# Patient Record
Sex: FEMALE | Race: WHITE | Employment: OTHER | URBAN - METROPOLITAN AREA
[De-identification: names, ages, dates, MRNs, and addresses within clinical notes are randomized per-mention and may not be internally consistent; named-entity substitution may affect disease eponyms.]

---

## 2017-01-05 ENCOUNTER — OFFICE VISIT (OUTPATIENT)
Dept: CARDIOLOGY CLINIC | Age: 62
End: 2017-01-05

## 2017-01-05 VITALS
WEIGHT: 167.4 LBS | HEART RATE: 96 BPM | DIASTOLIC BLOOD PRESSURE: 70 MMHG | SYSTOLIC BLOOD PRESSURE: 120 MMHG | BODY MASS INDEX: 27.02 KG/M2

## 2017-01-05 DIAGNOSIS — E78.1 PURE HYPERGLYCERIDEMIA: Primary | ICD-10-CM

## 2017-01-05 DIAGNOSIS — R94.39 POSITIVE CARDIAC STRESS TEST: ICD-10-CM

## 2017-01-05 PROCEDURE — 99214 OFFICE O/P EST MOD 30 MIN: CPT | Performed by: INTERNAL MEDICINE

## 2017-01-06 RX ORDER — ROSUVASTATIN CALCIUM 10 MG/1
10 TABLET, COATED ORAL NIGHTLY
Qty: 90 TABLET | Refills: 0 | Status: SHIPPED | OUTPATIENT
Start: 2017-01-06 | End: 2017-05-12 | Stop reason: SDUPTHER

## 2017-01-20 RX ORDER — TRAMADOL HYDROCHLORIDE 50 MG/1
TABLET ORAL
Qty: 30 TABLET | Refills: 0 | Status: SHIPPED | OUTPATIENT
Start: 2017-01-20 | End: 2017-03-06 | Stop reason: SDUPTHER

## 2017-01-20 RX ORDER — PREDNISONE 1 MG/1
1 TABLET ORAL DAILY
Qty: 10 TABLET | Refills: 0 | Status: SHIPPED | OUTPATIENT
Start: 2017-01-20 | End: 2017-07-11

## 2017-02-14 RX ORDER — CITALOPRAM 20 MG/1
TABLET ORAL
Qty: 90 TABLET | Refills: 0 | Status: SHIPPED | OUTPATIENT
Start: 2017-02-14 | End: 2017-04-05 | Stop reason: SDUPTHER

## 2017-03-06 RX ORDER — TRAMADOL HYDROCHLORIDE 50 MG/1
TABLET ORAL
Qty: 30 TABLET | Refills: 0 | Status: SHIPPED | OUTPATIENT
Start: 2017-03-06 | End: 2017-04-05 | Stop reason: SDUPTHER

## 2017-03-14 DIAGNOSIS — B37.0 ORAL THRUSH: Primary | ICD-10-CM

## 2017-04-05 ENCOUNTER — OFFICE VISIT (OUTPATIENT)
Dept: FAMILY MEDICINE CLINIC | Age: 62
End: 2017-04-05

## 2017-04-05 VITALS
WEIGHT: 162 LBS | SYSTOLIC BLOOD PRESSURE: 140 MMHG | OXYGEN SATURATION: 98 % | BODY MASS INDEX: 26.03 KG/M2 | DIASTOLIC BLOOD PRESSURE: 74 MMHG | HEART RATE: 93 BPM | HEIGHT: 66 IN

## 2017-04-05 DIAGNOSIS — Z86.69 HISTORY OF MIGRAINE: ICD-10-CM

## 2017-04-05 DIAGNOSIS — M79.7 FIBROMYALGIA: Primary | ICD-10-CM

## 2017-04-05 DIAGNOSIS — Z02.89 PAIN MEDICATION AGREEMENT: ICD-10-CM

## 2017-04-05 PROCEDURE — 99214 OFFICE O/P EST MOD 30 MIN: CPT | Performed by: FAMILY MEDICINE

## 2017-04-05 RX ORDER — RIZATRIPTAN BENZOATE 10 MG/1
TABLET ORAL
Qty: 27 TABLET | Refills: 3 | Status: SHIPPED | OUTPATIENT
Start: 2017-04-05 | End: 2017-12-21 | Stop reason: SDUPTHER

## 2017-04-05 RX ORDER — TRAMADOL HYDROCHLORIDE 50 MG/1
TABLET ORAL
Qty: 30 TABLET | Refills: 2 | Status: SHIPPED | OUTPATIENT
Start: 2017-04-05 | End: 2017-07-20 | Stop reason: SDUPTHER

## 2017-04-05 RX ORDER — CITALOPRAM 20 MG/1
TABLET ORAL
Qty: 90 TABLET | Refills: 1 | Status: SHIPPED | OUTPATIENT
Start: 2017-04-05 | End: 2017-12-11 | Stop reason: SDUPTHER

## 2017-04-06 ENCOUNTER — NURSE ONLY (OUTPATIENT)
Dept: FAMILY MEDICINE CLINIC | Age: 62
End: 2017-04-06

## 2017-04-06 DIAGNOSIS — E78.1 PURE HYPERGLYCERIDEMIA: ICD-10-CM

## 2017-04-06 DIAGNOSIS — E78.5 HYPERLIPIDEMIA, UNSPECIFIED HYPERLIPIDEMIA TYPE: Primary | ICD-10-CM

## 2017-04-06 DIAGNOSIS — R94.39 POSITIVE CARDIAC STRESS TEST: ICD-10-CM

## 2017-04-07 LAB
A/G RATIO: 1.8 (ref 1.1–2.2)
ALBUMIN SERPL-MCNC: 4.2 G/DL (ref 3.4–5)
ALP BLD-CCNC: 85 U/L (ref 40–129)
ALT SERPL-CCNC: 10 U/L (ref 10–40)
ANION GAP SERPL CALCULATED.3IONS-SCNC: 15 MMOL/L (ref 3–16)
AST SERPL-CCNC: 13 U/L (ref 15–37)
BILIRUB SERPL-MCNC: 0.3 MG/DL (ref 0–1)
BUN BLDV-MCNC: 10 MG/DL (ref 7–20)
CALCIUM SERPL-MCNC: 9.2 MG/DL (ref 8.3–10.6)
CHLORIDE BLD-SCNC: 102 MMOL/L (ref 99–110)
CHOLESTEROL, TOTAL: 186 MG/DL (ref 0–199)
CO2: 25 MMOL/L (ref 21–32)
CREAT SERPL-MCNC: 0.6 MG/DL (ref 0.6–1.2)
GFR AFRICAN AMERICAN: >60
GFR NON-AFRICAN AMERICAN: >60
GLOBULIN: 2.3 G/DL
GLUCOSE BLD-MCNC: 92 MG/DL (ref 70–99)
HDLC SERPL-MCNC: 59 MG/DL (ref 40–60)
LDL CHOLESTEROL CALCULATED: 100 MG/DL
POTASSIUM SERPL-SCNC: 3.9 MMOL/L (ref 3.5–5.1)
SODIUM BLD-SCNC: 142 MMOL/L (ref 136–145)
TOTAL PROTEIN: 6.5 G/DL (ref 6.4–8.2)
TRIGL SERPL-MCNC: 137 MG/DL (ref 0–150)
VLDLC SERPL CALC-MCNC: 27 MG/DL

## 2017-04-10 LAB
6-ACETYLMORPHINE: NOT DETECTED
7-AMINOCLONAZEPAM: NOT DETECTED
ALPHA-OH-ALPRAZOLAM: NOT DETECTED
ALPRAZOLAM: NOT DETECTED
AMPHETAMINE: NOT DETECTED
BARBITURATES: NOT DETECTED
BENZOYLECGONINE: NOT DETECTED
BUPRENORPHINE: NOT DETECTED
CARISOPRODOL: NOT DETECTED
CLONAZEPAM: NOT DETECTED
CODEINE: NOT DETECTED
CREATININE URINE: 195.9 MG/DL (ref 20–400)
DIAZEPAM: NOT DETECTED
DRUGS EXPECTED: NORMAL
EER PAIN MGT DRUG PANEL, HIGH RES/EMIT U: NORMAL
ETHYL GLUCURONIDE: NOT DETECTED
FENTANYL: NOT DETECTED
HYDROCODONE: NOT DETECTED
HYDROMORPHONE: NOT DETECTED
LORAZEPAM: NOT DETECTED
MARIJUANA METABOLITE: NOT DETECTED
MDA: NOT DETECTED
MDEA: NOT DETECTED
MDMA URINE: NOT DETECTED
MEPERIDINE: NOT DETECTED
METHADONE: NOT DETECTED
METHAMPHETAMINE: NOT DETECTED
METHYLPHENIDATE: NOT DETECTED
MIDAZOLAM: NOT DETECTED
MORPHINE: NOT DETECTED
NORBUPRENORPHINE, FREE: NOT DETECTED
NORDIAZEPAM: NOT DETECTED
NORFENTANYL: NOT DETECTED
NORHYDROCODONE, URINE: NOT DETECTED
NOROXYCODONE: NOT DETECTED
NOROXYMORPHONE, URINE: NOT DETECTED
OXAZEPAM: NOT DETECTED
OXYCODONE: NOT DETECTED
OXYMORPHONE: NOT DETECTED
PAIN MANAGEMENT DRUG PANEL: NORMAL
PAIN MANAGEMENT DRUG PANEL: NORMAL
PCP: NOT DETECTED
PHENTERMINE: NOT DETECTED
PROPOXYPHENE: NOT DETECTED
TAPENTADOL, URINE: NOT DETECTED
TAPENTADOL-O-SULFATE, URINE: NOT DETECTED
TEMAZEPAM: NOT DETECTED
TRAMADOL: PRESENT
ZOLPIDEM: NOT DETECTED

## 2017-05-12 RX ORDER — ROSUVASTATIN CALCIUM 10 MG/1
TABLET, COATED ORAL
Qty: 90 TABLET | Refills: 0 | Status: SHIPPED | OUTPATIENT
Start: 2017-05-12 | End: 2017-09-21 | Stop reason: SDUPTHER

## 2017-06-08 ENCOUNTER — TELEPHONE (OUTPATIENT)
Dept: FAMILY MEDICINE CLINIC | Age: 62
End: 2017-06-08

## 2017-06-08 DIAGNOSIS — R51.9 CHRONIC NONINTRACTABLE HEADACHE, UNSPECIFIED HEADACHE TYPE: Primary | ICD-10-CM

## 2017-06-08 DIAGNOSIS — G89.29 CHRONIC NONINTRACTABLE HEADACHE, UNSPECIFIED HEADACHE TYPE: Primary | ICD-10-CM

## 2017-06-15 PROBLEM — G43.119 MIGRAINE WITH AURA, INTRACTABLE, WITHOUT STATUS MIGRAINOSUS: Status: ACTIVE | Noted: 2017-06-15

## 2017-06-15 PROBLEM — R41.3 MEMORY PROBLEM: Status: ACTIVE | Noted: 2017-06-15

## 2017-06-15 PROBLEM — M79.2 NEUROPATHIC PAIN: Status: ACTIVE | Noted: 2017-06-15

## 2017-06-15 PROBLEM — G43.719 INTRACTABLE CHRONIC MIGRAINE WITHOUT AURA AND WITHOUT STATUS MIGRAINOSUS: Status: ACTIVE | Noted: 2017-06-15

## 2017-06-22 DIAGNOSIS — R30.0 DYSURIA: Primary | ICD-10-CM

## 2017-06-22 LAB
BILIRUBIN, POC: ABNORMAL
BLOOD URINE, POC: ABNORMAL
CLARITY, POC: ABNORMAL
COLOR, POC: ABNORMAL
GLUCOSE URINE, POC: ABNORMAL
KETONES, POC: ABNORMAL
LEUKOCYTE EST, POC: ABNORMAL
NITRITE, POC: ABNORMAL
PH, POC: 7.5
PROTEIN, POC: 30
SPECIFIC GRAVITY, POC: 1.02
UROBILINOGEN, POC: 0.2

## 2017-06-22 PROCEDURE — 81002 URINALYSIS NONAUTO W/O SCOPE: CPT | Performed by: FAMILY MEDICINE

## 2017-06-22 RX ORDER — NITROFURANTOIN 25; 75 MG/1; MG/1
100 CAPSULE ORAL 2 TIMES DAILY
Qty: 14 CAPSULE | Refills: 0 | Status: SHIPPED | OUTPATIENT
Start: 2017-06-22 | End: 2017-06-29

## 2017-06-23 ENCOUNTER — HOSPITAL ENCOUNTER (OUTPATIENT)
Dept: MRI IMAGING | Age: 62
Discharge: HOME | End: 2017-06-23
Attending: PSYCHIATRY & NEUROLOGY | Admitting: PSYCHIATRY & NEUROLOGY

## 2017-06-23 DIAGNOSIS — G43.119 MIGRAINE WITH AURA, INTRACTABLE, WITHOUT STATUS MIGRAINOSUS: ICD-10-CM

## 2017-06-23 DIAGNOSIS — R41.3 MEMORY PROBLEM: ICD-10-CM

## 2017-06-23 DIAGNOSIS — G43.719 INTRACTABLE CHRONIC MIGRAINE WITHOUT AURA AND WITHOUT STATUS MIGRAINOSUS: ICD-10-CM

## 2017-06-24 LAB
ORGANISM: ABNORMAL
URINE CULTURE, ROUTINE: ABNORMAL

## 2017-07-07 ENCOUNTER — PATIENT MESSAGE (OUTPATIENT)
Dept: FAMILY MEDICINE CLINIC | Age: 62
End: 2017-07-07

## 2017-07-10 ENCOUNTER — TELEPHONE (OUTPATIENT)
Dept: FAMILY MEDICINE CLINIC | Age: 62
End: 2017-07-10

## 2017-07-11 ENCOUNTER — OFFICE VISIT (OUTPATIENT)
Dept: FAMILY MEDICINE CLINIC | Age: 62
End: 2017-07-11

## 2017-07-11 ENCOUNTER — TELEPHONE (OUTPATIENT)
Dept: FAMILY MEDICINE CLINIC | Age: 62
End: 2017-07-11

## 2017-07-11 VITALS
SYSTOLIC BLOOD PRESSURE: 138 MMHG | WEIGHT: 168 LBS | HEART RATE: 74 BPM | HEIGHT: 66 IN | DIASTOLIC BLOOD PRESSURE: 82 MMHG | OXYGEN SATURATION: 98 % | BODY MASS INDEX: 27 KG/M2

## 2017-07-11 DIAGNOSIS — M79.7 FIBROMYALGIA: ICD-10-CM

## 2017-07-11 DIAGNOSIS — Z12.39 SCREENING FOR BREAST CANCER: ICD-10-CM

## 2017-07-11 PROCEDURE — 99212 OFFICE O/P EST SF 10 MIN: CPT | Performed by: NURSE PRACTITIONER

## 2017-07-11 RX ORDER — MAGNESIUM OXIDE 400 MG/1
400 TABLET ORAL DAILY
COMMUNITY
End: 2019-06-17 | Stop reason: ALTCHOICE

## 2017-07-19 ASSESSMENT — ENCOUNTER SYMPTOMS: RESPIRATORY NEGATIVE: 1

## 2017-07-20 RX ORDER — TRAMADOL HYDROCHLORIDE 50 MG/1
TABLET ORAL
Qty: 30 TABLET | Refills: 0 | Status: SHIPPED | OUTPATIENT
Start: 2017-07-20 | End: 2017-08-31 | Stop reason: SDUPTHER

## 2017-08-31 RX ORDER — TRAMADOL HYDROCHLORIDE 50 MG/1
TABLET ORAL
Qty: 30 TABLET | Refills: 0 | Status: SHIPPED | OUTPATIENT
Start: 2017-08-31 | End: 2017-10-04 | Stop reason: SDUPTHER

## 2017-09-13 DIAGNOSIS — R11.0 NAUSEA: ICD-10-CM

## 2017-09-13 RX ORDER — PROMETHAZINE HYDROCHLORIDE 25 MG/1
25 TABLET ORAL EVERY 6 HOURS PRN
Qty: 20 TABLET | Refills: 0 | Status: SHIPPED | OUTPATIENT
Start: 2017-09-13 | End: 2018-03-08 | Stop reason: SDUPTHER

## 2017-09-21 RX ORDER — ROSUVASTATIN CALCIUM 10 MG/1
10 TABLET, COATED ORAL DAILY
Qty: 90 TABLET | Refills: 0 | Status: SHIPPED | OUTPATIENT
Start: 2017-09-21 | End: 2018-05-09

## 2017-10-04 RX ORDER — TRAMADOL HYDROCHLORIDE 50 MG/1
TABLET ORAL
Qty: 30 TABLET | Refills: 0 | Status: SHIPPED | OUTPATIENT
Start: 2017-10-04 | End: 2017-11-15 | Stop reason: SDUPTHER

## 2017-10-04 NOTE — TELEPHONE ENCOUNTER
From: Riaz Rodriguez  To: Gene Gallardo DO  Sent: 10/4/2017 11:03 AM EDT  Subject: Medication Renewal Request    Original authorizing provider: DO Shelby Aguilar would like a refill of the following medications:  traMADol (ULTRAM) 50 MG tablet Gene Gallardo DO]    Preferred pharmacy: [facility] [address], [address] [phone] - F [phone]    Comment:  Hi Dr. Wilian Albright, I need to request a refill on my prescription for Tramadol. My next appointment with you is not until 11.06.17.  Thanks

## 2017-10-24 ENCOUNTER — OFFICE VISIT (OUTPATIENT)
Dept: FAMILY MEDICINE CLINIC | Age: 62
End: 2017-10-24

## 2017-10-24 DIAGNOSIS — T78.40XA ALLERGIC REACTION, INITIAL ENCOUNTER: Primary | ICD-10-CM
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
## 2017-10-24 PROCEDURE — 99213 OFFICE O/P EST LOW 20 MIN: CPT | Performed by: NURSE PRACTITIONER

## 2017-10-24 PROCEDURE — 96372 THER/PROPH/DIAG INJ SC/IM: CPT | Performed by: NURSE PRACTITIONER

## 2017-10-24 PROCEDURE — G0444 DEPRESSION SCREEN ANNUAL: HCPCS | Performed by: NURSE PRACTITIONER

## 2017-10-24 RX ORDER — HYDROXYZINE HYDROCHLORIDE 25 MG/1
25 TABLET, FILM COATED ORAL 3 TIMES DAILY PRN
Qty: 20 TABLET | Refills: 0 | Status: SHIPPED | OUTPATIENT
Start: 2017-10-24 | End: 2019-08-23

## 2017-10-24 RX ORDER — MUPIROCIN CALCIUM 20 MG/G
CREAM TOPICAL
Qty: 30 G | Refills: 0 | Status: SHIPPED | OUTPATIENT
Start: 2017-10-24 | End: 2017-11-23

## 2017-10-24 RX ORDER — CLOTRIMAZOLE AND BETAMETHASONE DIPROPIONATE 10; .64 MG/G; MG/G
CREAM TOPICAL PRN
COMMUNITY
End: 2020-10-21

## 2017-10-24 RX ORDER — METHYLPREDNISOLONE SODIUM SUCCINATE 125 MG/2ML
125 INJECTION, POWDER, LYOPHILIZED, FOR SOLUTION INTRAMUSCULAR; INTRAVENOUS ONCE
Status: COMPLETED | OUTPATIENT
Start: 2017-10-24 | End: 2017-10-24

## 2017-10-24 RX ORDER — PREDNISONE 10 MG/1
TABLET ORAL
Qty: 30 TABLET | Refills: 0 | Status: SHIPPED | OUTPATIENT
Start: 2017-10-24 | End: 2017-10-26 | Stop reason: ALTCHOICE

## 2017-10-24 RX ADMIN — METHYLPREDNISOLONE SODIUM SUCCINATE 125 MG: 125 INJECTION, POWDER, LYOPHILIZED, FOR SOLUTION INTRAMUSCULAR; INTRAVENOUS at 19:38

## 2017-10-24 ASSESSMENT — PATIENT HEALTH QUESTIONNAIRE - PHQ9
2. FEELING DOWN, DEPRESSED OR HOPELESS: 3
1. LITTLE INTEREST OR PLEASURE IN DOING THINGS: 0
6. FEELING BAD ABOUT YOURSELF - OR THAT YOU ARE A FAILURE OR HAVE LET YOURSELF OR YOUR FAMILY DOWN: 1
9. THOUGHTS THAT YOU WOULD BE BETTER OFF DEAD, OR OF HURTING YOURSELF: 0
SUM OF ALL RESPONSES TO PHQ QUESTIONS 1-9: 6
10. IF YOU CHECKED OFF ANY PROBLEMS, HOW DIFFICULT HAVE THESE PROBLEMS MADE IT FOR YOU TO DO YOUR WORK, TAKE CARE OF THINGS AT HOME, OR GET ALONG WITH OTHER PEOPLE: 1
3. TROUBLE FALLING OR STAYING ASLEEP: 0
8. MOVING OR SPEAKING SO SLOWLY THAT OTHER PEOPLE COULD HAVE NOTICED. OR THE OPPOSITE, BEING SO FIGETY OR RESTLESS THAT YOU HAVE BEEN MOVING AROUND A LOT MORE THAN USUAL: 0
SUM OF ALL RESPONSES TO PHQ9 QUESTIONS 1 & 2: 3
4. FEELING TIRED OR HAVING LITTLE ENERGY: 2
5. POOR APPETITE OR OVEREATING: 0
7. TROUBLE CONCENTRATING ON THINGS, SUCH AS READING THE NEWSPAPER OR WATCHING TELEVISION: 0

## 2017-10-24 NOTE — PATIENT INSTRUCTIONS
provider before taking prednisone? You should not use this medication if you are allergic to prednisone, or if you have a fungal infection anywhere in your body. Steroid medication can weaken your immune system, making it easier for you to get an infection or worsening an infection you already have or have recently had. Tell your doctor about any illness or infection you have had within the past several weeks. To make sure prednisone is safe for you, tell your doctor if you have:  · any illness that causes diarrhea;  · liver disease (such as cirrhosis);  · kidney disease;  · heart disease, high blood pressure, low levels of potassium in your blood;  · a thyroid disorder;  · diabetes;  · a history of malaria;  · tuberculosis;  · osteoporosis;  · glaucoma, cataracts, or herpes infection of the eyes;  · stomach ulcers, ulcerative colitis, or a history of stomach bleeding;  · a muscle disorder such as myasthenia gravis; or  · depression or mental illness. Long-term use of steroids may lead to bone loss (osteoporosis), especially if you smoke, if you do not exercise, if you do not get enough vitamin D or calcium in your diet, or if you have a family history of osteoporosis. Talk with your doctor about your risk of osteoporosis. Prednisone can cause low birth weight or birth defects if you take the medicine during your first trimester. Tell your doctor if you are pregnant or plan to become pregnant while using this medication. Use effective birth control. Prednisone can pass into breast milk and may harm a nursing baby. Tell your doctor if you are breast-feeding a baby. Steroids can affect growth in children. Talk with your doctor if you think your child is not growing at a normal rate while using this medication. How should I take prednisone? Follow all directions on your prescription label. Your doctor may occasionally change your dose to make sure you get the best results.  Do not take this medicine in larger or smaller amounts or for longer than recommended. Take with food. Your dosage needs may change if you have any unusual stress such as a serious illness, fever or infection, or if you have surgery or a medical emergency. Do not change your medication dose or schedule without your doctor's advice. Measure liquid medicine with a special dose-measuring spoon or medicine cup. If you do not have a dose-measuring device, ask your pharmacist for one. Do not crush, chew, or break a delayed-release tablet. Swallow it whole. While using prednisone, you may need frequent blood tests at your doctor's office. Your blood pressure may also need to be checked. This medication can cause unusual results with certain medical tests. Tell any doctor who treats you that you are using prednisone. You should not stop using prednisone suddenly. Follow your doctor's instructions about tapering your dose. Wear a medical alert tag or carry an ID card stating that you take prednisone. Any medical care provider who treats you should know that you are using a steroid. Store at room temperature away from moisture and heat. What happens if I miss a dose? Take the missed dose as soon as you remember. Skip the missed dose if it is almost time for your next scheduled dose. Do not take extra medicine to make up the missed dose. What happens if I overdose? Seek emergency medical attention or call the Poison Help line at 1-684.212.8020. An overdose of prednisone is not expected to produce life threatening symptoms. However, long term use of high steroid doses can lead to symptoms such as thinning skin, easy bruising, changes in the shape or location of body fat (especially in your face, neck, back, and waist), increased acne or facial hair, menstrual problems, impotence, or loss of interest in sex. What should I avoid while taking prednisone? Avoid being near people who are sick or have infections.  Call your doctor for preventive treatment if you are exposed to chicken pox or measles. These conditions can be serious or even fatal in people who are using a steroid. Do not receive a \"live\" vaccine while using prednisone. Prednisone may increase your risk of harmful effects from a live vaccine. Live vaccines include measles, mumps, rubella (MMR), rotavirus, typhoid, yellow fever, varicella (chickenpox), zoster (shingles), and nasal flu (influenza) vaccine. Avoid drinking alcohol while you are taking prednisone. What are the possible side effects of prednisone? Get emergency medical help if you have any of these signs of an allergic reaction: hives; difficult breathing; swelling of your face, lips, tongue, or throat. Call your doctor at once if you have:  · blurred vision, eye pain, or seeing halos around lights;  · swelling, rapid weight gain, feeling short of breath;  · severe depression, feelings of extreme happiness or sadness, changes in personality or behavior, seizure (convulsions);  · bloody or tarry stools, coughing up blood;  · pancreatitis (severe pain in your upper stomach spreading to your back, nausea and vomiting, fast heart rate);  · low potassium (confusion, uneven heart rate, extreme thirst, increased urination, leg discomfort, muscle weakness or limp feeling); or  · dangerously high blood pressure (severe headache, blurred vision, buzzing in your ears, anxiety, confusion, chest pain, shortness of breath, uneven heartbeats, seizure). Other common side effects may include:  · sleep problems (insomnia), mood changes;  · increased appetite, gradual weight gain;  · acne, increased sweating, dry skin, thinning skin, bruising or discoloration;  · slow wound healing;  · headache, dizziness, spinning sensation;  · nausea, stomach pain, bloating; or  · changes in the shape or location of body fat (especially in your arms, legs, face, neck, breasts, and waist). This is not a complete list of side effects and others may occur.  Call your doctor for medical advice about side effects. You may report side effects to FDA at 8-261-SDE-2012. What other drugs will affect prednisone? Many drugs can interact with prednisone. Not all possible interactions are listed here. Tell your doctor about all your medications and any you start or stop using during treatment with prednisone, especially:  · amphotericin B;  · cyclosporine;  · digoxin, digitalis;  · Ramon's wort;  · an antibiotic such as clarithromycin or telithromycin;  · antifungal medication such as itraconazole, ketoconazole, posaconazole, voriconazole;  · birth control pills and other hormones;  · a blood thinner such as warfarin, Coumadin;  · a diuretic or \"water pill\";  · the hepatitis C medications boceprevir or telaprevir;  · HIV or AIDS medicine such as atazanavir, delavirdine, efavirenz, fosamprenavir, indinavir, nelfinavir, nevirapine, ritonavir, saquinavir;  · insulin or diabetes medications you take by mouth;  · a non-steroidal anti-inflammatory drug (NSAID) such as aspirin, ibuprofen (Advil, Motrin), naproxen (Aleve), celecoxib, diclofenac, indomethacin, meloxicam, and others;  · seizure medications such as carbamazepine, fosphenytoin, oxcarbazepine, phenobarbital, phenytoin, primidone; or  · the tuberculosis medications isoniazid, rifabutin, rifapentine, or rifampin. This list is not complete and many other drugs can interact with prednisone. This includes prescription and over-the-counter medicines, vitamins, and herbal products. Give a list of all your medicines to any healthcare provider who treats you. Where can I get more information? Your pharmacist can provide more information about prednisone. Remember, keep this and all other medicines out of the reach of children, never share your medicines with others, and use this medication only for the indication prescribed.   Every effort has been made to ensure that the information provided by Maddy smith accurate, up-to-date, and complete, but no guarantee is made to that effect. Drug information contained herein may be time sensitive. [facility] information has been compiled for use by healthcare practitioners and consumers in the [place] and therefore [facility] does not warrant that uses outside of the [place] are appropriate, unless specifically indicated otherwise. [facility] information does not endorse drugs, diagnose patients or recommend therapy. [facility]'s drug information is an informational resource designed to assist licensed healthcare practitioners in caring for their patients and/or to serve consumers viewing this service as a supplement to, and not a substitute for, the expertise, skill, knowledge and judgment of healthcare practitioners. The absence of a warning for a given drug or drug combination in no way should be construed to indicate that the drug or drug combination is safe, effective or appropriate for any given patient. [facility] does not assume any responsibility for any aspect of healthcare administered with the aid of information [facility] provides. The information contained herein is not intended to cover all possible uses, directions, precautions, warnings, drug interactions, allergic reactions, or adverse effects. If you have questions about the drugs you are taking, check with your doctor, nurse or pharmacist.  Copyright 8285-7593 [address]. Version: 9.01. Revision date: 2/13/2013. Care instructions adapted under license by [facility] ([facility]). If you have questions about a medical condition or this instruction, always ask your healthcare professional. [place] [address] any warranty or liability for your use of this information. Patient Education          hydroxyzine  Pronunciation:  julia DROX irene preston  Brand:  Vistaril  What is the most important information I should know about hydroxyzine?   You should not use hydroxyzine if you are pregnant, especially your entire arm or leg is swollen). · You have signs of infection, such as:  ¨ Increased pain, swelling, redness, or warmth around the sting. ¨ Red streaks leading from the area. ¨ Pus draining from the sting. ¨ A fever. Watch closely for changes in your health, and be sure to contact your doctor if:  · You do not get better as expected. Where can you learn more? Go to https://[email or web address]. org and sign in to your [employer] account. Enter P390 in the [employer] box to learn more about \"Insect Stings and Bites: Care Instructions. \"     If you do not have an account, please click on the \"Sign Up Now\" link. Current as of: March 20, 2017  Content Version: 11.3  © 0091-7299 [employer]. Care instructions adapted under license by [facility] ([facility]). If you have questions about a medical condition or this instruction, always ask your healthcare professional. [place] [address] any warranty or liability for your use of this information. Patient Education          mupirocin topical  Pronunciation:  myoo PEER oh sin  Brand:  Francie Hernandez AT Kit  What is the most important information I should know about mupirocin topical?  Follow all directions on your medicine label and package. Tell each of your healthcare providers about all your medical conditions, allergies, and all medicines you use. What is mupirocin topical?  Mupirocin is an antibiotic that prevents bacteria from growing on your skin. Mupirocin topical (for use on the skin) is used to treat skin infections such as impetigo (JH-xn-ELR-go) or a \"Staph\" infection of the skin. Mupirocin topical may also be used for purposes not listed in this medication guide. What should I discuss with my healthcare provider before using mupirocin topical?  You should not use this medicine if you are allergic to mupirocin. Do not use mupirocin topical on a child without medical advice.  The cream should not be used on a child younger than 1 months old. The ointment may be used on a child as young as 3 months old. It is not known whether this medicine will harm an unborn baby. Tell your doctor if you are pregnant or plan to become pregnant. It is not known whether mupirocin topical passes into breast milk or if it could harm a nursing baby. Tell your doctor if you are breast-feeding a baby. How should I use mupirocin topical?  Follow all directions on your prescription label. Do not use this medicine in larger or smaller amounts or for longer than recommended. Do not take by mouth. Mupirocin topical is for use only on the skin. If this medicine gets in your eyes, nose, or mouth, rinse with water. Wash your hands before and after applying mupirocin topical.  Clean and dry the affected skin area. Use a cotton swab or gauze pad to apply a small amount of mupirocin topical as directed. Do not spread mupirocin topical over large areas of skin. Use only a sterile gauze pad to cover the treated skin. Do not cover treated areas with a bandage, plastic wrap, or other covering that does not allow air to circulate. Mupirocin topical is usually applied 3 times per day for 10 days. Call your doctor if your symptoms do not improve within 3 to 5 days, or if your skin condition gets worse. Use this medicine for the full prescribed length of time. Your symptoms may improve before the infection is completely cleared. Skipping doses may also increase your risk of further infection that is resistant to antibiotics. Store at room temperature away from moisture and heat. Do not freeze. Keep the medicine tube tightly closed when not in use. What happens if I miss a dose? Apply the missed dose as soon as you remember. Skip the missed dose if it is almost time for your next dose. Do not use extra medicine to make up the missed dose. What happens if I overdose? An overdose of mupirocin topical is not expected to be dangerous.  Seek emergency medical attention or call the Poison Help line at 1-832.445.5161 if anyone has accidentally swallowed the medication. What should I avoid while using mupirocin topical?  Antibiotic medicines can cause diarrhea, which may be a sign of a new infection. If you have diarrhea that is watery or bloody, call your doctor. Do not use anti-diarrhea medicine unless your doctor tells you to. Avoid getting this medicine in your eyes, mouth, or nose. A separate product called mupirocin nasal is made for use in the nose. Mupirocin topical is for use only on the skin. Avoid using other medications on the areas you treat with mupirocin topical unless your doctor tells you to. What are the possible side effects of mupirocin topical?  Get emergency medical help if you have signs of an allergic reaction: hives; difficult breathing; swelling of your face, lips, tongue, or throat. Call your doctor at once if you have:  · severe stomach pain, diarrhea that is watery or bloody;  · redness, itching, dryness, or other irritation of treated skin;  · unusual skin blistering or peeling; or  · any signs of a new skin infection. Common side effects may include:  · rash;  · nausea; or  · headache. This is not a complete list of side effects and others may occur. Call your doctor for medical advice about side effects. You may report side effects to FDA at 6-613-JUM-3618. What other drugs will affect mupirocin topical?  It is not likely that other drugs you take orally or inject will have an effect on topically applied mupirocin. But many drugs can interact with each other. Tell each of your health care providers about all medicines you use, including prescription and over-the-counter medicines, vitamins, and herbal products. Where can I get more information?   Your pharmacist can provide more information about mupirocin topical.    Remember, keep this and all other medicines out of the reach of children, never share your medicines with others, and use this medication only for the indication prescribed. Every effort has been made to ensure that the information provided by Maddy Donaldson Dr is accurate, up-to-date, and complete, but no guarantee is made to that effect. Drug information contained herein may be time sensitive. [facility] information has been compiled for use by healthcare practitioners and consumers in the [place] and therefore [facility] does not warrant that uses outside of the [place] are appropriate, unless specifically indicated otherwise. [facility]'s drug information does not endorse drugs, diagnose patients or recommend therapy. [facility]'s drug information is an informational resource designed to assist licensed healthcare practitioners in caring for their patients and/or to serve consumers viewing this service as a supplement to, and not a substitute for, the expertise, skill, knowledge and judgment of healthcare practitioners. The absence of a warning for a given drug or drug combination in no way should be construed to indicate that the drug or drug combination is safe, effective or appropriate for any given patient. [facility] does not assume any responsibility for any aspect of healthcare administered with the aid of information [facility] provides. The information contained herein is not intended to cover all possible uses, directions, precautions, warnings, drug interactions, allergic reactions, or adverse effects. If you have questions about the drugs you are taking, check with your doctor, nurse or pharmacist.  Copyright 2025-0274 [address]. Version: 3.01. Revision date: 2/1/2016. Care instructions adapted under license by [facility] ([facility]). If you have questions about a medical condition or this instruction, always ask your healthcare professional. [place] [address] any warranty or liability for your use of this information.

## 2017-10-26 ENCOUNTER — TELEPHONE (OUTPATIENT)
Dept: FAMILY MEDICINE CLINIC | Age: 62
End: 2017-10-26

## 2017-10-26 ENCOUNTER — OFFICE VISIT (OUTPATIENT)
Dept: FAMILY MEDICINE CLINIC | Age: 62
End: 2017-10-26

## 2017-10-26 VITALS
DIASTOLIC BLOOD PRESSURE: 94 MMHG | HEIGHT: 66 IN | SYSTOLIC BLOOD PRESSURE: 136 MMHG | RESPIRATION RATE: 20 BRPM | TEMPERATURE: 99.1 F | OXYGEN SATURATION: 98 % | BODY MASS INDEX: 26.2 KG/M2 | WEIGHT: 163 LBS | HEART RATE: 106 BPM

## 2017-10-26 DIAGNOSIS — R11.2 NAUSEA AND VOMITING, INTRACTABILITY OF VOMITING NOT SPECIFIED, UNSPECIFIED VOMITING TYPE: Primary | ICD-10-CM

## 2017-10-26 PROCEDURE — 96372 THER/PROPH/DIAG INJ SC/IM: CPT | Performed by: NURSE PRACTITIONER

## 2017-10-26 PROCEDURE — 99213 OFFICE O/P EST LOW 20 MIN: CPT | Performed by: NURSE PRACTITIONER

## 2017-10-26 RX ORDER — HYDROXYZINE HYDROCHLORIDE 25 MG/1
TABLET, FILM COATED ORAL
Qty: 20 TABLET | Refills: 0 | OUTPATIENT
Start: 2017-10-26

## 2017-10-26 RX ORDER — ONDANSETRON 4 MG/1
4 TABLET, ORALLY DISINTEGRATING ORAL EVERY 8 HOURS PRN
Qty: 15 TABLET | Refills: 0 | Status: SHIPPED | OUTPATIENT
Start: 2017-10-26 | End: 2019-01-31

## 2017-10-26 RX ORDER — PROMETHAZINE HYDROCHLORIDE 25 MG/ML
25 INJECTION, SOLUTION INTRAMUSCULAR; INTRAVENOUS ONCE
Status: COMPLETED | OUTPATIENT
Start: 2017-10-26 | End: 2017-10-26

## 2017-10-26 RX ORDER — PROMETHAZINE HYDROCHLORIDE 25 MG/1
25 SUPPOSITORY RECTAL EVERY 6 HOURS PRN
Qty: 8 SUPPOSITORY | Refills: 0 | Status: ON HOLD | OUTPATIENT
Start: 2017-10-26 | End: 2019-06-01

## 2017-10-26 RX ADMIN — PROMETHAZINE HYDROCHLORIDE 25 MG: 25 INJECTION, SOLUTION INTRAMUSCULAR; INTRAVENOUS at 11:03

## 2017-10-26 ASSESSMENT — ENCOUNTER SYMPTOMS
SPUTUM PRODUCTION: 0
HEARTBURN: 0
BLOOD IN STOOL: 0
DIARRHEA: 0
EYES NEGATIVE: 1
ABDOMINAL PAIN: 0
NAUSEA: 1
COUGH: 0
ORTHOPNEA: 0
SHORTNESS OF BREATH: 0
HEMOPTYSIS: 0
RESPIRATORY NEGATIVE: 1
CONSTIPATION: 0
VOMITING: 1

## 2017-10-26 NOTE — TELEPHONE ENCOUNTER
Called patient to check on her status. She said she feels much better, no longer having facial discomfort or hot sensation. No longer nauseated, has been able to drink and eat without emesis. She was able to rest and get sleep with phenergan.

## 2017-10-26 NOTE — LETTER
[facility]  [address]  Phone: [phone]  Fax: [phone] [place], [place]        October 26, 2017     Patient: Demetra Fonseca   YOB: 1955   Date of Visit: 10/26/2017       To Whom it May Concern:    Zakia Ribera was seen in my clinic on 10/26/2017. She may return to school on 10/26/17. If you have any questions or concerns, please don't hesitate to call.     Sincerely,         Luis Miguel Hernandez, CNP

## 2017-10-26 NOTE — PROGRESS NOTES
take ibuprofen or aleve only tylenol while taking prednisone. Atarax as needed as directed for itching- do not take while driving  Bactroban three times per day to lesions on neck  Call office if you develop fever, or red streaks to lesions   Return to office if symptoms worsen  To Emergency Room for shortness of breath, chest pain or dizziness.       Return to office for worsening symptoms, fever   To emergency room for severe symptoms

## 2017-10-26 NOTE — PROGRESS NOTES
ear normal.   Mouth/Throat: Oropharynx is clear and moist.   Right side of face a little puffy, no facial droop, eyebrows eqaul height   Eyes: Conjunctivae and EOM are normal. Pupils are equal, round, and reactive to light. Right eye exhibits no discharge. Left eye exhibits no discharge. No scleral icterus. Cardiovascular: Normal rate and regular rhythm. Heart rate elevated   Pulmonary/Chest: Effort normal and breath sounds normal. No respiratory distress. She has no wheezes. She has no rales. Neurological: She is alert and oriented to person, place, and time. Cranial Nerves:   II: Visual fields: Full to confrontation  III: Pupils: equal, round, reactive to light  III,IV,VI: Extra Ocular Movements are intact. No nystagmus  V: Facial sensation is intact to pin prick and light touch  VII: Facial strength and movements: intact and symmetric smile,cheek puffing and eyebrow elevation  IX: Palate elevation is symmetric  XI: Shoulder shrug is intact  XII: Tongue movements are normal  Coordination: no pronator drift, no dysmetria. Finger nose finger testing within normal limits. Sensation: normal to all modalities. Gait/Posture: steady      Skin: Skin is warm and dry. There is erythema. Face is flushed, lesions on neck bilaterally much less inflamed and reddened than they were at OV. Face flushed. Psychiatric: She has a normal mood and affect. Her behavior is normal. Thought content normal.       Assessment/Plan   1. Nausea and vomiting, intractability of vomiting not specified, unspecified vomiting type  promethazine (PHENERGAN) injection 25 mg    promethazine (PHENERGAN) 25 MG suppository    ondansetron (ZOFRAN ODT) 4 MG disintegrating tablet   Phenergan 25 mg IM given. Patient states she has taken this medication and dose without side effects in the past. Her  is with her to drive home. She stayed 10 minutes after injection given by MA. No side effects of injection.        Phenergan suppository 25

## 2017-10-27 ENCOUNTER — TELEPHONE (OUTPATIENT)
Dept: FAMILY MEDICINE CLINIC | Age: 62
End: 2017-10-27

## 2017-10-27 VITALS
DIASTOLIC BLOOD PRESSURE: 84 MMHG | OXYGEN SATURATION: 99 % | HEIGHT: 66 IN | BODY MASS INDEX: 26.84 KG/M2 | TEMPERATURE: 98.1 F | WEIGHT: 167 LBS | SYSTOLIC BLOOD PRESSURE: 148 MMHG | HEART RATE: 84 BPM | RESPIRATION RATE: 18 BRPM

## 2017-10-27 ASSESSMENT — ENCOUNTER SYMPTOMS: RESPIRATORY NEGATIVE: 1

## 2017-11-15 RX ORDER — TRAMADOL HYDROCHLORIDE 50 MG/1
TABLET ORAL
Qty: 30 TABLET | Refills: 0 | Status: SHIPPED | OUTPATIENT
Start: 2017-11-15 | End: 2017-12-27 | Stop reason: SDUPTHER

## 2017-11-20 ENCOUNTER — TELEPHONE (OUTPATIENT)
Dept: FAMILY MEDICINE CLINIC | Age: 62
End: 2017-11-20

## 2017-12-20 ENCOUNTER — OFFICE VISIT (OUTPATIENT)
Dept: ORTHOPEDIC SURGERY | Age: 62
End: 2017-12-20

## 2017-12-20 VITALS
HEART RATE: 88 BPM | SYSTOLIC BLOOD PRESSURE: 150 MMHG | BODY MASS INDEX: 26.18 KG/M2 | DIASTOLIC BLOOD PRESSURE: 94 MMHG | WEIGHT: 162.92 LBS | HEIGHT: 66 IN

## 2017-12-20 DIAGNOSIS — M25.512 LEFT SHOULDER PAIN, UNSPECIFIED CHRONICITY: Primary | ICD-10-CM

## 2017-12-20 DIAGNOSIS — M75.82 ROTATOR CUFF TENDINITIS, LEFT: ICD-10-CM

## 2017-12-20 PROCEDURE — 72040 X-RAY EXAM NECK SPINE 2-3 VW: CPT | Performed by: PHYSICIAN ASSISTANT

## 2017-12-20 PROCEDURE — 99213 OFFICE O/P EST LOW 20 MIN: CPT | Performed by: PHYSICIAN ASSISTANT

## 2017-12-20 NOTE — PROGRESS NOTES
CHIEF COMPLAINT:    Chief Complaint   Patient presents with    Neck Pain     LEFT BURNIG DOWN ARM, . NO GRECIA. X2 WEEKS       HISTORY OF PRESENT ILLNESS:                The patient is a 58 y.o. female who presents to clinic for evaluation of left shoulder pain. She states she began expressing pain to the lateral aspect of his shoulder and in her armpit. She notices some burning down the arm and pain in her scapula.   She denies any numbness or tingling distally    Past Medical History:   Diagnosis Date    Allergic rhinitis     Anxiety     Depression     Endometriosis     hx    Fibromyalgia     Headache(784.0)     Hyperlipidemia     Irritable bowel syndrome     Osteoarthritis     SVT (supraventricular tachycardia) (HCC)     hx     Trouble swallowing           The pain assessment was noted & is as follows:  Pain Assessment  Location of Pain: Neck  Location Modifiers: Left  Severity of Pain: 9  Quality of Pain: Aching  Duration of Pain: Persistent  Frequency of Pain: Constant]      Work Status/Functionality:     Past Medical History: Medical history form was reviewed today & can be found in the media tab  Past Medical History:   Diagnosis Date    Allergic rhinitis     Anxiety     Depression     Endometriosis     hx    Fibromyalgia     Headache(784.0)     Hyperlipidemia     Irritable bowel syndrome     Osteoarthritis     SVT (supraventricular tachycardia) (HCC)     hx     Trouble swallowing       Past Surgical History:     Past Surgical History:   Procedure Laterality Date    ABLATION OF DYSRHYTHMIC FOCUS  1999    APPENDECTOMY      BUNIONECTOMY  1973    bilateral, 2 revisions left foot    CHOLECYSTECTOMY      COLONOSCOPY  10/15/13    Hx polyps-3 yrs-? poor prep    HYSTERECTOMY      LAPAROSCOPY      6    LAPAROTOMY      3    NASAL SEPTUM SURGERY  1982    OTHER SURGICAL HISTORY      Angiogram     UPPER GASTROINTESTINAL ENDOSCOPY      UPPER GASTROINTESTINAL ENDOSCOPY  10/15/13    volodymyr omeprazole (PRILOSEC) 20 MG capsule, Take 20 mg by mouth every 12 hours. , Disp: , Rfl:     ibuprofen (ADVIL;MOTRIN) 200 MG tablet, Take 200 mg by mouth every 6 hours as needed. OTC , Disp: , Rfl:     diphenhydrAMINE (BENADRYL) 25 MG tablet, Take 25 mg by mouth every 6 hours as needed. OTC , Disp: , Rfl:     hydrOXYzine (ATARAX) 25 MG tablet, Take 1 tablet by mouth 3 times daily as needed for Itching, Disp: 20 tablet, Rfl: 0    naratriptan (AMERGE) 2.5 MG tablet, Take 1 tablet by mouth once as needed for Migraine 2.5 mg at onset of headache, may repeat in 4 hours if needed, Disp: 9 tablet, Rfl: 3  Allergies: Avelox [moxifloxacin hcl in nacl]; Cephalexin; Darvon [propoxyphene hcl]; Digoxin; Librax [chlordiazepoxide-methscopol]; Motrin [ibuprofen micronized]; Other; Pcn [penicillins]; Prochlorperazine edisylate; Sulfa antibiotics; Tetracyclines & related; and Statins [statins]  Social History:    reports that she has never smoked. She has never used smokeless tobacco. She reports that she does not drink alcohol or use drugs. Family History:   Family History   Problem Relation Age of Onset    Other Mother      sepsis    Dementia Mother     High Cholesterol Mother     Depression Mother     Anxiety Disorder Mother     High Blood Pressure Father     Other Father       from renal failure and CHF    Heart Disease Father     Migraines Father     Heart Attack Brother     Heart Disease Brother     Hypertension Other     High Cholesterol Other     Migraines Other     Heart Disease Other     Diabetes Other        REVIEW OF SYSTEMS:   For new problems, a full review of systems will be found scanned in the patient's chart.   CONSTITUTIONAL: Denies unexplained weight loss, fevers, chills   NEUROLOGICAL: Denies unsteady gait or progressive weakness  SKIN: Denies skin changes, delayed healing, rash, itching       PHYSICAL EXAM:    Vitals: Blood pressure (!) 150/94, pulse 88, height 5' 5.98\" (1.676 m), dosing and has taken this medication the past without adverse reactions. She will follow up in 2 weeks for reevaluation with Dr. Collins Martin.

## 2017-12-21 ENCOUNTER — OFFICE VISIT (OUTPATIENT)
Dept: FAMILY MEDICINE CLINIC | Age: 62
End: 2017-12-21

## 2017-12-21 VITALS
OXYGEN SATURATION: 96 % | HEART RATE: 104 BPM | BODY MASS INDEX: 26.36 KG/M2 | HEIGHT: 66 IN | DIASTOLIC BLOOD PRESSURE: 90 MMHG | SYSTOLIC BLOOD PRESSURE: 166 MMHG | WEIGHT: 164 LBS

## 2017-12-21 DIAGNOSIS — Z12.31 ENCOUNTER FOR SCREENING MAMMOGRAM FOR BREAST CANCER: ICD-10-CM

## 2017-12-21 DIAGNOSIS — G89.4 CHRONIC PAIN SYNDROME: Primary | ICD-10-CM

## 2017-12-21 DIAGNOSIS — M79.7 FIBROMYALGIA: ICD-10-CM

## 2017-12-21 DIAGNOSIS — Z86.69 HISTORY OF MIGRAINE: ICD-10-CM

## 2017-12-21 PROCEDURE — 99214 OFFICE O/P EST MOD 30 MIN: CPT | Performed by: FAMILY MEDICINE

## 2017-12-21 RX ORDER — HYDROCODONE BITARTRATE AND ACETAMINOPHEN 5; 325 MG/1; MG/1
1 TABLET ORAL DAILY PRN
Qty: 10 TABLET | Refills: 0 | Status: SHIPPED | OUTPATIENT
Start: 2017-12-21 | End: 2017-12-28

## 2017-12-21 RX ORDER — RIZATRIPTAN BENZOATE 10 MG/1
TABLET ORAL
Qty: 27 TABLET | Refills: 3 | Status: SHIPPED | OUTPATIENT
Start: 2017-12-21 | End: 2019-01-30

## 2017-12-21 ASSESSMENT — ENCOUNTER SYMPTOMS
GASTROINTESTINAL NEGATIVE: 1
RESPIRATORY NEGATIVE: 1

## 2017-12-21 NOTE — PROGRESS NOTES
bowel syndrome  No date: Osteoarthritis  No date: SVT (supraventricular tachycardia) (HCC)      Comment: hx   No date: Trouble swallowing          Review of Systems   HENT: Negative. Respiratory: Negative. Cardiovascular: Negative. Gastrointestinal: Negative. Musculoskeletal: Positive for myalgias. Neurological: Positive for headaches. Psychiatric/Behavioral: Negative. Objective:   Physical Exam   Constitutional: She is oriented to person, place, and time. HENT:   Mouth/Throat: Oropharynx is clear and moist.   Eyes: Conjunctivae are normal.   Neck: Carotid bruit is not present. No thyromegaly present. Cardiovascular: Normal rate, regular rhythm and normal heart sounds. Pulmonary/Chest: Effort normal and breath sounds normal.   Abdominal: Soft. She exhibits no distension and no mass. There is no tenderness. Musculoskeletal: She exhibits no edema. Lymphadenopathy:     She has no cervical adenopathy. Neurological: She is alert and oriented to person, place, and time. Psychiatric: She has a normal mood and affect. Her behavior is normal. Judgment and thought content normal.       Assessment:      1. Chronic pain syndrome    2. Encounter for screening mammogram for breast cancer    3. Fibromyalgia    4. History of migraine            Plan:      Enzo Whitman was seen today for chronic pain. Diagnoses and all orders for this visit:    Chronic pain syndrome  meds as needed-heat-ex's  Encounter for screening mammogram for breast cancer  -     [facility] Digital Screen Bilateral [[id number]];  Future    Fibromyalgia  As above  History of migraine  As above  Other orders  -     rizatriptan (MAXALT) 10 MG tablet; TAKE ONE TABLET BY MOUTH ONCE AS NEEDED FOR MIGRAINE FOR UP TO 1 DOSE. MAY REPEAT IN 2HR IF NEEDED

## 2017-12-21 NOTE — PATIENT INSTRUCTIONS
Kelley Somers was seen today for chronic pain. Diagnoses and all orders for this visit:    Chronic pain syndrome  meds as needed-heat-ex's  Encounter for screening mammogram for breast cancer  -     [facility] Digital Screen Bilateral [[id number]];  Future    Fibromyalgia  As above  History of migraine  As above  Other orders  -     rizatriptan (MAXALT) 10 MG tablet; TAKE ONE TABLET BY MOUTH ONCE AS NEEDED FOR MIGRAINE FOR UP TO 1 DOSE. MAY REPEAT IN 2HR IF NEEDED

## 2017-12-26 ENCOUNTER — HOSPITAL ENCOUNTER (OUTPATIENT)
Dept: MAMMOGRAPHY | Age: 62
Discharge: HOME | End: 2017-12-26
Admitting: FAMILY MEDICINE

## 2017-12-26 DIAGNOSIS — Z12.31 ENCOUNTER FOR SCREENING MAMMOGRAM FOR BREAST CANCER: ICD-10-CM

## 2017-12-27 RX ORDER — TRAMADOL HYDROCHLORIDE 50 MG/1
TABLET ORAL
Qty: 30 TABLET | Refills: 0 | Status: SHIPPED | OUTPATIENT
Start: 2017-12-27 | End: 2018-02-13 | Stop reason: SDUPTHER

## 2017-12-27 NOTE — TELEPHONE ENCOUNTER
From: Kerri Polk  Sent: 12/27/2017 1:35 PM EST  Subject: Medication Renewal Request    Shelby Godoy would like a refill of the following medications:  traMADol (ULTRAM) 50 MG tablet Marguerite Peralta DO]    Preferred pharmacy: [facility] [address], [address] [facility] [phone] - F [phone]    Comment:  Hi, I told Dr. Loya Son at my appointment that I would check and see if I had any refills on my prescription for the Tramadol but I do not. The Vicodin is only for the current condition of the rotator cuff tendenitis, which is very, very painful.

## 2017-12-27 NOTE — TELEPHONE ENCOUNTER
Last Seen: 12/21/2017    Last Writen: 11/15/17    Last UDS: 4/5/17     OARRS Run On: 10/9/17    Med Agreement Signed On: n/a    Next Appointment: 3/23/2018    Requested Prescriptions     Pending Prescriptions Disp Refills    traMADol (ULTRAM) 50 MG tablet 30 tablet 0     Sig: TAKE ONE TABLET BY MOUTH TWICE A DAY AS NEEDED FOR PAIN.

## 2018-01-01 ENCOUNTER — HOSPITAL ENCOUNTER (OUTPATIENT)
Dept: PHYSICAL THERAPY | Age: 63
Discharge: HOME | End: 2018-01-31
Attending: ORTHOPAEDIC SURGERY | Admitting: ORTHOPAEDIC SURGERY

## 2018-01-09 ENCOUNTER — HOSPITAL ENCOUNTER (OUTPATIENT)
Dept: PHYSICAL THERAPY | Age: 63
Discharge: HOME | End: 2018-01-09
Admitting: ORTHOPAEDIC SURGERY

## 2018-01-09 NOTE — PLAN OF CARE
instruction: (see scanned forms)    GOALS:  Patient stated goal: full function    Therapist goals for Patient:   Short Term Goals: To be achieved in: 2 weeks  1. Independent in HEP and progression per patient tolerance, in order to prevent re-injury. 2. Patient will have a decrease in pain to facilitate improvement in movement, function, and ADLs as indicated by Functional Deficits. Long Term Goals: To be achieved in: 8 weeks  1. Disability index score of 19% or less for the DASH to assist with reaching prior level of function. 3. Patient will demonstrate an increase in Strength to 5/5 to allow for proper functional mobility as indicated by patients Functional Deficits. 4. Patient will return to full functional activities without increased symptoms or restriction.         Electronically signed by:  Tee Bui PT

## 2018-01-09 NOTE — FLOWSHEET NOTE
Treatment Minutes: 15   Total Treatment Minutes: 35     [x] EVAL (LOW) 54924 (typically 20 minutes face-to-face)  [x] BM(96397) x      [] IONTO  [] NMR (61759) x      [] VASO  [] Manual (54697) x       [] Other:  [] TA x       [] Mech Traction (32901)  [] ES(attended) (83364)      [] ES (un) (35409):     Theresa Cuff stated goal: full function     Therapist goals for Patient:   Short Term Goals: To be achieved in: 2 weeks  1. Independent in HEP and progression per patient tolerance, in order to prevent re-injury. 2. Patient will have a decrease in pain to facilitate improvement in movement, function, and ADLs as indicated by Functional Deficits.     Long Term Goals: To be achieved in: 8 weeks  1. Disability index score of 19% or less for the DASH to assist with reaching prior level of function. 3. Patient will demonstrate an increase in Strength to 5/5 to allow for proper functional mobility as indicated by patients Functional Deficits. 4. Patient will return to full functional activities without increased symptoms or restriction. Progression Towards Functional goals:  [] Patient is progressing as expected towards functional goals listed. [] Progression is slowed due to complexities listed. [] Progression has been slowed due to co-morbidities.   [x] Plan just implemented, too soon to assess goals progression  [] Other:     ASSESSMENT:  See eval    Treatment/Activity Tolerance:  [x] Patient tolerated treatment well [] Patient limited by fatique  [] Patient limited by pain  [] Patient limited by other medical complications  [] Other:     Prognosis: [x] Good [] Fair  [] Poor    Patient Requires Follow-up: [x] Yes  [] No    PLAN: See eval  [] Continue per plan of care [] Alter current plan (see comments)  [x] Plan of care initiated [] Hold pending MD visit [] Discharge    Electronically signed by: Georgina Saavedra PT

## 2018-01-10 ENCOUNTER — OFFICE VISIT (OUTPATIENT)
Dept: ORTHOPEDIC SURGERY | Age: 63
End: 2018-01-10

## 2018-01-10 VITALS
HEIGHT: 65 IN | DIASTOLIC BLOOD PRESSURE: 68 MMHG | SYSTOLIC BLOOD PRESSURE: 124 MMHG | BODY MASS INDEX: 26.99 KG/M2 | HEART RATE: 87 BPM | WEIGHT: 162 LBS

## 2018-01-10 DIAGNOSIS — R52 PAIN: Primary | ICD-10-CM

## 2018-01-10 PROCEDURE — 99213 OFFICE O/P EST LOW 20 MIN: CPT | Performed by: ORTHOPAEDIC SURGERY

## 2018-01-10 RX ORDER — METHYLPREDNISOLONE 4 MG/1
TABLET ORAL
Qty: 1 KIT | Refills: 0 | Status: SHIPPED | OUTPATIENT
Start: 2018-01-10 | End: 2018-01-16

## 2018-01-10 NOTE — PROGRESS NOTES
Chief Complaint:  Shoulder Pain (LEFT        [facility]   12/20/2017   )      History of Present Illness:  Lorena Hanson is a 58 y.o. RHD female here regarding left shoulder pain. The pain is located diffusely throughout the shoulder. She describes the symptoms as aching and burning. Symptoms improve with rest, avoiding the painful activities. The symptoms are worse with certain motions. Patient reports the pain began in December 2017. She was seen in the after hours clinic on December 20, 2017 where she was started on a prednisone taper, and got started in formal physical therapy. She reports her pain has much improved. She was complaining of some burning sensation after her armpit down into her arm. She denies any specific injury to her shoulder. Her primary care physician is Dr. Khloe Garcia who has given her prescription for tramadol and Robaxin for her fibromyalgia and migraines which she has been taking and helps her shoulder somewhat. She also has a small prescription of Vicodin which she needed to take a few times as well. For the most part she states that ibuprofen and Tylenol which helps minimally as well. She does have a history of a adhesive capsulitis on the shoulder which she reports resolved well in just continue to be able to move it very well. Medical History:  Patient's medications, allergies, past medical, surgical, social and family histories were reviewed and updated as appropriate. Review of Systems:  Pertinent items are noted in HPI  Review of systems reviewed from Patient History Form dated on 01/10/2018 and available in the patient's chart under the Media tab.      Vital Signs:  Vitals:    01/10/18 1628   BP: 124/68   Pulse: 87       Pain Assessment:  Pain Assessment  Location of Pain: Shoulder  Location Modifiers: Left  Severity of Pain: 3  Quality of Pain: Dull, Aching  Frequency of Pain: Intermittent  Aggravating Factors: Stretching, Straightening  Relieving Factors: Rest, Ice, tear    Impression:  Encounter Diagnosis   Name Primary?  Pain Yes       Office Procedures:  Orders Placed This Encounter   Procedures    XR SHOULDER LEFT (MIN 2 VIEWS)     22107     Order Specific Question:   Reason for exam:     Answer:   Pain       Treatment Plan:  The patient wants to continue with her therapy and home exercise program for some strengthening. We'll give her a prescription for a Medrol Dosepak and she'll follow-up with us in approximately 4 weeks. Deangelo Orellana PA-C, scribing for Dr. Edna Polanco        This dictation was performed with a verbal recognition program [facility]) and it was checked for errors. It is possible that there are still dictated errors within this office note. If so, please bring any errors to my attention for an addendum. All efforts were made to ensure that this office note is accurate.

## 2018-02-01 ENCOUNTER — HOSPITAL ENCOUNTER (OUTPATIENT)
Dept: PHYSICAL THERAPY | Age: 63
Discharge: HOME | End: 2018-02-28
Attending: ORTHOPAEDIC SURGERY | Admitting: ORTHOPAEDIC SURGERY

## 2018-02-05 ENCOUNTER — OFFICE VISIT (OUTPATIENT)
Dept: ORTHOPEDIC SURGERY | Age: 63
End: 2018-02-05

## 2018-02-05 VITALS
HEART RATE: 99 BPM | SYSTOLIC BLOOD PRESSURE: 140 MMHG | HEIGHT: 65 IN | WEIGHT: 162.04 LBS | BODY MASS INDEX: 27 KG/M2 | DIASTOLIC BLOOD PRESSURE: 89 MMHG

## 2018-02-05 DIAGNOSIS — M75.82 ROTATOR CUFF TENDINITIS, LEFT: Primary | ICD-10-CM

## 2018-02-05 PROCEDURE — 99213 OFFICE O/P EST LOW 20 MIN: CPT | Performed by: ORTHOPAEDIC SURGERY

## 2018-02-05 NOTE — PROGRESS NOTES
Chief Complaint:  Shoulder Pain (ck left shoulder )      History of Present Illness:  Daniel Soto is a 58 y.o. RHD female here regarding left shoulder pain. The pain is located diffusely throughout the shoulder. She describes the symptoms as aching and burning. Symptoms improve with rest, avoiding the painful activities. The symptoms are worse with certain motions. Patient reports the pain began in December 2017. She was seen in the after hours clinic on December 20, 2017 where she was started on a prednisone taper, and got started in formal physical therapy. She reports her pain has much improved. She was complaining of some burning sensation after her armpit down into her arm. She denies any specific injury to her shoulder. Her primary care physician is Dr. Armond Baker who has given her prescription for tramadol and Robaxin for her fibromyalgia and migraines which she has been taking and helps her shoulder somewhat. She also has a small prescription of Vicodin which she needed to take a few times as well. For the most part she states that ibuprofen and Tylenol which helps minimally as well. She does have a history of a adhesive capsulitis on the shoulder which she reports resolved well in just continue to be able to move it very well. At her last visit we gave her a prescription for Medrol Dosepak and asked her to continue with her home exercise program and she feels that her pain and stiffness is now 100% improved. Medical History:  Patient's medications, allergies, past medical, surgical, social and family histories were reviewed and updated as appropriate. Review of Systems:  Pertinent items are noted in HPI  Review of systems reviewed from Patient History Form dated on 01/10/2018 and available in the patient's chart under the Media tab.      Vital Signs:  Vitals:    02/05/18 1600   BP: (!) 140/89   Pulse: 99       Pain Assessment:  Pain Assessment  Location of Pain: Shoulder  Location encounter. Treatment Plan: At this time the patient is doing very well overall. We encouraged her to continue with her home exercise program and will follow-up with us on a p.r.n. basis    Bindu Bagley PA-C, scribing for Dr. Racquel German        This dictation was performed with a verbal recognition program [facility] ) and it was checked for errors. It is possible that there are still dictated errors within this office note. If so, please bring any errors to my attention for an addendum. All efforts were made to ensure that this office note is accurate.

## 2018-02-13 DIAGNOSIS — G89.29 CHRONIC LOW BACK PAIN, UNSPECIFIED BACK PAIN LATERALITY, WITH SCIATICA PRESENCE UNSPECIFIED: Primary | ICD-10-CM

## 2018-02-13 DIAGNOSIS — M54.5 CHRONIC LOW BACK PAIN, UNSPECIFIED BACK PAIN LATERALITY, WITH SCIATICA PRESENCE UNSPECIFIED: Primary | ICD-10-CM

## 2018-02-14 RX ORDER — DICYCLOMINE HYDROCHLORIDE 10 MG/1
10 CAPSULE ORAL
Qty: 90 CAPSULE | Refills: 0 | Status: SHIPPED | OUTPATIENT
Start: 2018-02-14 | End: 2021-12-22

## 2018-02-14 RX ORDER — TRAMADOL HYDROCHLORIDE 50 MG/1
TABLET ORAL
Qty: 30 TABLET | Refills: 0 | Status: SHIPPED | OUTPATIENT
Start: 2018-02-14 | End: 2018-03-22 | Stop reason: SDUPTHER

## 2018-03-08 DIAGNOSIS — R11.0 NAUSEA: ICD-10-CM

## 2018-03-08 RX ORDER — PROMETHAZINE HYDROCHLORIDE 25 MG/1
25 TABLET ORAL EVERY 6 HOURS PRN
Qty: 20 TABLET | Refills: 0 | Status: SHIPPED | OUTPATIENT
Start: 2018-03-08 | End: 2019-09-13 | Stop reason: SDUPTHER

## 2018-03-08 RX ORDER — METHOCARBAMOL 500 MG/1
500 TABLET, FILM COATED ORAL 3 TIMES DAILY
Qty: 50 TABLET | Refills: 1 | Status: SHIPPED | OUTPATIENT
Start: 2018-03-08 | End: 2018-10-16 | Stop reason: SDUPTHER

## 2018-03-08 NOTE — TELEPHONE ENCOUNTER
From: Elaine Hsieh  Sent: 3/8/2018 1:13 PM EST  Subject: Medication Renewal Request    Shelby Park would like a refill of the following medications:     promethazine (PHENERGAN) 25 MG tablet [Bubba Gamez, ]     methocarbamol (ROBAXIN) 500 MG tablet Haim Certain, DO]    Preferred pharmacy: [facility] [address], [address] [facility] [phone] - F [phone]    Comment:  Hi, I need to request a refill on my prescriptions for Promethazine and Methocarbamol. Please let me know when the prescriptions are sent to [facility] in [place]. Nancy. I am on day 3 of a migraine headache with nausea. I think that they are caused by the weather.

## 2018-03-20 ENCOUNTER — TELEPHONE (OUTPATIENT)
Dept: FAMILY MEDICINE CLINIC | Age: 63
End: 2018-03-20

## 2018-03-20 DIAGNOSIS — Z23 NEED FOR DIPHTHERIA-TETANUS-PERTUSSIS (TDAP) VACCINE: Primary | ICD-10-CM

## 2018-03-20 PROCEDURE — 90715 TDAP VACCINE 7 YRS/> IM: CPT | Performed by: FAMILY MEDICINE

## 2018-03-20 PROCEDURE — 90471 IMMUNIZATION ADMIN: CPT | Performed by: FAMILY MEDICINE

## 2018-03-22 ENCOUNTER — OFFICE VISIT (OUTPATIENT)
Dept: FAMILY MEDICINE CLINIC | Age: 63
End: 2018-03-22

## 2018-03-22 VITALS
OXYGEN SATURATION: 98 % | HEIGHT: 65 IN | HEART RATE: 85 BPM | SYSTOLIC BLOOD PRESSURE: 166 MMHG | DIASTOLIC BLOOD PRESSURE: 98 MMHG | BODY MASS INDEX: 28.66 KG/M2 | WEIGHT: 172 LBS

## 2018-03-22 DIAGNOSIS — R60.0 LOCALIZED EDEMA: ICD-10-CM

## 2018-03-22 DIAGNOSIS — R00.2 PALPITATIONS: ICD-10-CM

## 2018-03-22 DIAGNOSIS — G89.29 CHRONIC LOW BACK PAIN, UNSPECIFIED BACK PAIN LATERALITY, WITH SCIATICA PRESENCE UNSPECIFIED: ICD-10-CM

## 2018-03-22 DIAGNOSIS — M54.5 CHRONIC LOW BACK PAIN, UNSPECIFIED BACK PAIN LATERALITY, WITH SCIATICA PRESENCE UNSPECIFIED: ICD-10-CM

## 2018-03-22 DIAGNOSIS — M79.7 FIBROMYALGIA: Primary | ICD-10-CM

## 2018-03-22 PROCEDURE — 93000 ELECTROCARDIOGRAM COMPLETE: CPT | Performed by: FAMILY MEDICINE

## 2018-03-22 PROCEDURE — 99214 OFFICE O/P EST MOD 30 MIN: CPT | Performed by: FAMILY MEDICINE

## 2018-03-22 RX ORDER — TRAMADOL HYDROCHLORIDE 50 MG/1
TABLET ORAL
Qty: 30 TABLET | Refills: 1 | Status: SHIPPED | OUTPATIENT
Start: 2018-03-22 | End: 2018-06-05 | Stop reason: SDUPTHER

## 2018-03-22 ASSESSMENT — ENCOUNTER SYMPTOMS
RESPIRATORY NEGATIVE: 1
GASTROINTESTINAL NEGATIVE: 1

## 2018-03-22 NOTE — PROGRESS NOTES
MD    Medication rosuvastatin (CRESTOR) 10 MG tablet, Sig Take 1 tablet by mouth daily, Taking? Yes, Authorizing Provider Bubba Gamez, DO    Medication magnesium oxide (MAG-OX) 400 MG tablet, Sig Take 400 mg by mouth daily, Taking? Yes, Authorizing Provider Luís Cruz MD    Medication Cholecalciferol (VITAMIN D3) 5000 UNITS TABS, Sig Take by mouth, Taking? Yes, Authorizing Provider Luís Cruz MD    Medication hydrochlorothiazide (HYDRODIURIL) 25 MG tablet, Sig 1 or 2 daily prn swelling, Taking? Yes, Authorizing Provider Bubba Gamez, DO    Medication omeprazole (PRILOSEC) 20 MG capsule, Sig Take 20 mg by mouth every 12 hours. , Taking? Yes, Authorizing Provider Luís Cruz MD    Medication ibuprofen (ADVIL;MOTRIN) 200 MG tablet, Sig Take 200 mg by mouth every 6 hours as needed. OTC , Taking? Yes, Authorizing Provider Luís Cruz MD    Medication diphenhydrAMINE (BENADRYL) 25 MG tablet, Sig Take 25 mg by mouth every 6 hours as needed. OTC , Taking? Yes, Authorizing Provider Luís Cruz MD    Medication hydrOXYzine (ATARAX) 25 MG tablet, Sig Take 1 tablet by mouth 3 times daily as needed for Itching, Taking? , Authorizing Provider Gregory Beatty CNP    Medication Topiramate ER (TROKENDI XR) 25 MG CP24, Sig 3 sample packs containing 7 pills each, Taking? , Authorizing Provider Linda Gottlieb MD    Medication naratriptan (AMERGE) 2.5 MG tablet, Sig Take 1 tablet by mouth once as needed for Migraine 2.5 mg at onset of headache, may repeat in 4 hours if needed, Taking? , Authorizing Provider Linda Gottlieb MD    Medication Diclofenac Sodium POWD, Sig Apply 1-2 g topically as needed (TID - QID) Formula 3 D Ketamine 5 %, Taking? , Authorizing Provider Linda Gottlieb MD      Past Medical History:  No date: Allergic rhinitis  No date:  Anxiety  No date: Depression  No date: Endometriosis      Comment: hx  No date: Fibromyalgia  No date:

## 2018-05-07 DIAGNOSIS — Z00.00 ENCOUNTER FOR ANNUAL HEALTH EXAMINATION: Primary | ICD-10-CM

## 2018-05-09 LAB
A/G RATIO: 2.1 (ref 1.1–2.2)
ALBUMIN SERPL-MCNC: 4.6 G/DL (ref 3.4–5)
ALP BLD-CCNC: 81 U/L (ref 40–129)
ALT SERPL-CCNC: 10 U/L (ref 10–40)
ANION GAP SERPL CALCULATED.3IONS-SCNC: 15 MMOL/L (ref 3–16)
AST SERPL-CCNC: 14 U/L (ref 15–37)
BILIRUB SERPL-MCNC: 0.3 MG/DL (ref 0–1)
BUN BLDV-MCNC: 9 MG/DL (ref 7–20)
CALCIUM SERPL-MCNC: 9.5 MG/DL (ref 8.3–10.6)
CHLORIDE BLD-SCNC: 102 MMOL/L (ref 99–110)
CHOLESTEROL, TOTAL: 207 MG/DL (ref 0–199)
CO2: 27 MMOL/L (ref 21–32)
CREAT SERPL-MCNC: 0.6 MG/DL (ref 0.6–1.2)
GFR AFRICAN AMERICAN: >60
GFR NON-AFRICAN AMERICAN: >60
GLOBULIN: 2.2 G/DL
GLUCOSE BLD-MCNC: 105 MG/DL (ref 70–99)
HDLC SERPL-MCNC: 61 MG/DL (ref 40–60)
LDL CHOLESTEROL CALCULATED: 112 MG/DL
POTASSIUM SERPL-SCNC: 4.3 MMOL/L (ref 3.5–5.1)
SODIUM BLD-SCNC: 144 MMOL/L (ref 136–145)
TOTAL PROTEIN: 6.8 G/DL (ref 6.4–8.2)
TRIGL SERPL-MCNC: 171 MG/DL (ref 0–150)
VLDLC SERPL CALC-MCNC: 34 MG/DL

## 2018-06-05 DIAGNOSIS — M54.5 CHRONIC LOW BACK PAIN, UNSPECIFIED BACK PAIN LATERALITY, WITH SCIATICA PRESENCE UNSPECIFIED: ICD-10-CM

## 2018-06-05 DIAGNOSIS — G89.29 CHRONIC LOW BACK PAIN, UNSPECIFIED BACK PAIN LATERALITY, WITH SCIATICA PRESENCE UNSPECIFIED: ICD-10-CM

## 2018-06-06 RX ORDER — TRAMADOL HYDROCHLORIDE 50 MG/1
TABLET ORAL
Qty: 30 TABLET | Refills: 0 | Status: SHIPPED | OUTPATIENT
Start: 2018-06-06 | End: 2018-07-02 | Stop reason: SDUPTHER

## 2018-06-09 ENCOUNTER — OFFICE VISIT (OUTPATIENT)
Dept: ORTHOPEDIC SURGERY | Age: 63
End: 2018-06-09

## 2018-06-09 VITALS — HEIGHT: 66 IN | BODY MASS INDEX: 27.16 KG/M2 | WEIGHT: 169 LBS

## 2018-06-09 DIAGNOSIS — M79.672 LEFT FOOT PAIN: Primary | ICD-10-CM

## 2018-06-09 DIAGNOSIS — S93.492A SPRAIN OF OTHER LIGAMENT OF LEFT ANKLE, INITIAL ENCOUNTER: ICD-10-CM

## 2018-06-09 PROCEDURE — L4361 PNEUMA/VAC WALK BOOT PRE OTS: HCPCS | Performed by: PHYSICIAN ASSISTANT

## 2018-06-09 PROCEDURE — 99213 OFFICE O/P EST LOW 20 MIN: CPT | Performed by: PHYSICIAN ASSISTANT

## 2018-06-11 ENCOUNTER — TELEPHONE (OUTPATIENT)
Dept: ORTHOPEDIC SURGERY | Age: 63
End: 2018-06-11

## 2018-06-18 ENCOUNTER — OFFICE VISIT (OUTPATIENT)
Dept: ORTHOPEDIC SURGERY | Age: 63
End: 2018-06-18

## 2018-06-18 VITALS — BODY MASS INDEX: 27.18 KG/M2 | HEIGHT: 66 IN | WEIGHT: 169.09 LBS

## 2018-06-18 DIAGNOSIS — S93.602A SPRAIN OF LEFT FOOT, INITIAL ENCOUNTER: ICD-10-CM

## 2018-06-18 DIAGNOSIS — S93.492A SPRAIN OF ANTERIOR TALOFIBULAR LIGAMENT OF LEFT ANKLE, INITIAL ENCOUNTER: Primary | ICD-10-CM

## 2018-06-18 PROCEDURE — 99213 OFFICE O/P EST LOW 20 MIN: CPT | Performed by: ORTHOPAEDIC SURGERY

## 2018-06-18 PROCEDURE — L1902 AFO ANKLE GAUNTLET PRE OTS: HCPCS | Performed by: ORTHOPAEDIC SURGERY

## 2018-07-02 ENCOUNTER — OFFICE VISIT (OUTPATIENT)
Dept: ORTHOPEDIC SURGERY | Age: 63
End: 2018-07-02

## 2018-07-02 VITALS
HEIGHT: 67 IN | WEIGHT: 168 LBS | HEART RATE: 84 BPM | BODY MASS INDEX: 26.37 KG/M2 | SYSTOLIC BLOOD PRESSURE: 136 MMHG | DIASTOLIC BLOOD PRESSURE: 72 MMHG

## 2018-07-02 DIAGNOSIS — S93.492A SPRAIN OF ANTERIOR TALOFIBULAR LIGAMENT OF LEFT ANKLE, INITIAL ENCOUNTER: Primary | ICD-10-CM

## 2018-07-02 DIAGNOSIS — S93.602A SPRAIN OF LEFT FOOT, INITIAL ENCOUNTER: ICD-10-CM

## 2018-07-02 DIAGNOSIS — G89.29 CHRONIC LOW BACK PAIN, UNSPECIFIED BACK PAIN LATERALITY, WITH SCIATICA PRESENCE UNSPECIFIED: ICD-10-CM

## 2018-07-02 DIAGNOSIS — M54.5 CHRONIC LOW BACK PAIN, UNSPECIFIED BACK PAIN LATERALITY, WITH SCIATICA PRESENCE UNSPECIFIED: ICD-10-CM

## 2018-07-02 PROCEDURE — 99213 OFFICE O/P EST LOW 20 MIN: CPT | Performed by: ORTHOPAEDIC SURGERY

## 2018-07-02 RX ORDER — TRAMADOL HYDROCHLORIDE 50 MG/1
TABLET ORAL
Qty: 30 TABLET | Refills: 0 | Status: SHIPPED | OUTPATIENT
Start: 2018-07-02 | End: 2018-08-03 | Stop reason: SDUPTHER

## 2018-07-02 NOTE — PROGRESS NOTES
CHIEF COMPLAINT:    Chief Complaint   Patient presents with    Ankle Pain     CK LEFT ANKLE/FOOT SPRAIN- DOING BETTER       HISTORY OF PRESENT ILLNESS:                The patient is a 61 y.o. female   Past Medical History:   Diagnosis Date    Allergic rhinitis     Anxiety     Depression     Endometriosis     hx    Fibromyalgia     Headache(784.0)     Hyperlipidemia     Irritable bowel syndrome     Osteoarthritis     SVT (supraventricular tachycardia) (HCC)     hx     Trouble swallowing       This lady presents in follow-up for her LEFT foot and ankle sprain from 6/9/80. Overall she reports that she is doing well. She's has been using a Aguilar brace now. She is starting to wean herself out of it.     The pain assessment was noted & is as follows:  Pain Assessment  Location of Pain: Ankle  Location Modifiers: Left  Severity of Pain: 1  Quality of Pain: Aching  Duration of Pain: Persistent  Frequency of Pain: Constant]      Work Status/Functionality:     Past Medical History: Medical history form was reviewed today & can be found in the media tab  Past Medical History:   Diagnosis Date    Allergic rhinitis     Anxiety     Depression     Endometriosis     hx    Fibromyalgia     Headache(784.0)     Hyperlipidemia     Irritable bowel syndrome     Osteoarthritis     SVT (supraventricular tachycardia) (HCC)     hx     Trouble swallowing       Past Surgical History:     Past Surgical History:   Procedure Laterality Date    ABLATION OF DYSRHYTHMIC FOCUS  1999    APPENDECTOMY      BUNIONECTOMY  1973    bilateral, 2 revisions left foot    CHOLECYSTECTOMY      COLONOSCOPY  10/15/13    Hx polyps-3 yrs-? poor prep    HYSTERECTOMY      LAPAROSCOPY      6    LAPAROTOMY      3    NASAL SEPTUM SURGERY  1982    OTHER SURGICAL HISTORY      Angiogram     UPPER GASTROINTESTINAL ENDOSCOPY      UPPER GASTROINTESTINAL ENDOSCOPY  10/15/13    volodymyr ring     Current Medications:     Current Outpatient Prescriptions:     traMADol (ULTRAM) 50 MG tablet, TAKE ONE TABLET BY MOUTH TWICE A DAY AS NEEDED FOR PAIN., Disp: 30 tablet, Rfl: 0    citalopram (CELEXA) 20 MG tablet, TAKE ONE TABLET BY MOUTH ONE TIME A DAY, Disp: 90 tablet, Rfl: 1    rosuvastatin (CRESTOR) 10 MG tablet, TAKE ONE TABLET BY MOUTH AT BEDTIME, Disp: 90 tablet, Rfl: 1    Chlorphen-Pseudoephed-APAP (TYLENOL ALLERGY SINUS PO), Take by mouth as needed, Disp: , Rfl:     promethazine (PHENERGAN) 25 MG tablet, Take 1 tablet by mouth every 6 hours as needed for Nausea, Disp: 20 tablet, Rfl: 0    methocarbamol (ROBAXIN) 500 MG tablet, Take 1 tablet by mouth 3 times daily, Disp: 50 tablet, Rfl: 1    dicyclomine (BENTYL) 10 MG capsule, Take 1 capsule by mouth 3 times daily (before meals), Disp: 90 capsule, Rfl: 0    rizatriptan (MAXALT) 10 MG tablet, TAKE ONE TABLET BY MOUTH ONCE AS NEEDED FOR MIGRAINE FOR UP TO 1 DOSE. MAY REPEAT IN 2HR IF NEEDED, Disp: 27 tablet, Rfl: 3    promethazine (PHENERGAN) 25 MG suppository, Place 1 suppository rectally every 6 hours as needed for Nausea, Disp: 8 suppository, Rfl: 0    ondansetron (ZOFRAN ODT) 4 MG disintegrating tablet, Take 1 tablet by mouth every 8 hours as needed for Nausea or Vomiting, Disp: 15 tablet, Rfl: 0    clotrimazole-betamethasone (LOTRISONE) 1-0.05 % cream, Apply topically as needed Apply topically 2 times daily. , Disp: , Rfl:     Topiramate ER (TROKENDI XR) 25 MG CP24, 3 sample packs containing 7 pills each, Disp: 21 capsule, Rfl: 0    magnesium oxide (MAG-OX) 400 MG tablet, Take 400 mg by mouth daily, Disp: , Rfl:     Diclofenac Sodium POWD, Apply 1-2 g topically as needed (TID - QID) Formula 3 D Ketamine 5 %, Disp: 240 g, Rfl: 3    Cholecalciferol (VITAMIN D3) 5000 UNITS TABS, Take by mouth, Disp: , Rfl:     hydrochlorothiazide (HYDRODIURIL) 25 MG tablet, 1 or 2 daily prn swelling, Disp: 50 tablet, Rfl: 1    omeprazole (PRILOSEC) 20 MG capsule, Take 20 mg by mouth every 12 hours. ,

## 2018-07-02 NOTE — TELEPHONE ENCOUNTER
From: Matthias Cordova  Sent: 7/2/2018 4:35 PM EDT  Subject: Medication Renewal Request    Shelby Brito would like a refill of the following medications:     traMADol (ULTRAM) 50 MG tablet [Bubba Gamez, DO]   Patient Comment: next appt is 8/03/18    Preferred pharmacy: [facility] [address], [address] [place] [address]

## 2018-08-03 ENCOUNTER — OFFICE VISIT (OUTPATIENT)
Dept: FAMILY MEDICINE CLINIC | Age: 63
End: 2018-08-03

## 2018-08-03 VITALS
BODY MASS INDEX: 25.11 KG/M2 | HEIGHT: 67 IN | SYSTOLIC BLOOD PRESSURE: 140 MMHG | OXYGEN SATURATION: 98 % | DIASTOLIC BLOOD PRESSURE: 80 MMHG | HEART RATE: 92 BPM | WEIGHT: 160 LBS

## 2018-08-03 DIAGNOSIS — M79.7 FIBROMYALGIA: ICD-10-CM

## 2018-08-03 DIAGNOSIS — Z00.00 ANNUAL PHYSICAL EXAM: Primary | ICD-10-CM

## 2018-08-03 DIAGNOSIS — E55.9 VITAMIN D DEFICIENCY: ICD-10-CM

## 2018-08-03 DIAGNOSIS — Z86.69 HISTORY OF MIGRAINE: ICD-10-CM

## 2018-08-03 DIAGNOSIS — Z13.29 THYROID DISORDER SCREEN: ICD-10-CM

## 2018-08-03 DIAGNOSIS — M54.5 CHRONIC LOW BACK PAIN, UNSPECIFIED BACK PAIN LATERALITY, WITH SCIATICA PRESENCE UNSPECIFIED: Primary | ICD-10-CM

## 2018-08-03 DIAGNOSIS — Z12.11 COLON CANCER SCREENING: ICD-10-CM

## 2018-08-03 DIAGNOSIS — G89.29 CHRONIC LOW BACK PAIN, UNSPECIFIED BACK PAIN LATERALITY, WITH SCIATICA PRESENCE UNSPECIFIED: Primary | ICD-10-CM

## 2018-08-03 LAB
BASOPHILS ABSOLUTE: 0.1 K/UL (ref 0–0.2)
BASOPHILS RELATIVE PERCENT: 1.1 %
EOSINOPHILS ABSOLUTE: 0.1 K/UL (ref 0–0.6)
EOSINOPHILS RELATIVE PERCENT: 2.4 %
HCT VFR BLD CALC: 42.5 % (ref 36–48)
HEMOGLOBIN: 14.2 G/DL (ref 12–16)
LYMPHOCYTES ABSOLUTE: 1.7 K/UL (ref 1–5.1)
LYMPHOCYTES RELATIVE PERCENT: 29.4 %
MCH RBC QN AUTO: 31 PG (ref 26–34)
MCHC RBC AUTO-ENTMCNC: 33.4 G/DL (ref 31–36)
MCV RBC AUTO: 92.7 FL (ref 80–100)
MONOCYTES ABSOLUTE: 0.3 K/UL (ref 0–1.3)
MONOCYTES RELATIVE PERCENT: 5.4 %
NEUTROPHILS ABSOLUTE: 3.6 K/UL (ref 1.7–7.7)
NEUTROPHILS RELATIVE PERCENT: 61.7 %
PDW BLD-RTO: 13.9 % (ref 12.4–15.4)
PLATELET # BLD: 315 K/UL (ref 135–450)
PMV BLD AUTO: 6.8 FL (ref 5–10.5)
RBC # BLD: 4.59 M/UL (ref 4–5.2)
T4 FREE: 1 NG/DL (ref 0.9–1.8)
TSH SERPL DL<=0.05 MIU/L-ACNC: 0.66 UIU/ML (ref 0.27–4.2)
VITAMIN D 25-HYDROXY: 22.3 NG/ML
WBC # BLD: 5.9 K/UL (ref 4–11)

## 2018-08-03 PROCEDURE — 99396 PREV VISIT EST AGE 40-64: CPT | Performed by: FAMILY MEDICINE

## 2018-08-03 RX ORDER — TRAMADOL HYDROCHLORIDE 50 MG/1
TABLET ORAL
Qty: 30 TABLET | Refills: 5 | Status: SHIPPED | OUTPATIENT
Start: 2018-08-03 | End: 2019-01-30 | Stop reason: SDUPTHER

## 2018-08-03 ASSESSMENT — ENCOUNTER SYMPTOMS
ABDOMINAL PAIN: 1
SHORTNESS OF BREATH: 0
BLOOD IN STOOL: 0
COUGH: 0

## 2018-08-03 NOTE — LETTER
MEDICATION AGREEMENT     Easton Rena  [birth year]      For certain conditions, multiple classes of medications may be used to help better manage your symptoms, and to improve your ability to function at home, work and in social settings. However, these medications do have risks, which will be discussed with you, including addiction and dependency. The following prescribed medications need frequent monitoring and will require you to partner and assist in your healthcare. Medication  Dose, instructions and quantity as indicated on current prescription bottle Diagnosis/Reason(s) for Taking Category   traMADol (ULTRAM) 50 MG tablet                               Benefits and goals of Controlled Substance Medications: There are two potential goals for your treatment: (1) decreased pain and suffering (2) improved daily life functions. There are many possible treatments for your chronic condition(s), and, in addition to controlled substance medications, we will try alternatives such as physical therapy, yoga, massage, home daily exercise, meditation, relaxation techniques, injections, chiropractic manipulations, surgery, cognitive therapy, hypnosis and many medications that are not habit-forming. Use of controlled substance medications may be helpful, but they are unlikely to resolve all of your symptoms or restore all function. Risks of Controlled Substance Medications:    Opioid pain medications: These medications can lead to problems such as addiction/dependence, sedation, lightheadedness/dizziness, memory issues, falls, constipation, nausea, or vomiting. They may also impair the ability to drive or operate machinery. Additionally, these medications may lower testosterone levels, leading to loss of bone strength, stamina and sex drive.   They may cause problems with breathing, sleep apnea and reduced coughing, which are especially dangerous for patients with lung · I agree to participate in any medical, psychological or psychiatric assessments recommended by my provider. · I will actively participate in any program designed to improve function, including social, physical, psychological and daily or work activities. 2. I will not use illegal or street drugs or another person's prescription. If I have an addiction problem with drugs or alcohol and my provider asks me to enter a program to address this issue, I agree to follow through. Such programs may include:  · 12-Step program and securing a sponsor  · Individual counseling   · Inpatient or outpatient treatment  · Other:_____________________________________________________________________________________________________________________________________________    If in treatment, I will request that a copy of the programs initial evaluation and treatment recommendations be sent to this provider and will not expect refills until that is received. I will also request written monthly updates be sent to this provider to verify my continuing treatment. 3. I will consent to drug screening upon my providers request to assure I am only taking the prescribed drugs, described in this MEDICATION AGREEMENT. I understand that a drug screen is a laboratory test in which a sample of my urine, blood or saliva is checked to see what drugs I have been taking. 4. I agree that I will treat the providers and staff at this office with respect at all times. I will keep all of my scheduled appointments, but if I need to cancel my appointment, I will do so a minimum of 24 hours before it is scheduled. 5. I understand that this provider may stop prescribing the medications listed if:  · I do not show any improvement in pain, or my activity has not improved. · I develop rapid tolerance or loss of improvement, as described in my treatment plan. · I develop significant side effects from the medication. · My behavior is inconsistent with the responsibilities outlined above, which may also result in my being prevented from receiving further care from this office. · Other:____________________________________________________________________    AGREEMENT:    I have read the above and have had all of my questions answered. For chronic disease management, I know that my symptoms can be managed with many types of treatments. A chronic medication trial may be part of my treatment, but I must be an active participant in my care. Medication therapy is only one part of my symptom management plan. In some cases, there may be limited scientific evidence to support the chronic use of certain medications to improve symptoms and daily function. Furthermore, in certain circumstances, there may be scientific information that suggests that use of chronic controlled substances may actually worsen my symptoms and increase my risk of unintentional death directly related to this medication therapy. I know that if my provider feels my risk from controlled medications is greater than my benefit, I will have my controlled substance medication(s) compassionately lowered or removed altogether. I agree to a controlled substance medication trial.      I further agree to allow this office to contact family or friends if there are concerns about my safety and use of the controlled medications. I have agreed to use the following medications above as instructed by my physician and as stated in this [id number].      Patient Signature:  ______________________  Date:8/3/2018 or _____________    Provider Signature:______________________  Date:8/3/2018 or _____________

## 2018-08-03 NOTE — TELEPHONE ENCOUNTER
Last Seen: 8/3/2018    Last Writen: 7/2/18    Last UDS: 4/5/17    OARRS Run On: 7/2/18    Med Agreement Signed On: 8/3/18    Next Appointment: 11/8/2018    Requested Prescriptions     Pending Prescriptions Disp Refills    traMADol (ULTRAM) 50 MG tablet 30 tablet 0     Sig: TAKE ONE TABLET BY MOUTH TWICE A DAY AS NEEDED FOR PAIN.

## 2018-08-03 NOTE — PROGRESS NOTES
XR) 25 MG CP24, Sig 3 sample packs containing 7 pills each, Taking? Yes, Authorizing Provider Gina Menard MD    Medication magnesium oxide (MAG-OX) 400 MG tablet, Sig Take 400 mg by mouth daily, Taking? Yes, Authorizing Provider Luís Cruz MD    Medication Diclofenac Sodium POWD, Sig Apply 1-2 g topically as needed (TID - QID) Formula 3 D Ketamine 5 %, Taking? Yes, Authorizing Provider Gina Menard MD    Medication Cholecalciferol (VITAMIN D3) 5000 UNITS TABS, Sig Take by mouth, Taking? Yes, Authorizing Provider Luís Cruz MD    Medication hydrochlorothiazide (HYDRODIURIL) 25 MG tablet, Sig 1 or 2 daily prn swelling, Taking? Yes, Authorizing Provider Bubba Gamez,     Medication omeprazole (PRILOSEC) 20 MG capsule, Sig Take 20 mg by mouth every 12 hours. , Taking? Yes, Authorizing Provider Luís Cruz MD    Medication ibuprofen (ADVIL;MOTRIN) 200 MG tablet, Sig Take 200 mg by mouth every 6 hours as needed. OTC , Taking? Yes, Authorizing Provider Luís Cruz MD    Medication diphenhydrAMINE (BENADRYL) 25 MG tablet, Sig Take 25 mg by mouth every 6 hours as needed. OTC , Taking? Yes, Authorizing Provider Luís Cruz MD    Medication hydrOXYzine (ATARAX) 25 MG tablet, Sig Take 1 tablet by mouth 3 times daily as needed for Itching, Taking? , Authorizing Provider LUCIA Julien - CNP    Medication naratriptan (AMERGE) 2.5 MG tablet, Sig Take 1 tablet by mouth once as needed for Migraine 2.5 mg at onset of headache, may repeat in 4 hours if needed, Taking? , Authorizing Provider Gina Menard MD      Past Medical History:  No date: Allergic rhinitis  No date:  Anxiety  No date: Depression  No date: Endometriosis      Comment: hx  No date: Fibromyalgia  No date: Headache(784.0)  No date: Hyperlipidemia  No date: Irritable bowel syndrome  No date: Osteoarthritis  No date: SVT (supraventricular tachycardia) ([facility])      Comment: hx   No date: Trouble swallowing          Review of Systems   Constitutional: Negative for unexpected weight change. HENT: Negative for congestion and postnasal drip. Eyes: Negative for visual disturbance. Respiratory: Negative for cough and shortness of breath. Cardiovascular: Positive for chest pain. Negative for palpitations. Has had checked   Gastrointestinal: Positive for abdominal pain. Negative for blood in stool. Hx IBS   Genitourinary: Negative for difficulty urinating and hematuria. Musculoskeletal: Positive for arthralgias and myalgias. Neurological: Positive for headaches. Psychiatric/Behavioral: Negative for sleep disturbance. The patient is not nervous/anxious. Objective:   Physical Exam   Constitutional: She is oriented to person, place, and time. She appears well-developed and well-nourished. HENT:   Head: Normocephalic. Mouth/Throat: Oropharynx is clear and moist.   Eyes: Conjunctivae are normal. No scleral icterus. Neck: Neck supple. Carotid bruit is not present. No thyromegaly present. Cardiovascular: Normal rate, regular rhythm, normal heart sounds and intact distal pulses. Pulmonary/Chest: Effort normal and breath sounds normal.   Abdominal: Soft. Bowel sounds are normal. She exhibits no distension and no mass. There is no tenderness. Musculoskeletal: Normal range of motion. She exhibits no edema or tenderness. Lymphadenopathy:     She has no cervical adenopathy. Neurological: She is alert and oriented to person, place, and time. She displays normal reflexes. No cranial nerve deficit. She exhibits normal muscle tone. Coordination normal.   Skin: Skin is warm and dry. No pallor. Psychiatric: She has a normal mood and affect. Her behavior is normal. Judgment and thought content normal.       Assessment:      1. Annual physical exam    2. Fibromyalgia    3. History of migraine    4. Thyroid disorder screen    5. Vitamin D deficiency    6.  Colon cancer

## 2018-10-11 ENCOUNTER — TELEPHONE (OUTPATIENT)
Dept: FAMILY MEDICINE CLINIC | Age: 63
End: 2018-10-11

## 2018-10-11 NOTE — TELEPHONE ENCOUNTER
Requesting an antibiotic. Has chills and sweats. Is using flonase and washing sinuses. Mucous is green, coughing up green stuff and has had diarrhea this afternoon.

## 2018-10-12 RX ORDER — AZITHROMYCIN 250 MG/1
TABLET, FILM COATED ORAL
Qty: 1 PACKET | Refills: 0 | Status: SHIPPED | OUTPATIENT
Start: 2018-10-12 | End: 2018-10-22

## 2018-10-12 NOTE — TELEPHONE ENCOUNTER
Pt informed to be seen, stated you had sent in an rx. Informed patient if that doesn't help to call the office back to schedule an appt.

## 2018-11-08 ENCOUNTER — OFFICE VISIT (OUTPATIENT)
Dept: FAMILY MEDICINE CLINIC | Age: 63
End: 2018-11-08
Payer: COMMERCIAL

## 2018-11-08 VITALS
SYSTOLIC BLOOD PRESSURE: 140 MMHG | DIASTOLIC BLOOD PRESSURE: 80 MMHG | BODY MASS INDEX: 26.03 KG/M2 | HEIGHT: 66 IN | OXYGEN SATURATION: 99 % | HEART RATE: 75 BPM | WEIGHT: 162 LBS

## 2018-11-08 DIAGNOSIS — G89.4 CHRONIC PAIN SYNDROME: Primary | ICD-10-CM

## 2018-11-08 DIAGNOSIS — Z86.69 HISTORY OF MIGRAINE: ICD-10-CM

## 2018-11-08 PROCEDURE — 99214 OFFICE O/P EST MOD 30 MIN: CPT | Performed by: FAMILY MEDICINE

## 2018-11-08 ASSESSMENT — ENCOUNTER SYMPTOMS
BLOOD IN STOOL: 0
COUGH: 0
SHORTNESS OF BREATH: 0
ABDOMINAL PAIN: 0

## 2018-11-08 ASSESSMENT — PATIENT HEALTH QUESTIONNAIRE - PHQ9
SUM OF ALL RESPONSES TO PHQ QUESTIONS 1-9: 0
SUM OF ALL RESPONSES TO PHQ9 QUESTIONS 1 & 2: 0
1. LITTLE INTEREST OR PLEASURE IN DOING THINGS: 0
2. FEELING DOWN, DEPRESSED OR HOPELESS: 0
SUM OF ALL RESPONSES TO PHQ QUESTIONS 1-9: 0

## 2018-11-08 NOTE — PROGRESS NOTES
Systems    Review of Systems   Respiratory: Negative for cough and shortness of breath. Cardiovascular: Negative for chest pain and palpitations. Gastrointestinal: Negative for abdominal pain and blood in stool. Musculoskeletal: Positive for myalgias. Neurological: Positive for headaches. Psychiatric/Behavioral: Negative for sleep disturbance. The patient is not nervous/anxious. Objective:   Physical Exam     Physical Exam   Constitutional: She is oriented to person, place, and time. Cardiovascular: Normal rate, regular rhythm and normal heart sounds. Pulmonary/Chest: Effort normal.   Musculoskeletal: She exhibits no edema. Neurological: She is alert and oriented to person, place, and time. Psychiatric: She has a normal mood and affect. Her behavior is normal. Judgment and thought content normal.       Assessment:       Diagnosis Orders   1. Chronic pain syndrome     2. History of migraine  Drug Panel-PM-HI Res-UR Interp-A         Plan:      Mary Jo Palomo was seen today for chronic pain and 3 month follow-up.     Diagnoses and all orders for this visit:    Chronic pain syndrome  Meds as needed-heat-ex's  History of migraine  -     Drug Panel-PM-HI Res-UR Interp-A  Rx Aimovig  Other orders  -     Erenumab-aooe (AIMOVIG) 70 MG/ML SOAJ; 1 inj per mo    See me 3 mos        Bubba Gamez, DO

## 2018-11-13 LAB
6-ACETYLMORPHINE: NOT DETECTED
7-AMINOCLONAZEPAM: NOT DETECTED
ALPHA-OH-ALPRAZOLAM: NOT DETECTED
ALPRAZOLAM: NOT DETECTED
AMPHETAMINE: NOT DETECTED
BARBITURATES: NOT DETECTED
BENZOYLECGONINE: NOT DETECTED
BUPRENORPHINE: NOT DETECTED
CARISOPRODOL: NOT DETECTED
CLONAZEPAM: NOT DETECTED
CODEINE: NOT DETECTED
CREATININE URINE: 70.5 MG/DL (ref 20–400)
DIAZEPAM: NOT DETECTED
DRUGS EXPECTED: NORMAL
EER PAIN MGT DRUG PANEL, HIGH RES/EMIT U: NORMAL
ETHYL GLUCURONIDE: NOT DETECTED
FENTANYL: NOT DETECTED
HYDROCODONE: NOT DETECTED
HYDROMORPHONE: NOT DETECTED
LORAZEPAM: NOT DETECTED
MARIJUANA METABOLITE: NOT DETECTED
MDA: NOT DETECTED
MDEA: NOT DETECTED
MDMA URINE: NOT DETECTED
MEPERIDINE: NOT DETECTED
METHADONE: NOT DETECTED
METHAMPHETAMINE: NOT DETECTED
METHYLPHENIDATE: NOT DETECTED
MIDAZOLAM: NOT DETECTED
MORPHINE: NOT DETECTED
NORBUPRENORPHINE, FREE: NOT DETECTED
NORDIAZEPAM: NOT DETECTED
NORFENTANYL: NOT DETECTED
NORHYDROCODONE, URINE: NOT DETECTED
NOROXYCODONE: NOT DETECTED
NOROXYMORPHONE, URINE: NOT DETECTED
OXAZEPAM: NOT DETECTED
OXYCODONE: NOT DETECTED
OXYMORPHONE: NOT DETECTED
PAIN MANAGEMENT DRUG PANEL: NORMAL
PAIN MANAGEMENT DRUG PANEL: NORMAL
PCP: NOT DETECTED
PHENTERMINE: NOT DETECTED
PROPOXYPHENE: NOT DETECTED
TAPENTADOL, URINE: NOT DETECTED
TAPENTADOL-O-SULFATE, URINE: NOT DETECTED
TEMAZEPAM: NOT DETECTED
TRAMADOL: PRESENT
ZOLPIDEM: NOT DETECTED

## 2018-12-06 ENCOUNTER — NURSE ONLY (OUTPATIENT)
Dept: FAMILY MEDICINE CLINIC | Age: 63
End: 2018-12-06
Payer: COMMERCIAL

## 2018-12-06 DIAGNOSIS — G43.119 MIGRAINE WITH AURA, INTRACTABLE, WITHOUT STATUS MIGRAINOSUS: Primary | ICD-10-CM

## 2018-12-06 DIAGNOSIS — G43.909 MIGRAINE WITHOUT STATUS MIGRAINOSUS, NOT INTRACTABLE, UNSPECIFIED MIGRAINE TYPE: Primary | ICD-10-CM

## 2018-12-06 PROCEDURE — 96372 THER/PROPH/DIAG INJ SC/IM: CPT | Performed by: FAMILY MEDICINE

## 2018-12-06 RX ORDER — KETOROLAC TROMETHAMINE 30 MG/ML
30 INJECTION, SOLUTION INTRAMUSCULAR; INTRAVENOUS ONCE
Status: COMPLETED | OUTPATIENT
Start: 2018-12-06 | End: 2018-12-06

## 2018-12-06 RX ADMIN — KETOROLAC TROMETHAMINE 30 MG: 30 INJECTION, SOLUTION INTRAMUSCULAR; INTRAVENOUS at 14:40

## 2018-12-18 ENCOUNTER — TELEPHONE (OUTPATIENT)
Dept: FAMILY MEDICINE CLINIC | Age: 63
End: 2018-12-18

## 2018-12-19 ENCOUNTER — TELEPHONE (OUTPATIENT)
Dept: FAMILY MEDICINE CLINIC | Age: 63
End: 2018-12-19

## 2018-12-28 NOTE — TELEPHONE ENCOUNTER
I reviewed the letter and the criteria needed. I re-submitted the PA with clinical documentation and failure of past drugs.

## 2019-01-08 RX ORDER — CITALOPRAM 20 MG/1
TABLET ORAL
Qty: 90 TABLET | Refills: 1 | Status: SHIPPED | OUTPATIENT
Start: 2019-01-08 | End: 2019-04-10 | Stop reason: SDUPTHER

## 2019-01-30 DIAGNOSIS — M54.5 CHRONIC LOW BACK PAIN, UNSPECIFIED BACK PAIN LATERALITY, WITH SCIATICA PRESENCE UNSPECIFIED: ICD-10-CM

## 2019-01-30 DIAGNOSIS — G43.119 MIGRAINE WITH AURA, INTRACTABLE, WITHOUT STATUS MIGRAINOSUS: ICD-10-CM

## 2019-01-30 DIAGNOSIS — G43.719 INTRACTABLE CHRONIC MIGRAINE WITHOUT AURA AND WITHOUT STATUS MIGRAINOSUS: ICD-10-CM

## 2019-01-30 DIAGNOSIS — G89.29 CHRONIC LOW BACK PAIN, UNSPECIFIED BACK PAIN LATERALITY, WITH SCIATICA PRESENCE UNSPECIFIED: ICD-10-CM

## 2019-01-30 RX ORDER — NARATRIPTAN 2.5 MG/1
2.5 TABLET ORAL
Qty: 9 TABLET | Refills: 3 | Status: SHIPPED | OUTPATIENT
Start: 2019-01-30 | End: 2019-05-03 | Stop reason: SDUPTHER

## 2019-01-30 RX ORDER — TRAMADOL HYDROCHLORIDE 50 MG/1
50 TABLET ORAL 2 TIMES DAILY PRN
Qty: 30 TABLET | Refills: 1 | Status: SHIPPED | OUTPATIENT
Start: 2019-01-30 | End: 2019-03-19 | Stop reason: SDUPTHER

## 2019-01-31 ENCOUNTER — TELEPHONE (OUTPATIENT)
Dept: FAMILY MEDICINE CLINIC | Age: 64
End: 2019-01-31

## 2019-01-31 RX ORDER — ONDANSETRON 4 MG/1
4 TABLET, ORALLY DISINTEGRATING ORAL EVERY 8 HOURS PRN
Qty: 20 TABLET | Refills: 0 | Status: SHIPPED | OUTPATIENT
Start: 2019-01-31 | End: 2020-08-07

## 2019-01-31 RX ORDER — OSELTAMIVIR PHOSPHATE 75 MG/1
75 CAPSULE ORAL 2 TIMES DAILY
Qty: 10 CAPSULE | Refills: 0 | Status: SHIPPED | OUTPATIENT
Start: 2019-01-31 | End: 2019-02-05

## 2019-02-04 ENCOUNTER — OFFICE VISIT (OUTPATIENT)
Dept: FAMILY MEDICINE CLINIC | Age: 64
End: 2019-02-04
Payer: COMMERCIAL

## 2019-02-04 VITALS
OXYGEN SATURATION: 99 % | BODY MASS INDEX: 24.86 KG/M2 | SYSTOLIC BLOOD PRESSURE: 136 MMHG | DIASTOLIC BLOOD PRESSURE: 82 MMHG | WEIGHT: 154 LBS | TEMPERATURE: 98.6 F | HEART RATE: 113 BPM

## 2019-02-04 DIAGNOSIS — R11.0 NAUSEA: Primary | ICD-10-CM

## 2019-02-04 DIAGNOSIS — R19.7 DIARRHEA, UNSPECIFIED TYPE: ICD-10-CM

## 2019-02-04 PROCEDURE — 96372 THER/PROPH/DIAG INJ SC/IM: CPT | Performed by: PHYSICIAN ASSISTANT

## 2019-02-04 PROCEDURE — 99213 OFFICE O/P EST LOW 20 MIN: CPT | Performed by: PHYSICIAN ASSISTANT

## 2019-02-04 RX ORDER — PROMETHAZINE HYDROCHLORIDE 25 MG/ML
25 INJECTION, SOLUTION INTRAMUSCULAR; INTRAVENOUS ONCE
Qty: 1 ML | Refills: 0
Start: 2019-02-04 | End: 2019-02-04 | Stop reason: CLARIF

## 2019-02-04 RX ORDER — PROMETHAZINE HYDROCHLORIDE 12.5 MG/1
12.5 TABLET ORAL EVERY 6 HOURS PRN
Qty: 12 TABLET | Refills: 0 | Status: SHIPPED | OUTPATIENT
Start: 2019-02-04 | End: 2019-02-07

## 2019-02-04 RX ORDER — PROMETHAZINE HYDROCHLORIDE 25 MG/ML
25 INJECTION, SOLUTION INTRAMUSCULAR; INTRAVENOUS ONCE
Status: COMPLETED | OUTPATIENT
Start: 2019-02-04 | End: 2019-02-04

## 2019-02-04 RX ADMIN — PROMETHAZINE HYDROCHLORIDE 25 MG: 25 INJECTION, SOLUTION INTRAMUSCULAR; INTRAVENOUS at 12:58

## 2019-02-04 ASSESSMENT — ENCOUNTER SYMPTOMS
NAUSEA: 1
ANAL BLEEDING: 0
VOICE CHANGE: 0
COLOR CHANGE: 0
CHEST TIGHTNESS: 0
ABDOMINAL DISTENTION: 0
ABDOMINAL PAIN: 0
BLOOD IN STOOL: 0
SINUS PRESSURE: 0
RHINORRHEA: 0
DIARRHEA: 1
VOMITING: 0
CONSTIPATION: 0
TROUBLE SWALLOWING: 0
RECTAL PAIN: 0
COUGH: 0
SHORTNESS OF BREATH: 0
SINUS PAIN: 0

## 2019-02-08 ENCOUNTER — OFFICE VISIT (OUTPATIENT)
Dept: FAMILY MEDICINE CLINIC | Age: 64
End: 2019-02-08
Payer: COMMERCIAL

## 2019-02-08 VITALS
SYSTOLIC BLOOD PRESSURE: 135 MMHG | HEART RATE: 108 BPM | OXYGEN SATURATION: 99 % | HEIGHT: 66 IN | BODY MASS INDEX: 24.62 KG/M2 | WEIGHT: 153.2 LBS | DIASTOLIC BLOOD PRESSURE: 80 MMHG

## 2019-02-08 DIAGNOSIS — Z86.69 HISTORY OF MIGRAINE: ICD-10-CM

## 2019-02-08 DIAGNOSIS — M79.7 FIBROMYALGIA: Primary | ICD-10-CM

## 2019-02-08 DIAGNOSIS — F41.9 ANXIETY: ICD-10-CM

## 2019-02-08 PROCEDURE — 99214 OFFICE O/P EST MOD 30 MIN: CPT | Performed by: FAMILY MEDICINE

## 2019-02-08 ASSESSMENT — ENCOUNTER SYMPTOMS
COUGH: 0
ABDOMINAL PAIN: 0
SHORTNESS OF BREATH: 0

## 2019-03-14 ENCOUNTER — TELEPHONE (OUTPATIENT)
Dept: FAMILY MEDICINE CLINIC | Age: 64
End: 2019-03-14

## 2019-03-16 ENCOUNTER — E-VISIT (OUTPATIENT)
Dept: FAMILY MEDICINE CLINIC | Age: 64
End: 2019-03-16
Payer: COMMERCIAL

## 2019-03-16 PROCEDURE — 99444 PR PHYSICIAN ONLINE EVALUATION & MANAGEMENT SERVICE: CPT | Performed by: FAMILY MEDICINE

## 2019-03-16 RX ORDER — AZITHROMYCIN 250 MG/1
250 TABLET, FILM COATED ORAL SEE ADMIN INSTRUCTIONS
Qty: 6 TABLET | Refills: 0 | Status: SHIPPED | OUTPATIENT
Start: 2019-03-16 | End: 2019-03-21

## 2019-03-16 ASSESSMENT — LIFESTYLE VARIABLES: SMOKING_STATUS: NO, I'VE NEVER SMOKED

## 2019-03-19 DIAGNOSIS — G89.29 CHRONIC LOW BACK PAIN, UNSPECIFIED BACK PAIN LATERALITY, WITH SCIATICA PRESENCE UNSPECIFIED: ICD-10-CM

## 2019-03-19 DIAGNOSIS — M54.5 CHRONIC LOW BACK PAIN, UNSPECIFIED BACK PAIN LATERALITY, WITH SCIATICA PRESENCE UNSPECIFIED: ICD-10-CM

## 2019-03-20 RX ORDER — TRAMADOL HYDROCHLORIDE 50 MG/1
TABLET ORAL
Qty: 28 TABLET | Refills: 0 | Status: SHIPPED | OUTPATIENT
Start: 2019-03-20 | End: 2019-04-15 | Stop reason: SDUPTHER

## 2019-03-25 DIAGNOSIS — R05.9 COUGH: Primary | ICD-10-CM

## 2019-03-27 DIAGNOSIS — R00.0 TACHYCARDIA: Primary | ICD-10-CM

## 2019-03-28 ENCOUNTER — TELEPHONE (OUTPATIENT)
Dept: FAMILY MEDICINE CLINIC | Age: 64
End: 2019-03-28

## 2019-03-28 ENCOUNTER — HOSPITAL ENCOUNTER (OUTPATIENT)
Dept: GENERAL RADIOLOGY | Age: 64
Discharge: HOME | End: 2019-03-28
Payer: COMMERCIAL

## 2019-03-28 DIAGNOSIS — R05.9 COUGH: ICD-10-CM

## 2019-03-28 PROCEDURE — 71046 X-RAY EXAM CHEST 2 VIEWS: CPT

## 2019-03-29 DIAGNOSIS — R93.89 ABNORMAL CXR: Primary | ICD-10-CM

## 2019-03-29 RX ORDER — AZITHROMYCIN 250 MG/1
250 TABLET, FILM COATED ORAL SEE ADMIN INSTRUCTIONS
Qty: 6 TABLET | Refills: 0 | Status: SHIPPED | OUTPATIENT
Start: 2019-03-29 | End: 2019-04-03

## 2019-04-01 ENCOUNTER — HOSPITAL ENCOUNTER (OUTPATIENT)
Dept: CT IMAGING | Age: 64
Discharge: HOME | End: 2019-04-01
Payer: COMMERCIAL

## 2019-04-01 DIAGNOSIS — R93.89 ABNORMAL CXR: ICD-10-CM

## 2019-04-01 PROCEDURE — 71250 CT THORAX DX C-: CPT

## 2019-04-05 ENCOUNTER — TELEPHONE (OUTPATIENT)
Dept: FAMILY MEDICINE CLINIC | Age: 64
End: 2019-04-05

## 2019-04-05 RX ORDER — METHYLPREDNISOLONE 4 MG/1
TABLET ORAL
Qty: 21 TABLET | Refills: 0 | Status: SHIPPED | OUTPATIENT
Start: 2019-04-05 | End: 2019-04-11

## 2019-04-09 ENCOUNTER — EMPLOYEE WELLNESS (OUTPATIENT)
Dept: OTHER | Age: 64
End: 2019-04-09

## 2019-04-09 LAB
CHOLESTEROL, TOTAL: 181 MG/DL (ref 0–199)
GLUCOSE BLD-MCNC: 93 MG/DL (ref 70–99)
HDLC SERPL-MCNC: 85 MG/DL (ref 40–60)
LDL CHOLESTEROL CALCULATED: 73 MG/DL
TRIGL SERPL-MCNC: 115 MG/DL (ref 0–150)

## 2019-04-10 ENCOUNTER — PATIENT MESSAGE (OUTPATIENT)
Dept: FAMILY MEDICINE CLINIC | Age: 64
End: 2019-04-10

## 2019-04-10 RX ORDER — CITALOPRAM 20 MG/1
TABLET ORAL
Qty: 10 TABLET | Refills: 0 | Status: SHIPPED | OUTPATIENT
Start: 2019-04-10 | End: 2019-04-10

## 2019-04-10 RX ORDER — CITALOPRAM 40 MG/1
40 TABLET ORAL DAILY
Qty: 90 TABLET | Refills: 1 | Status: SHIPPED | OUTPATIENT
Start: 2019-04-10 | End: 2019-11-27 | Stop reason: SDUPTHER

## 2019-04-10 NOTE — TELEPHONE ENCOUNTER
From: Ale Gann  To: Alis Arambula DO  Sent: 4/10/2019 8:29 AM EDT  Subject: Prescription Question    Good Morning,    I need a prescription sent in for the increased dose of the Citalopram. The last time I was in you increased it. Thanks.

## 2019-04-11 ENCOUNTER — TELEPHONE (OUTPATIENT)
Dept: FAMILY MEDICINE CLINIC | Age: 64
End: 2019-04-11

## 2019-04-15 DIAGNOSIS — M54.5 CHRONIC LOW BACK PAIN, UNSPECIFIED BACK PAIN LATERALITY, WITH SCIATICA PRESENCE UNSPECIFIED: ICD-10-CM

## 2019-04-15 DIAGNOSIS — G89.29 CHRONIC LOW BACK PAIN, UNSPECIFIED BACK PAIN LATERALITY, WITH SCIATICA PRESENCE UNSPECIFIED: ICD-10-CM

## 2019-04-15 RX ORDER — TRAMADOL HYDROCHLORIDE 50 MG/1
50 TABLET ORAL 2 TIMES DAILY PRN
Qty: 28 TABLET | Refills: 0 | Status: SHIPPED | OUTPATIENT
Start: 2019-04-15 | End: 2019-05-10 | Stop reason: SDUPTHER

## 2019-04-19 NOTE — TELEPHONE ENCOUNTER
Received DENIAL for Aimovig 70MG/ML SC SOAJ. Denial letter attached. Please advise patient. Thank you.

## 2019-04-22 NOTE — TELEPHONE ENCOUNTER
Pt states she has used topamax didn't work years ago, Imitrex doesn't work, tried years ago, Maxalt causes rebound headaches. But states she has been getting Aimovig for free. That it doesn't need a PA.

## 2019-04-23 VITALS — WEIGHT: 151 LBS | BODY MASS INDEX: 24.37 KG/M2

## 2019-04-26 ENCOUNTER — OFFICE VISIT (OUTPATIENT)
Dept: FAMILY MEDICINE CLINIC | Age: 64
End: 2019-04-26
Payer: COMMERCIAL

## 2019-04-26 VITALS
HEIGHT: 66 IN | OXYGEN SATURATION: 98 % | DIASTOLIC BLOOD PRESSURE: 85 MMHG | WEIGHT: 155.8 LBS | BODY MASS INDEX: 25.04 KG/M2 | HEART RATE: 77 BPM | SYSTOLIC BLOOD PRESSURE: 140 MMHG

## 2019-04-26 DIAGNOSIS — Z86.69 HISTORY OF MIGRAINE: ICD-10-CM

## 2019-04-26 DIAGNOSIS — M79.7 FIBROMYALGIA: Primary | ICD-10-CM

## 2019-04-26 DIAGNOSIS — R35.0 URINARY FREQUENCY: ICD-10-CM

## 2019-04-26 LAB
BILIRUBIN, POC: NEGATIVE
BLOOD URINE, POC: NEGATIVE
CLARITY, POC: CLEAR
COLOR, POC: NORMAL
GLUCOSE URINE, POC: NEGATIVE
KETONES, POC: NEGATIVE
LEUKOCYTE EST, POC: NORMAL
NITRITE, POC: NEGATIVE
PH, POC: 6
PROTEIN, POC: NORMAL
SPECIFIC GRAVITY, POC: 1.02
UROBILINOGEN, POC: 1

## 2019-04-26 PROCEDURE — 99214 OFFICE O/P EST MOD 30 MIN: CPT | Performed by: FAMILY MEDICINE

## 2019-04-26 PROCEDURE — 81002 URINALYSIS NONAUTO W/O SCOPE: CPT | Performed by: FAMILY MEDICINE

## 2019-04-26 ASSESSMENT — ENCOUNTER SYMPTOMS
SHORTNESS OF BREATH: 0
COUGH: 0

## 2019-04-26 NOTE — PATIENT INSTRUCTIONS
Fibromyalgia  Continue meds-keep active-heat-ex's  History of migraine  Meds as needed  Urinary frequency  UA pending  Other orders  -     POCT Urinalysis no Micro

## 2019-04-29 ENCOUNTER — OFFICE VISIT (OUTPATIENT)
Dept: CARDIOLOGY CLINIC | Age: 64
End: 2019-04-29
Payer: COMMERCIAL

## 2019-04-29 VITALS
DIASTOLIC BLOOD PRESSURE: 70 MMHG | HEART RATE: 78 BPM | SYSTOLIC BLOOD PRESSURE: 133 MMHG | WEIGHT: 155.2 LBS | BODY MASS INDEX: 25.05 KG/M2

## 2019-04-29 DIAGNOSIS — R06.02 SHORTNESS OF BREATH: ICD-10-CM

## 2019-04-29 DIAGNOSIS — R94.39 POSITIVE CARDIAC STRESS TEST: Primary | ICD-10-CM

## 2019-04-29 PROCEDURE — 0296T PR EXT ECG > 48HR TO 21 DAY RCRD W/CONECT INTL RCRD: CPT | Performed by: INTERNAL MEDICINE

## 2019-04-29 PROCEDURE — 93000 ELECTROCARDIOGRAM COMPLETE: CPT | Performed by: INTERNAL MEDICINE

## 2019-04-29 PROCEDURE — 99243 OFF/OP CNSLTJ NEW/EST LOW 30: CPT | Performed by: INTERNAL MEDICINE

## 2019-04-29 NOTE — PROGRESS NOTES
[facility] [place] [address]     Outpatient Cardiology Consult  Consulting Cardiologist Lamine Argueta M.D., [facility] [place]  Referring Provider:  Dheeraj Florentino DO    4/29/2019,2:40 PM    Chief Complaint   Patient presents with    Follow-up           Asked by No admitting provider for patient encounter./Bubba Gamez DO  to evaluate and assess this patient's palpitations and atypical weakness    History of Present Illness: Piero Coleman is a 61 y.o. female is here for cardiac evaluation because she's had sensation of pauses and may be heart stoppage but no syncope. She thinks that she may had a presyncopal episode. She states that she is more tired than usual and has fibromyalgia flareups and she thinks she is having PVCs. This time she does not have any pain. She is otherwise active and moving about fairly well. History of SVT ablation by Dr. Obinna Alfaro cath 2016 with minor coronary lesions    Past Medical History:   has a past medical history of Allergic rhinitis, Anxiety, Depression, Endometriosis, Fibromyalgia, Headache(784.0), Hyperlipidemia, Irritable bowel syndrome, Osteoarthritis, SVT (supraventricular tachycardia) ([address] and Trouble swallowing. Surgical History:   has a past surgical history that includes Nasal septum surgery (1982); Appendectomy; Cholecystectomy; Hysterectomy; Bunionectomy (1973); laparoscopy; laparotomy; ablation of dysrhythmic focus (1999); Upper gastrointestinal endoscopy; Colonoscopy (10/15/13); Upper gastrointestinal endoscopy (10/15/13); and other surgical history. Social History:   reports that she has never smoked. She has never used smokeless tobacco. She reports that she does not drink alcohol or use drugs.      Family History:  family history includes Anxiety Disorder in her mother; Dementia in her mother; Depression in her mother; Diabetes in an other family member; Heart Attack in her brother; Heart Disease in her brother, father, and another family member; High Blood Pressure in her father; High Cholesterol in her mother and another family member; Hypertension in an other family member; Migraines in her father and another family member; Other in her father and mother. Home Medications:  Prior to Admission medications    Medication Sig Start Date End Date Taking? Authorizing Provider   traMADol (ULTRAM) 50 MG tablet Take 1 tablet by mouth 2 times daily as needed for Pain for up to 14 days. 4/15/19 4/29/19 Yes Bubba Gamez DO   citalopram (CELEXA) 40 MG tablet Take 1 tablet by mouth daily 4/10/19  Yes Bubba Gamez DO   gabapentin (NEURONTIN) 100 MG capsule Take 1 capsule by mouth 2 times daily for 30 days.  Intended supply: 30 days 4/3/19 5/3/19 Yes Bubba Gamez DO   ondansetron (ZOFRAN-ODT) 4 MG disintegrating tablet Take 1 tablet by mouth every 8 hours as needed for Nausea or Vomiting 1/31/19  Yes Bubba Gamez DO   naratriptan (AMERGE) 2.5 MG tablet Take 1 tablet by mouth once as needed for Migraine 2.5 mg at onset of headache, may repeat in 4 hours if needed 1/30/19 4/29/19 Yes Aleksandar Gamez DO   Erenumab-aooe (AIMOVIG) 70 MG/ML SOAJ 1 inj per mo 11/8/18  Yes Bubba Gamez DO   methocarbamol (ROBAXIN) 500 MG tablet TAKE ONE TABLET BY MOUTH THREE TIMES A DAY 10/16/18  Yes Bubba Gamez DO   rosuvastatin (CRESTOR) 10 MG tablet TAKE ONE TABLET BY MOUTH AT BEDTIME 5/9/18  Yes Bubba Gamez DO   Chlorphen-Pseudoephed-APAP (TYLENOL ALLERGY SINUS PO) Take by mouth as needed   Yes Historical Provider, MD   promethazine (PHENERGAN) 25 MG tablet Take 1 tablet by mouth every 6 hours as needed for Nausea 3/8/18  Yes Bubba Gamez DO   dicyclomine (BENTYL) 10 MG capsule Take 1 capsule by mouth 3 times daily (before meals) 2/14/18  Yes Bubba Gamez DO   promethazine (PHENERGAN) 25 MG suppository Place 1 suppository rectally every 6 hours as needed for Nausea 10/26/17  Yes Blanca Solo, APRN - CNP   clotrimazole-betamethasone (LOTRISONE) 1-0.05 % cream Apply topically as needed Apply topically 2 times daily. Yes Historical Provider, MD   hydrOXYzine (ATARAX) 25 MG tablet Take 1 tablet by mouth 3 times daily as needed for Itching 10/24/17 4/29/19 Yes LUCIA Watson - CNP   magnesium oxide (MAG-OX) 400 MG tablet Take 400 mg by mouth daily   Yes Historical Provider, MD   Diclofenac Sodium POWD Apply 1-2 g topically as needed (TID - QID) Formula 3 D Ketamine 5 % 6/15/17  Yes Jessica Rg Contractor, MD   Cholecalciferol (VITAMIN D3) 5000 UNITS TABS Take by mouth   Yes Historical Provider, MD   hydrochlorothiazide (HYDRODIURIL) 25 MG tablet 1 or 2 daily prn swelling 6/13/14  Yes Bubba Gamez,    omeprazole (PRILOSEC) 20 MG capsule Take 20 mg by mouth every 12 hours. Yes Historical Provider, MD   ibuprofen (ADVIL;MOTRIN) 200 MG tablet Take 200 mg by mouth every 6 hours as needed. OTC    Yes Historical Provider, MD   diphenhydrAMINE (BENADRYL) 25 MG tablet Take 25 mg by mouth every 6 hours as needed. OTC    Yes Historical Provider, MD           Allergies: Avelox [moxifloxacin hcl in nacl]; Cephalexin; Darvon [propoxyphene hcl]; Digoxin; Librax [chlordiazepoxide-methscopol]; Motrin [ibuprofen micronized]; Other; Pcn [penicillins]; Prochlorperazine edisylate; Sulfa antibiotics; Tetracyclines & related; and Statins [statins]     Review of Systems:   · Constitutional: there has been no unanticipated weight loss. · Eyes: No visual changes or diplopia. No scleral icterus. · ENT: No Headaches, hearing loss or vertigo. No mouth sores or sore throat. · Cardiovascular: Reviewed in HPI  ·  Pulmonary:  no cough or sputum production. · Gastrointestinal: No abdominal pain, appetite loss, blood in stools. No change in bowel or bladder habits. · Genitourinary: No dysuria, trouble voiding, or hematuria. · Musculoskeletal:  No gait disturbance, weakness or joint complaints. · Integumentary: No rash or pruritis.           Endocrine: No malaise, fatigue or temperature intolerance. Hematologic/Lymphatic: No abnormal bruising or bleeding, blood clots or swollen lymph nodes. · Allergic/Immunologic: No nasal congestion or hives. · All other ROS are reviewed and are unremarkable. Physical Examination:      Wt Readings from Last 3 Encounters:   04/29/19 155 lb 3.2 oz (70.4 kg)   04/26/19 155 lb 12.8 oz (70.7 kg)   04/09/19 151 lb (68.5 kg)       BP Readings from Last 3 Encounters:   04/29/19 133/70   04/26/19 (!) 140/85   02/08/19 135/80       Pulse Readings from Last 3 Encounters:   04/29/19 78   04/26/19 77   02/08/19 108       Constitutional and General Appearance:  Healthy. And alert . HEENT: eyes and ears intact. No nasal masses  THYROID: not enlarged  LUNGS:  · Clear to auscultation and percussion  HEART and VASCULAR:  · The apical impulses not displaced  · Heart tones are crisp and normal  · Cervical veins are not engorged  · The carotid upstroke is normal in amplitude and contour without delay or bruit  · Peripheral pulses are symmetrical and full  · There is no clubbing, cyanosis of the extremities. · No peripheral edema  · Femoral Arteries: 2+ and equal  · Pedal Pulses: 2+ and equal   ABDOMEN[de-identified]  · No masses or tenderness  · Liver/Spleen: No Abnormalities Noted  NEUROLOGICAL:  . Moves all extremities to command. Cranial nerves 2-12 are in tact.   PSYCHIATRIC: alert and lucid  and oriented and appropriate  SKIN: No lesions or rashes  LYMPH NODES: none enlarged    ·   ·   ·     CBC with Differential:    Lab Results   Component Value Date    WBC 5.9 08/03/2018    RBC 4.59 08/03/2018    HGB 14.2 08/03/2018    HCT 42.5 08/03/2018     08/03/2018    MCV 92.7 08/03/2018    MCH 31.0 08/03/2018    MCHC 33.4 08/03/2018    RDW 13.9 08/03/2018    SEGSPCT 73.7 06/27/2012    LYMPHOPCT 29.4 08/03/2018    MONOPCT 5.4 08/03/2018    BASOPCT 1.1 08/03/2018    MONOSABS 0.3 08/03/2018    LYMPHSABS 1.7 08/03/2018    EOSABS 0.1 08/03/2018    BASOSABS 0.1 using voice recognition technology and may contain unintended errors  Joelyn Gottron, M.D., [facility] [place]  9/49/14278:[id number] PM

## 2019-04-30 ENCOUNTER — TELEPHONE (OUTPATIENT)
Dept: FAMILY MEDICINE CLINIC | Age: 64
End: 2019-04-30

## 2019-05-02 ENCOUNTER — TELEPHONE (OUTPATIENT)
Dept: FAMILY MEDICINE CLINIC | Age: 64
End: 2019-05-02

## 2019-05-10 ENCOUNTER — OFFICE VISIT (OUTPATIENT)
Dept: FAMILY MEDICINE CLINIC | Age: 64
End: 2019-05-10
Payer: COMMERCIAL

## 2019-05-10 VITALS
BODY MASS INDEX: 25.04 KG/M2 | HEIGHT: 66 IN | HEART RATE: 97 BPM | DIASTOLIC BLOOD PRESSURE: 80 MMHG | OXYGEN SATURATION: 98 % | SYSTOLIC BLOOD PRESSURE: 122 MMHG | WEIGHT: 155.8 LBS

## 2019-05-10 DIAGNOSIS — M54.5 CHRONIC LOW BACK PAIN, UNSPECIFIED BACK PAIN LATERALITY, WITH SCIATICA PRESENCE UNSPECIFIED: ICD-10-CM

## 2019-05-10 DIAGNOSIS — G89.29 CHRONIC LOW BACK PAIN, UNSPECIFIED BACK PAIN LATERALITY, WITH SCIATICA PRESENCE UNSPECIFIED: ICD-10-CM

## 2019-05-10 DIAGNOSIS — G43.709 CHRONIC MIGRAINE WITHOUT AURA WITHOUT STATUS MIGRAINOSUS, NOT INTRACTABLE: Primary | ICD-10-CM

## 2019-05-10 PROCEDURE — 99213 OFFICE O/P EST LOW 20 MIN: CPT | Performed by: FAMILY MEDICINE

## 2019-05-10 RX ORDER — TRAMADOL HYDROCHLORIDE 50 MG/1
50 TABLET ORAL 2 TIMES DAILY PRN
Qty: 28 TABLET | Refills: 2 | Status: SHIPPED | OUTPATIENT
Start: 2019-05-10 | End: 2019-07-22 | Stop reason: SDUPTHER

## 2019-05-10 ASSESSMENT — ENCOUNTER SYMPTOMS
SHORTNESS OF BREATH: 0
COUGH: 0

## 2019-05-10 NOTE — PROGRESS NOTES
Subjective:      Patient ID: Cecille Sood is a 59 y.o. female. HPI  In fir check on Chr Migraines(worse with weather changes). Prior to Visit Medications :  Medication naratriptan (AMERGE) 2.5 MG tablet, Sig TAKE 1 TABLET BY MOUTH ONCE AS NEEDED FOR MIGRAINE AT ONSET OF HEADACHE MAY REPEAT IN 4 HOURS IF NEEDED, Taking? Yes, Authorizing Provider Bubba Gamez, DO    Medication methocarbamol (ROBAXIN) 500 MG tablet, Sig TAKE ONE TABLET BY MOUTH THREE TIMES A DAY, Taking? Yes, Authorizing Provider Bubba Gamez, DO    Medication citalopram (CELEXA) 40 MG tablet, Sig Take 1 tablet by mouth daily, Taking? Yes, Authorizing Provider Bubba Gamez, DO    Medication ondansetron (ZOFRAN-ODT) 4 MG disintegrating tablet, Sig Take 1 tablet by mouth every 8 hours as needed for Nausea or Vomiting, Taking? Yes, Authorizing Provider Bubba Gamez, DO    Medication Erenumab-aooe (AIMOVIG) 70 MG/ML SOAJ, Sig 1 inj per mo, Taking? Yes, Authorizing Provider Bubba Gamez, DO    Medication rosuvastatin (CRESTOR) 10 MG tablet, Sig TAKE ONE TABLET BY MOUTH AT BEDTIME, Taking? Yes, Authorizing Provider Bubba Gamez, DO    Medication Chlorphen-Pseudoephed-APAP (TYLENOL ALLERGY SINUS PO), Sig Take by mouth as needed, Taking? Yes, Authorizing Provider Historical Provider, MD    Medication promethazine (PHENERGAN) 25 MG tablet, Sig Take 1 tablet by mouth every 6 hours as needed for Nausea, Taking? Yes, Authorizing Provider Bubba Gamez, DO    Medication dicyclomine (BENTYL) 10 MG capsule, Sig Take 1 capsule by mouth 3 times daily (before meals), Taking? Yes, Authorizing Provider Bubba Gamez, DO    Medication promethazine (PHENERGAN) 25 MG suppository, Sig Place 1 suppository rectally every 6 hours as needed for Nausea, Taking? Yes, Authorizing Provider Olamide Smith APRN - CNP    Medication clotrimazole-betamethasone (LOTRISONE) 1-0.05 % cream, Sig Apply topically as needed Apply topically 2 times daily. , Taking?  Yes, Authorizing Provider Historical MD Nancy    Medication magnesium oxide (MAG-OX) 400 MG tablet, Sig Take 400 mg by mouth daily, Taking? Yes, Authorizing Provider Historical Provider, MD    Medication Cholecalciferol (VITAMIN D3) 5000 UNITS TABS, Sig Take by mouth, Taking? Yes, Authorizing Provider Luís Cruz MD    Medication hydrochlorothiazide (HYDRODIURIL) 25 MG tablet, Sig 1 or 2 daily prn swelling, Taking? Yes, Authorizing Provider Bubba Gamez, DO    Medication omeprazole (PRILOSEC) 20 MG capsule, Sig Take 20 mg by mouth every 12 hours. , Taking? Yes, Authorizing Provider Historical Provider, MD    Medication ibuprofen (ADVIL;MOTRIN) 200 MG tablet, Sig Take 200 mg by mouth every 6 hours as needed. OTC , Taking? Yes, Authorizing Provider Historical MD Nancy    Medication diphenhydrAMINE (BENADRYL) 25 MG tablet, Sig Take 25 mg by mouth every 6 hours as needed. OTC , Taking? Yes, Authorizing Provider Historical Provider, MD    Medication gabapentin (NEURONTIN) 100 MG capsule, Sig Take 1 capsule by mouth 2 times daily for 30 days. Intended supply: 30 days, Taking? , Authorizing Provider Bubba Gamez, DO    Medication hydrOXYzine (ATARAX) 25 MG tablet, Sig Take 1 tablet by mouth 3 times daily as needed for Itching, Taking? , Authorizing Provider Claudia Joseph APRN - CNP    Medication Diclofenac Sodium POWD, Sig Apply 1-2 g topically as needed (TID - QID) Formula 3 D Ketamine 5 %, Taking? , Authorizing Provider Danyn Toscano MD      Past Medical History:  No date: Allergic rhinitis  No date:  Anxiety  No date: Depression  No date: Endometriosis      Comment:  hx  No date: Fibromyalgia  No date: Headache(784.0)  No date: Hyperlipidemia  No date: Irritable bowel syndrome  No date: Osteoarthritis  No date: SVT (supraventricular tachycardia) ([facility])      Comment:  hx   No date: Trouble swallowing        Review of Systems    Review of Systems   Respiratory: Negative for cough and shortness of breath. Cardiovascular: Negative for chest pain and palpitations. Neurological: Positive for headaches. Negative for tremors. Objective:   Physical Exam      Physical Exam   Cardiovascular: Normal rate, regular rhythm and normal heart sounds. Pulmonary/Chest: Effort normal and breath sounds normal.   Musculoskeletal: She exhibits no edema. Tender occip ms   Psychiatric: She has a normal mood and affect. Her behavior is normal. Judgment and thought content normal.       Assessment:       Diagnosis Orders   1. Chronic migraine without aura without status migrainosus, not intractable           Plan:      Ale Otto was seen today for 3 month follow-up.     Diagnoses and all orders for this visit:    Chronic migraine without aura without status migrainosus, not intractable    Increase to 140 mg per mo  See me 3 mos        Bubba Gamez, DO

## 2019-05-30 ENCOUNTER — TELEPHONE (OUTPATIENT)
Dept: CARDIOLOGY CLINIC | Age: 64
End: 2019-05-30

## 2019-05-30 ENCOUNTER — HOSPITAL ENCOUNTER (OUTPATIENT)
Dept: CARDIOLOGY | Age: 64
Discharge: HOME | End: 2019-05-30
Payer: COMMERCIAL

## 2019-05-30 ENCOUNTER — TELEPHONE (OUTPATIENT)
Dept: CARDIOLOGY | Age: 64
End: 2019-05-30

## 2019-05-30 DIAGNOSIS — R94.39 POSITIVE CARDIAC STRESS TEST: ICD-10-CM

## 2019-05-30 DIAGNOSIS — R06.02 SHORTNESS OF BREATH: ICD-10-CM

## 2019-05-30 DIAGNOSIS — R94.31 ABNORMAL ELECTROCARDIOGRAM: Primary | ICD-10-CM

## 2019-05-30 LAB
LV EF: 33 %
LVEF MODALITY: NORMAL

## 2019-05-30 PROCEDURE — 93320 DOPPLER ECHO COMPLETE: CPT

## 2019-05-30 PROCEDURE — 93351 STRESS TTE COMPLETE: CPT

## 2019-06-01 ENCOUNTER — APPOINTMENT (OUTPATIENT)
Dept: GENERAL RADIOLOGY | Age: 64
DRG: 247 | End: 2019-06-01
Payer: COMMERCIAL

## 2019-06-01 ENCOUNTER — HOSPITAL ENCOUNTER (INPATIENT)
Age: 64
LOS: 5 days | Discharge: HOME | DRG: 247 | End: 2019-06-06
Attending: EMERGENCY MEDICINE | Admitting: INTERNAL MEDICINE
Payer: COMMERCIAL

## 2019-06-01 DIAGNOSIS — I20.0 UNSTABLE ANGINA (HCC): Primary | ICD-10-CM

## 2019-06-01 PROBLEM — R06.09 DYSPNEA ON EXERTION: Status: ACTIVE | Noted: 2019-06-01

## 2019-06-01 PROBLEM — R07.9 CHEST PAIN: Status: ACTIVE | Noted: 2019-06-01

## 2019-06-01 LAB
ANION GAP SERPL CALCULATED.3IONS-SCNC: 12 MMOL/L (ref 3–16)
APTT: 34.3 SEC (ref 26–36)
BUN BLDV-MCNC: 6 MG/DL (ref 7–20)
CALCIUM SERPL-MCNC: 9.2 MG/DL (ref 8.3–10.6)
CHLORIDE BLD-SCNC: 103 MMOL/L (ref 99–110)
CO2: 27 MMOL/L (ref 21–32)
CREAT SERPL-MCNC: 0.6 MG/DL (ref 0.6–1.2)
GFR AFRICAN AMERICAN: >60
GFR NON-AFRICAN AMERICAN: >60
GLUCOSE BLD-MCNC: 104 MG/DL (ref 70–99)
HCT VFR BLD CALC: 39.2 % (ref 36–48)
HEMOGLOBIN: 13.3 G/DL (ref 12–16)
INR BLD: 1.02 (ref 0.86–1.14)
MCH RBC QN AUTO: 31.4 PG (ref 26–34)
MCHC RBC AUTO-ENTMCNC: 34.1 G/DL (ref 31–36)
MCV RBC AUTO: 92.1 FL (ref 80–100)
PDW BLD-RTO: 12.7 % (ref 12.4–15.4)
PLATELET # BLD: 304 K/UL (ref 135–450)
PMV BLD AUTO: 6.7 FL (ref 5–10.5)
POTASSIUM SERPL-SCNC: 3.5 MMOL/L (ref 3.5–5.1)
PRO-BNP: 68 PG/ML (ref 0–124)
PROTHROMBIN TIME: 11.6 SEC (ref 9.8–13)
RBC # BLD: 4.25 M/UL (ref 4–5.2)
SODIUM BLD-SCNC: 142 MMOL/L (ref 136–145)
TROPONIN: <0.01 NG/ML
WBC # BLD: 6.4 K/UL (ref 4–11)

## 2019-06-01 PROCEDURE — 6360000002 HC RX W HCPCS: Performed by: INTERNAL MEDICINE

## 2019-06-01 PROCEDURE — 6370000000 HC RX 637 (ALT 250 FOR IP): Performed by: INTERNAL MEDICINE

## 2019-06-01 PROCEDURE — 83880 ASSAY OF NATRIURETIC PEPTIDE: CPT

## 2019-06-01 PROCEDURE — 84484 ASSAY OF TROPONIN QUANT: CPT

## 2019-06-01 PROCEDURE — 71046 X-RAY EXAM CHEST 2 VIEWS: CPT

## 2019-06-01 PROCEDURE — 6370000000 HC RX 637 (ALT 250 FOR IP): Performed by: EMERGENCY MEDICINE

## 2019-06-01 PROCEDURE — 80048 BASIC METABOLIC PNL TOTAL CA: CPT

## 2019-06-01 PROCEDURE — 99285 EMERGENCY DEPT VISIT HI MDM: CPT

## 2019-06-01 PROCEDURE — 85027 COMPLETE CBC AUTOMATED: CPT

## 2019-06-01 PROCEDURE — 93005 ELECTROCARDIOGRAM TRACING: CPT | Performed by: EMERGENCY MEDICINE

## 2019-06-01 PROCEDURE — 85730 THROMBOPLASTIN TIME PARTIAL: CPT

## 2019-06-01 PROCEDURE — 2580000003 HC RX 258: Performed by: INTERNAL MEDICINE

## 2019-06-01 PROCEDURE — 36415 COLL VENOUS BLD VENIPUNCTURE: CPT

## 2019-06-01 PROCEDURE — 85610 PROTHROMBIN TIME: CPT

## 2019-06-01 PROCEDURE — 2060000000 HC ICU INTERMEDIATE R&B

## 2019-06-01 RX ORDER — HEPARIN SODIUM 10000 [USP'U]/100ML
12 INJECTION, SOLUTION INTRAVENOUS CONTINUOUS
Status: DISCONTINUED | OUTPATIENT
Start: 2019-06-01 | End: 2019-06-01

## 2019-06-01 RX ORDER — HYDRALAZINE HYDROCHLORIDE 20 MG/ML
10 INJECTION INTRAMUSCULAR; INTRAVENOUS EVERY 6 HOURS PRN
Status: DISCONTINUED | OUTPATIENT
Start: 2019-06-01 | End: 2019-06-06 | Stop reason: HOSPADM

## 2019-06-01 RX ORDER — HEPARIN SODIUM 5000 [USP'U]/ML
4000 INJECTION, SOLUTION INTRAVENOUS; SUBCUTANEOUS ONCE
Status: DISCONTINUED | OUTPATIENT
Start: 2019-06-01 | End: 2019-06-01

## 2019-06-01 RX ORDER — ONDANSETRON 2 MG/ML
4 INJECTION INTRAMUSCULAR; INTRAVENOUS EVERY 6 HOURS PRN
Status: DISCONTINUED | OUTPATIENT
Start: 2019-06-01 | End: 2019-06-06 | Stop reason: HOSPADM

## 2019-06-01 RX ORDER — PROMETHAZINE HYDROCHLORIDE 25 MG/ML
12.5 INJECTION, SOLUTION INTRAMUSCULAR; INTRAVENOUS
Status: DISPENSED | OUTPATIENT
Start: 2019-06-01 | End: 2019-06-01

## 2019-06-01 RX ORDER — NITROGLYCERIN 0.4 MG/1
0.4 TABLET SUBLINGUAL EVERY 5 MIN PRN
Status: DISCONTINUED | OUTPATIENT
Start: 2019-06-01 | End: 2019-06-06 | Stop reason: HOSPADM

## 2019-06-01 RX ORDER — DICYCLOMINE HYDROCHLORIDE 10 MG/1
10 CAPSULE ORAL
Status: DISCONTINUED | OUTPATIENT
Start: 2019-06-01 | End: 2019-06-06 | Stop reason: HOSPADM

## 2019-06-01 RX ORDER — CITALOPRAM 40 MG/1
40 TABLET ORAL DAILY
Status: DISCONTINUED | OUTPATIENT
Start: 2019-06-02 | End: 2019-06-06 | Stop reason: HOSPADM

## 2019-06-01 RX ORDER — PANTOPRAZOLE SODIUM 40 MG/1
40 TABLET, DELAYED RELEASE ORAL
Status: DISCONTINUED | OUTPATIENT
Start: 2019-06-02 | End: 2019-06-06 | Stop reason: HOSPADM

## 2019-06-01 RX ORDER — HYDROCHLOROTHIAZIDE 25 MG/1
25 TABLET ORAL DAILY
Status: DISCONTINUED | OUTPATIENT
Start: 2019-06-01 | End: 2019-06-05

## 2019-06-01 RX ORDER — SODIUM CHLORIDE 0.9 % (FLUSH) 0.9 %
10 SYRINGE (ML) INJECTION EVERY 12 HOURS SCHEDULED
Status: DISCONTINUED | OUTPATIENT
Start: 2019-06-01 | End: 2019-06-06 | Stop reason: HOSPADM

## 2019-06-01 RX ORDER — ASPIRIN 81 MG/1
81 TABLET, CHEWABLE ORAL DAILY
Status: DISCONTINUED | OUTPATIENT
Start: 2019-06-02 | End: 2019-06-06 | Stop reason: HOSPADM

## 2019-06-01 RX ORDER — ROSUVASTATIN CALCIUM 20 MG/1
20 TABLET, COATED ORAL NIGHTLY
Status: DISCONTINUED | OUTPATIENT
Start: 2019-06-01 | End: 2019-06-06 | Stop reason: HOSPADM

## 2019-06-01 RX ORDER — SODIUM CHLORIDE 0.9 % (FLUSH) 0.9 %
10 SYRINGE (ML) INJECTION PRN
Status: DISCONTINUED | OUTPATIENT
Start: 2019-06-01 | End: 2019-06-06 | Stop reason: HOSPADM

## 2019-06-01 RX ORDER — TRAMADOL HYDROCHLORIDE 50 MG/1
50 TABLET ORAL EVERY 6 HOURS PRN
Status: DISCONTINUED | OUTPATIENT
Start: 2019-06-01 | End: 2019-06-06 | Stop reason: HOSPADM

## 2019-06-01 RX ORDER — OXYCODONE HYDROCHLORIDE 5 MG/1
5 TABLET ORAL ONCE
Status: COMPLETED | OUTPATIENT
Start: 2019-06-01 | End: 2019-06-01

## 2019-06-01 RX ORDER — HEPARIN SODIUM 5000 [USP'U]/ML
4000 INJECTION, SOLUTION INTRAVENOUS; SUBCUTANEOUS PRN
Status: DISCONTINUED | OUTPATIENT
Start: 2019-06-01 | End: 2019-06-05 | Stop reason: ALTCHOICE

## 2019-06-01 RX ORDER — ACETAMINOPHEN 500 MG
1000 TABLET ORAL ONCE
Status: COMPLETED | OUTPATIENT
Start: 2019-06-01 | End: 2019-06-01

## 2019-06-01 RX ORDER — HEPARIN SODIUM 5000 [USP'U]/ML
2000 INJECTION, SOLUTION INTRAVENOUS; SUBCUTANEOUS PRN
Status: DISCONTINUED | OUTPATIENT
Start: 2019-06-01 | End: 2019-06-05 | Stop reason: ALTCHOICE

## 2019-06-01 RX ORDER — NITROGLYCERIN 0.4 MG/1
0.4 TABLET SUBLINGUAL EVERY 5 MIN PRN
Status: DISCONTINUED | OUTPATIENT
Start: 2019-06-01 | End: 2019-06-01 | Stop reason: SDUPTHER

## 2019-06-01 RX ORDER — METHOCARBAMOL 500 MG/1
500 TABLET, FILM COATED ORAL 3 TIMES DAILY
Status: DISCONTINUED | OUTPATIENT
Start: 2019-06-01 | End: 2019-06-06 | Stop reason: HOSPADM

## 2019-06-01 RX ADMIN — METHOCARBAMOL TABLETS 500 MG: 500 TABLET, COATED ORAL at 17:06

## 2019-06-01 RX ADMIN — NITROGLYCERIN 0.4 MG: 0.4 TABLET, ORALLY DISINTEGRATING SUBLINGUAL at 15:41

## 2019-06-01 RX ADMIN — OXYCODONE HYDROCHLORIDE 5 MG: 5 TABLET ORAL at 15:16

## 2019-06-01 RX ADMIN — ACETAMINOPHEN 1000 MG: 500 TABLET ORAL at 14:07

## 2019-06-01 RX ADMIN — ENOXAPARIN SODIUM 70 MG: 80 INJECTION SUBCUTANEOUS at 18:22

## 2019-06-01 RX ADMIN — TRAMADOL HYDROCHLORIDE 50 MG: 50 TABLET, FILM COATED ORAL at 19:17

## 2019-06-01 RX ADMIN — Medication 10 ML: at 20:19

## 2019-06-01 RX ADMIN — ROSUVASTATIN CALCIUM 20 MG: 20 TABLET, COATED ORAL at 20:18

## 2019-06-01 ASSESSMENT — PAIN SCALES - GENERAL
PAINLEVEL_OUTOF10: 4
PAINLEVEL_OUTOF10: 2
PAINLEVEL_OUTOF10: 6
PAINLEVEL_OUTOF10: 8
PAINLEVEL_OUTOF10: 8

## 2019-06-01 ASSESSMENT — ENCOUNTER SYMPTOMS
NAUSEA: 1
COUGH: 0
SHORTNESS OF BREATH: 1
DIARRHEA: 0
RHINORRHEA: 0
BACK PAIN: 1
VOMITING: 0
ABDOMINAL PAIN: 0

## 2019-06-01 ASSESSMENT — PAIN DESCRIPTION - DESCRIPTORS
DESCRIPTORS: SHARP
DESCRIPTORS: ACHING
DESCRIPTORS: HEADACHE
DESCRIPTORS: HEADACHE

## 2019-06-01 ASSESSMENT — PAIN DESCRIPTION - LOCATION
LOCATION: HEAD
LOCATION: BACK
LOCATION: HEAD
LOCATION: NECK;SHOULDER;BACK

## 2019-06-01 ASSESSMENT — PAIN DESCRIPTION - ONSET
ONSET: GRADUAL

## 2019-06-01 ASSESSMENT — PAIN DESCRIPTION - PAIN TYPE
TYPE: ACUTE PAIN

## 2019-06-01 ASSESSMENT — PAIN DESCRIPTION - PROGRESSION
CLINICAL_PROGRESSION: GRADUALLY IMPROVING
CLINICAL_PROGRESSION: GRADUALLY IMPROVING

## 2019-06-01 ASSESSMENT — PAIN DESCRIPTION - FREQUENCY
FREQUENCY: INTERMITTENT

## 2019-06-01 ASSESSMENT — PAIN DESCRIPTION - ORIENTATION
ORIENTATION: ANTERIOR
ORIENTATION: RIGHT;LEFT
ORIENTATION: RIGHT;UPPER
ORIENTATION: MID

## 2019-06-01 ASSESSMENT — PAIN - FUNCTIONAL ASSESSMENT
PAIN_FUNCTIONAL_ASSESSMENT: ACTIVITIES ARE NOT PREVENTED
PAIN_FUNCTIONAL_ASSESSMENT: ACTIVITIES ARE NOT PREVENTED

## 2019-06-01 NOTE — H&P
Hospital Medicine History & Physical      PCP: Hiren Daniels DO    Date of Admission: 6/1/2019    Date of Service: Pt seen/examined on 06/01/19 and Admitted to Inpatient with expected LOS greater than two midnights due to medical therapy. Chief Complaint:  Right sided scapular pain      History Of Present Illness:   59 y.o. female Migue Santos past medical history of SVT ablation by Dr. Jerson Walters, CAD status post cardiac 2016 with minor coronary lesions, fibromyalgia, depression, migraines, hyperlipidemia, recent stress test which is high risk for ischemia presented to the emergency room with complaints of right-sided scapular pain. Pain started yesterday, going around the right scapula blade, intermittently radiates to the anterior chest wall, worse with exertion, associated with dyspnea, nausea, left cheek and neck numbness. Recently seen in the cardiology office for concerns of sensation of pauses, EKG and done in the office which showed nonspecific ST changes, and stress echocardiogram was ordered. As per patient she was also on Holter monitor for 2 weeks but did not show any pauses or abnormal rhythm. Stress echocardiogram was done which was high risk for ischemia. Patient's case was discussed with cardiology by emergency physician, with recent HIDA stress test recommended admission for monitoring and possibly cardiac cath on Monday. She was given a dose of nitroglycerin which did somewhat improve her pain that she started to have significant headaches with nausea after administration.      Past Medical History:          Diagnosis Date    Allergic rhinitis     Anxiety     Depression     Endometriosis     hx    Fibromyalgia     Headache(784.0)     Hyperlipidemia     Irritable bowel syndrome     Osteoarthritis     SVT (supraventricular tachycardia) (HCC)     hx     Trouble swallowing        Past Surgical History:          Procedure Laterality Date    ABLATION OF DYSRHYTHMIC FOCUS  1999    APPENDECTOMY      BUNIONECTOMY  1973    bilateral, 2 revisions left foot    CHOLECYSTECTOMY      COLONOSCOPY  10/15/13    Hx polyps-3 yrs-? poor prep    HYSTERECTOMY      LAPAROSCOPY      6    LAPAROTOMY      3    NASAL SEPTUM SURGERY  1982    OTHER SURGICAL HISTORY      Angiogram     UPPER GASTROINTESTINAL ENDOSCOPY      UPPER GASTROINTESTINAL ENDOSCOPY  10/15/13    volodymyr ring       Medications Prior to Admission:      Prior to Admission medications    Medication Sig Start Date End Date Taking? Authorizing Provider   Erenumab-aooe (AIMOVIG) 140 MG/ML SOAJ 1 inj per mo 5/10/19   Bubba Gamez DO   naratriptan (AMERGE) 2.5 MG tablet TAKE 1 TABLET BY MOUTH ONCE AS NEEDED FOR MIGRAINE AT ONSET OF HEADACHE MAY REPEAT IN 4 HOURS IF NEEDED 5/3/19   Bubba Gamez DO   methocarbamol (ROBAXIN) 500 MG tablet TAKE ONE TABLET BY MOUTH THREE TIMES A DAY 4/29/19   Bubba Gamez DO   citalopram (CELEXA) 40 MG tablet Take 1 tablet by mouth daily 4/10/19   Bubba Gamez DO   gabapentin (NEURONTIN) 100 MG capsule Take 1 capsule by mouth 2 times daily for 30 days.  Intended supply: 30 days 4/3/19 5/3/19  Bubba Gamez DO   ondansetron (ZOFRAN-ODT) 4 MG disintegrating tablet Take 1 tablet by mouth every 8 hours as needed for Nausea or Vomiting 1/31/19   Bubba Gamez DO   rosuvastatin (CRESTOR) 10 MG tablet TAKE ONE TABLET BY MOUTH AT BEDTIME 5/9/18   Bubba Gamez DO   Chlorphen-Pseudoephed-APAP (TYLENOL ALLERGY SINUS PO) Take by mouth as needed    Historical Provider, MD   promethazine (PHENERGAN) 25 MG tablet Take 1 tablet by mouth every 6 hours as needed for Nausea 3/8/18   Bubba Gamez DO   dicyclomine (BENTYL) 10 MG capsule Take 1 capsule by mouth 3 times daily (before meals) 2/14/18   Bubba Gamez DO   promethazine (PHENERGAN) 25 MG suppository Place 1 suppository rectally every 6 hours as needed for Nausea 10/26/17   Nura Dias, APRN - CNP   clotrimazole-betamethasone (Malachy Camel) 1-0.05 % cream Apply topically as needed Apply topically 2 times daily. Historical Provider, MD   hydrOXYzine (ATARAX) 25 MG tablet Take 1 tablet by mouth 3 times daily as needed for Itching 10/24/17 4/29/19  Creed Jimena Trinidad APRN - CNP   magnesium oxide (MAG-OX) 400 MG tablet Take 400 mg by mouth daily    Historical Provider, MD   Diclofenac Sodium POWD Apply 1-2 g topically as needed (TID - QID) Formula 3 D Ketamine 5 % 6/15/17   Yun REYNOSO Contractor, MD   Cholecalciferol (VITAMIN D3) 5000 UNITS TABS Take by mouth    Historical Provider, MD   hydrochlorothiazide (HYDRODIURIL) 25 MG tablet 1 or 2 daily prn swelling 14   Bubba Gamez DO   omeprazole (PRILOSEC) 20 MG capsule Take 20 mg by mouth every 12 hours. Historical Provider, MD   ibuprofen (ADVIL;MOTRIN) 200 MG tablet Take 200 mg by mouth every 6 hours as needed. OTC     Historical Provider, MD   diphenhydrAMINE (BENADRYL) 25 MG tablet Take 25 mg by mouth every 6 hours as needed. OTC     Historical Provider, MD       Allergies: Avelox [moxifloxacin hcl in nacl]; Cephalexin; Darvon [propoxyphene hcl]; Digoxin; Librax [chlordiazepoxide-methscopol]; Motrin [ibuprofen micronized]; Other; Pcn [penicillins]; Prochlorperazine edisylate; Sulfa antibiotics; Tetracyclines & related; and Statins [statins]    Social History:      The patient currently lives at home    TOBACCO:   reports that she has never smoked. She has never used smokeless tobacco.  ETOH:   reports that she does not drink alcohol. Family History:      Reviewed in detail.   Positive as follows:        Problem Relation Age of Onset    Other Mother         sepsis    Dementia Mother     High Cholesterol Mother     Depression Mother     Anxiety Disorder Mother     High Blood Pressure Father     Other Father          from renal failure and CHF    Heart Disease Father     Migraines Father     Heart Attack Brother     Heart Disease Brother     Hypertension Other     High Cholesterol Other     Migraines Other     Heart Disease Other     Diabetes Other        REVIEW OF SYSTEMS:   Pertinent positives as noted in the HPI. All other systems reviewed and negative. PHYSICAL EXAM PERFORMED:    BP (!) 204/88   Pulse 81   Temp 99.2 °F (37.3 °C) (Oral)   Resp 12   Wt 155 lb (70.3 kg)   SpO2 99%   BMI 25.02 kg/m²     General appearance:  No apparent distress, appears stated age and cooperative. HEENT:  Normal cephalic, atraumatic without obvious deformity. Pupils equal, round, and reactive to light. Extra ocular muscles intact. Conjunctivae/corneas clear. Neck: Supple, with full range of motion. No jugular venous distention. Trachea midline. Respiratory:  Normal respiratory effort. Clear to auscultation, bilaterally without Rales/Wheezes/Rhonchi. Cardiovascular:  Regular rate and rhythm with normal S1/S2 without murmurs, rubs or gallops. No chest wall tenderness  No radiological delay  Noted her today  Blood pressure all 4 extremity with checked lower summary pressures greater than upper extremity, Bilateral upper extremity pressures similar  Abdomen: Soft, non-tender, non-distended with normal bowel sounds. Musculoskeletal:  No clubbing, cyanosis or edema bilaterally. Full range of motion without deformity. Skin: Skin color, texture, turgor normal.  No rashes or lesions. Neurologic:  Neurovascularly intact without any focal sensory/motor deficits. Cranial nerves: II-XII intact, grossly non-focal.  Psychiatric:  Alert and oriented, thought content appropriate, normal insight  Capillary Refill: Brisk,< 3 seconds   Peripheral Pulses: +2 palpable, equal bilaterally       Labs:     Recent Labs     06/01/19  1327   WBC 6.4   HGB 13.3   HCT 39.2        Recent Labs     06/01/19  1327      K 3.5      CO2 27   BUN 6*   CREATININE 0.6   CALCIUM 9.2     No results for input(s): AST, ALT, BILIDIR, BILITOT, ALKPHOS in the last 72 hours.   No results for input(s): INR in the last 72 hours. Recent Labs     06/01/19  1327   TROPONINI <0.01       Urinalysis:      Lab Results   Component Value Date    BLOODU NEGATIVE 04/26/2019    SPECGRAV 1.025 04/26/2019    GLUCOSEU NEGATIVE 04/26/2019       Radiology:     CXR: I have reviewed the CXR with the following interpretation: No acute cardio pulmonary abnormality  EKG:  I have reviewed the EKG with the following interpretation:   EKG showed normal sinus rhythm with ventricular rate of 79, left bundle branch block, nonspecific ST changes    XR CHEST STANDARD (2 VW)   Final Result      No acute chest process. 5/30 stress TTE:  HIgh risk abnormal stress echo with baseline, resting LV dysfunction and   poor augmentation with exercise.   These findings are consistent with ischemia      ASSESSMENT/PLAN[de-identified]    Active Hospital Problems    Diagnosis Date Noted    Dyspnea on exertion [R06.09] 06/01/2019    Chest pain [R07.9] 06/01/2019     1. Right scapular pain with radiation to the anterior chest wall, nausea, recent high risk stress test, considering this pain as angina equal, and with unstable into the pain started on therapeutic Lovenox dose. Continue aspirin, high intensity statin (she is already on rosuvastatin 20 mg once daily). Continue telemetry monitoring. Nitroglycerin in sublingual when necessary for chest pain. Tylenol when necessary for headaches post nitroglycerin use. 2. Dyspnea on exertion: Recent high risk stress test, cardiology consulted, will await recommendations  3.  Uncontrolled hypertension: Continue hydrochlorothiazide 25 mammograms once daily, added hydralazine IV when necessary for SBP greater than 160 and DBP greater than 110  Chronic medical problems  Fibromyalgia: Continue home medications including Robaxin 500 mg 3 times a day, Bentyl 10 mg 3 times a day, gabapentin 100 mg twice a day  Migraine headaches  Anxiety/depression      DVT Prophylaxis: Lovenox therapeutic dose  Diet: No diet orders on

## 2019-06-01 NOTE — PROGRESS NOTES
4 Eyes Admission Assessment     I agree as the admission nurse that 2 RN's have performed a thorough Head to Toe Skin Assessment on the patient. ALL assessment sites listed below have been assessed on admission. Areas assessed by both nurses:   [x]   Head, Face, and Ears   [x]   Shoulders, Back, and Chest  [x]   Arms, Elbows, and Hands   [x]   Coccyx, Sacrum, and Ischum  [x]   Legs, Feet, and Heels        Does the Patient have Skin Breakdown?   No         Jaquan Prevention initiated:  No   Wound Care Orders initiated:  No      [facility] nurse consulted for Pressure Injury (Stage 3,4, Unstageable, DTI, NWPT, and Complex wounds):  No      Nurse 1 eSignature: Electronically signed by Tyrese Navas RN on 6/1/19 at 4:42 PM    **SHARE this note so that the co-signing nurse is able to place an eSignature**    Nurse 2 eSignature: Electronically signed by Dara King RN on 6/2/19 at 3:45 AM

## 2019-06-01 NOTE — ED TRIAGE NOTES
Pt arrived to ED with right upper back pain and nausea that started last night and has progressively gotten worse. Recently had positive stress ECHO on 5/30/19.

## 2019-06-01 NOTE — PLAN OF CARE
Problem: HEMODYNAMIC STATUS  Goal: Patient has stable vital signs and fluid balance  Note:   Pt remains hemodynamically stable at this time. Vss. Denies chest pain, sob, lightheadedness, dizziness, or palpitations. Strict I/Os maintained with daily weights. NSR on tele. SpO2 remains >92% on room air . Will continue to monitor.

## 2019-06-01 NOTE — ED PROVIDER NOTES
Bunionectomy (1973); laparoscopy; laparotomy; ablation of dysrhythmic focus (1999); Upper gastrointestinal endoscopy; Colonoscopy (10/15/13); Upper gastrointestinal endoscopy (10/15/13); and other surgical history. Her family history includes Anxiety Disorder in her mother; Dementia in her mother; Depression in her mother; Diabetes in an other family member; Heart Attack in her brother; Heart Disease in her brother, father, and another family member; High Blood Pressure in her father; High Cholesterol in her mother and another family member; Hypertension in an other family member; Migraines in her father and another family member; Other in her father and mother. She reports that she has never smoked. She has never used smokeless tobacco. She reports that she does not drink alcohol or use drugs.     Medications     Current Discharge Medication List      CONTINUE these medications which have NOT CHANGED    Details   Erenumab-aooe (AIMOVIG) 140 MG/ML SOAJ 1 inj per mo  Qty: 1 pen, Refills: 5      naratriptan (AMERGE) 2.5 MG tablet TAKE 1 TABLET BY MOUTH ONCE AS NEEDED FOR MIGRAINE AT ONSET OF HEADACHE MAY REPEAT IN 4 HOURS IF NEEDED  Qty: 9 tablet, Refills: 5    Associated Diagnoses: Intractable chronic migraine without aura and without status migrainosus; Migraine with aura, intractable, without status migrainosus      methocarbamol (ROBAXIN) 500 MG tablet TAKE ONE TABLET BY MOUTH THREE TIMES A DAY  Qty: 90 tablet, Refills: 2      citalopram (CELEXA) 40 MG tablet Take 1 tablet by mouth daily  Qty: 90 tablet, Refills: 1      ondansetron (ZOFRAN-ODT) 4 MG disintegrating tablet Take 1 tablet by mouth every 8 hours as needed for Nausea or Vomiting  Qty: 20 tablet, Refills: 0      rosuvastatin (CRESTOR) 10 MG tablet TAKE ONE TABLET BY MOUTH AT BEDTIME  Qty: 90 tablet, Refills: 1      promethazine (PHENERGAN) 25 MG tablet Take 1 tablet by mouth every 6 hours as needed for Nausea  Qty: 20 tablet, Refills: 0    Associated Diagnoses: Nausea      dicyclomine (BENTYL) 10 MG capsule Take 1 capsule by mouth 3 times daily (before meals)  Qty: 90 capsule, Refills: 0    Associated Diagnoses: Chronic low back pain, unspecified back pain laterality, with sciatica presence unspecified      Diclofenac Sodium POWD Apply 1-2 g topically as needed (TID - QID) Formula 3 D Ketamine 5 %  Qty: 240 g, Refills: 3    Comments: Pt contact #'s [phone] (N)[phone] (C)[phone] (M)  Associated Diagnoses: Intractable chronic migraine without aura and without status migrainosus; Migraine with aura, intractable, without status migrainosus; Neuropathic pain; Fibromyalgia      Cholecalciferol (VITAMIN D3) 5000 UNITS TABS Take by mouth      hydrochlorothiazide (HYDRODIURIL) 25 MG tablet 1 or 2 daily prn swelling  Qty: 50 tablet, Refills: 1      omeprazole (PRILOSEC) 20 MG capsule Take 20 mg by mouth every 12 hours. ibuprofen (ADVIL;MOTRIN) 200 MG tablet Take 200 mg by mouth every 6 hours as needed. OTC       diphenhydrAMINE (BENADRYL) 25 MG tablet Take 25 mg by mouth every 6 hours as needed. OTC       gabapentin (NEURONTIN) 100 MG capsule Take 1 capsule by mouth 2 times daily for 30 days. Intended supply: 30 days  Qty: 60 capsule, Refills: 2      Chlorphen-Pseudoephed-APAP (TYLENOL ALLERGY SINUS PO) Take by mouth as needed      clotrimazole-betamethasone (LOTRISONE) 1-0.05 % cream Apply topically as needed Apply topically 2 times daily. hydrOXYzine (ATARAX) 25 MG tablet Take 1 tablet by mouth 3 times daily as needed for Itching  Qty: 20 tablet, Refills: 0    Associated Diagnoses:  Allergic reaction, initial encounter; Bug bite, initial encounter      magnesium oxide (MAG-OX) 400 MG tablet Take 400 mg by mouth daily             Allergies     Sheis allergic to avelox [moxifloxacin hcl in nacl]; cephalexin; darvon [propoxyphene hcl]; digoxin; librax [chlordiazepoxide-methscopol]; motrin [ibuprofen micronized]; other; pcn [penicillins]; prochlorperazine edisylate; sulfa antibiotics; tetracyclines & related; and statins [statins]. Physical Exam     INITIAL VITALS: BP: (!) 152/84,Temp: 99.2 °F (37.3 °C), Pulse: 74, Resp: 19, SpO2: 100 %   Physical Exam   Constitutional: She is oriented to person, place, and time. She appears well-developed and well-nourished. No distress. HENT:   Head: Normocephalic and atraumatic. Eyes: EOM are normal. No scleral icterus. Neck: Normal range of motion. No tracheal deviation present. Cardiovascular: Normal rate, regular rhythm, normal heart sounds and intact distal pulses. Exam reveals no gallop and no friction rub. No murmur heard. Pulmonary/Chest: Effort normal and breath sounds normal. No respiratory distress. She has no wheezes. She has no rales. Abdominal: Soft. She exhibits no distension. There is no tenderness. There is no rebound and no guarding. Musculoskeletal: Normal range of motion. She exhibits no edema. Neurological: She is alert and oriented to person, place, and time. No cranial nerve deficit. Skin: Skin is warm. No rash noted. She is not diaphoretic. Psychiatric: She has a normal mood and affect. Her behavior is normal.   Nursing note and vitals reviewed. Diagnostic Results     EKG   As below    RADIOLOGY:  XR CHEST STANDARD (2 VW)   Final Result      No acute chest process.           LABS:   Results for orders placed or performed during the hospital encounter of 06/01/19   CBC   Result Value Ref Range    WBC 6.4 4.0 - 11.0 K/uL    RBC 4.25 4.00 - 5.20 M/uL    Hemoglobin 13.3 12.0 - 16.0 g/dL    Hematocrit 39.2 36.0 - 48.0 %    MCV 92.1 80.0 - 100.0 fL    MCH 31.4 26.0 - 34.0 pg    MCHC 34.1 31.0 - 36.0 g/dL    RDW 12.7 12.4 - 15.4 %    Platelets 541 072 - 474 K/uL    MPV 6.7 5.0 - 10.5 fL   Basic Metabolic Panel   Result Value Ref Range    Sodium 142 136 - 145 mmol/L    Potassium 3.5 3.5 - 5.1 mmol/L    Chloride 103 99 - 110 mmol/L    CO2 27 21 - 32 mmol/L    Anion Gap 12 3 - 16    Glucose 104 (H) 70 - 99 mg/dL    BUN 6 (L) 7 - 20 mg/dL    CREATININE 0.6 0.6 - 1.2 mg/dL    GFR Non-African American >60 >60    GFR African American >60 >60    Calcium 9.2 8.3 - 10.6 mg/dL   Troponin   Result Value Ref Range    Troponin <0.01 <0.01 ng/mL   Brain natriuretic peptide   Result Value Ref Range    Pro-BNP 68 0 - 124 pg/mL   APTT   Result Value Ref Range    aPTT 34.3 26.0 - 36.0 sec   Protime-INR   Result Value Ref Range    Protime 11.6 9.8 - 13.0 sec    INR 1.02 0.86 - 1.14       ED BEDSIDE ULTRASOUND:  N/A    RECENT VITALS:  BP: (!) 156/91, Temp: 98.1 °F (36.7 °C), Pulse: 81, Resp: 16, SpO2: 98 %     Procedures     N/A    MEDICAL DECISION MAKING / ASSESSMENT / Beatrice Jennifer is a 59 y.o. female with a history of depression, migraines, HL who presents with R back pain. Symptoms and exam most consistent with MSK pain. Other considerations include PNA/PTX. Doubt PE (Wells's score). With recent positive stress, however, and exertional characteristic, some concern for ACS. Plan: ECG, labs as below, CXR, pain control, cardiology consult    ED Course     Nursing Notes, Past Medical Hx, Past Surgical Hx, Social Hx, Allergies, and Family Hx were reviewed.     The patient was given the followingmedications:  Orders Placed This Encounter   Medications    acetaminophen (TYLENOL) tablet 1,000 mg    citalopram (CELEXA) tablet 40 mg    dicyclomine (BENTYL) capsule 10 mg    methocarbamol (ROBAXIN) tablet 500 mg    pantoprazole (PROTONIX) tablet 40 mg    rosuvastatin (CRESTOR) tablet 20 mg    sodium chloride flush 0.9 % injection 10 mL    sodium chloride flush 0.9 % injection 10 mL    magnesium hydroxide (MILK OF MAGNESIA) 400 MG/5ML suspension 30 mL    ondansetron (ZOFRAN) injection 4 mg    aspirin chewable tablet 81 mg    DISCONTD: enoxaparin (LOVENOX) injection 40 mg    DISCONTD: nitroGLYCERIN (NITROSTAT) SL tablet 0.4 mg    oxyCODONE (ROXICODONE) immediate release tablet 5 mg  nitroGLYCERIN (NITROSTAT) SL tablet 0.4 mg    promethazine (PHENERGAN) injection 12.5 mg    DISCONTD: heparin (porcine) injection 4,000 Units    heparin (porcine) injection 4,000 Units    heparin (porcine) injection 2,000 Units    DISCONTD: heparin 25,000 units in dextrose 5% 250 mL infusion    enoxaparin (LOVENOX) injection 70 mg       CONSULTS:  IP CONSULT TO CARDIOLOGY  IP CONSULT TO HOSPITALIST    ED Course as of Jun 01 1759   Sat Jun 01, 2019   1320 NSR at 79bpm, LBBB, no inappropriate ST discordance/concordance   EKG 12 Lead [AZ]   1343 Neg acute   XR CHEST STANDARD (2 VW) [AZ]   1352 CBC/BMP unremarkable    [AZ]   1404 Neg   Troponin: <0.01 [AZ]   1434 Talked to on-call cardiology. Reasonable to admit for cath. [AZ]      ED Course User Index  [AZ] Ana Bright MD       Clinical Impression     1.  Unstable angina [facility])        Disposition     PATIENT REFERRED TO:  Modesto Daily DO  [address] [place] [id number]  [phone]            DISCHARGEMEDICATIONS:  Current Discharge Medication List          DISPOSITION Admitted 06/01/2019 03:26:54 PM     Ana Bright MD  06/01/19 1800

## 2019-06-02 LAB
EKG ATRIAL RATE: 65 BPM
EKG ATRIAL RATE: 79 BPM
EKG DIAGNOSIS: NORMAL
EKG DIAGNOSIS: NORMAL
EKG P AXIS: 35 DEGREES
EKG P AXIS: 44 DEGREES
EKG P-R INTERVAL: 116 MS
EKG P-R INTERVAL: 128 MS
EKG Q-T INTERVAL: 430 MS
EKG Q-T INTERVAL: 470 MS
EKG QRS DURATION: 126 MS
EKG QRS DURATION: 130 MS
EKG QTC CALCULATION (BAZETT): 488 MS
EKG QTC CALCULATION (BAZETT): 493 MS
EKG R AXIS: -26 DEGREES
EKG R AXIS: -37 DEGREES
EKG T AXIS: -3 DEGREES
EKG T AXIS: 75 DEGREES
EKG VENTRICULAR RATE: 65 BPM
EKG VENTRICULAR RATE: 79 BPM
HCT VFR BLD CALC: 38.4 % (ref 36–48)
HEMOGLOBIN: 12.9 G/DL (ref 12–16)
MCH RBC QN AUTO: 31.5 PG (ref 26–34)
MCHC RBC AUTO-ENTMCNC: 33.8 G/DL (ref 31–36)
MCV RBC AUTO: 93.2 FL (ref 80–100)
PDW BLD-RTO: 13.1 % (ref 12.4–15.4)
PLATELET # BLD: 295 K/UL (ref 135–450)
PMV BLD AUTO: 6.7 FL (ref 5–10.5)
RBC # BLD: 4.11 M/UL (ref 4–5.2)
WBC # BLD: 5.2 K/UL (ref 4–11)

## 2019-06-02 PROCEDURE — 93010 ELECTROCARDIOGRAM REPORT: CPT | Performed by: INTERNAL MEDICINE

## 2019-06-02 PROCEDURE — 99222 1ST HOSP IP/OBS MODERATE 55: CPT | Performed by: INTERNAL MEDICINE

## 2019-06-02 PROCEDURE — 2580000003 HC RX 258: Performed by: INTERNAL MEDICINE

## 2019-06-02 PROCEDURE — 2060000000 HC ICU INTERMEDIATE R&B

## 2019-06-02 PROCEDURE — 6360000002 HC RX W HCPCS: Performed by: INTERNAL MEDICINE

## 2019-06-02 PROCEDURE — 36415 COLL VENOUS BLD VENIPUNCTURE: CPT

## 2019-06-02 PROCEDURE — 85027 COMPLETE CBC AUTOMATED: CPT

## 2019-06-02 PROCEDURE — 6370000000 HC RX 637 (ALT 250 FOR IP): Performed by: INTERNAL MEDICINE

## 2019-06-02 PROCEDURE — 93005 ELECTROCARDIOGRAM TRACING: CPT | Performed by: INTERNAL MEDICINE

## 2019-06-02 RX ADMIN — Medication 10 ML: at 09:23

## 2019-06-02 RX ADMIN — METHOCARBAMOL TABLETS 500 MG: 500 TABLET, COATED ORAL at 16:06

## 2019-06-02 RX ADMIN — ROSUVASTATIN CALCIUM 20 MG: 20 TABLET, COATED ORAL at 20:13

## 2019-06-02 RX ADMIN — ENOXAPARIN SODIUM 70 MG: 80 INJECTION SUBCUTANEOUS at 21:52

## 2019-06-02 RX ADMIN — TRAMADOL HYDROCHLORIDE 50 MG: 50 TABLET, FILM COATED ORAL at 20:13

## 2019-06-02 RX ADMIN — CITALOPRAM HYDROBROMIDE 40 MG: 40 TABLET ORAL at 09:22

## 2019-06-02 RX ADMIN — TRAMADOL HYDROCHLORIDE 50 MG: 50 TABLET, FILM COATED ORAL at 04:43

## 2019-06-02 RX ADMIN — METHOCARBAMOL TABLETS 500 MG: 500 TABLET, COATED ORAL at 20:13

## 2019-06-02 RX ADMIN — ENOXAPARIN SODIUM 70 MG: 80 INJECTION SUBCUTANEOUS at 09:23

## 2019-06-02 RX ADMIN — TRAMADOL HYDROCHLORIDE 50 MG: 50 TABLET, FILM COATED ORAL at 12:31

## 2019-06-02 RX ADMIN — ASPIRIN 81 MG 81 MG: 81 TABLET ORAL at 09:22

## 2019-06-02 RX ADMIN — METHOCARBAMOL TABLETS 500 MG: 500 TABLET, COATED ORAL at 09:22

## 2019-06-02 RX ADMIN — HYDROCHLOROTHIAZIDE 25 MG: 25 TABLET ORAL at 09:22

## 2019-06-02 RX ADMIN — PANTOPRAZOLE SODIUM 40 MG: 40 TABLET, DELAYED RELEASE ORAL at 09:23

## 2019-06-02 ASSESSMENT — PAIN DESCRIPTION - DESCRIPTORS
DESCRIPTORS: ACHING;DISCOMFORT
DESCRIPTORS: HEADACHE
DESCRIPTORS: ACHING

## 2019-06-02 ASSESSMENT — PAIN SCALES - GENERAL
PAINLEVEL_OUTOF10: 0
PAINLEVEL_OUTOF10: 0
PAINLEVEL_OUTOF10: 8
PAINLEVEL_OUTOF10: 0
PAINLEVEL_OUTOF10: 2
PAINLEVEL_OUTOF10: 4
PAINLEVEL_OUTOF10: 6
PAINLEVEL_OUTOF10: 3
PAINLEVEL_OUTOF10: 3
PAINLEVEL_OUTOF10: 0

## 2019-06-02 ASSESSMENT — PAIN DESCRIPTION - PAIN TYPE
TYPE: ACUTE PAIN

## 2019-06-02 ASSESSMENT — PAIN DESCRIPTION - FREQUENCY
FREQUENCY: INTERMITTENT

## 2019-06-02 ASSESSMENT — PAIN DESCRIPTION - PROGRESSION
CLINICAL_PROGRESSION: NOT CHANGED
CLINICAL_PROGRESSION: NOT CHANGED
CLINICAL_PROGRESSION: GRADUALLY WORSENING

## 2019-06-02 ASSESSMENT — PAIN DESCRIPTION - ORIENTATION
ORIENTATION: UPPER;RIGHT;LEFT
ORIENTATION: POSTERIOR
ORIENTATION: ANTERIOR

## 2019-06-02 ASSESSMENT — PAIN DESCRIPTION - LOCATION
LOCATION: HEAD
LOCATION: BACK

## 2019-06-02 ASSESSMENT — PAIN DESCRIPTION - ONSET
ONSET: GRADUAL

## 2019-06-02 ASSESSMENT — PAIN - FUNCTIONAL ASSESSMENT
PAIN_FUNCTIONAL_ASSESSMENT: ACTIVITIES ARE NOT PREVENTED

## 2019-06-02 NOTE — CONSULTS
[facility]  Cardiology Inpatient Consult Service                                                                                          Pt Name: James Joyce  Age: 59 y.o. Sex: female  : 1955  Location: [address] [place] [address]    Referring Physician: Óscar Yates MD      Reason for Consult:       Reason for Consultation/Chief Complaint: back pain, CP, abnormal stress      HPI:      James Joyce is a 59 y.o. female with a past medical history of non obst CAD, SVT ablation,HLD  who presented to the hospital with worsening back pain, rad to right side, some nausea associated with the pain. Had recent abnormal stress echo with plan of possible cath vs CTA to be performed as outpatient. Pt called during the weekend due to worsening pain. Known LBBB. Neg trop x3      Histories     Past Medical History:   has a past medical history of Allergic rhinitis, Anxiety, Depression, Endometriosis, Fibromyalgia, GERD (gastroesophageal reflux disease), Headache(784.0), Hyperlipidemia, Irritable bowel syndrome, Left bundle branch block (LBBB), Osteoarthritis, SVT (supraventricular tachycardia) ([address] and Trouble swallowing. Surgical History:   has a past surgical history that includes Nasal septum surgery (); Appendectomy; Cholecystectomy; Hysterectomy; Bunionectomy (); laparoscopy; laparotomy; ablation of dysrhythmic focus (); Upper gastrointestinal endoscopy; Colonoscopy (10/15/13); Upper gastrointestinal endoscopy (10/15/13); and other surgical history. Social History:   reports that she has never smoked. She has never used smokeless tobacco. She reports that she does not drink alcohol or use drugs. Family History:  No evidence for sudden cardiac death or premature CAD      Medications:       Home Medications  Were reviewed and are listed in nursing record. and/or listed below  Prior to Admission medications    Medication Sig Start Date End Date Taking? Authorizing Provider   Erenumab-aooe (AIMOVIG) 140 MG/ML SOAJ 1 inj per mo 5/10/19  Yes Bubba Gamez DO   naratriptan (AMERGE) 2.5 MG tablet TAKE 1 TABLET BY MOUTH ONCE AS NEEDED FOR MIGRAINE AT ONSET OF HEADACHE MAY REPEAT IN 4 HOURS IF NEEDED 5/3/19  Yes Bubba Gamez DO   methocarbamol (ROBAXIN) 500 MG tablet TAKE ONE TABLET BY MOUTH THREE TIMES A DAY  Patient taking differently: TAKE two TABLET BY MOUTH THREE TIMES A DAY 4/29/19  Yes Bubba Gamez DO   citalopram (CELEXA) 40 MG tablet Take 1 tablet by mouth daily 4/10/19  Yes Bubba Gamez DO   ondansetron (ZOFRAN-ODT) 4 MG disintegrating tablet Take 1 tablet by mouth every 8 hours as needed for Nausea or Vomiting 1/31/19  Yes Bubba Gamez DO   rosuvastatin (CRESTOR) 10 MG tablet TAKE ONE TABLET BY MOUTH AT BEDTIME  Patient taking differently: TAKE ONE TABLET BY MOUTH AT BEDTIME (40 mg) 5/9/18  Yes Bubba Gamez DO   promethazine (PHENERGAN) 25 MG tablet Take 1 tablet by mouth every 6 hours as needed for Nausea 3/8/18  Yes Bubba Gamez DO   dicyclomine (BENTYL) 10 MG capsule Take 1 capsule by mouth 3 times daily (before meals)  Patient taking differently: Take 10 mg by mouth 3 times daily as needed  2/14/18  Yes Bubba Gamez DO   Diclofenac Sodium POWD Apply 1-2 g topically as needed (TID - QID) Formula 3 D Ketamine 5 % 6/15/17  Yes Dc Salvador Contractor, MD   Cholecalciferol (VITAMIN D3) 5000 UNITS TABS Take by mouth   Yes Historical Provider, MD   hydrochlorothiazide (HYDRODIURIL) 25 MG tablet 1 or 2 daily prn swelling 6/13/14  Yes Bubba Gamez DO   omeprazole (PRILOSEC) 20 MG capsule Take 20 mg by mouth every 12 hours. Yes Historical Provider, MD   ibuprofen (ADVIL;MOTRIN) 200 MG tablet Take 200 mg by mouth every 6 hours as needed. OTC    Yes Historical Provider, MD   diphenhydrAMINE (BENADRYL) 25 MG tablet Take 25 mg by mouth every 6 hours as needed.  OTC    Yes Historical Provider, MD   gabapentin (NEURONTIN) 100 MG capsule Take 1 capsule by mouth 2 times daily for 30 days. Intended supply: 30 days 4/3/19 5/3/19  Bubba Gamez,    Chlorphen-Pseudoephed-APAP (TYLENOL ALLERGY SINUS PO) Take by mouth as needed    Historical Provider, MD   clotrimazole-betamethasone (LOTRISONE) 1-0.05 % cream Apply topically as needed Apply topically 2 times daily. Historical Provider, MD   hydrOXYzine (ATARAX) 25 MG tablet Take 1 tablet by mouth 3 times daily as needed for Itching 10/24/17 4/29/19  Sylvia Johnson, APRN - CNP   magnesium oxide (MAG-OX) 400 MG tablet Take 400 mg by mouth daily    Historical Provider, MD          Inpatient Medications:   citalopram  40 mg Oral Daily    dicyclomine  10 mg Oral TID AC    methocarbamol  500 mg Oral TID    pantoprazole  40 mg Oral QAM AC    rosuvastatin  20 mg Oral Nightly    sodium chloride flush  10 mL Intravenous 2 times per day    aspirin  81 mg Oral Daily    enoxaparin  1 mg/kg Subcutaneous BID    hydrochlorothiazide  25 mg Oral Daily       IV drips:      PRN:  sodium chloride flush, magnesium hydroxide, ondansetron, nitroGLYCERIN, heparin (porcine), heparin (porcine), traMADol, hydrALAZINE    Allergy:     Avelox [moxifloxacin hcl in nacl]; Cephalexin; Darvon [propoxyphene hcl]; Digoxin; Librax [chlordiazepoxide-methscopol]; Motrin [ibuprofen micronized]; Other; Pcn [penicillins]; Prochlorperazine edisylate; Sulfa antibiotics; Tetracyclines & related; and Statins [statins]       Review of Systems:     All 12 point review of symptoms completed. Pertinent positives identified in the HPI, all other review of symptoms negative as below. CONSTITUTIONAL: Nounanticipated weight loss. No change in energy level, sleep pattern, or activity level. SKIN: No rash or pruritis. EYES: No visual changes or diplopia. No scleral icterus. ENT: No Headaches, hearing loss or vertigo. No mouth sores or sore throat. CARDIOVASCULAR: CP/Back pain  RESPIRATORY: No cough or wheezing, no sputum production. No hematemesis. atraumatic, no cyanosis or edema   Pulses: 2+ and symmetric   Skin: Skin color, texture, turgor normal, no rashes or lesions   Pysch: Normal mood and affect   Neurologic: Normal gross motor and sensory exam.  Cranial nerves intact        Labs:     Recent Labs     06/01/19  1327      K 3.5   BUN 6*   CREATININE 0.6      CO2 27   GLUCOSE 104*   CALCIUM 9.2     Recent Labs     06/01/19  1327 06/02/19  0502   WBC 6.4 5.2   HGB 13.3 12.9   HCT 39.2 38.4    295   MCV 92.1 93.2     No results for input(s): CHOLTOT, TRIG, HDL in the last 72 hours. Invalid input(s): LIPIDCOMM, CHOLHDL, VLDCHOL, LDL  Recent Labs     06/01/19  1327   INR 1.02     Recent Labs     06/01/19  1327 06/01/19  1952 06/01/19  2307   TROPONINI <0.01 <0.01 <0.01     No results for input(s): BNP in the last 72 hours. No results for input(s): TSH in the last 72 hours. No results for input(s): CHOL, HDL, LDLCALC, TRIG in the last 72 hours.]    Lab Results   Component Value Date    TROPONINI <0.01 06/01/2019         Imaging:     I personally reviewed imaging studies including CXR, Stress test, TTE/TANVIR. Last ECG (if available) - EKG:  I have reviewed EKG with the following interpretation  LBBB, NSR    Telemetry:  NSR, LBBB    Last Stress (if available):  HIgh risk abnormal stress echo with baseline, resting LV dysfunction and   poor augmentation with exercise.  Michael Patel findings are consistent with ischemia    Last TTE/TANVIR(if available):    Last Cath (if available):    Last CMR  (if available):      Assessment / Plan:     Back pain/CP/abnormal stress/non obstructive CAD prior cath  - NPO after midnight for possible cath vs CTA, will discuss with interventional.  - continue ASA/Statin  - heparin for angina  - VS stable, Neg trop, ECG LBBB. Tobacco use was discussed with the patient and educated on the negative effects. I have asked the patient to not utilize these agents.     Thank you for allowing to us to participate in the care or Cruz Sanchez. Further evaluation will be based upon the patient's clinical course and testing results. All questions and concerns were addressed to the patient/family. Alternatives to my treatment were discussed. The note was completed using EMR. Every effort was made to ensure accuracy; however, inadvertent computerized transcription errors may be present. Chris Salcdeo MD, [facility]

## 2019-06-02 NOTE — PLAN OF CARE
Problem: HEMODYNAMIC STATUS  Goal: Patient has stable vital signs and fluid balance  6/2/2019 0650 by Iftikahr Richardson RN  Outcome: Ongoing  VSS throughout shift thus far. Denies CP, lightheadedness, or SOB. Skin warm and dry. NSR on telemetry. Will continue to monitor.

## 2019-06-02 NOTE — PROGRESS NOTES
bilaterally without Rales/Wheezes/Rhonchi. Cardiovascular: Regular rate and rhythm with normal S1/S2 without murmurs, rubs or gallops. Abdomen: Soft, non-tender, non-distended with normal bowel sounds. Musculoskeletal: No clubbing, cyanosis or edema bilaterally. Full range of motion without deformity. Skin: Skin color, texture, turgor normal.  No rashes or lesions. Neurologic:  Neurovascularly intact without any focal sensory/motor deficits. Cranial nerves: II-XII intact, grossly non-focal.  Psychiatric: Alert and oriented, thought content appropriate, normal insight  Capillary Refill: Brisk,< 3 seconds   Peripheral Pulses: +2 palpable, equal bilaterally       Labs:   Recent Labs     06/01/19  1327 06/02/19  0502   WBC 6.4 5.2   HGB 13.3 12.9   HCT 39.2 38.4    295     Recent Labs     06/01/19  1327      K 3.5      CO2 27   BUN 6*   CREATININE 0.6   CALCIUM 9.2     No results for input(s): AST, ALT, BILIDIR, BILITOT, ALKPHOS in the last 72 hours. Recent Labs     06/01/19  1327   INR 1.02     Recent Labs     06/01/19  1327 06/01/19  1952 06/01/19  2307   TROPONINI <0.01 <0.01 <0.01       Urinalysis:      Lab Results   Component Value Date    BLOODU NEGATIVE 04/26/2019    SPECGRAV 1.025 04/26/2019    GLUCOSEU NEGATIVE 04/26/2019       Radiology:  XR CHEST STANDARD (2 VW)   Final Result      No acute chest process. Assessment/Plan:    Active Hospital Problems    Diagnosis Date Noted    Dyspnea on exertion [R06.09] 06/01/2019    Chest pain [R07.9] 06/01/2019     59 y.o. female Lisa Ohs past medical history of SVT ablation by Dr. Mary Arellano, CAD status post cardiac 2016 with minor coronary lesions, fibromyalgia, depression, migraines, hyperlipidemia, recent stress test which is high risk for ischemia presented to the emergency room with complaints of right-sided scapular pain.   Pain started yesterday, going around the right scapula blade, intermittently radiates to the

## 2019-06-03 ENCOUNTER — APPOINTMENT (OUTPATIENT)
Dept: CT IMAGING | Age: 64
DRG: 247 | End: 2019-06-03
Payer: COMMERCIAL

## 2019-06-03 PROBLEM — R94.39 ABNORMAL STRESS ECHO: Status: ACTIVE | Noted: 2019-06-03

## 2019-06-03 LAB
D DIMER: <200 NG/ML DDU (ref 0–229)
MAGNESIUM: 2 MG/DL (ref 1.8–2.4)
T4 TOTAL: 7 UG/DL (ref 4.5–10.9)
TSH REFLEX: 0.94 UIU/ML (ref 0.27–4.2)

## 2019-06-03 PROCEDURE — 75574 CT ANGIO HRT W/3D IMAGE: CPT

## 2019-06-03 PROCEDURE — 84443 ASSAY THYROID STIM HORMONE: CPT

## 2019-06-03 PROCEDURE — 6370000000 HC RX 637 (ALT 250 FOR IP): Performed by: INTERNAL MEDICINE

## 2019-06-03 PROCEDURE — 36415 COLL VENOUS BLD VENIPUNCTURE: CPT

## 2019-06-03 PROCEDURE — 84436 ASSAY OF TOTAL THYROXINE: CPT

## 2019-06-03 PROCEDURE — 6360000002 HC RX W HCPCS: Performed by: INTERNAL MEDICINE

## 2019-06-03 PROCEDURE — 85379 FIBRIN DEGRADATION QUANT: CPT

## 2019-06-03 PROCEDURE — 99233 SBSQ HOSP IP/OBS HIGH 50: CPT | Performed by: NURSE PRACTITIONER

## 2019-06-03 PROCEDURE — 6360000004 HC RX CONTRAST MEDICATION: Performed by: NURSE PRACTITIONER

## 2019-06-03 PROCEDURE — 2500000003 HC RX 250 WO HCPCS

## 2019-06-03 PROCEDURE — 2060000000 HC ICU INTERMEDIATE R&B

## 2019-06-03 PROCEDURE — 2580000003 HC RX 258: Performed by: INTERNAL MEDICINE

## 2019-06-03 PROCEDURE — 6370000000 HC RX 637 (ALT 250 FOR IP): Performed by: STUDENT IN AN ORGANIZED HEALTH CARE EDUCATION/TRAINING PROGRAM

## 2019-06-03 PROCEDURE — 83735 ASSAY OF MAGNESIUM: CPT

## 2019-06-03 RX ORDER — SODIUM CHLORIDE 9 MG/ML
INJECTION, SOLUTION INTRAVENOUS CONTINUOUS
Status: CANCELLED | OUTPATIENT
Start: 2019-06-03

## 2019-06-03 RX ORDER — SODIUM CHLORIDE 0.9 % (FLUSH) 0.9 %
10 SYRINGE (ML) INJECTION EVERY 12 HOURS SCHEDULED
Status: CANCELLED | OUTPATIENT
Start: 2019-06-03

## 2019-06-03 RX ORDER — OXYCODONE HYDROCHLORIDE AND ACETAMINOPHEN 5; 325 MG/1; MG/1
1 TABLET ORAL ONCE
Status: COMPLETED | OUTPATIENT
Start: 2019-06-03 | End: 2019-06-03

## 2019-06-03 RX ORDER — METOPROLOL TARTRATE 5 MG/5ML
INJECTION INTRAVENOUS
Status: COMPLETED
Start: 2019-06-03 | End: 2019-06-03

## 2019-06-03 RX ORDER — SODIUM CHLORIDE 0.9 % (FLUSH) 0.9 %
10 SYRINGE (ML) INJECTION PRN
Status: CANCELLED | OUTPATIENT
Start: 2019-06-03

## 2019-06-03 RX ADMIN — METHOCARBAMOL TABLETS 500 MG: 500 TABLET, COATED ORAL at 14:06

## 2019-06-03 RX ADMIN — IOPAMIDOL 80 ML: 755 INJECTION, SOLUTION INTRAVENOUS at 14:01

## 2019-06-03 RX ADMIN — HYDROCHLOROTHIAZIDE 25 MG: 25 TABLET ORAL at 09:02

## 2019-06-03 RX ADMIN — ENOXAPARIN SODIUM 70 MG: 80 INJECTION SUBCUTANEOUS at 09:03

## 2019-06-03 RX ADMIN — ONDANSETRON 4 MG: 2 INJECTION INTRAMUSCULAR; INTRAVENOUS at 15:18

## 2019-06-03 RX ADMIN — TRAMADOL HYDROCHLORIDE 50 MG: 50 TABLET, FILM COATED ORAL at 09:01

## 2019-06-03 RX ADMIN — NITROGLYCERIN 0.4 MG: 0.4 TABLET, ORALLY DISINTEGRATING SUBLINGUAL at 13:03

## 2019-06-03 RX ADMIN — Medication 10 ML: at 09:04

## 2019-06-03 RX ADMIN — METOPROLOL TARTRATE 5 MG: 5 INJECTION, SOLUTION INTRAVENOUS at 13:03

## 2019-06-03 RX ADMIN — METOPROLOL TARTRATE 5 MG: 5 INJECTION, SOLUTION INTRAVENOUS at 13:15

## 2019-06-03 RX ADMIN — OXYCODONE HYDROCHLORIDE AND ACETAMINOPHEN 1 TABLET: 5; 325 TABLET ORAL at 17:33

## 2019-06-03 RX ADMIN — METHOCARBAMOL TABLETS 500 MG: 500 TABLET, COATED ORAL at 21:28

## 2019-06-03 RX ADMIN — CITALOPRAM HYDROBROMIDE 40 MG: 40 TABLET ORAL at 16:38

## 2019-06-03 RX ADMIN — PANTOPRAZOLE SODIUM 40 MG: 40 TABLET, DELAYED RELEASE ORAL at 16:38

## 2019-06-03 RX ADMIN — ROSUVASTATIN CALCIUM 20 MG: 20 TABLET, COATED ORAL at 21:28

## 2019-06-03 RX ADMIN — ASPIRIN 81 MG 81 MG: 81 TABLET ORAL at 09:01

## 2019-06-03 RX ADMIN — ENOXAPARIN SODIUM 70 MG: 80 INJECTION SUBCUTANEOUS at 21:28

## 2019-06-03 RX ADMIN — TRAMADOL HYDROCHLORIDE 50 MG: 50 TABLET, FILM COATED ORAL at 16:15

## 2019-06-03 RX ADMIN — METOPROLOL TARTRATE 5 MG: 5 INJECTION, SOLUTION INTRAVENOUS at 13:28

## 2019-06-03 ASSESSMENT — PAIN DESCRIPTION - ORIENTATION
ORIENTATION: POSTERIOR
ORIENTATION: MID

## 2019-06-03 ASSESSMENT — PAIN SCALES - GENERAL
PAINLEVEL_OUTOF10: 0
PAINLEVEL_OUTOF10: 0
PAINLEVEL_OUTOF10: 9
PAINLEVEL_OUTOF10: 6
PAINLEVEL_OUTOF10: 10
PAINLEVEL_OUTOF10: 1
PAINLEVEL_OUTOF10: 3
PAINLEVEL_OUTOF10: 5
PAINLEVEL_OUTOF10: 0

## 2019-06-03 ASSESSMENT — PAIN DESCRIPTION - PAIN TYPE
TYPE: ACUTE PAIN
TYPE: ACUTE PAIN

## 2019-06-03 ASSESSMENT — PAIN DESCRIPTION - DESCRIPTORS
DESCRIPTORS: ACHING;DISCOMFORT
DESCRIPTORS: HEADACHE

## 2019-06-03 ASSESSMENT — PAIN DESCRIPTION - PROGRESSION
CLINICAL_PROGRESSION: GRADUALLY WORSENING
CLINICAL_PROGRESSION: GRADUALLY WORSENING

## 2019-06-03 ASSESSMENT — PAIN DESCRIPTION - ONSET
ONSET: GRADUAL
ONSET: GRADUAL

## 2019-06-03 ASSESSMENT — PAIN DESCRIPTION - FREQUENCY
FREQUENCY: INTERMITTENT
FREQUENCY: INTERMITTENT

## 2019-06-03 ASSESSMENT — PAIN DESCRIPTION - LOCATION
LOCATION: BACK
LOCATION: HEAD

## 2019-06-03 NOTE — PLAN OF CARE
Problem: Pain:  Goal: Pain level will decrease  Description  Pain level will decrease  Outcome: Ongoing  Goal: Control of acute pain  Description  Control of acute pain  Outcome: Ongoing  Goal: Control of chronic pain  Description  Control of chronic pain  Outcome: Ongoing     Problem: OXYGENATION/RESPIRATORY FUNCTION  Goal: Patient will maintain patent airway  Outcome: Ongoing  Goal: Patient will achieve/maintain normal respiratory rate/effort  Description  Respiratory rate and effort will be within normal limits for the patient  Outcome: Ongoing     Problem: HEMODYNAMIC STATUS  Goal: Patient has stable vital signs and fluid balance  6/3/2019 0055 by Rashawn Albert RN  Outcome: Ongoing  6/2/2019 1926 by Kristyn Moser RN  Outcome: Ongoing  Note:   Pt remains hemodynamically stable at this time. Vss. Denies chest pain, sob, lightheadedness, dizziness, or palpitations. Strict I/Os maintained with daily weights. NSR on tele. SpO2 remains >92% on room air . Will continue to monitor.

## 2019-06-03 NOTE — PROGRESS NOTES
[facility]       In Patient  Progress note        Sotero Wilkerson MD,  [address] [facility]   Daily Progress Note      Admit Date:  6/1/2019      CC: back pain, CP, abnormal stress      Subjective: resting in bed, quiet night   Pt with no acute overnight events. Denies chest pain, palpitations, and dyspnea. Reviewed vitals    Discussed CTA coronary verses LHC will do CTA first per pt and Dr. Yamel Lr request   If needed LHC will follows     HPI:per Dr. Shanon Salcedo on 6/1/19 Cruz Sanchez is a 59 y.o. female with a past medical history of non obst CAD, SVT ablation,HLD  who presented to the hospital with worsening back pain, rad to right side, some nausea associated with the pain. Had recent abnormal stress echo with plan of possible cath vs CTA to be performed as outpatient. Pt called during the weekend due to worsening pain. Known LBBB. Neg trop x3    History of SVT ablation by Dr. Montalvo Ill cath 2016 with minor coronary lesions    Reviewed most recent test with pt       Review of Systems:   · Constitutional: no unanticipated weight loss. There's been no change in energy level, sleep pattern, or activity level. No fevers, chills. · Eyes: No visual changes or diplopia. No scleral icterus. · ENT: No Headaches, hearing loss or vertigo. No mouth sores or sore throat. · Cardiovascular: as reviewed in HPI  · Respiratory: No cough or wheezing, no sputum production. No hematemesis. · Gastrointestinal: No abdominal pain, appetite loss, blood in stools. No change in bowel or bladder habits. · Genitourinary: No dysuria, trouble voiding, or hematuria. · Musculoskeletal:  No gait disturbance, no joint complaints. · Integumentary: No rash or pruritis. · Neurological: No headache, diplopia, change in muscle strength, numbness or tingling. · Psychiatric: No anxiety or depression.   · Endocrine: No temperature intolerance. No excessive thirst, fluid intake, or urination. No tremor. · Hematologic/Lymphatic: No abnormal bruising or bleeding, blood clots or swollen lymph nodes. · Allergic/Immunologic: No nasal congestion or hives. Objective:   /67   Pulse 80   Temp 97.9 °F (36.6 °C) (Oral)   Resp 16   Ht 5' 6.14\" (1.68 m)   Wt 151 lb 14.4 oz (68.9 kg)   SpO2 95%   BMI 24.41 kg/m²       Intake/Output Summary (Last 24 hours) at 6/3/2019 0836  Last data filed at 6/3/2019 8624  Gross per 24 hour   Intake 480 ml   Output 2150 ml   Net -1670 ml     Wt Readings from Last 3 Encounters:   06/03/19 151 lb 14.4 oz (68.9 kg)   05/10/19 155 lb 12.8 oz (70.7 kg)   04/29/19 155 lb 3.2 oz (70.4 kg)       Physical Exam:  /67   Pulse 80   Temp 97.9 °F (36.6 °C) (Oral)   Resp 16   Ht 5' 6.14\" (1.68 m)   Wt 151 lb 14.4 oz (68.9 kg)   SpO2 95%   BMI 24.41 kg/m²   BP Readings from Last 3 Encounters:   06/03/19 107/67   05/10/19 122/80   04/29/19 133/70     Pulse Readings from Last 3 Encounters:   06/03/19 80   05/10/19 97   04/29/19 78       Intake/Output Summary (Last 24 hours) at 6/3/2019 0836  Last data filed at 6/3/2019 5148  Gross per 24 hour   Intake 480 ml   Output 2150 ml   Net -1670 ml     Wt Readings from Last 2 Encounters:   06/03/19 151 lb 14.4 oz (68.9 kg)   05/10/19 155 lb 12.8 oz (70.7 kg)     Constitutional: She is oriented to person, place, and time. She appears well-developed and well-nourished. In no acute distress. Head: Normocephalic and atraumatic. Eyes: PEERL   Neck: Neck supple. No JVD present. Carotid bruit is not present. No mass and no thyromegaly present. No lymphadenopathy present. Cardiovascular: Normal rate, regular rhythm, normal heart sounds and intact distal pulses. Exam reveals no gallop and no friction rub. No murmur heard. Pulmonary/Chest: Effort normal and breath sounds normal. No respiratory distress. She has no wheezes, rhonchi or rales. Abdominal: Soft, non-tender. Bowel sounds and aorta are normal. She exhibits no organomegaly, mass or bruit. Extremities: No edema, cyanosis, or clubbing. Pulses are 2+ radial/carotid/dorsalis pedis and posterior tibial bilaterally. Neurological: She is alert and oriented to person, place, and time. She has normal reflexes. No cranial nerve deficit. Coordination normal.   Skin: Skin is warm and dry. There is no rash or diaphoresis. Psychiatric: She has a normal mood and affect. Her speech is normal and behavior is normal.     Medications:    citalopram  40 mg Oral Daily    dicyclomine  10 mg Oral TID AC    methocarbamol  500 mg Oral TID    pantoprazole  40 mg Oral QAM AC    rosuvastatin  20 mg Oral Nightly    sodium chloride flush  10 mL Intravenous 2 times per day    aspirin  81 mg Oral Daily    enoxaparin  1 mg/kg Subcutaneous BID    hydrochlorothiazide  25 mg Oral Daily       sodium chloride flush, magnesium hydroxide, ondansetron, nitroGLYCERIN, heparin (porcine), heparin (porcine), traMADol, hydrALAZINE    CBC:   Recent Labs     06/01/19  1327 06/02/19  0502   WBC 6.4 5.2   HGB 13.3 12.9    295     BMP:    Recent Labs     06/01/19  1327      K 3.5   CO2 27   BUN 6*   CREATININE 0.6     LIVR: No results for input(s): AST, ALT in the last 72 hours. INR:    Recent Labs     06/01/19  1327   INR 1.02     APTT:   Recent Labs     06/01/19  1327   APTT 34.3     BNP:  No results for input(s): BNP in the last 72 hours. Telemetry: NSR     Reviewed  available lab work,  EKGs, images, [facility]       Assessment     cp   Discussed CTA coronary verses [facility] will do CTA first per pt and Dr. Celina Manzo request   If needed LHC will follows   history of non obst CAD, SVT ablation,HLD  who presented to the hospital with worsening back pain, Pt called during the weekend due to worsening pain. Known LBBB.  Neg trop x3    Cardiac cath 2016 with minor coronary lesions    History of SVT ablation by Dr. Kvng Montoya  in NSR     HD  On statin optimal level     Migraines /  fibromyalgia  / GERD / depression anxiety   Stable  / on Celexa     Plan   Mag level TSH T4  CTA coronary today     Ary Jean APRN, CVNP

## 2019-06-03 NOTE — PROGRESS NOTES
Resident Progress Note    Admit Date: 2019    PCP: Flor Navarro DO                  : 1955  MRN: [id number]  CC: Back and chest pain    Subjective: Interval History:   Patient seen this morning with resolution of back pain and nausea. Diet: Diet NPO, After Midnight      Data:   Scheduled Meds:   citalopram  40 mg Oral Daily    dicyclomine  10 mg Oral TID AC    methocarbamol  500 mg Oral TID    pantoprazole  40 mg Oral QAM AC    rosuvastatin  20 mg Oral Nightly    sodium chloride flush  10 mL Intravenous 2 times per day    aspirin  81 mg Oral Daily    enoxaparin  1 mg/kg Subcutaneous BID    hydrochlorothiazide  25 mg Oral Daily     Continuous Infusions:  PRN Meds:sodium chloride flush, magnesium hydroxide, ondansetron, nitroGLYCERIN, heparin (porcine), heparin (porcine), traMADol, hydrALAZINE  I/O last 3 completed shifts: In: 480 [P.O.:480]  Out: 2150 [Urine:2150]  No intake/output data recorded. Intake/Output Summary (Last 24 hours) at 6/3/2019 0742  Last data filed at 6/3/2019 5266  Gross per 24 hour   Intake 480 ml   Output 2150 ml   Net -1670 ml           Objective:     Vitals: /67   Pulse 80   Temp 97.9 °F (36.6 °C) (Oral)   Resp 16   Ht 5' 6.14\" (1.68 m)   Wt 151 lb 14.4 oz (68.9 kg)   SpO2 95%   BMI 24.41 kg/m²     Physical Exam   Constitutional: She is oriented to person, place, and time. She appears well-developed and well-nourished. HENT:   Head: Normocephalic and atraumatic. Eyes: Pupils are equal, round, and reactive to light. EOM are normal.   Neck: Normal range of motion. Neck supple. No JVD present. Cardiovascular: Normal rate, regular rhythm and normal heart sounds. Exam reveals no friction rub. No murmur heard. Pulmonary/Chest: Effort normal and breath sounds normal. No stridor. No respiratory distress. She has no wheezes. Abdominal: Soft. Bowel sounds are normal. She exhibits no distension and no mass. There is no tenderness.  There is no guarding. Musculoskeletal: Normal range of motion. She exhibits no edema or deformity. Neurological: She is alert and oriented to person, place, and time. No cranial nerve deficit. Skin: Skin is warm and dry. Capillary refill takes less than 2 seconds. Psychiatric: She has a normal mood and affect. LABS:    CBC:   Recent Labs     06/01/19  1327 06/02/19  0502   WBC 6.4 5.2   HGB 13.3 12.9   HCT 39.2 38.4   MCV 92.1 93.2    295                                                                BMP:    Recent Labs     06/01/19  1327      K 3.5      CO2 27   BUN 6*   CREATININE 0.6   GLUCOSE 104*       LFT's: No results for input(s): AST, ALT, ALB, BILITOT, ALKPHOS in the last 72 hours. Troponin:   Recent Labs     06/01/19  1327 06/01/19 1952 06/01/19  2307   TROPONINI <0.01 <0.01 <0.01       BNP: No results for input(s): BNP in the last 72 hours. Lipids: No results for input(s): CHOL, HDL in the last 72 hours. Invalid input(s): LDLCALCU    ABGs: No results found for: PHART, XOV9UCG, PO2ART    INR:   Recent Labs     06/01/19  1327   INR 1.02       U/A:No results for input(s): NITRITE, COLORU, PHUR, LABCAST, WBCUA, RBCUA, MUCUS, TRICHOMONAS, YEAST, BACTERIA, CLARITYU, SPECGRAV, LEUKOCYTESUR, UROBILINOGEN, BILIRUBINUR, BLOODU, GLUCOSEU, AMORPHOUS in the last 72 hours. Invalid input(s): Marlena Marquez   -----------------------------------------------------------------  RAD:   XR CHEST STANDARD (2 VW)   Final Result      No acute chest process. Assessment/Plan:   Patient is a 58 y/o female presenting with back pain and abnormal stress echo.      Back pain with radiation to anterior chest 2/2 ACS vs Aortic Dissection  - Patient hemodynamically stable and no elevation of troponin  - Patient had an abnormal stress echo on 5/30/19  - Patient started on therapeutic Lovenox  - ASA 81  - Crestor 20 mg nightly  - Cardiology consulted awaiting CTA Coronaries results before cardiac cath  - Will add D-Dimer in light of symptoms concerning for dissection   - Continue to monitor on Tele    Anxiety and depression  - Stable symptoms  - continue on Celexa    Irritable bowel   - contineu on Bentyl    HTN  Started on HCTZ 25mg daily      Code Status: Full  FEN: NPO  PPx: Lovenox  Dispo: GMF    Plan discussed with Dr. Alicia Salguero MD  6/3/2019  7:42 AM

## 2019-06-03 NOTE — PROGRESS NOTES
Procedure CTA Coronary Heart:  Over ride pull for NTG 0.4 SL and 3 doses Metoprolol 5mg/5ml to be given IVP q5min x3 doses according toIV protocol for CTA Coronary Procedure. Patient name and  verified and vital signs taken prior to each dose. First dose 1305 NTG 0.4 mg SL and Metoprolol 5mg/5ml IVP per rt antecubital IV--good blood return and flushes easily.

## 2019-06-04 ENCOUNTER — APPOINTMENT (OUTPATIENT)
Dept: CARDIAC CATH/INVASIVE PROCEDURES | Age: 64
DRG: 247 | End: 2019-06-04
Payer: COMMERCIAL

## 2019-06-04 LAB
ALBUMIN SERPL-MCNC: 4.4 G/DL (ref 3.4–5)
ANION GAP SERPL CALCULATED.3IONS-SCNC: 11 MMOL/L (ref 3–16)
BASOPHILS ABSOLUTE: 0.1 K/UL (ref 0–0.2)
BASOPHILS RELATIVE PERCENT: 1.2 %
BUN BLDV-MCNC: 9 MG/DL (ref 7–20)
CALCIUM SERPL-MCNC: 9.5 MG/DL (ref 8.3–10.6)
CHLORIDE BLD-SCNC: 99 MMOL/L (ref 99–110)
CHOLESTEROL, TOTAL: 169 MG/DL (ref 0–199)
CO2: 29 MMOL/L (ref 21–32)
CREAT SERPL-MCNC: 0.7 MG/DL (ref 0.6–1.2)
EKG ATRIAL RATE: 76 BPM
EKG DIAGNOSIS: NORMAL
EKG P AXIS: 48 DEGREES
EKG P-R INTERVAL: 124 MS
EKG Q-T INTERVAL: 450 MS
EKG QRS DURATION: 128 MS
EKG QTC CALCULATION (BAZETT): 506 MS
EKG R AXIS: -58 DEGREES
EKG T AXIS: 89 DEGREES
EKG VENTRICULAR RATE: 76 BPM
EOSINOPHILS ABSOLUTE: 0.3 K/UL (ref 0–0.6)
EOSINOPHILS RELATIVE PERCENT: 4.5 %
GFR AFRICAN AMERICAN: >60
GFR NON-AFRICAN AMERICAN: >60
GLUCOSE BLD-MCNC: 109 MG/DL (ref 70–99)
HCT VFR BLD CALC: 39.5 % (ref 36–48)
HDLC SERPL-MCNC: 51 MG/DL (ref 40–60)
HEMOGLOBIN: 13.4 G/DL (ref 12–16)
LDL CHOLESTEROL CALCULATED: 65 MG/DL
LEFT VENTRICULAR EJECTION FRACTION MODE: NORMAL
LV EF: 60 %
LYMPHOCYTES ABSOLUTE: 2.8 K/UL (ref 1–5.1)
LYMPHOCYTES RELATIVE PERCENT: 41.6 %
MCH RBC QN AUTO: 31.6 PG (ref 26–34)
MCHC RBC AUTO-ENTMCNC: 33.9 G/DL (ref 31–36)
MCV RBC AUTO: 93.2 FL (ref 80–100)
MONOCYTES ABSOLUTE: 0.4 K/UL (ref 0–1.3)
MONOCYTES RELATIVE PERCENT: 6 %
NEUTROPHILS ABSOLUTE: 3.1 K/UL (ref 1.7–7.7)
NEUTROPHILS RELATIVE PERCENT: 46.7 %
PDW BLD-RTO: 13.1 % (ref 12.4–15.4)
PHOSPHORUS: 3.7 MG/DL (ref 2.5–4.9)
PLATELET # BLD: 305 K/UL (ref 135–450)
PMV BLD AUTO: 6.7 FL (ref 5–10.5)
POC ACT LR: 363 SEC
POTASSIUM SERPL-SCNC: 3.9 MMOL/L (ref 3.5–5.1)
RBC # BLD: 4.24 M/UL (ref 4–5.2)
SODIUM BLD-SCNC: 139 MMOL/L (ref 136–145)
TRIGL SERPL-MCNC: 267 MG/DL (ref 0–150)
VLDLC SERPL CALC-MCNC: 53 MG/DL
WBC # BLD: 6.6 K/UL (ref 4–11)

## 2019-06-04 PROCEDURE — C1874 STENT, COATED/COV W/DEL SYS: HCPCS

## 2019-06-04 PROCEDURE — 2060000000 HC ICU INTERMEDIATE R&B

## 2019-06-04 PROCEDURE — 2580000003 HC RX 258: Performed by: INTERNAL MEDICINE

## 2019-06-04 PROCEDURE — 2709999900 HC NON-CHARGEABLE SUPPLY

## 2019-06-04 PROCEDURE — 99233 SBSQ HOSP IP/OBS HIGH 50: CPT | Performed by: NURSE PRACTITIONER

## 2019-06-04 PROCEDURE — 93010 ELECTROCARDIOGRAM REPORT: CPT | Performed by: INTERNAL MEDICINE

## 2019-06-04 PROCEDURE — C1769 GUIDE WIRE: HCPCS

## 2019-06-04 PROCEDURE — 80061 LIPID PANEL: CPT

## 2019-06-04 PROCEDURE — 6370000000 HC RX 637 (ALT 250 FOR IP)

## 2019-06-04 PROCEDURE — 6360000002 HC RX W HCPCS

## 2019-06-04 PROCEDURE — C1887 CATHETER, GUIDING: HCPCS

## 2019-06-04 PROCEDURE — 6360000002 HC RX W HCPCS: Performed by: INTERNAL MEDICINE

## 2019-06-04 PROCEDURE — 93005 ELECTROCARDIOGRAM TRACING: CPT | Performed by: NURSE PRACTITIONER

## 2019-06-04 PROCEDURE — 027034Z DILATION OF CORONARY ARTERY, ONE ARTERY WITH DRUG-ELUTING INTRALUMINAL DEVICE, PERCUTANEOUS APPROACH: ICD-10-PCS | Performed by: FAMILY MEDICINE

## 2019-06-04 PROCEDURE — 85025 COMPLETE CBC W/AUTO DIFF WBC: CPT

## 2019-06-04 PROCEDURE — 92928 PRQ TCAT PLMT NTRAC ST 1 LES: CPT

## 2019-06-04 PROCEDURE — B2151ZZ FLUOROSCOPY OF LEFT HEART USING LOW OSMOLAR CONTRAST: ICD-10-PCS | Performed by: FAMILY MEDICINE

## 2019-06-04 PROCEDURE — 99153 MOD SED SAME PHYS/QHP EA: CPT

## 2019-06-04 PROCEDURE — 6370000000 HC RX 637 (ALT 250 FOR IP): Performed by: INTERNAL MEDICINE

## 2019-06-04 PROCEDURE — B2111ZZ FLUOROSCOPY OF MULTIPLE CORONARY ARTERIES USING LOW OSMOLAR CONTRAST: ICD-10-PCS | Performed by: FAMILY MEDICINE

## 2019-06-04 PROCEDURE — 36415 COLL VENOUS BLD VENIPUNCTURE: CPT

## 2019-06-04 PROCEDURE — 85347 COAGULATION TIME ACTIVATED: CPT

## 2019-06-04 PROCEDURE — C1894 INTRO/SHEATH, NON-LASER: HCPCS

## 2019-06-04 PROCEDURE — 80069 RENAL FUNCTION PANEL: CPT

## 2019-06-04 PROCEDURE — 2500000003 HC RX 250 WO HCPCS

## 2019-06-04 PROCEDURE — 6360000004 HC RX CONTRAST MEDICATION: Performed by: INTERNAL MEDICINE

## 2019-06-04 PROCEDURE — 4A023N7 MEASUREMENT OF CARDIAC SAMPLING AND PRESSURE, LEFT HEART, PERCUTANEOUS APPROACH: ICD-10-PCS | Performed by: FAMILY MEDICINE

## 2019-06-04 PROCEDURE — 92921 HC PRQ CARDIAC ANGIO ADDL ART: CPT

## 2019-06-04 PROCEDURE — 99152 MOD SED SAME PHYS/QHP 5/>YRS: CPT

## 2019-06-04 PROCEDURE — 93458 L HRT ARTERY/VENTRICLE ANGIO: CPT

## 2019-06-04 PROCEDURE — C1725 CATH, TRANSLUMIN NON-LASER: HCPCS

## 2019-06-04 RX ORDER — MORPHINE SULFATE 2 MG/ML
2 INJECTION, SOLUTION INTRAMUSCULAR; INTRAVENOUS
Status: ACTIVE | OUTPATIENT
Start: 2019-06-04 | End: 2019-06-04

## 2019-06-04 RX ORDER — ASPIRIN 81 MG/1
81 TABLET, CHEWABLE ORAL DAILY
Status: DISCONTINUED | OUTPATIENT
Start: 2019-06-05 | End: 2019-06-04

## 2019-06-04 RX ORDER — ATORVASTATIN CALCIUM 40 MG/1
40 TABLET, FILM COATED ORAL NIGHTLY
Status: DISCONTINUED | OUTPATIENT
Start: 2019-06-04 | End: 2019-06-04

## 2019-06-04 RX ORDER — ATROPINE SULFATE 0.4 MG/ML
0.5 AMPUL (ML) INJECTION
Status: DISCONTINUED | OUTPATIENT
Start: 2019-06-04 | End: 2019-06-04

## 2019-06-04 RX ORDER — ACETAMINOPHEN 325 MG/1
650 TABLET ORAL EVERY 4 HOURS PRN
Status: DISCONTINUED | OUTPATIENT
Start: 2019-06-04 | End: 2019-06-06 | Stop reason: HOSPADM

## 2019-06-04 RX ORDER — SODIUM CHLORIDE 0.9 % (FLUSH) 0.9 %
10 SYRINGE (ML) INJECTION EVERY 12 HOURS SCHEDULED
Status: DISCONTINUED | OUTPATIENT
Start: 2019-06-04 | End: 2019-06-04

## 2019-06-04 RX ORDER — SODIUM CHLORIDE 9 MG/ML
INJECTION, SOLUTION INTRAVENOUS CONTINUOUS
Status: ACTIVE | OUTPATIENT
Start: 2019-06-04 | End: 2019-06-04

## 2019-06-04 RX ORDER — ATROPINE SULFATE 0.1 MG/ML
0.5 INJECTION INTRAVENOUS
Status: ACTIVE | OUTPATIENT
Start: 2019-06-04 | End: 2019-06-04

## 2019-06-04 RX ORDER — SODIUM CHLORIDE 0.9 % (FLUSH) 0.9 %
10 SYRINGE (ML) INJECTION PRN
Status: DISCONTINUED | OUTPATIENT
Start: 2019-06-04 | End: 2019-06-04

## 2019-06-04 RX ORDER — LISINOPRIL 2.5 MG/1
2.5 TABLET ORAL DAILY
Status: DISCONTINUED | OUTPATIENT
Start: 2019-06-04 | End: 2019-06-05

## 2019-06-04 RX ORDER — ONDANSETRON 2 MG/ML
4 INJECTION INTRAMUSCULAR; INTRAVENOUS EVERY 6 HOURS PRN
Status: DISCONTINUED | OUTPATIENT
Start: 2019-06-04 | End: 2019-06-04

## 2019-06-04 RX ADMIN — METHOCARBAMOL TABLETS 500 MG: 500 TABLET, COATED ORAL at 07:41

## 2019-06-04 RX ADMIN — ONDANSETRON 4 MG: 2 INJECTION INTRAMUSCULAR; INTRAVENOUS at 20:25

## 2019-06-04 RX ADMIN — Medication 10 ML: at 20:25

## 2019-06-04 RX ADMIN — CITALOPRAM HYDROBROMIDE 40 MG: 40 TABLET ORAL at 12:50

## 2019-06-04 RX ADMIN — ASPIRIN 81 MG 81 MG: 81 TABLET ORAL at 07:48

## 2019-06-04 RX ADMIN — METHOCARBAMOL TABLETS 500 MG: 500 TABLET, COATED ORAL at 20:25

## 2019-06-04 RX ADMIN — SODIUM CHLORIDE: 9 INJECTION, SOLUTION INTRAVENOUS at 11:24

## 2019-06-04 RX ADMIN — ACETAMINOPHEN 650 MG: 325 TABLET ORAL at 12:43

## 2019-06-04 RX ADMIN — TRAMADOL HYDROCHLORIDE 50 MG: 50 TABLET, FILM COATED ORAL at 20:25

## 2019-06-04 RX ADMIN — TIROFIBAN 0.15 MCG/KG/MIN: 5 INJECTION, SOLUTION INTRAVENOUS at 11:29

## 2019-06-04 RX ADMIN — METOPROLOL TARTRATE 25 MG: 25 TABLET ORAL at 12:45

## 2019-06-04 RX ADMIN — ONDANSETRON 4 MG: 2 INJECTION INTRAMUSCULAR; INTRAVENOUS at 12:50

## 2019-06-04 RX ADMIN — Medication 10 ML: at 07:51

## 2019-06-04 RX ADMIN — PANTOPRAZOLE SODIUM 40 MG: 40 TABLET, DELAYED RELEASE ORAL at 07:41

## 2019-06-04 RX ADMIN — METOPROLOL TARTRATE 25 MG: 25 TABLET ORAL at 20:25

## 2019-06-04 RX ADMIN — METHOCARBAMOL TABLETS 500 MG: 500 TABLET, COATED ORAL at 18:52

## 2019-06-04 RX ADMIN — HYDROCHLOROTHIAZIDE 25 MG: 25 TABLET ORAL at 07:40

## 2019-06-04 RX ADMIN — ROSUVASTATIN CALCIUM 20 MG: 20 TABLET, COATED ORAL at 20:25

## 2019-06-04 RX ADMIN — IOHEXOL 200 ML: 350 INJECTION, SOLUTION INTRAVENOUS at 10:25

## 2019-06-04 RX ADMIN — ENOXAPARIN SODIUM 70 MG: 80 INJECTION SUBCUTANEOUS at 20:24

## 2019-06-04 RX ADMIN — LISINOPRIL 2.5 MG: 2.5 TABLET ORAL at 15:09

## 2019-06-04 RX ADMIN — SODIUM CHLORIDE: 9 INJECTION, SOLUTION INTRAVENOUS at 14:05

## 2019-06-04 ASSESSMENT — PAIN SCALES - GENERAL
PAINLEVEL_OUTOF10: 0
PAINLEVEL_OUTOF10: 9
PAINLEVEL_OUTOF10: 7

## 2019-06-04 ASSESSMENT — PAIN DESCRIPTION - LOCATION: LOCATION: HEAD

## 2019-06-04 ASSESSMENT — PAIN DESCRIPTION - PAIN TYPE: TYPE: CHRONIC PAIN

## 2019-06-04 ASSESSMENT — PAIN DESCRIPTION - FREQUENCY: FREQUENCY: INTERMITTENT

## 2019-06-04 ASSESSMENT — PAIN DESCRIPTION - PROGRESSION: CLINICAL_PROGRESSION: GRADUALLY WORSENING

## 2019-06-04 ASSESSMENT — PAIN DESCRIPTION - DESCRIPTORS: DESCRIPTORS: HEADACHE

## 2019-06-04 ASSESSMENT — PAIN DESCRIPTION - ONSET: ONSET: ON-GOING

## 2019-06-04 NOTE — PROCEDURES
The [address] [facility] CATH    James Joyce   59 y.o., female  1955 6/4/2019      Procedure  Selective Coronary Angiography  Cardiac Catheterization for Coronary Anatomy  Left Heart Catheterization  Left Ventriculogram  PTCA with stent to the LAD 3.5 x 15 drug-eluting Chet Hollowayort stent. Arterial Access Right Radial Artery after a negative Magan test  TR Band    Indication:Cardiac cath to rule out ischemic CAD, Possible angioplasty, The procedure and risks described to patient including risk of CVA, MI, bleeding, emergency surgery, death, this patient did have a CT angiogram that suggested moderate to severe stenosis of the LAD., Consent signed or positive stress test  Unspecified Angina  Anesthesia: Moderate sedation with Versed and Fentanyl IV  Estimated blood loss :minimal  Abnormal Stress Test      Procedure:  After written informed consent was obtained, the patient was   brought to the cardiac catheterization suite, where patient was prepped and   draped in the usual sterile fashion. Local anesthesia was achieved in the   right wrist with 2% lidocaine. A 5-Australian hemostasis sheath was placed into   the right radial artery. The pre cocktail of heparin, verapamil and nitroglycerin was injected into the sheath. The Brian catheter could not access the left coronary artery. It seemed to short. We did ultimately use a JL4 diagnostic and was able to inject the left coronary artery and very degrees of obliquity. We did however use the Brian catheter   introduced  to engage the right coronary   artery. Radiographic  images were obtained.  The catheter was removed and exchanged over an exchange length 0.35 soft guide wire. The Brian catheter was then positioned in the left ventricle. Left ventriculogram was performed by hand injection with good imaging. After these images were obtained. , the   catheter was flushed, pulled back across the aortic valve revealing no gradient. The catheter was removed. At this time we proceeded with intervention. We use an [employer] left 3.5. Through this we placed a BMW down the LAD. We then directly stented with a 3.5 x 15 mm Araceli drug-eluting stent. This stent did retirement the moderately large diagonal branch. We then used a guidewire to access that diagonal branch through the stent struts and we used a 2.5 x 8 mm balloon through the stress and we inflated the balloon. There was grade flow. After procedure was done we then terminated the procedure and removed the guidewires. Made our final images. The hemostasis sheath was removed and hemostasis was achieved using a TR Band     There were no complications. Patient tolerated the procedure well. The   patient was transferred to the holding area in stable condition. Total contrast was 160 cc  Radiation fluoroscopy time was 17.5 minutes  Radiation exposure was 1035.19 mGy    Results:  Left ventricular pressure 12 mmHg  Aortic pressure 142 mmHg    Coronary anatomy:   The left main coronary artery is normal.     Left anterior descending artery had a proximal stenosis of 80% at the takeoff of a large diagonal branch. This was treated with balloon angioplasty stenting as noted. Circumflex artery is dominant and normal.    The right coronary artery is a non-dominant vessel with moderate plaque. .     Left ventriculogram shows ejection fraction of 55%. Normal wall motion. Impression:  Significant disease of the LAD that was treated with balloon angioplasty and stenting. Plan:  Continue optimal medical management.   She will be started on brilenta in addition to baby aspirin and she still takes Crestor I believe at 20 mg daily. We anticipate keeping her this afternoon and possibly going home this evening.   This note was likely completed using voice recognition technology and may contain unintended errors  Stephanie Murrieta M.D., [address]     Cc; Larissa Lee,

## 2019-06-04 NOTE — PROGRESS NOTES
Patient is scheduled for LHC with  today at 0900. Please call [phone] with questions.   Cam Scripture  7:18 AM

## 2019-06-04 NOTE — CONSULTS
Pharmacy Progress Note    Admit date: 6/1/2019     Subjective/Objective:  Pt s/p LHC. Consult entered by Dr. Sheilah Lefort stating that patient will need Brilinta via the Meds To Beds program at discharge. Anticipated discharge date: unknown at this time      Assessment/Plan:  1)  Meds To Beds General Rules:   Can only be done Mon-Fri between 8am-5pm   Prescription(s) must be in pharmacy by 3pm to be filled the same day   Copy of patient's insurance / prescription drug card and patient Face Sheet must be sent along with the prescription   Cost of Rx cannot be added to the hospital bill. If financial assistance is required, Social Work should be consulted.  Patients can then  the prescription on their way out of the hospital at discharge, or pharmacy can deliver to the bedside if staff is available (payment due at time of pick-up or delivery--cash, check, or card accepted)    Please notify outpatient pharmacy at [phone] when discharge prescriptions are printed and being sent to pharmacy. Please call with questions. Concha Plascencia., [facility]  Wireless phone:  [phone]  6/4/2019 11:16 AM

## 2019-06-04 NOTE — CARE COORDINATION
CTC attempted to meet with patient this am for initial face to face interview.   Patient being assessed by SN, CTC will attempt to meet with patient again later today, or in am.      Thank You,    Kevin Rivera RN  Care Transition Coordinator  Contact [facility]:[phone]

## 2019-06-04 NOTE — PROGRESS NOTES
Pt reports intervals, 10-15 seconds each, of shortness of breath and chest pressure. Vitals are WNL and oxygen is 99 percent on room air. Will monitor.

## 2019-06-04 NOTE — PLAN OF CARE
Problem: Pain:  Goal: Pain level will decrease  Description  Pain level will decrease  Outcome: Ongoing  Goal: Control of acute pain  Description  Control of acute pain  Outcome: Ongoing     Problem: OXYGENATION/RESPIRATORY FUNCTION  Goal: Patient will maintain patent airway  Outcome: Ongoing  Goal: Patient will achieve/maintain normal respiratory rate/effort  Description  Respiratory rate and effort will be within normal limits for the patient  Outcome: Ongoing     Problem: HEMODYNAMIC STATUS  Goal: Patient has stable vital signs and fluid balance  6/4/2019 0402 by Elo Joel RN  Outcome: Ongoing  6/3/2019 1948 by Luiz Martinez RN  Note:   Pt remains hemodynamically stable at this time. Vss. Denies chest pain, sob, lightheadedness, dizziness, or palpitations. Strict I/Os maintained with daily weights. NSR on tele. SpO2 remains >92% on room air . Will continue to monitor.         Problem: FLUID AND ELECTROLYTE IMBALANCE  Goal: Fluid and electrolyte balance are achieved/maintained  Outcome: Ongoing     Problem: ACTIVITY INTOLERANCE/IMPAIRED MOBILITY  Goal: Mobility/activity is maintained at optimum level for patient  Outcome: Ongoing

## 2019-06-04 NOTE — PROGRESS NOTES
The [address]       In Patient  Progress note        Sotero Wilkerson MD,  [address]   Daily Progress Note      Admit Date:  6/1/2019      CC:  back pain, CP, abnormal stress        Subjective: resting in bed, quiet night   Pt with no acute overnight events. Denies chest pain, palpitations, and dyspnea. Reviewed vitals  / stable   To have [facility] this am       HPI: admitted with cp with abnormal stress test & abnormal CTA coronary  History of SVT ablation by Dr. Montalvo Ill cath 2016 with minor coronary lesions      Reviewed most recent test with pt       Review of Systems:   · Constitutional: no unanticipated weight loss. There's been no change in energy level, sleep pattern, or activity level. No fevers, chills. · Eyes: No visual changes or diplopia. No scleral icterus. · ENT: No Headaches, hearing loss or vertigo. No mouth sores or sore throat. · Cardiovascular: as reviewed in HPI  · Respiratory: No cough or wheezing, no sputum production. No hematemesis. · Gastrointestinal: No abdominal pain, appetite loss, blood in stools. No change in bowel or bladder habits. · Genitourinary: No dysuria, trouble voiding, or hematuria. · Musculoskeletal:  No gait disturbance, no joint complaints. · Integumentary: No rash or pruritis. · Neurological: No headache, diplopia, change in muscle strength, numbness or tingling. · Psychiatric: No anxiety or depression. · Endocrine: No temperature intolerance. No excessive thirst, fluid intake, or urination. No tremor. · Hematologic/Lymphatic: No abnormal bruising or bleeding, blood clots or swollen lymph nodes. · Allergic/Immunologic: No nasal congestion or hives.     Objective:   /69   Pulse 71   Temp 98 °F (36.7 °C) (Oral)   Resp 16   Ht 5' 6.14\" (1.68 m)   Wt 149 lb 14.6 oz (68 kg)   SpO2 94%   BMI 24.09 kg/m²       Intake/Output Summary (Last 24 hours) at 6/4/2019 0851  Last data filed at 6/4/2019 0502  Gross per 24 hour   Intake 240 ml   Output 2000 ml   Net -1760 ml     Wt Readings from Last 3 Encounters:   06/04/19 149 lb 14.6 oz (68 kg)   05/10/19 155 lb 12.8 oz (70.7 kg)   04/29/19 155 lb 3.2 oz (70.4 kg)       Physical Exam:  /69   Pulse 71   Temp 98 °F (36.7 °C) (Oral)   Resp 16   Ht 5' 6.14\" (1.68 m)   Wt 149 lb 14.6 oz (68 kg)   SpO2 94%   BMI 24.09 kg/m²   BP Readings from Last 3 Encounters:   06/04/19 106/69   05/10/19 122/80   04/29/19 133/70     Pulse Readings from Last 3 Encounters:   06/04/19 71   05/10/19 97   04/29/19 78       Intake/Output Summary (Last 24 hours) at 6/4/2019 0851  Last data filed at 6/4/2019 0502  Gross per 24 hour   Intake 240 ml   Output 2000 ml   Net -1760 ml     Wt Readings from Last 2 Encounters:   06/04/19 149 lb 14.6 oz (68 kg)   05/10/19 155 lb 12.8 oz (70.7 kg)     Constitutional: She is oriented to person, place, and time. She appears well-developed and well-nourished. In no acute distress. Head: Normocephalic and atraumatic. Eyes: PEERL   Neck: Neck supple. No JVD present. Carotid bruit is not present. No mass and no thyromegaly present. No lymphadenopathy present. Cardiovascular: Normal rate, regular rhythm, normal heart sounds and intact distal pulses. Exam reveals no gallop and no friction rub. No murmur heard. Pulmonary/Chest: Effort normal and breath sounds normal. No respiratory distress. She has no wheezes, rhonchi or rales. Abdominal: Soft, non-tender. Bowel sounds and aorta are normal. She exhibits no organomegaly, mass or bruit. Extremities: No edema, cyanosis, or clubbing. Pulses are 2+ radial/carotid/dorsalis pedis and posterior tibial bilaterally. Neurological: She is alert and oriented to person, place, and time. She has normal reflexes. No cranial nerve deficit. Coordination normal.   Skin: Skin is warm and dry. There is no rash or diaphoresis.    Psychiatric: She has a

## 2019-06-04 NOTE — PROGRESS NOTES
Resident Progress Note    Admit Date: 2019    PCP: Chelsea Herndon DO                  : 1955  MRN: [id number]  CC: chest pain and abnormal stress test    Subjective: Interval History:   Patient was seen this afternoon no events overnight. Patient denies any chest pain or SOB    Diet: DIET CARDIAC; No Caffeine      Data:   Scheduled Meds:   metoprolol tartrate  25 mg Oral BID    lisinopril  2.5 mg Oral Daily    [START ON 2019] ticagrelor  90 mg Oral BID    citalopram  40 mg Oral Daily    dicyclomine  10 mg Oral TID AC    methocarbamol  500 mg Oral TID    pantoprazole  40 mg Oral QAM AC    rosuvastatin  20 mg Oral Nightly    sodium chloride flush  10 mL Intravenous 2 times per day    aspirin  81 mg Oral Daily    enoxaparin  1 mg/kg Subcutaneous BID    hydrochlorothiazide  25 mg Oral Daily     Continuous Infusions:   sodium chloride 75 mL/hr at 19 1124    tirofiban 0.15 mcg/kg/min (19 1129)     PRN Meds:acetaminophen, morphine, atropine, sodium chloride flush, magnesium hydroxide, ondansetron, nitroGLYCERIN, heparin (porcine), heparin (porcine), traMADol, hydrALAZINE  I/O last 3 completed shifts: In: 240 [P.O.:240]  Out: 2000 [Urine:2000]  No intake/output data recorded. Intake/Output Summary (Last 24 hours) at 2019 1311  Last data filed at 2019 0502  Gross per 24 hour   Intake 240 ml   Output 2000 ml   Net -1760 ml           Objective:     Vitals: /84   Pulse 78   Temp 98 °F (36.7 °C) (Oral)   Resp 16   Ht 5' 6.14\" (1.68 m)   Wt 149 lb 14.6 oz (68 kg)   SpO2 99%   BMI 24.09 kg/m²     Physical Exam   Constitutional: She is oriented to person, place, and time. She appears well-developed and well-nourished. HENT:   Head: Normocephalic and atraumatic. Eyes: Pupils are equal, round, and reactive to light. EOM are normal.   Neck: Normal range of motion. Neck supple. No JVD present. Cardiovascular: Normal rate, regular rhythm and normal heart sounds. Exam reveals no gallop and no friction rub. No murmur heard. Pulmonary/Chest: Effort normal. No stridor. No respiratory distress. She has no wheezes. Abdominal: Soft. Bowel sounds are normal. She exhibits no distension and no mass. There is no tenderness. There is no guarding. Musculoskeletal: Normal range of motion. She exhibits no edema or deformity. Good Cap refill on right hand   Neurological: She is alert and oriented to person, place, and time. Skin: Skin is warm and dry. Capillary refill takes less than 2 seconds. Psychiatric: She has a normal mood and affect. LABS:    CBC:   Recent Labs     06/01/19  1327 06/02/19  0502 06/04/19  0549   WBC 6.4 5.2 6.6   HGB 13.3 12.9 13.4   HCT 39.2 38.4 39.5   MCV 92.1 93.2 93.2    295 305                                                                BMP:    Recent Labs     06/01/19  1327 06/04/19  0549    139   K 3.5 3.9    99   CO2 27 29   BUN 6* 9   CREATININE 0.6 0.7   GLUCOSE 104* 109*       LFT's: No results for input(s): AST, ALT, ALB, BILITOT, ALKPHOS in the last 72 hours. Troponin:   Recent Labs     06/01/19  1327 06/01/19  1952 06/01/19  2307   TROPONINI <0.01 <0.01 <0.01       BNP: No results for input(s): BNP in the last 72 hours. Lipids: No results for input(s): CHOL, HDL in the last 72 hours. Invalid input(s): LDLCALCU    ABGs: No results found for: PHART, LBF1BDQ, PO2ART    INR:   Recent Labs     06/01/19  1327   INR 1.02       U/A:No results for input(s): NITRITE, COLORU, PHUR, LABCAST, WBCUA, RBCUA, MUCUS, TRICHOMONAS, YEAST, BACTERIA, CLARITYU, SPECGRAV, LEUKOCYTESUR, UROBILINOGEN, BILIRUBINUR, BLOODU, GLUCOSEU, AMORPHOUS in the last 72 hours.     Invalid input(s): Dre Acevedo   -----------------------------------------------------------------  RAD:   CTA CARDIAC W C STRC MORP W CONTRAST   Final Result      The total calcium score of 52 is in between the 50 percentile and 75 percentile for females  between the age of 61 and 59. This means that 50% of people this age and gender had less calcium than was detected in this study. A calcium score of 52 has an implication of definite at least mild atherosclerotic plaque and places the patient in a category of Risk of Coronary Artery Disease of minimal or mild coronary artery narrowings likely. CT CORONARY ANGIOGRAPHY WITH IV CONTRAST & MAXIMUM INTENSITY PROJECTION MULTIPLANAR RECONSTRUCTION & POST-PROCESSING      HISTORY: 79-year-old woman with abnormal stress test, screening for coronary artery disease      TECHNIQUE:      CT coronary angiography performed using standard protocol with prospective ECG cardiac gating and timing bolus acquisition with 80 mL Isovue-370 IV. Multiplanar reconstruction of the image dataset as MIP images with 3-D volume rendering and    post-processing on the independent workstation. Review of the images on the [employer] Independent Workstation by the interpreting radiologist.      Radiation dose reduction individualized and optimized for the CT scan to reduce the radiation exposure to as low as reasonably achievable (ALARA), while still obtaining diagnotic-quality images. The dose reduction techniques include automated exposure    control, adjustment of mA and/or kV according to patient size and use of iterative reconstruction technique. FINDINGS:      No coronary artery congenital anomalies. Left coronary dominant system. Left Main Coronary Artery: Normal patent. Left Anterior Descending Coronary Artery: Origin of vessel soft plaque causes irregular moderate stenosis (60%). Proximal vessel soft plaque causes moderate stenosis (60%). Mid and distal vessel mildly irregular. Distal vessel mild stenosis (less than    50% stenoses. Left Circumflex Coronary Artery: Mid and distal vessel small focus of soft plaque causes mild stenoses (25%). Remainder of vessel patent.       Right Coronary Artery: Small vessel. Origin soft plaque causes mild-moderate stenosis (50%). Mid vessel mild vessel soft plaque causes mild-moderate stenosis (50%)       Normal tricuspid aortic valve. Normal bicuspid mitral valve. Normal paired pulmonary veins bilateral.      IMPRESSION:      Left anterior descending coronary artery origin irregular moderate stenosis (60%) and proximal vessel moderate stenosis (60%). Right coronary artery origin mild-moderate stenosis (50%) and mid vessel mild-moderate stenosis (50%). XR CHEST STANDARD (2 VW)   Final Result      No acute chest process.             Assessment/Plan:   Patient is a 60 y/o female presenting with back pain and abnormal stress echo.      Back pain with radiation to anterior chest 2/2 CAD s/p Cardiac cath 6/4/19  - Patient underwent cardiac cath showing LAD disease s/p OLAMIDE to LAD  - ASA 81 and Brilinta 90 mg BID  - Crestor 20 mg nightly  - Lopressor and Lisinopril  - Will add D-Dimer negative  - Continue to monitor on Tele     Anxiety and depression  - Stable symptoms  - continue on Celexa     Irritable bowel   - contineu on Bentyl     HTN  Started on HCTZ 25mg daily    Code Status: Full  FEN: Cardiac diet  PPx: Lovenox  Dispo: GMF    Plan discussed with Dr. Angelique James MD  6/4/2019  1:11 PM

## 2019-06-04 NOTE — ANESTHESIA PRE-OP
Brief Pre-Op Note/Sedation Assessment      Nils Maxwell  1955  [id number]  [id number]  10:58 AM    Planned Procedure: Cardiac Catheterization Procedure    Post Procedure Plan: Return to same level of care    Consent: I have discussed with the patient and/or the patient representative the indication, alternatives, and the possible risks and/or complications of the planned procedure and the anesthesia methods. The patient and/or patient representative appear to understand and agree to proceed. Chief Complaint: Chest Pain/Pressure  Anginal Equivalent      Indications for the Procedure:   CAD Presentation:  Worsening Angina  Anginal Classification within 2 weeks:  CCS I - Angina only during strenuous or prolonged physical activity  NYHA Heart Failure Class within 2 weeks: No symptoms      Anti- Anginal Meds within 2 weeks:   ANTI-ANGINAL MEDS: Yes: Beta Blockers and Ca Channel Blockers      Stress or Imaging Studies Performed:  Stress Echo Result: Positive:  anteroseptal and anteroapical Risk/Extent of Ischemia:  Intermediate    Vital Signs:  BP 97/65   Pulse 68   Temp 98 °F (36.7 °C) (Oral)   Resp 16   Ht 5' 6.14\" (1.68 m)   Wt 149 lb 14.6 oz (68 kg)   SpO2 94%   BMI 24.09 kg/m²     Allergies:   Allergies   Allergen Reactions    Avelox [Moxifloxacin Hcl In Nacl] Hives    Cephalexin Hives and Rash    Darvon [Propoxyphene Hcl] Hives    Digoxin Hives    Librax [Chlordiazepoxide-Methscopol] Hives    Motrin [Ibuprofen Micronized] Rash    Other Hives     Most mycin's    Pcn [Penicillins] Rash    Prochlorperazine Edisylate Nausea And Vomiting    Sulfa Antibiotics Rash    Tetracyclines & Related Hives    Statins [Statins]        Past Medical History:  Past Medical History:   Diagnosis Date    Allergic rhinitis     Anxiety     Depression     Endometriosis     hx    Fibromyalgia     GERD (gastroesophageal reflux disease)     Headache(784.0)     Hiatal hernia     Hyperlipidemia     Irritable bowel syndrome     Left bundle branch block (LBBB)     Osteoarthritis     SVT (supraventricular tachycardia) (HCC)     hx     Trouble swallowing          Surgical History:  Past Surgical History:   Procedure Laterality Date    ABLATION OF DYSRHYTHMIC FOCUS  1999    APPENDECTOMY      BUNIONECTOMY  1973    bilateral, 2 revisions left foot    CHOLECYSTECTOMY      COLONOSCOPY  10/15/13    Hx polyps-3 yrs-? poor prep    HYSTERECTOMY      LAPAROSCOPY      6    LAPAROTOMY      3    NASAL SEPTUM SURGERY  1982    OTHER SURGICAL HISTORY      Angiogram     UPPER GASTROINTESTINAL ENDOSCOPY      UPPER GASTROINTESTINAL ENDOSCOPY  10/15/13    schatzki ring         Medications:  Current Facility-Administered Medications   Medication Dose Route Frequency Provider Last Rate Last Dose    citalopram (CELEXA) tablet 40 mg  40 mg Oral Daily Anders Brasher MD   Stopped at 06/04/19 0740    dicyclomine (BENTYL) capsule 10 mg  10 mg Oral TID AC Anders Brasher MD   Stopped at 06/04/19 0740    methocarbamol (ROBAXIN) tablet 500 mg  500 mg Oral TID Anders Brasher MD   Stopped at 06/04/19 0741    pantoprazole (PROTONIX) tablet 40 mg  40 mg Oral QAM AC Anders Brasher MD   Stopped at 06/04/19 0741    rosuvastatin (CRESTOR) tablet 20 mg  20 mg Oral Nightly Anders Brasher MD   20 mg at 06/03/19 2128    sodium chloride flush 0.9 % injection 10 mL  10 mL Intravenous 2 times per day Anders Brasher MD   10 mL at 06/04/19 0751    sodium chloride flush 0.9 % injection 10 mL  10 mL Intravenous PRN Anders Brasher MD        magnesium hydroxide (MILK OF MAGNESIA) 400 MG/5ML suspension 30 mL  30 mL Oral Daily PRN Anders Brasher MD        ondansetron [facility] injection 4 mg  4 mg Intravenous Q6H PRN Anders Brasher MD   4 mg at 06/03/19 1518    aspirin chewable tablet 81 mg  81 mg Oral Daily Anders Brasher MD   81 mg at 06/04/19 0748    nitroGLYCERIN (NITROSTAT) SL tablet 0.4 mg  0.4 mg Sublingual Q5 Min

## 2019-06-04 NOTE — PROGRESS NOTES
Right radial cath site is bleeding. Original start time to start removing air was 11:30. Added two mls air at 11:25 due to bleeding. Will monitor. Radial site resumed bleeding after first 2 ml air removal. Put 2 ml of air back in, will keep on for 30 more minutes per conversation with cath lab.

## 2019-06-04 NOTE — PLAN OF CARE
Problem: Pain:  Goal: Pain level will decrease  Description  Pain level will decrease  6/4/2019 1635 by Rod Smith RN  Outcome: Ongoing  Goal: Control of acute pain  Description  Control of acute pain  6/4/2019 1635 by Rod Smith RN  Outcome: Ongoing  Goal: Control of chronic pain  Description  Control of chronic pain  Outcome: Ongoing

## 2019-06-05 PROBLEM — Z92.89 H/O ANGIOGRAPHY: Status: ACTIVE | Noted: 2019-06-05

## 2019-06-05 PROBLEM — I25.10 CAD (CORONARY ARTERY DISEASE): Status: ACTIVE | Noted: 2019-06-05

## 2019-06-05 LAB
ALBUMIN SERPL-MCNC: 4.1 G/DL (ref 3.4–5)
ANION GAP SERPL CALCULATED.3IONS-SCNC: 9 MMOL/L (ref 3–16)
BASOPHILS ABSOLUTE: 0.1 K/UL (ref 0–0.2)
BASOPHILS RELATIVE PERCENT: 0.7 %
BUN BLDV-MCNC: 10 MG/DL (ref 7–20)
CALCIUM SERPL-MCNC: 9.5 MG/DL (ref 8.3–10.6)
CHLORIDE BLD-SCNC: 102 MMOL/L (ref 99–110)
CO2: 28 MMOL/L (ref 21–32)
CREAT SERPL-MCNC: 0.8 MG/DL (ref 0.6–1.2)
EKG ATRIAL RATE: 77 BPM
EKG DIAGNOSIS: NORMAL
EKG P AXIS: 51 DEGREES
EKG P-R INTERVAL: 124 MS
EKG Q-T INTERVAL: 466 MS
EKG QRS DURATION: 122 MS
EKG QTC CALCULATION (BAZETT): 527 MS
EKG R AXIS: -27 DEGREES
EKG T AXIS: 135 DEGREES
EKG VENTRICULAR RATE: 77 BPM
EOSINOPHILS ABSOLUTE: 0.3 K/UL (ref 0–0.6)
EOSINOPHILS RELATIVE PERCENT: 4.3 %
GFR AFRICAN AMERICAN: >60
GFR NON-AFRICAN AMERICAN: >60
GLUCOSE BLD-MCNC: 104 MG/DL (ref 70–99)
HCT VFR BLD CALC: 40 % (ref 36–48)
HEMOGLOBIN: 13.5 G/DL (ref 12–16)
LYMPHOCYTES ABSOLUTE: 2.2 K/UL (ref 1–5.1)
LYMPHOCYTES RELATIVE PERCENT: 28.8 %
MCH RBC QN AUTO: 31.6 PG (ref 26–34)
MCHC RBC AUTO-ENTMCNC: 33.7 G/DL (ref 31–36)
MCV RBC AUTO: 93.9 FL (ref 80–100)
MONOCYTES ABSOLUTE: 0.5 K/UL (ref 0–1.3)
MONOCYTES RELATIVE PERCENT: 6.3 %
NEUTROPHILS ABSOLUTE: 4.6 K/UL (ref 1.7–7.7)
NEUTROPHILS RELATIVE PERCENT: 59.9 %
PDW BLD-RTO: 13.1 % (ref 12.4–15.4)
PHOSPHORUS: 3.4 MG/DL (ref 2.5–4.9)
PLATELET # BLD: 297 K/UL (ref 135–450)
PMV BLD AUTO: 6.6 FL (ref 5–10.5)
POTASSIUM REFLEX MAGNESIUM: 3.9 MMOL/L (ref 3.5–5.1)
POTASSIUM SERPL-SCNC: 3.9 MMOL/L (ref 3.5–5.1)
RBC # BLD: 4.26 M/UL (ref 4–5.2)
SODIUM BLD-SCNC: 139 MMOL/L (ref 136–145)
WBC # BLD: 7.6 K/UL (ref 4–11)

## 2019-06-05 PROCEDURE — 6360000002 HC RX W HCPCS: Performed by: INTERNAL MEDICINE

## 2019-06-05 PROCEDURE — 2580000003 HC RX 258: Performed by: INTERNAL MEDICINE

## 2019-06-05 PROCEDURE — 6370000000 HC RX 637 (ALT 250 FOR IP): Performed by: INTERNAL MEDICINE

## 2019-06-05 PROCEDURE — 85025 COMPLETE CBC W/AUTO DIFF WBC: CPT

## 2019-06-05 PROCEDURE — 36415 COLL VENOUS BLD VENIPUNCTURE: CPT

## 2019-06-05 PROCEDURE — 93005 ELECTROCARDIOGRAM TRACING: CPT | Performed by: INTERNAL MEDICINE

## 2019-06-05 PROCEDURE — 93010 ELECTROCARDIOGRAM REPORT: CPT | Performed by: INTERNAL MEDICINE

## 2019-06-05 PROCEDURE — 6360000002 HC RX W HCPCS: Performed by: STUDENT IN AN ORGANIZED HEALTH CARE EDUCATION/TRAINING PROGRAM

## 2019-06-05 PROCEDURE — 99233 SBSQ HOSP IP/OBS HIGH 50: CPT | Performed by: INTERNAL MEDICINE

## 2019-06-05 PROCEDURE — APPSS30 APP SPLIT SHARED TIME 16-30 MINUTES: Performed by: NURSE PRACTITIONER

## 2019-06-05 PROCEDURE — 2060000000 HC ICU INTERMEDIATE R&B

## 2019-06-05 PROCEDURE — 80069 RENAL FUNCTION PANEL: CPT

## 2019-06-05 RX ORDER — DIPHENHYDRAMINE HCL 25 MG
25 TABLET ORAL ONCE
Status: COMPLETED | OUTPATIENT
Start: 2019-06-05 | End: 2019-06-05

## 2019-06-05 RX ORDER — PROMETHAZINE HYDROCHLORIDE 25 MG/ML
25 INJECTION, SOLUTION INTRAMUSCULAR; INTRAVENOUS ONCE
Status: COMPLETED | OUTPATIENT
Start: 2019-06-05 | End: 2019-06-05

## 2019-06-05 RX ORDER — LISINOPRIL 2.5 MG/1
2.5 TABLET ORAL DAILY
Qty: 30 TABLET | Refills: 3 | Status: SHIPPED | OUTPATIENT
Start: 2019-06-05 | End: 2019-06-06 | Stop reason: HOSPADM

## 2019-06-05 RX ORDER — NITROGLYCERIN 0.4 MG/1
TABLET SUBLINGUAL
Qty: 25 TABLET | Refills: 3 | Status: SHIPPED | OUTPATIENT
Start: 2019-06-05 | End: 2020-11-30 | Stop reason: SDUPTHER

## 2019-06-05 RX ORDER — PREDNISONE 20 MG/1
40 TABLET ORAL DAILY
Status: DISCONTINUED | OUTPATIENT
Start: 2019-06-05 | End: 2019-06-06 | Stop reason: HOSPADM

## 2019-06-05 RX ORDER — 0.9 % SODIUM CHLORIDE 0.9 %
500 INTRAVENOUS SOLUTION INTRAVENOUS ONCE
Status: COMPLETED | OUTPATIENT
Start: 2019-06-05 | End: 2019-06-05

## 2019-06-05 RX ORDER — PROMETHAZINE HYDROCHLORIDE 25 MG/ML
12.5 INJECTION, SOLUTION INTRAMUSCULAR; INTRAVENOUS ONCE
Status: DISCONTINUED | OUTPATIENT
Start: 2019-06-05 | End: 2019-06-05

## 2019-06-05 RX ORDER — METOCLOPRAMIDE HYDROCHLORIDE 5 MG/ML
10 INJECTION INTRAMUSCULAR; INTRAVENOUS ONCE
Status: COMPLETED | OUTPATIENT
Start: 2019-06-05 | End: 2019-06-05

## 2019-06-05 RX ORDER — ASPIRIN 81 MG/1
81 TABLET, CHEWABLE ORAL DAILY
Qty: 30 TABLET | Refills: 3 | Status: SHIPPED | OUTPATIENT
Start: 2019-06-05

## 2019-06-05 RX ORDER — ROSUVASTATIN CALCIUM 20 MG/1
20 TABLET, COATED ORAL NIGHTLY
Qty: 30 TABLET | Refills: 3 | Status: SHIPPED | OUTPATIENT
Start: 2019-06-05 | End: 2019-10-15 | Stop reason: SDUPTHER

## 2019-06-05 RX ADMIN — LISINOPRIL 2.5 MG: 2.5 TABLET ORAL at 08:32

## 2019-06-05 RX ADMIN — METHOCARBAMOL TABLETS 500 MG: 500 TABLET, COATED ORAL at 08:32

## 2019-06-05 RX ADMIN — METOCLOPRAMIDE 10 MG: 5 INJECTION, SOLUTION INTRAMUSCULAR; INTRAVENOUS at 14:37

## 2019-06-05 RX ADMIN — TICAGRELOR 90 MG: 90 TABLET ORAL at 08:32

## 2019-06-05 RX ADMIN — ASPIRIN 81 MG 81 MG: 81 TABLET ORAL at 08:32

## 2019-06-05 RX ADMIN — ONDANSETRON 4 MG: 2 INJECTION INTRAMUSCULAR; INTRAVENOUS at 09:58

## 2019-06-05 RX ADMIN — CITALOPRAM HYDROBROMIDE 40 MG: 40 TABLET ORAL at 08:31

## 2019-06-05 RX ADMIN — PREDNISONE 40 MG: 20 TABLET ORAL at 11:41

## 2019-06-05 RX ADMIN — Medication 10 ML: at 21:10

## 2019-06-05 RX ADMIN — SODIUM CHLORIDE 500 ML: 9 INJECTION, SOLUTION INTRAVENOUS at 11:08

## 2019-06-05 RX ADMIN — DICYCLOMINE HYDROCHLORIDE 10 MG: 10 CAPSULE ORAL at 16:31

## 2019-06-05 RX ADMIN — ENOXAPARIN SODIUM 40 MG: 40 INJECTION SUBCUTANEOUS at 21:16

## 2019-06-05 RX ADMIN — PROMETHAZINE HYDROCHLORIDE 25 MG: 25 INJECTION INTRAMUSCULAR; INTRAVENOUS at 11:02

## 2019-06-05 RX ADMIN — PANTOPRAZOLE SODIUM 40 MG: 40 TABLET, DELAYED RELEASE ORAL at 05:16

## 2019-06-05 RX ADMIN — METOPROLOL TARTRATE 25 MG: 25 TABLET ORAL at 08:32

## 2019-06-05 RX ADMIN — HYDROCHLOROTHIAZIDE 25 MG: 25 TABLET ORAL at 08:32

## 2019-06-05 RX ADMIN — METHOCARBAMOL TABLETS 500 MG: 500 TABLET, COATED ORAL at 21:09

## 2019-06-05 RX ADMIN — TICAGRELOR 90 MG: 90 TABLET ORAL at 21:09

## 2019-06-05 RX ADMIN — DIPHENHYDRAMINE HCL 25 MG: 25 TABLET ORAL at 11:41

## 2019-06-05 RX ADMIN — Medication 10 ML: at 08:32

## 2019-06-05 RX ADMIN — ROSUVASTATIN CALCIUM 20 MG: 20 TABLET, COATED ORAL at 21:09

## 2019-06-05 ASSESSMENT — PAIN SCALES - GENERAL
PAINLEVEL_OUTOF10: 0

## 2019-06-05 NOTE — PROGRESS NOTES
Concern for allergic to tina per family. Per PCU nursing staff patient has stent with tina material.   Discussed with Dr Marisol Gonzalez it has titanium in it   Doubt this is allergic reaction. Likely hypotensive from meds. Will do holding parameter on bp meds. Will do IVF for hypotension, Benadryl x 1. Cancel dc given hypotension.

## 2019-06-05 NOTE — PROGRESS NOTES
Resident Progress Note    Admit Date: 2019    PCP: Doug Baron DO                  : 1955  MRN: [id number]  CC: back pain and abnormal stress test    Subjective: Interval History:   Patient was seen this morning without any complaints however patient became hypotensive and nauseous in the afternoon. Discharge was held. Patient was given a 500ml bolus with good responce    Diet: DIET CARDIAC; No Caffeine      Data:   Scheduled Meds:   predniSONE  40 mg Oral Daily    enoxaparin  40 mg Subcutaneous Daily    metoclopramide  10 mg Intravenous Once    metoprolol tartrate  25 mg Oral BID    ticagrelor  90 mg Oral BID    citalopram  40 mg Oral Daily    dicyclomine  10 mg Oral TID AC    methocarbamol  500 mg Oral TID    pantoprazole  40 mg Oral QAM AC    rosuvastatin  20 mg Oral Nightly    sodium chloride flush  10 mL Intravenous 2 times per day    aspirin  81 mg Oral Daily     Continuous Infusions:  PRN Meds:acetaminophen, sodium chloride flush, magnesium hydroxide, ondansetron, nitroGLYCERIN, traMADol, hydrALAZINE  I/O last 3 completed shifts: In: 1230 [P.O.:480; I.V.:750]  Out: 700 [Urine:700]  I/O this shift: In: 56 [P.O.:740]  Out: 650 [Urine:500; Emesis/NG output:150]    Intake/Output Summary (Last 24 hours) at 2019 1414  Last data filed at 2019 1250  Gross per 24 hour   Intake 1730 ml   Output 1350 ml   Net 380 ml           Objective:     Vitals: BP (!) 95/56   Pulse 77   Temp 97.5 °F (36.4 °C) (Oral)   Resp 18   Ht 5' 6.14\" (1.68 m)   Wt 149 lb 14.6 oz (68 kg)   SpO2 99%   BMI 24.09 kg/m²     Physical Exam   Constitutional: She is oriented to person, place, and time. She appears well-developed and well-nourished. HENT:   Head: Normocephalic and atraumatic. Eyes: Pupils are equal, round, and reactive to light. EOM are normal.   Neck: Normal range of motion. Neck supple. No JVD present. Cardiovascular: Normal rate, regular rhythm and normal heart sounds.  Exam reveals no gallop and no friction rub. No murmur heard. Pulmonary/Chest: Effort normal and breath sounds normal. No stridor. No respiratory distress. She has no wheezes. Abdominal: Soft. Bowel sounds are normal. She exhibits no distension and no mass. There is no tenderness. There is no guarding. Musculoskeletal: Normal range of motion. She exhibits no edema or deformity. Neurological: She is alert and oriented to person, place, and time. Skin: Skin is warm and dry. Capillary refill takes less than 2 seconds. No erythema. Psychiatric: She has a normal mood and affect. LABS:    CBC:   Recent Labs     06/04/19 0549 06/05/19  0520   WBC 6.6 7.6   HGB 13.4 13.5   HCT 39.5 40.0   MCV 93.2 93.9    297                                                                BMP:    Recent Labs     06/04/19  0549 06/05/19  0520    139   K 3.9 3.9  3.9   CL 99 102   CO2 29 28   BUN 9 10   CREATININE 0.7 0.8   GLUCOSE 109* 104*       LFT's: No results for input(s): AST, ALT, ALB, BILITOT, ALKPHOS in the last 72 hours. Troponin: No results for input(s): TROPONINI in the last 72 hours. BNP: No results for input(s): BNP in the last 72 hours. Lipids:   Recent Labs     06/04/19  0549   CHOL 169   HDL 51       ABGs: No results found for: PHART, OFQ5EGQ, PO2ART    INR: No results for input(s): INR in the last 72 hours. U/A:No results for input(s): NITRITE, COLORU, PHUR, LABCAST, WBCUA, RBCUA, MUCUS, TRICHOMONAS, YEAST, BACTERIA, CLARITYU, SPECGRAV, LEUKOCYTESUR, UROBILINOGEN, BILIRUBINUR, BLOODU, GLUCOSEU, AMORPHOUS in the last 72 hours. Invalid input(s): [employer]   -----------------------------------------------------------------  RAD:   CTA CARDIAC W C STRC MORP W CONTRAST   Final Result      The total calcium score of 52 is in between the 50 percentile and 75 percentile for females  between the age of 61 and 59.  This means that 50% of people this age and gender had less calcium than was detected in this study. A calcium score of 52 has an implication of definite at least mild atherosclerotic plaque and places the patient in a category of Risk of Coronary Artery Disease of minimal or mild coronary artery narrowings likely. CT CORONARY ANGIOGRAPHY WITH IV CONTRAST & MAXIMUM INTENSITY PROJECTION MULTIPLANAR RECONSTRUCTION & POST-PROCESSING      HISTORY: 60-year-old woman with abnormal stress test, screening for coronary artery disease      TECHNIQUE:      CT coronary angiography performed using standard protocol with prospective ECG cardiac gating and timing bolus acquisition with 80 mL Isovue-370 IV. Multiplanar reconstruction of the image dataset as MIP images with 3-D volume rendering and    post-processing on the independent workstation. Review of the images on the [employer] Independent Workstation by the interpreting radiologist.      Radiation dose reduction individualized and optimized for the CT scan to reduce the radiation exposure to as low as reasonably achievable (ALARA), while still obtaining diagnotic-quality images. The dose reduction techniques include automated exposure    control, adjustment of mA and/or kV according to patient size and use of iterative reconstruction technique. FINDINGS:      No coronary artery congenital anomalies. Left coronary dominant system. Left Main Coronary Artery: Normal patent. Left Anterior Descending Coronary Artery: Origin of vessel soft plaque causes irregular moderate stenosis (60%). Proximal vessel soft plaque causes moderate stenosis (60%). Mid and distal vessel mildly irregular. Distal vessel mild stenosis (less than    50% stenoses. Left Circumflex Coronary Artery: Mid and distal vessel small focus of soft plaque causes mild stenoses (25%). Remainder of vessel patent. Right Coronary Artery: Small vessel. Origin soft plaque causes mild-moderate stenosis (50%).  Mid vessel mild vessel soft plaque causes

## 2019-06-05 NOTE — CARE COORDINATION
CTC attempted to meet with patient again today, patient lying in bed sleeping with covers over her head. CTC did not want to disturb patient.   Will attempt to meet with patient again in am.        Thank You,    Nan Pickering RN  Care Transition Coordinator  Contact JOSE:[phone]

## 2019-06-05 NOTE — PROGRESS NOTES
updated regarding patient's possible allergic reaction to Jonathan. New orders obtained. Kelly li RN updated on new plan of care.  Nilda Haddad R.N.

## 2019-06-05 NOTE — DISCHARGE SUMMARY
INTERNAL MEDICINE DEPARTMENT AT [address] [place] [address]  DISCHARGE SUMMARY    Patient ID: Zi Betts  Discharge Date: 6/6/19  Patient's PCP: Sophie Burgos DO  Discharge Physician: Sarah Colon MD  Admit Date: 6/1/2019  Admitting Physician: Danika Caballero MD      DISCHARGE DIAGNOSES:  1- Coronary artery disease  2- Unstable angina  2- hypotension    Hospital Course:    Patient is a 60 y/o female with PMHx of SVT s/p ablation presenting with back pain and abnormal stress test and was admitted for further evaluation. Patient underwent cardiac CT that was concerning CAD. Patient underwent cardiac cath which a OLAMIDE was placed in the LAD. Patient was started on DAPT BB and Lisinopril, however patient became Hypotensive from her new blood pressure medications. Patient's BP was monitored overnight which improved off blood pressure medications. Conferred with cardiology about her cardiac meds and recommended to stop lisinopril and reduce Lopressor to 12.5mg BID. Patient will be discharged home on this regimen. Patient was discharged home in stable condition and will follow up with cardiology in 1-2 weeks    Physical Exam:  /66   Pulse 91   Temp 98.7 °F (37.1 °C) (Oral)   Resp 18   Ht 5' 6.14\" (1.68 m)   Wt 148 lb 5.9 oz (67.3 kg)   SpO2 97%   BMI 23.84 kg/m²   General appearance: alert, appears stated age and cooperative  Physical Exam   Constitutional: She is oriented to person, place, and time. She appears well-developed and well-nourished. HENT:   Head: Normocephalic and atraumatic. Eyes: Pupils are equal, round, and reactive to light. EOM are normal.   Neck: Normal range of motion. Neck supple. No JVD present. Cardiovascular: Normal rate, regular rhythm and normal heart sounds. Exam reveals no gallop and no friction rub. No murmur heard. Pulmonary/Chest: Effort normal and breath sounds normal. No stridor. No respiratory distress. She has no wheezes. Abdominal: Soft.  Bowel sounds are normal. She exhibits no distension. There is no tenderness. There is no guarding. Musculoskeletal: Normal range of motion. She exhibits no edema or deformity. Neurological: She is alert and oriented to person, place, and time. No cranial nerve deficit. Skin: Skin is warm and dry. Capillary refill takes less than 2 seconds. Psychiatric: She has a normal mood and affect. Consults: cardiology  Significant Diagnostic Studies:  Cardiac cath  Treatments: cardiac meds: metoprolol and ASA and Brilinta  Disposition: home  Discharged Condition: Stable  Follow Up: Primary Care Physician in one week    DISCHARGE MEDICATION:     Medication List      START taking these medications    aspirin 81 MG chewable tablet  Take 1 tablet by mouth daily  Notes to patient:  Aspirin  USE--Prevents heart attack and stroke (decreases platelet adhesion)  SIDE EFFECTS-- Stomach upset, bleeding/bruising more easily     metoprolol tartrate 25 MG tablet  Commonly known as:  LOPRESSOR  Take 0.5 tablets by mouth 2 times daily  Notes to patient:  Do NOT take if SBP <110      Metoprolol  (Lopressor, Toprol XL)  USE--  Lower blood pressure, prevent heart attack, treat heart failure  SIDE EFFECTS--  Dizziness, feeling tired, low blood pressure     nitroGLYCERIN 0.4 MG SL tablet  Commonly known as:  NITROSTAT  up to max of 3 total doses. If no relief after 1 dose, call 911.   Notes to patient:  Use: Treat chest pain  Side effects: headache, flushing, dizziness     ticagrelor 90 MG Tabs tablet  Commonly known as:  BRILINTA  Take 1 tablet by mouth 2 times daily  Notes to patient:  Ticagrelor (Brilinta)  USE--  Prevents strokes and heart attacks, protects heart stents  SIDE EFFECTS--bleeding or bruising more easily; upset stomach  Refer to drug information handout        CHANGE how you take these medications    dicyclomine 10 MG capsule  Commonly known as:  BENTYL  Take 1 capsule by mouth 3 times daily (before meals)  What changed:    · when to take tablet  · metoprolol tartrate 25 MG tablet  · nitroGLYCERIN 0.4 MG SL tablet  · rosuvastatin 20 MG tablet     You can get these medications from any pharmacy    Bring a paper prescription for each of these medications  · ticagrelor 90 MG Tabs tablet       Activity: activity as tolerated  Diet: cardiac diet  Wound Care: none needed    Time Spent on discharge is more than 45 minutes    Signed:  Tre Villavicencio MD   6/6/2019

## 2019-06-05 NOTE — PROGRESS NOTES
Pt states she is feeling much better. 1x dose reglan helped per pt, no emesis.  Will cont to monitor  Electronically signed by Antonietta Lamb RN on 6/5/2019 at 4:42 PM

## 2019-06-05 NOTE — CARE COORDINATION
Case Management Discharge Assessment    2019  [facility])  Clinical Case Management Department    Patient: Yary Diaz  MRN: [id number] / : 1955  ACCT: [de-identified]          Admission Documentation  Attending Provider: Uvaldo Weathers MD  Admit date/time: 2019 12:52 PM  Status: Inpatient [101]  Diagnosis: Chest pain     Readmission within last 30 days:  no     Living Situation  Discharge Planning  Living Arrangements: Spouse/Significant Other  Support Systems: Spouse/Significant Other, Family Members  Potential Assistance Needed: N/A  Type of Home Care Services: None  Patient expects to be discharged to[de-identified] home  Expected Discharge Date: 19    Service Assessment:       Values / Beliefs  Do you have any ethnic, cultural, sacramental, or spiritual Presybeterian needs you would like us to be aware of while you are in the hospital?: No    Advance Directives (For Healthcare)  Pre-existing DNR Comfort Care/DNR Arrest/DNI Order: No  Healthcare Directive: No, patient does not have an advance directive for healthcare treatment  Information on Healthcare Directives Requested: No  Patient Requests Assistance: Yes, referral made to   Advance Directives: Documents explained                        Pharmacy: [facility]   Potential Assistance Purchasing Medications:  No  Does patient want to participate in local refill/meds to beds program?: No    Notification completed in [facility]/PAS? No     IMM  No       Discharge disposition:   Home with spouse. Patient refused home care and  states he takes cvare of her. Transport home with . Shelby and her family were provided with choice of provider; she and her family are in agreement with the discharge plan.       Care Transitions patient: No    Foster Aguilar RN  [facility]  Case Management Department  Ph: [phone]

## 2019-06-05 NOTE — FLOWSHEET NOTE
06/05/19 1057   Vitals   BP (!) 76/50  (paged MD)       Pt c/o nausea, emesis x2. Order for IM phenergan obtained per MD. On most recent BP check, as above. Checked manually, 70/50. MD aware, spoke with over phone. Order for IVF bolus, see MAR. Pt endorses dizziness and nausea. Bed alarm on, call light in reach,  at bedside. Will cont to monitor. Electronically signed by Pal Ramirez RN on 6/5/2019 at 11:14 AM      Paged Dr Xochilt Waever of the above as well via cardio RN  Electronically signed by Pal Ramirez RN on 6/5/2019 at 11:35 AM    Nickel added to pt's list of allergies. Was not on list, pt mentioned today to this RN she is allergic. Will cont to monitor.   Electronically signed by Pal Ramirez RN on 6/5/2019 at 11:51 AM

## 2019-06-05 NOTE — PROGRESS NOTES
[address]       Gurinder Das MD,  [place] [address]       Cardiology                   Milo Corona  1955 June 5, 2019      CC: back pain chest pain abnormal stress test     Reason for Cardiology Consult:cp  Primary Cardiologist Juan     Subjective: pt with no acute overnight events  / no c/o cp SOB edema   No c/o this am right wrist [facility] site no complications noted     HPI:  The patient is 59 y.o. female with admitted with cp with abnormal stress test & abnormal CTA coronary  History of SVT ablation by Dr. Obinna Alfaro cath 2016 with minor coronary lesions    [facility] 5/4/19 LCA with stent to the LAD 3.5 x 15 drug-eluting [address] stent. Reviewed most recent test with pt     Review of Systems:  Constitutional: No fatigue, weakness, night sweats or fever. HEENT: No new vision difficulties or ringing in the ears. Respiratory: No new SOB, PND, orthopnea or cough. Cardiovascular: See HPI   GI: No n/v, diarrhea, constipation, abdominal pain or changes in bowel habits. No melena, no hematochezia  : No urinary frequency, urgency, incontinence, hematuria or dysuria. Skin: No cyanosis or skin lesions. Musculoskeletal: No new muscle or joint pain. Neurological: No syncope or TIA-like symptoms.   Psychiatric: No anxiety, insomnia or depression     Past Medical History:   Diagnosis Date    Allergic rhinitis     Anxiety     CAD (coronary artery disease) 6/5/2019    Depression     Endometriosis     hx    Fibromyalgia     GERD (gastroesophageal reflux disease)     Headache(784.0)     Hiatal hernia     Hyperlipidemia     Irritable bowel syndrome     Left bundle branch block (LBBB)     Osteoarthritis     SVT (supraventricular tachycardia) (HCC)     hx     Trouble swallowing      Past Surgical History:   Procedure Laterality Date    ABLATION OF DYSRHYTHMIC FOCUS  1999    APPENDECTOMY  BUNIONECTOMY  1973    bilateral, 2 revisions left foot    CHOLECYSTECTOMY      COLONOSCOPY  10/15/13    Hx polyps-3 yrs-? poor prep    HYSTERECTOMY      LAPAROSCOPY      6    LAPAROTOMY      3    NASAL SEPTUM SURGERY      OTHER SURGICAL HISTORY      Angiogram     UPPER GASTROINTESTINAL ENDOSCOPY      UPPER GASTROINTESTINAL ENDOSCOPY  10/15/13    volodymyr balderrama     Family History   Problem Relation Age of Onset    Other Mother         sepsis    Dementia Mother     High Cholesterol Mother     Depression Mother     Anxiety Disorder Mother     High Blood Pressure Father     Other Father          from renal failure and CHF    Heart Disease Father     Migraines Father     Heart Attack Brother     Heart Disease Brother     Hypertension Other     High Cholesterol Other     Migraines Other     Heart Disease Other     Diabetes Other      Social History     Tobacco Use    Smoking status: Never Smoker    Smokeless tobacco: Never Used   Substance Use Topics    Alcohol use: No    Drug use: No       Allergies   Allergen Reactions    Avelox [Moxifloxacin Hcl In Nacl] Hives    Cephalexin Hives and Rash    Darvon [Propoxyphene Hcl] Hives    Digoxin Hives    Librax [Chlordiazepoxide-Methscopol] Hives    Motrin [Ibuprofen Micronized] Rash    Other Hives     Most mycin's    Pcn [Penicillins] Rash    Prochlorperazine Edisylate Nausea And Vomiting    Sulfa Antibiotics Rash    Tetracyclines & Related Hives    Statins [Statins]     Nickel Nausea And Vomiting     Hypotension       Current Facility-Administered Medications   Medication Dose Route Frequency Provider Last Rate Last Dose    predniSONE (DELTASONE) tablet 40 mg  40 mg Oral Daily Karen Eldridge MD   40 mg at 19 1141    enoxaparin (LOVENOX) injection 40 mg  40 mg Subcutaneous Daily Karen Eldridge MD        acetaminophen (TYLENOL) tablet 650 mg  650 mg Oral Q4H PRN Shelli Mehta MD   650 mg at 19 1243    metoprolol tartrate (LOPRESSOR) tablet 25 mg  25 mg Oral BID Sukumar Hill MD   25 mg at 06/05/19 9586    ticagrelor (BRILINTA) tablet 90 mg  90 mg Oral BID Mera Ewing MD   90 mg at 06/05/19 1875    citalopram (CELEXA) tablet 40 mg  40 mg Oral Daily Helyn Pain, MD   40 mg at 06/05/19 0831    dicyclomine (BENTYL) capsule 10 mg  10 mg Oral TID [facility] Helyn Pain, MD   Stopped at 06/05/19 1040    methocarbamol (ROBAXIN) tablet 500 mg  500 mg Oral TID Helyn Pain, MD   Stopped at 06/05/19 1323    pantoprazole (PROTONIX) tablet 40 mg  40 mg Oral QAM AC Helyn Pain, MD   40 mg at 06/05/19 0516    rosuvastatin (CRESTOR) tablet 20 mg  20 mg Oral Nightly Helyn Pain, MD   20 mg at 06/04/19 2025    sodium chloride flush 0.9 % injection 10 mL  10 mL Intravenous 2 times per day Helyn Pain, MD   10 mL at 06/05/19 8952    sodium chloride flush 0.9 % injection 10 mL  10 mL Intravenous PRN Helyn Pain, MD        magnesium hydroxide (MILK OF MAGNESIA) 400 MG/5ML suspension 30 mL  30 mL Oral Daily PRN Helyn Pain, MD        ondansetron [facility]) injection 4 mg  4 mg Intravenous Q6H PRN Helyn Pain, MD   4 mg at 06/05/19 2434    aspirin chewable tablet 81 mg  81 mg Oral Daily Helyn Pain, MD   81 mg at 06/05/19 9149    nitroGLYCERIN (NITROSTAT) SL tablet 0.4 mg  0.4 mg Sublingual Q5 Min PRN Helyn Pain, MD   0.4 mg at 06/03/19 1303    traMADol (ULTRAM) tablet 50 mg  50 mg Oral Q6H PRN Helyn Pain, MD   50 mg at 06/04/19 2025    hydrALAZINE (APRESOLINE) injection 10 mg  10 mg Intravenous Q6H PRN Helyn Pain, MD           Physical Exam:  BP 99/60   Pulse 78   Temp 98.8 °F (37.1 °C) (Oral)   Resp 16   Ht 5' 6.14\" (1.68 m)   Wt 149 lb 14.6 oz (68 kg)   SpO2 98%   BMI 24.09 kg/m²     Intake/Output Summary (Last 24 hours) at 6/5/2019 1457  Last data filed at 6/5/2019 1250  Gross per 24 hour   Intake 1730 ml   Output 1350 ml   Net 380 ml     Wt Readings from Last 2 Encounters:   06/04/19 149 lb 14.6 oz (68 kg)   05/10/19 155 lb 12.8 oz (70.7 kg)     Constitutional: She is oriented to person, place, and time. She appears well-developed and well-nourished. In no acute distress. Head: Normocephalic and atraumatic. Eyes: RODGER   Neck: Neck supple. No JVD present. Carotid bruit is not present. No mass and no thyromegaly present. No lymphadenopathy present. Cardiovascular: Normal rate, regular rhythm, normal heart sounds and intact distal pulses. Exam reveals no gallop and no friction rub. No murmur heard. Pulmonary/Chest: Effort normal and breath sounds normal. No respiratory distress. She has no wheezes, rhonchi or rales. Abdominal: Soft, non-tender. Bowel sounds and aorta are normal. She exhibits no organomegaly, mass or bruit. Extremities: No edema, cyanosis, or clubbing. Pulses are 2+ radial/carotid/dorsalis pedis and posterior tibial bilaterally. Neurological: She is alert and oriented to person, place, and time. She has normal reflexes. No cranial nerve deficit. Coordination normal.   Skin: Skin is warm and dry. There is no rash or diaphoresis. Psychiatric: She has a normal mood and affect. Her speech is normal and behavior is normal.     EKG Interpretation:     Lab Review:   Lab Results   Component Value Date    TRIG 267 06/04/2019    HDL 51 06/04/2019    LDLCALC 65 06/04/2019    LABVLDL 53 06/04/2019     Lab Results   Component Value Date     06/05/2019    K 3.9 06/05/2019    K 3.9 06/05/2019    BUN 10 06/05/2019    CREATININE 0.8 06/05/2019     Recent Labs     06/04/19  0549 06/05/19  0520   WBC 6.6 7.6   HGB 13.4 13.5   HCT 39.5 40.0    297       Any Lab work EKGs stress test, angiograms, & images reviewed       Assessment:    cp   Cardiac cath 2016 with minor coronary lesions  [facility] 5/4/19 LCA with stent to the LAD 3.5 x 15 drug-eluting [address] stent.     History of SVT ablation by Dr. Anna Tijerina  in [place]      HLD  On statin   optimal level    Migraines / Starlet Gentle  / GERD / depression anxiety   Stable  / on Celexa      Plan:   Plan home today on GDMT / including Alice Muñoz in approx 1-2 weeks     Addendum   b/p soft with n/v  / given bolus fluid, prednisone benadryl Zofran     She was concerned she was allergic to OLAMIDE  / states has nickel allergy    This afternoon she is feeling better   Plan home  tomorrow if continues to feel better       [address] [facility] cardiology    This patient did have stent placed yesterday. This morning she developed some nausea and think she has some diarrhea and someone raised the issue about whether she may have a nickel allergy. We did the research and we found that there is some very trace amount of nickel and lower and the stent materials. She does not have rash and at this point she is completely stable and her vitals are stable. I don't think this is a stent related issue. Don't have a good idea about exactly what is going on at this time but she is clinically better. Toward that end she is being observed and monitored. We anticipate discharging her home tomorrow on current therapy. Romi Maddox M.D.  [facility]

## 2019-06-06 VITALS
TEMPERATURE: 98.7 F | RESPIRATION RATE: 18 BRPM | SYSTOLIC BLOOD PRESSURE: 100 MMHG | DIASTOLIC BLOOD PRESSURE: 66 MMHG | HEART RATE: 91 BPM | HEIGHT: 66 IN | OXYGEN SATURATION: 97 % | WEIGHT: 148.37 LBS | BODY MASS INDEX: 23.84 KG/M2

## 2019-06-06 LAB
ALBUMIN SERPL-MCNC: 4.1 G/DL (ref 3.4–5)
ANION GAP SERPL CALCULATED.3IONS-SCNC: 14 MMOL/L (ref 3–16)
BASOPHILS ABSOLUTE: 0 K/UL (ref 0–0.2)
BASOPHILS RELATIVE PERCENT: 0.3 %
BUN BLDV-MCNC: 15 MG/DL (ref 7–20)
CALCIUM SERPL-MCNC: 9.3 MG/DL (ref 8.3–10.6)
CHLORIDE BLD-SCNC: 99 MMOL/L (ref 99–110)
CO2: 26 MMOL/L (ref 21–32)
CREAT SERPL-MCNC: 0.9 MG/DL (ref 0.6–1.2)
EOSINOPHILS ABSOLUTE: 0 K/UL (ref 0–0.6)
EOSINOPHILS RELATIVE PERCENT: 0.5 %
GFR AFRICAN AMERICAN: >60
GFR NON-AFRICAN AMERICAN: >60
GLUCOSE BLD-MCNC: 99 MG/DL (ref 70–99)
HCT VFR BLD CALC: 41.8 % (ref 36–48)
HEMOGLOBIN: 14.1 G/DL (ref 12–16)
LYMPHOCYTES ABSOLUTE: 2.2 K/UL (ref 1–5.1)
LYMPHOCYTES RELATIVE PERCENT: 22.3 %
MCH RBC QN AUTO: 31.4 PG (ref 26–34)
MCHC RBC AUTO-ENTMCNC: 33.7 G/DL (ref 31–36)
MCV RBC AUTO: 93.2 FL (ref 80–100)
MONOCYTES ABSOLUTE: 0.7 K/UL (ref 0–1.3)
MONOCYTES RELATIVE PERCENT: 7 %
NEUTROPHILS ABSOLUTE: 6.8 K/UL (ref 1.7–7.7)
NEUTROPHILS RELATIVE PERCENT: 69.9 %
PDW BLD-RTO: 13.6 % (ref 12.4–15.4)
PHOSPHORUS: 3.3 MG/DL (ref 2.5–4.9)
PLATELET # BLD: 333 K/UL (ref 135–450)
PMV BLD AUTO: 6.8 FL (ref 5–10.5)
POTASSIUM SERPL-SCNC: 3.4 MMOL/L (ref 3.5–5.1)
RBC # BLD: 4.48 M/UL (ref 4–5.2)
SODIUM BLD-SCNC: 139 MMOL/L (ref 136–145)
WBC # BLD: 9.7 K/UL (ref 4–11)

## 2019-06-06 PROCEDURE — 6370000000 HC RX 637 (ALT 250 FOR IP): Performed by: INTERNAL MEDICINE

## 2019-06-06 PROCEDURE — 80069 RENAL FUNCTION PANEL: CPT

## 2019-06-06 PROCEDURE — 6370000000 HC RX 637 (ALT 250 FOR IP): Performed by: STUDENT IN AN ORGANIZED HEALTH CARE EDUCATION/TRAINING PROGRAM

## 2019-06-06 PROCEDURE — 36415 COLL VENOUS BLD VENIPUNCTURE: CPT

## 2019-06-06 PROCEDURE — 2580000003 HC RX 258: Performed by: INTERNAL MEDICINE

## 2019-06-06 PROCEDURE — 99233 SBSQ HOSP IP/OBS HIGH 50: CPT | Performed by: NURSE PRACTITIONER

## 2019-06-06 PROCEDURE — 85025 COMPLETE CBC W/AUTO DIFF WBC: CPT

## 2019-06-06 RX ORDER — POTASSIUM CHLORIDE 20 MEQ/1
40 TABLET, EXTENDED RELEASE ORAL ONCE
Status: COMPLETED | OUTPATIENT
Start: 2019-06-06 | End: 2019-06-06

## 2019-06-06 RX ADMIN — ASPIRIN 81 MG 81 MG: 81 TABLET ORAL at 08:19

## 2019-06-06 RX ADMIN — PREDNISONE 40 MG: 20 TABLET ORAL at 08:20

## 2019-06-06 RX ADMIN — METHOCARBAMOL TABLETS 500 MG: 500 TABLET, COATED ORAL at 08:20

## 2019-06-06 RX ADMIN — PANTOPRAZOLE SODIUM 40 MG: 40 TABLET, DELAYED RELEASE ORAL at 06:44

## 2019-06-06 RX ADMIN — POTASSIUM CHLORIDE 40 MEQ: 20 TABLET, EXTENDED RELEASE ORAL at 08:20

## 2019-06-06 RX ADMIN — Medication 10 ML: at 08:21

## 2019-06-06 RX ADMIN — TICAGRELOR 90 MG: 90 TABLET ORAL at 08:20

## 2019-06-06 RX ADMIN — DICYCLOMINE HYDROCHLORIDE 10 MG: 10 CAPSULE ORAL at 06:44

## 2019-06-06 RX ADMIN — CITALOPRAM HYDROBROMIDE 40 MG: 40 TABLET ORAL at 08:20

## 2019-06-06 ASSESSMENT — PAIN SCALES - GENERAL
PAINLEVEL_OUTOF10: 0
PAINLEVEL_OUTOF10: 0

## 2019-06-06 NOTE — CARE COORDINATION
CTC attempted to meet with patient again this am, around 11:52 am.  Patient had already discharged from hospital.  CTC will follow up with patient in home CTC call tomorrow.     Thank Rula Nascimento RN  Care Transition Coordinator  Contact LUCIANAYTANAANNABELLE:[phone]

## 2019-06-06 NOTE — PROGRESS NOTES
[address]       In Patient  Progress note        Darren Leiva MD,  [address]   Daily Progress Note      Admit Date:  6/1/2019      CC: back pain chest pain abnormal stress test     Subjective: resting in bed, quiet night /eating breakfast   Pt with no acute overnight events. Denies chest pain, palpitations, and dyspnea. Reviewed vitals      HPI:  The patient is 59 y.o. female with admitted with cp with abnormal stress test & abnormal CTA coronary  History of SVT ablation by Dr. Moe Marker cath 2016 with minor coronary lesions     [facility] 5/4/19 LCA with stent to the LAD 3.5 x 15 drug-eluting Desiree Lofty stent. Reviewed most recent test with pt     Review of Systems:   · Constitutional: no unanticipated weight loss. There's been no change in energy level, sleep pattern, or activity level. No fevers, chills. · Eyes: No visual changes or diplopia. No scleral icterus. · ENT: No Headaches, hearing loss or vertigo. No mouth sores or sore throat. · Cardiovascular: as reviewed in HPI  · Respiratory: No cough or wheezing, no sputum production. No hematemesis. · Gastrointestinal: No abdominal pain, appetite loss, blood in stools. No change in bowel or bladder habits. · Genitourinary: No dysuria, trouble voiding, or hematuria. · Musculoskeletal:  No gait disturbance, no joint complaints. · Integumentary: No rash or pruritis. · Neurological: No headache, diplopia, change in muscle strength, numbness or tingling. · Psychiatric: No anxiety or depression. · Endocrine: No temperature intolerance. No excessive thirst, fluid intake, or urination. No tremor. · Hematologic/Lymphatic: No abnormal bruising or bleeding, blood clots or swollen lymph nodes. · Allergic/Immunologic: No nasal congestion or hives.     Objective:   /66   Pulse 91   Temp 98.7 °F (37.1 °C) (Oral)   Resp 18   Ht 5' 6.14\" (1.68 m)   Wt 148 lb 5.9 oz (67.3 kg)   SpO2 97%   BMI 23.84 kg/m²       Intake/Output Summary (Last 24 hours) at 6/6/2019 1310  Last data filed at 6/6/2019 1002  Gross per 24 hour   Intake 660 ml   Output --   Net 660 ml     Wt Readings from Last 3 Encounters:   06/06/19 148 lb 5.9 oz (67.3 kg)   05/10/19 155 lb 12.8 oz (70.7 kg)   04/29/19 155 lb 3.2 oz (70.4 kg)       Physical Exam:  /66   Pulse 91   Temp 98.7 °F (37.1 °C) (Oral)   Resp 18   Ht 5' 6.14\" (1.68 m)   Wt 148 lb 5.9 oz (67.3 kg)   SpO2 97%   BMI 23.84 kg/m²   BP Readings from Last 3 Encounters:   06/06/19 100/66   05/10/19 122/80   04/29/19 133/70     Pulse Readings from Last 3 Encounters:   06/06/19 91   05/10/19 97   04/29/19 78       Intake/Output Summary (Last 24 hours) at 6/6/2019 1310  Last data filed at 6/6/2019 1002  Gross per 24 hour   Intake 660 ml   Output --   Net 660 ml     Wt Readings from Last 2 Encounters:   06/06/19 148 lb 5.9 oz (67.3 kg)   05/10/19 155 lb 12.8 oz (70.7 kg)     Constitutional: She is oriented to person, place, and time. She appears well-developed and well-nourished. In no acute distress. Head: Normocephalic and atraumatic. Eyes: PEERL   Neck: Neck supple. No JVD present. Carotid bruit is not present. No mass and no thyromegaly present. No lymphadenopathy present. Cardiovascular: Normal rate, regular rhythm, normal heart sounds and intact distal pulses. Exam reveals no gallop and no friction rub. No murmur heard. Pulmonary/Chest: Effort normal and breath sounds normal. No respiratory distress. She has no wheezes, rhonchi or rales. Abdominal: Soft, non-tender. Bowel sounds and aorta are normal. She exhibits no organomegaly, mass or bruit. Extremities: No edema, cyanosis, or clubbing. Pulses are 2+ radial/carotid/dorsalis pedis and posterior tibial bilaterally. Neurological: She is alert and oriented to person, place, and time. She has normal reflexes. No cranial nerve deficit.  Coordination normal.   Skin: Skin is warm and dry. There is no rash or diaphoresis. Psychiatric: She has a normal mood and affect. Her speech is normal and behavior is normal.     Medications:    metoprolol tartrate  12.5 mg Oral BID    predniSONE  40 mg Oral Daily    enoxaparin  40 mg Subcutaneous Daily    ticagrelor  90 mg Oral BID    citalopram  40 mg Oral Daily    dicyclomine  10 mg Oral TID AC    methocarbamol  500 mg Oral TID    pantoprazole  40 mg Oral QAM AC    rosuvastatin  20 mg Oral Nightly    sodium chloride flush  10 mL Intravenous 2 times per day    aspirin  81 mg Oral Daily       acetaminophen, sodium chloride flush, magnesium hydroxide, ondansetron, nitroGLYCERIN, traMADol, hydrALAZINE    Lab Data:  CBC:   Recent Labs     06/04/19  0549 06/05/19  0520 06/06/19  0508   WBC 6.6 7.6 9.7   HGB 13.4 13.5 14.1    297 333     BMP:    Recent Labs     06/04/19  0549 06/05/19  0520 06/06/19  0508    139 139   K 3.9 3.9  3.9 3.4*   CO2 29 28 26   BUN 9 10 15   CREATININE 0.7 0.8 0.9     LIVR: No results for input(s): AST, ALT in the last 72 hours. INR:  No results for input(s): INR in the last 72 hours. APTT: No results for input(s): APTT in the last 72 hours. BNP:  No results for input(s): BNP in the last 72 hours.     Telemetry: [facility]     Reviewed  available lab work,  EKGs, images, [facility]       Assessment/Plan:       Cardiac cath 2016 with minor coronary lesions  [facility] 5/4/19 LCA with stent to the LAD 3.5 x 15 drug-eluting [place] stent.     History of SVT ablation by Dr. Sigrid Conway  in [facility] [address]  On statin   optimal level      Migraines / Kal Alvarado  / GERD / depression anxiety   Stable  / on Celexa      Plan:   Plan home today on GDMT / including Brilinta   Fu in approx 1-2 weeks      Mike MYERS, CVNP

## 2019-06-06 NOTE — PLAN OF CARE
Problem: Nausea/Vomiting:  Goal: Absence of nausea/vomiting  Description  Absence of nausea/vomiting  Outcome: Met This Shift  Note:   Patient had no episode of nausea and vomiting and has tolerated fluid intake so far. Able to verbalize needs and calls appropriately.

## 2019-06-06 NOTE — PROGRESS NOTES
Pt provided with discharge instructions, and follow up information. No further questions at this time. Scripts filled thru meds to beds. IV and tele d/c'd. Taken down to 's vehicle via wheelchair without incident with all belongings.    Electronically signed by Mariel Machado RN on 6/6/2019 at 11:26 AM

## 2019-06-07 ENCOUNTER — TELEPHONE (OUTPATIENT)
Dept: CARDIOLOGY CLINIC | Age: 64
End: 2019-06-07

## 2019-06-07 ENCOUNTER — CARE COORDINATION (OUTPATIENT)
Dept: CASE MANAGEMENT | Age: 64
End: 2019-06-07

## 2019-06-07 DIAGNOSIS — R07.9 CHEST PAIN, UNSPECIFIED TYPE: Primary | ICD-10-CM

## 2019-06-07 NOTE — CARE COORDINATION
[facility] Trans[facility] Initial Follow Up Call    Call within 2 business days of discharge: Yes    Patient: Cruz Sanchez Patient : 1955   MRN: [id number]   Reason for Admission: Chest Pain, S/P Cardiac Cath   Discharge Date: 19 RARS: Readmission Risk Score: 13      Last Discharge [facility]       Complaint Diagnosis Description Type Department Provider    19 Back Pain; Nausea Unstable angina [facility]) ED to Hosp-Admission (Discharged) (ADMITTED) [facility] 4 PCU Dg Bingham MD; Leeann Santos ... Spoke with: Joselito Gupta 148: [facility] [employer]     Non-face-to-face services provided:  Obtained and reviewed discharge summary and/or continuity of care documents  Education of patient/family/caregiver/guardian to support self-management-. Assessment and support for treatment adherence and medication management-.    Care Transitions 24 Hour Call    Schedule Follow Up Appointment with PCP:  Declined  Do you have any ongoing symptoms?:  Yes  Patient-reported symptoms:  Shortness of Breath  Do you have a copy of your discharge instructions?:  No  Do you have all of your prescriptions and are they filled?:  Yes  Have you been contacted by a [facility] Pharmacist?:  No  Have you scheduled your follow up appointment?:  Yes  How are you going to get to your appointment?:  Car - family or friend to transport  Were you discharged with any Home Care or Post Acute Services:  No  Do you feel like you have everything you need to keep you well at home?:  Yes  Care Transitions Interventions  No Identified Needs       Summary  CTC spoke with patient this am for initial 24 hour discharge follow up CTC call. Patient states she is doing well, having some SOB, but this improves with rest.  Patient states she is not having any complaints of chest pain, heart palpitations, dizziness or lightheadedness. Appetite wnl, no difficulty with urination or BM's.   No nausea, vomiting, fevers, chills or

## 2019-06-11 ENCOUNTER — CARE COORDINATION (OUTPATIENT)
Dept: CASE MANAGEMENT | Age: 64
End: 2019-06-11

## 2019-06-12 ENCOUNTER — CARE COORDINATION (OUTPATIENT)
Dept: CASE MANAGEMENT | Age: 64
End: 2019-06-12

## 2019-06-12 ENCOUNTER — OFFICE VISIT (OUTPATIENT)
Dept: FAMILY MEDICINE CLINIC | Age: 64
End: 2019-06-12
Payer: COMMERCIAL

## 2019-06-12 VITALS
SYSTOLIC BLOOD PRESSURE: 114 MMHG | TEMPERATURE: 98.4 F | OXYGEN SATURATION: 98 % | HEIGHT: 66 IN | HEART RATE: 80 BPM | BODY MASS INDEX: 24.27 KG/M2 | WEIGHT: 151 LBS | DIASTOLIC BLOOD PRESSURE: 80 MMHG

## 2019-06-12 DIAGNOSIS — I25.110 CORONARY ARTERY DISEASE INVOLVING NATIVE CORONARY ARTERY OF NATIVE HEART WITH UNSTABLE ANGINA PECTORIS (HCC): Primary | ICD-10-CM

## 2019-06-12 PROCEDURE — 99214 OFFICE O/P EST MOD 30 MIN: CPT | Performed by: FAMILY MEDICINE

## 2019-06-12 PROCEDURE — 1111F DSCHRG MED/CURRENT MED MERGE: CPT | Performed by: FAMILY MEDICINE

## 2019-06-17 ENCOUNTER — OFFICE VISIT (OUTPATIENT)
Dept: CARDIOLOGY CLINIC | Age: 64
End: 2019-06-17
Payer: COMMERCIAL

## 2019-06-17 VITALS
SYSTOLIC BLOOD PRESSURE: 110 MMHG | HEART RATE: 80 BPM | DIASTOLIC BLOOD PRESSURE: 85 MMHG | WEIGHT: 149.2 LBS | BODY MASS INDEX: 24.08 KG/M2

## 2019-06-17 DIAGNOSIS — Z92.89 H/O ANGIOGRAPHY: ICD-10-CM

## 2019-06-17 PROCEDURE — 99214 OFFICE O/P EST MOD 30 MIN: CPT | Performed by: NURSE PRACTITIONER

## 2019-06-17 NOTE — PATIENT INSTRUCTIONS
On GDMT for CAD  Cardiac rehab Phase II   recommended / consult completed   Fu in 6 months with blood work

## 2019-06-17 NOTE — PROGRESS NOTES
[facility], [place]  Office Visit           Everette Muñoz MD,  [address]  APRN [address] [facility]       Cardiology           Onofre Toro  1955 June 17, 2019    CC: fu after feeling she had a TIA / recent [facility]     HPI:  The patient is 59 y.o. female with c/o having TIA at work, states felt numbness right side of face, no dropping of mouth eyes arms speech wnl  Today she feels well   Today no c/o cp SOB edema   On GDMT for CAD / reviewed meds   Palpitations / wore Holter monitor    Abnormal CTA coronary    6/4/2019 [facility] PTCA with stent to the LAD 3.5 x 15 drug-eluting [id number] stent. This stent did halfway the moderately large diagonal branch. We then used a guidewire to access that diagonal branch through the stent struts and we used a 2.5 x 8 mm balloon through the stress and we inflated the balloon / EF55%     History of SVT ablation by Dr. Sowmya Bhatt cath 2016 with minor coronary lesions     Reviewed most recent test with pt. Review of Systems:  Constitutional: Denies  fatigue, weakness, night sweats or fever. HEENT: Denies new visual changes, ringing in ears, nosebleeds,nasal congestion  Respiratory: Denies new or change in SOB, PND, orthopnea or cough. Cardiovascular: see HPI  GI: Denies N/V, diarrhea, constipation, abdominal pain, change in bowel habits, melena or hematochezia  : Denies urinary frequency, urgency, incontinence, hematuria or dysuria. Skin: Denies rash, hives, or cyanosis  Musculoskeletal: Denies joint or muscle aches/pain  Neurological: Denies syncope or TIA-like symptoms.   Psychiatric: Denies anxiety, insomnia or depression     Past Medical History:   Diagnosis Date    Allergic rhinitis     Anxiety     CAD (coronary artery disease) 6/5/2019    Depression     Endometriosis     hx    Fibromyalgia     GERD (gastroesophageal reflux disease)     Headache(784.0)     Hiatal hernia     Hyperlipidemia     Irritable bowel syndrome  Left bundle branch block (LBBB)     Osteoarthritis     SVT (supraventricular tachycardia) (HCC)     hx     Trouble swallowing      Past Surgical History:   Procedure Laterality Date    ABLATION OF DYSRHYTHMIC FOCUS      APPENDECTOMY      BUNIONECTOMY  1973    bilateral, 2 revisions left foot    CHOLECYSTECTOMY      COLONOSCOPY  10/15/13    Hx polyps-3 yrs-? poor prep    HYSTERECTOMY      LAPAROSCOPY      6    LAPAROTOMY      3    NASAL SEPTUM SURGERY      OTHER SURGICAL HISTORY      Angiogram     UPPER GASTROINTESTINAL ENDOSCOPY      UPPER GASTROINTESTINAL ENDOSCOPY  10/15/13    volodymyr balderrama     Family History   Problem Relation Age of Onset    Other Mother         sepsis    Dementia Mother     High Cholesterol Mother     Depression Mother     Anxiety Disorder Mother     High Blood Pressure Father     Other Father          from renal failure and CHF    Heart Disease Father     Migraines Father     Heart Attack Brother     Heart Disease Brother     Hypertension Other     High Cholesterol Other     Migraines Other     Heart Disease Other     Diabetes Other      Social History     Tobacco Use    Smoking status: Never Smoker    Smokeless tobacco: Never Used   Substance Use Topics    Alcohol use: No    Drug use: No       Allergies   Allergen Reactions    Avelox [Moxifloxacin Hcl In Nacl] Hives    Cephalexin Hives and Rash    Darvon [Propoxyphene Hcl] Hives    Digoxin Hives    Librax [Chlordiazepoxide-Methscopol] Hives    Motrin [Ibuprofen Micronized] Rash    Other Hives     Most mycin's    Pcn [Penicillins] Rash    Prochlorperazine Edisylate Nausea And Vomiting    Sulfa Antibiotics Rash    Tetracyclines & Related Hives    Statins [Statins]     Nickel Nausea And Vomiting     Hypotension       Current Outpatient Medications   Medication Sig Dispense Refill    metoprolol tartrate (LOPRESSOR) 25 MG tablet Take 0.5 tablets by mouth 2 times daily 60 tablet 3  aspirin 81 MG chewable tablet Take 1 tablet by mouth daily 30 tablet 3    nitroGLYCERIN (NITROSTAT) 0.4 MG SL tablet up to max of 3 total doses. If no relief after 1 dose, call 911. 25 tablet 3    rosuvastatin (CRESTOR) 20 MG tablet Take 1 tablet by mouth nightly 30 tablet 3    ticagrelor (BRILINTA) 90 MG TABS tablet Take 1 tablet by mouth 2 times daily 60 tablet 3    Erenumab-aooe (AIMOVIG) 140 MG/ML SOAJ 1 inj per mo 1 pen 5    methocarbamol (ROBAXIN) 500 MG tablet TAKE ONE TABLET BY MOUTH THREE TIMES A DAY (Patient taking differently: TAKE two TABLET BY MOUTH THREE TIMES A DAY) 90 tablet 2    citalopram (CELEXA) 40 MG tablet Take 1 tablet by mouth daily 90 tablet 1    gabapentin (NEURONTIN) 100 MG capsule Take 1 capsule by mouth 2 times daily for 30 days. Intended supply: 30 days 60 capsule 2    ondansetron (ZOFRAN-ODT) 4 MG disintegrating tablet Take 1 tablet by mouth every 8 hours as needed for Nausea or Vomiting 20 tablet 0    promethazine (PHENERGAN) 25 MG tablet Take 1 tablet by mouth every 6 hours as needed for Nausea 20 tablet 0    dicyclomine (BENTYL) 10 MG capsule Take 1 capsule by mouth 3 times daily (before meals) (Patient taking differently: Take 10 mg by mouth 3 times daily as needed ) 90 capsule 0    clotrimazole-betamethasone (LOTRISONE) 1-0.05 % cream Apply topically as needed Apply topically 2 times daily.  hydrOXYzine (ATARAX) 25 MG tablet Take 1 tablet by mouth 3 times daily as needed for Itching 20 tablet 0    magnesium oxide (MAG-OX) 400 MG tablet Take 400 mg by mouth daily      Cholecalciferol (VITAMIN D3) 5000 UNITS TABS Take by mouth      hydrochlorothiazide (HYDRODIURIL) 25 MG tablet 1 or 2 daily prn swelling 50 tablet 1    omeprazole (PRILOSEC) 20 MG capsule Take 20 mg by mouth every 12 hours.  diphenhydrAMINE (BENADRYL) 25 MG tablet Take 25 mg by mouth every 6 hours as needed. OTC        No current facility-administered medications for this visit. Physical Exam:   /85   Pulse 80   Wt 149 lb 3.2 oz (67.7 kg)   BMI 24.08 kg/m²   BP Readings from Last 3 Encounters:   06/17/19 110/85   06/12/19 114/80   06/06/19 100/66     Pulse Readings from Last 3 Encounters:   06/17/19 80   06/12/19 80   06/06/19 91     Wt Readings from Last 3 Encounters:   06/17/19 149 lb 3.2 oz (67.7 kg)   06/12/19 151 lb (68.5 kg)   06/06/19 148 lb 5.9 oz (67.3 kg)     Constitutional: She is oriented to person, place, and time. She appears well-developed and well-nourished. In no acute distress. HEENT: Normocephalic and atraumatic. Sclerae anicteric. No xanthelasmas. Conjunctiva white, no subconjunctival hemorrhage   External inspection of ears nose teeth & gums   Eyes:PERRLA EOM's intact. Neck: Neck supple. No JVD present. Carotids without bruits. No mass and no thyromegaly present. No lymphadenopathy present. Cardiovascular: RRR, normal S1 and S2; no murmur/gallop or rub, PMI nondisplaced  Pulmonary/Chest: Effort normal.  Lungs clear to auscultation. Chest wall nontender  Abdominal: soft, nontender, nondistended. + bowel sounds; no organomegaly or bruits. Aorta normal  Extremities: No edema, cyanosis, or clubbing. Pulses are 2+ radial/carotid/dorsalis pedis bilaterally. Cap refill brisk. Neurological: No cranial nerve deficit. Psychiatric: She has a normal mood and affect.  Her speech is normal and behavior is normal.     Lab Review:   Lab Results   Component Value Date    TRIG 267 06/04/2019    HDL 51 06/04/2019    LDLCALC 65 06/04/2019    LABVLDL 53 06/04/2019     Lab Results   Component Value Date     06/06/2019    K 3.4 06/06/2019    K 3.9 06/05/2019    CL 99 06/06/2019    CO2 26 06/06/2019    BUN 15 06/06/2019    CREATININE 0.9 06/06/2019    GLUCOSE 99 06/06/2019    CALCIUM 9.3 06/06/2019      Lab Results   Component Value Date    WBC 9.7 06/06/2019    HGB 14.1 06/06/2019    HCT 41.8 06/06/2019    MCV 93.2 06/06/2019     06/06/2019         I have reviewed any available labs, images, any stress test, [facility] on this pt       Please cc this note to PCP    Assessment:    hx cp / no c/o cp today   Abnormal CTA coronary then [facility]   6/4/2019 [facility] PTCA with stent to the LAD 3.5 x 15 drug-eluting Reecezechariah Zayas stent. This stent did assisted the moderately large diagonal branch. We then used a guidewire to access that diagonal branch through the stent struts and we used a 2.5 x 8 mm balloon through the stress and we inflated the balloon / EF55%     c/o having TIA at work, x one and evaluated by PCP    states felt numbness right side of face, no dropping of mouth eyes arms speech wnl  Today she feels well     Palpitations    wore Holter monitor in May     History of SVT   ablation by Dr. Martinez Confer:  On GDMT for CAD  Cardiac rehab Phase II   recommended / consult completed   Fu in 6 months with blood work     Thanks for allowing me to participate in the care of this patient.   Please cc this note to PCP      JESSICA Gonzalez

## 2019-06-20 ENCOUNTER — TELEPHONE (OUTPATIENT)
Dept: CARDIOLOGY CLINIC | Age: 64
End: 2019-06-20

## 2019-06-20 ENCOUNTER — CARE COORDINATION (OUTPATIENT)
Dept: CASE MANAGEMENT | Age: 64
End: 2019-06-20

## 2019-06-28 DIAGNOSIS — R00.0 TACHYCARDIA: ICD-10-CM

## 2019-06-28 DIAGNOSIS — R07.9 CHEST PAIN, UNSPECIFIED TYPE: ICD-10-CM

## 2019-06-28 PROCEDURE — 0298T PR EXT ECG > 48HR TO 21 DAY REVIEW AND INTERPRETATN: CPT | Performed by: INTERNAL MEDICINE

## 2019-07-02 ENCOUNTER — HOSPITAL ENCOUNTER (OUTPATIENT)
Dept: CARDIAC REHAB | Age: 64
Setting detail: THERAPIES SERIES
Discharge: HOME | End: 2019-07-02
Payer: COMMERCIAL

## 2019-07-08 ENCOUNTER — APPOINTMENT (OUTPATIENT)
Dept: CARDIAC REHAB | Age: 64
End: 2019-07-08
Payer: COMMERCIAL

## 2019-07-08 ENCOUNTER — TELEPHONE (OUTPATIENT)
Dept: FAMILY MEDICINE CLINIC | Age: 64
End: 2019-07-08

## 2019-07-09 ENCOUNTER — TELEPHONE (OUTPATIENT)
Dept: CARDIOLOGY CLINIC | Age: 64
End: 2019-07-09

## 2019-07-10 ENCOUNTER — HOSPITAL ENCOUNTER (OUTPATIENT)
Dept: CARDIAC REHAB | Age: 64
Setting detail: THERAPIES SERIES
Discharge: HOME | End: 2019-07-10
Payer: COMMERCIAL

## 2019-07-10 PROCEDURE — 93798 PHYS/QHP OP CAR RHAB W/ECG: CPT

## 2019-07-12 ENCOUNTER — HOSPITAL ENCOUNTER (OUTPATIENT)
Dept: CARDIAC REHAB | Age: 64
Setting detail: THERAPIES SERIES
Discharge: HOME | End: 2019-07-12
Payer: COMMERCIAL

## 2019-07-12 PROCEDURE — 93798 PHYS/QHP OP CAR RHAB W/ECG: CPT

## 2019-07-15 ENCOUNTER — TELEPHONE (OUTPATIENT)
Dept: FAMILY MEDICINE CLINIC | Age: 64
End: 2019-07-15

## 2019-07-15 ENCOUNTER — HOSPITAL ENCOUNTER (OUTPATIENT)
Dept: CARDIAC REHAB | Age: 64
Setting detail: THERAPIES SERIES
Discharge: HOME | End: 2019-07-15
Payer: COMMERCIAL

## 2019-07-15 PROCEDURE — 93798 PHYS/QHP OP CAR RHAB W/ECG: CPT

## 2019-07-16 ENCOUNTER — TELEPHONE (OUTPATIENT)
Dept: FAMILY MEDICINE CLINIC | Age: 64
End: 2019-07-16

## 2019-07-16 DIAGNOSIS — Z78.0 POST-MENOPAUSAL: Primary | ICD-10-CM

## 2019-07-17 ENCOUNTER — HOSPITAL ENCOUNTER (OUTPATIENT)
Dept: WOMENS IMAGING | Age: 64
Discharge: HOME | End: 2019-07-17
Payer: COMMERCIAL

## 2019-07-17 ENCOUNTER — HOSPITAL ENCOUNTER (OUTPATIENT)
Dept: CARDIAC REHAB | Age: 64
Setting detail: THERAPIES SERIES
Discharge: HOME | End: 2019-07-17
Payer: COMMERCIAL

## 2019-07-17 DIAGNOSIS — Z78.0 POST-MENOPAUSAL: ICD-10-CM

## 2019-07-17 PROCEDURE — 77080 DXA BONE DENSITY AXIAL: CPT

## 2019-07-17 PROCEDURE — 93798 PHYS/QHP OP CAR RHAB W/ECG: CPT

## 2019-07-17 RX ORDER — METOPROLOL SUCCINATE 25 MG/1
25 TABLET, EXTENDED RELEASE ORAL DAILY
Qty: 90 TABLET | Refills: 3 | Status: SHIPPED | OUTPATIENT
Start: 2019-07-17 | End: 2020-11-16

## 2019-07-22 ENCOUNTER — HOSPITAL ENCOUNTER (OUTPATIENT)
Dept: CARDIAC REHAB | Age: 64
Setting detail: THERAPIES SERIES
Discharge: HOME | End: 2019-07-22
Payer: COMMERCIAL

## 2019-07-22 PROCEDURE — 93798 PHYS/QHP OP CAR RHAB W/ECG: CPT

## 2019-07-26 ENCOUNTER — HOSPITAL ENCOUNTER (OUTPATIENT)
Dept: CARDIAC REHAB | Age: 64
Setting detail: THERAPIES SERIES
Discharge: HOME | End: 2019-07-26
Payer: COMMERCIAL

## 2019-07-26 PROCEDURE — 93798 PHYS/QHP OP CAR RHAB W/ECG: CPT

## 2019-07-29 ENCOUNTER — HOSPITAL ENCOUNTER (OUTPATIENT)
Dept: CARDIAC REHAB | Age: 64
Setting detail: THERAPIES SERIES
Discharge: HOME | End: 2019-07-29
Payer: COMMERCIAL

## 2019-07-29 PROCEDURE — 93798 PHYS/QHP OP CAR RHAB W/ECG: CPT

## 2019-07-31 ENCOUNTER — HOSPITAL ENCOUNTER (OUTPATIENT)
Dept: CARDIAC REHAB | Age: 64
Setting detail: THERAPIES SERIES
Discharge: HOME | End: 2019-07-31
Payer: COMMERCIAL

## 2019-07-31 PROCEDURE — 93798 PHYS/QHP OP CAR RHAB W/ECG: CPT

## 2019-08-02 ENCOUNTER — HOSPITAL ENCOUNTER (OUTPATIENT)
Dept: CARDIAC REHAB | Age: 64
Setting detail: THERAPIES SERIES
Discharge: HOME | End: 2019-08-02
Payer: COMMERCIAL

## 2019-08-02 PROCEDURE — 93798 PHYS/QHP OP CAR RHAB W/ECG: CPT

## 2019-08-05 ENCOUNTER — HOSPITAL ENCOUNTER (OUTPATIENT)
Dept: CARDIAC REHAB | Age: 64
Setting detail: THERAPIES SERIES
Discharge: HOME | End: 2019-08-05
Payer: COMMERCIAL

## 2019-08-05 PROCEDURE — 93798 PHYS/QHP OP CAR RHAB W/ECG: CPT

## 2019-08-09 ENCOUNTER — HOSPITAL ENCOUNTER (OUTPATIENT)
Dept: CARDIAC REHAB | Age: 64
Setting detail: THERAPIES SERIES
Discharge: HOME | End: 2019-08-09
Payer: COMMERCIAL

## 2019-08-09 PROCEDURE — 93798 PHYS/QHP OP CAR RHAB W/ECG: CPT

## 2019-08-12 ENCOUNTER — HOSPITAL ENCOUNTER (OUTPATIENT)
Dept: CARDIAC REHAB | Age: 64
Setting detail: THERAPIES SERIES
Discharge: HOME | End: 2019-08-12
Payer: COMMERCIAL

## 2019-08-12 PROCEDURE — 93798 PHYS/QHP OP CAR RHAB W/ECG: CPT

## 2019-08-13 DIAGNOSIS — M54.5 CHRONIC LOW BACK PAIN, UNSPECIFIED BACK PAIN LATERALITY, WITH SCIATICA PRESENCE UNSPECIFIED: ICD-10-CM

## 2019-08-13 DIAGNOSIS — G89.29 CHRONIC LOW BACK PAIN, UNSPECIFIED BACK PAIN LATERALITY, WITH SCIATICA PRESENCE UNSPECIFIED: ICD-10-CM

## 2019-08-14 ENCOUNTER — HOSPITAL ENCOUNTER (OUTPATIENT)
Dept: CARDIAC REHAB | Age: 64
Setting detail: THERAPIES SERIES
Discharge: HOME | End: 2019-08-14
Payer: COMMERCIAL

## 2019-08-14 PROCEDURE — 93798 PHYS/QHP OP CAR RHAB W/ECG: CPT

## 2019-08-14 RX ORDER — TRAMADOL HYDROCHLORIDE 50 MG/1
50 TABLET ORAL EVERY 6 HOURS PRN
Qty: 28 TABLET | Refills: 0 | Status: SHIPPED | OUTPATIENT
Start: 2019-08-14 | End: 2019-09-06 | Stop reason: SDUPTHER

## 2019-08-16 ENCOUNTER — HOSPITAL ENCOUNTER (OUTPATIENT)
Dept: CARDIAC REHAB | Age: 64
Setting detail: THERAPIES SERIES
Discharge: HOME | End: 2019-08-16
Payer: COMMERCIAL

## 2019-08-16 ENCOUNTER — TELEPHONE (OUTPATIENT)
Dept: CARDIOLOGY CLINIC | Age: 64
End: 2019-08-16

## 2019-08-16 PROCEDURE — 93798 PHYS/QHP OP CAR RHAB W/ECG: CPT

## 2019-08-19 ENCOUNTER — HOSPITAL ENCOUNTER (OUTPATIENT)
Dept: CARDIAC REHAB | Age: 64
Setting detail: THERAPIES SERIES
Discharge: HOME | End: 2019-08-19
Payer: COMMERCIAL

## 2019-08-19 PROCEDURE — 93798 PHYS/QHP OP CAR RHAB W/ECG: CPT

## 2019-08-21 ENCOUNTER — HOSPITAL ENCOUNTER (OUTPATIENT)
Dept: CARDIAC REHAB | Age: 64
Setting detail: THERAPIES SERIES
Discharge: HOME | End: 2019-08-21
Payer: COMMERCIAL

## 2019-08-21 PROCEDURE — 93798 PHYS/QHP OP CAR RHAB W/ECG: CPT

## 2019-08-23 ENCOUNTER — HOSPITAL ENCOUNTER (OUTPATIENT)
Dept: CARDIAC REHAB | Age: 64
Setting detail: THERAPIES SERIES
Discharge: HOME | End: 2019-08-23
Payer: COMMERCIAL

## 2019-08-23 ENCOUNTER — OFFICE VISIT (OUTPATIENT)
Dept: FAMILY MEDICINE CLINIC | Age: 64
End: 2019-08-23
Payer: COMMERCIAL

## 2019-08-23 VITALS
HEART RATE: 82 BPM | BODY MASS INDEX: 23.3 KG/M2 | OXYGEN SATURATION: 97 % | HEIGHT: 66 IN | DIASTOLIC BLOOD PRESSURE: 78 MMHG | SYSTOLIC BLOOD PRESSURE: 136 MMHG | WEIGHT: 145 LBS

## 2019-08-23 DIAGNOSIS — M79.7 FIBROMYALGIA: Primary | ICD-10-CM

## 2019-08-23 DIAGNOSIS — F32.A DEPRESSION, UNSPECIFIED DEPRESSION TYPE: ICD-10-CM

## 2019-08-23 DIAGNOSIS — F41.9 ANXIETY: ICD-10-CM

## 2019-08-23 DIAGNOSIS — G43.709 CHRONIC MIGRAINE WITHOUT AURA WITHOUT STATUS MIGRAINOSUS, NOT INTRACTABLE: ICD-10-CM

## 2019-08-23 PROCEDURE — 99214 OFFICE O/P EST MOD 30 MIN: CPT | Performed by: FAMILY MEDICINE

## 2019-08-23 PROCEDURE — 93798 PHYS/QHP OP CAR RHAB W/ECG: CPT

## 2019-08-23 ASSESSMENT — ENCOUNTER SYMPTOMS
SHORTNESS OF BREATH: 0
COUGH: 0
ABDOMINAL PAIN: 0

## 2019-08-23 NOTE — PROGRESS NOTES
Authorizing Provider Emily Gamez, DO    Medication ondansetron (ZOFRAN-ODT) 4 MG disintegrating tablet, Sig Take 1 tablet by mouth every 8 hours as needed for Nausea or Vomiting, Taking? Yes, Authorizing Provider Bubba Gamez, DO    Medication promethazine (PHENERGAN) 25 MG tablet, Sig Take 1 tablet by mouth every 6 hours as needed for Nausea, Taking? Yes, Authorizing Provider Bubba Gamez, DO    Medication dicyclomine (BENTYL) 10 MG capsule, Sig Take 1 capsule by mouth 3 times daily (before meals)  Patient taking differently: Take 10 mg by mouth 3 times daily as needed , Taking? Yes, Authorizing Provider Emily Gamez, DO    Medication clotrimazole-betamethasone (LOTRISONE) 1-0.05 % cream, Sig Apply topically as needed Apply topically 2 times daily. , Taking? Yes, Authorizing Provider Historical Provider, MD    Medication Cholecalciferol (VITAMIN D3) 5000 UNITS TABS, Sig Take by mouth, Taking? Yes, Authorizing Provider Historical Provider, MD    Medication omeprazole (PRILOSEC) 20 MG capsule, Sig Take 20 mg by mouth every 12 hours. , Taking? Yes, Authorizing Provider Historical Provider, MD    Medication diphenhydrAMINE (BENADRYL) 25 MG tablet, Sig Take 25 mg by mouth every 6 hours as needed. OTC , Taking? Yes, Authorizing Provider Historical Provider, MD    Medication gabapentin (NEURONTIN) 100 MG capsule, Sig Take 1 capsule by mouth 2 times daily for 30 days. Intended supply: 30 days, Taking? , Authorizing Provider Lakisha Griffith DO      Past Medical History:  No date: Allergic rhinitis  No date:  Anxiety  6/5/2019: CAD (coronary artery disease)  No date: Depression  No date: Endometriosis      Comment:  hx  No date: Fibromyalgia  No date: GERD (gastroesophageal reflux disease)  No date: Headache(784.0)  No date: Hiatal hernia  No date: Hyperlipidemia  No date: Irritable bowel syndrome  No date: Left bundle branch block (LBBB)  No date: Osteoarthritis  No date: SVT (supraventricular tachycardia) ([facility] [address]

## 2019-08-26 ENCOUNTER — HOSPITAL ENCOUNTER (OUTPATIENT)
Dept: CARDIAC REHAB | Age: 64
Setting detail: THERAPIES SERIES
Discharge: HOME | End: 2019-08-26
Payer: COMMERCIAL

## 2019-08-26 PROCEDURE — 93798 PHYS/QHP OP CAR RHAB W/ECG: CPT

## 2019-08-28 ENCOUNTER — HOSPITAL ENCOUNTER (OUTPATIENT)
Dept: CARDIAC REHAB | Age: 64
Setting detail: THERAPIES SERIES
Discharge: HOME | End: 2019-08-28
Payer: COMMERCIAL

## 2019-08-28 PROCEDURE — 93798 PHYS/QHP OP CAR RHAB W/ECG: CPT

## 2019-08-30 ENCOUNTER — HOSPITAL ENCOUNTER (OUTPATIENT)
Dept: CARDIAC REHAB | Age: 64
Setting detail: THERAPIES SERIES
Discharge: HOME | End: 2019-08-30
Payer: COMMERCIAL

## 2019-08-30 PROCEDURE — 93798 PHYS/QHP OP CAR RHAB W/ECG: CPT

## 2019-09-04 ENCOUNTER — HOSPITAL ENCOUNTER (OUTPATIENT)
Dept: CARDIAC REHAB | Age: 64
Setting detail: THERAPIES SERIES
Discharge: HOME | End: 2019-09-04
Payer: COMMERCIAL

## 2019-09-04 PROCEDURE — 93798 PHYS/QHP OP CAR RHAB W/ECG: CPT

## 2019-09-06 ENCOUNTER — HOSPITAL ENCOUNTER (OUTPATIENT)
Dept: CARDIAC REHAB | Age: 64
Setting detail: THERAPIES SERIES
Discharge: HOME | End: 2019-09-06
Payer: COMMERCIAL

## 2019-09-06 DIAGNOSIS — G89.29 CHRONIC LOW BACK PAIN, UNSPECIFIED BACK PAIN LATERALITY, WITH SCIATICA PRESENCE UNSPECIFIED: ICD-10-CM

## 2019-09-06 DIAGNOSIS — M54.5 CHRONIC LOW BACK PAIN, UNSPECIFIED BACK PAIN LATERALITY, WITH SCIATICA PRESENCE UNSPECIFIED: ICD-10-CM

## 2019-09-06 PROCEDURE — 93798 PHYS/QHP OP CAR RHAB W/ECG: CPT

## 2019-09-06 RX ORDER — TRAMADOL HYDROCHLORIDE 50 MG/1
TABLET ORAL
Qty: 28 TABLET | Refills: 0 | Status: SHIPPED | OUTPATIENT
Start: 2019-09-06 | End: 2019-10-01 | Stop reason: SDUPTHER

## 2019-09-09 ENCOUNTER — HOSPITAL ENCOUNTER (OUTPATIENT)
Dept: CARDIAC REHAB | Age: 64
Setting detail: THERAPIES SERIES
Discharge: HOME | End: 2019-09-09
Payer: COMMERCIAL

## 2019-09-09 PROCEDURE — 93798 PHYS/QHP OP CAR RHAB W/ECG: CPT

## 2019-09-11 ENCOUNTER — HOSPITAL ENCOUNTER (OUTPATIENT)
Dept: CARDIAC REHAB | Age: 64
Setting detail: THERAPIES SERIES
Discharge: HOME | End: 2019-09-11
Payer: COMMERCIAL

## 2019-09-11 PROCEDURE — 93798 PHYS/QHP OP CAR RHAB W/ECG: CPT

## 2019-09-13 ENCOUNTER — TELEPHONE (OUTPATIENT)
Dept: FAMILY MEDICINE CLINIC | Age: 64
End: 2019-09-13

## 2019-09-13 DIAGNOSIS — R11.0 NAUSEA: ICD-10-CM

## 2019-09-13 RX ORDER — PROMETHAZINE HYDROCHLORIDE 25 MG/1
25 TABLET ORAL EVERY 6 HOURS PRN
Qty: 20 TABLET | Refills: 0 | Status: CANCELLED | OUTPATIENT
Start: 2019-09-13

## 2019-09-13 RX ORDER — PROMETHAZINE HYDROCHLORIDE 25 MG/1
25 TABLET ORAL EVERY 6 HOURS PRN
Qty: 30 TABLET | Refills: 1 | Status: SHIPPED | OUTPATIENT
Start: 2019-09-13

## 2019-09-16 ENCOUNTER — HOSPITAL ENCOUNTER (OUTPATIENT)
Dept: CARDIAC REHAB | Age: 64
Setting detail: THERAPIES SERIES
Discharge: HOME | End: 2019-09-16
Payer: COMMERCIAL

## 2019-09-16 PROCEDURE — 93798 PHYS/QHP OP CAR RHAB W/ECG: CPT

## 2019-09-18 ENCOUNTER — HOSPITAL ENCOUNTER (OUTPATIENT)
Dept: CARDIAC REHAB | Age: 64
Setting detail: THERAPIES SERIES
Discharge: HOME | End: 2019-09-18
Payer: COMMERCIAL

## 2019-09-18 PROCEDURE — 93798 PHYS/QHP OP CAR RHAB W/ECG: CPT

## 2019-09-20 ENCOUNTER — HOSPITAL ENCOUNTER (OUTPATIENT)
Dept: CARDIAC REHAB | Age: 64
Setting detail: THERAPIES SERIES
Discharge: HOME | End: 2019-09-20
Payer: COMMERCIAL

## 2019-09-20 PROCEDURE — 93798 PHYS/QHP OP CAR RHAB W/ECG: CPT

## 2019-09-23 RX ORDER — METHYLPREDNISOLONE 4 MG/1
TABLET ORAL
Qty: 21 TABLET | Refills: 0 | Status: SHIPPED | OUTPATIENT
Start: 2019-09-23 | End: 2019-09-29

## 2019-09-25 ENCOUNTER — HOSPITAL ENCOUNTER (OUTPATIENT)
Dept: CARDIAC REHAB | Age: 64
Setting detail: THERAPIES SERIES
Discharge: HOME | End: 2019-09-25
Payer: COMMERCIAL

## 2019-09-25 PROCEDURE — 93798 PHYS/QHP OP CAR RHAB W/ECG: CPT

## 2019-10-01 DIAGNOSIS — G89.29 CHRONIC LOW BACK PAIN: ICD-10-CM

## 2019-10-01 DIAGNOSIS — M54.50 CHRONIC LOW BACK PAIN: ICD-10-CM

## 2019-10-02 ENCOUNTER — HOSPITAL ENCOUNTER (OUTPATIENT)
Dept: CARDIAC REHAB | Age: 64
Setting detail: THERAPIES SERIES
Discharge: HOME | End: 2019-10-02
Payer: COMMERCIAL

## 2019-10-02 PROCEDURE — 93798 PHYS/QHP OP CAR RHAB W/ECG: CPT

## 2019-10-03 RX ORDER — TRAMADOL HYDROCHLORIDE 50 MG/1
TABLET ORAL
Qty: 28 TABLET | Refills: 0 | Status: SHIPPED | OUTPATIENT
Start: 2019-10-03 | End: 2019-10-29 | Stop reason: SDUPTHER

## 2019-10-04 ENCOUNTER — OFFICE VISIT (OUTPATIENT)
Dept: CARDIOLOGY CLINIC | Age: 64
End: 2019-10-04
Payer: COMMERCIAL

## 2019-10-04 VITALS
BODY MASS INDEX: 23.21 KG/M2 | DIASTOLIC BLOOD PRESSURE: 80 MMHG | WEIGHT: 143.8 LBS | HEART RATE: 75 BPM | SYSTOLIC BLOOD PRESSURE: 135 MMHG

## 2019-10-04 DIAGNOSIS — E78.2 MIXED HYPERLIPIDEMIA: ICD-10-CM

## 2019-10-04 DIAGNOSIS — R07.9 CHEST PAIN, UNSPECIFIED TYPE: Primary | ICD-10-CM

## 2019-10-04 DIAGNOSIS — I20.0 UNSTABLE ANGINA (HCC): Primary | ICD-10-CM

## 2019-10-04 DIAGNOSIS — R07.9 CHEST PAIN, UNSPECIFIED TYPE: ICD-10-CM

## 2019-10-04 DIAGNOSIS — I20.0 UNSTABLE ANGINA (HCC): ICD-10-CM

## 2019-10-04 PROCEDURE — 93000 ELECTROCARDIOGRAM COMPLETE: CPT | Performed by: INTERNAL MEDICINE

## 2019-10-04 PROCEDURE — 99214 OFFICE O/P EST MOD 30 MIN: CPT | Performed by: INTERNAL MEDICINE

## 2019-10-08 ENCOUNTER — HOSPITAL ENCOUNTER (OUTPATIENT)
Dept: CARDIAC CATH/INVASIVE PROCEDURES | Age: 64
Discharge: HOME | End: 2019-10-08
Attending: INTERNAL MEDICINE | Admitting: INTERNAL MEDICINE
Payer: COMMERCIAL

## 2019-10-08 VITALS — WEIGHT: 143 LBS | TEMPERATURE: 98 F | HEIGHT: 66 IN | BODY MASS INDEX: 22.98 KG/M2

## 2019-10-08 PROBLEM — I20.9 ANGINA PECTORIS (HCC): Status: ACTIVE | Noted: 2019-06-01

## 2019-10-08 LAB
ANION GAP SERPL CALCULATED.3IONS-SCNC: 13 MMOL/L (ref 3–16)
BUN BLDV-MCNC: 12 MG/DL (ref 7–20)
CALCIUM SERPL-MCNC: 9.3 MG/DL (ref 8.3–10.6)
CHLORIDE BLD-SCNC: 100 MMOL/L (ref 99–110)
CO2: 28 MMOL/L (ref 21–32)
CREAT SERPL-MCNC: 0.7 MG/DL (ref 0.6–1.2)
EKG ATRIAL RATE: 72 BPM
EKG DIAGNOSIS: NORMAL
EKG P AXIS: 47 DEGREES
EKG P-R INTERVAL: 122 MS
EKG Q-T INTERVAL: 444 MS
EKG QRS DURATION: 126 MS
EKG QTC CALCULATION (BAZETT): 486 MS
EKG R AXIS: 92 DEGREES
EKG T AXIS: 2 DEGREES
EKG VENTRICULAR RATE: 72 BPM
GFR AFRICAN AMERICAN: >60
GFR NON-AFRICAN AMERICAN: >60
GLUCOSE BLD-MCNC: 105 MG/DL (ref 70–99)
HCT VFR BLD CALC: 40.1 % (ref 36–48)
HEMOGLOBIN: 13.6 G/DL (ref 12–16)
INR BLD: 0.99 (ref 0.86–1.14)
LEFT VENTRICULAR EJECTION FRACTION MODE: NORMAL
LV EF: 50 %
MCH RBC QN AUTO: 32.1 PG (ref 26–34)
MCHC RBC AUTO-ENTMCNC: 33.9 G/DL (ref 31–36)
MCV RBC AUTO: 94.8 FL (ref 80–100)
PDW BLD-RTO: 12.8 % (ref 12.4–15.4)
PLATELET # BLD: 263 K/UL (ref 135–450)
PMV BLD AUTO: 6.5 FL (ref 5–10.5)
POTASSIUM SERPL-SCNC: 3.8 MMOL/L (ref 3.5–5.1)
PROTHROMBIN TIME: 11.3 SEC (ref 9.8–13)
RBC # BLD: 4.23 M/UL (ref 4–5.2)
SODIUM BLD-SCNC: 141 MMOL/L (ref 136–145)
WBC # BLD: 6.8 K/UL (ref 4–11)

## 2019-10-08 PROCEDURE — 2709999900 HC NON-CHARGEABLE SUPPLY

## 2019-10-08 PROCEDURE — 2500000003 HC RX 250 WO HCPCS

## 2019-10-08 PROCEDURE — 99152 MOD SED SAME PHYS/QHP 5/>YRS: CPT

## 2019-10-08 PROCEDURE — 6360000002 HC RX W HCPCS

## 2019-10-08 PROCEDURE — C1769 GUIDE WIRE: HCPCS

## 2019-10-08 PROCEDURE — C1725 CATH, TRANSLUMIN NON-LASER: HCPCS

## 2019-10-08 PROCEDURE — 93010 ELECTROCARDIOGRAM REPORT: CPT | Performed by: INTERNAL MEDICINE

## 2019-10-08 PROCEDURE — 85027 COMPLETE CBC AUTOMATED: CPT

## 2019-10-08 PROCEDURE — 99153 MOD SED SAME PHYS/QHP EA: CPT

## 2019-10-08 PROCEDURE — 85610 PROTHROMBIN TIME: CPT

## 2019-10-08 PROCEDURE — 6370000000 HC RX 637 (ALT 250 FOR IP)

## 2019-10-08 PROCEDURE — 93005 ELECTROCARDIOGRAM TRACING: CPT | Performed by: INTERNAL MEDICINE

## 2019-10-08 PROCEDURE — C1894 INTRO/SHEATH, NON-LASER: HCPCS

## 2019-10-08 PROCEDURE — 6370000000 HC RX 637 (ALT 250 FOR IP): Performed by: INTERNAL MEDICINE

## 2019-10-08 PROCEDURE — 93458 L HRT ARTERY/VENTRICLE ANGIO: CPT | Performed by: INTERNAL MEDICINE

## 2019-10-08 PROCEDURE — 93458 L HRT ARTERY/VENTRICLE ANGIO: CPT

## 2019-10-08 PROCEDURE — 93567 NJX CAR CTH SPRVLV AORTGRPHY: CPT

## 2019-10-08 PROCEDURE — 99024 POSTOP FOLLOW-UP VISIT: CPT | Performed by: INTERNAL MEDICINE

## 2019-10-08 PROCEDURE — 6360000004 HC RX CONTRAST MEDICATION: Performed by: INTERNAL MEDICINE

## 2019-10-08 PROCEDURE — 99217 PR OBSERVATION CARE DISCHARGE MANAGEMENT: CPT | Performed by: NURSE PRACTITIONER

## 2019-10-08 PROCEDURE — 80048 BASIC METABOLIC PNL TOTAL CA: CPT

## 2019-10-08 RX ORDER — ACETAMINOPHEN 325 MG/1
650 TABLET ORAL EVERY 4 HOURS PRN
Status: DISCONTINUED | OUTPATIENT
Start: 2019-10-08 | End: 2019-10-08 | Stop reason: HOSPADM

## 2019-10-08 RX ORDER — SODIUM CHLORIDE 9 MG/ML
INJECTION, SOLUTION INTRAVENOUS CONTINUOUS
Status: DISCONTINUED | OUTPATIENT
Start: 2019-10-08 | End: 2019-10-08 | Stop reason: HOSPADM

## 2019-10-08 RX ADMIN — ACETAMINOPHEN 650 MG: 325 TABLET ORAL at 13:29

## 2019-10-08 RX ADMIN — IOPAMIDOL 160 ML: 755 INJECTION, SOLUTION INTRAVENOUS at 10:30

## 2019-10-08 ASSESSMENT — PAIN SCALES - GENERAL: PAINLEVEL_OUTOF10: 5

## 2019-10-15 ENCOUNTER — OFFICE VISIT (OUTPATIENT)
Dept: CARDIOLOGY CLINIC | Age: 64
End: 2019-10-15
Payer: COMMERCIAL

## 2019-10-15 VITALS
BODY MASS INDEX: 23.73 KG/M2 | DIASTOLIC BLOOD PRESSURE: 70 MMHG | SYSTOLIC BLOOD PRESSURE: 122 MMHG | WEIGHT: 147 LBS | HEART RATE: 68 BPM

## 2019-10-15 DIAGNOSIS — R93.1 ABNORMAL FINDINGS ON CARDIAC CATHETERIZATION: ICD-10-CM

## 2019-10-15 PROCEDURE — 99214 OFFICE O/P EST MOD 30 MIN: CPT | Performed by: NURSE PRACTITIONER

## 2019-10-15 RX ORDER — ROSUVASTATIN CALCIUM 20 MG/1
20 TABLET, COATED ORAL NIGHTLY
Qty: 30 TABLET | Refills: 3 | Status: SHIPPED | OUTPATIENT
Start: 2019-10-15 | End: 2019-10-15 | Stop reason: SDUPTHER

## 2019-10-15 RX ORDER — ROSUVASTATIN CALCIUM 20 MG/1
20 TABLET, COATED ORAL NIGHTLY
Qty: 90 TABLET | Refills: 3 | Status: SHIPPED | OUTPATIENT
Start: 2019-10-15 | End: 2020-11-16

## 2019-10-16 ENCOUNTER — HOSPITAL ENCOUNTER (OUTPATIENT)
Dept: CARDIAC REHAB | Age: 64
Setting detail: THERAPIES SERIES
Discharge: HOME | End: 2019-10-16
Payer: COMMERCIAL

## 2019-10-16 ENCOUNTER — TELEPHONE (OUTPATIENT)
Dept: CARDIOLOGY CLINIC | Age: 64
End: 2019-10-16

## 2019-10-16 PROCEDURE — 93798 PHYS/QHP OP CAR RHAB W/ECG: CPT

## 2019-10-18 ENCOUNTER — HOSPITAL ENCOUNTER (OUTPATIENT)
Dept: CARDIAC REHAB | Age: 64
Setting detail: THERAPIES SERIES
Discharge: HOME | End: 2019-10-18
Payer: COMMERCIAL

## 2019-10-18 PROCEDURE — 93798 PHYS/QHP OP CAR RHAB W/ECG: CPT

## 2019-10-21 ENCOUNTER — HOSPITAL ENCOUNTER (OUTPATIENT)
Dept: CARDIAC REHAB | Age: 64
Setting detail: THERAPIES SERIES
Discharge: HOME | End: 2019-10-21
Payer: COMMERCIAL

## 2019-10-21 PROCEDURE — 93798 PHYS/QHP OP CAR RHAB W/ECG: CPT

## 2019-10-23 ENCOUNTER — HOSPITAL ENCOUNTER (OUTPATIENT)
Dept: WOMENS IMAGING | Age: 64
Discharge: HOME | End: 2019-10-23
Payer: COMMERCIAL

## 2019-10-23 DIAGNOSIS — Z12.31 ENCOUNTER FOR SCREENING MAMMOGRAM FOR MALIGNANT NEOPLASM OF BREAST: ICD-10-CM

## 2019-10-23 PROCEDURE — 77063 BREAST TOMOSYNTHESIS BI: CPT

## 2019-10-24 RX ORDER — GABAPENTIN 100 MG/1
100 CAPSULE ORAL 2 TIMES DAILY
Qty: 60 CAPSULE | Refills: 2 | Status: SHIPPED | OUTPATIENT
Start: 2019-10-24 | End: 2019-11-06 | Stop reason: SDUPTHER

## 2019-10-28 ENCOUNTER — HOSPITAL ENCOUNTER (OUTPATIENT)
Dept: CARDIAC REHAB | Age: 64
Setting detail: THERAPIES SERIES
Discharge: HOME | End: 2019-10-28
Payer: COMMERCIAL

## 2019-10-28 PROCEDURE — 93798 PHYS/QHP OP CAR RHAB W/ECG: CPT

## 2019-10-29 DIAGNOSIS — G89.29 CHRONIC LOW BACK PAIN: ICD-10-CM

## 2019-10-29 DIAGNOSIS — M54.50 CHRONIC LOW BACK PAIN: ICD-10-CM

## 2019-10-30 ENCOUNTER — HOSPITAL ENCOUNTER (OUTPATIENT)
Dept: CARDIAC REHAB | Age: 64
Setting detail: THERAPIES SERIES
Discharge: HOME | End: 2019-10-30
Payer: COMMERCIAL

## 2019-10-30 PROCEDURE — 93798 PHYS/QHP OP CAR RHAB W/ECG: CPT

## 2019-10-30 RX ORDER — TRAMADOL HYDROCHLORIDE 50 MG/1
TABLET ORAL
Qty: 28 TABLET | Refills: 0 | Status: SHIPPED | OUTPATIENT
Start: 2019-10-30 | End: 2019-11-22 | Stop reason: SDUPTHER

## 2019-11-04 ENCOUNTER — HOSPITAL ENCOUNTER (OUTPATIENT)
Dept: CARDIAC REHAB | Age: 64
Setting detail: THERAPIES SERIES
Discharge: HOME | End: 2019-11-04
Payer: COMMERCIAL

## 2019-11-04 PROCEDURE — 93798 PHYS/QHP OP CAR RHAB W/ECG: CPT

## 2019-11-06 ENCOUNTER — HOSPITAL ENCOUNTER (OUTPATIENT)
Dept: CARDIAC REHAB | Age: 64
Setting detail: THERAPIES SERIES
Discharge: HOME | End: 2019-11-06
Payer: COMMERCIAL

## 2019-11-06 PROCEDURE — 93798 PHYS/QHP OP CAR RHAB W/ECG: CPT

## 2019-11-06 RX ORDER — GABAPENTIN 100 MG/1
100 CAPSULE ORAL 2 TIMES DAILY
Qty: 180 CAPSULE | Refills: 1 | Status: SHIPPED | OUTPATIENT
Start: 2019-11-06 | End: 2020-08-07

## 2019-11-07 ENCOUNTER — TELEPHONE (OUTPATIENT)
Dept: FAMILY MEDICINE CLINIC | Age: 64
End: 2019-11-07

## 2019-11-08 ENCOUNTER — HOSPITAL ENCOUNTER (OUTPATIENT)
Dept: CARDIAC REHAB | Age: 64
Setting detail: THERAPIES SERIES
Discharge: HOME | End: 2019-11-08
Payer: COMMERCIAL

## 2019-11-08 PROCEDURE — 93798 PHYS/QHP OP CAR RHAB W/ECG: CPT

## 2019-11-11 ENCOUNTER — APPOINTMENT (OUTPATIENT)
Dept: CARDIAC REHAB | Age: 64
End: 2019-11-11
Payer: COMMERCIAL

## 2019-11-22 DIAGNOSIS — M54.50 CHRONIC LOW BACK PAIN: ICD-10-CM

## 2019-11-22 DIAGNOSIS — G89.29 CHRONIC LOW BACK PAIN: ICD-10-CM

## 2019-11-22 RX ORDER — TRAMADOL HYDROCHLORIDE 50 MG/1
50 TABLET ORAL EVERY 6 HOURS PRN
Qty: 28 TABLET | Refills: 0 | Status: SHIPPED | OUTPATIENT
Start: 2019-11-22 | End: 2019-12-16 | Stop reason: SDUPTHER

## 2019-11-25 ENCOUNTER — OFFICE VISIT (OUTPATIENT)
Dept: FAMILY MEDICINE CLINIC | Age: 64
End: 2019-11-25
Payer: COMMERCIAL

## 2019-11-25 VITALS
SYSTOLIC BLOOD PRESSURE: 122 MMHG | HEIGHT: 67 IN | OXYGEN SATURATION: 98 % | HEART RATE: 76 BPM | DIASTOLIC BLOOD PRESSURE: 78 MMHG | BODY MASS INDEX: 22.79 KG/M2 | WEIGHT: 145.2 LBS

## 2019-11-25 DIAGNOSIS — M54.50 CHRONIC BILATERAL LOW BACK PAIN WITHOUT SCIATICA: ICD-10-CM

## 2019-11-25 DIAGNOSIS — M79.7 FIBROMYALGIA: Primary | ICD-10-CM

## 2019-11-25 DIAGNOSIS — G89.29 CHRONIC BILATERAL LOW BACK PAIN WITHOUT SCIATICA: ICD-10-CM

## 2019-11-25 DIAGNOSIS — G43.709 CHRONIC MIGRAINE WITHOUT AURA WITHOUT STATUS MIGRAINOSUS, NOT INTRACTABLE: ICD-10-CM

## 2019-11-25 PROCEDURE — 99214 OFFICE O/P EST MOD 30 MIN: CPT | Performed by: FAMILY MEDICINE

## 2019-11-25 ASSESSMENT — ENCOUNTER SYMPTOMS
COUGH: 0
CHEST TIGHTNESS: 0
BACK PAIN: 1
BLOOD IN STOOL: 0
ABDOMINAL PAIN: 0
SHORTNESS OF BREATH: 0

## 2019-11-27 RX ORDER — CITALOPRAM 40 MG/1
TABLET ORAL
Qty: 90 TABLET | Refills: 1 | Status: SHIPPED | OUTPATIENT
Start: 2019-11-27 | End: 2020-06-25

## 2019-12-16 ENCOUNTER — OFFICE VISIT (OUTPATIENT)
Dept: CARDIOLOGY CLINIC | Age: 64
End: 2019-12-16
Payer: COMMERCIAL

## 2019-12-16 VITALS
WEIGHT: 147.2 LBS | DIASTOLIC BLOOD PRESSURE: 70 MMHG | HEART RATE: 65 BPM | BODY MASS INDEX: 23.4 KG/M2 | SYSTOLIC BLOOD PRESSURE: 110 MMHG

## 2019-12-16 DIAGNOSIS — G89.29 CHRONIC LOW BACK PAIN: ICD-10-CM

## 2019-12-16 DIAGNOSIS — I25.10 CORONARY ARTERY DISEASE INVOLVING NATIVE CORONARY ARTERY OF NATIVE HEART WITHOUT ANGINA PECTORIS: Primary | ICD-10-CM

## 2019-12-16 DIAGNOSIS — M54.50 CHRONIC LOW BACK PAIN: ICD-10-CM

## 2019-12-16 PROCEDURE — 99214 OFFICE O/P EST MOD 30 MIN: CPT | Performed by: INTERNAL MEDICINE

## 2019-12-16 RX ORDER — TRAMADOL HYDROCHLORIDE 50 MG/1
TABLET ORAL
Qty: 28 TABLET | Refills: 0 | Status: SHIPPED | OUTPATIENT
Start: 2019-12-16 | End: 2020-01-06 | Stop reason: SDUPTHER

## 2020-01-08 ENCOUNTER — TELEPHONE (OUTPATIENT)
Dept: FAMILY MEDICINE CLINIC | Age: 65
End: 2020-01-08

## 2020-01-08 NOTE — TELEPHONE ENCOUNTER
I called [facility] and spoke to  Shelby Guerrero. She states this medication needs to go through [address] ([address]) formally [facility] that she has exceeded her fills for this medication at a the retail pharmacy. Please notify patient. Thank you.

## 2020-01-09 NOTE — TELEPHONE ENCOUNTER
The only pharmacy is [facility] outpatient, same address as well.  Pended pharmacy and medication

## 2020-01-15 ENCOUNTER — TELEPHONE (OUTPATIENT)
Dept: FAMILY MEDICINE CLINIC | Age: 65
End: 2020-01-15

## 2020-01-17 NOTE — TELEPHONE ENCOUNTER
Amovig free at [facility] instead of [facility] pharmacy. Please send rx.    Future ov 3/26/20

## 2020-01-22 ENCOUNTER — TELEPHONE (OUTPATIENT)
Dept: FAMILY MEDICINE CLINIC | Age: 65
End: 2020-01-22

## 2020-01-22 NOTE — TELEPHONE ENCOUNTER
[facility] Needs re-sent to [facility] pharmacy. Insurance will not cover at outside [facility]. I have pended the medication and [facility] pharmacy as this location was used last time.

## 2020-01-22 NOTE — TELEPHONE ENCOUNTER
PRIOR AUTH NEEDED FOR Aimovig 140mg/mL auto-injectors     KEY: [id number]   : 55  LAST NAME: Vicki Aragon

## 2020-01-29 ENCOUNTER — TELEPHONE (OUTPATIENT)
Dept: CARDIOLOGY CLINIC | Age: 65
End: 2020-01-29

## 2020-02-03 ENCOUNTER — TELEPHONE (OUTPATIENT)
Dept: CARDIOLOGY CLINIC | Age: 65
End: 2020-02-03

## 2020-02-10 ENCOUNTER — TELEPHONE (OUTPATIENT)
Dept: FAMILY MEDICINE CLINIC | Age: 65
End: 2020-02-10

## 2020-02-10 NOTE — TELEPHONE ENCOUNTER
Suzi Lucero. Nancy called to clarify. Said the tramadol was called in twice on 1/7. Wants to know if there was a change or if it was accidentally sent in twice.

## 2020-02-12 RX ORDER — TRAMADOL HYDROCHLORIDE 50 MG/1
TABLET ORAL
Qty: 45 TABLET | Refills: 0 | Status: SHIPPED | OUTPATIENT
Start: 2020-02-12 | End: 2020-03-16

## 2020-02-17 ENCOUNTER — TELEPHONE (OUTPATIENT)
Dept: CARDIOLOGY CLINIC | Age: 65
End: 2020-02-17

## 2020-02-17 NOTE — TELEPHONE ENCOUNTER
[facility] Medication Refills:    ticagrelor (BRILINTA) 90 MG TABS tablet  Take 1 tablet by mouth 2 times daily, Disp-60 tablet, R-3    The patient states she only has one tablet left   Please send to this pharmacy so the patient can pick the prescription up  [place], [address]    Last Office Visit: 12/16/20    Next Office Visit: 06/26/20    Last Refill: 10/08/19    Last Labs: 10/08/19

## 2020-03-04 ENCOUNTER — TELEPHONE (OUTPATIENT)
Dept: FAMILY MEDICINE CLINIC | Age: 65
End: 2020-03-04

## 2020-03-06 ENCOUNTER — TELEPHONE (OUTPATIENT)
Dept: FAMILY MEDICINE CLINIC | Age: 65
End: 2020-03-06

## 2020-03-09 RX ORDER — FREMANEZUMAB-VFRM 225 MG/1.5ML
INJECTION SUBCUTANEOUS
Qty: 1 SYRINGE | Refills: 2 | Status: SHIPPED | OUTPATIENT
Start: 2020-03-09 | End: 2020-04-22 | Stop reason: ALTCHOICE

## 2020-04-15 ENCOUNTER — TELEPHONE (OUTPATIENT)
Dept: FAMILY MEDICINE CLINIC | Age: 65
End: 2020-04-15

## 2020-04-20 ENCOUNTER — TELEPHONE (OUTPATIENT)
Dept: FAMILY MEDICINE CLINIC | Age: 65
End: 2020-04-20

## 2020-04-20 RX ORDER — TRAMADOL HYDROCHLORIDE 50 MG/1
50 TABLET ORAL 2 TIMES DAILY PRN
Qty: 45 TABLET | Refills: 0 | Status: SHIPPED | OUTPATIENT
Start: 2020-04-20 | End: 2020-06-23

## 2020-04-20 NOTE — TELEPHONE ENCOUNTER
.  Last Seen: 11/25/2019    Last Written: 3/17/20 45 tablet 0 refills    Last UDS: 11/8/18     OARRS [place] On: 7/2/18     Med Agreement Signed On: 8/14/18     Next Appointment: 4/28/2020    Requested Prescriptions     Pending Prescriptions Disp Refills    traMADol (ULTRAM) 50 MG tablet 45 tablet 0

## 2020-04-22 ENCOUNTER — OFFICE VISIT (OUTPATIENT)
Dept: FAMILY MEDICINE CLINIC | Age: 65
End: 2020-04-22
Payer: COMMERCIAL

## 2020-04-22 VITALS
HEART RATE: 66 BPM | DIASTOLIC BLOOD PRESSURE: 70 MMHG | OXYGEN SATURATION: 97 % | SYSTOLIC BLOOD PRESSURE: 112 MMHG | BODY MASS INDEX: 24.45 KG/M2 | WEIGHT: 153.8 LBS

## 2020-04-22 DIAGNOSIS — R07.9 CHEST PAIN, UNSPECIFIED TYPE: ICD-10-CM

## 2020-04-22 DIAGNOSIS — Z92.89 H/O ANGIOGRAPHY: ICD-10-CM

## 2020-04-22 DIAGNOSIS — E78.2 MIXED HYPERLIPIDEMIA: ICD-10-CM

## 2020-04-22 DIAGNOSIS — I20.0 UNSTABLE ANGINA (HCC): ICD-10-CM

## 2020-04-22 PROCEDURE — 99213 OFFICE O/P EST LOW 20 MIN: CPT | Performed by: FAMILY MEDICINE

## 2020-04-22 RX ORDER — HYDROCODONE BITARTRATE AND ACETAMINOPHEN 5; 325 MG/1; MG/1
1 TABLET ORAL DAILY PRN
Qty: 10 TABLET | Refills: 0 | Status: SHIPPED | OUTPATIENT
Start: 2020-04-22 | End: 2020-05-18 | Stop reason: SDUPTHER

## 2020-04-22 ASSESSMENT — ENCOUNTER SYMPTOMS
COUGH: 0
SHORTNESS OF BREATH: 0
BACK PAIN: 1

## 2020-04-22 NOTE — PROGRESS NOTES
Historical Provider, MD      Past Medical History:  No date: Allergic rhinitis  No date: Anxiety  6/5/2019: CAD (coronary artery disease)  No date: Depression  No date: Endometriosis      Comment:  hx  No date: Fibromyalgia  No date: GERD (gastroesophageal reflux disease)  No date: Headache(784.0)  No date: Hiatal hernia  No date: Hyperlipidemia  No date: Irritable bowel syndrome  No date: Left bundle branch block (LBBB)  No date: Osteoarthritis  No date: SVT (supraventricular tachycardia) (HCC)      Comment:  hx   No date: Trouble swallowing        Review of Systems    Review of Systems   Constitutional: Negative for fever and unexpected weight change. Respiratory: Negative for cough and shortness of breath. Cardiovascular: Negative for chest pain and palpitations. Musculoskeletal: Positive for arthralgias, back pain and myalgias. Neurological: Positive for headaches. Objective:   Physical Exam      Physical Exam  Constitutional:       Appearance: Normal appearance. She is normal weight. HENT:      Mouth/Throat:      Mouth: Mucous membranes are moist.      Pharynx: Oropharynx is clear. Neck:      Vascular: No carotid bruit. Cardiovascular:      Rate and Rhythm: Normal rate and regular rhythm. Heart sounds: Normal heart sounds. Pulmonary:      Effort: Pulmonary effort is normal.      Breath sounds: Normal breath sounds. Abdominal:      Palpations: Abdomen is soft. Tenderness: There is no abdominal tenderness. Musculoskeletal:      Comments: Mild tenderness to palpate musculature of both arms and forearms both thighs and both calves. Neurological:      Mental Status: She is alert and oriented to person, place, and time. Psychiatric:         Mood and Affect: Mood normal.         Behavior: Behavior normal.         Thought Content: Thought content normal.         Judgment: Judgment normal.         Assessment:       Diagnosis Orders   1.  Fibromyalgia  HYDROcodone-acetaminophen (NORCO) 5-325 MG per tablet   2. Chronic migraine without aura without status migrainosus, not intractable  HYDROcodone-acetaminophen (NORCO) 5-325 MG per tablet         Plan:      Michelle Eid was seen today for follow-up. Diagnoses and all orders for this visit:    Fibromyalgia  -     HYDROcodone-acetaminophen (NORCO) 5-325 MG per tablet; Take 1 tablet by mouth daily as needed for Pain for up to 10 days. . Take lowest dose possible to manage pain  Continue medications as directed and I will send over 10 hydrocodone to be used 1/2 tablet on a flareup of the fibromyalgia. Chronic migraine without aura without status migrainosus, not intractable  -     HYDROcodone-acetaminophen (NORCO) 5-325 MG per tablet; Take 1 tablet by mouth daily as needed for Pain for up to 10 days. . Take lowest dose possible to manage pain  Continue medications. See me 3 months.      Bubba Gamez, DO

## 2020-04-23 ENCOUNTER — TELEPHONE (OUTPATIENT)
Dept: CARDIOLOGY CLINIC | Age: 65
End: 2020-04-23

## 2020-04-23 LAB
A/G RATIO: 1.8 (ref 1.1–2.2)
ALBUMIN SERPL-MCNC: 4.8 G/DL (ref 3.4–5)
ALP BLD-CCNC: 85 U/L (ref 40–129)
ALT SERPL-CCNC: 11 U/L (ref 10–40)
ANION GAP SERPL CALCULATED.3IONS-SCNC: 14 MMOL/L (ref 3–16)
AST SERPL-CCNC: 18 U/L (ref 15–37)
BASOPHILS ABSOLUTE: 0.1 K/UL (ref 0–0.2)
BASOPHILS RELATIVE PERCENT: 1.1 %
BILIRUB SERPL-MCNC: 0.5 MG/DL (ref 0–1)
BUN BLDV-MCNC: 10 MG/DL (ref 7–20)
CALCIUM SERPL-MCNC: 9.9 MG/DL (ref 8.3–10.6)
CHLORIDE BLD-SCNC: 97 MMOL/L (ref 99–110)
CO2: 26 MMOL/L (ref 21–32)
CREAT SERPL-MCNC: 0.7 MG/DL (ref 0.6–1.2)
EOSINOPHILS ABSOLUTE: 0.3 K/UL (ref 0–0.6)
EOSINOPHILS RELATIVE PERCENT: 5.1 %
GFR AFRICAN AMERICAN: >60
GFR NON-AFRICAN AMERICAN: >60
GLOBULIN: 2.7 G/DL
GLUCOSE BLD-MCNC: 94 MG/DL (ref 70–99)
HCT VFR BLD CALC: 40.7 % (ref 36–48)
HEMOGLOBIN: 13.7 G/DL (ref 12–16)
LYMPHOCYTES ABSOLUTE: 2.1 K/UL (ref 1–5.1)
LYMPHOCYTES RELATIVE PERCENT: 35.9 %
MAGNESIUM: 2.3 MG/DL (ref 1.8–2.4)
MCH RBC QN AUTO: 31.8 PG (ref 26–34)
MCHC RBC AUTO-ENTMCNC: 33.5 G/DL (ref 31–36)
MCV RBC AUTO: 94.7 FL (ref 80–100)
MONOCYTES ABSOLUTE: 0.4 K/UL (ref 0–1.3)
MONOCYTES RELATIVE PERCENT: 7.2 %
NEUTROPHILS ABSOLUTE: 3 K/UL (ref 1.7–7.7)
NEUTROPHILS RELATIVE PERCENT: 50.7 %
PDW BLD-RTO: 13.7 % (ref 12.4–15.4)
PLATELET # BLD: 293 K/UL (ref 135–450)
PMV BLD AUTO: 7.1 FL (ref 5–10.5)
POTASSIUM SERPL-SCNC: 4.2 MMOL/L (ref 3.5–5.1)
RBC # BLD: 4.3 M/UL (ref 4–5.2)
SODIUM BLD-SCNC: 137 MMOL/L (ref 136–145)
TOTAL PROTEIN: 7.5 G/DL (ref 6.4–8.2)
TSH REFLEX FT4: 1.49 UIU/ML (ref 0.27–4.2)
VITAMIN D 25-HYDROXY: 16.4 NG/ML
WBC # BLD: 6 K/UL (ref 4–11)

## 2020-05-04 ENCOUNTER — TELEPHONE (OUTPATIENT)
Dept: FAMILY MEDICINE CLINIC | Age: 65
End: 2020-05-04

## 2020-05-18 RX ORDER — HYDROCODONE BITARTRATE AND ACETAMINOPHEN 5; 325 MG/1; MG/1
1 TABLET ORAL DAILY PRN
Qty: 10 TABLET | Refills: 0 | Status: SHIPPED | OUTPATIENT
Start: 2020-05-18 | End: 2020-06-22 | Stop reason: SDUPTHER

## 2020-06-11 ENCOUNTER — TELEPHONE (OUTPATIENT)
Dept: FAMILY MEDICINE CLINIC | Age: 65
End: 2020-06-11

## 2020-06-12 ENCOUNTER — PATIENT MESSAGE (OUTPATIENT)
Dept: FAMILY MEDICINE CLINIC | Age: 65
End: 2020-06-12

## 2020-06-15 NOTE — TELEPHONE ENCOUNTER
.  Last office visit 4/22/2020     Last written 4/20/20 #45 no refills     Next office visit scheduled 7/22/2020    Requested Prescriptions     Pending Prescriptions Disp Refills    traMADol (ULTRAM) 50 MG tablet [Pharmacy Med Name: traMADol HCL 50MG TABLET] 45 tablet 0     Sig: TAKE ONE TABLET BY MOUTH TWICE A DAY AS NEEDED FOR PAIN     uds- 11/8/18  Med contract- 8/14/18

## 2020-06-15 NOTE — TELEPHONE ENCOUNTER
From: Jermaine Shen  To: Eugenia Bowman DO  Sent: 6/12/2020 6:20 PM EDT  Subject: Prescription Question    Hi,   I put in a request for a refill of Norco and I also need a refill of my Tramadol. I have an appointment on 07.22.20.

## 2020-06-16 ENCOUNTER — TELEPHONE (OUTPATIENT)
Dept: FAMILY MEDICINE CLINIC | Age: 65
End: 2020-06-16

## 2020-06-17 RX ORDER — CIPROFLOXACIN 250 MG/1
250 TABLET, FILM COATED ORAL 2 TIMES DAILY
Qty: 14 TABLET | Refills: 0 | Status: SHIPPED | OUTPATIENT
Start: 2020-06-17 | End: 2020-08-07

## 2020-06-17 RX ORDER — METRONIDAZOLE 500 MG/1
500 TABLET ORAL 3 TIMES DAILY
Qty: 30 TABLET | Refills: 0 | Status: SHIPPED | OUTPATIENT
Start: 2020-06-17 | End: 2020-06-27

## 2020-06-19 ENCOUNTER — TELEPHONE (OUTPATIENT)
Dept: FAMILY MEDICINE CLINIC | Age: 65
End: 2020-06-19

## 2020-06-19 DIAGNOSIS — M79.7 FIBROMYALGIA: ICD-10-CM

## 2020-06-19 DIAGNOSIS — G43.709 CHRONIC MIGRAINE WITHOUT AURA WITHOUT STATUS MIGRAINOSUS, NOT INTRACTABLE: ICD-10-CM

## 2020-06-19 RX ORDER — HYDROCODONE BITARTRATE AND ACETAMINOPHEN 5; 325 MG/1; MG/1
1 TABLET ORAL DAILY PRN
Qty: 10 TABLET | Refills: 0 | Status: CANCELLED | OUTPATIENT
Start: 2020-06-19 | End: 2020-06-29

## 2020-06-22 RX ORDER — HYDROCODONE BITARTRATE AND ACETAMINOPHEN 5; 325 MG/1; MG/1
1 TABLET ORAL DAILY PRN
Qty: 10 TABLET | Refills: 0 | Status: SHIPPED | OUTPATIENT
Start: 2020-06-22 | End: 2020-08-07 | Stop reason: SDUPTHER

## 2020-06-23 RX ORDER — TRAMADOL HYDROCHLORIDE 50 MG/1
TABLET ORAL
Qty: 45 TABLET | Refills: 0 | Status: SHIPPED | OUTPATIENT
Start: 2020-06-23 | End: 2020-08-04 | Stop reason: SDUPTHER

## 2020-06-25 RX ORDER — CITALOPRAM 40 MG/1
TABLET ORAL
Qty: 90 TABLET | Refills: 1 | Status: SHIPPED | OUTPATIENT
Start: 2020-06-25 | End: 2021-01-28

## 2020-06-25 NOTE — TELEPHONE ENCOUNTER
.  Last office visit 4/22/2020     Last written 11-27-19 90 with 1      Next office visit scheduled 7/22/2020    Requested Prescriptions     Pending Prescriptions Disp Refills    citalopram (CELEXA) 40 MG tablet [Pharmacy Med Name: CITALOPRAM HYDROBROMIDE 40MG TABS] 90 tablet 1     Sig: TAKE 1 TABLET BY MOUTH ONE TIME A DAY

## 2020-06-30 ENCOUNTER — TELEPHONE (OUTPATIENT)
Dept: FAMILY MEDICINE CLINIC | Age: 65
End: 2020-06-30

## 2020-06-30 NOTE — TELEPHONE ENCOUNTER
Pt states on the back page of her FMLA form where it has the graph where it says first date of leave should be 6/1/20 and the last date ending was sometime in November per pt.  Pt asking for this to be changed and refaxed

## 2020-07-21 RX ORDER — TICAGRELOR 90 MG/1
TABLET ORAL
Qty: 60 TABLET | Refills: 5 | Status: SHIPPED | OUTPATIENT
Start: 2020-07-21 | End: 2020-08-21

## 2020-08-04 ENCOUNTER — PATIENT MESSAGE (OUTPATIENT)
Dept: FAMILY MEDICINE CLINIC | Age: 65
End: 2020-08-04

## 2020-08-04 NOTE — TELEPHONE ENCOUNTER
From: Hilton Pacheco  To: Hannah Hills DO  Sent: 8/4/2020 9:41 AM EDT  Subject: Prescription Question    Hi, could i get a refill of my Tramadol sent to [facility].

## 2020-08-04 NOTE — TELEPHONE ENCOUNTER
Last office visit 4/22/2020     Last written 6-     Next office visit scheduled 8/7/2020    Requested Prescriptions     Pending Prescriptions Disp Refills    traMADol (ULTRAM) 50 MG tablet 45 tablet 0     Sig: Take 1 tablet by mouth 2 times daily for 30 days.

## 2020-08-05 RX ORDER — TRAMADOL HYDROCHLORIDE 50 MG/1
50 TABLET ORAL 2 TIMES DAILY
Qty: 45 TABLET | Refills: 0 | Status: SHIPPED | OUTPATIENT
Start: 2020-08-05 | End: 2020-09-17 | Stop reason: SDUPTHER

## 2020-08-07 ENCOUNTER — OFFICE VISIT (OUTPATIENT)
Dept: FAMILY MEDICINE CLINIC | Age: 65
End: 2020-08-07
Payer: COMMERCIAL

## 2020-08-07 VITALS
BODY MASS INDEX: 24.83 KG/M2 | HEART RATE: 73 BPM | SYSTOLIC BLOOD PRESSURE: 130 MMHG | WEIGHT: 156.2 LBS | TEMPERATURE: 97.4 F | DIASTOLIC BLOOD PRESSURE: 62 MMHG | OXYGEN SATURATION: 95 % | RESPIRATION RATE: 16 BRPM

## 2020-08-07 PROBLEM — R06.09 DYSPNEA ON EXERTION: Status: RESOLVED | Noted: 2019-06-01 | Resolved: 2020-08-07

## 2020-08-07 PROBLEM — M79.2 NEUROPATHIC PAIN: Status: RESOLVED | Noted: 2017-06-15 | Resolved: 2020-08-07

## 2020-08-07 PROBLEM — G43.119 MIGRAINE WITH AURA, INTRACTABLE, WITHOUT STATUS MIGRAINOSUS: Status: RESOLVED | Noted: 2017-06-15 | Resolved: 2020-08-07

## 2020-08-07 PROBLEM — S93.492A SPRAIN OF OTHER LIGAMENT OF LEFT ANKLE: Status: RESOLVED | Noted: 2018-06-09 | Resolved: 2020-08-07

## 2020-08-07 PROBLEM — R41.3 MEMORY PROBLEM: Status: RESOLVED | Noted: 2017-06-15 | Resolved: 2020-08-07

## 2020-08-07 PROBLEM — R94.39 ABNORMAL STRESS ECHO: Status: RESOLVED | Noted: 2019-06-03 | Resolved: 2020-08-07

## 2020-08-07 PROBLEM — G43.719 INTRACTABLE CHRONIC MIGRAINE WITHOUT AURA AND WITHOUT STATUS MIGRAINOSUS: Status: RESOLVED | Noted: 2017-06-15 | Resolved: 2020-08-07

## 2020-08-07 PROCEDURE — 90471 IMMUNIZATION ADMIN: CPT | Performed by: NURSE PRACTITIONER

## 2020-08-07 PROCEDURE — 90732 PPSV23 VACC 2 YRS+ SUBQ/IM: CPT | Performed by: NURSE PRACTITIONER

## 2020-08-07 PROCEDURE — 99213 OFFICE O/P EST LOW 20 MIN: CPT | Performed by: NURSE PRACTITIONER

## 2020-08-07 RX ORDER — HYDROCODONE BITARTRATE AND ACETAMINOPHEN 5; 325 MG/1; MG/1
1 TABLET ORAL DAILY PRN
Qty: 10 TABLET | Refills: 0 | Status: SHIPPED | OUTPATIENT
Start: 2020-08-07 | End: 2020-09-04 | Stop reason: SDUPTHER

## 2020-08-07 NOTE — PROGRESS NOTES
Subjective:      Patient ID: Asim Engle is a 72 y.o. female. HPI     To establish care with this provider at her request.     Hx of migraines and fibromyalgia, increased in the summer. Taking tramadoll routinely. During exacerbation taking norco.    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 Stays busy. Cardiology by Dr. Willie Arrieta, has appt next month. Review of Systems   Constitutional: Negative for chills, fever and unexpected weight change. Respiratory: Negative for chest tightness and shortness of breath. Cardiovascular: Negative for chest pain and palpitations. Gastrointestinal: Negative for constipation and diarrhea. Genitourinary: Negative for difficulty urinating. Musculoskeletal: Positive for arthralgias and myalgias. Negative for back pain and gait problem. Neurological: Positive for headaches. Negative for dizziness. Psychiatric/Behavioral: Negative. Objective:   Physical Exam  Constitutional:       Appearance: Normal appearance. She is well-developed. She is obese. HENT:      Head: Normocephalic and atraumatic. Neck:      Musculoskeletal: Normal range of motion and neck supple. Thyroid: No thyromegaly. Vascular: No carotid bruit. Cardiovascular:      Rate and Rhythm: Normal rate and regular rhythm. Pulses: Normal pulses. Heart sounds: Normal heart sounds. Pulmonary:      Effort: Pulmonary effort is normal.      Breath sounds: Normal breath sounds. Abdominal:      General: Bowel sounds are normal.      Palpations: Abdomen is soft. Musculoskeletal:         General: No swelling. Comments: Limited ROM generalized. Gait steady. Raises independent from seated. Skin:     General: Skin is warm. Neurological:      Mental Status: She is alert and oriented to person, place, and time. Psychiatric:         Mood and Affect: Mood normal.         Behavior: Behavior normal.         Assessment:      1. Migraine headaches  2. Fibromyalgia  3.

## 2020-08-07 NOTE — LETTER
MEDICATION AGREEMENT     Shelby HERNANDEZ Mary Elisa  [birth year]      For certain conditions, multiple classes of medications may be used to help better manage your symptoms, and to improve your ability to function at home, work and in social settings. However, these medications do have risks, which will be discussed with you, including addiction and dependency. The following prescribed medications need frequent monitoring and will require you to partner and assist in your healthcare. Medication  Dose, instructions and quantity as indicated on current prescription bottle Diagnosis/Reason(s) for Taking Category   Tramadol 50 mg every 8 hours as needed Migraine headache, fibromyalgia      Norco 1 tablet daily as needed Migraine headache, fibromyalgia                     Benefits and goals of Controlled Substance Medications: There are two potential goals for your treatment: (1) decreased pain and suffering (2) improved daily life functions. There are many possible treatments for your chronic condition(s), and, in addition to controlled substance medications, we will try alternatives such as physical therapy, yoga, massage, home daily exercise, meditation, relaxation techniques, injections, chiropractic manipulations, surgery, cognitive therapy, hypnosis and many medications that are not habit-forming. Use of controlled substance medications may be helpful, but they are unlikely to resolve all of your symptoms or restore all function. Risks of Controlled Substance Medications:    Opioid pain medications: These medications can lead to problems such as addiction/dependence, sedation, lightheadedness/dizziness, memory issues, falls, constipation, nausea, or vomiting. They may also impair the ability to drive or operate machinery. Additionally, these medications may lower testosterone levels, leading to loss of bone strength, stamina and sex drive.   They may cause problems with breathing, sleep apnea and reduced coughing, which are especially dangerous for patients with lung disease. Overdose or dangerous interactions with alcohol and other medications may occur, leading to death. Hyperalgesia may develop, in which patients receiving opioids for the treatment of pain may actually become more sensitive to certain painful stimuli, and in some cases, experience pain from ordinarily non-painful stimuli. Women between the ages of 14-53 who could become pregnant should carefully weigh the risks and benefits of opioids with their physicians, as these medications increase the risk of pregnancy complications, including miscarriage,  delivery and stillbirth. It is also possible for babies to be born addicted to opioids. Opioid dependence withdrawal symptoms may include; feelings of uneasiness, increased pain, irritability, belly pain, diarrhea, sweats and goose-flesh. Benzodiazepines and non-benzodiazepine sleep medications: These medications can lead to problems such as addiction/dependence, sedation, fatigue, lightheadedness, dizziness, incoordination, falls, depression, hallucinations, and impaired judgment, memory and concentration. The ability to drive and operate machinery may also be affected. Abnormal sleep-related behaviors have been reported, including sleep walking, driving, making telephone calls, eating, or having sex while not fully awake. These medications can suppress breathing and worsen sleep apnea, particularly when combined with alcohol or other sedating medications, potentially leading to death. Dependence withdrawal symptoms may include tremors, anxiety, hallucinations and seizures. Stimulants:  Common adverse effects include addiction/dependence, increased blood pressure and heart rate, decreased appetite, nausea, involuntary weight loss, insomnia, irritability, and headaches.   These risks may increase when these medications are combined with other stimulants, such as caffeine pills or energy drinks, certain weight loss supplements and oral decongestants. Dependence withdrawal symptoms may include depressed mood, loss of interest, suicidal thoughts, anxiety, fatigue, appetite changes and agitation. Testosterone replacement therapy:  Potential side effects include increased risk of stroke and heart attack, blood clots, increased blood pressure, increased cholesterol, enlarged prostate, sleep apnea, irritability/aggression and other mood disorders, and decreased fertility. Other:     1. I understand that I have the following responsibilities:  · I will take medications at the dose and frequency prescribed. · I will not increase or change how I take my medications without the approval of the health care provider who signs this Medication Agreement. · I will arrange for refills at the prescribed interval ONLY during regular office hours. I will not ask for refills earlier than agreed, after-hours, on holidays or on weekends. · I will obtain all refills for these medications at  ·  [facility]___________________________________  pharmacy (phone number  ·  ________________________), with full consent for my provider and pharmacist to exchange information in writing or verbally. · I will not request any pain medications or controlled substances from other providers and will inform this provider of all other medications I am taking. · I will inform my other health care providers that I am taking these medications and of the existence of this [address]46. In the event of an emergency, I will provide the same information to the emergency department providers. · I will protect my prescriptions and medications. I understand that lost or misplaced prescriptions will not be replaced. · I will keep medications only for my own use and will not share them with others. I will keep all medications away from children. · I agree to participate in any medical, psychological or psychiatric assessments recommended by my provider. · I will actively participate in any program designed to improve function, including social, physical, psychological and daily or work activities. 2. I will not use illegal or street drugs or another person's prescription. If I have an addiction problem with drugs or alcohol and my provider asks me to enter a program to address this issue, I agree to follow through. Such programs may include:  · 12-Step program and securing a sponsor  · Individual counseling   · Inpatient or outpatient treatment  · Other:_____________________________________________________________________________________________________________________________________________    If in treatment, I will request that a copy of the programs initial evaluation and treatment recommendations be sent to this provider and will not expect refills until that is received. I will also request written monthly updates be sent to this provider to verify my continuing treatment. 3. I will consent to drug screening upon my providers request to assure I am only taking the prescribed drugs, described in this MEDICATION AGREEMENT. I understand that a drug screen is a laboratory test in which a sample of my urine, blood or saliva is checked to see what drugs I have been taking. 4. I agree that I will treat the providers and staff at this office with respect at all times. I will keep all of my scheduled appointments, but if I need to cancel my appointment, I will do so a minimum of 24 hours before it is scheduled. 5. I understand that this provider may stop prescribing the medications listed if:  · I do not show any improvement in pain, or my activity has not improved. · I develop rapid tolerance or loss of improvement, as described in my treatment plan. · I develop significant side effects from the medication. · My behavior is inconsistent with the responsibilities outlined above, which may also result in my being prevented from receiving further care from this office. · Other:____________________________________________________________________    AGREEMENT:    I have read the above and have had all of my questions answered. For chronic disease management, I know that my symptoms can be managed with many types of treatments. A chronic medication trial may be part of my treatment, but I must be an active participant in my care. Medication therapy is only one part of my symptom management plan. In some cases, there may be limited scientific evidence to support the chronic use of certain medications to improve symptoms and daily function. Furthermore, in certain circumstances, there may be scientific information that suggests that use of chronic controlled substances may actually worsen my symptoms and increase my risk of unintentional death directly related to this medication therapy. I know that if my provider feels my risk from controlled medications is greater than my benefit, I will have my controlled substance medication(s) compassionately lowered or removed altogether. I agree to a controlled substance medication trial.      I further agree to allow this office to contact my HIPAA contact on file if there are concerns about my safety and use of controlled medications. I have agreed to use the following medications above as instructed by my physician and as stated in this [address].      Patient Signature:  ______________________  MFYC:2/8/5210 or _____________    Provider Signature:______________________  Date:8/7/2020 or _____________

## 2020-08-08 PROBLEM — G43.109 MIGRAINE WITH AURA AND WITHOUT STATUS MIGRAINOSUS, NOT INTRACTABLE: Status: ACTIVE | Noted: 2017-06-15

## 2020-08-08 PROBLEM — R93.1 ABNORMAL FINDINGS ON CARDIAC CATHETERIZATION: Status: RESOLVED | Noted: 2019-10-15 | Resolved: 2020-08-08

## 2020-08-08 ASSESSMENT — ENCOUNTER SYMPTOMS
CONSTIPATION: 0
BACK PAIN: 0
SHORTNESS OF BREATH: 0
DIARRHEA: 0
CHEST TIGHTNESS: 0

## 2020-08-17 ENCOUNTER — NURSE ONLY (OUTPATIENT)
Dept: FAMILY MEDICINE CLINIC | Age: 65
End: 2020-08-17

## 2020-08-17 ENCOUNTER — TELEPHONE (OUTPATIENT)
Dept: FAMILY MEDICINE CLINIC | Age: 65
End: 2020-08-17

## 2020-08-17 LAB
CHOLESTEROL, TOTAL: 129 MG/DL (ref 0–199)
HDLC SERPL-MCNC: 58 MG/DL (ref 40–60)
LDL CHOLESTEROL CALCULATED: 50 MG/DL
TRIGL SERPL-MCNC: 107 MG/DL (ref 0–150)
VITAMIN D 25-HYDROXY: 24.8 NG/ML
VLDLC SERPL CALC-MCNC: 21 MG/DL

## 2020-08-17 NOTE — PROGRESS NOTES
Patient came into the office per physician's request for the following blood test(s): b12, vit d, and lipid.       Blood drawn in office by john    # of tubes sent:2 sst

## 2020-08-19 LAB — VITAMIN B-12: 485 PG/ML (ref 211–911)

## 2020-08-21 ENCOUNTER — TELEPHONE (OUTPATIENT)
Dept: CARDIOLOGY CLINIC | Age: 65
End: 2020-08-21

## 2020-08-21 ENCOUNTER — OFFICE VISIT (OUTPATIENT)
Dept: CARDIOLOGY CLINIC | Age: 65
End: 2020-08-21
Payer: COMMERCIAL

## 2020-08-21 VITALS
WEIGHT: 156 LBS | TEMPERATURE: 97.2 F | HEART RATE: 67 BPM | SYSTOLIC BLOOD PRESSURE: 110 MMHG | HEIGHT: 66 IN | BODY MASS INDEX: 25.07 KG/M2 | DIASTOLIC BLOOD PRESSURE: 72 MMHG

## 2020-08-21 PROCEDURE — 99214 OFFICE O/P EST MOD 30 MIN: CPT | Performed by: INTERNAL MEDICINE

## 2020-08-21 PROCEDURE — 93000 ELECTROCARDIOGRAM COMPLETE: CPT | Performed by: INTERNAL MEDICINE

## 2020-08-21 NOTE — PROGRESS NOTES
[facility]  Office Visit           Leslie Christy MD,  [facility]                      Cardiology             Nikki Avina Se  1955 August 21, 2020    Primary Cardiologist:  Catie Campbell       CC: HPI:  The patient is 72 y.o. female with recent LHC /annually stable and active and at work. She is over a year since her stent was placed. Her vitals are generally stable her lungs are clear the heart is regular and the EKG looks stable today. I think she has coronary disease and is stable and nothing we need to do change different. Her LDL was 50 on current therapy. She is now on Crestor at 20 mg/day. Cardiac cath October 2019  CAD, previous LAD stent  6/2019  / on GDMT /stable   HLD LDL 65 optimal  / crestor   hx SVT ablation / in NSR /stable   hx TIA / no residual     complaint with meds   Discussed nutrition / sodium intake / fluid intake   Recommend activity as tolerated     Reviewed most recent test with pt. reviewed LHC results     Review of Systems:  Constitutional: Denies  fatigue, weakness, night sweats or fever. HEENT: Denies new visual changes, ringing in ears, nosebleeds,nasal congestion  Respiratory: Denies new or change in SOB, PND, orthopnea or cough. Cardiovascular: see HPI  GI: Denies N/V, diarrhea, constipation, abdominal pain, change in bowel habits, melena or hematochezia  : Denies urinary frequency, urgency, incontinence, hematuria or dysuria. Skin: Denies rash, hives, or cyanosis  Musculoskeletal: Denies joint or muscle aches/pain  Neurological: Denies syncope or TIA-like symptoms.   Psychiatric: Denies anxiety, insomnia or depression     Past Medical History:   Diagnosis Date    Allergic rhinitis     Anxiety     CAD (coronary artery disease) 6/5/2019    Depression     Endometriosis     hx    Fibromyalgia     GERD (gastroesophageal reflux disease)     Headache(784.0)     Hiatal hernia     Hyperlipidemia     Irritable bowel syndrome     Left bundle branch block (LBBB)     Osteoarthritis     SVT (supraventricular tachycardia) (HCC)     hx     Trouble swallowing      Past Surgical History:   Procedure Laterality Date    ABLATION OF DYSRHYTHMIC FOCUS      APPENDECTOMY      BUNIONECTOMY  1973    bilateral, 2 revisions left foot    CHOLECYSTECTOMY      COLONOSCOPY  10/15/13    Hx polyps-3 yrs-? poor prep    HYSTERECTOMY      LAPAROSCOPY      6    LAPAROTOMY      3    NASAL SEPTUM SURGERY      OTHER SURGICAL HISTORY      Angiogram     UPPER GASTROINTESTINAL ENDOSCOPY      UPPER GASTROINTESTINAL ENDOSCOPY  10/15/13    volodymyr balderrama     Family History   Problem Relation Age of Onset    Other Mother         sepsis    Dementia Mother     High Cholesterol Mother     Depression Mother     Anxiety Disorder Mother     High Blood Pressure Father     Other Father          from renal failure and CHF    Heart Disease Father     Migraines Father     Heart Attack Brother     Heart Disease Brother     Hypertension Other     High Cholesterol Other     Migraines Other     Heart Disease Other     Diabetes Other      Social History     Tobacco Use    Smoking status: Never Smoker    Smokeless tobacco: Never Used   Substance Use Topics    Alcohol use: No    Drug use: No       Allergies   Allergen Reactions    Avelox [Moxifloxacin Hcl In Nacl] Hives    Cephalexin Hives and Rash    Darvon [Propoxyphene Hcl] Hives    Digoxin Hives    Librax [Chlordiazepoxide-Methscopol] Hives    Motrin [Ibuprofen Micronized] Rash    Other Hives     Most mycin's    Pcn [Penicillins] Rash    Prochlorperazine Edisylate Nausea And Vomiting    Sulfa Antibiotics Rash    Tetracyclines & Related Hives    Nickel Nausea And Vomiting     Hypotension      Statins Other (See Comments)     myalgia     Current Outpatient Medications   Medication Sig Dispense Refill    traMADol (ULTRAM) 50 MG tablet Take 1 tablet by mouth 2 times daily for 30 days.  39 tablet 0    BRILINTA 90 MG TABS tablet TAKE 1 TABLET BY MOUTH 2 TIMES A DAY 60 tablet 5    citalopram (CELEXA) 40 MG tablet TAKE 1 TABLET BY MOUTH ONE TIME A DAY 90 tablet 1    Fremanezumab-vfrm (AJOVY) 225 MG/1.5ML SOSY INJECT CONTENTS OF SYRINGE UNDER THE SKIN ONCE A MONTH 1 Syringe 5    methocarbamol (ROBAXIN) 500 MG tablet TAKE ONE TABLET BY MOUTH THREE TIMES A DAY 90 tablet 2    rosuvastatin (CRESTOR) 20 MG tablet Take 1 tablet by mouth nightly 90 tablet 3    promethazine (PHENERGAN) 25 MG tablet Take 1 tablet by mouth every 6 hours as needed for Nausea 30 tablet 1    metoprolol succinate (TOPROL XL) 25 MG extended release tablet Take 1 tablet by mouth daily (Patient taking differently: Take 25 mg by mouth daily Indications: taking 1/2 tab at night ) 90 tablet 3    aspirin 81 MG chewable tablet Take 1 tablet by mouth daily 30 tablet 3    nitroGLYCERIN (NITROSTAT) 0.4 MG SL tablet up to max of 3 total doses. If no relief after 1 dose, call 911. 25 tablet 3    dicyclomine (BENTYL) 10 MG capsule Take 1 capsule by mouth 3 times daily (before meals) (Patient taking differently: Take 10 mg by mouth 3 times daily as needed ) 90 capsule 0    clotrimazole-betamethasone (LOTRISONE) 1-0.05 % cream Apply topically as needed Apply topically 2 times daily.  Cholecalciferol (VITAMIN D3) 5000 UNITS TABS Take by mouth      omeprazole (PRILOSEC) 20 MG capsule Take 20 mg by mouth every 12 hours.  diphenhydrAMINE (BENADRYL) 25 MG tablet Take 25 mg by mouth every 6 hours as needed. OTC        No current facility-administered medications for this visit.         Physical Exam:   /72   Pulse 67   Temp 97.2 °F (36.2 °C)   Ht 5' 6\" (1.676 m)   Wt 156 lb (70.8 kg)   BMI 25.18 kg/m²   BP Readings from Last 3 Encounters:   08/21/20 110/72   08/07/20 130/62   04/22/20 112/70     Pulse Readings from Last 3 Encounters:   08/21/20 67   08/07/20 73   04/22/20 66     Wt Readings from Last 3 Encounters:   08/21/20

## 2020-08-25 ENCOUNTER — TELEPHONE (OUTPATIENT)
Dept: FAMILY MEDICINE CLINIC | Age: 65
End: 2020-08-25

## 2020-09-01 DIAGNOSIS — G43.709 CHRONIC MIGRAINE WITHOUT AURA WITHOUT STATUS MIGRAINOSUS, NOT INTRACTABLE: ICD-10-CM

## 2020-09-01 DIAGNOSIS — M79.7 FIBROMYALGIA: ICD-10-CM

## 2020-09-04 RX ORDER — HYDROCODONE BITARTRATE AND ACETAMINOPHEN 5; 325 MG/1; MG/1
1 TABLET ORAL DAILY PRN
Qty: 10 TABLET | Refills: 0 | Status: SHIPPED | OUTPATIENT
Start: 2020-09-04 | End: 2020-10-09 | Stop reason: SDUPTHER

## 2020-09-17 RX ORDER — TRAMADOL HYDROCHLORIDE 50 MG/1
50 TABLET ORAL 2 TIMES DAILY
Qty: 45 TABLET | Refills: 0 | Status: SHIPPED | OUTPATIENT
Start: 2020-09-17 | End: 2020-10-23 | Stop reason: SDUPTHER

## 2020-09-22 ENCOUNTER — TELEPHONE (OUTPATIENT)
Dept: FAMILY MEDICINE CLINIC | Age: 65
End: 2020-09-22

## 2020-09-23 NOTE — TELEPHONE ENCOUNTER
Patient needs to request this medication  through [facility].      No PA needed. It needs to be sent to the [facility]. She is out of her allowed [facility] fills which is why its saying she needs a PA.      Please notify patient. Thank you.

## 2020-09-25 RX ORDER — FREMANEZUMAB-VFRM 225 MG/1.5ML
INJECTION SUBCUTANEOUS
Qty: 3 SYRINGE | Refills: 2 | Status: SHIPPED | OUTPATIENT
Start: 2020-09-25 | End: 2020-10-21

## 2020-10-02 ENCOUNTER — TELEPHONE (OUTPATIENT)
Dept: FAMILY MEDICINE CLINIC | Age: 65
End: 2020-10-02

## 2020-10-09 DIAGNOSIS — M79.7 FIBROMYALGIA: ICD-10-CM

## 2020-10-09 DIAGNOSIS — G43.709 CHRONIC MIGRAINE WITHOUT AURA WITHOUT STATUS MIGRAINOSUS, NOT INTRACTABLE: ICD-10-CM

## 2020-10-09 RX ORDER — HYDROCODONE BITARTRATE AND ACETAMINOPHEN 5; 325 MG/1; MG/1
1 TABLET ORAL DAILY PRN
Qty: 10 TABLET | Refills: 0 | Status: SHIPPED | OUTPATIENT
Start: 2020-10-09 | End: 2020-11-11 | Stop reason: SDUPTHER

## 2020-10-09 NOTE — TELEPHONE ENCOUNTER
Refill Request - Controlled Substance    Last Seen: 8/7/2020       Last Written: 9/4/2020    Last UDS: N/A    Med Agreement Signed On: 8/7/2020    Next Appointment: Visit date not found        Requested Prescriptions     Pending Prescriptions Disp Refills    HYDROcodone-acetaminophen (NORCO) 5-325 MG per tablet 10 tablet 0     Sig: Take 1 tablet by mouth daily as needed for Pain for up to 10 days.  May take instead of tramadol when pain is increased

## 2020-10-12 ENCOUNTER — TELEPHONE (OUTPATIENT)
Dept: FAMILY MEDICINE CLINIC | Age: 65
End: 2020-10-12

## 2020-10-14 ENCOUNTER — TELEPHONE (OUTPATIENT)
Dept: FAMILY MEDICINE CLINIC | Age: 65
End: 2020-10-14

## 2020-10-14 RX ORDER — METHYLPREDNISOLONE 4 MG/1
TABLET ORAL
Qty: 1 KIT | Refills: 0 | Status: SHIPPED | OUTPATIENT
Start: 2020-10-14 | End: 2020-10-20

## 2020-10-14 NOTE — TELEPHONE ENCOUNTER
Pt calling requesting medrol dose pack for her fibromyalgia pain. Scheduled next Wednesday with Mariella Mathis but wants this now.

## 2020-10-15 RX ORDER — GALCANEZUMAB 120 MG/ML
120 INJECTION, SOLUTION SUBCUTANEOUS
Qty: 3 PEN | Refills: 3 | Status: SHIPPED | OUTPATIENT
Start: 2020-10-15 | End: 2021-12-22

## 2020-10-16 ENCOUNTER — TELEPHONE (OUTPATIENT)
Dept: FAMILY MEDICINE CLINIC | Age: 65
End: 2020-10-16

## 2020-10-16 NOTE — TELEPHONE ENCOUNTER
Called [facility] for a verbal PA on Emgality 120MG/ML auto-injectors. Spoke to Chaitanya. Pending Auth# [id number]. Received APPROVAL for Emgality 120 mg/ml auto-injectors through 11/21/2020. Approval letter attached. Please notify patient. Thank you.

## 2020-10-21 ENCOUNTER — OFFICE VISIT (OUTPATIENT)
Dept: FAMILY MEDICINE CLINIC | Age: 65
End: 2020-10-21
Payer: COMMERCIAL

## 2020-10-21 VITALS
HEIGHT: 66 IN | HEART RATE: 86 BPM | RESPIRATION RATE: 16 BRPM | SYSTOLIC BLOOD PRESSURE: 118 MMHG | TEMPERATURE: 96.6 F | DIASTOLIC BLOOD PRESSURE: 78 MMHG | WEIGHT: 157.6 LBS | OXYGEN SATURATION: 98 % | BODY MASS INDEX: 25.33 KG/M2

## 2020-10-21 PROCEDURE — 90694 VACC AIIV4 NO PRSRV 0.5ML IM: CPT | Performed by: NURSE PRACTITIONER

## 2020-10-21 PROCEDURE — 99213 OFFICE O/P EST LOW 20 MIN: CPT | Performed by: NURSE PRACTITIONER

## 2020-10-21 PROCEDURE — 90471 IMMUNIZATION ADMIN: CPT | Performed by: NURSE PRACTITIONER

## 2020-10-21 PROCEDURE — 96372 THER/PROPH/DIAG INJ SC/IM: CPT | Performed by: NURSE PRACTITIONER

## 2020-10-21 RX ORDER — CYANOCOBALAMIN 1000 UG/ML
1000 INJECTION INTRAMUSCULAR; SUBCUTANEOUS ONCE
Status: COMPLETED | OUTPATIENT
Start: 2020-10-21 | End: 2020-10-21

## 2020-10-21 RX ORDER — PREDNISONE 10 MG/1
TABLET ORAL
Qty: 35 TABLET | Refills: 0 | Status: SHIPPED | OUTPATIENT
Start: 2020-10-21 | End: 2020-11-06

## 2020-10-21 RX ORDER — ERGOCALCIFEROL 1.25 MG/1
50000 CAPSULE ORAL WEEKLY
Qty: 12 CAPSULE | Refills: 1 | Status: SHIPPED | OUTPATIENT
Start: 2020-10-21 | End: 2021-09-07 | Stop reason: SDUPTHER

## 2020-10-21 RX ADMIN — CYANOCOBALAMIN 1000 MCG: 1000 INJECTION INTRAMUSCULAR; SUBCUTANEOUS at 17:07

## 2020-10-21 SDOH — ECONOMIC STABILITY: INCOME INSECURITY: HOW HARD IS IT FOR YOU TO PAY FOR THE VERY BASICS LIKE FOOD, HOUSING, MEDICAL CARE, AND HEATING?: NOT HARD AT ALL

## 2020-10-21 SDOH — HEALTH STABILITY: PHYSICAL HEALTH: ON AVERAGE, HOW MANY MINUTES DO YOU ENGAGE IN EXERCISE AT THIS LEVEL?: 30 MIN

## 2020-10-21 SDOH — ECONOMIC STABILITY: TRANSPORTATION INSECURITY
IN THE PAST 12 MONTHS, HAS LACK OF TRANSPORTATION KEPT YOU FROM MEETINGS, WORK, OR FROM GETTING THINGS NEEDED FOR DAILY LIVING?: NO

## 2020-10-21 SDOH — HEALTH STABILITY: MENTAL HEALTH
STRESS IS WHEN SOMEONE FEELS TENSE, NERVOUS, ANXIOUS, OR CAN'T SLEEP AT NIGHT BECAUSE THEIR MIND IS TROUBLED. HOW STRESSED ARE YOU?: ONLY A LITTLE

## 2020-10-21 SDOH — ECONOMIC STABILITY: FOOD INSECURITY: WITHIN THE PAST 12 MONTHS, YOU WORRIED THAT YOUR FOOD WOULD RUN OUT BEFORE YOU GOT MONEY TO BUY MORE.: NEVER TRUE

## 2020-10-21 SDOH — HEALTH STABILITY: PHYSICAL HEALTH: ON AVERAGE, HOW MANY DAYS PER WEEK DO YOU ENGAGE IN MODERATE TO STRENUOUS EXERCISE (LIKE A BRISK WALK)?: 3 DAYS

## 2020-10-21 SDOH — ECONOMIC STABILITY: TRANSPORTATION INSECURITY
IN THE PAST 12 MONTHS, HAS THE LACK OF TRANSPORTATION KEPT YOU FROM MEDICAL APPOINTMENTS OR FROM GETTING MEDICATIONS?: NO

## 2020-10-21 SDOH — ECONOMIC STABILITY: FOOD INSECURITY: WITHIN THE PAST 12 MONTHS, THE FOOD YOU BOUGHT JUST DIDN'T LAST AND YOU DIDN'T HAVE MONEY TO GET MORE.: NEVER TRUE

## 2020-10-21 ASSESSMENT — ENCOUNTER SYMPTOMS
BACK PAIN: 0
DIARRHEA: 0
CONSTIPATION: 0
CHEST TIGHTNESS: 0

## 2020-10-21 NOTE — PROGRESS NOTES
Inject 120 mg into the skin every 30 days 3 pen 3    ticagrelor (BRILINTA) 60 MG TABS tablet Take 1 tablet by mouth 2 times daily 180 tablet 3    citalopram (CELEXA) 40 MG tablet TAKE 1 TABLET BY MOUTH ONE TIME A DAY 90 tablet 1    methocarbamol (ROBAXIN) 500 MG tablet TAKE ONE TABLET BY MOUTH THREE TIMES A DAY 90 tablet 2    rosuvastatin (CRESTOR) 20 MG tablet Take 1 tablet by mouth nightly 90 tablet 3    promethazine (PHENERGAN) 25 MG tablet Take 1 tablet by mouth every 6 hours as needed for Nausea 30 tablet 1    metoprolol succinate (TOPROL XL) 25 MG extended release tablet Take 1 tablet by mouth daily 90 tablet 3    aspirin 81 MG chewable tablet Take 1 tablet by mouth daily 30 tablet 3    nitroGLYCERIN (NITROSTAT) 0.4 MG SL tablet up to max of 3 total doses. If no relief after 1 dose, call 911. 25 tablet 3    dicyclomine (BENTYL) 10 MG capsule Take 1 capsule by mouth 3 times daily (before meals) (Patient taking differently: Take 10 mg by mouth 3 times daily as needed ) 90 capsule 0    Cholecalciferol (VITAMIN D3) 5000 UNITS TABS Take by mouth      omeprazole (PRILOSEC) 20 MG capsule Take 20 mg by mouth every 12 hours.  diphenhydrAMINE (BENADRYL) 25 MG tablet Take 25 mg by mouth every 6 hours as needed. OTC        No current facility-administered medications for this visit. Assessment:      1. Fibromyalgia-exacerbation  2. DJD  3. Migraine headache-stable      Plan:      1. .  Controlled Substance Monitoring:    Acute and Chronic Pain Monitoring:   RX Monitoring 10/21/2020   Attestation -   Periodic Controlled Substance Monitoring No signs of potential drug abuse or diversion identified. Chronic Pain > 120 MEDD Obtained or confirmed \"Medication Contract\" on file. 2. Shelby was seen today for chronic pain.     Diagnoses and all orders for this visit:    Fibromyalgia  -     cyanocobalamin injection 1,000 mcg  -     predniSONE (DELTASONE) 10 MG tablet; 2 tablets 2 times daily for 5 days, 1 tablet 2 times daily for 5 days, 1 tablet daily    Primary osteoarthritis involving multiple joints    Flu vaccine need  -     INFLUENZA, QUADV, ADJUVANTED, 65 YRS =, IM, PF, PREFILL SYR, 0.5ML (FLUAD)  -     INFLUENZA, QUADV, ADJUVANTED, 65 YRS =, IM, PF, PREFILL SYR, 0.5ML (FLUAD)    Vitamin D deficiency  -     vitamin D (ERGOCALCIFEROL) 1.25 MG (52001 UT) CAPS capsule;  Take 1 capsule by mouth once a week              SOCORRO ADAMS, APRN - CNP

## 2020-10-29 ENCOUNTER — TELEPHONE (OUTPATIENT)
Dept: FAMILY MEDICINE CLINIC | Age: 65
End: 2020-10-29

## 2020-10-29 NOTE — TELEPHONE ENCOUNTER
Prior Auth need on     Ajovy 225MG/1.5ML Syringes  Key: [id number]    Emgality 120MG/ML Auto-Injectors  Key: [id number]

## 2020-11-06 RX ORDER — PREDNISONE 10 MG/1
TABLET ORAL
Qty: 60 TABLET | Refills: 0 | Status: ON HOLD
Start: 2020-11-06 | End: 2021-01-06 | Stop reason: HOSPADM

## 2020-11-16 RX ORDER — METOPROLOL SUCCINATE 25 MG/1
TABLET, EXTENDED RELEASE ORAL
Qty: 90 TABLET | Refills: 1 | Status: SHIPPED | OUTPATIENT
Start: 2020-11-16 | End: 2021-07-22

## 2020-11-16 RX ORDER — ROSUVASTATIN CALCIUM 20 MG/1
TABLET, COATED ORAL
Qty: 90 TABLET | Refills: 1 | Status: SHIPPED | OUTPATIENT
Start: 2020-11-16 | End: 2021-06-22

## 2020-11-25 ENCOUNTER — TELEPHONE (OUTPATIENT)
Dept: FAMILY MEDICINE CLINIC | Age: 65
End: 2020-11-25

## 2020-11-25 RX ORDER — HYDROCODONE BITARTRATE AND ACETAMINOPHEN 5; 325 MG/1; MG/1
1 TABLET ORAL DAILY PRN
Qty: 10 TABLET | Refills: 0 | Status: SHIPPED | OUTPATIENT
Start: 2020-11-25 | End: 2020-12-24 | Stop reason: SDUPTHER

## 2020-11-30 RX ORDER — NITROGLYCERIN 0.4 MG/1
TABLET SUBLINGUAL
Qty: 25 TABLET | Refills: 3 | Status: SHIPPED | OUTPATIENT
Start: 2020-11-30 | End: 2022-01-25 | Stop reason: SDUPTHER

## 2020-12-11 ENCOUNTER — TELEPHONE (OUTPATIENT)
Dept: FAMILY MEDICINE CLINIC | Age: 65
End: 2020-12-11

## 2020-12-11 NOTE — TELEPHONE ENCOUNTER
Pt calling stated that [place] paperwork that was just faxed to our office pt asked that we disregard that fax we can shred the paperwork it wasn't suppose to come to PCP.   I let call room know to look for and to disregard

## 2020-12-19 ENCOUNTER — TELEPHONE (OUTPATIENT)
Dept: FAMILY MEDICINE CLINIC | Age: 65
End: 2020-12-19

## 2020-12-21 RX ORDER — FREMANEZUMAB-VFRM 225 MG/1.5ML
1.5 INJECTION SUBCUTANEOUS
Qty: 3 ML | Refills: 5 | Status: SHIPPED | OUTPATIENT
Start: 2020-12-21 | End: 2021-02-09 | Stop reason: ALTCHOICE

## 2020-12-21 RX ORDER — FREMANEZUMAB-VFRM 225 MG/1.5ML
INJECTION SUBCUTANEOUS
Qty: 3 SYRINGE | Refills: 5 | Status: SHIPPED | OUTPATIENT
Start: 2020-12-21 | End: 2020-12-21 | Stop reason: SDUPTHER

## 2020-12-22 ENCOUNTER — TELEPHONE (OUTPATIENT)
Dept: FAMILY MEDICINE CLINIC | Age: 65
End: 2020-12-22

## 2020-12-22 NOTE — TELEPHONE ENCOUNTER
PA needed for Ajovy syringe 225 mg/1.5 syringe  Insurance: [address] [place] [address]  ID #[id number]  Phone number: [phone]    [employer]/priorauthportal  [id number]

## 2020-12-28 ENCOUNTER — OFFICE VISIT (OUTPATIENT)
Dept: PRIMARY CARE CLINIC | Age: 65
End: 2020-12-28
Payer: COMMERCIAL

## 2020-12-28 PROCEDURE — 99211 OFF/OP EST MAY X REQ PHY/QHP: CPT | Performed by: NURSE PRACTITIONER

## 2020-12-28 NOTE — PATIENT INSTRUCTIONS
Advance Care Planning  People with COVID-19 may have no symptoms, mild symptoms, such as fever, cough, and shortness of breath or they may have more severe illness, developing severe and fatal pneumonia. As a result, Advance Care Planning with attention to naming a health care decision maker (someone you trust to make healthcare decisions for you if you could not speak for yourself) and sharing other health care preferences is important BEFORE a possible health crisis. Please contact your Primary Care Provider to discuss Advance Care Planning. Preventing the Spread of Coronavirus Disease 2019 in Homes and Residential Communities  For the most recent information go to [employer]    Prevention steps for People with confirmed or suspected COVID-19 (including persons under investigation) who do not need to be hospitalized  and   People with confirmed COVID-19 who were hospitalized and determined to be medically stable to go home    Your healthcare provider and public health staff will evaluate whether you can be cared for at home. If it is determined that you do not need to be hospitalized and can be isolated at home, you will be monitored by staff from your local or state health department. You should follow the prevention steps below until a healthcare provider or local or state health department says you can return to your normal activities. Stay home except to get medical care  People who are mildly ill with COVID-19 are able to isolate at home during their illness. You should restrict activities outside your home, except for getting medical care. Do not go to work, school, or public areas. Avoid using public transportation, ride-sharing, or taxis.   Separate yourself from other people and animals in your home People: As much as possible, you should stay in a specific room and away from other people in your home. Also, you should use a separate bathroom, if available. Animals: You should restrict contact with pets and other animals while you are sick with COVID-19, just like you would around other people. Although there have not been reports of pets or other animals becoming sick with COVID-19, it is still recommended that people sick with COVID-19 limit contact with animals until more information is known about the virus. When possible, have another member of your household care for your animals while you are sick. If you are sick with COVID-19, avoid contact with your pet, including petting, snuggling, being kissed or licked, and sharing food. If you must care for your pet or be around animals while you are sick, wash your hands before and after you interact with pets and wear a facemask. Call ahead before visiting your doctor  If you have a medical appointment, call the healthcare provider and tell them that you have or may have COVID-19. This will help the healthcare providers office take steps to keep other people from getting infected or exposed. Wear a facemask  You should wear a facemask when you are around other people (e.g., sharing a room or vehicle) or pets and before you enter a healthcare providers office. If you are not able to wear a facemask (for example, because it causes trouble breathing), then people who live with you should not stay in the same room with you, or they should wear a facemask if they enter your room. Cover your coughs and sneezes  Cover your mouth and nose with a tissue when you cough or sneeze. Throw used tissues in a lined trash can. Immediately wash your hands with soap and water for at least 20 seconds or, if soap and water are not available, clean your hands with an alcohol-based hand  that contains at least 60% alcohol.   Clean your hands often Wash your hands often with soap and water for at least 20 seconds, especially after blowing your nose, coughing, or sneezing; going to the bathroom; and before eating or preparing food. If soap and water are not readily available, use an alcohol-based hand  with at least 60% alcohol, covering all surfaces of your hands and rubbing them together until they feel dry. Soap and water are the best option if hands are visibly dirty. Avoid touching your eyes, nose, and mouth with unwashed hands. Avoid sharing personal household items  You should not share dishes, drinking glasses, cups, eating utensils, towels, or bedding with other people or pets in your home. After using these items, they should be washed thoroughly with soap and water. Clean all high-touch surfaces everyday  High touch surfaces include counters, tabletops, doorknobs, bathroom fixtures, toilets, phones, keyboards, tablets, and bedside tables. Also, clean any surfaces that may have blood, stool, or body fluids on them. Use a household cleaning spray or wipe, according to the label instructions. Labels contain instructions for safe and effective use of the cleaning product including precautions you should take when applying the product, such as wearing gloves and making sure you have good ventilation during use of the product.   Monitor your symptoms

## 2020-12-28 NOTE — PROGRESS NOTES
Alexey Mack received a viral test for COVID-19. They were educated on isolation and quarantine as appropriate. For any symptoms, they were directed to seek care from their PCP, given contact information to establish with a doctor, directed to an urgent care or the emergency room.

## 2020-12-29 LAB — SARS-COV-2, NAA: DETECTED

## 2020-12-29 NOTE — RESULT ENCOUNTER NOTE
Patient notified. Your test came back detected/positive for Covid-19. What happens if I have a positive test?  If you have symptoms:  Isolate until all three of these things are true: 1) your symptoms are better, 2) it has been 10 days since you first felt sick, and 3) you have had no fever for at least 24 hours without using medicine that lowers fever. Drink plenty of fluids and eat when you can. You may take medicine for pain or fever if you need to. Rest as much as you can. If you do not have symptoms:  Stay home for 10 days after the date you were tested. If you develop symptoms during those 10 days, stay home until all three of these things are true: 1) your symptoms are better, 2) it has been 10 days since you first felt sick, and 3) you have had no fever for at least 24 hours without using medicine that lowers fever. Follow care instructions from your doctor or other healthcare provider. Seek emergency medical care immediately if you have trouble breathing, persistent pain or pressure in the chest, new confusion, inability to wake or stay awake, or bluish lips or face. Someone from the health department (case investigator or contact tracer) may reach out to you to check on your health and ask about other people you have been around or where you've spent time while you may have been able to spread COVID-19 to others. This person's role is strictly to map the virus to help identify people who may have been exposed to the virus and prevent its spread. The local health department will also provide guidance on how to stay safely at home to avoid spreading illness. Detected results can happen for months and you are not considered contagious. No retest is necessary. '

## 2021-01-01 ENCOUNTER — APPOINTMENT (OUTPATIENT)
Dept: GENERAL RADIOLOGY | Age: 66
DRG: 871 | End: 2021-01-01
Payer: COMMERCIAL

## 2021-01-01 ENCOUNTER — HOSPITAL ENCOUNTER (INPATIENT)
Age: 66
LOS: 5 days | Discharge: HOME HEALTH | DRG: 871 | End: 2021-01-06
Attending: STUDENT IN AN ORGANIZED HEALTH CARE EDUCATION/TRAINING PROGRAM | Admitting: INTERNAL MEDICINE
Payer: COMMERCIAL

## 2021-01-01 ENCOUNTER — APPOINTMENT (OUTPATIENT)
Dept: CT IMAGING | Age: 66
DRG: 871 | End: 2021-01-01
Payer: COMMERCIAL

## 2021-01-01 DIAGNOSIS — R07.9 RIGHT-SIDED CHEST PAIN: ICD-10-CM

## 2021-01-01 DIAGNOSIS — K92.2 UPPER GI BLEED: ICD-10-CM

## 2021-01-01 DIAGNOSIS — U07.1 PNEUMONIA DUE TO COVID-19 VIRUS: ICD-10-CM

## 2021-01-01 DIAGNOSIS — J12.82 PNEUMONIA DUE TO COVID-19 VIRUS: ICD-10-CM

## 2021-01-01 DIAGNOSIS — R11.2 NON-INTRACTABLE VOMITING WITH NAUSEA, UNSPECIFIED VOMITING TYPE: Primary | ICD-10-CM

## 2021-01-01 DIAGNOSIS — R94.31 ABNORMAL EKG: ICD-10-CM

## 2021-01-01 DIAGNOSIS — G43.709 CHRONIC MIGRAINE WITHOUT AURA WITHOUT STATUS MIGRAINOSUS, NOT INTRACTABLE: ICD-10-CM

## 2021-01-01 DIAGNOSIS — U07.1 COVID-19: ICD-10-CM

## 2021-01-01 DIAGNOSIS — M79.7 FIBROMYALGIA: ICD-10-CM

## 2021-01-01 PROBLEM — R06.02 SOB (SHORTNESS OF BREATH): Status: ACTIVE | Noted: 2021-01-01

## 2021-01-01 LAB
A/G RATIO: 1.4 (ref 1.1–2.2)
ALBUMIN SERPL-MCNC: 4.1 G/DL (ref 3.4–5)
ALP BLD-CCNC: 65 U/L (ref 40–129)
ALT SERPL-CCNC: 9 U/L (ref 10–40)
ANION GAP SERPL CALCULATED.3IONS-SCNC: 14 MMOL/L (ref 3–16)
AST SERPL-CCNC: 18 U/L (ref 15–37)
BACTERIA: ABNORMAL /HPF
BASE EXCESS VENOUS: 2.4 MMOL/L (ref -3–3)
BASOPHILS ABSOLUTE: 0 K/UL (ref 0–0.2)
BASOPHILS RELATIVE PERCENT: 0.5 %
BILIRUB SERPL-MCNC: 0.6 MG/DL (ref 0–1)
BILIRUBIN URINE: NEGATIVE
BLOOD, URINE: NEGATIVE
BUN BLDV-MCNC: 14 MG/DL (ref 7–20)
CALCIUM SERPL-MCNC: 9.2 MG/DL (ref 8.3–10.6)
CARBOXYHEMOGLOBIN: 1.9 % (ref 0–1.5)
CHLORIDE BLD-SCNC: 97 MMOL/L (ref 99–110)
CLARITY: ABNORMAL
CO2: 27 MMOL/L (ref 21–32)
COLOR: YELLOW
CREAT SERPL-MCNC: 0.7 MG/DL (ref 0.6–1.2)
D DIMER: 385 NG/ML DDU (ref 0–229)
EOSINOPHILS ABSOLUTE: 0 K/UL (ref 0–0.6)
EOSINOPHILS RELATIVE PERCENT: 0.3 %
EPITHELIAL CELLS, UA: ABNORMAL /HPF (ref 0–5)
GFR AFRICAN AMERICAN: >60
GFR NON-AFRICAN AMERICAN: >60
GLOBULIN: 2.9 G/DL
GLUCOSE BLD-MCNC: 130 MG/DL (ref 70–99)
GLUCOSE URINE: NEGATIVE MG/DL
HCO3 VENOUS: 25.7 MMOL/L (ref 23–29)
HCT VFR BLD CALC: 37.4 % (ref 36–48)
HCT VFR BLD CALC: 40.3 % (ref 36–48)
HEMOGLOBIN: 12.7 G/DL (ref 12–16)
HEMOGLOBIN: 13.7 G/DL (ref 12–16)
KETONES, URINE: >=80 MG/DL
LACTIC ACID: 2.1 MMOL/L (ref 0.4–2)
LEUKOCYTE ESTERASE, URINE: NEGATIVE
LYMPHOCYTES ABSOLUTE: 0.8 K/UL (ref 1–5.1)
LYMPHOCYTES RELATIVE PERCENT: 15.8 %
MAGNESIUM: 1.8 MG/DL (ref 1.8–2.4)
MCH RBC QN AUTO: 31.4 PG (ref 26–34)
MCHC RBC AUTO-ENTMCNC: 33.8 G/DL (ref 31–36)
MCV RBC AUTO: 92.9 FL (ref 80–100)
METHEMOGLOBIN VENOUS: 0.5 %
MICROSCOPIC EXAMINATION: YES
MONOCYTES ABSOLUTE: 0.3 K/UL (ref 0–1.3)
MONOCYTES RELATIVE PERCENT: 7.2 %
NEUTROPHILS ABSOLUTE: 3.7 K/UL (ref 1.7–7.7)
NEUTROPHILS RELATIVE PERCENT: 76.2 %
NITRITE, URINE: NEGATIVE
O2 CONTENT, VEN: 13 VOL %
O2 SAT, VEN: 65 %
O2 THERAPY: ABNORMAL
PCO2, VEN: 36 MMHG (ref 40–50)
PDW BLD-RTO: 14 % (ref 12.4–15.4)
PH UA: 7 (ref 5–8)
PH VENOUS: 7.47 (ref 7.35–7.45)
PLATELET # BLD: 271 K/UL (ref 135–450)
PMV BLD AUTO: 6.9 FL (ref 5–10.5)
PO2, VEN: 30 MMHG (ref 25–40)
POTASSIUM SERPL-SCNC: 2.8 MMOL/L (ref 3.5–5.1)
PRO-BNP: 18 PG/ML (ref 0–124)
PROCALCITONIN: 0.06 NG/ML (ref 0–0.15)
PROTEIN UA: ABNORMAL MG/DL
RBC # BLD: 4.34 M/UL (ref 4–5.2)
RBC UA: ABNORMAL /HPF (ref 0–4)
SODIUM BLD-SCNC: 138 MMOL/L (ref 136–145)
SPECIFIC GRAVITY UA: <=1.005 (ref 1–1.03)
TCO2 CALC VENOUS: 27 MMOL/L
TOTAL PROTEIN: 7 G/DL (ref 6.4–8.2)
TROPONIN: <0.01 NG/ML
TROPONIN: <0.01 NG/ML
URINE TYPE: ABNORMAL
UROBILINOGEN, URINE: >=8 E.U./DL
WBC # BLD: 4.8 K/UL (ref 4–11)
WBC UA: ABNORMAL /HPF (ref 0–5)

## 2021-01-01 PROCEDURE — 85014 HEMATOCRIT: CPT

## 2021-01-01 PROCEDURE — 85025 COMPLETE CBC W/AUTO DIFF WBC: CPT

## 2021-01-01 PROCEDURE — 2060000000 HC ICU INTERMEDIATE R&B

## 2021-01-01 PROCEDURE — 96365 THER/PROPH/DIAG IV INF INIT: CPT

## 2021-01-01 PROCEDURE — 83880 ASSAY OF NATRIURETIC PEPTIDE: CPT

## 2021-01-01 PROCEDURE — 71045 X-RAY EXAM CHEST 1 VIEW: CPT

## 2021-01-01 PROCEDURE — 93005 ELECTROCARDIOGRAM TRACING: CPT | Performed by: STUDENT IN AN ORGANIZED HEALTH CARE EDUCATION/TRAINING PROGRAM

## 2021-01-01 PROCEDURE — 6360000002 HC RX W HCPCS: Performed by: PHYSICIAN ASSISTANT

## 2021-01-01 PROCEDURE — C9113 INJ PANTOPRAZOLE SODIUM, VIA: HCPCS | Performed by: PHYSICIAN ASSISTANT

## 2021-01-01 PROCEDURE — 81001 URINALYSIS AUTO W/SCOPE: CPT

## 2021-01-01 PROCEDURE — 83735 ASSAY OF MAGNESIUM: CPT

## 2021-01-01 PROCEDURE — 6370000000 HC RX 637 (ALT 250 FOR IP): Performed by: INTERNAL MEDICINE

## 2021-01-01 PROCEDURE — 96366 THER/PROPH/DIAG IV INF ADDON: CPT

## 2021-01-01 PROCEDURE — 6370000000 HC RX 637 (ALT 250 FOR IP): Performed by: PHYSICIAN ASSISTANT

## 2021-01-01 PROCEDURE — 96375 TX/PRO/DX INJ NEW DRUG ADDON: CPT

## 2021-01-01 PROCEDURE — 96376 TX/PRO/DX INJ SAME DRUG ADON: CPT

## 2021-01-01 PROCEDURE — 85018 HEMOGLOBIN: CPT

## 2021-01-01 PROCEDURE — 85379 FIBRIN DEGRADATION QUANT: CPT

## 2021-01-01 PROCEDURE — 71260 CT THORAX DX C+: CPT

## 2021-01-01 PROCEDURE — 6360000004 HC RX CONTRAST MEDICATION: Performed by: STUDENT IN AN ORGANIZED HEALTH CARE EDUCATION/TRAINING PROGRAM

## 2021-01-01 PROCEDURE — 83605 ASSAY OF LACTIC ACID: CPT

## 2021-01-01 PROCEDURE — 84145 PROCALCITONIN (PCT): CPT

## 2021-01-01 PROCEDURE — 82803 BLOOD GASES ANY COMBINATION: CPT

## 2021-01-01 PROCEDURE — 96361 HYDRATE IV INFUSION ADD-ON: CPT

## 2021-01-01 PROCEDURE — 84484 ASSAY OF TROPONIN QUANT: CPT

## 2021-01-01 PROCEDURE — 80053 COMPREHEN METABOLIC PANEL: CPT

## 2021-01-01 PROCEDURE — 99285 EMERGENCY DEPT VISIT HI MDM: CPT

## 2021-01-01 PROCEDURE — 2580000003 HC RX 258: Performed by: PHYSICIAN ASSISTANT

## 2021-01-01 RX ORDER — ACETAMINOPHEN 325 MG/1
650 TABLET ORAL EVERY 6 HOURS PRN
Status: DISCONTINUED | OUTPATIENT
Start: 2021-01-01 | End: 2021-01-06 | Stop reason: HOSPADM

## 2021-01-01 RX ORDER — SUCRALFATE 1 G/1
1 TABLET ORAL EVERY 6 HOURS SCHEDULED
Status: DISCONTINUED | OUTPATIENT
Start: 2021-01-01 | End: 2021-01-01

## 2021-01-01 RX ORDER — PROMETHAZINE HYDROCHLORIDE 25 MG/1
12.5 TABLET ORAL EVERY 6 HOURS PRN
Status: DISCONTINUED | OUTPATIENT
Start: 2021-01-01 | End: 2021-01-02

## 2021-01-01 RX ORDER — POTASSIUM CHLORIDE 7.45 MG/ML
10 INJECTION INTRAVENOUS ONCE
Status: COMPLETED | OUTPATIENT
Start: 2021-01-01 | End: 2021-01-01

## 2021-01-01 RX ORDER — SODIUM CHLORIDE 0.9 % (FLUSH) 0.9 %
10 SYRINGE (ML) INJECTION PRN
Status: DISCONTINUED | OUTPATIENT
Start: 2021-01-01 | End: 2021-01-06 | Stop reason: HOSPADM

## 2021-01-01 RX ORDER — PANTOPRAZOLE SODIUM 40 MG/10ML
40 INJECTION, POWDER, LYOPHILIZED, FOR SOLUTION INTRAVENOUS DAILY
Status: DISCONTINUED | OUTPATIENT
Start: 2021-01-04 | End: 2021-01-02

## 2021-01-01 RX ORDER — ACETAMINOPHEN 500 MG
1000 TABLET ORAL ONCE
Status: COMPLETED | OUTPATIENT
Start: 2021-01-01 | End: 2021-01-01

## 2021-01-01 RX ORDER — SODIUM CHLORIDE 9 MG/ML
10 INJECTION INTRAVENOUS DAILY
Status: DISCONTINUED | OUTPATIENT
Start: 2021-01-04 | End: 2021-01-02

## 2021-01-01 RX ORDER — ONDANSETRON 2 MG/ML
4 INJECTION INTRAMUSCULAR; INTRAVENOUS ONCE
Status: COMPLETED | OUTPATIENT
Start: 2021-01-01 | End: 2021-01-01

## 2021-01-01 RX ORDER — ACETAMINOPHEN 650 MG/1
650 SUPPOSITORY RECTAL EVERY 6 HOURS PRN
Status: DISCONTINUED | OUTPATIENT
Start: 2021-01-01 | End: 2021-01-06 | Stop reason: HOSPADM

## 2021-01-01 RX ORDER — ONDANSETRON 2 MG/ML
4 INJECTION INTRAMUSCULAR; INTRAVENOUS EVERY 6 HOURS PRN
Status: DISCONTINUED | OUTPATIENT
Start: 2021-01-01 | End: 2021-01-01

## 2021-01-01 RX ORDER — POTASSIUM CHLORIDE 20 MEQ/1
40 TABLET, EXTENDED RELEASE ORAL ONCE
Status: COMPLETED | OUTPATIENT
Start: 2021-01-01 | End: 2021-01-01

## 2021-01-01 RX ORDER — MORPHINE SULFATE 2 MG/ML
2 INJECTION, SOLUTION INTRAMUSCULAR; INTRAVENOUS ONCE
Status: COMPLETED | OUTPATIENT
Start: 2021-01-01 | End: 2021-01-01

## 2021-01-01 RX ORDER — 0.9 % SODIUM CHLORIDE 0.9 %
1000 INTRAVENOUS SOLUTION INTRAVENOUS ONCE
Status: COMPLETED | OUTPATIENT
Start: 2021-01-01 | End: 2021-01-01

## 2021-01-01 RX ORDER — SODIUM CHLORIDE 0.9 % (FLUSH) 0.9 %
10 SYRINGE (ML) INJECTION EVERY 12 HOURS SCHEDULED
Status: DISCONTINUED | OUTPATIENT
Start: 2021-01-01 | End: 2021-01-06 | Stop reason: HOSPADM

## 2021-01-01 RX ADMIN — ONDANSETRON 4 MG: 2 INJECTION INTRAMUSCULAR; INTRAVENOUS at 17:29

## 2021-01-01 RX ADMIN — POTASSIUM CHLORIDE 10 MEQ: 10 INJECTION, SOLUTION INTRAVENOUS at 18:32

## 2021-01-01 RX ADMIN — SODIUM CHLORIDE 1000 ML: 9 INJECTION, SOLUTION INTRAVENOUS at 17:31

## 2021-01-01 RX ADMIN — MORPHINE SULFATE 2 MG: 2 INJECTION, SOLUTION INTRAMUSCULAR; INTRAVENOUS at 17:29

## 2021-01-01 RX ADMIN — PROMETHAZINE HYDROCHLORIDE 12.5 MG: 25 TABLET ORAL at 23:59

## 2021-01-01 RX ADMIN — IOPAMIDOL 75 ML: 755 INJECTION, SOLUTION INTRAVENOUS at 19:01

## 2021-01-01 RX ADMIN — SODIUM CHLORIDE 8 MG/HR: 9 INJECTION, SOLUTION INTRAVENOUS at 21:52

## 2021-01-01 RX ADMIN — POTASSIUM CHLORIDE 40 MEQ: 1500 TABLET, EXTENDED RELEASE ORAL at 18:32

## 2021-01-01 RX ADMIN — ONDANSETRON 4 MG: 2 INJECTION INTRAMUSCULAR; INTRAVENOUS at 21:09

## 2021-01-01 RX ADMIN — ACETAMINOPHEN 1000 MG: 500 TABLET ORAL at 17:29

## 2021-01-01 RX ADMIN — MORPHINE SULFATE 2 MG: 2 INJECTION, SOLUTION INTRAMUSCULAR; INTRAVENOUS at 21:09

## 2021-01-01 RX ADMIN — SODIUM CHLORIDE 80 MG: 9 INJECTION, SOLUTION INTRAVENOUS at 21:11

## 2021-01-01 RX ADMIN — ASPIRIN 325 MG: 325 TABLET, COATED ORAL at 17:29

## 2021-01-01 RX ADMIN — ACETAMINOPHEN 650 MG: 325 TABLET ORAL at 23:59

## 2021-01-01 ASSESSMENT — PAIN SCALES - GENERAL
PAINLEVEL_OUTOF10: 7
PAINLEVEL_OUTOF10: 7
PAINLEVEL_OUTOF10: 10
PAINLEVEL_OUTOF10: 8

## 2021-01-01 ASSESSMENT — PAIN DESCRIPTION - PAIN TYPE
TYPE: ACUTE PAIN
TYPE: ACUTE PAIN

## 2021-01-01 ASSESSMENT — PAIN DESCRIPTION - LOCATION
LOCATION: ABDOMEN
LOCATION: BACK

## 2021-01-01 NOTE — ED NOTES

## 2021-01-02 PROBLEM — E87.6 HYPOKALEMIA: Status: ACTIVE | Noted: 2021-01-02

## 2021-01-02 LAB
ANION GAP SERPL CALCULATED.3IONS-SCNC: 9 MMOL/L (ref 3–16)
BASOPHILS ABSOLUTE: 0 K/UL (ref 0–0.2)
BASOPHILS RELATIVE PERCENT: 0.7 %
BUN BLDV-MCNC: 14 MG/DL (ref 7–20)
CALCIUM SERPL-MCNC: 8.2 MG/DL (ref 8.3–10.6)
CHLORIDE BLD-SCNC: 105 MMOL/L (ref 99–110)
CO2: 27 MMOL/L (ref 21–32)
CREAT SERPL-MCNC: 0.7 MG/DL (ref 0.6–1.2)
EOSINOPHILS ABSOLUTE: 0 K/UL (ref 0–0.6)
EOSINOPHILS RELATIVE PERCENT: 0.4 %
GFR AFRICAN AMERICAN: >60
GFR NON-AFRICAN AMERICAN: >60
GLUCOSE BLD-MCNC: 102 MG/DL (ref 70–99)
HCT VFR BLD CALC: 34 % (ref 36–48)
HCT VFR BLD CALC: 38 % (ref 36–48)
HEMOGLOBIN: 11.6 G/DL (ref 12–16)
HEMOGLOBIN: 12.7 G/DL (ref 12–16)
IRON SATURATION: 10 % (ref 15–50)
IRON: 26 UG/DL (ref 37–145)
LYMPHOCYTES ABSOLUTE: 1.1 K/UL (ref 1–5.1)
LYMPHOCYTES RELATIVE PERCENT: 31.6 %
MAGNESIUM: 1.8 MG/DL (ref 1.8–2.4)
MCH RBC QN AUTO: 31.9 PG (ref 26–34)
MCHC RBC AUTO-ENTMCNC: 34 G/DL (ref 31–36)
MCV RBC AUTO: 94 FL (ref 80–100)
MONOCYTES ABSOLUTE: 0.4 K/UL (ref 0–1.3)
MONOCYTES RELATIVE PERCENT: 10.2 %
NEUTROPHILS ABSOLUTE: 2.1 K/UL (ref 1.7–7.7)
NEUTROPHILS RELATIVE PERCENT: 57.1 %
PDW BLD-RTO: 13.6 % (ref 12.4–15.4)
PLATELET # BLD: 212 K/UL (ref 135–450)
PMV BLD AUTO: 6.6 FL (ref 5–10.5)
POTASSIUM REFLEX MAGNESIUM: 3.3 MMOL/L (ref 3.5–5.1)
RBC # BLD: 3.62 M/UL (ref 4–5.2)
SODIUM BLD-SCNC: 141 MMOL/L (ref 136–145)
TOTAL IRON BINDING CAPACITY: 252 UG/DL (ref 260–445)
WBC # BLD: 3.6 K/UL (ref 4–11)

## 2021-01-02 PROCEDURE — 6360000002 HC RX W HCPCS: Performed by: HOSPITALIST

## 2021-01-02 PROCEDURE — 85018 HEMOGLOBIN: CPT

## 2021-01-02 PROCEDURE — 2580000003 HC RX 258: Performed by: INTERNAL MEDICINE

## 2021-01-02 PROCEDURE — 83550 IRON BINDING TEST: CPT

## 2021-01-02 PROCEDURE — 36415 COLL VENOUS BLD VENIPUNCTURE: CPT

## 2021-01-02 PROCEDURE — 83735 ASSAY OF MAGNESIUM: CPT

## 2021-01-02 PROCEDURE — C9113 INJ PANTOPRAZOLE SODIUM, VIA: HCPCS | Performed by: HOSPITALIST

## 2021-01-02 PROCEDURE — 80048 BASIC METABOLIC PNL TOTAL CA: CPT

## 2021-01-02 PROCEDURE — 6370000000 HC RX 637 (ALT 250 FOR IP): Performed by: INTERNAL MEDICINE

## 2021-01-02 PROCEDURE — 2060000000 HC ICU INTERMEDIATE R&B

## 2021-01-02 PROCEDURE — 2500000003 HC RX 250 WO HCPCS: Performed by: NURSE PRACTITIONER

## 2021-01-02 PROCEDURE — 85014 HEMATOCRIT: CPT

## 2021-01-02 PROCEDURE — 83540 ASSAY OF IRON: CPT

## 2021-01-02 PROCEDURE — 85025 COMPLETE CBC W/AUTO DIFF WBC: CPT

## 2021-01-02 PROCEDURE — 6360000002 HC RX W HCPCS: Performed by: NURSE PRACTITIONER

## 2021-01-02 RX ORDER — METHOCARBAMOL 500 MG/1
500 TABLET, FILM COATED ORAL 3 TIMES DAILY
Status: DISCONTINUED | OUTPATIENT
Start: 2021-01-02 | End: 2021-01-06 | Stop reason: HOSPADM

## 2021-01-02 RX ORDER — PANTOPRAZOLE SODIUM 40 MG/10ML
40 INJECTION, POWDER, LYOPHILIZED, FOR SOLUTION INTRAVENOUS 2 TIMES DAILY
Status: DISCONTINUED | OUTPATIENT
Start: 2021-01-02 | End: 2021-01-06 | Stop reason: HOSPADM

## 2021-01-02 RX ORDER — MORPHINE SULFATE 2 MG/ML
2 INJECTION, SOLUTION INTRAMUSCULAR; INTRAVENOUS ONCE
Status: COMPLETED | OUTPATIENT
Start: 2021-01-02 | End: 2021-01-02

## 2021-01-02 RX ORDER — PROMETHAZINE HYDROCHLORIDE 25 MG/ML
12.5 INJECTION, SOLUTION INTRAMUSCULAR; INTRAVENOUS EVERY 6 HOURS PRN
Status: DISCONTINUED | OUTPATIENT
Start: 2021-01-02 | End: 2021-01-06 | Stop reason: HOSPADM

## 2021-01-02 RX ORDER — METOPROLOL TARTRATE 5 MG/5ML
5 INJECTION INTRAVENOUS ONCE
Status: COMPLETED | OUTPATIENT
Start: 2021-01-02 | End: 2021-01-02

## 2021-01-02 RX ORDER — PROCHLORPERAZINE EDISYLATE 5 MG/ML
10 INJECTION INTRAMUSCULAR; INTRAVENOUS EVERY 6 HOURS PRN
Status: DISCONTINUED | OUTPATIENT
Start: 2021-01-02 | End: 2021-01-02

## 2021-01-02 RX ORDER — ROSUVASTATIN CALCIUM 10 MG/1
20 TABLET, COATED ORAL NIGHTLY
Status: DISCONTINUED | OUTPATIENT
Start: 2021-01-02 | End: 2021-01-06 | Stop reason: HOSPADM

## 2021-01-02 RX ORDER — ONDANSETRON 2 MG/ML
4 INJECTION INTRAMUSCULAR; INTRAVENOUS EVERY 6 HOURS PRN
Status: DISCONTINUED | OUTPATIENT
Start: 2021-01-02 | End: 2021-01-06 | Stop reason: HOSPADM

## 2021-01-02 RX ORDER — METOPROLOL SUCCINATE 25 MG/1
25 TABLET, EXTENDED RELEASE ORAL DAILY
Status: DISCONTINUED | OUTPATIENT
Start: 2021-01-02 | End: 2021-01-06 | Stop reason: HOSPADM

## 2021-01-02 RX ORDER — HYDROCODONE BITARTRATE AND ACETAMINOPHEN 5; 325 MG/1; MG/1
1 TABLET ORAL DAILY PRN
Status: DISCONTINUED | OUTPATIENT
Start: 2021-01-02 | End: 2021-01-06 | Stop reason: HOSPADM

## 2021-01-02 RX ORDER — CITALOPRAM 20 MG/1
40 TABLET ORAL DAILY
Status: DISCONTINUED | OUTPATIENT
Start: 2021-01-02 | End: 2021-01-06 | Stop reason: HOSPADM

## 2021-01-02 RX ADMIN — ACETAMINOPHEN 650 MG: 325 TABLET ORAL at 06:43

## 2021-01-02 RX ADMIN — ONDANSETRON 4 MG: 2 INJECTION INTRAMUSCULAR; INTRAVENOUS at 23:40

## 2021-01-02 RX ADMIN — METOPROLOL TARTRATE 5 MG: 1 INJECTION, SOLUTION INTRAVENOUS at 23:40

## 2021-01-02 RX ADMIN — PROMETHAZINE HYDROCHLORIDE 12.5 MG: 25 TABLET ORAL at 11:21

## 2021-01-02 RX ADMIN — MORPHINE SULFATE 2 MG: 2 INJECTION, SOLUTION INTRAMUSCULAR; INTRAVENOUS at 23:40

## 2021-01-02 RX ADMIN — ACETAMINOPHEN 650 MG: 650 SUPPOSITORY RECTAL at 17:43

## 2021-01-02 RX ADMIN — PANTOPRAZOLE SODIUM 40 MG: 40 INJECTION, POWDER, FOR SOLUTION INTRAVENOUS at 20:32

## 2021-01-02 RX ADMIN — ONDANSETRON 4 MG: 2 INJECTION INTRAMUSCULAR; INTRAVENOUS at 17:34

## 2021-01-02 RX ADMIN — SODIUM CHLORIDE, PRESERVATIVE FREE 10 ML: 5 INJECTION INTRAVENOUS at 20:32

## 2021-01-02 RX ADMIN — PROMETHAZINE HYDROCHLORIDE 12.5 MG: 25 INJECTION INTRAMUSCULAR; INTRAVENOUS at 20:31

## 2021-01-02 ASSESSMENT — PAIN SCALES - GENERAL
PAINLEVEL_OUTOF10: 0

## 2021-01-02 NOTE — PROGRESS NOTES
Hospitalist Progress Note      PCP: LUCIA Mobley - CNP    Date of Admission: 1/1/2021        Subjective:   Feels better this morning. Nausea and vomiting improving. No diarrhea      Medications:  Reviewed    Infusion Medications    pantoprozole (PROTONIX) infusion 8 mg/hr (01/01/21 2152)     Scheduled Medications    [START ON 1/4/2021] pantoprazole  40 mg Intravenous Daily    And    [START ON 1/4/2021] sodium chloride (PF)  10 mL Intravenous Daily    sodium chloride flush  10 mL Intravenous 2 times per day     PRN Meds: sodium chloride flush, promethazine **OR** [DISCONTINUED] ondansetron, acetaminophen **OR** acetaminophen      Intake/Output Summary (Last 24 hours) at 1/2/2021 0900  Last data filed at 1/2/2021 7158  Gross per 24 hour   Intake 1250.26 ml   Output 0 ml   Net 1250.26 ml       Exam:    /64   Pulse 77   Temp 98.6 °F (37 °C)   Resp 16   Ht 5' 4\" (1.626 m)   Wt 161 lb 12.8 oz (73.4 kg)   LMP  (LMP Unknown)   SpO2 96%   BMI 27.77 kg/m²     General appearance: No apparent distress, appears stated age and cooperative. HEENT: Pupils equal, round, and reactive to light. Conjunctivae/corneas clear. Neck: Supple, with full range of motion. No jugular venous distention. Trachea midline. Respiratory:  Normal respiratory effort. Clear to auscultation, bilaterally without Rales/Wheezes/Rhonchi. Cardiovascular: Regular rate and rhythm with normal S1/S2 without murmurs, rubs or gallops. Abdomen: Soft, non-tender, non-distended with normal bowel sounds. Musculoskeletal: No clubbing, cyanosis or edema bilaterally. Full range of motion without deformity. Skin: Skin color, texture, turgor normal.  No rashes or lesions. Neurologic:  Neurovascularly intact without any focal sensory/motor deficits.  Cranial nerves: II-XII intact, grossly non-focal.  Psychiatric: Alert and oriented, thought content appropriate, normal insight  Capillary Refill: Brisk,< 3 seconds Peripheral Pulses: +2 palpable, equal bilaterally       Labs:   Recent Labs     01/01/21  1632 01/01/21  2041 01/02/21  0533   WBC 4.8  --  3.6*   HGB 13.7 12.7 11.6*   HCT 40.3 37.4 34.0*     --  212     Recent Labs     01/01/21  1632 01/02/21  0533    141   K 2.8* 3.3*   CL 97* 105   CO2 27 27   BUN 14 14   CREATININE 0.7 0.7   CALCIUM 9.2 8.2*     Recent Labs     01/01/21  1632   AST 18   ALT 9*   BILITOT 0.6   ALKPHOS 65     No results for input(s): INR in the last 72 hours. Recent Labs     01/01/21  1632 01/01/21 2042   TROPONINI <0.01 <0.01       Assessment/Plan:      -COVID-19 infection.   No pneumonia on chest x-ray and patient does not require oxygen  -Nausea and vomiting with concerns for GI bleed--most likely due to -COVID-19-CT showed hiatal hernia with gastric contents that could be blood although this most likely due to contrast-no melena, hematochezia or hematemesis  -Severe hypokalemia--- improving  -CAD s/p stent  -Hiatal hernia-moderate-sized  -Leukopenia COVID-19    Plan  Start clears,   continue IV fluids  Replete potassium  Continue PPI   Follow-up with GI  Continue supportive care for COVID-19      DVT Prophylaxis: Lovenox  Diet: Diet NPO Effective Now Exceptions are: Ice Chips  Code Status: Full Code        Wade Red MD

## 2021-01-02 NOTE — PROGRESS NOTES
Page sent to hospital physician: The pt had an epidode of emesis. She has already been given Phenergan, Zofran is discontinued. Can we get anything else? Call put out for GI to notify of the episode of emesis. Waiting for response. 1830: Pt emesis was clear with no coffee ground emesis present. Pt to stay NPO and changes to be made to nausea medications. No bloody or dark stools on this shift either. Pt reported 4 episodes of brown loose stool.

## 2021-01-02 NOTE — ED NOTES
Attempted to ambulate pt with pulse ox but pt was unable to ambulate due to being SOB.   [address] aware      Sravanthi Garcia  01/01/21 2025

## 2021-01-02 NOTE — H&P
Hospital Medicine History & Physical      PCP: LUCIA Portillo - CNP    Date of Admission: 1/1/2021    Date of Service: Pt seen/examined on 1/1/2021 and Admitted to Inpatient with expected LOS greater than two midnights due to medical therapy. Chief Complaint: Back pain, emesis since Abdulkadir      History Of Present Illness:   72 y.o. female who presented to [facility] with above complaints  Patient with PMH of CAD s/p LAD stent, SVT ablation, TIA, fibromyalgia, HTN, HLD, hiatal hernia presented via private vehicle from her home for the above complaints. Patient reports she was tested positive for Covid on December 24. Since then she has had worsening shortness of breath, right-sided chest pain, mild low-grade fevers, mild intermittent cough. She also reports having nausea and vomiting since then intermittently. She has been taking Phenergan for the nausea without much improvement. She has been taking Tylenol for the fevers. She denies any hematemesis, hematochezia or melena. Denies any abdominal pain or diarrhea. Patient reports today she felt lightheaded like she was about to pass out. So she decided to come to the ED. patient denies any left-sided chest pain, all her pain is on the right side of her chest and right back.        Past Medical History:          Diagnosis Date    Allergic rhinitis     Anxiety     CAD (coronary artery disease) 6/5/2019    Depression     Endometriosis     hx    Fibromyalgia     GERD (gastroesophageal reflux disease)     Headache(784.0)     Hiatal hernia     Hyperlipidemia     Irritable bowel syndrome     Left bundle branch block (LBBB)     Osteoarthritis     SVT (supraventricular tachycardia) (HCC)     hx     Trouble swallowing        Past Surgical History:          Procedure Laterality Date    ABLATION OF DYSRHYTHMIC FOCUS  1999    APPENDECTOMY      BUNIONECTOMY  1973    bilateral, 2 revisions left foot    CHOLECYSTECTOMY ticagrelor (BRILINTA) 60 MG TABS tablet Take 1 tablet by mouth 2 times daily 8/21/20   Leona Castillo MD   citalopram (CELEXA) 40 MG tablet TAKE 1 TABLET BY MOUTH ONE TIME A DAY 6/25/20   Bubba Gamez DO   methocarbamol (ROBAXIN) 500 MG tablet TAKE ONE TABLET BY MOUTH THREE TIMES A DAY 3/17/20   Bubba Gamez DO   promethazine (PHENERGAN) 25 MG tablet Take 1 tablet by mouth every 6 hours as needed for Nausea 9/13/19   Bubba Gamez DO   aspirin 81 MG chewable tablet Take 1 tablet by mouth daily 6/5/19   Tim Ruggiero MD   dicyclomine (BENTYL) 10 MG capsule Take 1 capsule by mouth 3 times daily (before meals)  Patient taking differently: Take 10 mg by mouth 3 times daily as needed  2/14/18   Bubba Gamez DO   Cholecalciferol (VITAMIN D3) 5000 UNITS TABS Take by mouth    Historical Provider, MD   omeprazole (PRILOSEC) 20 MG capsule Take 20 mg by mouth every 12 hours. Historical Provider, MD   diphenhydrAMINE (BENADRYL) 25 MG tablet Take 25 mg by mouth every 6 hours as needed. OTC     Historical Provider, MD       Allergies: Avelox [moxifloxacin hcl in nacl], Cephalexin, Darvon [propoxyphene hcl], Digoxin, Librax [chlordiazepoxide-methscopol], Motrin [ibuprofen micronized], Other, Pcn [penicillins], Prochlorperazine edisylate, Sulfa antibiotics, Tetracyclines & related, Nickel, and Statins    Social History:      The patient currently lives at home    TOBACCO:   reports that she has never smoked. She has never used smokeless tobacco.  ETOH:   reports no history of alcohol use.   E-Cigarettes/Vaping Use     Questions Responses    E-Cigarette/Vaping Use Never User    Start Date     Passive Exposure     Quit Date     Counseling Given     Comments             Family History:   Positive as follows:        Problem Relation Age of Onset    Other Mother         sepsis    Dementia Mother     High Cholesterol Mother     Depression Mother     Anxiety Disorder Mother     High Blood Pressure Father  Other Father          from renal failure and CHF    Heart Disease Father     Migraines Father     Heart Attack Brother     Heart Disease Brother     Hypertension Other     High Cholesterol Other     Migraines Other     Heart Disease Other     Diabetes Other        REVIEW OF SYSTEMS:   Pertinent positives as noted in the HPI. All other systems reviewed and negative. PHYSICAL EXAM PERFORMED:    /76   Pulse 92   Temp 99.9 °F (37.7 °C) (Oral)   Resp 16   Ht 5' 4\" (1.626 m)   Wt 161 lb 12.8 oz (73.4 kg)   LMP  (LMP Unknown)   SpO2 98%   BMI 27.77 kg/m²     General appearance:  No apparent distress, appears stated age and cooperative. HEENT:  Normal cephalic, atraumatic without obvious deformity. Pupils equal, round, and reactive to light. Extra ocular muscles intact. Conjunctivae/corneas clear. Neck: Supple, with full range of motion. No jugular venous distention. Trachea midline. Respiratory:  Normal respiratory effort. Clear to auscultation, bilaterally without Rales/Wheezes/Rhonchi. Cardiovascular:  Regular rate and rhythm with normal S1/S2 without murmurs, rubs or gallops. Abdomen: Soft, non-tender, non-distended with normal bowel sounds. Musculoskeletal:  No clubbing, cyanosis or edema bilaterally. Full range of motion without deformity. Skin: Skin color, texture, turgor normal.  No rashes or lesions. Neurologic:  Neurovascularly intact without any focal sensory/motor deficits.  Cranial nerves: II-XII intact, grossly non-focal.  Psychiatric:  Alert and oriented, thought content appropriate, normal insight  Capillary Refill: Brisk,< 3 seconds   Peripheral Pulses: +2 palpable, equal bilaterally       Labs:     Recent Labs     21  1632 21  2041   WBC 4.8  --    HGB 13.7 12.7   HCT 40.3 37.4     --      Recent Labs     21  1632      K 2.8*   CL 97*   CO2 27   BUN 14   CREATININE 0.7   CALCIUM 9.2     Recent Labs     21  1632   AST 18 ALT 9*   BILITOT 0.6   ALKPHOS 65     No results for input(s): INR in the last 72 hours. Recent Labs     01/01/21  1632 01/01/21 2042   TROPONINI <0.01 <0.01       Urinalysis:      Lab Results   Component Value Date    NITRU Negative 01/01/2021    WBCUA 10-20 01/01/2021    BACTERIA Rare 01/01/2021    RBCUA 3-4 01/01/2021    BLOODU Negative 01/01/2021    SPECGRAV <=1.005 01/01/2021    GLUCOSEU Negative 01/01/2021       Radiology:     I personally reviewed the CXR and CT chest pulmonary, CT chest significant for groundglass opacity bilateral, moderate-sized hiatal hernia containing high density material which could be blood    EKG:  I have reviewed the EKG with the following interpretation: Sinus tachycardia, intraventricular conduction delay    CT CHEST PULMONARY EMBOLISM W CONTRAST   Preliminary Result   1. No acute pulmonary embolus. 2. Mild areas of ground-glass opacity compatible with COVID-19 pneumonia. 3. Moderate-sized hiatal hernia containing high-density material.  This could   be due to an active GI bleed or recently ingested contrast.   Findings were discussed with emergency department at 7:52 pm on 1/1/2021. RECOMMENDATIONS:   1.      XR CHEST PORTABLE   Final Result   No acute airspace disease identified. ASSESSMENT:PLAN:    Non-intractable nausea and vomiting, with concerns for GI bleed  -IV antiemetics  -CT showed hiatal hernia with gastric contents that could be blood. General surgery and GI consulted. Possible EGD in the a.m., will keep n.p.o.. -Patient refused NG tube insertion to determine if gastric contents of blood  -IV PPI bolus and drip started  -Hold antiplatelet agents, anticoagulants until GI bleed ruled out  -Follow serial H&H      Hypokalemia  Due to severe emesis. Replacement initiated in the ED. recheck labs in a.m.     COVID-19, mild  Value: DETECTEDAbnormal     Comment: The SARS-CoV-2 assay Patient reports she was diagnosed positive Covid on December 24, has shortness of breath  Patient not hypoxic  No evidence of PE on CTA chest, does have GGO  Supportive care, no steroids indicated at this time    CAD status post LAD stent  Troponin negative x2, EKG nonischemic. Right-sided chest pain nonspecific, less likely cardiac  Follow troponin trend  Aspirin, Brilinta on hold  Continue statin and beta-blocker    Hyperlipidemia-resume statin    Depression/fibromyalgia-resume home medication regimen    DVT Prophylaxis: SCD  Diet: Diet NPO Effective Now Exceptions are: Ice Chips  Code Status: Full Code    PT/OT Eval Status: Ambulatory    Dispo -IP stay       Linette Nuñez MD    Thank you LUCIA MITCHELL - JARVIS for the opportunity to be involved in this patient's care. If you have any questions or concerns please feel free to contact me at [phone].

## 2021-01-02 NOTE — ED NOTES
[facility] @5625  Per Donte APARICIO  RE ? upper GI bleed?   Dillon Holt @0880       Climmie Reamer  01/01/21 2036

## 2021-01-02 NOTE — CONSULTS
Gastroenterology Consult Note    Patient:   Aileen Ross   YOB: 1955   Facility:   [facility]   Referring/PCP: LUCIA Park CNP  Date:     1/2/2021  Consultant:   Nando Trotter MD    Subjective: This 72 y.o. female was admitted 1/1/2021 with a diagnosis of \"SOB (shortness of breath) [R06.02]\" and is seen in consultation regarding \"N/V\". Information was obtained from interview of  the patient, examination of the patient, and review of records. I did  update the past medical, surgical, social and / or family history. N/V moderate for 2 days in GI tract assoc w SOB/Covid    Current status  Present  Diet Order: DIET CLEAR LIQUID; and she is tolerating diet. Recently, she has experienced no abdominal  Pain and she has not required Intravenous narcotic analgesics. The patient has also experienced no constipation, diarrhea, hematochezia and melena      Prior to Admission medications    Medication Sig Start Date End Date Taking? Authorizing Provider   HYDROcodone-acetaminophen (NORCO) 5-325 MG per tablet Take 1 tablet by mouth daily as needed for Pain for up to 10 days. May take instead of tramadol when pain is increased 12/24/20 1/3/21  LUCIA Chaidez CNP   Fremanezumab-vfrm (AJOVY) 225 MG/1.5ML SOSY Inject 1.5 mLs into the skin every 30 days 12/21/20   LUCIA Cai CNP   nitroGLYCERIN (NITROSTAT) 0.4 MG SL tablet up to max of 3 total doses.  If no relief after 1 dose, call 911. 11/30/20   LUCIA Cui CNP   rosuvastatin (CRESTOR) 20 MG tablet TAKE ONE TABLET BY MOUTH NIGHTLY 11/16/20   LUCIA Cui CNP   metoprolol succinate (TOPROL XL) 25 MG extended release tablet TAKE 1 TABLET BY MOUTH ONE TIME A DAY 11/16/20   LUCIA Cui CNP predniSONE (DELTASONE) 10 MG tablet 2 tablets 2 times daily for 5 days, 1 tablet 2 times daily for 5 days, 1 tablet daily 11/6/20   LUCIA Mc CNP   Magnesium 200 MG CHEW Take 1 tablet by mouth daily    Historical Provider, MD   Calcium Carbonate-Vit D-Min (CALCIUM 600+D3 PLUS MINERALS) 600-800 MG-UNIT CHEW Take 1 tablet by mouth 2 times daily    Historical Provider, MD   vitamin D (ERGOCALCIFEROL) 1.25 MG (41038 UT) CAPS capsule Take 1 capsule by mouth once a week 10/21/20   LUCIA Mc - JARVIS   Galcanezumab-gnlm (EMGALITY) 120 MG/ML SOAJ Inject 120 mg into the skin every 30 days 10/15/20   LUCIA Mc CNP   ticagrelor (BRILINTA) 60 MG TABS tablet Take 1 tablet by mouth 2 times daily 8/21/20   Matthieu Clifton MD   citalopram (CELEXA) 40 MG tablet TAKE 1 TABLET BY MOUTH ONE TIME A DAY 6/25/20   Bubba Gamez DO   methocarbamol (ROBAXIN) 500 MG tablet TAKE ONE TABLET BY MOUTH THREE TIMES A DAY 3/17/20   Bubba Gamez DO   promethazine (PHENERGAN) 25 MG tablet Take 1 tablet by mouth every 6 hours as needed for Nausea 9/13/19   Bubba Gamez DO   aspirin 81 MG chewable tablet Take 1 tablet by mouth daily 6/5/19   Justa Stahl MD   dicyclomine (BENTYL) 10 MG capsule Take 1 capsule by mouth 3 times daily (before meals)  Patient taking differently: Take 10 mg by mouth 3 times daily as needed  2/14/18   Bubba Gamez DO   Cholecalciferol (VITAMIN D3) 5000 UNITS TABS Take by mouth    Historical Provider, MD   omeprazole (PRILOSEC) 20 MG capsule Take 20 mg by mouth every 12 hours. Historical Provider, MD   diphenhydrAMINE (BENADRYL) 25 MG tablet Take 25 mg by mouth every 6 hours as needed.  OTC     Historical Provider, MD      Scheduled Medications:    [START ON 1/4/2021] pantoprazole  40 mg Intravenous Daily    And    [START ON 1/4/2021] sodium chloride (PF)  10 mL Intravenous Daily    sodium chloride flush  10 mL Intravenous 2 times per day     Infusions:  pantoprozole (PROTONIX) infusion 8 mg/hr (01/01/21 7109)     PRN Medications: sodium chloride flush, promethazine **OR** [DISCONTINUED] ondansetron, acetaminophen **OR** acetaminophen  Allergies:    Allergies   Allergen Reactions    Avelox [Moxifloxacin Hcl In Nacl] Hives    Cephalexin Hives and Rash    Darvon [Propoxyphene Hcl] Hives    Digoxin Hives    Librax [Chlordiazepoxide-Methscopol] Hives    Motrin [Ibuprofen Micronized] Rash    Other Hives     Most mycin's    Pcn [Penicillins] Rash    Prochlorperazine Edisylate Nausea And Vomiting    Sulfa Antibiotics Rash    Tetracyclines & Related Hives    Nickel Nausea And Vomiting     Hypotension      Statins Other (See Comments)     myalgia       Past Medical History:   Diagnosis Date    Allergic rhinitis     Anxiety     CAD (coronary artery disease) 6/5/2019    Depression     Endometriosis     hx    Fibromyalgia     GERD (gastroesophageal reflux disease)     Headache(784.0)     Hiatal hernia     Hyperlipidemia     Irritable bowel syndrome     Left bundle branch block (LBBB)     Osteoarthritis     SVT (supraventricular tachycardia) (HCC)     hx     Trouble swallowing      Past Surgical History:   Procedure Laterality Date    ABLATION OF DYSRHYTHMIC FOCUS  1999    APPENDECTOMY      BUNIONECTOMY  1973    bilateral, 2 revisions left foot    CHOLECYSTECTOMY      COLONOSCOPY  10/15/13    Hx polyps-3 yrs-? poor prep    HYSTERECTOMY      LAPAROSCOPY      6    LAPAROTOMY      3    NASAL SEPTUM SURGERY  1982    OTHER SURGICAL HISTORY      Angiogram     UPPER GASTROINTESTINAL ENDOSCOPY      UPPER GASTROINTESTINAL ENDOS[facility]  10[facility]15/13    [facility]       Social:   Social History     Tobacco Use    Smoking status: Never Smoker    Smokeless tobacco: Never Used   Substance Use Topics    Alcohol use: No     Family:   Family History   Problem Relation Age of Onset    Other Mother         sepsis    Dementia Mother  High Cholesterol Mother     Depression Mother     Anxiety Disorder Mother     High Blood Pressure Father     Other Father          from renal failure and CHF    Heart Disease Father     Migraines Father     Heart Attack Brother     Heart Disease Brother     Hypertension Other     High Cholesterol Other     Migraines Other     Heart Disease Other     Diabetes Other        ROS: Pertinent items are noted in HPI.     Objective:   Vital Signs:  Temp (24hrs), Av.4 °F (37.4 °C), Min:98 °F (36.7 °C), Max:101.9 °F (52.2 °C)     Systolic (33HNV), MRB:112 , Min:106 , WMK:552      Diastolic (70ZUB), ILD:45, Min:57, Max:77     Pulse  Av.5  Min: 77  Max: 121  /71   Pulse 84   Temp 98 °F (36.7 °C) (Oral)   Resp 15   Ht 5' 4\" (1.626 m)   Wt 161 lb 12.8 oz (73.4 kg)   LMP  (LMP Unknown)   SpO2 96%   BMI 27.77 kg/m²      Physical Exam:   /71   Pulse 84   Temp 98 °F (36.7 °C) (Oral)   Resp 15   Ht 5' 4\" (1.626 m)   Wt 161 lb 12.8 oz (73.4 kg)   LMP  (LMP Unknown)   SpO2 96%   BMI 27.77 kg/m²   General appearance: alert, appears stated age and cooperative  Neurologic: Grossly normal    Lab and Imaging Review   Recent Labs     21  1632 211 21  0533   WBC 4.8  --  3.6*   HGB 13.7 12.7 11.6*   MCV 92.9  --  94.0     --  212     --  141   K 2.8*  --  3.3*   CL 97*  --  105   CO2 27  --  27   BUN 14  --  14   CREATININE 0.7  --  0.7   GLUCOSE 130*  --  102*   CALCIUM 9.2  --  8.2*   PROT 7.0  --   --    LABALBU 4.1  --   --    AST 18  --   --    ALT 9*  --   --    ALKPHOS 65  --   --    BILITOT 0.6  --   --    MG 1.80  --  1.80       Assessment:     Patient Active Problem List    Diagnosis Date Noted    Hypokalemia 2021    GI bleed 2021    COVID-19 2021    SOB (shortness of breath) 2021    CAD S/P percutaneous coronary angioplasty     CAD (coronary artery disease) 2019    H/O angiography 2019

## 2021-01-02 NOTE — ED NOTES
[email or web address] [email or web address]  David APARICIO  RE abnormal Chest CT, gastric bleeding?    @2012       Sandi Gutierrez  01/01/21 2014

## 2021-01-02 NOTE — ED PROVIDER NOTES
I independently examined and evaluated Raghav Angel. In brief, 70y F with hx of fibromyalgia, HLD, CAD, IBS, migraine and SBO. Diagnosed with COVID on 12/24. Reports SOB and R sided chest pain that has been worsening since that time. Also has nausea/NBNB emesis, low grade fever. No abdominal pain or diarrhea or constipation. Patient is worried about pneumonia. Mild cough. Intermittent lightheadedness today. Focused exam revealed   PHYSICAL EXAM  /69   Pulse 94   Temp 101.9 °F (38.8 °C) (Oral) Comment: ibuprofen taken this morning per pt unsure of the time  Resp 18   Ht 5' 4\" (1.626 m)   Wt 160 lb (72.6 kg)   LMP  (LMP Unknown)   SpO2 97%   BMI 27.46 kg/m²    GENERAL APPEARANCE: Awake and alert. Cooperative. ill appearing. HENT: Normocephalic. Atraumatic. Mucous membranes are dry  NECK: Supple. Full range of motion of the neck without stiffness or pain. EYES: PERRL. EOM's grossly intact. HEART/CHEST: tachycardia, RR. No murmurs. Chest wall is not tender to palpation. LUNGS: Respirations unlabored. CTAB. Good air exchange. Speaking comfortably in full sentences. ABDOMEN: No tenderness. Soft. Non-distended. No masses. No organomegaly. No guarding or rebound. MUSCULOSKELETAL: No extremity edema. Compartments soft. No deformity. No tenderness in the extremities. All extremities neurovascularly intact. SKIN: Warm and dry. No acute rashes. NEUROLOGICAL: Alert and oriented. No gross facial drooping. Strength 5/5, sensation intact. PSYCHIATRIC: Normal mood and affect. ED course:     Overall ill appearing patient, presenting for SOB and chest pain in setting of COVID infection. Physical exam remarkable for ill appearance, tachycardia. Differential diagnosis includes but is not limited to: COVID, PE, ACS, pneumonia, CHF      CT CHEST PULMONARY EMBOLISM W CONTRAST   Final Result   1. No acute pulmonary embolus. 2. Mild areas of ground-glass opacity compatible with COVID-19 pneumonia. 3. Moderate-sized hiatal hernia containing high-density material.  This could   be due to an active GI bleed or recently ingested contrast.   Findings were discussed with emergency department at 7:52 pm on 1/1/2021. RECOMMENDATIONS:   1.      XR CHEST PORTABLE   Final Result   No acute airspace disease identified. ED Course as of Jan 06 0949   Herlinda Main Jan 01, 2021   1906 Troponin within normal limits. EKG did show acute changes, spoke to interventional cardiology who did not feel that patient was having a STEMI.    [ER]   1906 Hypokalemia, magnesium within normal limits. Mild hyperchloremia. No significant other electrolyte abnormalities or evidence of kidney dysfunction. Patient is receiving potassium repletion.    [ER]   1906 Liver function testing unremarkable. [ER]   1907 BNP within normal limits, no evidence of fluid overload on exam, low suspicion for CHF. [ER]   1907 D-dimer is elevated, CT PE obtained. [ER]   1946 Blood gas with alkylemia, no hypercarbia        [ER]      ED Course User Index  [ER] Matty Hager MD     -SOB: Patient is not hypoxic, and in no acuute respiratory distress. Likely related to COVID infection. Imaging showed findings consistent with COVID. Blood gas without hypercarbia or acidemia, do not suspect COPD. CT without evidence of PE or pneumothorax. ECG abnormal with acute changes. Troponin WNL. Did discuss with Cardiology, they stated that ECG abnormal but had low suspicion for STEMI at this time. Patient did receive ASA. Patient with Heart score of 4, recommend further cardiac evaluation while inpatient. Workup not consistent with CHF.     -Hypokalemia- likely in setting of emesis, repletion given   -Elevated lactate- also maybe due to emesis- IVF given -CT scan showed incidental moderate hiatal hernia containing some high density material differentials include active GI bleed verses recently ingested contrast.  She did not have any ingested contrast or GI cocktail. SLAVA discussed with general surgery on-call, Dr. Wesly Braden who reviewed the images and he agreed it could be the start of a GI bleed. GI also consulted and recommended NG and PPI. Attempts at NG tube placement unsuccessful, patient could not tolerate. Will likely plan for EGD tomorrow. There was a drop in hemoglobin but patient had had blood drawn and fluids, patient does not require transfusion at this time but should be trended. AT this time patient requires admission for COVID, hypokalemia management, cardiac evaluation and further evaluation of possible upper GI bleed. Patient admitted in stable condition. CLINICAL IMPRESSION  1. Non-intractable vomiting with nausea, unspecified vomiting type    2. Upper GI bleed    3. Abnormal EKG    4. COVID-19    5. Pneumonia due to COVID-19 virus    6. Right-sided chest pain          DISPOSITION  Shelby Ledezma was admitted in stable condition. All diagnostic, treatment, and disposition decisions were made by myself in conjunction with the advanced practice provider. For all further details of the patient's emergency department visit, please see the advanced practice provider's documentation. Comment: Please note this report has been produced using speech recognition software and may contain errors related to that system including errors in grammar, punctuation, and spelling, as well as words and phrases that may be inappropriate. If there are any questions or concerns please feel free to contact the dictating provider for clarification.         Renetta Sanchez MD  01/06/21 Robert Umaña MD  01/06/21 [id number]

## 2021-01-02 NOTE — ED PROVIDER NOTES
[facility]  ED  EMERGENCY DEPARTMENT ENCOUNTER        Pt Name: Reynaldo Westbrook  MRN: [id number]  [place] 1955  Date of evaluation: 1/1/20213  Provider: ALESSANDRA Jang  PCP: LUCIA Ochoa - CNP    This patient was seen and evaluated by the attending physician Chad Guzman MD.        [facility]       Chief Complaint   Patient presents with    Back Pain     covid on anjelica debbie and now she has pain in the right upper back and is worried about pneumonia.  Emesis     began around anjelica debbie - all symptoms getting worse       HISTORY OF PRESENT ILLNESS   (Location/Symptom, Timing/Onset, Context/Setting, Quality, Duration, Modifying Factors, Severity)  Note limiting factors. Reynaldo Westbrook is a 72 y.o. female with past medical history of hyperlipidemia coronary artery disease status post PCI, IBS, hiatal hernia, GERD who presents via private vehicle from her home for evaluation of emesis, Covid. Patient tested positive for Covid [place]. She started having worsening shortness of breath, right-sided chest pain and vomiting and nausea same day that she tested positive. She has had mild low-grade fevers. She denies hematemesis. She denies melena or hematochezia, no diarrhea. She denies any abdominal pain. She notes that she thinks she may have pneumonia because the right side of her chest hurts constantly. She notes that today she started feeling lightheaded like she was may be in a pass out. She endorses mild intermittent cough and mild headaches. She has been taking Phenergan for the nausea with mild improvement. She has been taking Tylenol for fevers. Nursing Notes were all reviewed and agreed with or any disagreements were addressed  in the HPI. Pt was seen during the [place] 19 pandemic. Appropriate PPE worn by ME during patient encounters. Pt seen during a time with constrained hospital bed capacity and other potential inpatient and outpatient resources were constrained due to the viral pandemic. REVIEW OF SYSTEMS    (2-9 systems for level 4, 10 or more for level 5)     Review of Systems    Positives and Pertinent negatives as per HPI. Except as noted abovein the ROS, all other systems were reviewed and negative.        PAST MEDICAL HISTORY     Past Medical History:   Diagnosis Date    Allergic rhinitis     Anxiety     CAD (coronary artery disease) 6/5/2019    Depression     Endometriosis     hx    Fibromyalgia     GERD (gastroesophageal reflux disease)     Headache(784.0)     Hiatal hernia     Hyperlipidemia     Irritable bowel syndrome     Left bundle branch block (LBBB)     Osteoarthritis     SVT (supraventricular tachycardia) (HCC)     hx     Trouble swallowing          SURGICAL HISTORY     Past Surgical History:   Procedure Laterality Date    ABLATION OF DYSRHYTHMIC FOCUS  1999    APPENDECTOMY      BUNIONECTOMY  1973    bilateral, 2 revisions left foot    CHOLECYSTECTOMY      COLONOSCOPY  10/15/13    Hx polyps-3 yrs-? poor prep    HYSTERECTOMY      LAPAROSCOPY      6    LAPAROTOMY      3    NASAL SEPTUM SURGERY  1982    OTHER SURGICAL HISTORY      Angiogram     UPPER GASTROINTESTINAL ENDOSCOPY      UPPER GASTROINTESTINAL ENDOSCOPY  10/15/13    michaelki ring         CURRENTMEDICATIONS       Previous Medications    ASPIRIN 81 MG CHEWABLE TABLET    Take 1 tablet by mouth daily    CALCIUM CARBONATE-VIT D-MIN (CALCIUM 600+D3 PLUS MINERALS) 600-800 MG-UNIT CHEW    Take 1 tablet by mouth 2 times daily    CHOLECALCIFEROL (VITAMIN D3) 5000 UNITS TABS    Take by mouth    CITALOPRAM (CELEXA) 40 MG TABLET    TAKE 1 TABLET BY MOUTH ONE TIME A DAY DICYCLOMINE (BENTYL) 10 MG CAPSULE    Take 1 capsule by mouth 3 times daily (before meals)    DIPHENHYDRAMINE (BENADRYL) 25 MG TABLET    Take 25 mg by mouth every 6 hours as needed. OTC     FREMANEZUMAB-VFRM (AJOVY) 225 MG/1.5ML SOSY    Inject 1.5 mLs into the skin every 30 days    GALCANEZUMAB-GNLM (EMGALITY) 120 MG/ML SOAJ    Inject 120 mg into the skin every 30 days    HYDROCODONE-ACETAMINOPHEN (NORCO) 5-325 MG PER TABLET    Take 1 tablet by mouth daily as needed for Pain for up to 10 days. May take instead of tramadol when pain is increased    MAGNESIUM 200 MG CHEW    Take 1 tablet by mouth daily    METHOCARBAMOL (ROBAXIN) 500 MG TABLET    TAKE ONE TABLET BY MOUTH THREE TIMES A DAY    METOPROLOL SUCCINATE (TOPROL XL) 25 MG EXTENDED RELEASE TABLET    TAKE 1 TABLET BY MOUTH ONE TIME A DAY    NITROGLYCERIN (NITROSTAT) 0.4 MG SL TABLET    up to max of 3 total doses. If no relief after 1 dose, call 911. OMEPRAZOLE (PRILOSEC) 20 MG CAPSULE    Take 20 mg by mouth every 12 hours.     PREDNISONE (DELTASONE) 10 MG TABLET    2 tablets 2 times daily for 5 days, 1 tablet 2 times daily for 5 days, 1 tablet daily    PROMETHAZINE (PHENERGAN) 25 MG TABLET    Take 1 tablet by mouth every 6 hours as needed for Nausea    ROSUVASTATIN (CRESTOR) 20 MG TABLET    TAKE ONE TABLET BY MOUTH NIGHTLY    TICAGRELOR (BRILINTA) 60 MG TABS TABLET    Take 1 tablet by mouth 2 times daily    VITAMIN D (ERGOCALCIFEROL) 1.25 MG (83530 UT) CAPS CAPSULE    Take 1 capsule by mouth once a week         ALLERGIES     Avelox [moxifloxacin hcl in nacl], Cephalexin, Darvon [propoxyphene hcl], Digoxin, Librax [chlordiazepoxide-methscopol], Motrin [ibuprofen micronized], Other, Pcn [penicillins], Prochlorperazine edisylate, Sulfa antibiotics, Tetracyclines & related, Nickel, and Statins    FAMILYHISTORY       Family History   Problem Relation Age of Onset    Other Mother         sepsis    Dementia Mother     High Cholesterol Mother  Depression Mother     Anxiety Disorder Mother     High Blood Pressure Father     Other Father          from renal failure and CHF    Heart Disease Father     Migraines Father     Heart Attack Brother     Heart Disease Brother     Hypertension Other     High Cholesterol Other     Migraines Other     Heart Disease Other     Diabetes Other           SOCIAL HISTORY       Social History     Socioeconomic History    Marital status:      Spouse name: None    Number of children: 0    Years of education: None    Highest education level: None   Occupational History    None   Social Needs    Financial resource strain: Not hard at all   [place] insecurity     Worry: Never true     Inability: Never true    Transportation needs     Medical: No     Non-medical: No   Tobacco Use    Smoking status: Never Smoker    Smokeless tobacco: Never Used   Substance and Sexual Activity    Alcohol use: No    Drug use: No    Sexual activity: Not Currently     Partners: Male   Lifestyle    Physical activity     Days per week: 3 days     Minutes per session: 30 min    Stress:  Only a little   Relationships    Social connections     Talks on phone: None     Gets together: None     Attends [employer] service: None     Active member of club or organization: None     Attends meetings of clubs or organizations: None     Relationship status: None    Intimate partner violence     Fear of current or ex partner: None     Emotionally abused: None     Physically abused: None     Forced sexual activity: None   Other Topics Concern    None   Social History Narrative    None       SCREENINGS    [place] Coma Scale  Eye Opening: Spontaneous  Best Verbal Response: Oriented  Best Motor Response: Obeys commands  [place] Coma Scale Score: 15        PHYSICAL EXAM    (up to 7 for level 4, 8 or more for level 5)     ED Triage Vitals [21 1620]   BP Temp Temp Source Pulse Resp SpO2 Height Weight 106/69 101.9 °F (38.8 °C) Oral 121 20 96 % 5' 4\" (1.626 m) 160 lb (72.6 kg)       Physical Exam  Vitals signs and nursing note reviewed. Constitutional:       General: She is awake. She is not in acute distress. Appearance: Normal appearance. She is well-developed and normal weight. She is ill-appearing. She is not toxic-appearing or diaphoretic. HENT:      Head: Normocephalic and atraumatic. Right Ear: External ear normal.      Left Ear: External ear normal.      Nose: Nose normal.      Mouth/Throat:      Mouth: Mucous membranes are dry. Pharynx: Oropharynx is clear. Eyes:      General:         Right eye: No discharge. Left eye: No discharge. Extraocular Movements: Extraocular movements intact. Conjunctiva/sclera: Conjunctivae normal.      Pupils: Pupils are equal, round, and reactive to light. Neck:      Musculoskeletal: Normal range of motion and neck supple. No neck rigidity. Comments: No meningismus  Cardiovascular:      Rate and Rhythm: Regular rhythm. Tachycardia present. Pulses: Normal pulses. Heart sounds: Normal heart sounds. No murmur. No friction rub. No gallop. Pulmonary:      Effort: Pulmonary effort is normal. No respiratory distress. Breath sounds: Normal breath sounds. No wheezing or rales. Comments: No cough appreciated  Abdominal:      General: Abdomen is flat. Bowel sounds are increased. There is no distension. Palpations: Abdomen is soft. Tenderness: There is no abdominal tenderness. Musculoskeletal:      Right lower leg: No edema. Left lower leg: No edema. Lymphadenopathy:      Cervical: No cervical adenopathy. Skin:     General: Skin is warm and dry. Capillary Refill: Capillary refill takes less than 2 seconds. Findings: No rash. Neurological:      General: No focal deficit present. Mental Status: She is alert, oriented to person, place, and time and easily aroused. Sensory: No sensory deficit. Motor: Weakness (Generalized) present. Psychiatric:         Behavior: Behavior normal. Behavior is cooperative.            DIAGNOSTIC RESULTS   LABS:    Labs Reviewed   CBC WITH AUTO DIFFERENTIAL - Abnormal; Notable for the following components:       Result Value    Lymphocytes Absolute 0.8 (*)     All other components within normal limits    Narrative:     Performed at:  [address],  [address], [address]   Phone [phone]   COMPREHENSIVE METABOLIC PANEL - Abnormal; Notable for the following components:    Potassium 2.8 (*)     Chloride 97 (*)     Glucose 130 (*)     ALT 9 (*)     All other components within normal limits    Narrative:     Patricia Back tel. [phone],  Chemistry results called to and read back by Eduardo Segundo RN, 01/01/2021  17:29, by Cecily Both  Performed at:  [address],  [address], [address]   Phone [phone]   LACTIC ACID, PLASMA - Abnormal; Notable for the following components:    Lactic Acid 2.1 (*)     All other components within normal limits    Narrative:     Performed at:  [address],  [address], [address]   Phone [phone]   BLOOD GAS, VENOUS - Abnormal; Notable for the following components:    pH, Eder 7.472 (*)     pCO2, Eder 36.0 (*)     Carboxyhemoglobin 1.9 (*)     All other components within normal limits    Narrative:     Performed at:  [address],  [address] [place] [address] Drive, [address]   Phone [phone]   D-DIMER, QUANTITATIVE - Abnormal; Notable for the following components:    D-Dimer, Quant 385 (*)     All other components within normal limits    Narrative:     Performed at:  [address],  [address] [place] [address] Drive, [address]   Phone [phone]    Narrative: CALL  [facility] [phone],  Chemistry results called to and read back by Archana Spnece RN, 01/01/2021  17:29, by Berenice Mace  Performed at:  [address], [facility] [place]   Phone [phone]   TROPONIN    Narrative:     Adryan Gauthier tel. [phone],  Chemistry results called to and read back by Archana Spence RN, 01/01/2021  17:29, by Berenice Mace  Performed at:  [address], [facility] [place]   Phone [phone]   MAGNESIUM    Narrative:     [address] [phone],  Chemistry results called to and read back by Archana Spence RN, 01/01/2021  17:29, by Berenice Mace  Performed at:  [address], [facility] [place]   Phone [phone]   URINALYSIS   TROPONIN       All other labs were within normal range or not returned as of this dictation. EKG: All EKG's are interpreted by the Emergency Department Physician who either signs orCo-signs this chart in the absence of a cardiologist.  Please see their note for interpretation of EKG. RADIOLOGY:   Non-plain film images such as CT, Ultrasound and MRI are read by the radiologist. Plain radiographic images are visualized andpreliminarily interpreted by the  ED Provider with the below findings:        Interpretation perthe Radiologist below, if available at the time of this note:    CT CHEST PULMONARY EMBOLISM W CONTRAST   Preliminary Result   1. No acute pulmonary embolus. 2. Mild areas of ground-glass opacity compatible with COVID-19 pneumonia. 3. Moderate-sized hiatal hernia containing high-density material.  This could   be due to an active GI bleed or recently ingested contrast.   Findings were discussed with emergency department at 7:52 pm on 1/1/2021. RECOMMENDATIONS:   1.      XR CHEST PORTABLE   Final Result   No acute airspace disease identified.            Xr Chest Portable Result Date: 1/1/2021  EXAMINATION: ONE XRAY VIEW OF THE CHEST 1/1/2021 4:35 pm COMPARISON: 03/28/2019 HISTORY: ORDERING SYSTEM PROVIDED HISTORY: short of breath TECHNOLOGIST PROVIDED HISTORY: Reason for exam:->short of breath Reason for Exam: sob,covid+ Acuity: Acute Type of Exam: Initial FINDINGS: The cardiomediastinal silhouette is within normal limits. Hiatal hernia again demonstrated. There is no consolidation, pneumothorax or evidence for edema. No evidence for effusion. No acute osseous abnormality is identified. No acute airspace disease identified. PROCEDURES   Unless otherwise noted below, none     Procedures    CRITICAL CARE TIME   The total critical care time spent while evaluating and treating this patient was 50 minutes. This excludes time spent doing separately billable procedures. This includes time at the bedside, data interpretation, medication management, obtaining critical history from collateral sources if the patient is unable to provide it directly, and physician consultation. Specifics of interventions taken and potentially life-threatening diagnostic considerations are listed above in the medical decision making.           CONSULTS:  IP CONSULT TO GENERAL SURGERY  IP CONSULT TO GI      EMERGENCY DEPARTMENT COURSE and DIFFERENTIALDIAGNOSIS/MDM:   Vitals:    Vitals:    01/01/21 1814 01/01/21 1834 01/01/21 1949 01/01/21 2009   BP: 119/65 114/62 (!) 114/58 (!) 109/59   Pulse: 94 95 95 89   Resp: 20 13 17 16   Temp:       TempSrc:       SpO2: 99% 98% 98% 98%   Weight:       Height:           Patient was given thefollowing medications:  Medications   pantoprazole (PROTONIX) 80 mg in sodium chloride 0.9 % 50 mL bolus (has no administration in time range)   pantoprazole (PROTONIX) 80 mg in sodium chloride 0.9 % 100 mL infusion (has no administration in time range)   sucralfate (CARAFATE) tablet 1 g (has no administration in time range) pantoprazole (PROTONIX) injection 40 mg (has no administration in time range)     And   sodium chloride (PF) 0.9 % injection 10 mL (has no administration in time range)   acetaminophen (TYLENOL) tablet 1,000 mg (1,000 mg Oral Given 1/1/21 1729)   0.9 % sodium chloride bolus (0 mLs Intravenous Stopped 1/1/21 1834)   ondansetron (ZOFRAN) injection 4 mg (4 mg Intravenous Given 1/1/21 1729)   morphine (PF) injection 2 mg (2 mg Intravenous Given 1/1/21 1729)   aspirin EC tablet 325 mg (325 mg Oral Given 1/1/21 1729)   potassium chloride (KLOR-CON M) extended release tablet 40 mEq (40 mEq Oral Given 1/1/21 1832)   potassium chloride 10 mEq/100 mL IVPB (Peripheral Line) (0 mEq Intravenous Stopped 1/1/21 2014)   iopamidol (ISOVUE-370) 76 % injection 75 mL (75 mLs Intravenous Given 1/1/21 1901)       PDMP Monitoring:    Last PDMP [facility] SYSTEM as Reviewed [facility]):  Review User Review Instant Review Result   Sly Platts 12/24/2020 12:44 PM Reviewed PDMP [1]     Last Controlled Substance Monitoring Documentation      Patient Message from 11/24/2020 in [facility]   Periodic Controlled Substance Monitoring  No signs of potential drug abuse or diversion identified.  filed at 11/25/2020 1038        Urine Drug Screenings (1 yr)     Drug Panel-PM-HI Res-UR Interp-A  Collected: 11/8/2018  8:21 AM (Final result)    Narrative: Referred out by:  [facility]  [address] [place], [place] [address]   Phone [phone]    Complete Results          Drug Panel-PM-HI Res-UR Interp-A  Collected: 4/5/2017  1:58 PM (Final result)    Complete Results              Medication Contract and Consent for Opioid Use Documents Filed     Patient Documents       Type of Document Status Date Received Received By Description     Medication Contract Received 8/14/2018 12:04 PM [place] [facility] Med contract Medication Contract Received 8/7/2020 12:06 PM LIZZIE ESCUDERO Medication Agreement Tramadol, [place] 8/7/2020                MDM:   Patient seen and evaluated. Old records reviewed. Diagnostic testing reviewed and results discussed. Patient is a 70-year-old female who presents for evaluation of right upper chest wall pain, vomiting, and context of known COVID-19 infection. On exam she is alert and oriented she is febrile with compensatory tachycardia. She is not hypoxic, she does not show sign of acute respiratory distress. She is actively nauseated, IV Zofran administered. Labs and imaging were obtained and reviewed in the department. She has no acute go cytosis, no evidence of anemia. Mild lymphopenia consistent with COVID-19. She has significant hypokalemia consistent with history of emesis. Replenishment administered in the emergency department, magnesium is within normal limits. She does have a mildly elevated lactic acidosis. No evidence of heart failure or heart strain. Troponins negative. Her EKG is not normal.  My attending discussed this EKG with cardiology, please see her note for this. D-dimer is elevated, CTPA was obtained to rule out acute PE which was a concern of mine given her right-sided chest pain and absence of a significant finding on chest x-ray. She has no evidence of acute PE, she has groundglass opacities compatible with COVID-19 pneumonia. She has a moderate size hiatal hernia containing some high density material differentials include active GI bleed a recently ingested contrast.  She did not have any ingested contrast or GI cocktail. I discussed the CT result with general surgery on-call, Dr. Irena Montano who reviewed the images and he agreed it could be the start of a GI bleed. NG tube not recommended at this time. Consult placed for GI. Patient started on Protonix drip and Carafate. Will start her on IV antibiotics for covid PNA. Will hold dexamethasone at this time as she is not hypoxic. Patient was given IV fluids, Tylenol for fever, full dose aspirin given the abnormal EKG, morphine for body aches and Zofran for nausea. I spoke with Dr. Edwige Gallardo, GI. He agrees with PPI drip, will cancel Carafate. He does recommend placing of an NG tube as it will determine if there is blood. Will likely plan for EGD tomorrow. All information including ED workup, results, treatment, diagnosis has been reviewed and discussed with ED attending physician and directly discussed with Hospitalist who is the admitting physician. Pt will be admitted in stable condition. Pt advised of admission and is in full agreement. FINAL IMPRESSION      1. Non-intractable vomiting with nausea, unspecified vomiting type    2. Upper GI bleed    3. Abnormal EKG    4. COVID-19    5. Pneumonia due to COVID-19 virus    6. Right-sided chest pain          DISPOSITION/PLAN   DISPOSITION        PATIENT REFERREDTO:  No follow-up provider specified.     DISCHARGE MEDICATIONS:  New Prescriptions    No medications on file        DISCONTINUED MEDICATIONS:  Discontinued Medications    No medications on file (Please note that portions ofthis note were completed with a voice recognition program.  Efforts were made to edit the dictations but occasionally words are mis-transcribed.)    Magali Nation (electronically signed)       ALESSANDRA Nation  01/01/21 2129       Magali Nation  01/01/21 2141

## 2021-01-02 NOTE — PROGRESS NOTES
I just found out by accident that I am supposed to take care of this patient. Interestingly, the nurse just sent me a message to ask about diet. So I looked up the patient and found out I am supposed to see her. It would have been optimal if the hospital/ER staff had asked the patient last night which GI group she is established with before automatically calling [facility], and me not finding out about this consult until the next day, and by coincidence, now that I have been in the hospital all morning and just left.     Antonio Young MD       [phone]) [phone]

## 2021-01-02 NOTE — RESEARCH
The patient is admitted with COVID pneumonia. I was informed about the gastroenterology consultation for this patient last night. This was mainly because of nausea, vomiting and the possibility of GI bleeding based on the CT scan because of the high density area in the stomach. I have reviewed the chart. The patient has been seen by Jayy Osei in the pastDr. Casey Lares. It would be best to contact [facility] for GI evaluation. No patient-physician relationship between this patient and me exists. This note will be routed to the attending physician.

## 2021-01-02 NOTE — CONSULTS
Switch protonix gtt to IV protonoix 40 mg bid after current              bag completed   Switched to 40 mg BID iv

## 2021-01-02 NOTE — ED NOTES
Four attempts to place an NG tube, patient unable to tolerate insertion of the tube. Last attempt was successful, however patient pulled the tube back out. MD aware.      Jesi Castelan RN  01/01/21 9474

## 2021-01-03 LAB
ANION GAP SERPL CALCULATED.3IONS-SCNC: 20 MMOL/L (ref 3–16)
BASOPHILS ABSOLUTE: 0 K/UL (ref 0–0.2)
BASOPHILS RELATIVE PERCENT: 0.5 %
BUN BLDV-MCNC: 10 MG/DL (ref 7–20)
CALCIUM SERPL-MCNC: 8.7 MG/DL (ref 8.3–10.6)
CHLORIDE BLD-SCNC: 101 MMOL/L (ref 99–110)
CO2: 20 MMOL/L (ref 21–32)
CREAT SERPL-MCNC: 0.7 MG/DL (ref 0.6–1.2)
EOSINOPHILS ABSOLUTE: 0 K/UL (ref 0–0.6)
EOSINOPHILS RELATIVE PERCENT: 0 %
GFR AFRICAN AMERICAN: >60
GFR NON-AFRICAN AMERICAN: >60
GLUCOSE BLD-MCNC: 93 MG/DL (ref 70–99)
HCT VFR BLD CALC: 37 % (ref 36–48)
HEMOGLOBIN: 12.3 G/DL (ref 12–16)
LYMPHOCYTES ABSOLUTE: 0.9 K/UL (ref 1–5.1)
LYMPHOCYTES RELATIVE PERCENT: 17.5 %
MCH RBC QN AUTO: 31.5 PG (ref 26–34)
MCHC RBC AUTO-ENTMCNC: 33.2 G/DL (ref 31–36)
MCV RBC AUTO: 94.9 FL (ref 80–100)
MONOCYTES ABSOLUTE: 0.2 K/UL (ref 0–1.3)
MONOCYTES RELATIVE PERCENT: 4.6 %
NEUTROPHILS ABSOLUTE: 4 K/UL (ref 1.7–7.7)
NEUTROPHILS RELATIVE PERCENT: 77.4 %
PDW BLD-RTO: 13.8 % (ref 12.4–15.4)
PLATELET # BLD: 258 K/UL (ref 135–450)
PMV BLD AUTO: 6.9 FL (ref 5–10.5)
POTASSIUM SERPL-SCNC: 3.2 MMOL/L (ref 3.5–5.1)
RBC # BLD: 3.9 M/UL (ref 4–5.2)
SODIUM BLD-SCNC: 141 MMOL/L (ref 136–145)
WBC # BLD: 5.2 K/UL (ref 4–11)

## 2021-01-03 PROCEDURE — 6370000000 HC RX 637 (ALT 250 FOR IP): Performed by: INTERNAL MEDICINE

## 2021-01-03 PROCEDURE — C9113 INJ PANTOPRAZOLE SODIUM, VIA: HCPCS | Performed by: HOSPITALIST

## 2021-01-03 PROCEDURE — 6370000000 HC RX 637 (ALT 250 FOR IP): Performed by: HOSPITALIST

## 2021-01-03 PROCEDURE — 36415 COLL VENOUS BLD VENIPUNCTURE: CPT

## 2021-01-03 PROCEDURE — 85025 COMPLETE CBC W/AUTO DIFF WBC: CPT

## 2021-01-03 PROCEDURE — 2580000003 HC RX 258: Performed by: INTERNAL MEDICINE

## 2021-01-03 PROCEDURE — 6360000002 HC RX W HCPCS: Performed by: HOSPITALIST

## 2021-01-03 PROCEDURE — 80048 BASIC METABOLIC PNL TOTAL CA: CPT

## 2021-01-03 PROCEDURE — 2060000000 HC ICU INTERMEDIATE R&B

## 2021-01-03 RX ORDER — POTASSIUM CHLORIDE 7.45 MG/ML
10 INJECTION INTRAVENOUS
Status: COMPLETED | OUTPATIENT
Start: 2021-01-03 | End: 2021-01-03

## 2021-01-03 RX ORDER — PREDNISONE 10 MG/1
10 TABLET ORAL DAILY
Status: DISCONTINUED | OUTPATIENT
Start: 2021-01-03 | End: 2021-01-03

## 2021-01-03 RX ORDER — ASPIRIN 81 MG/1
81 TABLET, CHEWABLE ORAL DAILY
Status: DISCONTINUED | OUTPATIENT
Start: 2021-01-03 | End: 2021-01-06 | Stop reason: HOSPADM

## 2021-01-03 RX ADMIN — POTASSIUM CHLORIDE 10 MEQ: 10 INJECTION, SOLUTION INTRAVENOUS at 09:53

## 2021-01-03 RX ADMIN — CITALOPRAM HYDROBROMIDE 40 MG: 20 TABLET ORAL at 18:43

## 2021-01-03 RX ADMIN — ROSUVASTATIN CALCIUM 20 MG: 10 TABLET, FILM COATED ORAL at 20:43

## 2021-01-03 RX ADMIN — PANTOPRAZOLE SODIUM 40 MG: 40 INJECTION, POWDER, FOR SOLUTION INTRAVENOUS at 20:43

## 2021-01-03 RX ADMIN — POTASSIUM CHLORIDE 10 MEQ: 10 INJECTION, SOLUTION INTRAVENOUS at 13:04

## 2021-01-03 RX ADMIN — POTASSIUM CHLORIDE 10 MEQ: 10 INJECTION, SOLUTION INTRAVENOUS at 18:40

## 2021-01-03 RX ADMIN — ASPIRIN 81 MG: 81 TABLET, CHEWABLE ORAL at 18:43

## 2021-01-03 RX ADMIN — ACETAMINOPHEN 650 MG: 325 TABLET ORAL at 13:07

## 2021-01-03 RX ADMIN — ACETAMINOPHEN 650 MG: 325 TABLET ORAL at 20:43

## 2021-01-03 RX ADMIN — PANTOPRAZOLE SODIUM 40 MG: 40 INJECTION, POWDER, FOR SOLUTION INTRAVENOUS at 09:53

## 2021-01-03 RX ADMIN — PROMETHAZINE HYDROCHLORIDE 12.5 MG: 25 INJECTION INTRAMUSCULAR; INTRAVENOUS at 09:53

## 2021-01-03 RX ADMIN — METHOCARBAMOL 500 MG: 500 TABLET ORAL at 20:43

## 2021-01-03 RX ADMIN — ONDANSETRON 4 MG: 2 INJECTION INTRAMUSCULAR; INTRAVENOUS at 20:44

## 2021-01-03 RX ADMIN — Medication 1 LOZENGE: at 06:12

## 2021-01-03 RX ADMIN — POTASSIUM CHLORIDE 10 MEQ: 10 INJECTION, SOLUTION INTRAVENOUS at 15:59

## 2021-01-03 RX ADMIN — SODIUM CHLORIDE, PRESERVATIVE FREE 10 ML: 5 INJECTION INTRAVENOUS at 09:53

## 2021-01-03 RX ADMIN — SODIUM CHLORIDE, PRESERVATIVE FREE 10 ML: 5 INJECTION INTRAVENOUS at 20:43

## 2021-01-03 RX ADMIN — METOPROLOL SUCCINATE 25 MG: 25 TABLET, EXTENDED RELEASE ORAL at 18:43

## 2021-01-03 ASSESSMENT — PAIN SCALES - GENERAL
PAINLEVEL_OUTOF10: 0
PAINLEVEL_OUTOF10: 5
PAINLEVEL_OUTOF10: 5

## 2021-01-03 NOTE — PROGRESS NOTES
Pt is a 79yr old admitted with possible GI bleed and COVID +, Pt is NPO d/t N/V and did not receive routine dose of metoprolol and HR is hanging out around 120s sometimes jumping up to 140s. Can we get an IV dose to help with HR? Thanks. (separate message) Also pt is stating they are in pain in the R shoulder, would we be able to get something IV for pain?  Thanks  LUNA Modi has been [employer]

## 2021-01-03 NOTE — FLOWSHEET NOTE
This note also relates to the following rows which could not be included:  Pulse - Cannot attach notes to unvalidated device data       01/02/21 2027   Assessment   Charting Type Shift assessment   Neurological   Neuro (WDL) WDL   Level of Consciousness Alert (0)   Orientation Level Oriented X4   Cognition Appropriate judgement; Appropriate safety awareness; Appropriate attention/concentration; Appropriate for developmental age; Follows commands   Language Clear   [place] Coma Scale   Eye Opening 4   Best Verbal Response 5   Best Motor Response 6   [place] Coma Scale Score 15   HEENT   HEENT (WDL) X  (hearing aids at home)   Right Ear Impaired hearing   Left Ear Impaired hearing   Respiratory   Respiratory (WDL) X   Respiratory Quality/Effort Labored;Dyspnea with exertion   Chest Assessment Chest expansion symmetrical;Trachea midline   Breath Sounds   Right Upper Lobe Clear   Right Middle Lobe Clear   Right Lower Lobe Diminished   Left Upper Lobe Clear   Left Lower Lobe Diminished   Cardiac   Cardiac (WDL) WDL   Cardiac Regularity Regular   Cardiac Rhythm NSR;ST   Cardiac Monitor   Telemetry Monitor On Yes   Telemetry Audible Yes   Telemetry Alarms Set Yes   Gastrointestinal   Abdominal (WDL) X   Tenderness Soft;Nontender   Abdomen Inspection Soft   RUQ Bowel Sounds Active   LUQ Bowel Sounds Active   RLQ Bowel Sounds Active   LLQ Bowel Sounds Active   Peripheral Vascular   Peripheral Vascular (WDL) X   Edema Left lower extremity;Right lower extremity   RLE Edema +1   LLE Edema +1   Skin Color/Condition   Skin Color/Condition (WDL) X   Skin Color Appropriate for ethnicity   Skin Condition/Temp Dry; Warm   Skin Integrity   Skin Integrity (WDL) WDL   Musculoskeletal   Musculoskeletal (WDL) WDL   Genitourinary   Genitourinary (WDL) WDL   Flank Tenderness No   Suprapubic Tenderness No   Dysuria No   Anus/Rectum   Anus/Rectum (WDL) WDL   Psychosocial   Psychosocial (WDL) WDL

## 2021-01-03 NOTE — PROGRESS NOTES
ALT 9*   BILITOT 0.6   ALKPHOS 65       Imaging Last 24 Hours:  Xr Chest Portable    Result Date: 1/1/2021  EXAMINATION: ONE XRAY VIEW OF THE CHEST 1/1/2021 4:35 pm COMPARISON: 03/28/2019 HISTORY: ORDERING SYSTEM PROVIDED HISTORY: short of breath TECHNOLOGIST PROVIDED HISTORY: Reason for exam:->short of breath Reason for Exam: sob,covid+ Acuity: Acute Type of Exam: Initial FINDINGS: The cardiomediastinal silhouette is within normal limits. Hiatal hernia again demonstrated. There is no consolidation, pneumothorax or evidence for edema. No evidence for effusion. No acute osseous abnormality is identified. No acute airspace disease identified.      Ct Chest Pulmonary Embolism W Contrast    Result Date: 1/1/2021 EXAMINATION: CTA OF THE CHEST 1/1/2021 6:39 pm TECHNIQUE: CTA of the chest was performed after the administration of intravenous contrast.  Multiplanar reformatted images are provided for review. MIP images are provided for review. Dose modulation, iterative reconstruction, and/or weight based adjustment of the mA/kV was utilized to reduce the radiation dose to as low as reasonably achievable. COMPARISON: None. HISTORY: ORDERING SYSTEM PROVIDED HISTORY: SOB, + Covid TECHNOLOGIST PROVIDED HISTORY: Reason for exam:->SOB, + Covid Reason for Exam: right upper back pain x 1 week Acuity: Acute Type of Exam: Initial Additional signs and symptoms: shortness of breath Relevant Medical/Surgical History: cardiac stent,Covid positive FINDINGS: Pulmonary Arteries:  No central, lobar, or segmental pulmonary embolus. Mediastinum: No evidence of mediastinal lymphadenopathy. The heart and pericardium demonstrate no acute abnormality. There is no acute abnormality of the thoracic aorta. Thoracic aorta is normal caliber. Aortic arch branch vessels are patent. Heart size is normal.  No pericardial effusion. No thoracic adenopathy. Moderate-sized hiatal hernia is present. There is high-density material in the herniated stomach and extending along the lesser curvature of the stomach in the gastric body. Findings could represent active gastrointestinal bleeding or recently ingested contrast. Lungs and Pleura: Mild ground-glass patchy areas of consolidation are present in the lung bases compatible with COVID-19 pneumonia. No pneumothorax. No pleural effusion. Central airways are clear. Upper Abdomen: Limited images of the upper abdomen otherwise demonstrate no acute abnormality. Bones: No acute osseous abnormality. 1. No acute pulmonary embolus. 2. Mild areas of ground-glass opacity compatible with COVID-19 pneumonia. 3. Moderate-sized hiatal hernia containing high-density material.  This could be due to an active GI bleed or recently ingested contrast. Findings were discussed with emergency department at 7:52 pm on 1/1/2021. RECOMMENDATIONS: 1. Assessment//Plan           Hospital Problems           Last Modified POA    Fibromyalgia 1/2/2021 Yes    Hyperlipidemia 1/2/2021 Yes    Depression 1/2/2021 Yes    CAD (coronary artery disease) 1/2/2021 Yes    CAD S/P percutaneous coronary angioplasty 1/2/2021 Yes    GI bleed 1/1/2021 Yes    COVID-19 1/1/2021 Yes    SOB (shortness of breath) 1/1/2021 Yes    Hypokalemia 1/2/2021 Yes        Assessment & Plan  72 y. o. female, established w Dr Haynes Outhouse history of hyperlipidemia coronary artery disease status post PCI, IBS, hiatal hernia, GERD who presented w Covid-19 pneumonia and N/V. H/H 12/34, CT revealed a medium-size [place] w material in it, most likely ingested contrast although blood could not be ruled out. However, there is no reported abd pain, hematemesis or melena/hematochezia, and H/H is stable.     Plan:   1. Agree w IV Protonix and antiemetics  2. Consider adding short-term Reglan if not contraindicated  3. Supportive care w Abx's, IVF and Covid-19 Rx protocol  4. No indication for emergency EGD at this time  5. Daily H/H  6.  Will follow     Freeman Gates MD       (O) [phone]    Electronically signed by Freeman Gates MD on 1/3/21 at 8:53 AM EST

## 2021-01-03 NOTE — PROGRESS NOTES
Pt is a 72 yr old admitted with a possible GI bleed and COVID +, pt is NPO d/t N/V that they are thinking is more COVID related and no GI. Pt has been complaining of a severe dry throat. Would we be able to get some lozenges to help? Thanks.  MD Ayala Thurman has been messaged

## 2021-01-03 NOTE — PROGRESS NOTES
Pt stated she no longer take her brillinta due to nosebleeds, that she also no longer takes prednisone that was for her fibromyalgia.

## 2021-01-03 NOTE — PROGRESS NOTES
Page sent to physician: Her K this morning 3.2, she has no PRN replacement ordered. She did not tolerate liquids yesterday. If we order replacement can it be IV please? Thanks.

## 2021-01-04 LAB
A/G RATIO: 1.4 (ref 1.1–2.2)
ALBUMIN SERPL-MCNC: 3.7 G/DL (ref 3.4–5)
ALP BLD-CCNC: 54 U/L (ref 40–129)
ALT SERPL-CCNC: 7 U/L (ref 10–40)
ANION GAP SERPL CALCULATED.3IONS-SCNC: 13 MMOL/L (ref 3–16)
AST SERPL-CCNC: 20 U/L (ref 15–37)
BASOPHILS ABSOLUTE: 0 K/UL (ref 0–0.2)
BASOPHILS RELATIVE PERCENT: 0.5 %
BILIRUB SERPL-MCNC: 0.5 MG/DL (ref 0–1)
BUN BLDV-MCNC: 14 MG/DL (ref 7–20)
C-REACTIVE PROTEIN: 44 MG/L (ref 0–5.1)
CALCIUM SERPL-MCNC: 8.5 MG/DL (ref 8.3–10.6)
CHLORIDE BLD-SCNC: 102 MMOL/L (ref 99–110)
CO2: 22 MMOL/L (ref 21–32)
CREAT SERPL-MCNC: 0.6 MG/DL (ref 0.6–1.2)
D DIMER: 524 NG/ML DDU (ref 0–229)
EKG ATRIAL RATE: 117 BPM
EKG DIAGNOSIS: NORMAL
EKG P AXIS: 23 DEGREES
EKG P-R INTERVAL: 116 MS
EKG Q-T INTERVAL: 360 MS
EKG QRS DURATION: 122 MS
EKG QTC CALCULATION (BAZETT): 502 MS
EKG R AXIS: -55 DEGREES
EKG T AXIS: 103 DEGREES
EKG VENTRICULAR RATE: 117 BPM
EOSINOPHILS ABSOLUTE: 0 K/UL (ref 0–0.6)
EOSINOPHILS RELATIVE PERCENT: 0.3 %
GFR AFRICAN AMERICAN: >60
GFR NON-AFRICAN AMERICAN: >60
GLOBULIN: 2.6 G/DL
GLUCOSE BLD-MCNC: 88 MG/DL (ref 70–99)
HCT VFR BLD CALC: 36.5 % (ref 36–48)
HEMOGLOBIN: 12.4 G/DL (ref 12–16)
LACTATE DEHYDROGENASE: 341 U/L (ref 100–190)
LYMPHOCYTES ABSOLUTE: 0.8 K/UL (ref 1–5.1)
LYMPHOCYTES RELATIVE PERCENT: 13.6 %
MAGNESIUM: 1.9 MG/DL (ref 1.8–2.4)
MCH RBC QN AUTO: 31.6 PG (ref 26–34)
MCHC RBC AUTO-ENTMCNC: 33.9 G/DL (ref 31–36)
MCV RBC AUTO: 93.3 FL (ref 80–100)
MONOCYTES ABSOLUTE: 0.3 K/UL (ref 0–1.3)
MONOCYTES RELATIVE PERCENT: 5.4 %
NEUTROPHILS ABSOLUTE: 4.9 K/UL (ref 1.7–7.7)
NEUTROPHILS RELATIVE PERCENT: 80.2 %
PDW BLD-RTO: 13.5 % (ref 12.4–15.4)
PLATELET # BLD: 274 K/UL (ref 135–450)
PMV BLD AUTO: 6.9 FL (ref 5–10.5)
POTASSIUM SERPL-SCNC: 2.6 MMOL/L (ref 3.5–5.1)
POTASSIUM SERPL-SCNC: 3.1 MMOL/L (ref 3.5–5.1)
RBC # BLD: 3.91 M/UL (ref 4–5.2)
SODIUM BLD-SCNC: 137 MMOL/L (ref 136–145)
TOTAL PROTEIN: 6.3 G/DL (ref 6.4–8.2)
WBC # BLD: 6.1 K/UL (ref 4–11)

## 2021-01-04 PROCEDURE — 85379 FIBRIN DEGRADATION QUANT: CPT

## 2021-01-04 PROCEDURE — 6370000000 HC RX 637 (ALT 250 FOR IP): Performed by: INTERNAL MEDICINE

## 2021-01-04 PROCEDURE — 93010 ELECTROCARDIOGRAM REPORT: CPT | Performed by: INTERNAL MEDICINE

## 2021-01-04 PROCEDURE — 83735 ASSAY OF MAGNESIUM: CPT

## 2021-01-04 PROCEDURE — 36415 COLL VENOUS BLD VENIPUNCTURE: CPT

## 2021-01-04 PROCEDURE — 85025 COMPLETE CBC W/AUTO DIFF WBC: CPT

## 2021-01-04 PROCEDURE — C9113 INJ PANTOPRAZOLE SODIUM, VIA: HCPCS | Performed by: HOSPITALIST

## 2021-01-04 PROCEDURE — 80053 COMPREHEN METABOLIC PANEL: CPT

## 2021-01-04 PROCEDURE — 86140 C-REACTIVE PROTEIN: CPT

## 2021-01-04 PROCEDURE — 6360000002 HC RX W HCPCS: Performed by: HOSPITALIST

## 2021-01-04 PROCEDURE — 1200000000 HC SEMI PRIVATE

## 2021-01-04 PROCEDURE — 2580000003 HC RX 258: Performed by: INTERNAL MEDICINE

## 2021-01-04 PROCEDURE — 6370000000 HC RX 637 (ALT 250 FOR IP): Performed by: NURSE PRACTITIONER

## 2021-01-04 PROCEDURE — 84132 ASSAY OF SERUM POTASSIUM: CPT

## 2021-01-04 PROCEDURE — 83615 LACTATE (LD) (LDH) ENZYME: CPT

## 2021-01-04 PROCEDURE — 6360000002 HC RX W HCPCS: Performed by: NURSE PRACTITIONER

## 2021-01-04 PROCEDURE — 6370000000 HC RX 637 (ALT 250 FOR IP): Performed by: HOSPITALIST

## 2021-01-04 RX ORDER — POTASSIUM CHLORIDE 20 MEQ/1
20 TABLET, EXTENDED RELEASE ORAL ONCE
Status: COMPLETED | OUTPATIENT
Start: 2021-01-04 | End: 2021-01-04

## 2021-01-04 RX ORDER — POTASSIUM CHLORIDE 7.45 MG/ML
10 INJECTION INTRAVENOUS
Status: COMPLETED | OUTPATIENT
Start: 2021-01-04 | End: 2021-01-05

## 2021-01-04 RX ADMIN — ENOXAPARIN SODIUM 40 MG: 40 INJECTION SUBCUTANEOUS at 09:18

## 2021-01-04 RX ADMIN — PROMETHAZINE HYDROCHLORIDE 12.5 MG: 25 INJECTION INTRAMUSCULAR; INTRAVENOUS at 14:21

## 2021-01-04 RX ADMIN — SODIUM CHLORIDE, PRESERVATIVE FREE 10 ML: 5 INJECTION INTRAVENOUS at 20:52

## 2021-01-04 RX ADMIN — ONDANSETRON 4 MG: 2 INJECTION INTRAMUSCULAR; INTRAVENOUS at 20:51

## 2021-01-04 RX ADMIN — PROMETHAZINE HYDROCHLORIDE 12.5 MG: 25 INJECTION INTRAMUSCULAR; INTRAVENOUS at 23:02

## 2021-01-04 RX ADMIN — ACETAMINOPHEN 650 MG: 325 TABLET ORAL at 23:14

## 2021-01-04 RX ADMIN — PROMETHAZINE HYDROCHLORIDE 12.5 MG: 25 INJECTION INTRAMUSCULAR; INTRAVENOUS at 06:54

## 2021-01-04 RX ADMIN — ASPIRIN 81 MG: 81 TABLET, CHEWABLE ORAL at 09:17

## 2021-01-04 RX ADMIN — METOPROLOL SUCCINATE 25 MG: 25 TABLET, EXTENDED RELEASE ORAL at 09:17

## 2021-01-04 RX ADMIN — ACETAMINOPHEN 650 MG: 325 TABLET ORAL at 17:03

## 2021-01-04 RX ADMIN — METHOCARBAMOL 500 MG: 500 TABLET ORAL at 20:51

## 2021-01-04 RX ADMIN — POTASSIUM CHLORIDE 20 MEQ: 20 TABLET, EXTENDED RELEASE ORAL at 21:48

## 2021-01-04 RX ADMIN — POTASSIUM CHLORIDE 10 MEQ: 7.46 INJECTION, SOLUTION INTRAVENOUS at 21:48

## 2021-01-04 RX ADMIN — ROSUVASTATIN CALCIUM 20 MG: 10 TABLET, FILM COATED ORAL at 20:51

## 2021-01-04 RX ADMIN — SODIUM CHLORIDE, PRESERVATIVE FREE 10 ML: 5 INJECTION INTRAVENOUS at 09:18

## 2021-01-04 RX ADMIN — PANTOPRAZOLE SODIUM 40 MG: 40 INJECTION, POWDER, FOR SOLUTION INTRAVENOUS at 20:52

## 2021-01-04 RX ADMIN — PANTOPRAZOLE SODIUM 40 MG: 40 INJECTION, POWDER, FOR SOLUTION INTRAVENOUS at 09:18

## 2021-01-04 RX ADMIN — CITALOPRAM HYDROBROMIDE 40 MG: 20 TABLET ORAL at 09:17

## 2021-01-04 RX ADMIN — ONDANSETRON 4 MG: 2 INJECTION INTRAMUSCULAR; INTRAVENOUS at 05:20

## 2021-01-04 RX ADMIN — POTASSIUM CHLORIDE 10 MEQ: 7.46 INJECTION, SOLUTION INTRAVENOUS at 23:13

## 2021-01-04 RX ADMIN — ONDANSETRON 4 MG: 2 INJECTION INTRAMUSCULAR; INTRAVENOUS at 11:52

## 2021-01-04 ASSESSMENT — PAIN SCALES - GENERAL
PAINLEVEL_OUTOF10: 0
PAINLEVEL_OUTOF10: 7
PAINLEVEL_OUTOF10: 8
PAINLEVEL_OUTOF10: 0
PAINLEVEL_OUTOF10: 8

## 2021-01-04 NOTE — PLAN OF CARE
Problem: Falls - Risk of:  Goal: Will remain free from falls  Description: Will remain free from falls  Outcome: Ongoing  Goal: Absence of physical injury  Description: Absence of physical injury  Outcome: Ongoing     Problem: Airway Clearance - Ineffective  Goal: Achieve or maintain patent airway  Outcome: Ongoing     Problem: Gas Exchange - Impaired  Goal: Absence of hypoxia  Outcome: Ongoing  Goal: Promote optimal lung function  Outcome: Ongoing     Problem: Breathing Pattern - Ineffective  Goal: Ability to achieve and maintain a regular respiratory rate  Outcome: Ongoing     Problem:  Body Temperature -  Risk of, Imbalanced  Goal: Ability to maintain a body temperature within defined limits  Outcome: Ongoing  Goal: Will regain or maintain usual level of consciousness  Outcome: Ongoing  Goal: Complications related to the disease process, condition or treatment will be avoided or minimized  Outcome: Ongoing     Problem: Isolation Precautions - Risk of Spread of Infection  Goal: Prevent transmission of infection  Outcome: Ongoing     Problem: Nutrition Deficits  Goal: Optimize nutrtional status  Outcome: Ongoing     Problem: Risk for Fluid Volume Deficit  Goal: Maintain normal heart rhythm  Outcome: Ongoing  Goal: Maintain absence of muscle cramping  Outcome: Ongoing  Goal: Maintain normal serum potassium, sodium, calcium, phosphorus, and pH  Outcome: Ongoing   Pt understands to call out for help, call light within reach, bed locked and in lowest position

## 2021-01-04 NOTE — PROGRESS NOTES
Hospitalist Progress Note      PCP: [address], LUCIA - CNP    Date of Admission: 1/1/2021    Chief Complaint: Back pain, emesis since [address] Course:      Subjective: denies any overt bleeding  today      Medications:  Reviewed    Infusion Medications   Scheduled Medications    ticagrelor  60 mg Oral BID    aspirin  81 mg Oral Daily    enoxaparin  40 mg Subcutaneous Daily    citalopram  40 mg Oral Daily    methocarbamol  500 mg Oral TID    metoprolol succinate  25 mg Oral Daily    rosuvastatin  20 mg Oral Nightly    pantoprazole  40 mg Intravenous BID    sodium chloride flush  10 mL Intravenous 2 times per day     PRN Meds: benzocaine-menthol, HYDROcodone-acetaminophen, ondansetron, promethazine, sodium chloride flush, acetaminophen **OR** acetaminophen      Intake/Output Summary (Last 24 hours) at 1/4/2021 1217  Last data filed at 1/4/2021 0745  Gross per 24 hour   Intake 360 ml   Output 850 ml   Net -490 ml       Physical Exam Performed:    /74   Pulse 90   Temp 99.7 °F (37.6 °C) (Oral)   Resp 20   Ht 5' 4\" (1.626 m)   Wt 165 lb 2 oz (74.9 kg)   LMP  (LMP Unknown)   SpO2 95%   BMI 28.34 kg/m²     General appearance: No apparent distress, appears stated age and cooperative. HEENT: Pupils equal, round, and reactive to light. Conjunctivae/corneas clear. Neck: Supple, with full range of motion. No jugular venous distention. Trachea midline. Respiratory:  Normal respiratory effort. Clear to auscultation, bilaterally without Rales/Wheezes/Rhonchi. Cardiovascular: Regular rate and rhythm with normal S1/S2 without murmurs, rubs or gallops. Abdomen: Soft, non-tender, non-distended with normal bowel sounds. Musculoskeletal: No clubbing, cyanosis or edema bilaterally. Full range of motion without deformity. Skin: Skin color, texture, turgor normal.  No rashes or lesions. Neurologic:  Neurovascularly intact without any focal sensory/motor deficits. Cranial nerves: II-XII intact, grossly non-focal.  Psychiatric: Alert and oriented, thought content appropriate, normal insight  Capillary Refill: Brisk,< 3 seconds   Peripheral Pulses: +2 palpable, equal bilaterally       Labs:   Recent Labs     01/02/21  0533 01/02/21  1742 01/03/21  0531 01/04/21  0518   WBC 3.6*  --  5.2 6.1   HGB 11.6* 12.7 12.3 12.4   HCT 34.0* 38.0 37.0 36.5     --  258 274     Recent Labs     01/02/21  0533 01/03/21  0531 01/04/21  0518    141 137   K 3.3* 3.2* 3.1*    101 102   CO2 27 20* 22   BUN 14 10 14   CREATININE 0.7 0.7 0.6   CALCIUM 8.2* 8.7 8.5     Recent Labs     01/01/21  1632 01/04/21  0518   AST 18 20   ALT 9* 7*   BILITOT 0.6 0.5   ALKPHOS 65 54     No results for input(s): INR in the last 72 hours. Recent Labs     01/01/21  1632 01/01/21 2042   TROPONINI <0.01 <0.01       Urinalysis:      Lab Results   Component Value Date    NITRU Negative 01/01/2021    WBCUA 10-20 01/01/2021    BACTERIA Rare 01/01/2021    RBCUA 3-4 01/01/2021    BLOODU Negative 01/01/2021    SPECGRAV <=1.005 01/01/2021    GLUCOSEU Negative 01/01/2021       Radiology:  CT CHEST PULMONARY EMBOLISM W CONTRAST   Final Result   1. No acute pulmonary embolus. 2. Mild areas of ground-glass opacity compatible with COVID-19 pneumonia. 3. Moderate-sized hiatal hernia containing high-density material.  This could   be due to an active GI bleed or recently ingested contrast.   Findings were discussed with emergency department at 7:52 pm on 1/1/2021. RECOMMENDATIONS:   1.      XR CHEST PORTABLE   Final Result   No acute airspace disease identified.                  Assessment/Plan:    Active Hospital Problems    Diagnosis    Hypokalemia [E87.6]    GI bleed [K92.2]    COVID-19 [U07.1]    SOB (shortness of breath) [R06.02]    CAD S/P percutaneous coronary angioplasty [I25.10, Z98.61]  CAD (coronary artery disease) [I25.10]    Hyperlipidemia [E78.5]    Depression [F32.9]    Fibromyalgia [M79.7]     COVID-19 infection. No overt pneumonia on chest x-ray . patient does not require oxygen  -supportive tx    -Nausea and vomiting /fevers dt COVID-19 -improved. concerns for GI bleed on CTwhich showed hiatal hernia with gastric contents that could be blood although this most likely due to contrast-no melena, hematochezia or hematemesis. Josiane Abad  H&H is stable  -Started clear liquid diet and advance as tolerated  -on ivfs    -Sepsis due to COVID-19--poa--fever one 101.9, sinus tach 121    -Severe hypokalemia-k 2.8-- improving  -repleted, monitored    -CAD s/p LAD stent-on aspirin, Brilinta, metoprolol and statin    -SVT s/p ablation--having sinus tach    -HTN-on metoprolol    -Hyperlipidemia-statin    -GERD with hiatal hernia-moderate-sized on CT-on PPI    -Leukopenia COVID-19    -chronic migraine headaches-stable- on Emgality       DVT Prophylaxis: lovenox  Diet: DIET CLEAR LIQUID;  Code Status: Full Code    PT/OT Eval Status: not ordered    Dispo - pending GI recs    Robin Navarrete MD

## 2021-01-04 NOTE — ACP (ADVANCE CARE PLANNING)
Advance Care Planning     Advance Care Planning Activator (Inpatient)  Conversation Note      Date of ACP Conversation: 1/1/2021    Conversation Conducted with: patient's     ACP Activator: [id number] Decision Maker:     Current Designated Health Care Decision Maker:   Primary Decision Maker: Miko Anibalks Spouse - [phone]      Care Preferences    Ventilation: \"If you were in your present state of health and suddenly became very ill and were unable to breathe on your own, what would your preference be about the use of a ventilator (breathing machine) if it were available to you? \"      Would the patient desire the use of ventilator (breathing machine)?: yes    \"If your health worsens and it becomes clear that your chance of recovery is unlikely, what would your preference be about the use of a ventilator (breathing machine) if it were available to you? \"     Would the patient desire the use of ventilator (breathing machine)?: Yes      Resuscitation  \"CPR works best to restart the heart when there is a sudden event, like a heart attack, in someone who is otherwise healthy. Unfortunately, CPR does not typically restart the heart for people who have serious health conditions or who are very sick. \"    \"In the event your heart stopped as a result of an underlying serious health condition, would you want attempts to be made to restart your heart (answer \"yes\" for attempt to resuscitate) or would you prefer a natural death (answer \"no\" for do not attempt to resuscitate)? \" yes         [x] Yes   [] No   Educated Patient / Dietra  regarding differences between Advance Directives and portable DNR orders.     Length of ACP Conversation in minutes:   5    Conversation Outcomes:  [x] ACP discussion completed  [] Existing advance directive reviewed with patient; no changes to patient's previously recorded wishes  [] New Advance Directive completed [] Portable Do Not Rescitate prepared for Provider review and signature  [] POLST/POST/MOLST/MOST prepared for Provider review and signature      Follow-up plan:    [] Schedule follow-up conversation to continue planning  [x] Referred individual to Provider for additional questions/concerns   [] Advised patient/agent/surrogate to review completed ACP document and update if needed with changes in condition, patient preferences or care setting    [] This note routed to one or more involved healthcare providers

## 2021-01-05 LAB
A/G RATIO: 1.3 (ref 1.1–2.2)
ALBUMIN SERPL-MCNC: 3.6 G/DL (ref 3.4–5)
ALP BLD-CCNC: 55 U/L (ref 40–129)
ALT SERPL-CCNC: 7 U/L (ref 10–40)
ANION GAP SERPL CALCULATED.3IONS-SCNC: 12 MMOL/L (ref 3–16)
AST SERPL-CCNC: 20 U/L (ref 15–37)
BILIRUB SERPL-MCNC: 0.4 MG/DL (ref 0–1)
BUN BLDV-MCNC: 8 MG/DL (ref 7–20)
CALCIUM SERPL-MCNC: 8.8 MG/DL (ref 8.3–10.6)
CHLORIDE BLD-SCNC: 103 MMOL/L (ref 99–110)
CO2: 24 MMOL/L (ref 21–32)
CREAT SERPL-MCNC: 0.6 MG/DL (ref 0.6–1.2)
GFR AFRICAN AMERICAN: >60
GFR NON-AFRICAN AMERICAN: >60
GLOBULIN: 2.8 G/DL
GLUCOSE BLD-MCNC: 112 MG/DL (ref 70–99)
POTASSIUM SERPL-SCNC: 3.4 MMOL/L (ref 3.5–5.1)
SODIUM BLD-SCNC: 139 MMOL/L (ref 136–145)
TOTAL PROTEIN: 6.4 G/DL (ref 6.4–8.2)

## 2021-01-05 PROCEDURE — 6360000002 HC RX W HCPCS: Performed by: NURSE PRACTITIONER

## 2021-01-05 PROCEDURE — 6360000002 HC RX W HCPCS: Performed by: HOSPITALIST

## 2021-01-05 PROCEDURE — C9113 INJ PANTOPRAZOLE SODIUM, VIA: HCPCS | Performed by: HOSPITALIST

## 2021-01-05 PROCEDURE — 36415 COLL VENOUS BLD VENIPUNCTURE: CPT

## 2021-01-05 PROCEDURE — 6370000000 HC RX 637 (ALT 250 FOR IP): Performed by: INTERNAL MEDICINE

## 2021-01-05 PROCEDURE — 80053 COMPREHEN METABOLIC PANEL: CPT

## 2021-01-05 PROCEDURE — 1200000000 HC SEMI PRIVATE

## 2021-01-05 PROCEDURE — 6370000000 HC RX 637 (ALT 250 FOR IP): Performed by: HOSPITALIST

## 2021-01-05 PROCEDURE — 2580000003 HC RX 258: Performed by: INTERNAL MEDICINE

## 2021-01-05 RX ORDER — PROCHLORPERAZINE EDISYLATE 5 MG/ML
5 INJECTION INTRAMUSCULAR; INTRAVENOUS EVERY 6 HOURS PRN
Status: DISCONTINUED | OUTPATIENT
Start: 2021-01-05 | End: 2021-01-06 | Stop reason: HOSPADM

## 2021-01-05 RX ADMIN — PROMETHAZINE HYDROCHLORIDE 12.5 MG: 25 INJECTION INTRAMUSCULAR; INTRAVENOUS at 15:21

## 2021-01-05 RX ADMIN — PANTOPRAZOLE SODIUM 40 MG: 40 INJECTION, POWDER, FOR SOLUTION INTRAVENOUS at 08:25

## 2021-01-05 RX ADMIN — ACETAMINOPHEN 650 MG: 325 TABLET ORAL at 18:02

## 2021-01-05 RX ADMIN — ASPIRIN 81 MG: 81 TABLET, CHEWABLE ORAL at 08:24

## 2021-01-05 RX ADMIN — METHOCARBAMOL 500 MG: 500 TABLET ORAL at 08:24

## 2021-01-05 RX ADMIN — SODIUM CHLORIDE, PRESERVATIVE FREE 10 ML: 5 INJECTION INTRAVENOUS at 08:23

## 2021-01-05 RX ADMIN — ONDANSETRON 4 MG: 2 INJECTION INTRAMUSCULAR; INTRAVENOUS at 04:10

## 2021-01-05 RX ADMIN — ROSUVASTATIN CALCIUM 20 MG: 10 TABLET, FILM COATED ORAL at 21:17

## 2021-01-05 RX ADMIN — SODIUM CHLORIDE, PRESERVATIVE FREE 10 ML: 5 INJECTION INTRAVENOUS at 21:17

## 2021-01-05 RX ADMIN — PANTOPRAZOLE SODIUM 40 MG: 40 INJECTION, POWDER, FOR SOLUTION INTRAVENOUS at 21:17

## 2021-01-05 RX ADMIN — CITALOPRAM HYDROBROMIDE 40 MG: 20 TABLET ORAL at 08:24

## 2021-01-05 RX ADMIN — METHOCARBAMOL 500 MG: 500 TABLET ORAL at 21:17

## 2021-01-05 RX ADMIN — ENOXAPARIN SODIUM 40 MG: 40 INJECTION SUBCUTANEOUS at 08:25

## 2021-01-05 RX ADMIN — POTASSIUM CHLORIDE 10 MEQ: 7.46 INJECTION, SOLUTION INTRAVENOUS at 00:33

## 2021-01-05 RX ADMIN — METOPROLOL SUCCINATE 25 MG: 25 TABLET, EXTENDED RELEASE ORAL at 08:24

## 2021-01-05 RX ADMIN — METHOCARBAMOL 500 MG: 500 TABLET ORAL at 15:21

## 2021-01-05 ASSESSMENT — PAIN SCALES - GENERAL
PAINLEVEL_OUTOF10: 0
PAINLEVEL_OUTOF10: 0

## 2021-01-05 NOTE — PROGRESS NOTES
Fibromyalgia 1/2/2021 Yes    Hyperlipidemia 1/2/2021 Yes    Depression 1/2/2021 Yes    CAD (coronary artery disease) 1/2/2021 Yes    CAD S/P percutaneous coronary angioplasty 1/2/2021 Yes    GI bleed 1/1/2021 Yes    COVID-19 1/1/2021 Yes    SOB (shortness of breath) 1/1/2021 Yes    Hypokalemia 1/2/2021 Yes        Assessment & Plan  72 y. o. female, established w Dr Harlen Bamberger history of hyperlipidemia coronary artery disease status post PCI, IBS, hiatal hernia, GERD who presented w Covid-19 pneumonia and N/V. H/H 12/34, CT revealed a medium-size [facility] w material in it, most likely ingested contrast although blood could not be ruled out. However, there is no reported abd pain, hematemesis or melena/hematochezia, and H/H is stable. She c/o mild diarrhea.     Plan:   1. Agree w IV Protonix and antiemetics  2. Consider adding short-term Reglan if not contraindicated  3. Supportive care w Abx's, IVF and Covid-19 Rx protocol  4. No indication for emergency EGD at this time  5. Daily H/H  6. Will order stool tests  7.  Will follow  Pankaj Hartley MD       [facility]) [phone]    Electronically signed by Saloni Landaverde MD on 1/5/21 at 5:44 AM EST

## 2021-01-05 NOTE — PROGRESS NOTES
Hospitalist Progress Note      PCP: LUCIA Griffin - CNP    Date of Admission: 1/1/2021    Chief Complaint: Back pain, emesis since [facility] [place] [facility] Course:       Subjective: denies any overt bleeding  today, no n/v, tolerating diet so far, had some nose bleeding overnight            Medications:  Reviewed    Infusion Medications   Scheduled Medications    ticagrelor  60 mg Oral BID    aspirin  81 mg Oral Daily    enoxaparin  40 mg Subcutaneous Daily    citalopram  40 mg Oral Daily    methocarbamol  500 mg Oral TID    metoprolol succinate  25 mg Oral Daily    rosuvastatin  20 mg Oral Nightly    pantoprazole  40 mg Intravenous BID    sodium chloride flush  10 mL Intravenous 2 times per day     PRN Meds: prochlorperazine, benzocaine-menthol, HYDROcodone-acetaminophen, ondansetron, promethazine, sodium chloride flush, acetaminophen **OR** acetaminophen      Intake/Output Summary (Last 24 hours) at 1/5/2021 1157  Last data filed at 1/5/2021 0121  Gross per 24 hour   Intake 420 ml   Output 500 ml   Net -80 ml       Physical Exam Performed:    BP (!) 145/65   Pulse 98   Temp 99.4 °F (37.4 °C) (Oral)   Resp 20   Ht 5' 4\" (1.626 m)   Wt 162 lb 14.7 oz (73.9 kg)   LMP  (LMP Unknown)   SpO2 93%   BMI 27.97 kg/m²     General appearance: No apparent distress, appears stated age and cooperative. HEENT: Pupils equal, round, and reactive to light. Conjunctivae/corneas clear. Neck: Supple, with full range of motion. No jugular venous distention. Trachea midline. Respiratory:  Normal respiratory effort. Clear to auscultation, bilaterally without Rales/Wheezes/Rhonchi. Cardiovascular: Regular rate and rhythm with normal S1/S2 without murmurs, rubs or gallops. Abdomen: Soft, non-tender, non-distended with normal bowel sounds. Musculoskeletal: No clubbing, cyanosis or edema bilaterally. Full range of motion without deformity. Skin: Skin color, texture, turgor normal.  No rashes or lesions. Neurologic:  Neurovascularly intact without any focal sensory/motor deficits. Cranial nerves: II-XII intact, grossly non-focal.  Psychiatric: Alert and oriented, thought content appropriate, normal insight  Capillary Refill: Brisk,< 3 seconds   Peripheral Pulses: +2 palpable, equal bilaterally       Labs:   Recent Labs     01/02/21  1742 01/03/21  0531 01/04/21  0518   WBC  --  5.2 6.1   HGB 12.7 12.3 12.4   HCT 38.0 37.0 36.5   PLT  --  258 274     Recent Labs     01/03/21  0531 01/04/21  0518 01/04/21  1950 01/05/21  0608    137  --  139   K 3.2* 3.1* 2.6* 3.4*    102  --  103   CO2 20* 22  --  24   BUN 10 14  --  8   CREATININE 0.7 0.6  --  0.6   CALCIUM 8.7 8.5  --  8.8     Recent Labs     01/04/21  0518 01/05/21  0608   AST 20 20   ALT 7* 7*   BILITOT 0.5 0.4   ALKPHOS 54 55     No results for input(s): INR in the last 72 hours. No results for input(s): Patrick Spoon in the last 72 hours. Urinalysis:      Lab Results   Component Value Date    NITRU Negative 01/01/2021    WBCUA 10-20 01/01/2021    BACTERIA Rare 01/01/2021    RBCUA 3-4 01/01/2021    BLOODU Negative 01/01/2021    SPECGRAV <=1.005 01/01/2021    GLUCOSEU Negative 01/01/2021       Radiology:  CT CHEST PULMONARY EMBOLISM W CONTRAST   Final Result   1. No acute pulmonary embolus. 2. Mild areas of ground-glass opacity compatible with COVID-19 pneumonia. 3. Moderate-sized hiatal hernia containing high-density material.  This could   be due to an active GI bleed or recently ingested contrast.   Findings were discussed with emergency department at 7:52 pm on 1/1/2021. RECOMMENDATIONS:   1.      XR CHEST PORTABLE   Final Result   No acute airspace disease identified.                  Assessment/Plan:    Active Hospital Problems    Diagnosis    Hypokalemia [E87.6]    GI bleed [K92.2]    COVID-19 [U07.1]    SOB (shortness of breath) [R06.02]  CAD S/P percutaneous coronary angioplasty [I25.10, Z98.61]    CAD (coronary artery disease) [I25.10]    Hyperlipidemia [E78.5]    Depression [F32.9]    Fibromyalgia [M79.7]        COVID-19 infection.  No overt pneumonia on chest x-ray .patient does not require oxygen  -supportive tx     -Nausea and vomiting /fevers dt COVID-19 -improved. concerns for GI bleed on CTwhich showed hiatal hernia with gastric contents that could be blood although this most likely due to contrast-no melena, hematochezia or hematemesis. . H&H is stable  -Started clear liquid diet and advanced as tolerated  -on ivfs   -GI consulted, continue iv ppi and antiemetics, no need for egd    -Sepsis due to COVID-19--poa--fever one 101.9, sinus tach 121     -Severe hypokalemia-k 2.8-- improving  -repleted, monitored     -CAD s/p LAD stent-on aspirin, Brilinta, metoprolol and statin     -SVT s/p ablation--was having sinus tach     -HTN-on metoprolol     -Hyperlipidemia-statin     -GERD with hiatal hernia-moderate-sized on CT-on PPI     -Leukopenia COVID-19     -chronic migraine headaches-stable- on Emgality    DVT Prophylaxis: lovenox  Diet: DIET LOW FAT;  Code Status: Full Code    PT/OT Eval Status: not ordered     Dispo - pending GI recs, tolerating low fat diet, ?tomorrow    Nima Solo MD

## 2021-01-05 NOTE — PROGRESS NOTES
Pt is a 72 yr old admitted with COVID + and possible GI bleed. Pts potassium from the AM was 3.1. There are no PRN potassium replacement ordered. Would you lie me to replace this or wait to recheck in the AM? Pt only has a peripheral IV. Thanks. LUNA Sewell has been messaged    1933- LUNA Sewell is aware, will recheck potassium and replace if need be.

## 2021-01-05 NOTE — PROGRESS NOTES
Pt transferred to 56 with all belongings. Room set up and pt set up to eat with call light in reach.

## 2021-01-05 NOTE — PROGRESS NOTES
Pt called out stating their nose was bleeding. Pt states they have a history of nose bleeds but hasn't had one since they stopped taking their brilinta which was before they were admitted. Pt was admitted for a possible GI bleed and COVID. They are thinking pt doesn't have a GI bleed but wanted to inform you. Thanks.  LUNA Jaffe has been messaged     0121- NP Sara Marrero is aware

## 2021-01-05 NOTE — PROGRESS NOTES
Progress Note  Date:2021       Room:[facility]0438-01  Patient Agustín Tabares     YOB: 1955     Age:65 y.o. Subjective    Subjective:  Symptoms:  Stable. Pain:  She reports no pain. Review of Systems  Objective         Vitals Last 24 Hours:  TEMPERATURE:  Temp  Av.3 °F (37.4 °C)  Min: 99 °F (37.2 °C)  Max: 99.7 °F (37.6 °C)  RESPIRATIONS RANGE: Resp  Av  Min: 20  Max: 20  PULSE OXIMETRY RANGE: SpO2  Av.5 %  Min: 91 %  Max: 95 %  PULSE RANGE: Pulse  Av.5  Min: 89  Max: 98  BLOOD PRESSURE RANGE: Systolic (64VQO), BROOKS:837 , Min:106 , YMH:654   ; Diastolic (45UXJ), TDI:18, Min:65, Max:76    I/O (24Hr): Intake/Output Summary (Last 24 hours) at 2021 1051  Last data filed at 2021 0121  Gross per 24 hour   Intake 540 ml   Output 500 ml   Net 40 ml     Objective:  General Appearance:  Not in pain. Vital signs: (most recent): Blood pressure (!) 145/65, pulse 98, temperature 99.4 °F (37.4 °C), temperature source Oral, resp. rate 20, height 5' 4\" (1.626 m), weight 162 lb 14.7 oz (73.9 kg), SpO2 93 %, not currently breastfeeding. Labs/Imaging/Diagnostics    Labs:  CBC:  Recent Labs     21  1742 21  0531 2118   WBC  --  5.2 6.1   RBC  --  3.90* 3.91*   HGB 12.7 12.3 12.4   HCT 38.0 37.0 36.5   MCV  --  94.9 93.3   RDW  --  13.8 13.5   PLT  --  258 274     CHEMISTRIES:  Recent Labs     21  0531 21  0518 21  1950 21  0608    137  --  139   K 3.2* 3.1* 2.6* 3.4*    102  --  103   CO2 20* 22  --  24   BUN 10 14  --  8   CREATININE 0.7 0.6  --  0.6   GLUCOSE 93 88  --  112*   MG  --   --  1.90  --      PT/INR:No results for input(s): PROTIME, INR in the last 72 hours. APTT:No results for input(s): APTT in the last 72 hours. LIVER PROFILE:  Recent Labs     21  0518 21  0608   AST 20 20   ALT 7* 7*   BILITOT 0.5 0.4   ALKPHOS 54 55       Imaging Last 24 Hours:  No results found. Assessment//Plan           Hospital Problems           Last Modified POA    Fibromyalgia 1/2/2021 Yes    Hyperlipidemia 1/2/2021 Yes    Depression 1/2/2021 Yes    CAD (coronary artery disease) 1/2/2021 Yes    CAD S/P percutaneous coronary angioplasty 1/2/2021 Yes    GI bleed 1/1/2021 Yes    COVID-19 1/1/2021 Yes    SOB (shortness of breath) 1/1/2021 Yes    Hypokalemia 1/2/2021 Yes        Assessment & Plan  72 y. o. female, established w Dr Uzair Almazan history of hyperlipidemia coronary artery disease status post PCI, IBS, hiatal hernia, GERD who presented w Covid-19 pneumonia and N/V. H/H 12/34, CT revealed a medium-size [facility] w material in it, most likely ingested contrast although blood could not be ruled out. However, there is no reported abd pain, hematemesis or melena/hematochezia, and H/H is stable. She c/o mild diarrhea and nausea and nose bleed.     Plan:   1. Agree w IV Protonix and antiemetics  2. Consider adding short-term Reglan if not contraindicated (will leave this determination up to the treating team)  3. Supportive care w Abx's, IVF and Covid-19 Rx protocol  4. No indication for emergency EGD at this time  5. Will advance to low fat diet  6. Daily H/H  7. Will order stool tests  8.  Will follow  MD Ary Hollins [phone]    Electronically signed by Antonio Young MD on 1/5/21 at 10:51 AM EST

## 2021-01-05 NOTE — PROGRESS NOTES
Assessment completed, see doc flow sheet, in bed resting, call light within reach, without distress, vital signs stable, denies pain or needs at present time, will continue to monitor and treat. Sarah Salazar

## 2021-01-05 NOTE — PROGRESS NOTES
FYI Pt magnesium resulted in 1.90. Thanks.  NP [facility] has been [employer]

## 2021-01-05 NOTE — FLOWSHEET NOTE
This note also relates to the following rows which could not be included:  Pulse - Cannot attach notes to unvalidated device data       01/04/21 2047   Assessment   Charting Type Shift assessment   Neurological   Neuro (WDL) WDL   Level of Consciousness Alert (0)   Orientation Level Oriented X4   Cognition Appropriate judgement; Appropriate safety awareness; Appropriate attention/concentration; Appropriate for developmental age; Follows commands; No short term memory loss   Language Clear   HEENT   HEENT (WDL) X   Right Ear Impaired hearing   Left Ear Impaired hearing   Respiratory   Respiratory (WDL) X   Respiratory Quality/Effort Dyspnea with exertion;Unlabored   Chest Assessment Chest expansion symmetrical;Trachea midline   Breath Sounds   Right Upper Lobe Clear   Right Middle Lobe Clear   Right Lower Lobe Diminished   Left Upper Lobe Clear   Left Lower Lobe Diminished   Cardiac   Cardiac (WDL) X   Cardiac Regularity Regular   Cardiac Rhythm NSR;ST   Gastrointestinal   Abdominal (WDL) X   Abdomen Inspection Soft   RUQ Bowel Sounds Active   LUQ Bowel Sounds Active   RLQ Bowel Sounds Active   LLQ Bowel Sounds Active   Peripheral Vascular   Peripheral Vascular (WDL) X   Edema Left lower extremity;Right lower extremity   RLE Edema +1   LLE Edema +1   Skin Color/Condition   Skin Color/Condition (WDL) X   Skin Integrity   Skin Integrity (WDL) WDL   Musculoskeletal   Musculoskeletal (WDL) WDL   Genitourinary   Genitourinary (WDL) WDL   Flank Tenderness No   Suprapubic Tenderness No   Dysuria No   Anus/Rectum   Anus/Rectum (WDL) WDL   Psychosocial   Psychosocial (WDL) WDL

## 2021-01-05 NOTE — PROGRESS NOTES
Pt has a critical lab value. Potassium came back at 2.6. Thanks.  LUNA [employer] been [employer]

## 2021-01-06 VITALS
TEMPERATURE: 98.3 F | HEART RATE: 92 BPM | WEIGHT: 162.92 LBS | HEIGHT: 64 IN | SYSTOLIC BLOOD PRESSURE: 111 MMHG | RESPIRATION RATE: 18 BRPM | BODY MASS INDEX: 27.81 KG/M2 | OXYGEN SATURATION: 94 % | DIASTOLIC BLOOD PRESSURE: 73 MMHG

## 2021-01-06 LAB
A/G RATIO: 1.2 (ref 1.1–2.2)
ALBUMIN SERPL-MCNC: 3.7 G/DL (ref 3.4–5)
ALP BLD-CCNC: 56 U/L (ref 40–129)
ALT SERPL-CCNC: 7 U/L (ref 10–40)
ANION GAP SERPL CALCULATED.3IONS-SCNC: 11 MMOL/L (ref 3–16)
AST SERPL-CCNC: 22 U/L (ref 15–37)
BASOPHILS ABSOLUTE: 0 K/UL (ref 0–0.2)
BASOPHILS RELATIVE PERCENT: 0.6 %
BILIRUB SERPL-MCNC: 0.5 MG/DL (ref 0–1)
BUN BLDV-MCNC: 8 MG/DL (ref 7–20)
CALCIUM SERPL-MCNC: 9.1 MG/DL (ref 8.3–10.6)
CHLORIDE BLD-SCNC: 100 MMOL/L (ref 99–110)
CO2: 28 MMOL/L (ref 21–32)
CREAT SERPL-MCNC: 0.7 MG/DL (ref 0.6–1.2)
EOSINOPHILS ABSOLUTE: 0.1 K/UL (ref 0–0.6)
EOSINOPHILS RELATIVE PERCENT: 2.6 %
GFR AFRICAN AMERICAN: >60
GFR NON-AFRICAN AMERICAN: >60
GLOBULIN: 3 G/DL
GLUCOSE BLD-MCNC: 93 MG/DL (ref 70–99)
HCT VFR BLD CALC: 37.4 % (ref 36–48)
HEMOGLOBIN: 12.3 G/DL (ref 12–16)
LYMPHOCYTES ABSOLUTE: 1.2 K/UL (ref 1–5.1)
LYMPHOCYTES RELATIVE PERCENT: 27.6 %
MCH RBC QN AUTO: 30.9 PG (ref 26–34)
MCHC RBC AUTO-ENTMCNC: 33 G/DL (ref 31–36)
MCV RBC AUTO: 93.8 FL (ref 80–100)
MONOCYTES ABSOLUTE: 0.5 K/UL (ref 0–1.3)
MONOCYTES RELATIVE PERCENT: 12.3 %
NEUTROPHILS ABSOLUTE: 2.5 K/UL (ref 1.7–7.7)
NEUTROPHILS RELATIVE PERCENT: 56.9 %
PDW BLD-RTO: 13.7 % (ref 12.4–15.4)
PLATELET # BLD: 326 K/UL (ref 135–450)
PMV BLD AUTO: 7.1 FL (ref 5–10.5)
POTASSIUM SERPL-SCNC: 3.3 MMOL/L (ref 3.5–5.1)
RBC # BLD: 3.98 M/UL (ref 4–5.2)
SODIUM BLD-SCNC: 139 MMOL/L (ref 136–145)
TOTAL PROTEIN: 6.7 G/DL (ref 6.4–8.2)
WBC # BLD: 4.4 K/UL (ref 4–11)

## 2021-01-06 PROCEDURE — 36415 COLL VENOUS BLD VENIPUNCTURE: CPT

## 2021-01-06 PROCEDURE — 80053 COMPREHEN METABOLIC PANEL: CPT

## 2021-01-06 PROCEDURE — C9113 INJ PANTOPRAZOLE SODIUM, VIA: HCPCS | Performed by: HOSPITALIST

## 2021-01-06 PROCEDURE — 6370000000 HC RX 637 (ALT 250 FOR IP): Performed by: INTERNAL MEDICINE

## 2021-01-06 PROCEDURE — 87505 NFCT AGENT DETECTION GI: CPT

## 2021-01-06 PROCEDURE — 6370000000 HC RX 637 (ALT 250 FOR IP): Performed by: HOSPITALIST

## 2021-01-06 PROCEDURE — 6360000002 HC RX W HCPCS: Performed by: HOSPITALIST

## 2021-01-06 PROCEDURE — 85025 COMPLETE CBC W/AUTO DIFF WBC: CPT

## 2021-01-06 PROCEDURE — 2580000003 HC RX 258: Performed by: INTERNAL MEDICINE

## 2021-01-06 RX ORDER — HYDROCODONE BITARTRATE AND ACETAMINOPHEN 5; 325 MG/1; MG/1
1 TABLET ORAL DAILY PRN
Qty: 10 TABLET | Refills: 0 | Status: CANCELLED | OUTPATIENT
Start: 2021-01-06 | End: 2021-01-16

## 2021-01-06 RX ADMIN — SODIUM CHLORIDE, PRESERVATIVE FREE 10 ML: 5 INJECTION INTRAVENOUS at 08:26

## 2021-01-06 RX ADMIN — ACETAMINOPHEN 650 MG: 325 TABLET ORAL at 02:20

## 2021-01-06 RX ADMIN — ASPIRIN 81 MG: 81 TABLET, CHEWABLE ORAL at 08:25

## 2021-01-06 RX ADMIN — METOPROLOL SUCCINATE 25 MG: 25 TABLET, EXTENDED RELEASE ORAL at 08:25

## 2021-01-06 RX ADMIN — METHOCARBAMOL 500 MG: 500 TABLET ORAL at 08:25

## 2021-01-06 RX ADMIN — ENOXAPARIN SODIUM 40 MG: 40 INJECTION SUBCUTANEOUS at 08:25

## 2021-01-06 RX ADMIN — CITALOPRAM HYDROBROMIDE 40 MG: 20 TABLET ORAL at 08:25

## 2021-01-06 RX ADMIN — PROMETHAZINE HYDROCHLORIDE 12.5 MG: 25 INJECTION INTRAMUSCULAR; INTRAVENOUS at 12:30

## 2021-01-06 RX ADMIN — PANTOPRAZOLE SODIUM 40 MG: 40 INJECTION, POWDER, FOR SOLUTION INTRAVENOUS at 08:25

## 2021-01-06 ASSESSMENT — PAIN SCALES - GENERAL
PAINLEVEL_OUTOF10: 8
PAINLEVEL_OUTOF10: 8

## 2021-01-06 NOTE — PROGRESS NOTES
Progress Note  Date:2021       Room:42/0542-01  Patient Iris Verduzco     YOB: 1955     Age:65 y.o. Subjective    Subjective:  Symptoms:  Stable. Pain:  She reports no pain. Review of Systems  Objective         Vitals Last 24 Hours:  TEMPERATURE:  Temp  Av.2 °F (36.8 °C)  Min: 97.5 °F (36.4 °C)  Max: 98.9 °F (37.2 °C)  RESPIRATIONS RANGE: Resp  Av  Min: 16  Max: 20  PULSE OXIMETRY RANGE: SpO2  Av %  Min: 90 %  Max: 94 %  PULSE RANGE: Pulse  Av  Min: 85  Max: 95  BLOOD PRESSURE RANGE: Systolic (83UUL), BDB:271 , Min:116 , HDX:948   ; Diastolic (42TWE), NYP:21, Min:72, Max:81    I/O (24Hr): Intake/Output Summary (Last 24 hours) at 2021 1456  Last data filed at 2021 2117  Gross per 24 hour   Intake 140 ml   Output    Net 140 ml     Objective:  General Appearance:  Not in pain. Vital signs: (most recent): Blood pressure 121/81, pulse 95, temperature 97.8 °F (36.6 °C), temperature source Oral, resp. rate 18, height 5' 4\" (1.626 m), weight 162 lb 14.7 oz (73.9 kg), SpO2 94 %, not currently breastfeeding. Labs/Imaging/Diagnostics    Labs:  CBC:  Recent Labs     21  0518 21  0720   WBC 6.1 4.4   RBC 3.91* 3.98*   HGB 12.4 12.3   HCT 36.5 37.4   MCV 93.3 93.8   RDW 13.5 13.7    326     CHEMISTRIES:  Recent Labs     21  0518 21  1950 21  0608 21  0720     --  139 139   K 3.1* 2.6* 3.4* 3.3*     --  103 100   CO2 22  --  24 28   BUN 14  --  8 8   CREATININE 0.6  --  0.6 0.7   GLUCOSE 88  --  112* 93   MG  --  1.90  --   --      PT/INR:No results for input(s): PROTIME, INR in the last 72 hours. APTT:No results for input(s): APTT in the last 72 hours. LIVER PROFILE:  Recent Labs     21  0518 21  0608 21  0720   AST 20 20 22   ALT 7* 7* 7*   BILITOT 0.5 0.4 0.5   ALKPHOS 54 55 56       Imaging Last 24 Hours:  No results found.   Assessment//Plan Hospital Problems           Last Modified POA    Fibromyalgia 1/2/2021 Yes    Hyperlipidemia 1/2/2021 Yes    Depression 1/2/2021 Yes    CAD (coronary artery disease) 1/2/2021 Yes    CAD S/P percutaneous coronary angioplasty 1/2/2021 Yes    GI bleed 1/1/2021 Yes    COVID-19 1/1/2021 Yes    SOB (shortness of breath) 1/1/2021 Yes    Hypokalemia 1/2/2021 Yes        Assessment & Plan  72 y. o. female, established w Dr Rafiq Lundy history of hyperlipidemia coronary artery disease status post PCI, IBS, hiatal hernia, GERD who presented w Covid-19 pneumonia and N/V. H/H 12/34, CT revealed a medium-size [facility] w material in it, most likely ingested contrast although blood could not be ruled out. However, there is no reported abd pain, hematemesis or melena/hematochezia, and H/H is stable. She c/o mild diarrhea and nausea and nose bleed.     Plan:   1. Agree w Protonix and antiemetics  2. Awaiting stool tests  3. Consider adding short-term Reglan if not contraindicated (will leave this determination up to the treating team)  4. Supportive care w Abx's, IVF and Covid-19 Rx protocol  5. No indication for emergency EGD at this time  6. Will advance to low fat diet  7. Daily H/H  8. OK to D/C from GI standpoint  9.  Will follow  Sana Cheney MD       [facility] [place]) [phone]    Electronically signed by Yves Leary MD on 1/6/21 at 2:56 PM EST

## 2021-01-06 NOTE — DISCHARGE SUMMARY
Hospital Medicine Discharge Summary    Patient ID: Katie Bradford      Patient's PCP: Earnest Gill, LUCIA - CNP    Admit Date: 1/1/2021     Discharge Date: 1/6/2021      Admitting Physician: Aron Tyson MD     Discharge Physician: Nadira Alvarez MD     Discharge Diagnoses: Active Hospital Problems    Diagnosis    Hypokalemia [E87.6]    GI bleed [K92.2]    COVID-19 [U07.1]    SOB (shortness of breath) [R06.02]    CAD S/P percutaneous coronary angioplasty [I25.10, Z98.61]    CAD (coronary artery disease) [I25.10]    Hyperlipidemia [E78.5]    Depression [F32.9]    Fibromyalgia [M79.7]       The patient was seen and examined on day of discharge and this discharge summary is in conjunction with any daily progress note from day of discharge. Hospital Course:   History Of Present Illness:   72 y.o. female who presented to [facility] with above complaints  Patient with PMH of CAD s/p LAD stent, SVT ablation, TIA, fibromyalgia, HTN, HLD, hiatal hernia presented via private vehicle from her home for the above complaints. Patient reports she was tested positive for Covid on December 24. Since then she has had worsening shortness of breath, right-sided chest pain, mild low-grade fevers, mild intermittent cough. She also reports having nausea and vomiting since then intermittently. She has been taking Phenergan for the nausea without much improvement. She has been taking Tylenol for the fevers. She denies any hematemesis, hematochezia or melena. Denies any abdominal pain or diarrhea. Patient reports today she felt lightheaded like she was about to pass out. So she decided to come to the ED. patient denies any left-sided chest pain, all her pain is on the right side of her chest and right back.        COVID-19 infection.  No overt pneumonia on chest x-ray .patient does not require oxygen  -supportive tx    -Nausea and vomiting /fevers dt COVID-19 -improved. concerns for GI bleed on CTwhich showed hiatal hernia with gastric contents that could be blood although this most likely due to contrast-no melena, hematochezia or hematemesis. . H&H was stable  -Started clear liquid diet and advanced as tolerated  -on ivfs   -GI consulted, continue iv ppi and antiemetics, no need for egd     -SIRS due to COVID-19--poa--fever one 101.9, sinus tach 121     -Severe hypokalemia-k 2.8-- improved  -repleted, monitored     -CAD s/p LAD stent-on aspirin, Brilinta(pt states does not take), metoprolol and statin     -SVT s/p ablation--was having sinus tach     -HTN-on metoprolol     -Hyperlipidemia-statin     -GERD with hiatal hernia-moderate-sized on CT-on PPI     -Leukopenia COVID-19     -chronic migraine headaches-stable- on Emgality       Physical Exam Performed:     /73   Pulse 92   Temp 98.3 °F (36.8 °C) (Oral)   Resp 18   Ht 5' 4\" (1.626 m)   Wt 162 lb 14.7 oz (73.9 kg)   LMP  (LMP Unknown)   SpO2 94%   BMI 27.97 kg/m²       General appearance:  No apparent distress, appears stated age and cooperative. HEENT:  Normal cephalic, atraumatic without obvious deformity. Pupils equal, round, and reactive to light. Extra ocular muscles intact. Conjunctivae/corneas clear. Neck: Supple, with full range of motion. No jugular venous distention. Trachea midline. Respiratory:  Normal respiratory effort. Clear to auscultation, bilaterally without Rales/Wheezes/Rhonchi. Cardiovascular:  Regular rate and rhythm with normal S1/S2 without murmurs, rubs or gallops. Abdomen: Soft, non-tender, non-distended with normal bowel sounds. Musculoskeletal:  No clubbing, cyanosis or edema bilaterally. Full range of motion without deformity. Skin: Skin color, texture, turgor normal.  No rashes or lesions. Neurologic:  Neurovascularly intact without any focal sensory/motor deficits.  Cranial nerves: II-XII intact, grossly non-focal. Psychiatric:  Alert and oriented, thought content appropriate, normal insight  Capillary Refill: Brisk,< 3 seconds   Peripheral Pulses: +2 palpable, equal bilaterally       Labs: For convenience and continuity at follow-up the following most recent labs are provided:      CBC:    Lab Results   Component Value Date    WBC 4.4 01/06/2021    HGB 12.3 01/06/2021    HCT 37.4 01/06/2021     01/06/2021       Renal:    Lab Results   Component Value Date     01/06/2021    K 3.3 01/06/2021    K 3.3 01/02/2021     01/06/2021    CO2 28 01/06/2021    BUN 8 01/06/2021    CREATININE 0.7 01/06/2021    CALCIUM 9.1 01/06/2021    PHOS 3.3 06/06/2019         Significant Diagnostic Studies    Radiology:   CT CHEST PULMONARY EMBOLISM W CONTRAST   Final Result   1. No acute pulmonary embolus. 2. Mild areas of ground-glass opacity compatible with COVID-19 pneumonia. 3. Moderate-sized hiatal hernia containing high-density material.  This could   be due to an active GI bleed or recently ingested contrast.   Findings were discussed with emergency department at 7:52 pm on 1/1/2021. RECOMMENDATIONS:   1.      XR CHEST PORTABLE   Final Result   No acute airspace disease identified. Consults:     IP CONSULT TO GENERAL SURGERY  IP CONSULT TO GI  IP CONSULT TO PHARMACY    Disposition:  home     Condition at Discharge: Stable    Discharge Instructions/Follow-up:  GI as outpt    Code Status:  Full Code     Activity: activity as tolerated    Diet: low fat, low cholesterol diet      Discharge Medications:     Discharge Medication List as of 1/6/2021  4:55 PM           Details   Fremanezumab-vfrm (AJOVY) 225 MG/1.5ML SOSY Inject 1.5 mLs into the skin every 30 days, Disp-3 mL, R-5Normal      nitroGLYCERIN (NITROSTAT) 0.4 MG SL tablet up to max of 3 total doses.  If no relief after 1 dose, call 911., Disp-25 tablet, R-3Normal rosuvastatin (CRESTOR) 20 MG tablet TAKE ONE TABLET BY MOUTH NIGHTLY, Disp-90 tablet, R-1Normal      metoprolol succinate (TOPROL XL) 25 MG extended release tablet TAKE 1 TABLET BY MOUTH ONE TIME A DAY, Disp-90 tablet, R-1Normal      Magnesium 200 MG CHEW Take 1 tablet by mouth dailyHistorical Med      Calcium Carbonate-Vit D-Min (CALCIUM 600+D3 PLUS MINERALS) 600-800 MG-UNIT CHEW Take 1 tablet by mouth 2 times dailyHistorical Med      vitamin D (ERGOCALCIFEROL) 1.25 MG (62436 UT) CAPS capsule Take 1 capsule by mouth once a week, Disp-12 capsule,R-1Normal      Galcanezumab-gnlm (EMGALITY) 120 MG/ML SOAJ Inject 120 mg into the skin every 30 days, Disp-3 pen,R-3Normal      citalopram (CELEXA) 40 MG tablet TAKE 1 TABLET BY MOUTH ONE TIME A DAY, Disp-90 tablet,R-1Normal      methocarbamol (ROBAXIN) 500 MG tablet TAKE ONE TABLET BY MOUTH THREE TIMES A DAY, Disp-90 tablet, R-2Normal      promethazine (PHENERGAN) 25 MG tablet Take 1 tablet by mouth every 6 hours as needed for Nausea, Disp-30 tablet, R-1Normal      aspirin 81 MG chewable tablet Take 1 tablet by mouth daily, Disp-30 tablet, R-3Normal      dicyclomine (BENTYL) 10 MG capsule Take 1 capsule by mouth 3 times daily (before meals), Disp-90 capsule, R-0Normal      Cholecalciferol (VITAMIN D3) 5000 UNITS TABS Take by mouthHistorical Med      omeprazole (PRILOSEC) 20 MG capsule Take 20 mg by mouth every 12 hours. diphenhydrAMINE (BENADRYL) 25 MG tablet Take 25 mg by mouth every 6 hours as needed. OTC              Time Spent on discharge is more than 30 minutes in the examination, evaluation, counseling and review of medications and discharge plan. Signed:    Robin Navarrete MD   1/6/2021      Thank you LUCIA MITCHELL - JARVIS for the opportunity to be involved in this patient's care. If you have any questions or concerns please feel free to contact me at [phone].

## 2021-01-06 NOTE — PROGRESS NOTES
Physician Progress Note      Rupali Zaman  CSN #:                  [id number]  :                       1955  ADMIT DATE:       2021 4:01 PM  [address] DATE:        2021 5:10 PM  RESPONDING  PROVIDER #:        Kirsten Hernandez MD          QUERY TEXT:    Pt admitted with Covid 19. Noted documentation of \" Covid 19 pneumonia\" per   GI consult notes. If possible, please document in progress notes and   discharge summary:    The medical record reflects the following:  Risk Factors: 72 ye old, nausea/vomiting and emesis prior admission, Covid 19   positive test    Clinical Indicators: \"presented w Covid-19 pneumonia\" per GI note , and  \"2. Mild areas of ground-glass opacity compatible with COVID-19 pneumonia. \"   per Chest CT scan on     Treatment: IV fluids, imaging, labs, steroids    Thank you,  Gila Arevalo RN, CDS  [phone]  Options provided:  -- Covid 19 Pneumonia confirmed  -- Covid 19 Pneumonia is ruled out  -- Other - I will add my own diagnosis  -- Disagree - Not applicable / Not valid  -- Disagree - Clinically unable to determine / Unknown  -- Refer to Clinical Documentation Reviewer    PROVIDER RESPONSE TEXT:    The diagnosis of Covid 19 Pneumonia is ruled out.     Query created by: John Lr on 2021 10:10 AM      Electronically signed by:  Kirsten Hernandez MD 2021 5:58 PM

## 2021-01-07 ENCOUNTER — CARE COORDINATION (OUTPATIENT)
Dept: CASE MANAGEMENT | Age: 66
End: 2021-01-07

## 2021-01-07 ENCOUNTER — CARE COORDINATION (OUTPATIENT)
Dept: OTHER | Facility: CLINIC | Age: 66
End: 2021-01-07

## 2021-01-07 ENCOUNTER — TELEPHONE (OUTPATIENT)
Dept: FAMILY MEDICINE CLINIC | Age: 66
End: 2021-01-07

## 2021-01-07 NOTE — TELEPHONE ENCOUNTER
Naty Noguera 45 Transitions Initial Follow Up Call    Outreach made within 2 business days of discharge: Yes    Patient: Madison Aranad Patient : 1955   MRN: <[id number]>  Reason for Admission: There are no discharge diagnoses documented for the most recent discharge.   Discharge Date: 21       Spoke with: LEO for patient to call back, she can schedule a VV follow up if needed     [facility]charge department[facility]facility[facility]      Scheduled appointment with PCP within 7-14 days    Follow Up  Future Appointments   Date Time Provider Ronny Orozco   2/3/2021  2:30 PM LUCIA Rios - CNP [facility]   2021 10:30 AM Fran Kaplan MD [facility]

## 2021-01-07 NOTE — CARE COORDINATION
Chuckie 45 Transitions Initial Follow Up Call    Call within 2 business days of discharge: Yes    Patient: Ish Farris Patient : 1955   MRN: [id number]  Reason for Admission: GIbleed/covid  Discharge Date: 21 RARS: Readmission Risk Score: 14      Last Discharge [facility]       Complaint Diagnosis Description Type Department Provider    21 Back Pain; Emesis Non-intractable vomiting with nausea, unspecified vomiting type . .. ED to Hosp-Admission (Discharged) (ADMITTED) RHEAAZ KARLA Mayo MD; Echo Snowball Ros. .. Spoke with: [address] Allie Silvermanoc: [facility]      Challenges to be reviewed by the provider   Additional needs identified to be addressed with provider No  none    Discussed COVID-19 related testing which was available at this time. Test results were positive. Patient informed of results, if available? Yes, patient aware         Method of communication with provider : phone    Advance Care Planning:   Does patient have an Advance Directive:  not on file. Was this a readmission? No  Patients top risk factors for readmission: medical condition    Care Transition Nurse (CTN) contacted the patient by telephone to perform post hospital discharge assessment. Verified name and  with patient as identifiers. Provided introduction to self, and explanation of the CTN role. CTN reviewed discharge instructions, medical action plan and red flags with patient who verbalized understanding. Patient given an opportunity to ask questions and does not have any further questions or concerns at this time. Were discharge instructions available to patient? Yes. Reviewed appropriate site of care based on symptoms and resources available to patient including: PCP and Specialist. The patient agrees to contact the PCP office for questions related to their healthcare.      Medication reconciliation was performed with patient, who verbalizes understanding of administration of home medications. Advised obtaining a 90-day supply of all daily and as-needed medications. Covid Risk Education    Patient has following risk factors of: CAD. Education provided regarding infection prevention, and signs and symptoms of COVID-19 and when to seek medical attention with patient who verbalized understanding. Discussed exposure protocols and quarantine From CDC: Are you at higher risk for severe illness?   and given an opportunity for questions and concerns. The patient agrees to contact the COVID-19 hotline [phone] or PCP office for questions related to COVID-19. For more information on steps you can take to protect yourself, see CDC's How to Protect Yourself     Patient/family/caregiver given information for GetWell Loop and agrees to enroll no  Patient's preferred e-mail: declines  Patient's preferred phone number: declines    Discussed follow-up appointments. If no appointment was previously scheduled, appointment scheduling offered: Yes. Is follow up appointment scheduled within 7 days of discharge? No    Plan for follow-up call in 5-7 days based on severity of symptoms and risk factors. Patient confirmed  and agreeable to follow up transition call. Patient continues to have episodes of nausea and noted to have cough during call then. Shortly into call, patient had episode of vomiting and CTN offered to call back and patient agreeable. CTN placed return call and patient reconfirmed . Patient feeling better after episode of emesis. All medication list reviewed and taking as directed. 1111f completed. Patient in quarantine until 21 and will schedule follow up with PCP after that date. CTN reviewed quarantine timeframe, CDC recommendations and covid precautions. Denies any acute needs at present time. Agreeable to f/u calls. Educated on the use of urgent care or physicians 24 hr access line if assistance is needed after hours.  Patient transferred to [facility] team  CTN provided contact information for future needs.       Care Transitions 24 Hour Call    Do you have any ongoing symptoms?: No  Do you have a copy of your discharge instructions?: Yes  Do you have all of your prescriptions and are they filled?: Yes  Care Transitions Interventions         Follow Up  Future Appointments   Date Time Provider Ronny Orozco   2/3/2021  2:30 PM LUCIA Mathew CNP  [facility]   2/17/2021 10:30 AM Zuly Knight MD [facility]       Jaycob Proctor RN

## 2021-01-08 LAB — GI BACTERIAL PATHOGENS BY PCR: NORMAL

## 2021-01-08 NOTE — TELEPHONE ENCOUNTER
Chuckie 45 Transitions Initial Follow Up Call    Outreach made within 2 business days of discharge: Yes    Patient: Raghav Angel Patient : 1955   MRN: <[id number]>  Reason for Admission: There are no discharge diagnoses documented for the most recent discharge. Discharge Date: 21       Spoke with: Patient, she is positive for COVID, states she is feeling a little better. Scheduled VV     Discharge department/facility: [facility]    TCM Interactive Patient Contact:  Was patient able to fill all prescriptions: Yes  Was patient instructed to bring all medications to the follow-up visit: Yes  Is patient taking all medications as directed in the discharge summary?  Yes  Does patient understand their discharge instructions: Yes  Does patient have questions or concerns that need addressed prior to 7-14 day follow up office visit: no    Scheduled appointment with PCP within 7-14 days    Follow Up  Future Appointments   Date Time Provider Ronny Orozco   2/3/2021  2:30 PM LUCIA Villa - CNP [facility]   2021 10:30 AM Vazquez Simmons MD [facility]       Graham Matthews

## 2021-01-12 ENCOUNTER — TELEPHONE (OUTPATIENT)
Dept: FAMILY MEDICINE CLINIC | Age: 66
End: 2021-01-12

## 2021-01-13 ENCOUNTER — VIRTUAL VISIT (OUTPATIENT)
Dept: FAMILY MEDICINE CLINIC | Age: 66
End: 2021-01-13
Payer: COMMERCIAL

## 2021-01-13 DIAGNOSIS — G43.709 CHRONIC MIGRAINE WITHOUT AURA WITHOUT STATUS MIGRAINOSUS, NOT INTRACTABLE: ICD-10-CM

## 2021-01-13 DIAGNOSIS — U07.1 COVID-19: Primary | ICD-10-CM

## 2021-01-13 DIAGNOSIS — R11.2 NAUSEA AND VOMITING, INTRACTABILITY OF VOMITING NOT SPECIFIED, UNSPECIFIED VOMITING TYPE: ICD-10-CM

## 2021-01-13 DIAGNOSIS — R53.83 FATIGUE, UNSPECIFIED TYPE: ICD-10-CM

## 2021-01-13 DIAGNOSIS — M79.7 FIBROMYALGIA: ICD-10-CM

## 2021-01-13 DIAGNOSIS — R06.02 SOB (SHORTNESS OF BREATH): ICD-10-CM

## 2021-01-13 PROBLEM — I25.10 CAD (CORONARY ARTERY DISEASE): Status: RESOLVED | Noted: 2019-06-05 | Resolved: 2021-01-13

## 2021-01-13 PROBLEM — K92.2 GI BLEED: Status: RESOLVED | Noted: 2021-01-01 | Resolved: 2021-01-13

## 2021-01-13 PROCEDURE — 1111F DSCHRG MED/CURRENT MED MERGE: CPT | Performed by: NURSE PRACTITIONER

## 2021-01-13 PROCEDURE — 99495 TRANSJ CARE MGMT MOD F2F 14D: CPT | Performed by: NURSE PRACTITIONER

## 2021-01-13 RX ORDER — METHOCARBAMOL 500 MG/1
TABLET, FILM COATED ORAL
Qty: 90 TABLET | Refills: 2 | Status: SHIPPED | OUTPATIENT
Start: 2021-01-13 | End: 2021-09-07 | Stop reason: SDUPTHER

## 2021-01-13 RX ORDER — HYDROCODONE BITARTRATE AND ACETAMINOPHEN 5; 325 MG/1; MG/1
1 TABLET ORAL DAILY PRN
Qty: 10 TABLET | Refills: 0 | Status: SHIPPED | OUTPATIENT
Start: 2021-01-13 | End: 2021-02-26 | Stop reason: SDUPTHER

## 2021-01-13 NOTE — PROGRESS NOTES
Post-Discharge Transitional Care Management Services or Hospital Follow Up      Carmen Nunes   YOB: 1955    Date of Office Visit:  1/13/2021  Date of Hospital Admission: 1/1/21  Date of Hospital Discharge: 1/6/21  Readmission Risk Score(high >=14%. Medium >=10%):Readmission Risk Score: 14    Was at a party and started with headache. Few days later brother was positive. The she was tested and was positive for covid. Nausea and emesis were the significant symptoms. Concern for bleeding in hiatial hernia but GI managed and felt to be benign. Since home eating and drinking, no further emesis. Continues to have aching. Moving around more. However continues with fatigue and aching. Temp this morning 98.4.      Care management risk score Rising risk (score 2-5) and Complex Care (Scores >=6): 4     Non face to face  following discharge, date last encounter closed (first attempt may have been earlier): 1/8/2021 10:29 AM 1/8/2021 10:29 AM    Call initiated 2 business days of discharge: Yes     Patient Active Problem List   Diagnosis    Anxiety    Fibromyalgia    IBS (irritable bowel syndrome)    OA (osteoarthritis)    History of prior ablation treatment    Lipoma    History of colonic diverticulitis    Hyperlipidemia    Depression    Small bowel obstruction ([facility] [address]    Migraine with aura and without status migrainosus, not intractable    Angina pectoris (HCC)    CAD (coronary artery disease)    H/O angiography    CAD S/P percutaneous coronary angioplasty    GI bleed    COVID-19    SOB (shortness of breath)    Hypokalemia       Allergies   Allergen Reactions    Avelox [Moxifloxacin Hcl In Nacl] Hives    Cephalexin Hives and Rash    Darvon [Propoxyphene Hcl] Hives    Digoxin Hives    Librax [Chlordiazepoxide-Methscopol] Hives    Motrin [Ibuprofen Micronized] Rash    Other Hives     Most mycin's    Pcn [Penicillins] Rash    Prochlorperazine Edisylate Nausea And Vomiting  Sulfa Antibiotics Rash    Tetracyclines & Related Hives    Nickel Nausea And Vomiting     Hypotension      Statins Other (See Comments)     myalgia       Medications listed as ordered at the time of discharge from hospital   Yesica Martin   Home Medication Instructions KV[facility]:    Printed on:01/13/21 1579   Medication Information                      aspirin 81 MG chewable tablet  Take 1 tablet by mouth daily             Calcium Carbonate-Vit D-Min (CALCIUM 600+D3 PLUS MINERALS) 600-800 MG-UNIT CHEW  Take 1 tablet by mouth 2 times daily             Cholecalciferol (VITAMIN D3) 5000 UNITS TABS  Take by mouth             citalopram (CELEXA) 40 MG tablet  TAKE 1 TABLET BY MOUTH ONE TIME A DAY             dicyclomine (BENTYL) 10 MG capsule  Take 1 capsule by mouth 3 times daily (before meals)             diphenhydrAMINE (BENADRYL) 25 MG tablet  Take 25 mg by mouth every 6 hours as needed. OTC              Fremanezumab-vfrm (AJOVY) 225 MG/1.5ML SOSY  Inject 1.5 mLs into the skin every 30 days             Galcanezumab-gnlm (EMGALITY) 120 MG/ML SOAJ  Inject 120 mg into the skin every 30 days             Magnesium 200 MG CHEW  Take 1 tablet by mouth daily             methocarbamol (ROBAXIN) 500 MG tablet  TAKE ONE TABLET BY MOUTH THREE TIMES A DAY             metoprolol succinate (TOPROL XL) 25 MG extended release tablet  TAKE 1 TABLET BY MOUTH ONE TIME A DAY             nitroGLYCERIN (NITROSTAT) 0.4 MG SL tablet  up to max of 3 total doses. If no relief after 1 dose, call 911. omeprazole (PRILOSEC) 20 MG capsule  Take 20 mg by mouth every 12 hours.              promethazine (PHENERGAN) 25 MG tablet  Take 1 tablet by mouth every 6 hours as needed for Nausea             rosuvastatin (CRESTOR) 20 MG tablet  TAKE ONE TABLET BY MOUTH NIGHTLY             vitamin D (ERGOCALCIFEROL) 1.25 MG (62474 UT) CAPS capsule  Take 1 capsule by mouth once a week                   Medications marked \"taking\" at this time Outpatient Medications Marked as Taking for the 1/13/21 encounter (Virtual Visit) with LUCIA Jones - CNP   Medication Sig Dispense Refill    Fremanezumab-vfrm (AJOVY) 225 MG/1.5ML SOSY Inject 1.5 mLs into the skin every 30 days 3 mL 5    nitroGLYCERIN (NITROSTAT) 0.4 MG SL tablet up to max of 3 total doses. If no relief after 1 dose, call 911. 25 tablet 3    rosuvastatin (CRESTOR) 20 MG tablet TAKE ONE TABLET BY MOUTH NIGHTLY 90 tablet 1    metoprolol succinate (TOPROL XL) 25 MG extended release tablet TAKE 1 TABLET BY MOUTH ONE TIME A DAY 90 tablet 1    Magnesium 200 MG CHEW Take 1 tablet by mouth daily      Calcium Carbonate-Vit D-Min (CALCIUM 600+D3 PLUS MINERALS) 600-800 MG-UNIT CHEW Take 1 tablet by mouth 2 times daily      vitamin D (ERGOCALCIFEROL) 1.25 MG (90889 UT) CAPS capsule Take 1 capsule by mouth once a week 12 capsule 1    Galcanezumab-gnlm (EMGALITY) 120 MG/ML SOAJ Inject 120 mg into the skin every 30 days 3 pen 3    citalopram (CELEXA) 40 MG tablet TAKE 1 TABLET BY MOUTH ONE TIME A DAY 90 tablet 1    methocarbamol (ROBAXIN) 500 MG tablet TAKE ONE TABLET BY MOUTH THREE TIMES A DAY 90 tablet 2    promethazine (PHENERGAN) 25 MG tablet Take 1 tablet by mouth every 6 hours as needed for Nausea 30 tablet 1    aspirin 81 MG chewable tablet Take 1 tablet by mouth daily 30 tablet 3    dicyclomine (BENTYL) 10 MG capsule Take 1 capsule by mouth 3 times daily (before meals) (Patient taking differently: Take 10 mg by mouth 3 times daily as needed ) 90 capsule 0    omeprazole (PRILOSEC) 20 MG capsule Take 20 mg by mouth every 12 hours.  diphenhydrAMINE (BENADRYL) 25 MG tablet Take 25 mg by mouth every 6 hours as needed.  OTC           Medications patient taking as of now reconciled against medications ordered at time of hospital discharge: Yes    Chief Complaint   Patient presents with    Follow-Up from Hospital       HPI Inpatient course: Discharge summary reviewed- see chart. Interval history/Current status: stable    Review of Systems   All other systems reviewed and are negative. There were no vitals filed for this visit. There is no height or weight on file to calculate BMI. Wt Readings from Last 3 Encounters:   01/05/21 162 lb 14.7 oz (73.9 kg)   10/21/20 157 lb 9.6 oz (71.5 kg)   08/21/20 156 lb (70.8 kg)     BP Readings from Last 3 Encounters:   01/06/21 111/73   10/21/20 118/78   08/21/20 110/72       Physical Exam    Alert oriented sitting in chair. Much conversation. Well groomed  Resp E&E  ROM neck intact. Face symmetrical      Assessment/Plan:        Controlled Substance Monitoring:    Acute and Chronic Pain Monitoring:   RX Monitoring 1/13/2021   Attestation -   Periodic Controlled Substance Monitoring No signs of potential drug abuse or diversion identified. Chronic Pain > 120 MEDD Obtained or confirmed \"Medication Contract\" on file. Esdras Gama was seen today for follow-up from hospital and positive for covid-19. Diagnoses and all orders for this visit:    COVID-19  -     [place] DISCHARGE MEDS RECONCILED W/ CURRENT OUTPATIENT MED LIST    SOB (shortness of breath)  -     [place] DISCHARGE MEDS RECONCILED W/ CURRENT OUTPATIENT MED LIST    Fibromyalgia  -     HYDROcodone-acetaminophen (NORCO) 5-325 MG per tablet; Take 1 tablet by mouth daily as needed for Pain for up to 10 days. May take instead of tramadol when pain is increased  -     methocarbamol (ROBAXIN) 500 MG tablet; TAKE ONE TABLET BY MOUTH THREE TIMES A DAY    Chronic migraine without aura without status migrainosus, not intractable  -     HYDROcodone-acetaminophen (NORCO) 5-325 MG per tablet; Take 1 tablet by mouth daily as needed for Pain for up to 10 days.  May take instead of tramadol when pain is increased Agree with plan to return to work at slow pace. Hoping to return next week. Discussed need to take time to build stamina up after serious illness. Following with employee health. Will plan follow up in 3 months and as needed. Call if symptoms decline.  Agreeable    Medical Decision Making: moderate complexity

## 2021-01-14 ENCOUNTER — CARE COORDINATION (OUTPATIENT)
Dept: OTHER | Facility: CLINIC | Age: 66
End: 2021-01-14

## 2021-01-14 RX ORDER — TRAMADOL HYDROCHLORIDE 50 MG/1
50 TABLET ORAL EVERY 6 HOURS PRN
COMMUNITY
End: 2021-01-21 | Stop reason: SDUPTHER

## 2021-01-15 ENCOUNTER — TELEPHONE (OUTPATIENT)
Dept: FAMILY MEDICINE CLINIC | Age: 66
End: 2021-01-15

## 2021-01-18 NOTE — TELEPHONE ENCOUNTER
Called [employer] and did a verbal PA over the phone with Otilia Domingo. 24-72 hour turn around time unless additional information requested then could take longer. Status PENDING.

## 2021-01-21 ENCOUNTER — CARE COORDINATION (OUTPATIENT)
Dept: OTHER | Facility: CLINIC | Age: 66
End: 2021-01-21

## 2021-01-21 ENCOUNTER — TELEPHONE (OUTPATIENT)
Dept: FAMILY MEDICINE CLINIC | Age: 66
End: 2021-01-21

## 2021-01-21 NOTE — TELEPHONE ENCOUNTER
Cece Koch states that she will be sending paperwork for the short-term disability for her bout with covid. She states that the absence manager, Arnold Chery (fax [phone]) will be contacting the office for information/records about Shelby's hospital stay and a copy of the letter that Delgado Perdomo generated for Cece Koch to return to work. Adv that we will need an auth to release PHI. She is also stating that Julieth Juan is still waiting for FMLA paperwork for fibromyalgia and migraines.   She says it was sent to the office in the first or second week of Dec.

## 2021-01-21 NOTE — CARE COORDINATION
[facility] Transitions Follow Up Call    2021    Patient: Ulises Cuba  Patient : 1955   MRN: [id number]  Reason for Admission: COVID  Discharge Date: 21 RARS: Readmission Risk Score: 14         ACM attempted to reach patient for follow up call. HIPAA compliant message left requesting a return phone call at patients convenience. Will continue to follow.          Follow Up  Future Appointments   Date Time Provider Ronny Orozco   2021 10:30 AM Zachary Gomez MD [facility]   2021  1:00 PM [facility]       Veronica Bonilla RN

## 2021-01-22 NOTE — TELEPHONE ENCOUNTER
Pt states that corrected forms (Joyce Muñoz entered the dates incorrectly) were faxed over by alma rosa Muñoz.

## 2021-01-22 NOTE — TELEPHONE ENCOUNTER
This paper work was completed in December. Please contact this person to discuss, determine if she has that paperwork. Does it need to be refaxed.

## 2021-01-22 NOTE — TELEPHONE ENCOUNTER
This paperwork was faxed over on 12/24/2020 and went through successfully. I re-faxed it again today 1/22/2021. I also called Melchor Hernández and left her a vm to call me back to let me know if she did receive the fax from today and also advised her that it was faxed over back in December.

## 2021-01-25 ENCOUNTER — CARE COORDINATION (OUTPATIENT)
Dept: OTHER | Facility: CLINIC | Age: 66
End: 2021-01-25

## 2021-01-25 ENCOUNTER — TELEPHONE (OUTPATIENT)
Dept: FAMILY MEDICINE CLINIC | Age: 66
End: 2021-01-25

## 2021-01-25 NOTE — CARE COORDINATION
[facility] Transitions Follow Up Call    2021    Patient: Soraida Childs  Patient : 1955   MRN: [id number]  Reason for Admission: [place]  Discharge Date: 21 RARS: Readmission Risk Score: 14       ACM attempted to reach patient for follow up call. HIPAA compliant message left requesting a return phone call at patients convenience. Final UTR letter sent via [address] No further outreach scheduled with this ACM, ACM will sign off care team at this time. Episode of Care resolved. Patient has this ACM's contact information if future needs arise.       Follow Up  Future Appointments   Date Time Provider Ronny Orozco   2021 10:30 AM Reynaldo Sullivan MD [facility]   2021  1:00 PM LUCIA Swanson - CNP [facility]       Vidal Grewal RN

## 2021-01-25 NOTE — TELEPHONE ENCOUNTER
I called [employer] and spoke to [address] [place] [address]. PA# [id number] to get the status of this PA and they state they need more information. They are requesting very specific chart notes showing tried and fails of migraine medications, etc. That had been sent originally but they state they never received it. I re-faxed chart notes to [address] [place] [address]. Still PENDING.

## 2021-01-27 ENCOUNTER — TELEPHONE (OUTPATIENT)
Dept: FAMILY MEDICINE CLINIC | Age: 66
End: 2021-01-27

## 2021-01-27 NOTE — TELEPHONE ENCOUNTER
I am trying to complete Shelby's FMLA forms for her illness related to covid. Please check with her to determine  1) what hours is she working  2) how is this going  3) what is her expectation of remaining on part time basis.   If is still having symptoms (aching, weakness, etc) she should probably continue on part time basis

## 2021-01-28 ENCOUNTER — TELEPHONE (OUTPATIENT)
Dept: CARDIOLOGY CLINIC | Age: 66
End: 2021-01-28

## 2021-01-28 DIAGNOSIS — M79.10 MYALGIA: Primary | ICD-10-CM

## 2021-01-28 RX ORDER — PREDNISONE 10 MG/1
TABLET ORAL
Qty: 35 TABLET | Refills: 0 | Status: SHIPPED | OUTPATIENT
Start: 2021-01-28 | End: 2021-04-14

## 2021-01-28 NOTE — TELEPHONE ENCOUNTER
1. Pt states that her hours are:   2. 1/18/21 through 1/21/21 she worked 9:30-2:30, (1/18-1/22 and 1/25-1/29)  Pt states that she is still having a lot of pain. She believes the pain is primarily from her fibromyalgia flaring up. She would like another round of prednisone. She woke up today without pain, but by the end of her workday, she was in a lot of pain (10/10 for pain for the last 2 hours of work). 3.  She is planning on trying to go full time next week unless [facility] advises her not to. Read the part of the note that said to not go back to full time if she is experiencing sx. She said she might wait another week. She wanted to be sure that Du Nelson got the paperwork she dropped off at the  today.

## 2021-01-28 NOTE — TELEPHONE ENCOUNTER
I thought I sent a message yesterday but do not see it. Please check with Shelby to see what hours she is currently working, is the shorter schedule working for her and how long is she considering the shorter schedule. I have the paperwork and started it yesterday but I need to state specific dates and times.

## 2021-02-09 ENCOUNTER — VIRTUAL VISIT (OUTPATIENT)
Dept: FAMILY MEDICINE CLINIC | Age: 66
End: 2021-02-09
Payer: COMMERCIAL

## 2021-02-09 DIAGNOSIS — R53.82 CHRONIC FATIGUE: Primary | ICD-10-CM

## 2021-02-09 DIAGNOSIS — M79.7 FIBROMYALGIA: ICD-10-CM

## 2021-02-09 DIAGNOSIS — R41.3 POOR MEMORY: ICD-10-CM

## 2021-02-09 PROCEDURE — 99213 OFFICE O/P EST LOW 20 MIN: CPT | Performed by: NURSE PRACTITIONER

## 2021-02-09 NOTE — PROGRESS NOTES
Soraida Childs (:  1955) is a 72 y.o. female,Established patient, here for evaluation of the following chief complaint(s): Chronic Pain (Discuss FMLA)      ASSESSMENT/PLAN:      Has paperwork with her and will fax to office for completion    Prednisone taper was ordered previously with mild improvement. Discussed need to participate in stretching exercise, balance activities while off work. Aware    Discussed need to challenge thought process while off work. Suggested reading article of interest in the morning and recalling in the evening, sudoko, work search, etc.     No change in medication as this time. Will evaluate improvement with rest.     No follow-ups on file. SUBJECTIVE/OBJECTIVE:  HPI      Tried to return to work. Increased fibromyalgia pain with ambulation, down arms, legs. Having chest tightness, sweating. Has appt with cardiology next week. Feels would be better to take time off on FMLA. Fatigue increased after covid. Considering being off 6 weeks. Feels memory is limited. Currently working part time this week.        Review of Systems    Patient-Reported Vitals 2021   Patient-Reported Weight 160 lb   Patient-Reported Height -   Patient-Reported Pulse -   Patient-Reported Temperature -   Patient-Reported SpO2 -        Physical Exam    [INSTRUCTIONS:  \"[x]\" Indicates a positive item  \"[]\" Indicates a negative item  -- DELETE ALL ITEMS NOT EXAMINED]    Constitutional: [x] Appears well-developed and well-nourished [x] No apparent distress      [] Abnormal -     Mental status: [x] Alert and awake  [x] Oriented to person/place/time [x] Able to follow commands    [] Abnormal -     Eyes:   EOM    [x]  Normal    [] Abnormal -   Sclera  [x]  Normal    [] Abnormal -          Discharge [x]  None visible   [] Abnormal -     HENT: [x] Normocephalic, atraumatic  [] Abnormal -   [x] Mouth/Throat: Mucous membranes are moist    External Ears [x] Normal  [] Abnormal -

## 2021-02-12 ENCOUNTER — TELEPHONE (OUTPATIENT)
Dept: FAMILY MEDICINE CLINIC | Age: 66
End: 2021-02-12

## 2021-02-15 ENCOUNTER — TELEPHONE (OUTPATIENT)
Dept: FAMILY MEDICINE CLINIC | Age: 66
End: 2021-02-15

## 2021-02-15 NOTE — TELEPHONE ENCOUNTER
Prior Auth on [employer] 225mg/1.5 syringe    Visits [employer]. [email or web address]  TRX Code;[id number]

## 2021-02-23 ENCOUNTER — PATIENT MESSAGE (OUTPATIENT)
Dept: FAMILY MEDICINE CLINIC | Age: 66
End: 2021-02-23

## 2021-02-23 DIAGNOSIS — M79.7 FIBROMYALGIA: ICD-10-CM

## 2021-02-23 DIAGNOSIS — G43.709 CHRONIC MIGRAINE WITHOUT AURA WITHOUT STATUS MIGRAINOSUS, NOT INTRACTABLE: ICD-10-CM

## 2021-02-23 DIAGNOSIS — M15.9 PRIMARY OSTEOARTHRITIS INVOLVING MULTIPLE JOINTS: ICD-10-CM

## 2021-02-24 NOTE — TELEPHONE ENCOUNTER
From: Aguilar Arroyo  To: LUCIA Mobley - CNP  Sent: 2/23/2021 10:19 PM EST  Subject: Prescription Question    I need to request a refill on my Norco and my Tramadol.      Please send to [facility] in [facility]

## 2021-02-25 NOTE — TELEPHONE ENCOUNTER
Last office visit 2/9/2021     Last written Tramadol 1-                        Norco 1-    Next office visit scheduled 2/24/2021    Requested Prescriptions     Pending Prescriptions Disp Refills    HYDROcodone-acetaminophen (NORCO) 5-325 MG per tablet 10 tablet 0     Sig: Take 1 tablet by mouth daily as needed for Pain for up to 10 days. May take instead of tramadol when pain is increased    traMADol (ULTRAM) 50 MG tablet 45 tablet 0     Sig: Take 1 tablet by mouth every 6 hours as needed for Pain (fibromyalgia) for up to 30 days.

## 2021-02-26 ENCOUNTER — OFFICE VISIT (OUTPATIENT)
Dept: CARDIOLOGY CLINIC | Age: 66
End: 2021-02-26
Payer: COMMERCIAL

## 2021-02-26 VITALS
WEIGHT: 168 LBS | SYSTOLIC BLOOD PRESSURE: 122 MMHG | TEMPERATURE: 96.8 F | HEIGHT: 66 IN | OXYGEN SATURATION: 98 % | DIASTOLIC BLOOD PRESSURE: 82 MMHG | HEART RATE: 80 BPM | BODY MASS INDEX: 27 KG/M2

## 2021-02-26 DIAGNOSIS — E78.5 HYPERLIPIDEMIA, UNSPECIFIED HYPERLIPIDEMIA TYPE: ICD-10-CM

## 2021-02-26 DIAGNOSIS — Z92.89 H/O ANGIOGRAPHY: ICD-10-CM

## 2021-02-26 DIAGNOSIS — R07.9 CHEST PAIN, UNSPECIFIED TYPE: Primary | ICD-10-CM

## 2021-02-26 DIAGNOSIS — R07.9 CHEST PAIN, UNSPECIFIED TYPE: ICD-10-CM

## 2021-02-26 DIAGNOSIS — R06.02 SOB (SHORTNESS OF BREATH): ICD-10-CM

## 2021-02-26 DIAGNOSIS — I20.9 ANGINA PECTORIS (HCC): ICD-10-CM

## 2021-02-26 LAB
A/G RATIO: 1.7 (ref 1.1–2.2)
ALBUMIN SERPL-MCNC: 4.1 G/DL (ref 3.4–5)
ALP BLD-CCNC: 64 U/L (ref 40–129)
ALT SERPL-CCNC: 8 U/L (ref 10–40)
ANION GAP SERPL CALCULATED.3IONS-SCNC: 9 MMOL/L (ref 3–16)
AST SERPL-CCNC: 11 U/L (ref 15–37)
BILIRUB SERPL-MCNC: 0.3 MG/DL (ref 0–1)
BUN BLDV-MCNC: 12 MG/DL (ref 7–20)
CALCIUM SERPL-MCNC: 9.4 MG/DL (ref 8.3–10.6)
CHLORIDE BLD-SCNC: 103 MMOL/L (ref 99–110)
CHOLESTEROL, TOTAL: 185 MG/DL (ref 0–199)
CO2: 28 MMOL/L (ref 21–32)
CREAT SERPL-MCNC: 0.6 MG/DL (ref 0.6–1.2)
GFR AFRICAN AMERICAN: >60
GFR NON-AFRICAN AMERICAN: >60
GLOBULIN: 2.4 G/DL
GLUCOSE BLD-MCNC: 101 MG/DL (ref 70–99)
HDLC SERPL-MCNC: 77 MG/DL (ref 40–60)
LDL CHOLESTEROL CALCULATED: 82 MG/DL
POTASSIUM SERPL-SCNC: 4.4 MMOL/L (ref 3.5–5.1)
SODIUM BLD-SCNC: 140 MMOL/L (ref 136–145)
TOTAL PROTEIN: 6.5 G/DL (ref 6.4–8.2)
TRIGL SERPL-MCNC: 131 MG/DL (ref 0–150)
VLDLC SERPL CALC-MCNC: 26 MG/DL

## 2021-02-26 PROCEDURE — 93000 ELECTROCARDIOGRAM COMPLETE: CPT | Performed by: INTERNAL MEDICINE

## 2021-02-26 PROCEDURE — 99214 OFFICE O/P EST MOD 30 MIN: CPT | Performed by: INTERNAL MEDICINE

## 2021-02-26 RX ORDER — HYDROCODONE BITARTRATE AND ACETAMINOPHEN 5; 325 MG/1; MG/1
1 TABLET ORAL DAILY PRN
Qty: 10 TABLET | Refills: 0 | Status: SHIPPED | OUTPATIENT
Start: 2021-02-26 | End: 2021-03-31 | Stop reason: SDUPTHER

## 2021-02-26 RX ORDER — TRAMADOL HYDROCHLORIDE 50 MG/1
50 TABLET ORAL EVERY 6 HOURS PRN
Qty: 45 TABLET | Refills: 0 | Status: SHIPPED | OUTPATIENT
Start: 2021-02-26 | End: 2021-03-31 | Stop reason: SDUPTHER

## 2021-02-26 NOTE — PROGRESS NOTES
[facility]  Office Visit           Noah Umana MD,  [facility] [place]                      Cardiology             Jake Morales  1955 February 26, 2021    Primary Cardiologist:  Sae Vela       CC: HPI:  The patient is 72 y.o. female with she is here for follow-up and has generally done fairly well however in late December she developed Covid positive. She was hospitalized for 6 days and has had perpetual and continuous shortness of breath and dyspnea. She has a fibromyalgia flareup and is generally better her O2 sat today is 97% but easily short of breath with any activity. Her examination today is otherwise unremarkable. The lungs are clear I do not see any peripheral edema and her blood pressure is stable. Overall cardiac status is stable and EKG looks stable. She is now on Crestor at 20 mg/day. Cardiac cath October 2019  CAD, previous LAD stent  6/2019  / on GDMT /stable   HLD LDL 65 optimal  / crestor   hx SVT ablation / in NSR /stable   hx TIA / no residual     complaint with meds   Discussed nutrition / sodium intake / fluid intake   Recommend activity as tolerated     Reviewed most recent test with pt. reviewed [facility] results     Review of Systems:  Constitutional: Denies  fatigue, weakness, night sweats or fever. HEENT: Denies new visual changes, ringing in ears, nosebleeds,nasal congestion  Respiratory: Denies new or change in SOB, PND, orthopnea or cough. Cardiovascular: see HPI  GI: Denies N/V, diarrhea, constipation, abdominal pain, change in bowel habits, melena or hematochezia  : Denies urinary frequency, urgency, incontinence, hematuria or dysuria. Skin: Denies rash, hives, or cyanosis  Musculoskeletal: Denies joint or muscle aches/pain  Neurological: Denies syncope or TIA-like symptoms.   Psychiatric: Denies anxiety, insomnia or depression     Past Medical History:   Diagnosis Date    Allergic rhinitis     Anxiety     CAD (coronary artery disease) 06/05/2019  COVID-19 2020    Depression     Endometriosis     hx    Fibromyalgia     GERD (gastroesophageal reflux disease)     Headache(784.0)     Hiatal hernia     Hyperlipidemia     Irritable bowel syndrome     Left bundle branch block (LBBB)     Osteoarthritis     SVT (supraventricular tachycardia) (HCC)     hx     Trouble swallowing      Past Surgical History:   Procedure Laterality Date    ABLATION OF DYSRHYTHMIC FOCUS      APPENDECTOMY      BUNIONECTOMY  1973    bilateral, 2 revisions left foot    CHOLECYSTECTOMY      COLONOSCOPY  10/15/13    Hx polyps-3 yrs-? poor prep    HYSTERECTOMY      LAPAROSCOPY      6    LAPAROTOMY      3    NASAL SEPTUM SURGERY      OTHER SURGICAL HISTORY      Angiogram     UPPER GASTROINTESTINAL ENDOSCOPY      UPPER GASTROINTESTINAL ENDOSCOPY  10/15/13    volodymyr balderrama     Family History   Problem Relation Age of Onset    Other Mother         sepsis    Dementia Mother     High Cholesterol Mother     Depression Mother     Anxiety Disorder Mother     High Blood Pressure Father     Other Father          from renal failure and CHF    Heart Disease Father     Migraines Father     Heart Attack Brother     Heart Disease Brother     Hypertension Other     High Cholesterol Other     Migraines Other     Heart Disease Other     Diabetes Other      Social History     Tobacco Use    Smoking status: Never Smoker    Smokeless tobacco: Never Used   Substance Use Topics    Alcohol use: No    Drug use: No       Allergies   Allergen Reactions    Avelox [Moxifloxacin Hcl In Nacl] Hives    Cephalexin Hives and Rash    Darvon [Propoxyphene Hcl] Hives    Digoxin Hives    Librax [Chlordiazepoxide-Methscopol] Hives    Motrin [Ibuprofen Micronized] Rash    Other Hives     Most mycin's    Pcn [Penicillins] Rash    Prochlorperazine Edisylate Nausea And Vomiting    Sulfa Antibiotics Rash    Tetracyclines & Related Hives    Nickel Nausea And Vomiting     Hypotension      Statins Other (See Comments)     myalgia     Current Outpatient Medications   Medication Sig Dispense Refill    HYDROcodone-acetaminophen (NORCO) 5-325 MG per tablet Take 1 tablet by mouth daily as needed for Pain for up to 10 days. May take instead of tramadol when pain is increased 10 tablet 0    traMADol (ULTRAM) 50 MG tablet Take 1 tablet by mouth every 6 hours as needed for Pain (fibromyalgia) for up to 30 days. 45 tablet 0    citalopram (CELEXA) 40 MG tablet TAKE 1 TABLET BY MOUTH ONE TIME A DAY 90 tablet 1    predniSONE (DELTASONE) 10 MG tablet 2 tablets 2 times daily for 5 days, 1 tablet 2 times daily for 5 days, 1 tablet daily 35 tablet 0    methocarbamol (ROBAXIN) 500 MG tablet TAKE ONE TABLET BY MOUTH THREE TIMES A DAY 90 tablet 2    nitroGLYCERIN (NITROSTAT) 0.4 MG SL tablet up to max of 3 total doses.  If no relief after 1 dose, call 911. 25 tablet 3    rosuvastatin (CRESTOR) 20 MG tablet TAKE ONE TABLET BY MOUTH NIGHTLY 90 tablet 1    metoprolol succinate (TOPROL XL) 25 MG extended release tablet TAKE 1 TABLET BY MOUTH ONE TIME A DAY 90 tablet 1    Magnesium 200 MG CHEW Take 1 tablet by mouth daily      Calcium Carbonate-Vit D-Min (CALCIUM 600+D3 PLUS MINERALS) 600-800 MG-UNIT CHEW Take 1 tablet by mouth 2 times daily      vitamin D (ERGOCALCIFEROL) 1.25 MG (79314 UT) CAPS capsule Take 1 capsule by mouth once a week 12 capsule 1    Galcanezumab-gnlm (EMGALITY) 120 MG/ML SOAJ Inject 120 mg into the skin every 30 days 3 pen 3    promethazine (PHENERGAN) 25 MG tablet Take 1 tablet by mouth every 6 hours as needed for Nausea 30 tablet 1    aspirin 81 MG chewable tablet Take 1 tablet by mouth daily 30 tablet 3    dicyclomine (BENTYL) 10 MG capsule Take 1 capsule by mouth 3 times daily (before meals) (Patient taking differently: Take 10 mg by mouth 3 times daily as needed ) 90 capsule 0    Cholecalciferol (VITAMIN D3) 5000 UNITS TABS Take by mouth      omeprazole (PRILOSEC) 20 MG capsule Take 20 mg by mouth every 12 hours as needed       diphenhydrAMINE (BENADRYL) 25 MG tablet Take 25 mg by mouth every 6 hours as needed. OTC        No current facility-administered medications for this visit. Physical Exam:   /82   Pulse 80   Temp 96.8 °F (36 °C)   Ht 5' 6\" (1.676 m)   Wt 168 lb (76.2 kg)   LMP  (LMP Unknown)   BMI 27.12 kg/m²   BP Readings from Last 3 Encounters:   02/26/21 122/82   01/06/21 111/73   10/21/20 118/78     Pulse Readings from Last 3 Encounters:   02/26/21 80   01/06/21 92   10/21/20 86     Wt Readings from Last 3 Encounters:   02/26/21 168 lb (76.2 kg)   01/05/21 162 lb 14.7 oz (73.9 kg)   10/21/20 157 lb 9.6 oz (71.5 kg)     Constitutional: She is oriented to person, place, and time. She appears well-developed and well-nourished. In no acute distress. HEENT: Normocephalic and atraumatic. Sclerae anicteric. No xanthelasmas. Conjunctiva white, no subconjunctival hemorrhage   External inspection of ears nose teeth & gums   Eyes:PERRLA EOM's intact. Neck: Neck supple. No JVD present. Carotids without bruits. No mass and no thyromegaly present. No lymphadenopathy present. Cardiovascular: RRR, normal S1 and S2; no murmur/gallop or rub, PMI nondisplaced  Pulmonary/Chest: Effort normal.  Lungs clear to auscultation. Chest wall nontender  Abdominal: soft, nontender, nondistended. + bowel sounds; no organomegaly or bruits. Aorta normal  Extremities: No edema, cyanosis, or clubbing. Pulses are 2+ radial/carotid/dorsalis pedis bilaterally. Cap refill brisk. Neurological: No cranial nerve deficit. Psychiatric: She has a normal mood and affect.  Her speech is normal and behavior is normal.     Lab Review:   Lab Results   Component Value Date    TRIG 107 08/17/2020    HDL 58 08/17/2020    LDLCALC 50 08/17/2020    LABVLDL 21 08/17/2020     Lab Results   Component Value Date     01/06/2021    K 3.3 01/06/2021    K 3.3 01/02/2021  01/06/2021    CO2 28 01/06/2021    BUN 8 01/06/2021    CREATININE 0.7 01/06/2021    GLUCOSE 93 01/06/2021    CALCIUM 9.1 01/06/2021      Lab Results   Component Value Date    WBC 4.4 01/06/2021    HGB 12.3 01/06/2021    HCT 37.4 01/06/2021    MCV 93.8 01/06/2021     01/06/2021     Results: Cardiac cath October 8, 2019  Left ventricular pressure 10 mmHg  Aortic pressure 126 mmHg     Coronary anatomy:   The left main coronary artery is normal.      Left anterior descending artery has previously placed proximal stent that is widely patent. Just distal to the stent is a mild narrowing of 20%.    Circumflex artery mild mid vessel plaque. It is a dominant vessel.     The right coronary artery is a nondominant but is normal.     Left ventriculogram shows ejection fraction of 50 %. Normal wall motion. EKG on 8/21/2020 sinus rhythm 67/min.  IVCD. There is the appearance of preexcitation although I do not see alka WPW. EKG on 2/26/2021 sinus rhythm 80/min. Short IA interval and wide complexes consistent with preexcitation EKG. 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 Impression:  No acute coronary lesions. The previously placed left coronary stent is widely patent.         Assessment:    recent LHC / stable left arm LHC no complications   October 6271  CAD, previous LAD stent  6/2019  / on GDMT /stable     Plan:  Continue current medicines. I think she is doing fairly well. We will obtain a CMP and lipid profile today. Return in 3 months. Echocardiograph to be done. I think she is post Covid pneumonia and is slowly progressing and slowly improving. Time will hopefully continue to improve her. Return to see me in 3 months. Brittany Knowles MD, [facility] [place]

## 2021-03-04 ENCOUNTER — TELEPHONE (OUTPATIENT)
Dept: FAMILY MEDICINE CLINIC | Age: 66
End: 2021-03-04

## 2021-03-04 NOTE — TELEPHONE ENCOUNTER
Pt needing f/u visit for medical leave. Wants to know if she needs to be seen next week or the following week and if she should be in office or video visit. Also, wants to let you know Snehal Ahumada, from [facility] will be faxing over forms and request for last office note.

## 2021-03-08 ENCOUNTER — TELEPHONE (OUTPATIENT)
Dept: FAMILY MEDICINE CLINIC | Age: 66
End: 2021-03-08

## 2021-03-10 ENCOUNTER — HOSPITAL ENCOUNTER (OUTPATIENT)
Dept: NON INVASIVE DIAGNOSTICS | Age: 66
Discharge: HOME | End: 2021-03-10
Payer: COMMERCIAL

## 2021-03-10 DIAGNOSIS — R06.02 SOB (SHORTNESS OF BREATH): ICD-10-CM

## 2021-03-10 DIAGNOSIS — R07.9 CHEST PAIN, UNSPECIFIED TYPE: ICD-10-CM

## 2021-03-10 LAB
LV EF: 40 %
LVEF MODALITY: NORMAL

## 2021-03-10 PROCEDURE — 6360000004 HC RX CONTRAST MEDICATION: Performed by: INTERNAL MEDICINE

## 2021-03-10 PROCEDURE — C8929 TTE W OR WO FOL WCON,DOPPLER: HCPCS

## 2021-03-10 RX ADMIN — PERFLUTREN 1.3 ML: 6.52 INJECTION, SUSPENSION INTRAVENOUS at 14:17

## 2021-03-10 NOTE — PROGRESS NOTES
PIV inserted at [address]. Definity 2ml given diluted with 0.9%NS. PIV removed post procedure. Catheter intact and pressure drsg applied.

## 2021-03-12 ENCOUNTER — TELEPHONE (OUTPATIENT)
Dept: CARDIOLOGY CLINIC | Age: 66
End: 2021-03-12

## 2021-03-12 NOTE — TELEPHONE ENCOUNTER
Patient would like to speak with Mohamud William  Regarding echo results pt has my chart reviewed her results pt is concerned about her results and has questions please advise

## 2021-03-20 ENCOUNTER — TELEPHONE (OUTPATIENT)
Dept: FAMILY MEDICINE CLINIC | Age: 66
End: 2021-03-20

## 2021-03-20 RX ORDER — METRONIDAZOLE 500 MG/1
500 TABLET ORAL 3 TIMES DAILY
Qty: 21 TABLET | Refills: 0 | Status: SHIPPED | OUTPATIENT
Start: 2021-03-20 | End: 2021-10-15 | Stop reason: SDUPTHER

## 2021-03-20 RX ORDER — CIPROFLOXACIN 500 MG/1
500 TABLET, FILM COATED ORAL 2 TIMES DAILY
Qty: 14 TABLET | Refills: 0 | Status: SHIPPED | OUTPATIENT
Start: 2021-03-20 | End: 2021-10-15 | Stop reason: SDUPTHER

## 2021-03-20 NOTE — TELEPHONE ENCOUNTER
On Call Communication: Pt reports three days of diarrhea and abdominal cramping that she feels is an episode of diverticulitis. She denies having a fever or blood in her stool. I have sent in flagyl and cipro to her pharmacy which the pt states that she tolerates well. She has a video visit scheduled with her provider for Monday. To the ER over the weekend for blood in the stool or a significant increase in pain.

## 2021-03-22 ENCOUNTER — VIRTUAL VISIT (OUTPATIENT)
Dept: FAMILY MEDICINE CLINIC | Age: 66
End: 2021-03-22
Payer: COMMERCIAL

## 2021-03-22 DIAGNOSIS — R20.2 NUMBNESS AND TINGLING: Primary | ICD-10-CM

## 2021-03-22 DIAGNOSIS — R20.0 NUMBNESS AND TINGLING: Primary | ICD-10-CM

## 2021-03-22 PROCEDURE — 99213 OFFICE O/P EST LOW 20 MIN: CPT | Performed by: NURSE PRACTITIONER

## 2021-03-22 RX ORDER — OMEPRAZOLE 20 MG/1
20 CAPSULE, DELAYED RELEASE ORAL EVERY 12 HOURS
Qty: 180 CAPSULE | Refills: 1 | Status: SHIPPED | OUTPATIENT
Start: 2021-03-22 | End: 2022-04-15

## 2021-03-22 NOTE — PROGRESS NOTES
Laverne Bowman (:  1955) is a 72 y.o. female,Established patient, here for evaluation of the following chief complaint(s): Other ([facility] paperwork update. )      ASSESSMENT/PLAN:  1. Numbness and tingling  -     EEG; Future  -     Vitamin B12; Carmen Ryan will contact HR regarding additional [facility] paperwork. Will need paperwork related to [employer] as well. No follow-ups on file. SUBJECTIVE/OBJECTIVE:  HPI     Having problems with pain in arms. When bending arms hands are numb. Causes her to have change positions, awakens. Occurs throughout the day. Worsened over past 4 weeks. Relatively new symptom. Called on call over weekend and felt she was having diverticulitis. Taking cipro and flagyl, soft diet. Feels is slowly improving. Constipation improved. Mild fever last night. Denies blood in stool. Feels is not ready to return to work at this time. Feels fatigued quickly, numbness, possible diverticulitis. Would like to be off 2-3 weeks longer. Review of Systems   All other systems reviewed and are negative.       Patient-Reported Vitals 2021   Patient-Reported Weight 160 lb   Patient-Reported Height -   Patient-Reported Pulse -   Patient-Reported Temperature -   Patient-Reported SpO2 -        Physical Exam    [INSTRUCTIONS:  \"[x]\" Indicates a positive item  \"[]\" Indicates a negative item  -- DELETE ALL ITEMS NOT EXAMINED]    Constitutional: [x] Appears well-developed and well-nourished [x] No apparent distress      [] Abnormal -     Mental status: [x] Alert and awake  [x] Oriented to person/place/time [x] Able to follow commands    [] Abnormal -     Eyes:   EOM    [x]  Normal    [] Abnormal -   Sclera  [x]  Normal    [] Abnormal -          Discharge [x]  None visible   [] Abnormal -     HENT: [x] Normocephalic, atraumatic  [] Abnormal -   [x] Mouth/Throat: Mucous membranes are moist    External Ears [x] Normal  [] Abnormal -    Neck: [x] No visualized mass [] Abnormal -

## 2021-03-22 NOTE — TELEPHONE ENCOUNTER
Last office visit 3/22/2021     Last written 10-    Next office visit scheduled 4/14/2021    Requested Prescriptions     Pending Prescriptions Disp Refills    omeprazole (PRILOSEC) 20 MG delayed release capsule 180 capsule 1     Sig: Take 1 capsule by mouth every 12 hours       Pt. States she didn't ask for this during her VV      Also, HR will be sending you FMLA papers and have extended her time off until April 18th.

## 2021-03-24 ENCOUNTER — TELEPHONE (OUTPATIENT)
Dept: FAMILY MEDICINE CLINIC | Age: 66
End: 2021-03-24

## 2021-03-24 DIAGNOSIS — R20.0 NUMBNESS AND TINGLING IN BOTH HANDS: Primary | ICD-10-CM

## 2021-03-24 DIAGNOSIS — R20.2 NUMBNESS AND TINGLING IN BOTH HANDS: Primary | ICD-10-CM

## 2021-03-24 NOTE — TELEPHONE ENCOUNTER
EMG is what was intended to be ordered. I am not able to discontinue the EEG because it is already scheduled. Please call to ensure the EEG is not going to be done.

## 2021-03-24 NOTE — TELEPHONE ENCOUNTER
Pt. Patient [employer] Disability Form does not need to be filled out. All they are requesting is the office notes from 3- faxed to them.   Pt. States the fax number is on the form she submitted

## 2021-03-24 NOTE — TELEPHONE ENCOUNTER
You have placed an order for a EEG but the diagnosis says tingling and numbness. Scheduling ? If you meant to order a EMG. Pt. Is scheduled for tomorrow.

## 2021-03-25 NOTE — TELEPHONE ENCOUNTER
Pt informed. Pt states that she is not going to get the EEG. Pt states that she will call and cancel the appt for the EEG.

## 2021-03-30 DIAGNOSIS — G43.709 CHRONIC MIGRAINE WITHOUT AURA WITHOUT STATUS MIGRAINOSUS, NOT INTRACTABLE: ICD-10-CM

## 2021-03-30 DIAGNOSIS — R20.2 NUMBNESS AND TINGLING: ICD-10-CM

## 2021-03-30 DIAGNOSIS — R20.0 NUMBNESS AND TINGLING: ICD-10-CM

## 2021-03-30 DIAGNOSIS — M79.7 FIBROMYALGIA: ICD-10-CM

## 2021-03-30 DIAGNOSIS — M15.9 PRIMARY OSTEOARTHRITIS INVOLVING MULTIPLE JOINTS: ICD-10-CM

## 2021-03-31 LAB — VITAMIN B-12: 564 PG/ML (ref 211–911)

## 2021-03-31 RX ORDER — HYDROCODONE BITARTRATE AND ACETAMINOPHEN 5; 325 MG/1; MG/1
1 TABLET ORAL DAILY PRN
Qty: 10 TABLET | Refills: 0 | Status: SHIPPED | OUTPATIENT
Start: 2021-03-31 | End: 2021-05-03 | Stop reason: SDUPTHER

## 2021-03-31 RX ORDER — TRAMADOL HYDROCHLORIDE 50 MG/1
50 TABLET ORAL EVERY 6 HOURS PRN
Qty: 45 TABLET | Refills: 0 | Status: SHIPPED | OUTPATIENT
Start: 2021-03-31 | End: 2021-05-20 | Stop reason: SDUPTHER

## 2021-03-31 NOTE — TELEPHONE ENCOUNTER
.  Last office visit 3/22/2021     Last written 2-26-21 with 0 refills      Next office visit scheduled 4/14/2021    Requested Prescriptions     Pending Prescriptions Disp Refills    HYDROcodone-acetaminophen (NORCO) 5-325 MG per tablet 10 tablet 0     Sig: Take 1 tablet by mouth daily as needed for Pain for up to 10 days. May take instead of tramadol when pain is increased    traMADol (ULTRAM) 50 MG tablet 45 tablet 0     Sig: Take 1 tablet by mouth every 6 hours as needed for Pain (fibromyalgia) for up to 30 days.

## 2021-04-07 ENCOUNTER — HOSPITAL ENCOUNTER (OUTPATIENT)
Dept: NEUROLOGY | Age: 66
Discharge: HOME | End: 2021-04-07
Payer: COMMERCIAL

## 2021-04-07 DIAGNOSIS — R20.0 NUMBNESS AND TINGLING IN BOTH HANDS: ICD-10-CM

## 2021-04-07 DIAGNOSIS — R20.2 NUMBNESS AND TINGLING IN BOTH HANDS: ICD-10-CM

## 2021-04-07 PROCEDURE — 95910 NRV CNDJ TEST 7-8 STUDIES: CPT | Performed by: PHYSICAL MEDICINE & REHABILITATION

## 2021-04-07 PROCEDURE — 95885 MUSC TST DONE W/NERV TST LIM: CPT | Performed by: PHYSICAL MEDICINE & REHABILITATION

## 2021-04-07 NOTE — PROCEDURES
[address]      Electrodiagnostic Report  Test Date:  2021    Patient: David White : 1955 Physician: Jesse Valle D.O.   Sex: Female Height:   Ref Phys: Julia Aleman. LUCIA Kauffman     Patient Complaints:  Patient is a 72year-old female who presents with numbness tingling pain in the hands worse over past 5-6 weeks    Patient History / Exam:  PMH: + fibromyalgia, + heart disease, +GERD + history of COVID, + carpal tunnel syndrome, no hand surgery no neck or arm surgery PE: reflexes trace. + thumb opposition weakness, + left hand intrinsic weakness    Impression: Study is consistent with bilateral carpal tunnel syndrome, moderate severity. There is underlying left ulnar neuropathy at elbow also moderate severity. No evidence of an acute radiculopathy at this time. [facility] NCV & EMG Findings:  Evaluation of the left median motor and the right median motor nerves showed prolonged distal onset latency (L4.7, R5.5 ms) and reduced amplitude (L3.7, R1.7 mV). The left ulnar motor nerve showed decreased conduction velocity (A Elbow-B Elbow, 44 m/s). The left median sensory, the right median sensory, and the left ulnar sensory nerves showed prolonged distal peak latency (L4.4, R5.4, L3.8 ms). The right ulnar sensory nerve showed reduced amplitude (13.1 µV). All remaining nerves (as indicated in the following tables) were within normal limits. Left vs. Right side comparison data for the median motor nerve indicates abnormal L-R latency difference (0.8 ms). The ulnar motor nerve indicates abnormal L-R amplitude difference (36.8 %). The median sensory nerve indicates abnormal L-R latency difference (1.0 ms). The ulnar sensory nerve indicates abnormal L-R amplitude difference (66.2 %). All examined muscles (as indicated in the following table) showed no evidence of electrical instability.                 Jesse Valle D.O.        Nerve Conduction Studies  Anti Parasp Up Rami C1-3 Nml Nml Nml         Right Cervical Parasp Mid Rami C4-6 Nml Nml Nml         Right Cervical Parasp Low Rami C7-8 Nml Nml Nml         Left Deltoid Axillary C5-6 Nml Nml Nml Nml Nml 0 Nml Nml    Left Biceps Musculocut C5-6 Nml Nml Nml Nml Nml 0 Nml Nml    Left Triceps Radial C6-7-8 Nml Nml Nml Nml Nml 0 Nml Nml    Left BrachioRad Radial C5-6 Nml Nml Nml Nml Nml 0 Nml Nml    Left PronatorTeres Median C6-7 Nml Nml Nml Nml Nml 0 Nml Nml    Left Ext Indicis Radial (Post Int) C7-8 Nml Nml Nml Nml Nml 0 Nml Nml    Left 1stDorInt Ulnar C8-T1 Nml Nml Nml Nml Nml 0 Nml Nml    Left Abd Poll Brev Median C8-T1 Nml Nml Nml Nml Nml 0 Nml Nml    Left Cervical Parasp Up Rami C1-3 Nml Nml Nml         Left Cervical Parasp Mid Rami C4-6 Nml Nml Nml         Left Cervical Parasp Low Rami C7-8 Nml Nml Nml           Electronically signed by Alice Hobbs DO on 4/7/2021 at 1:24 PM

## 2021-04-12 ENCOUNTER — TELEPHONE (OUTPATIENT)
Dept: FAMILY MEDICINE CLINIC | Age: 66
End: 2021-04-12

## 2021-04-12 NOTE — TELEPHONE ENCOUNTER
Patient came in today, 4/12/21 at 1:00, but her appointment is 4/14/21. She is still having post COVID difficulties of chest hurting, tiredness, arm pain etc. She is asking if you can give her a steroid? She also says there is no way she can return to work like this. She will also need extinction on time off, but will ask you about this on Wednesday.

## 2021-04-13 DIAGNOSIS — G56.03 BILATERAL CARPAL TUNNEL SYNDROME: Primary | ICD-10-CM

## 2021-04-13 DIAGNOSIS — G56.22 ULNAR NERVE COMPRESSION, LEFT: ICD-10-CM

## 2021-04-14 ENCOUNTER — OFFICE VISIT (OUTPATIENT)
Dept: FAMILY MEDICINE CLINIC | Age: 66
End: 2021-04-14
Payer: COMMERCIAL

## 2021-04-14 ENCOUNTER — TELEPHONE (OUTPATIENT)
Dept: FAMILY MEDICINE CLINIC | Age: 66
End: 2021-04-14

## 2021-04-14 VITALS
OXYGEN SATURATION: 97 % | HEART RATE: 94 BPM | DIASTOLIC BLOOD PRESSURE: 82 MMHG | RESPIRATION RATE: 18 BRPM | SYSTOLIC BLOOD PRESSURE: 130 MMHG | BODY MASS INDEX: 26.99 KG/M2 | WEIGHT: 167.2 LBS

## 2021-04-14 DIAGNOSIS — G56.22 ULNAR NERVE COMPRESSION, LEFT: Primary | ICD-10-CM

## 2021-04-14 DIAGNOSIS — G56.03 BILATERAL CARPAL TUNNEL SYNDROME: ICD-10-CM

## 2021-04-14 DIAGNOSIS — R53.82 CHRONIC FATIGUE: ICD-10-CM

## 2021-04-14 DIAGNOSIS — M79.7 FIBROMYALGIA: ICD-10-CM

## 2021-04-14 PROCEDURE — 99213 OFFICE O/P EST LOW 20 MIN: CPT | Performed by: NURSE PRACTITIONER

## 2021-04-14 RX ORDER — PREDNISONE 10 MG/1
TABLET ORAL
Qty: 60 TABLET | Refills: 0 | Status: SHIPPED | OUTPATIENT
Start: 2021-04-14 | End: 2021-06-28

## 2021-04-14 NOTE — PROGRESS NOTES
Vincent Falcon (:  1955) is a 72 y.o. female,Established patient, here for evaluation of the following chief complaint(s):  Peripheral Neuropathy (from leave of absents and fibromyalgia and chronic pain) and Fatigue      ASSESSMENT/PLAN:  1. Ulnar nerve compression, left  2. Fibromyalgia  -     predniSONE (DELTASONE) 10 MG tablet; 2 tablets 2 times daily for 5 days, 1 tablet 2 times daily for 5 days, 1 tablet daily, Disp-60 tablet, R-0Normal  3. Chronic fatigue  4. Bilateral carpal tunnel syndrome      Planning to restart gabapentin 100mg daily. Has at home    Will check with HR regarding paperwork, options      No follow-ups on file. SUBJECTIVE/OBJECTIVE:  HPI     For follow up of health issues. Continues with fatigue that is overwhelming. Numbness bilateral arms. EMG positive for CTS and Left with ulnar nerve neuropathy. Has appt with Dr. Paras Garcia in 2 weeks. Now wearing braces at night. Feels will need to work part and at the same time does not feel she is ready to return at this time. Review of Systems   All other systems reviewed and are negative. Physical Exam  Constitutional:       Appearance: Normal appearance. Cardiovascular:      Rate and Rhythm: Normal rate and regular rhythm. Pulses: Normal pulses. Heart sounds: Normal heart sounds. Pulmonary:      Breath sounds: Normal breath sounds. Abdominal:      Palpations: Abdomen is soft. Musculoskeletal:      Comments: Very limited ROM generalized. Neurological:      Mental Status: She is alert and oriented to person, place, and time.                  An electronic signature was used to authenticate this note.    --LUCIA MITCHELL - CNP

## 2021-04-14 NOTE — TELEPHONE ENCOUNTER
Pt. Is continuing her medical leave and retire July 1st.  She spoke with her manager and HR. You will need to complete new paper work. Will put it in you box when it comes in.

## 2021-04-15 ENCOUNTER — TELEPHONE (OUTPATIENT)
Dept: FAMILY MEDICINE CLINIC | Age: 66
End: 2021-04-15

## 2021-04-15 NOTE — TELEPHONE ENCOUNTER
Linda Guzman says that pt is stating that [employer] is supporting her claim that she cannot work. He is requesting office notes from any visits since the one on 2/9/2021.   Fax to [phone] notes from 3/22/21 and 4/14/21

## 2021-04-15 NOTE — TELEPHONE ENCOUNTER
Pt. States she was suppose to contact you to let you know if she had Gabapentin 100mg at home. She does not have any at home.

## 2021-04-16 DIAGNOSIS — M79.7 FIBROMYALGIA: Primary | ICD-10-CM

## 2021-04-16 RX ORDER — GABAPENTIN 100 MG/1
100 CAPSULE ORAL NIGHTLY
Qty: 30 CAPSULE | Refills: 1 | Status: SHIPPED | OUTPATIENT
Start: 2021-04-16 | End: 2021-12-22

## 2021-04-22 ENCOUNTER — TELEPHONE (OUTPATIENT)
Dept: FAMILY MEDICINE CLINIC | Age: 66
End: 2021-04-22

## 2021-04-22 NOTE — TELEPHONE ENCOUNTER
Pt. Continues to have constant tingling on face and head. She wants to figure out what is going on. Also, she is still disability leave when do you want to see her again?

## 2021-04-26 NOTE — CONSULTS
Consulted by ER to corroborate Radiologist reading of possible gastric bleed on CT. Given lack of oral contrast and high density gastric material on CT I agreed this was a possibility. Recommended GI consultation at the time. Please call if any other needs.     Gilberto Rojas MD Pacemaker interrogated  Presenting rhythm:  AS/, AP 8.8%,  100%  Battey voltage 11.2 years  Lead status:  Lead impedance within range and stable  Sensing:  P waves 2.6 mV,  R waves -- Paced  Thresholds:  Atrial 0.625V @ 0.4ms, ventricular 0.500 V @ 0.4ms  Observations:  None  Reprogramming for sensitivity and threshold testing  Next [facility] appointment:  07/27/21

## 2021-04-28 ENCOUNTER — OFFICE VISIT (OUTPATIENT)
Dept: ORTHOPEDIC SURGERY | Age: 66
End: 2021-04-28
Payer: COMMERCIAL

## 2021-04-28 VITALS — BODY MASS INDEX: 26.84 KG/M2 | WEIGHT: 167 LBS | RESPIRATION RATE: 16 BRPM | HEIGHT: 66 IN

## 2021-04-28 DIAGNOSIS — G56.23 CUBITAL TUNNEL SYNDROME, BILATERAL: ICD-10-CM

## 2021-04-28 DIAGNOSIS — G56.03 BILATERAL CARPAL TUNNEL SYNDROME: Primary | ICD-10-CM

## 2021-04-28 PROCEDURE — 99244 OFF/OP CNSLTJ NEW/EST MOD 40: CPT | Performed by: ORTHOPAEDIC SURGERY

## 2021-04-28 NOTE — LETTER
CONSENT TO OPERATION  AND/OR OTHER PROCEDURE(S)          PATIENT : Tiana Granados   YOB: 1955      DATE : 4/28/21          1. I request and consent that Dr. Mila Gonzalez. Promise Adkins,  and/or his associates or assistants perform an operation and/or procedures on the above patient at  [place] [address], to treat the condition(s) which appear indicated by the diagnostic studies already performed. I have been told that in general terms the nature, purpose and reasonable expectations of the operation and/or procedure(s) are:     Release Bilateral Carpal Tunnel and Bilateral Cubital Tunnel,   Left Side First      2. It has been explained to me by the informing physician that during the course of the operation and/or procedure(s) unforeseen conditions may be revealed that necessitate an extension of the original operation and/or procedure(s) or different operation and/or procedures than those set forth in Paragraph 1. I therefore authorize and request that my physician and/or his associates or assistants perform such operations and/or procedures as are necessary and desirable in the exercise of professional judgment. The authority granted under this Paragraph 2 shall extend to all conditions that require treatment and are known to my physician at the time the operation is commenced. 3. I have been made aware by the informing physician of certain risks and consequences that are associated with the operation and/or procedure(s) described in Paragraph 1, the reasonable alternative methods or treatment, the possible consequences, the possibility that the operation and/or procedure(s) may be unsuccessful and the possibility of complications.   I understand the reasonably known risks to be:      - Bleeding  - Infection  - Poor Healing  - Possible Damage to Nerve, Vessel, Tendon/Muscle or Bone  - Need for further Treatment/Surgery  - Stiffness  - Pain  - Residual or Recurrent Symptoms  - Anesthetic and/or Medical Risks  - We have discussed the specific limitations and risks of hospital and/or office based treatment at this time due to the COVID-19 pandemic                I have been counseled about the risks of bette Covid-19 in the harrison-operative and post-operative periods related to this procedure. I have been made aware that bette Covid-19 around the time of a surgical procedure may worsen my prognosis for recovering from the virus and lend to a higher morbidity and or mortality risk. With this knowledge, I have requested to proceed with the procedure as scheduled. 4. I have also been informed by the informing physician that there are other risks from both known and unknown causes that are attendant to the performance of any surgical procedure. I am aware that the practice of medicine and surgery is not an exact science, and that no guarantees have been made to me concerning the results of the operation and/or procedure(s). 5. I   CONSENT / REFUSE CONSENT  (strike the phrase that does not apply) to the taking of photographs before, during and/or after the operation or procedure for scientific/educational purposes. 6. I consent to the administration of anesthesia and to the use of such anesthetics as may be deemed advisable by the anesthesiologist who has been engaged by me or my physician.     7. I certify that I have read and understand the above consent to operation and/or other procedure(s); that the explanations therein referred to were made to me by the informing physician in advance of my signing this consent; that all blanks or statements requiring insertion or completion were filled in and inapplicable paragraphs, if any, were stricken before I signed; and that all questions asked by me about the operation and/or procedure(s) which I have consented to have been fully answered in a satisfactory manner.                                 _______________________ doctors note, or official forms (BWC, FMLA, etc.) will be filled out, upon request, to indicate your date of surgery and your restrictions as stated above. Dr. Koki Thompson' Narcotic Policy  Patients will only be prescribed narcotics after surgical procedures or significant injury. Not all procedures cause pain great enough to require Narcotics and thus, not all patients will receive prescriptions after surgical procedures or injuries. Narcotics are never prescribed for chronic conditions. Narcotics are never prescribed for use longer than one week at a time. Refills are only granted in unusual circumstances and only at Dr. [place] discretion. Patients who are receiving narcotic medication from another physician or who are under pain management contracts will not be given a prescription for narcotics for any reason. I have read the above policies and understand that by agreeing to proceed with treatment by Dr. Perfecto Friedman and his team, that I am agreeing to abide by these policies.   Patient Name:  Alhaji Loyola    Patient Signature:  _____________________________    [facility] Date:   4/28/21

## 2021-04-28 NOTE — LETTER
[facility]  Surgery Scheduling Form:      DEMOGRAPHICS:                                                                                                                Patient Name:  Adarsh Munguia  Patient :  1955   Patient SS#:  [id number]    Patient Phone:  [phone] (home) [phone] (work)   Patient Address:  [address] [place] [address] [address] [place] [address]    PCP:  LUCIA Miller CNP  Insurance:   Payor/Plan Subscr  Sex Relation Sub. Ins. ID Effective Group Num   1. [address] [facility] * 1955 Female Self [id number]                                   [address]       DIAGNOSIS & PROCEDURE:                                                                                              Diagnosis:   left Cubital Tunnel Syndrome / Ulnar Nerve Entrapment at Elbow (354.2) &  left Carpal Tunnel Syndrome    G56.02  Operation:  left  Ulnar Nerve Decompression  (at Elbow) and  left Carpal Tunnel Release   [id number]  [id number]  Location:  [facility]  Surgeon:  Braxton Valladares    SCHEDULING INFORMATION:                                                                                         .  Surgeon's Scheduling Instruction:  elective    Requested Date:    OR Time:  1:30 Patient Arrival Time:  11:30    OR Time Required:  20  Minutes  Anesthesia:  General  Equipment:  None                                 COVID: 5-3  Mini C-Arm:  No   Standard C-Arm:  No  Status:  outpatient  PAT Required:  Yes  Comments:                      Tl Diana. Maribel Albarado MD  21     BILLING INFORMATION:                                                                                                     Procedure:       CPT Code Modifier                                  Cardiac Clearance Request Form                        21              Our mutual patient  Adarsh Munguia                     DR. Velez   1955      Is scheduled for an elective procedure:    left  Ulnar Nerve Decompression  (at Elbow) and  left Carpal Tunnel Release       Date:     5-4-21                               Dr. Fleming Asper ___XX_    They will be receiving    GENERAL    XX                 MAC/TIVA                  SPINAL  for sedation.       Office :  [facility]  -                  Phone Number:  [phone]      We are requesting:      Cardiac Clearance with Anticoagulation Recommendations discontinue 7 days prior to procedure      Please fax this sheet to [phone]                   ATTN: [facility]

## 2021-04-28 NOTE — PROGRESS NOTES
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 Symptoms have been present for 30 years. The patient also frequently notices pain in the hand, wrist and arm, as well as weakness of , morning stiffness and swelling as well as difficulty performing functions which require fine touch. Symptoms are sometimes worse at night and can disturb sleep. The problem appears to recently have become worse. Conservative treatment has been prescribed(brace, NSAID, Gabapentin) There is no history of wrist fracture. There is no history and/or family history of diabetes. There is no history of thyroid disease. There is family history of carpal tunnel syndrome(mother). The pain assessment has been reviewed and is correct as stated. The patient's social history, past medical history, family history, medications, allergies and review of systems, entered 4/28/21, have been reviewed, and dated and are recorded in the chart. On examination the patient is Height: 5' 6\" (167.6 cm) tall and weighs Weight: 167 lb (75.8 kg). Respirations are 18 per minute. The patient is well nourished, is oriented to time and place, demonstrates appropriate mood and affect as well as normal gait and station. Cervical range of motion is normal for the patient's stated age and does not produce pain or paresthesias in either upper extremity. There is soft tissue swelling involving the volar aspect of the bilateral wrists. There is moderate bilateral thenar muscle atrophy and milder intrinsic atrophy. The Tinel sign is positive over the median nerve at the  carpal tunnel bilaterally and positive over the ulnar nerve at the elbow bilaterally. The Phalen test is positive bilaterally at 10 seconds. There is hypalgesia, with associated dysesthesia, on sensory testing over the median nerve distribution.   Two point discrimination is normal at the tip of the middle finger. Range of motion of the fingers, thumbs and wrists is normal.  Skin is intact without lesions. Distal circulation is normal.  All joints are stable. Fine motor coordination and gross muscle strength are limited bilaterally, including ulnar intrinsics. Deep tendon reflexes are brisk and present bilaterally. There are no subcutaneous masses or enlarged epitrochlear lymph nodes. I have personally examined and reviewed all previous imaging studies, laboratory tests and medical encounters pertinent to this patient's visit today. An EMG study, dated 4/7/21 , is abnormal. There is moderately severe bilateral carpal tunnel syndrome and cubital tunnel syndrome. The test results are shared with the patient. Impression:     Carpal tunnel syndrome, bilateral, with clinical evidence of nerve damage. Cubital tunnel syndrome, bilateral, with clinical evidence of nerve damage. The nature of their medical problem is fully discussed with the patient, including all treatment options. Surgery is also discussed, including the possible risks, complications, prognosis and postoperative care. All questions are answered. The surgery consent form is explained and signed. Surgery will be scheduled and the patient is asked to call me if there are any additional questions. The patient understands that the surgery will be done by Dr. Burt Souza. The patient is instructed to stop taking aspirin 7 days before each surgery. They will check with their medical doctor(s), if needed, to ask permission to do so.

## 2021-04-30 ENCOUNTER — TELEPHONE (OUTPATIENT)
Dept: ORTHOPEDIC SURGERY | Age: 66
End: 2021-04-30

## 2021-04-30 NOTE — PROGRESS NOTES
Obstructive Sleep Apnea (MIR) Screening     Patient:  Billie Fernandez    YOB: 1955      Medical Record #:  [id number]                     Date:  4/30/2021     1. Are you a loud and/or regular snorer? []  Yes       [x] No    2. Have you been observed to gasp or stop breathing during sleep? []  Yes       [x] No    3. Do you feel tired or groggy upon awakening or do you awaken with a headache?           []  Yes       [] No    4. Are you often tired or fatigued during the wake time hours? []  Yes       [] No    5. Do you fall asleep sitting, reading, watching TV or driving? []  Yes       [] No    6. Do you often have problems with memory or concentration? []  Yes       [] No    **If patient's score is ? 3 they are considered high risk for MIR. An Anesthesia provider will evaluate the patient and develop a plan of care the day of surgery. Note:  If the patient's BMI is more than 35 kg m¯² , has neck circumference > 40 cm, and/or high blood pressure the risk is greater (© [employer], 2006).

## 2021-04-30 NOTE — PROGRESS NOTES
Preoperative Screening for Elective Surgery/Invasive Procedures While COVID-19 present in the community     Have you had any of the following symptoms? No  o Fever, chills  o Cough  o Shortness of breath  o Muscle aches/pain  o Diarrhea  o Abdominal pain, nausea, vomiting  o Loss or decrease in taste and / or smell   Risk of Exposure  o Have you recently been hospitalized for COVID-19 or flu-like illness, if so when? Jan 1-Jan 6  o Recently diagnosed with COVID-19, if so when? Dec 2020  o Recently tested for COVID-19, if so when? Dec 2020  o Have you been in close contact with a person or family member who currently has or recently had [phone]? If yes, when and in what context? No  o Do you live with anybody who in the last 14 days has had fever, chills, shortness of breath, muscle aches, flu-like illness? No  o Do you have any close contacts or family members who are currently in the hospital for COVID-19 or flu-like illness? No  If yes, assess recent close contact with this person. Indicate if the patient has a positive screen by answering yes to one or more of the above questions. Patients who test positive or screen positive prior to surgery or on the day of surgery should be evaluated in conjunction with the surgeon/proceduralist/anesthesiologist to determine the urgency of the procedure.

## 2021-05-03 ENCOUNTER — OFFICE VISIT (OUTPATIENT)
Dept: FAMILY MEDICINE CLINIC | Age: 66
End: 2021-05-03
Payer: COMMERCIAL

## 2021-05-03 ENCOUNTER — OFFICE VISIT (OUTPATIENT)
Dept: PRIMARY CARE CLINIC | Age: 66
End: 2021-05-03

## 2021-05-03 ENCOUNTER — ANESTHESIA EVENT (OUTPATIENT)
Dept: OPERATING ROOM | Age: 66
End: 2021-05-03
Payer: COMMERCIAL

## 2021-05-03 VITALS
HEART RATE: 78 BPM | TEMPERATURE: 98.4 F | OXYGEN SATURATION: 97 % | BODY MASS INDEX: 26.78 KG/M2 | HEIGHT: 66 IN | RESPIRATION RATE: 16 BRPM | WEIGHT: 166.6 LBS | SYSTOLIC BLOOD PRESSURE: 118 MMHG | DIASTOLIC BLOOD PRESSURE: 70 MMHG

## 2021-05-03 DIAGNOSIS — R20.0 FACIAL NUMBNESS: ICD-10-CM

## 2021-05-03 DIAGNOSIS — Z01.818 PREOP TESTING: Primary | ICD-10-CM

## 2021-05-03 DIAGNOSIS — G43.709 CHRONIC MIGRAINE WITHOUT AURA WITHOUT STATUS MIGRAINOSUS, NOT INTRACTABLE: ICD-10-CM

## 2021-05-03 DIAGNOSIS — Z01.818 PRE-OP EXAM: ICD-10-CM

## 2021-05-03 DIAGNOSIS — G56.22 ULNAR NERVE COMPRESSION, LEFT: ICD-10-CM

## 2021-05-03 DIAGNOSIS — K63.5 POLYP OF COLON, UNSPECIFIED PART OF COLON, UNSPECIFIED TYPE: ICD-10-CM

## 2021-05-03 DIAGNOSIS — G56.03 BILATERAL CARPAL TUNNEL SYNDROME: Primary | ICD-10-CM

## 2021-05-03 DIAGNOSIS — M79.7 FIBROMYALGIA: ICD-10-CM

## 2021-05-03 DIAGNOSIS — M15.9 PRIMARY OSTEOARTHRITIS INVOLVING MULTIPLE JOINTS: ICD-10-CM

## 2021-05-03 PROBLEM — E87.6 HYPOKALEMIA: Status: RESOLVED | Noted: 2021-01-02 | Resolved: 2021-05-03

## 2021-05-03 PROBLEM — R06.02 SOB (SHORTNESS OF BREATH): Status: RESOLVED | Noted: 2021-01-01 | Resolved: 2021-05-03

## 2021-05-03 PROCEDURE — 93000 ELECTROCARDIOGRAM COMPLETE: CPT | Performed by: NURSE PRACTITIONER

## 2021-05-03 PROCEDURE — 99214 OFFICE O/P EST MOD 30 MIN: CPT | Performed by: NURSE PRACTITIONER

## 2021-05-03 RX ORDER — HYDROCODONE BITARTRATE AND ACETAMINOPHEN 5; 325 MG/1; MG/1
1 TABLET ORAL DAILY PRN
Qty: 10 TABLET | Refills: 0 | Status: SHIPPED | OUTPATIENT
Start: 2021-05-03 | End: 2021-08-24 | Stop reason: SDUPTHER

## 2021-05-03 ASSESSMENT — ENCOUNTER SYMPTOMS
CONSTIPATION: 0
SHORTNESS OF BREATH: 0
CHEST TIGHTNESS: 0
TROUBLE SWALLOWING: 0
BACK PAIN: 1
DIARRHEA: 0
NAUSEA: 0

## 2021-05-03 NOTE — PATIENT INSTRUCTIONS

## 2021-05-03 NOTE — PROGRESS NOTES
5/3/2021    Grupo Gunter (:  1955) is a 77 y.o. female, here for For pre operative history and physical in preparation for  Left ulnar nerve decompression at elbow, left carpal tunnel release per Dr. Bunnie Aase 2021 at [address] [facility] [place]. Fax:       Patient Active Problem List   Diagnosis    Anxiety    Fibromyalgia    IBS (irritable bowel syndrome)    OA (osteoarthritis)    History of prior ablation treatment    Lipoma    History of colonic diverticulitis    Hyperlipidemia    Depression    Small bowel obstruction (HCC)    Migraine with aura and without status migrainosus, not intractable    Angina pectoris ([facility] [address]    H/O angiography    CAD S/P percutaneous coronary angioplasty    COVID-19    Bilateral carpal tunnel syndrome    Ulnar nerve compression, left    Cubital tunnel syndrome, bilateral       Review of Systems   Constitutional: Positive for fatigue. Negative for chills, fever and unexpected weight change. HENT: Negative for trouble swallowing. Respiratory: Negative for chest tightness and shortness of breath. Cardiovascular: Negative for chest pain and palpitations. Gastrointestinal: Negative for constipation, diarrhea and nausea. Genitourinary: Negative for difficulty urinating. Musculoskeletal: Positive for arthralgias, back pain, gait problem and myalgias. Neurological: Positive for numbness. Negative for dizziness and headaches. Psychiatric/Behavioral: Negative. Prior to Visit Medications    Medication Sig Taking? Authorizing Provider   HYDROcodone-acetaminophen (NORCO) 5-325 MG per tablet Take 1 tablet by mouth daily as needed for Pain for up to 10 days. May take instead of tramadol when pain is increased Yes Aletta Cancer, APRN - CNP   gabapentin (NEURONTIN) 100 MG capsule Take 1 capsule by mouth nightly for 30 days.  Intended supply: 30 days Yes Aletta Cancer, APRN - CNP   predniSONE (DELTASONE) 10 MG tablet 2 tablets 2 times daily for 5 days, 1 tablet 2 times daily for 5 days, 1 tablet daily Yes LUCIA Gil CNP   omeprazole (PRILOSEC) 20 MG delayed release capsule Take 1 capsule by mouth every 12 hours Yes LUCIA Gil CNP   citalopram (CELEXA) 40 MG tablet TAKE 1 TABLET BY MOUTH ONE TIME A DAY Yes LUCIA Chaidez CNP   methocarbamol (ROBAXIN) 500 MG tablet TAKE ONE TABLET BY MOUTH THREE TIMES A DAY Yes LUCIA Chaidez CNP   nitroGLYCERIN (NITROSTAT) 0.4 MG SL tablet up to max of 3 total doses. If no relief after 1 dose, call 911. Yes Jo Homans, APRN - CNP   rosuvastatin (CRESTOR) 20 MG tablet TAKE ONE TABLET BY MOUTH NIGHTLY Yes Jo Homans, APRN - CNP   metoprolol succinate (TOPROL XL) 25 MG extended release tablet TAKE 1 TABLET BY MOUTH ONE TIME A DAY  Patient taking differently: nightly  Yes Jo Homans, APRN - CNP   Magnesium 200 MG CHEW Take 1 tablet by mouth daily Yes Historical Provider, MD   Calcium Carbonate-Vit D-Min (CALCIUM 600+D3 PLUS MINERALS) 600-800 MG-UNIT CHEW Take 1 tablet by mouth 2 times daily Yes Historical Provider, MD   vitamin D (ERGOCALCIFEROL) 1.25 MG (15983 UT) CAPS capsule Take 1 capsule by mouth once a week Yes LUCIA Chaidez CNP   Galcanezumab-gnlm (EMGALITY) 120 MG/ML SOAJ Inject 120 mg into the skin every 30 days Yes LUCIA Gil CNP   promethazine (PHENERGAN) 25 MG tablet Take 1 tablet by mouth every 6 hours as needed for Nausea Yes Bubba Gamez DO   aspirin 81 MG chewable tablet Take 1 tablet by mouth daily Yes Mary Aden MD   dicyclomine (BENTYL) 10 MG capsule Take 1 capsule by mouth 3 times daily (before meals)  Patient taking differently: Take 10 mg by mouth 3 times daily as needed  Yes Bubba Gamez DO   Cholecalciferol (VITAMIN D3) 5000 UNITS TABS Take by mouth daily  Yes Historical Provider, MD   diphenhydrAMINE (BENADRYL) 25 MG tablet Take 25 mg by mouth every 6 hours as needed. OTC  Yes Historical Provider, MD        Allergies   Allergen Reactions    Avelox [Moxifloxacin Hcl In Nacl] Hives    Cephalexin Hives and Rash    Darvon [Propoxyphene Hcl] Hives    Digoxin Hives    Librax [Chlordiazepoxide-Methscopol] Hives    Lyrica [Pregabalin] Swelling     Feet hands    Motrin [Ibuprofen Micronized] Rash    Other Hives     Most mycin's    Pcn [Penicillins] Rash    Prochlorperazine Edisylate Nausea And Vomiting    Sulfa Antibiotics Rash    Tetracyclines & Related Hives    Cymbalta [Duloxetine Hcl] Palpitations    Nickel Nausea And Vomiting     Hypotension      Savella [Milnacipran] Anxiety    Statins Other (See Comments)     myalgia       Past Medical History:   Diagnosis Date    Allergic rhinitis     Anxiety     Bilateral carpal tunnel syndrome 4/13/2021    CAD (coronary artery disease) 06/05/2019    COVID-19 12/28/2020    Depression     Endometriosis     hx    Fibromyalgia     GERD (gastroesophageal reflux disease)     Headache(784.0)     Hiatal hernia     Hyperlipidemia     Irritable bowel syndrome     Left bundle branch block (LBBB)     Osteoarthritis     SVT (supraventricular tachycardia) (HCC)     hx        Past Surgical History:   Procedure Laterality Date    ABLATION OF DYSRHYTHMIC FOCUS  1999    SVT    APPENDECTOMY  1982    BUNIONECTOMY  1973    bilateral, 2 revisions left foot    CARDIAC SURGERY  2019    stent    CHOLECYSTECTOMY  2000    COLONOSCOPY  10/15/13    Hx polyps-3 yrs-? poor prep    ECTOPIC PREGNANCY SURGERY Right 1988    HYSTERECTOMY, TOTAL ABDOMINAL  1988    LAPAROSCOPY      6--endometriosis    NASAL SEPTUM SURGERY  1982    OVARY SURGERY Right 1987    partial removal    SALPINGO-OOPHORECTOMY Left 1986    UPPER GASTROINTESTINAL ENDOSCOPY  10/15/13    volodymyr balderrama       Social History     Socioeconomic History    Marital status:      Spouse name: Not on file    Number of children: 0    Years of education: Not on file   Barbra Highest education level: Not on file   Occupational History    Not on file   Social Needs    Financial resource strain: Not hard at all    Food insecurity     Worry: Never true     Inability: Never true   [employer] needs     Medical: No     Non-medical: No   Tobacco Use    Smoking status: Never Smoker    Smokeless tobacco: Never Used   Substance and Sexual Activity    Alcohol use: No    Drug use: No    Sexual activity: Not Currently     Partners: Male   Lifestyle    Physical activity     Days per week: 3 days     Minutes per session: 30 min    Stress:  Only a little   Relationships    Social connections     Talks on phone: Not on file     Gets together: Not on file     Attends Voodoo service: Not on file     Active member of club or organization: Not on file     Attends meetings of clubs or organizations: Not on file     Relationship status: Not on file    Intimate partner violence     Fear of current or ex partner: Not on file     Emotionally abused: Not on file     Physically abused: Not on file     Forced sexual activity: Not on file   Other Topics Concern    Not on file   Social History Narrative    Not on file        Family History   Problem Relation Age of Onset    Other Mother         sepsis    Dementia Mother     High Cholesterol Mother     Depression Mother     Anxiety Disorder Mother     High Blood Pressure Father     Other Father          from renal failure and CHF    Heart Disease Father     Migraines Father     Heart Attack Brother     Heart Disease Brother     Hypertension Other     High Cholesterol Other     Migraines Other     Heart Disease Other     Diabetes Other        ADVANCE DIRECTIVE: N, <no information>    Vitals:    21 1115   BP: 118/70   Site: Right Upper Arm   Position: Sitting   Cuff Size: Medium Adult   Pulse: 78   Resp: 16   Temp: 98.4 °F (36.9 °C)   TempSrc: Oral   SpO2: 97%   Weight: 166 lb 9.6 oz (75.6 kg)   Height: 5' 6\" (1.676 m) Estimated body mass index is 26.89 kg/m² as calculated from the following:    Height as of this encounter: 5' 6\" (1.676 m). Weight as of this encounter: 166 lb 9.6 oz (75.6 kg). Physical Exam  Constitutional:       Appearance: Normal appearance. She is well-developed. HENT:      Head: Normocephalic and atraumatic. Neck:      Musculoskeletal: Normal range of motion and neck supple. Thyroid: No thyromegaly. Cardiovascular:      Rate and Rhythm: Normal rate and regular rhythm. Pulses: Normal pulses. Heart sounds: Normal heart sounds. Pulmonary:      Effort: Pulmonary effort is normal.      Breath sounds: Normal breath sounds. Abdominal:      General: Bowel sounds are normal.      Palpations: Abdomen is soft. Musculoskeletal:      Comments: Limited ROm generalized. Ambulatory with independent gait. Skin:     General: Skin is warm. Neurological:      Mental Status: She is alert and oriented to person, place, and time. Psychiatric:         Behavior: Behavior normal.         No flowsheet data found.     Lab Results   Component Value Date    CHOL 185 02/26/2021    CHOL 129 08/17/2020    CHOL 169 06/04/2019    TRIG 131 02/26/2021    TRIG 107 08/17/2020    TRIG 267 06/04/2019    HDL 77 02/26/2021    HDL 58 08/17/2020    HDL 51 06/04/2019    LDLCALC 82 02/26/2021    LDLCALC 50 08/17/2020    LDLCALC 65 06/04/2019    GLUCOSE 101 02/26/2021       The 10-year ASCVD risk score (Billie Guerrero et al., 2013) is: 4.5%    Values used to calculate the score:      Age: 77 years      Sex: Female      Is Non- : No      Diabetic: No      Tobacco smoker: No      Systolic Blood Pressure: 215 mmHg      Is BP treated: No      HDL Cholesterol: 77 mg/dL      Total Cholesterol: 185 mg/dL    Immunization History   Administered Date(s) Administered    COVID-19, Pfizer, PF, 30mcg/0.3mL 04/10/2021    Influenza Vaccine, unspecified formulation 10/13/2016, 11/01/2017    Influenza Virus Vaccine 10/04/2013, 11/01/2017, 10/04/2018    Influenza Whole 10/04/2013    Influenza, Quadv, IM, (6 mo and older Fluzone, Flulaval, Fluarix and 3 yrs and older Afluria) 10/08/2018    Influenza, Quadv, adjuvanted, 65 yrs +, IM, PF (Fluad) 10/21/2020    Pneumococcal Polysaccharide ([id number]) 08/07/2020    Tdap (Boostrix, Adacel) 03/20/2018    Zoster Recombinant (Shingrix) 02/16/2020, 10/04/2020       Health Maintenance   Topic Date Due    Diabetes screen  Never done    Colon cancer screen colonoscopy  10/15/2016    COVID-19 Vaccine (2 - Pfizer 2-dose series) 05/01/2021    Breast cancer screen  10/23/2021    Lipid screen  02/26/2022    DTaP/Tdap/Td vaccine (2 - Td) 03/20/2028    DEXA (modify frequency per FRAX score)  Completed    Flu vaccine  Completed    Shingles Vaccine  Completed    Pneumococcal 65+ years Vaccine  Completed    Hepatitis C screen  Completed    Hepatitis A vaccine  Aged Out    Hepatitis B vaccine  Aged Out    Hib vaccine  Aged Out    Meningococcal (ACWY) vaccine  Aged Out       ASSESSMENT/PLAN:  1. Bilateral carpal tunnel syndrome  -     CBC Auto Differential  -     Comprehensive Metabolic Panel w/ Reflex to MG  -     EKG 12 Lead; Future  2. Ulnar nerve compression, left  -     CBC Auto Differential  -     Comprehensive Metabolic Panel w/ Reflex to MG  -     EKG 12 Lead; Future  3. Primary osteoarthritis involving multiple joints  4. Polyp of colon, unspecified part of colon, unspecified type  -     External Referral To Gastroenterology  5. Fibromyalgia  -     HYDROcodone-acetaminophen (NORCO) 5-325 MG per tablet; Take 1 tablet by mouth daily as needed for Pain for up to 10 days. May take instead of tramadol when pain is increased, Disp-10 tablet, R-0Normal  6. Facial numbness  -     Mackenzie Jacobsen MD, [facility]  7. Chronic migraine without aura without status migrainosus, not intractable  -     HYDROcodone-acetaminophen (NORCO) 5-325 MG per tablet;  Take

## 2021-05-03 NOTE — LETTER
CONTROLLED SUBSTANCE MEDICATION AGREEMENT     Patient Name: Alhaji Loyola  Patient YOB: 1955   I understand, that controlled substance medications may be used to help better manage my symptoms and to improve my ability to function at home, work and in social settings. However, I also understand that these medications do have risks, which have been discussed with me, including possible development of physical or psychological dependence. I understand that successful treatment requires mutual trust and honesty between me and my provider. I understand and agree that following this Medication Agreement is necessary in continuing my provider-patient relationship and the success of my treatment plan. Explanation from my Provider: Benefits and Goals of Controlled Substance Medications: There are two potential goals for your treatment: (1) decreased pain and suffering (2) improved daily life functions. There are many possible treatments for your chronic condition(s). Alternatives such as physical therapy, yoga, massage, home daily exercise, meditation, relaxation techniques, injections, chiropractic manipulations, surgery, cognitive therapy, hypnosis and many medications that are not habit-forming may be used. Use of controlled substance medications may be helpful, but they are unlikely to resolve all symptoms or restore all function. Explanation from my Provider: Risks of Controlled Substance Medications:  Opioid pain medications: These medications can lead to problems such as addiction/dependence, sedation, lightheadedness/dizziness, memory issues, falls, constipation, nausea, or vomiting. They may also impair the ability to drive or operate machinery. Additionally, these medications may lower testosterone levels, leading to loss of bone strength, stamina and sex drive.   They may cause problems with breathing, sleep apnea and reduced coughing, which is especially dangerous for patients with lung disease. Overdose or dangerous interactions with alcohol and other medications may occur, leading to death. Hyperalgesia may develop, which means patients receiving opioids for the treatment of pain may become more sensitive to certain painful stimuli, and in some cases, experience pain from ordinarily non-painful stimuli. Women between the ages of 14-53 who could become pregnant should carefully weigh the risks and benefits of opioids with their physicians, as these medications increase the risk of pregnancy complications, including miscarriage,  delivery and stillbirth. It is also possible for babies to be born addicted to opioids. Opioid dependence withdrawal symptoms may include; feelings of uneasiness, increased pain, irritability, belly pain, diarrhea, sweats and goose-flesh. Benzodiazepines and non-benzodiazepine sleep medications: These medications can lead to problems such as addiction/dependence, sedation, fatigue, lightheadedness, dizziness, incoordination, falls, depression, hallucinations, and impaired judgment, memory and concentration. The ability to drive and operate machinery may also be affected. Abnormal sleep-related behaviors have been reported, including sleepwalking, driving, making telephone calls, eating, or having sex while not fully awake. These medications can suppress breathing and worsen sleep apnea, particularly when combined with alcohol or other sedating medications, potentially leading to death. Dependence withdrawal symptoms may include tremors, anxiety, hallucinations and seizures. Stimulants:  Common adverse effects include addiction/dependence, increased blood  pressure and heart rate, decreased appetite, nausea, involuntary weight loss, insomnia,                                                                                                                     Initials:_______   irritability, and headaches.   These risks may increase when these medications are combined with other stimulants, such as caffeine pills or energy drinks, certain weight loss supplements and oral decongestants. Dependence withdrawal symptoms may include depressed mood, loss of interest, suicidal thoughts, anxiety, fatigue, appetite changes and agitation. Testosterone replacement therapy:  Potential side effects include increased risk of stroke and heart attack, blood clots, increased blood pressure, increased cholesterol, enlarged prostate, sleep apnea, irritability/aggression and other mood disorders, and decreased fertility. I agree and understand that I and my prescriber have the following rights and responsibilities regarding my treatment plan:     1. MY RIGHTS:  To be informed of my treatment and medication plan. To be an active participant in my health and wellbeing. 2. MY RESPONSIBILITY AND UNDERSTANDING FOR USE OF MEDICATIONS   I will take medications at the dose and frequency as directed. For my safety, I will not increase or change how I take my medications without the recommendation of my healthcare provider.  I will actively participate in any program recommended by my provider which may improve function, including social, physical, psychological programs.  I will not take my medications with alcohol or other drugs not prescribed to me. I understand that drinking alcohol with my medications increases the chances of side effects, including reduced breathing rate and could lead to personal injury when operating machinery.  I understand that if I have a history of substance use disorders, including alcohol or other illicit drugs, that I may be at increased risk of addiction to my medications.  I agree to notify my provider immediately if I should become pregnant so that my treatment plan can be adjusted.    I agree and understand that I shall only receive controlled substance medications from the prescriber that signed this agreement unless there is written agreement among other prescribers of controlled substances outlining the responsibility of the medications being prescribed.  I understand that the if the controlled medication is not helping to achieve goals, the dosage may be tapered and no longer prescribed. 3. MY RESPONSIBILITY FOR COMMUNICATION / PRESCRIPTION RENEWALS   I agree that all controlled substance medications that I take will be prescribed only by my provider. If another healthcare provider prescribes me medication in an emergency, I will notify my provider within seventy-two (72) hours.  I will arrange for refills at the prescribed interval ONLY during regular office hours. I will not ask for refills earlier than agreed, after-hours, on holidays or weekends. Refills may take up to 72 hours for processing and prescriptions to reach the pharmacy.  I will inform my other health care providers that I am taking these medications and of the existence of this [id number]. In the event of an emergency, I will provide the same information to the emergency department prescribers.  I will keep my provider updated on the pharmacy I am using for controlled medication prescription filling. Initials:_______  4. MY RESPONSIBILITY FOR PROTECTING MEDICATIONS   I will protect my prescriptions and medications. I understand that lost or misplaced prescriptions will not be replaced.  I will keep medications only for my own use and will not share them with others. I will keep all medications away from children.  I agree that if my medications are adjusted or discontinued, I will properly dispose of any remaining medications. I understand that I will be required to dispose of any remaining controlled medications as, directed by my prescriber, prior to being provided with any prescriptions for other controlled medications.   Medication drop box locations can be found at: [email or web address]    5. MY RESPONSIBILITY WITH ILLEGAL DRUGS    I will not use illegal or street drugs or another person's prescription medications not prescribed to me.  If there are identified addiction type symptoms, then referral to a program may be provided by my provider and I agree to follow through with this recommendation. 6. MY RESPONSIBILITY FOR COOPERATION WITH INVESTIGATIONS   I understand that my provider will comply with any applicable law and may discuss my use and/or possible misuse/abuse of controlled substances and alcohol, as appropriate, with any health care provider involved in my care, pharmacist, or legal authority.  I authorize my provider and pharmacy to cooperate fully with law enforcement agencies (as permitted by law) in the investigation of any possible misuse, sale, or other diversion of my controlled substances.  I agree to waive any applicable privilege or right of privacy or confidentiality with respect to these authorizations. 7. PROVIDERS RIGHT TO MONITOR FOR SAFETY: PRESCRIPTION MONITORING / DRUG TESTING   I consent to drug/toxicology screening and will submit to a drug screen upon my providers request to assure I am only taking the prescribed drugs for my safety monitoring. I understand that a drug screen is a laboratory test in which a sample of my urine, blood or saliva is checked to see what drugs I have been taking. This may entail an observed urine specimen, which means that a nurse or other health care provider may watch me provide urine, and I will cooperate if I am asked to provide an observed specimen.  I understand that my provider will check a copy of my State Prescription Monitoring Program () Report in order to safely prescribe medications.  Pill Counts: I consent to pill counts when requested.   I may be asked to bring all my prescribed controlled substance medications, in their original bottles, to all of my scheduled appointments. In addition, my provider may ask me to come to the practice at any time for a random pill count. 8. TERMINATION OF THIS AGREEMENT  For my safety, my prescriber has the right to stop prescribing controlled substance medications and may end this agreement. Initials:_______   Conditions that may result in termination of this agreement:  a. I do not show any improvement in pain, or my activity has not improved. b. I develop rapid tolerance or loss of improvement, as described in my treatment plan.  c. I develop significant side effects from the medication. d. My behavior is not consistent with the responsibilities outlined above, thereby causing safety concerns to continue prescribing controlled substance medications. e. I fail to follow the terms of this agreement. f. Other:____________________________       UNDERSTANDING THIS MEDICATION AGREEMENT:    I have read the above and have had all my questions answered. For chronic disease management, I know that my symptoms can be managed with many types of treatments. A chronic medication trial may be part of my treatment, but I must be an active participant in my care. Medication therapy is only one part of my symptom management plan. In some cases, there may be limited scientific evidence to support the chronic use of certain medications to improve symptoms and daily function. Furthermore, in certain circumstances, there may be scientific information that suggests that the use of chronic controlled substances may worsen my symptoms and increase my risk of unintentional death directly related to this medication therapy. I know that if my provider feels my risk from controlled medications is greater than my benefit, I will have my controlled substance medication(s) compassionately lowered or removed altogether.      I further agree to allow this office to contact my HIPAA contact if there are concerns about my safety and use of the controlled medications. I have agreed to use the prescribed controlled substance medications to me as instructed by my provider and as stated in this Medication Agreement. My initial on each page and my signature below shows that I have read each page and I have had the opportunity to ask questions with answers provided by my provider.     Patient Name (Printed): _____________________________________  Patient Signature:  ______________________   Date: _____________    Prescriber Name (Printed): Chiqui Pan_____________________  Prescriber Signature: _____________________  Date: _5/2/2021____________

## 2021-05-03 NOTE — PROGRESS NOTES
EKG preop showed WPW. Dr Nolan Pardo reviewed patient's history and EKG, cardiology notes and cleared patient for surgery.

## 2021-05-04 ENCOUNTER — ANESTHESIA (OUTPATIENT)
Dept: OPERATING ROOM | Age: 66
End: 2021-05-04
Payer: COMMERCIAL

## 2021-05-04 ENCOUNTER — HOSPITAL ENCOUNTER (OUTPATIENT)
Age: 66
Setting detail: OUTPATIENT SURGERY
Discharge: HOME | End: 2021-05-04
Attending: ORTHOPAEDIC SURGERY | Admitting: ORTHOPAEDIC SURGERY
Payer: COMMERCIAL

## 2021-05-04 VITALS
OXYGEN SATURATION: 99 % | HEART RATE: 78 BPM | SYSTOLIC BLOOD PRESSURE: 127 MMHG | TEMPERATURE: 97.7 F | BODY MASS INDEX: 26.84 KG/M2 | RESPIRATION RATE: 18 BRPM | WEIGHT: 167 LBS | DIASTOLIC BLOOD PRESSURE: 70 MMHG | HEIGHT: 66 IN

## 2021-05-04 VITALS
DIASTOLIC BLOOD PRESSURE: 34 MMHG | SYSTOLIC BLOOD PRESSURE: 57 MMHG | OXYGEN SATURATION: 99 % | RESPIRATION RATE: 10 BRPM

## 2021-05-04 LAB
A/G RATIO: 2 (ref 1.1–2.2)
ALBUMIN SERPL-MCNC: 4.7 G/DL (ref 3.4–5)
ALP BLD-CCNC: 77 U/L (ref 40–129)
ALT SERPL-CCNC: 9 U/L (ref 10–40)
ANION GAP SERPL CALCULATED.3IONS-SCNC: 13 MMOL/L (ref 3–16)
AST SERPL-CCNC: 12 U/L (ref 15–37)
BASOPHILS ABSOLUTE: 0.1 K/UL (ref 0–0.2)
BASOPHILS RELATIVE PERCENT: 0.7 %
BILIRUB SERPL-MCNC: 0.3 MG/DL (ref 0–1)
BUN BLDV-MCNC: 14 MG/DL (ref 7–20)
CALCIUM SERPL-MCNC: 9.4 MG/DL (ref 8.3–10.6)
CHLORIDE BLD-SCNC: 102 MMOL/L (ref 99–110)
CO2: 27 MMOL/L (ref 21–32)
CREAT SERPL-MCNC: 0.6 MG/DL (ref 0.6–1.2)
EOSINOPHILS ABSOLUTE: 0.4 K/UL (ref 0–0.6)
EOSINOPHILS RELATIVE PERCENT: 4.2 %
GFR AFRICAN AMERICAN: >60
GFR NON-AFRICAN AMERICAN: >60
GLOBULIN: 2.3 G/DL
GLUCOSE BLD-MCNC: 88 MG/DL (ref 70–99)
HCT VFR BLD CALC: 43.9 % (ref 36–48)
HEMOGLOBIN: 14.5 G/DL (ref 12–16)
LYMPHOCYTES ABSOLUTE: 2.4 K/UL (ref 1–5.1)
LYMPHOCYTES RELATIVE PERCENT: 28.7 %
MCH RBC QN AUTO: 31.9 PG (ref 26–34)
MCHC RBC AUTO-ENTMCNC: 33 G/DL (ref 31–36)
MCV RBC AUTO: 96.9 FL (ref 80–100)
MONOCYTES ABSOLUTE: 0.5 K/UL (ref 0–1.3)
MONOCYTES RELATIVE PERCENT: 5.8 %
NEUTROPHILS ABSOLUTE: 5.1 K/UL (ref 1.7–7.7)
NEUTROPHILS RELATIVE PERCENT: 60.6 %
PDW BLD-RTO: 13.9 % (ref 12.4–15.4)
PLATELET # BLD: 305 K/UL (ref 135–450)
PMV BLD AUTO: 7.1 FL (ref 5–10.5)
POTASSIUM REFLEX MAGNESIUM: 4.2 MMOL/L (ref 3.5–5.1)
RBC # BLD: 4.53 M/UL (ref 4–5.2)
SARS-COV-2: NOT DETECTED
SODIUM BLD-SCNC: 142 MMOL/L (ref 136–145)
TOTAL PROTEIN: 7 G/DL (ref 6.4–8.2)
WBC # BLD: 8.3 K/UL (ref 4–11)

## 2021-05-04 PROCEDURE — 2580000003 HC RX 258: Performed by: ORTHOPAEDIC SURGERY

## 2021-05-04 PROCEDURE — 3700000001 HC ADD 15 MINUTES (ANESTHESIA): Performed by: ORTHOPAEDIC SURGERY

## 2021-05-04 PROCEDURE — 7100000011 HC PHASE II RECOVERY - ADDTL 15 MIN: Performed by: ORTHOPAEDIC SURGERY

## 2021-05-04 PROCEDURE — 2500000003 HC RX 250 WO HCPCS: Performed by: NURSE ANESTHETIST, CERTIFIED REGISTERED

## 2021-05-04 PROCEDURE — 2500000003 HC RX 250 WO HCPCS: Performed by: ANESTHESIOLOGY

## 2021-05-04 PROCEDURE — 7100000010 HC PHASE II RECOVERY - FIRST 15 MIN: Performed by: ORTHOPAEDIC SURGERY

## 2021-05-04 PROCEDURE — 2500000003 HC RX 250 WO HCPCS: Performed by: ORTHOPAEDIC SURGERY

## 2021-05-04 PROCEDURE — 3600000003 HC SURGERY LEVEL 3 BASE: Performed by: ORTHOPAEDIC SURGERY

## 2021-05-04 PROCEDURE — 7100000001 HC PACU RECOVERY - ADDTL 15 MIN: Performed by: ORTHOPAEDIC SURGERY

## 2021-05-04 PROCEDURE — 3600000013 HC SURGERY LEVEL 3 ADDTL 15MIN: Performed by: ORTHOPAEDIC SURGERY

## 2021-05-04 PROCEDURE — 2580000003 HC RX 258: Performed by: NURSE ANESTHETIST, CERTIFIED REGISTERED

## 2021-05-04 PROCEDURE — 7100000000 HC PACU RECOVERY - FIRST 15 MIN: Performed by: ORTHOPAEDIC SURGERY

## 2021-05-04 PROCEDURE — 2709999900 HC NON-CHARGEABLE SUPPLY: Performed by: ORTHOPAEDIC SURGERY

## 2021-05-04 PROCEDURE — 6360000002 HC RX W HCPCS: Performed by: NURSE ANESTHETIST, CERTIFIED REGISTERED

## 2021-05-04 PROCEDURE — 3700000000 HC ANESTHESIA ATTENDED CARE: Performed by: ORTHOPAEDIC SURGERY

## 2021-05-04 RX ORDER — ONDANSETRON 2 MG/ML
4 INJECTION INTRAMUSCULAR; INTRAVENOUS
Status: DISCONTINUED | OUTPATIENT
Start: 2021-05-04 | End: 2021-05-04 | Stop reason: HOSPADM

## 2021-05-04 RX ORDER — BUPIVACAINE HYDROCHLORIDE 5 MG/ML
INJECTION, SOLUTION EPIDURAL; INTRACAUDAL PRN
Status: DISCONTINUED | OUTPATIENT
Start: 2021-05-04 | End: 2021-05-04 | Stop reason: ALTCHOICE

## 2021-05-04 RX ORDER — MEPERIDINE HYDROCHLORIDE 50 MG/ML
12.5 INJECTION INTRAMUSCULAR; INTRAVENOUS; SUBCUTANEOUS EVERY 5 MIN PRN
Status: DISCONTINUED | OUTPATIENT
Start: 2021-05-04 | End: 2021-05-04 | Stop reason: HOSPADM

## 2021-05-04 RX ORDER — MORPHINE SULFATE 2 MG/ML
1 INJECTION, SOLUTION INTRAMUSCULAR; INTRAVENOUS EVERY 5 MIN PRN
Status: DISCONTINUED | OUTPATIENT
Start: 2021-05-04 | End: 2021-05-04 | Stop reason: HOSPADM

## 2021-05-04 RX ORDER — OXYCODONE HYDROCHLORIDE AND ACETAMINOPHEN 5; 325 MG/1; MG/1
2 TABLET ORAL PRN
Status: DISCONTINUED | OUTPATIENT
Start: 2021-05-04 | End: 2021-05-04 | Stop reason: HOSPADM

## 2021-05-04 RX ORDER — LIDOCAINE HYDROCHLORIDE 10 MG/ML
2 INJECTION, SOLUTION INFILTRATION; PERINEURAL
Status: DISCONTINUED | OUTPATIENT
Start: 2021-05-04 | End: 2021-05-04 | Stop reason: HOSPADM

## 2021-05-04 RX ORDER — PROPOFOL 10 MG/ML
INJECTION, EMULSION INTRAVENOUS PRN
Status: DISCONTINUED | OUTPATIENT
Start: 2021-05-04 | End: 2021-05-04 | Stop reason: SDUPTHER

## 2021-05-04 RX ORDER — PHENYLEPHRINE HCL IN 0.9% NACL 1 MG/10 ML
SYRINGE (ML) INTRAVENOUS PRN
Status: DISCONTINUED | OUTPATIENT
Start: 2021-05-04 | End: 2021-05-04 | Stop reason: SDUPTHER

## 2021-05-04 RX ORDER — SODIUM CHLORIDE, SODIUM LACTATE, POTASSIUM CHLORIDE, CALCIUM CHLORIDE 600; 310; 30; 20 MG/100ML; MG/100ML; MG/100ML; MG/100ML
INJECTION, SOLUTION INTRAVENOUS CONTINUOUS
Status: DISCONTINUED | OUTPATIENT
Start: 2021-05-04 | End: 2021-05-04 | Stop reason: HOSPADM

## 2021-05-04 RX ORDER — LIDOCAINE HYDROCHLORIDE 20 MG/ML
INJECTION, SOLUTION INFILTRATION; PERINEURAL PRN
Status: DISCONTINUED | OUTPATIENT
Start: 2021-05-04 | End: 2021-05-04 | Stop reason: SDUPTHER

## 2021-05-04 RX ORDER — MORPHINE SULFATE 2 MG/ML
2 INJECTION, SOLUTION INTRAMUSCULAR; INTRAVENOUS EVERY 5 MIN PRN
Status: DISCONTINUED | OUTPATIENT
Start: 2021-05-04 | End: 2021-05-04 | Stop reason: HOSPADM

## 2021-05-04 RX ORDER — OXYCODONE HYDROCHLORIDE AND ACETAMINOPHEN 5; 325 MG/1; MG/1
1 TABLET ORAL PRN
Status: DISCONTINUED | OUTPATIENT
Start: 2021-05-04 | End: 2021-05-04 | Stop reason: HOSPADM

## 2021-05-04 RX ORDER — SODIUM CHLORIDE, SODIUM LACTATE, POTASSIUM CHLORIDE, CALCIUM CHLORIDE 600; 310; 30; 20 MG/100ML; MG/100ML; MG/100ML; MG/100ML
INJECTION, SOLUTION INTRAVENOUS CONTINUOUS PRN
Status: DISCONTINUED | OUTPATIENT
Start: 2021-05-04 | End: 2021-05-04 | Stop reason: SDUPTHER

## 2021-05-04 RX ORDER — ONDANSETRON 2 MG/ML
INJECTION INTRAMUSCULAR; INTRAVENOUS PRN
Status: DISCONTINUED | OUTPATIENT
Start: 2021-05-04 | End: 2021-05-04 | Stop reason: SDUPTHER

## 2021-05-04 RX ORDER — MAGNESIUM HYDROXIDE 1200 MG/15ML
LIQUID ORAL CONTINUOUS PRN
Status: COMPLETED | OUTPATIENT
Start: 2021-05-04 | End: 2021-05-04

## 2021-05-04 RX ORDER — DEXAMETHASONE SODIUM PHOSPHATE 10 MG/ML
INJECTION INTRAMUSCULAR; INTRAVENOUS PRN
Status: DISCONTINUED | OUTPATIENT
Start: 2021-05-04 | End: 2021-05-04 | Stop reason: SDUPTHER

## 2021-05-04 RX ORDER — FENTANYL CITRATE 50 UG/ML
INJECTION, SOLUTION INTRAMUSCULAR; INTRAVENOUS PRN
Status: DISCONTINUED | OUTPATIENT
Start: 2021-05-04 | End: 2021-05-04 | Stop reason: SDUPTHER

## 2021-05-04 RX ADMIN — DEXAMETHASONE SODIUM PHOSPHATE 6 MG: 10 INJECTION INTRAMUSCULAR; INTRAVENOUS at 11:51

## 2021-05-04 RX ADMIN — Medication 100 MCG: at 12:08

## 2021-05-04 RX ADMIN — FENTANYL CITRATE 50 MCG: 50 INJECTION INTRAMUSCULAR; INTRAVENOUS at 11:46

## 2021-05-04 RX ADMIN — ONDANSETRON 4 MG: 2 INJECTION INTRAMUSCULAR; INTRAVENOUS at 11:51

## 2021-05-04 RX ADMIN — SODIUM CHLORIDE, SODIUM LACTATE, POTASSIUM CHLORIDE, AND CALCIUM CHLORIDE: .6; .31; .03; .02 INJECTION, SOLUTION INTRAVENOUS at 11:44

## 2021-05-04 RX ADMIN — LIDOCAINE HYDROCHLORIDE 60 MG: 20 INJECTION, SOLUTION INFILTRATION; PERINEURAL at 11:46

## 2021-05-04 RX ADMIN — PROPOFOL 200 MG: 10 INJECTION, EMULSION INTRAVENOUS at 11:46

## 2021-05-04 RX ADMIN — FAMOTIDINE 20 MG: 10 INJECTION, SOLUTION INTRAVENOUS at 11:38

## 2021-05-04 ASSESSMENT — PAIN SCALES - GENERAL: PAINLEVEL_OUTOF10: 0

## 2021-05-04 ASSESSMENT — PULMONARY FUNCTION TESTS
PIF_VALUE: 3
PIF_VALUE: 12
PIF_VALUE: 3
PIF_VALUE: 13
PIF_VALUE: 3
PIF_VALUE: 3
PIF_VALUE: 1
PIF_VALUE: 2
PIF_VALUE: 1
PIF_VALUE: 13

## 2021-05-04 ASSESSMENT — PAIN - FUNCTIONAL ASSESSMENT: PAIN_FUNCTIONAL_ASSESSMENT: 0-10

## 2021-05-04 ASSESSMENT — LIFESTYLE VARIABLES: SMOKING_STATUS: 0

## 2021-05-04 ASSESSMENT — ENCOUNTER SYMPTOMS: SHORTNESS OF BREATH: 0

## 2021-05-04 NOTE — H&P
Pre-operative Update of H&P:    I  have seen & examined Ms. India Julio related solely to her hand and upper extremity conditions, prior to the scheduled procedure on the date of her surgery. The indications for the planned surgical procedure & and her upper-extremity condition are unchanged.

## 2021-05-04 NOTE — OP NOTE
OPERATIVE REPORT          Patient:  Beau Novoa    YOB: 1955  Date of Service:  5/4/2021   Location:  [facility]      Preoperative Diagnoses:  Left  carpal tunnel syndrome & Left cubital tunnel syndrome     Postoperative Diagnoses:  Same    Procedures:   Left  Carpal Tunnel Release & Left Ulnar Nerve decompression at the Cubital Tunnel     Surgeon:    Zabrina Underwood. Karina Lambert MD    Surgical Assistant:    SARAHI Barnett Assistant    Anesthesia:   General                                   Blood Loss:    Minimal         Complications:    None                          Tourniquet Time:   7 minutes      Indications: Ms. Beau Novoa is a 77y.o.  year old female with Left  carpal tunnel syndrome & Left cubital tunnel syndrome . I have discussed preoperatively with her  the complications, limitations, expectations, alternatives and risk of the planned surgical care which she understood & all of her  questions were answered. Ms. Beau Novoa has provided written informed consent to proceed. After written consent was obtained and the proper operative sites were identified and marked, Ms. Beau Novoa was brought to the operating room and placed in the supine position on the operating room table with the Left arm extended upon a hand table. General anesthesia was induced & the Left upper extremity was prepped and draped in the usual sterile fashion. Procedure:   After Esmarch exsanguination, the pneuomo-tourniquet was inflated to 250 millimeters of Mercury about the arm. Attention was turned to the medial elbow. A curvilinear incision was fashioned over the posterior medial aspect of the elbow centered between the medial epicondyle and the tip of the olecranon. Dissection was carried carefully through the subcutaneous tissue identifying and protecting the superficial neurovascular structures.   The cubital tunnel was identified and cleared of overlying soft tissue. Careful incision allowed exposure of the ulnar nerve. The nerve was traced proximally and circumferential control of the nerve was obtained. It was dissected proximally to the level of the connection between the biceps and triceps muscles, approximately 3 cm proximal to the medial epicondyle. It was carefully mobilized from the medial intermuscular septum. It was traced distally, being completely freed along the course of the cubital tunnel, and was dissected distally to the level of the second motor branch as it split the heads of the FCU muscle. There was a tight fibrous band at the confluence of the heads of the FCU which was carefully divided. Digital palpation revealed that there were no further proximal or distal constriction about the nerve. The Ulnar Nerve was found to be stable in it's groove without tendency toward subluxation or snapping. Attention was turned to the palm. A 2 cm longitudinal incision was fashioned at the base of the palm, paralleling the longitudinal thenar crease. Dissection was carried carefully through the subcutaneous tissue identifying and protecting the neurovascular structures. The palmar fascia was incised longitudinally, exposing the transverse carpal ligament. The transverse carpal ligament was incised from its proximal to distal most extent, under direct visualization. The terminal 2 cm of antebrachial fascia was similarly incised under direct visualization. The contents of the carpal tunnel were inspected and found to be free of mass, lesion or other abnormality. Digital palpation revealed no further constriction about the median nerve. The incisions were all irrigated copiously with sterile saline for irrigation. The pneumo-tourniquet was deflated after a period of 7 minutes elevation & the fingers were immediately pink and well perfused. Hemostasis was easily obtained with direct pressure and electrocautery.   The incisions were closed in layers with interrupted sutures. The wounds were dressed with Adaptic & dry sterile dressings after local anesthetic was instilled for postoperative analgesia. Ms. Jean Paul Aguilar was awakened from anesthesia having tolerated the procedure without apparent complication. She was returned to the recovery room in stable condition. At the conclusion of the procedure, all needle, instrument and sponge counts were correct. Barbara Holley MD   5/4/2021, 12:11 PM

## 2021-05-04 NOTE — ANESTHESIA POSTPROCEDURE EVALUATION
Department of Anesthesiology  Postprocedure Note    Patient: Dutch Nick  MRN: [id number]  YOB: 1955  Date of evaluation: 5/4/2021  Time:  12:59 PM     Procedure Summary     Date: 05/04/21 Room / Location: [address] [place] [address]    Anesthesia Start: 9300 Anesthesia Stop: 1211    Procedures:       LEFT ULNAR NERVE DECOMPRESSION AT ELBOW (Left Arm Lower)      LEFT CARPAL TUNNEL RELEASE (Left Fingers) Diagnosis:       Cubital tunnel syndrome on left      Entrapment of left ulnar nerve at elbow      Left carpal tunnel syndrome      (LEFT CUBITAL AND CARPAL TUNNEL SYNDROMES, ULNAR NERVE ENTRAPMENT AT ELBOW)    Surgeons: Deepti Mitchell MD Responsible Provider: Nora Phalen, MD    Anesthesia Type: general ASA Status: 2          Anesthesia Type: general    Edna Phase I: Edna Score: 5    Edna Phase II: Edna Score: 10    Last vitals: Reviewed and per EMR flowsheets.      Vitals:    05/04/21 1220 05/04/21 1225 05/04/21 1230 05/04/21 1245   BP: (!) 95/55 (!) 99/58 (!) 103/59 132/77   Pulse: 70 70 70 76   Resp: 12 11 10 14   Temp:       TempSrc:       SpO2: 93% 94% 95% 98%   Weight:       Height:         Anesthesia Post Evaluation    Patient location during evaluation: bedside  Patient participation: complete - patient participated  Level of consciousness: awake and alert  Airway patency: patent  Nausea & Vomiting: no nausea  Complications: no  Cardiovascular status: hemodynamically stable  Respiratory status: acceptable  Hydration status: euvolemic

## 2021-05-04 NOTE — ANESTHESIA PRE PROCEDURE
Department of Anesthesiology  Preprocedure Note       Name:  Golden Ji   Age:  77 y.o.  :  1955                                          MRN:  [id number]         Date:  2021      Surgeon: Caleb Scott):  Vidya Sullivan MD    Procedure: Procedure(s):  LEFT ULNAR NERVE DECOMPRESSION AT ELBOW  LEFT CARPAL TUNNEL RELEASE    Medications prior to admission:   Prior to Admission medications    Medication Sig Start Date End Date Taking? Authorizing Provider   HYDROcodone-acetaminophen (NORCO) 5-325 MG per tablet Take 1 tablet by mouth daily as needed for Pain for up to 10 days. May take instead of tramadol when pain is increased 5/3/21 5/13/21  Jose Malone APRN - JARVIS   gabapentin (NEURONTIN) 100 MG capsule Take 1 capsule by mouth nightly for 30 days. Intended supply: 30 days 21  Jose Malone APRN - JARVIS   predniSONE (DELTASONE) 10 MG tablet 2 tablets 2 times daily for 5 days, 1 tablet 2 times daily for 5 days, 1 tablet daily 21   Jose Malone, APRN - CNP   omeprazole (PRILOSEC) 20 MG delayed release capsule Take 1 capsule by mouth every 12 hours 3/22/21   Celesta Hang, APRN - JARVIS   citalopram (CELEXA) 40 MG tablet TAKE 1 TABLET BY MOUTH ONE TIME A DAY 21, APRN - CNP   methocarbamol (ROBAXIN) 500 MG tablet TAKE ONE TABLET BY MOUTH THREE TIMES A DAY 21est, APRN - CNP   nitroGLYCERIN (NITROSTAT) 0.4 MG SL tablet up to max of 3 total doses.  If no relief after 1 dose, call 911. 20   LUCIA Smith - CNP   rosuvastatin (CRESTOR) 20 MG tablet TAKE ONE TABLET BY MOUTH NIGHTLY 20   LUCIA Smith - CNP   metoprolol succinate (TOPROL XL) 25 MG extended release tablet TAKE 1 TABLET BY MOUTH ONE TIME A DAY  Patient taking differently: nightly  20   LUCIA Smith - CNP   Magnesium 200 MG CHEW Take 1 tablet by mouth daily    Historical Provider, MD   Calcium Carbonate-Vit D-Min (CALCIUM 600+D3 PLUS MINERALS) 600-800 MG-UNIT CHEW Take 1 tablet by mouth 2 times daily    Historical Provider, MD   vitamin D (ERGOCALCIFEROL) 1.25 MG (99270 UT) CAPS capsule Take 1 capsule by mouth once a week 10/21/20   LUCIA Rodarte CNP   Galcanezumab-gnlm (EMGALITY) 120 MG/ML SOAJ Inject 120 mg into the skin every 30 days 10/15/20   LUCIA Rodarte CNP   promethazine (PHENERGAN) 25 MG tablet Take 1 tablet by mouth every 6 hours as needed for Nausea 9/13/19   Bubba Gamez DO   aspirin 81 MG chewable tablet Take 1 tablet by mouth daily 6/5/19   Alexsander Bolivar MD   dicyclomine (BENTYL) 10 MG capsule Take 1 capsule by mouth 3 times daily (before meals)  Patient taking differently: Take 10 mg by mouth 3 times daily as needed  2/14/18   Bubba Gamez DO   Cholecalciferol (VITAMIN D3) 5000 UNITS TABS Take by mouth daily     Historical Provider, MD   diphenhydrAMINE (BENADRYL) 25 MG tablet Take 25 mg by mouth every 6 hours as needed.  OTC     Historical Provider, MD       Current medications:    Current Facility-Administered Medications   Medication Dose Route Frequency Provider Last Rate Last Admin    ceFAZolin (ANCEF) 2000 mg in dextrose 5 % 100 mL IVPB  2,000 mg Intravenous On Call to Apple Davis MD        lidocaine 1 % injection 2 mL  2 mL Intradermal Once PRN Sonia Hobbs MD        lactated ringers infusion   Intravenous Continuous Sonia Hobbs MD        famotidine (PEPCID) injection 20 mg  20 mg Intravenous Once Yazan Chou MD        meperidine (DEMEROL) injection 12.5 mg  12.5 mg Intravenous Q5 Min PRN Yazan Chou MD        HYDROmorphone (DILAUDID) injection 0.25 mg  0.25 mg Intravenous Q5 Min PRN Yazan Chou MD        HYDROmorphone (DILAUDID) injection 0.5 mg  0.5 mg Intravenous Q5 Min PRN Yazan Chou MD        morphine (PF) injection 1 mg  1 mg Intravenous Q5 Min PRN Yazan Chou MD  morphine (PF) injection 2 mg  2 mg Intravenous Q5 Min PRN Paulina Schumacher MD        oxyCODONE-acetaminophen (PERCOCET) 5-325 MG per tablet 1 tablet  1 tablet Oral PRN Paulina Schumacher MD        Or    oxyCODONE-acetaminophen (PERCOCET) 5-325 MG per tablet 2 tablet  2 tablet Oral PRN Paulina Schumacher MD        ondansetron [facility]) injection 4 mg  4 mg Intravenous Once PRN Paulina Schumacher MD           Allergies: Allergies   Allergen Reactions    Avelox [Moxifloxacin Hcl In Nacl] Hives    Cephalexin Hives and Rash    Darvon [Propoxyphene Hcl] Hives    Digoxin Hives    Librax [Chlordiazepoxide-Methscopol] Hives    Lyrica [Pregabalin] Swelling     Feet hands    Motrin [Ibuprofen Micronized] Rash    Other Hives     Most mycin's    Pcn [Penicillins] Rash    Prochlorperazine Edisylate Nausea And Vomiting    Sulfa Antibiotics Rash    Tetracyclines & Related Hives    Cymbalta [Duloxetine Hcl] Palpitations    Nickel Nausea And Vomiting     Hypotension      Savella [Milnacipran] Anxiety    Statins Other (See Comments)     myalgia       Problem List:    Patient Active Problem List   Diagnosis Code    Anxiety F41.9    Fibromyalgia M79.7    IBS (irritable bowel syndrome) K58.9    OA (osteoarthritis) M19.90    History of prior ablation treatment Z98.890    Lipoma D17.9    History of colonic diverticulitis Z87.19    Hyperlipidemia E78.5    Depression F32.9    Small bowel obstruction (HCC) K56.609    Migraine with aura and without status migrainosus, not intractable G43. 109    Angina pectoris (HCC) I20.9    H/O angiography Z92.89    CAD S/P percutaneous coronary angioplasty I25.10, Z98.61    COVID-19 U07.1    Bilateral carpal tunnel syndrome G56.03    Ulnar nerve compression, left G56.22    Cubital tunnel syndrome, bilateral G56.23       Past Medical History:        Diagnosis Date    Allergic rhinitis     Anxiety     Bilateral carpal tunnel syndrome 4/13/2021    CAD (coronary artery disease) 06/05/2019    COVID-19 12/28/2020    Depression     Endometriosis     hx    Fibromyalgia     GERD (gastroesophageal reflux disease)     Headache(784.0)     Hiatal hernia     Hyperlipidemia     Irritable bowel syndrome     Left bundle branch block (LBBB)     Osteoarthritis     SVT (supraventricular tachycardia) (HCC)     hx        Past Surgical History:        Procedure Laterality Date    ABLATION OF DYSRHYTHMIC FOCUS  1999    SVT    APPENDECTOMY  1982    BUNIONECTOMY  1973    bilateral, 2 revisions left foot    CARDIAC SURGERY  2019    stent    CHOLECYSTECTOMY  2000    COLONOSCOPY  10/15/13    Hx polyps-3 yrs-? poor prep    ECTOPIC PREGNANCY SURGERY Right 1988    HYSTERECTOMY, TOTAL ABDOMINAL  1988    LAPAROSCOPY      6--endometriosis    NASAL SEPTUM SURGERY  1982    OVARY SURGERY Right 1987    partial removal    SALPINGO-OOPHORECTOMY Left 1986    UPPER GASTROINTESTINAL ENDOSCOPY  10/15/13    volodymyr balderrama       Social History:    Social History     Tobacco Use    Smoking status: Never Smoker    Smokeless tobacco: Never Used   Substance Use Topics    Alcohol use:  No                                Counseling given: Not Answered      Vital Signs (Current):   Vitals:    04/30/21 1508 05/04/21 1132   BP:  135/66   Pulse:  78   Resp:  16   Temp:  96.8 °F (36 °C)   TempSrc:  Temporal   SpO2:  98%   Weight: 167 lb (75.8 kg) 167 lb (75.8 kg)   Height: 5' 6\" (1.676 m) 5' 6\" (1.676 m)                                              BP Readings from Last 3 Encounters:   05/04/21 135/66   05/03/21 118/70   04/14/21 130/82       NPO Status: Time of last liquid consumption: 1030(sip of water)                        Time of last solid consumption: 1800                        Date of last liquid consumption: 05/04/21                        Date of last solid food consumption: 05/03/21    BMI:   Wt Readings from Last 3 Encounters:   05/04/21 167 lb (75.8 kg) 05/03/21 166 lb 9.6 oz (75.6 kg)   04/28/21 167 lb (75.8 kg)     Body mass index is 26.95 kg/m². CBC:   Lab Results   Component Value Date    WBC 8.3 05/03/2021    RBC 4.53 05/03/2021    HGB 14.5 05/03/2021    HCT 43.9 05/03/2021    MCV 96.9 05/03/2021    RDW 13.9 05/03/2021     05/03/2021       CMP:   Lab Results   Component Value Date     05/03/2021    K 4.2 05/03/2021     05/03/2021    CO2 27 05/03/2021    BUN 14 05/03/2021    CREATININE 0.6 05/03/2021    GFRAA >60 05/03/2021    GFRAA >60 06/27/2012    AGRATIO 2.0 05/03/2021    LABGLOM >60 05/03/2021    GLUCOSE 88 05/03/2021    PROT 7.0 05/03/2021    PROT 7.1 06/27/2012    CALCIUM 9.4 05/03/2021    BILITOT 0.3 05/03/2021    ALKPHOS 77 05/03/2021    AST 12 05/03/2021    ALT 9 05/03/2021       POC Tests: No results for input(s): POCGLU, POCNA, POCK, POCCL, POCBUN, POCHEMO, POCHCT in the last 72 hours. Coags:   Lab Results   Component Value Date    PROTIME 11.3 10/08/2019    INR 0.99 10/08/2019    APTT 34.3 06/01/2019       HCG (If Applicable): No results found for: PREGTESTUR, PREGSERUM, HCG, HCGQUANT     ABGs: No results found for: PHART, PO2ART, HXD6TKB, KSK9AMC, BEART, U2MGGWXA     Type & Screen (If Applicable):  No results found for: LABABO, LABRH    Drug/Infectious Status (If Applicable):  No results found for: HIV, HEPCAB    COVID-19 Screening (If Applicable):   Lab Results   Component Value Date    COVID19 Not Detected 05/03/2021    COVID19 DETECTED 12/28/2020           Anesthesia Evaluation  Patient summary reviewed no history of anesthetic complications:   Airway: Mallampati: III  TM distance: <3 FB   Neck ROM: limited  Mouth opening: > = 3 FB Dental:          Pulmonary:Negative Pulmonary ROS breath sounds clear to auscultation      (-) COPD, asthma, shortness of breath, recent URI, sleep apnea and not a current smoker          Patient did not smoke on day of surgery.                  Cardiovascular:    (+) CAD: obstructive, CABG/stent (plasty/stent, 2019):, dysrhythmias (ablation 1999): SVT,     (-) pacemaker, hypertension, past MI,  angina and  CHF    ECG reviewed  Rhythm: regular  Rate: normal  Echocardiogram reviewed         Beta Blocker:  Dose within 24 Hrs         Neuro/Psych:   (+) neuromuscular disease (cts b / fibromy.):, headaches: migraine headaches, depression/anxiety  (hx dep.)   (-) seizures, TIA and CVA           GI/Hepatic/Renal:   (+) hiatal hernia, GERD: well controlled,      (-) liver disease, no renal disease, bowel prep and no morbid obesity       Endo/Other:    (+) : arthritis:., no malignancy/cancer. (-) diabetes mellitus, hypothyroidism, hyperthyroidism, blood dyscrasia, no malignancy/cancer               Abdominal:           Vascular: negative vascular ROS. Anesthesia Plan      general     ASA 2       Induction: intravenous. MIPS: Postoperative opioids intended and Prophylactic antiemetics administered. Anesthetic plan and risks discussed with patient. Plan discussed with CRNA. This pre-anesthesia assessment may be used as a history and physical.    DOS STAFF ADDENDUM:    Pt seen and examined, chart reviewed (including anesthesia, drug and allergy history). No interval changes to history and physical examination. Anesthetic plan, risks, benefits, alternatives, and personnel involved discussed with patient. Patient verbalized an understanding and agrees to proceed.       Rock Ordonez MD  May 4, 2021  11:40 AM      Rock Ordonez MD   5/4/2021

## 2021-05-06 ENCOUNTER — TELEPHONE (OUTPATIENT)
Dept: FAMILY MEDICINE CLINIC | Age: 66
End: 2021-05-06

## 2021-05-06 NOTE — TELEPHONE ENCOUNTER
Pt called in for results from blood work on 5/3/21. Read them to her. She stated understanding and no questions.

## 2021-05-19 ENCOUNTER — OFFICE VISIT (OUTPATIENT)
Dept: ORTHOPEDIC SURGERY | Age: 66
End: 2021-05-19
Payer: COMMERCIAL

## 2021-05-19 VITALS — WEIGHT: 167 LBS | HEIGHT: 66 IN | BODY MASS INDEX: 26.84 KG/M2

## 2021-05-19 DIAGNOSIS — G56.20 CUBITAL TUNNEL SYNDROME, UNSPECIFIED LATERALITY: Primary | ICD-10-CM

## 2021-05-19 DIAGNOSIS — G56.03 BILATERAL CARPAL TUNNEL SYNDROME: ICD-10-CM

## 2021-05-19 PROCEDURE — 99214 OFFICE O/P EST MOD 30 MIN: CPT | Performed by: ORTHOPAEDIC SURGERY

## 2021-05-19 NOTE — PROGRESS NOTES
Ms. Shazia Guevara returns today in follow-up of her bilateral carpal tunnel syndrome and cubital tunnel syndrome having undergone recent left Ulnar Nerve Decompression (Cubital Tunnel Release) & Carpal Tunnel Release done approximately 2 weeks ago. She has done well noting mild discomfort and no other reported complications from the surgery at this time. She notes that her untreated right hand symptoms continue to worsen with time. .    She notes pre-operative symptoms to be significantly improved at this time. Physical Exam:  Skin incision is healing well, without erythema, drainage or sign of infection. Digital range of motion is without significant limitation. Wrist range of motion is Full. Elbow range of motion is equal bilaterally. Sensation is significantly improved in the Median Innervated Digits and Ulnar Innervated Digits  on the right side, persistently tingling on the left side   Vascular examination reveals normal and good capillary refill. Swelling is minimal.  Sensory and Motor Median & Ulnar Nerve function is intact. Impression:  Ms. Shazia Guevara is doing well after recent left Ulnar Nerve Decompression (Cubital Tunnel Release) &  Carpal Tunnel Release. Her right side symptoms are worsening. Plan:  Ms. Shazia Guevara is instructed in work on Active & Passive range of motion of the digits, wrist, & elbow. These modalities were specifically demonstrated to her today. We discussed the appropriateness of gradual resumption of use of the operated hand and the return to normal use as comfort allows. She is given instructions regarding management of the fresh surgical incision and progressive use of desensitization and tissue massage techniques. We discussed the appropriate expectations and timeline for symptom improvement. She is provided a written patient instruction sheet titled: Postoperative Instructions After Ulnar Nerve Decompression.     I have asked Ms. Grupo Gunter to follow-up with me or contact me by telephone over the next 2-4 weeks if her symptoms have not fully resolved or if she has not regained full & painless return of function. With regard to her  Right, Carpal Tunnel, Ulnar nerve:  I have had a thorough discussion with Ms. Grupo Gunter regarding the treatment options available for her worsened cubital tunnel syndrome, which is causing her significant  limitations. I have outlined for Ms. Grupo Gunter the benefits and consequences of the various treatment modalities, including the fact that surgical treatment is the only modality which is reasonably expected to provide long lasting or permanent resolution of her symptoms. Based upon our current discussion and a reasonable understating of the options available to her, Ms. Grupo Gunter has selected to proceed with surgical Ulnar Nerve decompression at the Cubital Tunnel. I have discussed the details of the surgical procedure, the pre, harrison and postoperative concerns and the appropriate expectations after surgery with Ms. Grupo Gunter today. She was given the opportunity to ask questions, voiced an understanding of the procedure, and she did wish to proceed with Right Ulnar Nerve decompression at the Cubital Tunnel. I had an extensive discussion with Ms. Grupo Gunter (and any family members present with her today) regarding the natural history, etiology, and long term consequences of this problem. We have mutually selected a surgical treatment plan  and, in my opinion, surgical intervention is indicated at this time. I have discussed with her the potential complications, limitations, expectations, alternatives, and risks of Ulnar Nerve decompression at the Cubital Tunnel. She has had full opportunity to ask her questions. I have answered them all to her satisfaction. I feel that Ms. Grupo Gunter (and any family members present with her today) do understand our discussion today and she has provided informed consent for Right Ulnar Nerve decompression at the Cubital Tunnel. I have had a thorough discussion with Ms. Asya Geronimo regarding the treatment options available for her worsened carpal tunnel syndrome, which is causing her significant  limitations. I have outlined for Ms. Asya Geronimo the benefits and consequences of the various treatment modalities, including the fact that surgical treatment is the only modality which is reasonably expected to provide long lasting or permanent resolution of her symptoms. Based upon our current discussion and a reasonable understating of the options available to her, Ms. Asya Geronimo has selected to proceed with surgical Carpal Tunnel Release. I have discussed the details of the surgical procedure, the pre, harrison and postoperative concerns and the appropriate expectations after surgery with Ms. Asya Geronimo today. She was given the opportunity to ask questions, voiced an understanding of the procedure, and she did wish to proceed with Right Carpal Tunnel Release. I had an extensive discussion with Ms. Asya Geronimo (and any family members present with her today) regarding the natural history, etiology, and long term consequences of this problem. We have mutually selected a surgical treatment plan  and, in my opinion, surgical intervention is indicated at this time. I have discussed with her the potential complications, limitations, expectations, alternatives, and risks of Carpal Tunnel Release. She has had full opportunity to ask her questions. I have answered them all to her satisfaction. I feel that Ms. Asya Geronimo (and any family members present with her today) do understand our discussion today and she has provided informed consent for Right Carpal Tunnel Release.      Ms. Asya Geronimo has been given a full verbal list of instructions and precautions related to her present condition. I have asked her to followup with me in the office at the prescribed time. She is also specifically requested to call or return to the office sooner if her symptoms change or worsen prior to the next scheduled appointment.

## 2021-05-19 NOTE — LETTER
CONSENT TO OPERATION  AND/OR OTHER PROCEDURE(S)          PATIENT : Sally Fajardo   YOB: 1955      DATE : 5/19/21          1. I request and consent that Dr. Jerad Scherer. Jocelynn Gonzalez,  and/or his associates or assistants perform an operation and/or procedures on the above patient at  [place] [address], to treat the condition(s) which appear indicated by the diagnostic studies already performed. I have been told that in general terms the nature, purpose and reasonable expectations of the operation and/or procedure(s) are:                               Right  Ulnar Nerve Decompression  (at Elbow) &  right Carpal Tunnel Release       2. It has been explained to me by the informing physician that during the course of the operation and/or procedure(s) unforeseen conditions may be revealed that necessitate an extension of the original operation and/or procedure(s) or different operation and/or procedures than those set forth in Paragraph 1. I therefore authorize and request that my physician and/or his associates or assistants perform such operations and/or procedures as are necessary and desirable in the exercise of professional judgment. The authority granted under this Paragraph 2 shall extend to all conditions that require treatment and are known to my physician at the time the operation is commenced. 3. I have been made aware by the informing physician of certain risks and consequences that are associated with the operation and/or procedure(s) described in Paragraph 1, the reasonable alternative methods or treatment, the possible consequences, the possibility that the operation and/or procedure(s) may be unsuccessful and the possibility of complications.   I understand the reasonably known risks to be:      - Bleeding  - Infection  - Poor Healing  - Possible Damage to Nerve, Vessel, Tendon/Muscle or Bone  - Need for further Treatment/Surgery  - Stiffness  - Pain  - Residual or Recurrent Symptoms  - Anesthetic and/or Medical Risks  - We have discussed the specific limitations and risks of hospital and/or office based treatment at this time due to the COVID-19 pandemic                I have been counseled about the risks of bette Covid-19 in the harrison-operative and post-operative periods related to this procedure. I have been made aware that bette Covid-19 around the time of a surgical procedure may worsen my prognosis for recovering from the virus and lend to a higher morbidity and or mortality risk. With this knowledge, I have requested to proceed with the procedure as scheduled. 4. I have also been informed by the informing physician that there are other risks from both known and unknown causes that are attendant to the performance of any surgical procedure. I am aware that the practice of medicine and surgery is not an exact science, and that no guarantees have been made to me concerning the results of the operation and/or procedure(s). 5. I   CONSENT / REFUSE CONSENT  (strike the phrase that does not apply) to the taking of photographs before, during and/or after the operation or procedure for scientific/educational purposes. 6. I consent to the administration of anesthesia and to the use of such anesthetics as may be deemed advisable by the anesthesiologist who has been engaged by me or my physician. 7. I certify that I have read and understand the above consent to operation and/or other procedure(s); that the explanations therein referred to were made to me by the informing physician in advance of my signing this consent; that all blanks or statements requiring insertion or completion were filled in and inapplicable paragraphs, if any, were stricken before I signed; and that all questions asked by me about the operation and/or procedure(s) which I have consented to have been fully answered in a satisfactory manner. _______________________           5/19/21                              Witness     Signature Of Patient         Date        Stacia Peraza                                                 Informing Physician                                           Signature of Informing Physician                              If patient is unable to sign or is a minor, complete one of the following:    (A)  Patient is a minor   years of age. (B)  Patient is unable to sign because: The undersigned represents that he or she is duly authorized to execute this consent for and on behalf of the above named patient. Witness               o  Parent  o  Guardian   o  Spouse       o  Other (specify)                                                          Patient Name: Denis Salcedo  Patient YOB: 1955  Dr. Marko Guerra' Return To Work Policy  Regarding your ability to return to work after surgery or injury, Dr. Marko Guerra will not state that any patient is off of work or cannot work at all. He will place you on restrictions after your surgical procedure or injury. This means that you will be allowed to return to work the day after your office visit or surgery with restrictions. Depending on the details of your particular situation, Dr. Marko Guerra may state that you will have either light use or no use of your hand for a specific number of weeks. It is your obligation to communicate with your employer regarding your restrictions. It is your employer's decision as to whether they will accommodate your restrictions (i.e. allow you to come to work in your restricted capacity) or to not allow you to return to work under your restrictions. Dr. Marko Guerra does not participate in making this decision and cannot influence your employer regarding their decision.   If you do not communicate your restrictions to your employer, or if you do not present to work as you are scheduled to, Dr. Marko Guerra will not provide an 'excuse' to explain your absence. A doctors note, or official forms (BWC, FMLA, etc.) will be filled out, upon request, to indicate your date of surgery and your restrictions as stated above. Dr. Abdullahi Weiss' Narcotic Policy  Patients will only be prescribed narcotics after surgical procedures or significant injury. Not all procedures cause pain great enough to require Narcotics and thus, not all patients will receive prescriptions after surgical procedures or injuries. Narcotics are never prescribed for chronic conditions. Narcotics are never prescribed for use longer than one week at a time. Refills are only granted in unusual circumstances and only at Dr. Geo Castle discretion. Patients who are receiving narcotic medication from another physician or who are under pain management contracts will not be given a prescription for narcotics for any reason. I have read the above policies and understand that by agreeing to proceed with treatment by Dr. Tamanna Torres and his team, that I am agreeing to abide by these policies.   Patient Name:  Kuldeep Gill    Patient Signature:  _____________________________    Dakota Rule Date:   5/19/21

## 2021-05-19 NOTE — LETTER
[facility] SURGERY  Surgery Scheduling Form:      DEMOGRAPHICS:                                                                                                              .  Patient Name:  Shazia Guevara  Patient :  1955   Patient SS#:      Patient Phone:  [phone] (home) [phone] (work)     Patient Address:  [address] [place] [address] [place] [address]    PCP:  LUCIA Miner CNP  Insurance:    Payor/Plan Subscr  Sex Relation Sub. Ins. ID Effective Group Num   1. [address] [facility] * 1955 Female Self [id number]                                   [address]       DIAGNOSIS & PROCEDURE:                                                                                            .  Diagnosis:   right Cubital Tunnel Syndrome / Ulnar Nerve Entrapment AT Elbow (354.2) AND  right Carpal Tunnel Syndrome     G56.01  Operation:  right  Ulnar Nerve Decompression  (at Elbow) AND  right Carpal Tunnel Release  [CPT: 59396 + 16678]  Location:  [facility]  Surgeon:  Brenden Vila    SCHEDULING INFORMATION:                                                                                         .    Surgeon's Scheduling Instruction:  elective    RN Post-op Appt:  [x] Yes   [] No  Preferred Thursday:   [] Yes   [] No    Requested Date:         OR Time:   9:30 Patient Arrival Time:   7:30    OR Time Required:  20  Minutes  Anesthesia:  General  Equipment:  None                                HAD VACCINES BRINGING CARD  Mini C-Arm:  No   Standard C-Arm:  No  Status:  outpatient  PAT Required:  Yes  Comments:                      Tutu Monroy. Flavia Nicholas MD  21 2:01 PM    BILLING INFORMATION:                                                                                                    .    Procedure:       CPT Code Modifier  right  Ulnar Nerve Decompression  (at Elbow) AND  right Carpal Tunnel Release    .          NO PRE OP ABX

## 2021-05-19 NOTE — Clinical Note
Dear  Manjula Watkins, APRN - CNP,    Thank you very much for your referral or Ms. Morgan Wilson to me for evaluation and treatment of her Hand & Wrist condition. I appreciate your confidence in me and thank you for allowing me the opportunity to care for your patients. If I can be of any further assistance to you on this or any other patient, please do not hesitate to contact me. Sincerely,    Jada Parker.  Cristofer Allen MD

## 2021-05-20 DIAGNOSIS — M15.9 PRIMARY OSTEOARTHRITIS INVOLVING MULTIPLE JOINTS: ICD-10-CM

## 2021-05-20 DIAGNOSIS — M79.7 FIBROMYALGIA: ICD-10-CM

## 2021-05-20 RX ORDER — TRAMADOL HYDROCHLORIDE 50 MG/1
50 TABLET ORAL EVERY 6 HOURS PRN
Qty: 45 TABLET | Refills: 0 | Status: SHIPPED | OUTPATIENT
Start: 2021-05-20 | End: 2021-06-28 | Stop reason: SDUPTHER

## 2021-05-20 NOTE — TELEPHONE ENCOUNTER
11-8-18 [facility]  5-3-21 med contract    Last office visit 5/3/2021     Last written 3-31-21 45 with 0      Next office visit scheduled Visit date not found    Requested Prescriptions     Pending Prescriptions Disp Refills    traMADol (ULTRAM) 50 MG tablet 45 tablet 0     Sig: Take 1 tablet by mouth every 6 hours as needed for Pain (fibromyalgia) for up to 30 days.
Pt is requesting a refill on her tramadol.   Rupali on [employer]
COUGH

## 2021-05-24 ENCOUNTER — TELEPHONE (OUTPATIENT)
Dept: ORTHOPEDIC SURGERY | Age: 66
End: 2021-05-24

## 2021-05-28 ENCOUNTER — OFFICE VISIT (OUTPATIENT)
Dept: CARDIOLOGY CLINIC | Age: 66
End: 2021-05-28
Payer: COMMERCIAL

## 2021-05-28 VITALS
HEART RATE: 80 BPM | DIASTOLIC BLOOD PRESSURE: 68 MMHG | BODY MASS INDEX: 27.79 KG/M2 | SYSTOLIC BLOOD PRESSURE: 100 MMHG | WEIGHT: 172.2 LBS

## 2021-05-28 DIAGNOSIS — I20.9 ANGINA PECTORIS (HCC): ICD-10-CM

## 2021-05-28 DIAGNOSIS — E78.5 HYPERLIPIDEMIA, UNSPECIFIED HYPERLIPIDEMIA TYPE: ICD-10-CM

## 2021-05-28 DIAGNOSIS — Z92.89 H/O ANGIOGRAPHY: Primary | ICD-10-CM

## 2021-05-28 PROCEDURE — 99214 OFFICE O/P EST MOD 30 MIN: CPT | Performed by: INTERNAL MEDICINE

## 2021-05-28 NOTE — PROGRESS NOTES
[facility]  Office Visit           Ricardo Montes MD,  [facility] [place]                      Cardiology             Kuldeep Decree  1955    May 28, 2021    Primary Cardiologist:  Bita Hernandes       CC: HPI:  The patient is 77 y.o. female with she is here she is still having issues with the Covid vaccine virus that she had back in December. Slowly improving. She does have fibromyalgia and has chronic aches and pains. Did have the [employer] vaccine and no issues. Is on medical leave at this time and is anticipating senior care on July 1, 2021. Overall she looks fairly stable and hemodynamically stable. Still taking prednisone at 10 mg/day and is hoping to reduce it by 5 mg. Last LDL was 82. Still having occasional short runs of SVT likely. That has been significantly improved since her ablation procedure many years ago. She is now on Crestor at 20 mg/day. 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 reviewed [facility] results     Review of Systems:  Constitutional: Denies  fatigue, weakness, night sweats or fever. HEENT: Denies new visual changes, ringing in ears, nosebleeds,nasal congestion  Respiratory: Denies new or change in SOB, PND, orthopnea or cough. Cardiovascular: see HPI  GI: Denies N/V, diarrhea, constipation, abdominal pain, change in bowel habits, melena or hematochezia  : Denies urinary frequency, urgency, incontinence, hematuria or dysuria. Skin: Denies rash, hives, or cyanosis  Musculoskeletal: Denies joint or muscle aches/pain  Neurological: Denies syncope or TIA-like symptoms.   Psychiatric: Denies anxiety, insomnia or depression     Past Medical History:   Diagnosis Date    Allergic rhinitis     Anxiety     Bilateral carpal tunnel syndrome 2021    CAD (coronary artery disease) 2019    COVID-19 2020    Depression     Endometriosis     hx    Fibromyalgia     GERD (gastroesophageal reflux disease)     Headache(784.0)     Hiatal hernia     Hyperlipidemia     Irritable bowel syndrome     Left bundle branch block (LBBB)     Osteoarthritis     SVT (supraventricular tachycardia) (HCC)     hx      Past Surgical History:   Procedure Laterality Date    ABLATION OF DYSRHYTHMIC FOCUS      SVT    APPENDECTOMY  1982    ARM SURGERY Left 2021    LEFT ULNAR NERVE DECOMPRESSION AT ELBOW performed by Deepti Mitchell MD at [address]    bilateral, 2 revisions left foot   [address]      stent    CARPAL TUNNEL RELEASE Left 2021    LEFT CARPAL TUNNEL RELEASE performed by Deepti Mitchell MD at [address]      COLONOSCOPY  10/15/13    Hx polyps-3 yrs-? poor prep    ECTOPIC PREGNANCY SURGERY Right     HYSTERECTOMY, TOTAL ABDOMINAL  1988    LAPAROSCOPY      6--endometriosis    NASAL SEPTUM SURGERY  1982    OVARY SURGERY Right     partial removal    SALPINGO-OOPHORECTOMY Left     UPPER GASTROINTESTINAL ENDOSCOPY  10/15/13    schatzki ring     Family History   Problem Relation Age of Onset    Other Mother         sepsis    Dementia Mother     High Cholesterol Mother     Depression Mother     Anxiety Disorder Mother     High Blood Pressure Father     Other Father          from renal failure and CHF    Heart Disease Father     Migraines Father     Heart Attack Brother     Heart Disease Brother     Hypertension Other     High Cholesterol Other     Migraines Other     Heart Disease Other     Diabetes Other      Social History     Tobacco Use    Smoking status: Never Smoker    Smokeless tobacco: Never Used  vitamin D (ERGOCALCIFEROL) 1.25 MG (81889 UT) CAPS capsule Take 1 capsule by mouth once a week 12 capsule 1    Galcanezumab-gnlm (EMGALITY) 120 MG/ML SOAJ Inject 120 mg into the skin every 30 days 3 pen 3    promethazine (PHENERGAN) 25 MG tablet Take 1 tablet by mouth every 6 hours as needed for Nausea 30 tablet 1    aspirin 81 MG chewable tablet Take 1 tablet by mouth daily 30 tablet 3    dicyclomine (BENTYL) 10 MG capsule Take 1 capsule by mouth 3 times daily (before meals) (Patient taking differently: Take 10 mg by mouth 3 times daily as needed ) 90 capsule 0    Cholecalciferol (VITAMIN D3) 5000 UNITS TABS Take by mouth daily       diphenhydrAMINE (BENADRYL) 25 MG tablet Take 25 mg by mouth every 6 hours as needed. OTC       Magnesium 200 MG CHEW Take 1 tablet by mouth daily (Patient not taking: Reported on 5/28/2021)       No current facility-administered medications for this visit. Physical Exam:   /68   Pulse 80   Wt 172 lb 3.2 oz (78.1 kg)   LMP  (LMP Unknown)   BMI 27.79 kg/m²   BP Readings from Last 3 Encounters:   05/28/21 100/68   05/04/21 (!) 57/34   05/04/21 127/70     Pulse Readings from Last 3 Encounters:   05/28/21 80   05/04/21 78   05/03/21 78     Wt Readings from Last 3 Encounters:   05/28/21 172 lb 3.2 oz (78.1 kg)   05/19/21 167 lb (75.8 kg)   05/04/21 167 lb (75.8 kg)     Constitutional: She is oriented to person, place, and time. She appears well-developed and well-nourished. In no acute distress. HEENT: Normocephalic and atraumatic. Sclerae anicteric. No xanthelasmas. Conjunctiva white, no subconjunctival hemorrhage   External inspection of ears nose teeth & gums   Eyes:PERRLA EOM's intact. Neck: Neck supple. No JVD present. Carotids without bruits. No mass and no thyromegaly present. No lymphadenopathy present. Cardiovascular: RRR, normal S1 and S2; no murmur/gallop or rub, PMI nondisplaced  Pulmonary/Chest: Effort normal.  Lungs clear to auscultation.  Chest wall nontender  Abdominal: soft, nontender, nondistended. + bowel sounds; no organomegaly or bruits. Aorta normal  Extremities: No edema, cyanosis, or clubbing. Pulses are 2+ radial/carotid/dorsalis pedis bilaterally. Cap refill brisk. Neurological: No cranial nerve deficit. Psychiatric: She has a normal mood and affect. Her speech is normal and behavior is normal.     Lab Review:   Lab Results   Component Value Date    TRIG 131 02/26/2021    HDL 77 02/26/2021    LDLCALC 82 02/26/2021    LABVLDL 26 02/26/2021     Lab Results   Component Value Date     05/03/2021    K 4.2 05/03/2021     05/03/2021    CO2 27 05/03/2021    BUN 14 05/03/2021    CREATININE 0.6 05/03/2021    GLUCOSE 88 05/03/2021    CALCIUM 9.4 05/03/2021      Lab Results   Component Value Date    WBC 8.3 05/03/2021    HGB 14.5 05/03/2021    HCT 43.9 05/03/2021    MCV 96.9 05/03/2021     05/03/2021   Summary 3/10/21   Definity is used for myocardial border enhancement. The left ventricle is normal in size with normal wall thickness. Left ventricular systolic function is moderately reduced with a visually   estimated ejection fraction of 40%. The basal to mid inferoseptal segments, basal to mid anteroseptal segments   and entire inferior wall appear hypokinetic. Grade I diastolic dysfunction with normal LV pressure. Normal right ventricular size and function. Mild mitral regurgitation. Inadequate tricuspid valve regurgitation to estimate systolic pulmonary   artery pressure. Compared to the prior resting study performed 5/30/2019, EF is slightly   improved with RWMA's noted above. Results: Cardiac cath October 8, 2019  Left ventricular pressure 10 mmHg  Aortic pressure 126 mmHg     Coronary anatomy:   The left main coronary artery is normal.      Left anterior descending artery has previously placed proximal stent that is widely patent. Just distal to the stent is a mild narrowing of 20%.      Circumflex artery mild mid vessel plaque. It is a dominant vessel.     The right coronary artery is a nondominant but is normal.     Left ventriculogram shows ejection fraction of 50 %. Normal wall motion. EKG on 8/21/2020 sinus rhythm 67/min.  IVCD. There is the appearance of preexcitation although I do not see alka WPW. EKG on 2/26/2021 sinus rhythm 80/min. Short [place] interval and wide complexes consistent with preexcitation EKG. 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 On 5/3/2021 sinus rhythm at 72/min. Accelerated AV conduction consistent with with Steffanie-Parkinson-White. IVCD late transition. Impression:  No acute coronary lesions. The previously placed left coronary stent is widely patent.         Assessment:    recent LHC / stable left arm LHC no complications   October 7048  CAD, previous LAD stent  6/2019  / on GDMT /stable     Plan:  I think overall is doing well from a cardiac perspective. Return to see me in 6 months. Continue current therapy. Planning to retire in July 1, 2021.   Alice Vincent MD, [facility] [place]

## 2021-06-03 ENCOUNTER — TELEPHONE (OUTPATIENT)
Dept: FAMILY MEDICINE CLINIC | Age: 66
End: 2021-06-03

## 2021-06-03 NOTE — TELEPHONE ENCOUNTER
Pt calling asking Anabel Yriszainab would be able to send a letter for the pt stating that she cannot return back to work due to her always being exhausted, fibromyalgia and how covid effected that.  Pt asking if that letter can be faxed to [employer] at [phone]

## 2021-06-08 ENCOUNTER — TELEPHONE (OUTPATIENT)
Dept: ORTHOPEDIC SURGERY | Age: 66
End: 2021-06-08

## 2021-06-08 NOTE — TELEPHONE ENCOUNTER
Scanned and notified [facility] the completed aps for [employer] for the right hand disability to be signed and returned.

## 2021-06-11 ENCOUNTER — OFFICE VISIT (OUTPATIENT)
Dept: FAMILY MEDICINE CLINIC | Age: 66
End: 2021-06-11
Payer: COMMERCIAL

## 2021-06-11 ENCOUNTER — TELEPHONE (OUTPATIENT)
Dept: ORTHOPEDIC SURGERY | Age: 66
End: 2021-06-11

## 2021-06-11 ENCOUNTER — TELEPHONE (OUTPATIENT)
Dept: FAMILY MEDICINE CLINIC | Age: 66
End: 2021-06-11

## 2021-06-11 VITALS
DIASTOLIC BLOOD PRESSURE: 74 MMHG | HEART RATE: 74 BPM | RESPIRATION RATE: 16 BRPM | SYSTOLIC BLOOD PRESSURE: 116 MMHG | HEIGHT: 66 IN | BODY MASS INDEX: 28.12 KG/M2 | WEIGHT: 175 LBS | OXYGEN SATURATION: 98 %

## 2021-06-11 DIAGNOSIS — G56.01 CARPAL TUNNEL SYNDROME OF RIGHT WRIST: ICD-10-CM

## 2021-06-11 DIAGNOSIS — Z01.818 PRE-OP EXAM: Primary | ICD-10-CM

## 2021-06-11 PROCEDURE — 99214 OFFICE O/P EST MOD 30 MIN: CPT | Performed by: NURSE PRACTITIONER

## 2021-06-11 NOTE — PROGRESS NOTES
Preoperative Evaluation    Subjective:   Danitza Granados is a 77 y.o. y.o.  female who presents to the office today for a preoperative consultation. Surgeon: Dr. Tenisha Paul    Location: [facility]  Performing: Right ulnar nerve decompression at elbow. Right carpal tunnel release. Date: June 14. Planned anesthesia is General.   The patient has the following known anesthesia issues: none   Patient has a bleeding risk of : no recent abnormal bleeding   Patient does not have objection to receiving blood products if needed. Taking Prednisone 5 mg daily for fibromyalgia. Review of Systems   Pertinent items are noted in HPI.      Denies fevers, chills, diaphoresis, CP, SOB, dysphagia, abd pain, urinary complaints, new edema, rashes, cyanosis    Past Medical History:   Diagnosis Date    Allergic rhinitis     Anxiety     Bilateral carpal tunnel syndrome 4/13/2021    CAD (coronary artery disease) 06/05/2019    COVID-19 12/28/2020    Depression     Endometriosis     hx    Fibromyalgia     GERD (gastroesophageal reflux disease)     Headache(784.0)     Hiatal hernia     Hyperlipidemia     Irritable bowel syndrome     Left bundle branch block (LBBB)     Osteoarthritis     SVT (supraventricular tachycardia) (HCC)     hx        Past Surgical History:   Procedure Laterality Date    ABLATION OF DYSRHYTHMIC FOCUS  1999    SVT    APPENDECTOMY  1982    ARM SURGERY Left 5/4/2021    LEFT ULNAR NERVE DECOMPRESSION AT ELBOW performed by June Garcia MD at [facility]    bilateral, 2 revisions left foot    CARDIAC SURGERY  2019    stent    CARPAL TUNNEL RELEASE Left 5/4/2021    LEFT CARPAL TUNNEL RELEASE performed by June Garcia MD at [address]  10/15/13    Hx polyps-3 yrs-? poor prep    ECTOPIC PREGNANCY SURGERY Right 1988    HYSTERECTOMY, TOTAL ABDOMINAL  1988    LAPAROSCOPY      6--endometriosis  NASAL SEPTUM SURGERY  1982    OVARY SURGERY Right 1987    partial removal    SALPINGO-OOPHORECTOMY Left 1986    UPPER GASTROINTESTINAL ENDOSCOPY  10/15/13    volodymyr ring       Social History     Tobacco History     Smoking Status  Never Smoker    Smokeless Tobacco Use  Never Used          Alcohol History     Alcohol Use Status  No          Drug Use     Drug Use Status  No          Sexual Activity     Sexually Active  Not Currently Partners  Male                Family History   Problem Relation Age of Onset    Other Mother         sepsis    Dementia Mother     High Cholesterol Mother     Depression Mother     Anxiety Disorder Mother     High Blood Pressure Father     Other Father          from renal failure and CHF    Heart Disease Father     Migraines Father     Heart Attack Brother     Heart Disease Brother     Hypertension Other     High Cholesterol Other     Migraines Other     Heart Disease Other     Diabetes Other        Current Outpatient Medications   Medication Sig Dispense Refill    traMADol (ULTRAM) 50 MG tablet Take 1 tablet by mouth every 6 hours as needed for Pain (fibromyalgia) for up to 30 days. 45 tablet 0    predniSONE (DELTASONE) 10 MG tablet 2 tablets 2 times daily for 5 days, 1 tablet 2 times daily for 5 days, 1 tablet daily (Patient taking differently: 5 mg daily ) 60 tablet 0    omeprazole (PRILOSEC) 20 MG delayed release capsule Take 1 capsule by mouth every 12 hours 180 capsule 1    citalopram (CELEXA) 40 MG tablet TAKE 1 TABLET BY MOUTH ONE TIME A DAY 90 tablet 1    methocarbamol (ROBAXIN) 500 MG tablet TAKE ONE TABLET BY MOUTH THREE TIMES A DAY 90 tablet 2    nitroGLYCERIN (NITROSTAT) 0.4 MG SL tablet up to max of 3 total doses.  If no relief after 1 dose, call 911. 25 tablet 3    rosuvastatin (CRESTOR) 20 MG tablet TAKE ONE TABLET BY MOUTH NIGHTLY 90 tablet 1    metoprolol succinate (TOPROL XL) 25 MG extended release tablet TAKE 1 TABLET BY MOUTH ONE TIME A DAY 90 tablet 1    Magnesium 200 MG CHEW Take 1 tablet by mouth daily       Calcium Carbonate-Vit D-Min (CALCIUM 600+D3 PLUS MINERALS) 600-800 MG-UNIT CHEW Take 1 tablet by mouth 2 times daily      vitamin D (ERGOCALCIFEROL) 1.25 MG (05169 UT) CAPS capsule Take 1 capsule by mouth once a week 12 capsule 1    Galcanezumab-gnlm (EMGALITY) 120 MG/ML SOAJ Inject 120 mg into the skin every 30 days 3 pen 3    promethazine (PHENERGAN) 25 MG tablet Take 1 tablet by mouth every 6 hours as needed for Nausea 30 tablet 1    aspirin 81 MG chewable tablet Take 1 tablet by mouth daily 30 tablet 3    dicyclomine (BENTYL) 10 MG capsule Take 1 capsule by mouth 3 times daily (before meals) (Patient taking differently: Take 10 mg by mouth 3 times daily as needed ) 90 capsule 0    Cholecalciferol (VITAMIN D3) 5000 UNITS TABS Take by mouth daily       diphenhydrAMINE (BENADRYL) 25 MG tablet Take 25 mg by mouth every 6 hours as needed. OTC       gabapentin (NEURONTIN) 100 MG capsule Take 1 capsule by mouth nightly for 30 days. Intended supply: 30 days 30 capsule 1     No current facility-administered medications for this visit. Objective:   Vitals:    06/11/21 1414   BP: 116/74   Pulse: 74   Resp: 16   SpO2: 98%       Physical Exam   /74   Pulse 74   Resp 16   Ht 5' 6\" (1.676 m)   Wt 175 lb (79.4 kg)   LMP  (LMP Unknown)   SpO2 98%   BMI 28.25 kg/m²   General appearance: alert, appears stated age, and cooperative  Throat: lips, mucosa, and tongue normal; teeth and gums normal  Lungs: clear to auscultation bilaterally  Heart: regular rate and rhythm, S1, S2 normal, no murmur, click, rub or gallop  Extremities: extremities normal, atraumatic, no cyanosis or edema  Pulses: 2+ and symmetric  Skin: Skin color, texture, turgor normal. No rashes or lesions  Neurologic: Grossly normal     Predictors of intubation difficulty:   Morbid obesity?  no   Anatomically abnormal facies? no   Short, thick neck? no   Neck range of motion: normal   Mallampati score: I (soft palate, uvula, fauces, tonsillar pillars visible)   Dentition: No chipped or loose teeth. Does have some missing teeth.           Lab Review   Office Visit on 05/03/2021   Component Date Value    WBC 05/03/2021 8.3     RBC 05/03/2021 4.53     Hemoglobin 05/03/2021 14.5     Hematocrit 05/03/2021 43.9     MCV 05/03/2021 96.9     MCH 05/03/2021 31.9     MCHC 05/03/2021 33.0     RDW 05/03/2021 13.9     Platelets 37/54/6936 305     MPV 05/03/2021 7.1     Neutrophils % 05/03/2021 60.6     Lymphocytes % 05/03/2021 28.7     Monocytes % 05/03/2021 5.8     Eosinophils % 05/03/2021 4.2     Basophils % 05/03/2021 0.7     Neutrophils Absolute 05/03/2021 5.1     Lymphocytes Absolute 05/03/2021 2.4     Monocytes Absolute 05/03/2021 0.5     Eosinophils Absolute 05/03/2021 0.4     Basophils Absolute 05/03/2021 0.1     Sodium 05/03/2021 142     Potassium reflex Magnesi* 05/03/2021 4.2     Chloride 05/03/2021 102     CO2 05/03/2021 27     Anion Gap 05/03/2021 13     Glucose 05/03/2021 88     BUN 05/03/2021 14     CREATININE 05/03/2021 0.6     GFR Non- 05/03/2021 >60     GFR  05/03/2021 >60     Calcium 05/03/2021 9.4     Total Protein 05/03/2021 7.0     Albumin 05/03/2021 4.7     Albumin/Globulin Ratio 05/03/2021 2.0     Total Bilirubin 05/03/2021 0.3     Alkaline Phosphatase 05/03/2021 77     ALT 05/03/2021 9*    AST 05/03/2021 12*    Globulin 05/03/2021 2.3    Office Visit on 05/03/2021   Component Date Value    SARS-CoV-2 05/03/2021 Not Detected    Orders Only on 03/30/2021   Component Date Value    Vitamin B-12 03/30/2021 [facility] Outpatient Visit on 03/10/2021   Component Date Value    Left Ventricular Ejectio* 03/10/2021 40     LVEF MODALITY 03/10/2021 ECHO    Orders Only on 02/26/2021   Component Date Value    Cholesterol, Total 02/26/2021 185     Triglycerides 02/26/2021 131     HDL 02/26/2021 in reference to those problems addressed above in detail. Appropriate medical decision making was based on this.          Pt is at an acceptable risk for planned procedure    Please call with any questions    Scotty Naqvi CNP

## 2021-06-11 NOTE — TELEPHONE ENCOUNTER
Surgery and/or Procedure Scheduling     Contact Name: Miladys Stein Request: PATIENT NEEDS  [facility] SURGERY  Patient Contact Number: [phone]

## 2021-06-12 LAB
ANION GAP SERPL CALCULATED.3IONS-SCNC: 12 MMOL/L (ref 3–16)
BASOPHILS ABSOLUTE: 0.1 K/UL (ref 0–0.2)
BASOPHILS RELATIVE PERCENT: 1 %
BUN BLDV-MCNC: 10 MG/DL (ref 7–20)
CALCIUM SERPL-MCNC: 9.2 MG/DL (ref 8.3–10.6)
CHLORIDE BLD-SCNC: 104 MMOL/L (ref 99–110)
CO2: 27 MMOL/L (ref 21–32)
CREAT SERPL-MCNC: 0.6 MG/DL (ref 0.6–1.2)
EOSINOPHILS ABSOLUTE: 0.1 K/UL (ref 0–0.6)
EOSINOPHILS RELATIVE PERCENT: 1.7 %
GFR AFRICAN AMERICAN: >60
GFR NON-AFRICAN AMERICAN: >60
GLUCOSE BLD-MCNC: 95 MG/DL (ref 70–99)
HCT VFR BLD CALC: 38.1 % (ref 36–48)
HEMOGLOBIN: 13 G/DL (ref 12–16)
LYMPHOCYTES ABSOLUTE: 1 K/UL (ref 1–5.1)
LYMPHOCYTES RELATIVE PERCENT: 15.8 %
MCH RBC QN AUTO: 32.2 PG (ref 26–34)
MCHC RBC AUTO-ENTMCNC: 34 G/DL (ref 31–36)
MCV RBC AUTO: 94.5 FL (ref 80–100)
MONOCYTES ABSOLUTE: 0.3 K/UL (ref 0–1.3)
MONOCYTES RELATIVE PERCENT: 4 %
NEUTROPHILS ABSOLUTE: 5 K/UL (ref 1.7–7.7)
NEUTROPHILS RELATIVE PERCENT: 77.5 %
PDW BLD-RTO: 13.8 % (ref 12.4–15.4)
PLATELET # BLD: 299 K/UL (ref 135–450)
PMV BLD AUTO: 6.7 FL (ref 5–10.5)
POTASSIUM SERPL-SCNC: 4.2 MMOL/L (ref 3.5–5.1)
RBC # BLD: 4.03 M/UL (ref 4–5.2)
SODIUM BLD-SCNC: 143 MMOL/L (ref 136–145)
WBC # BLD: 6.4 K/UL (ref 4–11)

## 2021-06-18 ENCOUNTER — TELEPHONE (OUTPATIENT)
Dept: FAMILY MEDICINE CLINIC | Age: 66
End: 2021-06-18

## 2021-06-18 NOTE — TELEPHONE ENCOUNTER
Pt would like to know it Caesar Lange has received paperwork from [facility] for a medical update. They told her they sent it at the beginning of the week and again on 6/15. She would like to know the status of the form. Found in media that it had been scanned in about 10 mins ago; adv pt.

## 2021-06-22 ENCOUNTER — TELEPHONE (OUTPATIENT)
Dept: FAMILY MEDICINE CLINIC | Age: 66
End: 2021-06-22

## 2021-06-22 RX ORDER — ROSUVASTATIN CALCIUM 20 MG/1
TABLET, COATED ORAL
Qty: 90 TABLET | Refills: 1 | Status: SHIPPED | OUTPATIENT
Start: 2021-06-22 | End: 2021-10-20 | Stop reason: SDUPTHER

## 2021-06-22 NOTE — TELEPHONE ENCOUNTER
Pt called back and was wondering if we have received a medical update form from [employer]. I haven't seen anything recently these past two days.

## 2021-06-28 ENCOUNTER — TELEPHONE (OUTPATIENT)
Dept: FAMILY MEDICINE CLINIC | Age: 66
End: 2021-06-28

## 2021-06-28 DIAGNOSIS — M79.7 FIBROMYALGIA: ICD-10-CM

## 2021-06-28 DIAGNOSIS — M15.9 PRIMARY OSTEOARTHRITIS INVOLVING MULTIPLE JOINTS: ICD-10-CM

## 2021-06-28 RX ORDER — TRAMADOL HYDROCHLORIDE 50 MG/1
50 TABLET ORAL EVERY 6 HOURS PRN
Qty: 45 TABLET | Refills: 0 | Status: SHIPPED | OUTPATIENT
Start: 2021-06-28 | End: 2021-07-30 | Stop reason: SDUPTHER

## 2021-06-28 NOTE — TELEPHONE ENCOUNTER
Pt called [employer] and every thing is taken care of. They received the Forms from Dr Vickie Nageotte and pt's CLARISA is covered. No further action is needed.

## 2021-06-28 NOTE — TELEPHONE ENCOUNTER
Pt is requesting a refill on her tramadol. [address]    Pt states that she will be having her carpel tunnel surgery on 7/6/21. She is asking that her norco be called in for her on 7/5/21. Pt would like to know if her paperwork for [facility] medical update is completed. Has it been completed and refaxed. She says it was not completed the first time it was sent.

## 2021-06-28 NOTE — TELEPHONE ENCOUNTER
Pt has been informed. Pt will call [facility] to let them know they faxed forms to the wrong office. Should have came to us.

## 2021-06-28 NOTE — PROGRESS NOTES
Obstructive Sleep Apnea (MIR) Screening     Patient:  Josiane Painter    YOB: 1955      Medical Record #:  [id number]                     Date:  6/28/2021     1. Are you a loud and/or regular snorer? []  Yes       [x] No    2. Have you been observed to gasp or stop breathing during sleep? []  Yes       [x] No    3. Do you feel tired or groggy upon awakening or do you awaken with a headache?           []  Yes       [] No    4. Are you often tired or fatigued during the wake time hours? []  Yes       [] No    5. Do you fall asleep sitting, reading, watching TV or driving? []  Yes       [] No    6. Do you often have problems with memory or concentration? []  Yes       [] No    **If patient's score is ? 3 they are considered high risk for MIR. An Anesthesia provider will evaluate the patient and develop a plan of care the day of surgery. Note:  If the patient's BMI is more than 35 kg m¯² , has neck circumference > 40 cm, and/or high blood pressure the risk is greater (© American Sleep Apnea Association, 2006).

## 2021-06-28 NOTE — TELEPHONE ENCOUNTER
I refilled the tramadol as requested    When I review her EMR in media it appears that [address] has forwarded the disability paperwork to Dr. Griselda Maizes and he has completed it. That is why this office does not have that paper.

## 2021-07-02 ENCOUNTER — ANESTHESIA EVENT (OUTPATIENT)
Dept: OPERATING ROOM | Age: 66
End: 2021-07-02
Payer: COMMERCIAL

## 2021-07-06 ENCOUNTER — ANESTHESIA (OUTPATIENT)
Dept: OPERATING ROOM | Age: 66
End: 2021-07-06
Payer: COMMERCIAL

## 2021-07-06 ENCOUNTER — HOSPITAL ENCOUNTER (OUTPATIENT)
Age: 66
Setting detail: OUTPATIENT SURGERY
Discharge: HOME | End: 2021-07-06
Attending: ORTHOPAEDIC SURGERY | Admitting: ORTHOPAEDIC SURGERY
Payer: COMMERCIAL

## 2021-07-06 VITALS
WEIGHT: 175 LBS | TEMPERATURE: 97.2 F | HEIGHT: 67 IN | HEART RATE: 78 BPM | OXYGEN SATURATION: 98 % | SYSTOLIC BLOOD PRESSURE: 130 MMHG | BODY MASS INDEX: 27.47 KG/M2 | RESPIRATION RATE: 16 BRPM | DIASTOLIC BLOOD PRESSURE: 70 MMHG

## 2021-07-06 VITALS
OXYGEN SATURATION: 98 % | RESPIRATION RATE: 14 BRPM | SYSTOLIC BLOOD PRESSURE: 87 MMHG | DIASTOLIC BLOOD PRESSURE: 50 MMHG

## 2021-07-06 PROBLEM — I25.118 CORONARY ARTERY DISEASE OF NATIVE ARTERY OF NATIVE HEART WITH STABLE ANGINA PECTORIS (HCC): Status: ACTIVE | Noted: 2019-06-05

## 2021-07-06 LAB — TROPONIN: <0.01 NG/ML

## 2021-07-06 PROCEDURE — 2709999900 HC NON-CHARGEABLE SUPPLY: Performed by: ORTHOPAEDIC SURGERY

## 2021-07-06 PROCEDURE — 2580000003 HC RX 258: Performed by: ANESTHESIOLOGY

## 2021-07-06 PROCEDURE — 7100000001 HC PACU RECOVERY - ADDTL 15 MIN: Performed by: ORTHOPAEDIC SURGERY

## 2021-07-06 PROCEDURE — 6360000002 HC RX W HCPCS: Performed by: ANESTHESIOLOGY

## 2021-07-06 PROCEDURE — 2500000003 HC RX 250 WO HCPCS: Performed by: ORTHOPAEDIC SURGERY

## 2021-07-06 PROCEDURE — 7100000000 HC PACU RECOVERY - FIRST 15 MIN: Performed by: ORTHOPAEDIC SURGERY

## 2021-07-06 PROCEDURE — 7100000010 HC PHASE II RECOVERY - FIRST 15 MIN: Performed by: ORTHOPAEDIC SURGERY

## 2021-07-06 PROCEDURE — 84484 ASSAY OF TROPONIN QUANT: CPT

## 2021-07-06 PROCEDURE — 64721 CARPAL TUNNEL SURGERY: CPT | Performed by: ORTHOPAEDIC SURGERY

## 2021-07-06 PROCEDURE — 93005 ELECTROCARDIOGRAM TRACING: CPT | Performed by: ANESTHESIOLOGY

## 2021-07-06 PROCEDURE — 3700000001 HC ADD 15 MINUTES (ANESTHESIA): Performed by: ORTHOPAEDIC SURGERY

## 2021-07-06 PROCEDURE — 6360000002 HC RX W HCPCS: Performed by: NURSE ANESTHETIST, CERTIFIED REGISTERED

## 2021-07-06 PROCEDURE — 3600000004 HC SURGERY LEVEL 4 BASE: Performed by: ORTHOPAEDIC SURGERY

## 2021-07-06 PROCEDURE — 7100000011 HC PHASE II RECOVERY - ADDTL 15 MIN: Performed by: ORTHOPAEDIC SURGERY

## 2021-07-06 PROCEDURE — 6370000000 HC RX 637 (ALT 250 FOR IP): Performed by: ANESTHESIOLOGY

## 2021-07-06 PROCEDURE — 3700000000 HC ANESTHESIA ATTENDED CARE: Performed by: ORTHOPAEDIC SURGERY

## 2021-07-06 PROCEDURE — 64718 REVISE ULNAR NERVE AT ELBOW: CPT | Performed by: ORTHOPAEDIC SURGERY

## 2021-07-06 PROCEDURE — 99215 OFFICE O/P EST HI 40 MIN: CPT | Performed by: INTERNAL MEDICINE

## 2021-07-06 PROCEDURE — 3600000014 HC SURGERY LEVEL 4 ADDTL 15MIN: Performed by: ORTHOPAEDIC SURGERY

## 2021-07-06 PROCEDURE — 2580000003 HC RX 258: Performed by: ORTHOPAEDIC SURGERY

## 2021-07-06 PROCEDURE — 2500000003 HC RX 250 WO HCPCS: Performed by: NURSE ANESTHETIST, CERTIFIED REGISTERED

## 2021-07-06 RX ORDER — MAGNESIUM HYDROXIDE 1200 MG/15ML
LIQUID ORAL CONTINUOUS PRN
Status: COMPLETED | OUTPATIENT
Start: 2021-07-06 | End: 2021-07-06

## 2021-07-06 RX ORDER — NITROGLYCERIN 0.4 MG/1
0.4 TABLET SUBLINGUAL ONCE
Status: DISCONTINUED | OUTPATIENT
Start: 2021-07-06 | End: 2021-07-06 | Stop reason: HOSPADM

## 2021-07-06 RX ORDER — HYDRALAZINE HYDROCHLORIDE 20 MG/ML
5 INJECTION INTRAMUSCULAR; INTRAVENOUS EVERY 10 MIN PRN
Status: DISCONTINUED | OUTPATIENT
Start: 2021-07-06 | End: 2021-07-06 | Stop reason: HOSPADM

## 2021-07-06 RX ORDER — PROPOFOL 10 MG/ML
INJECTION, EMULSION INTRAVENOUS PRN
Status: DISCONTINUED | OUTPATIENT
Start: 2021-07-06 | End: 2021-07-06 | Stop reason: SDUPTHER

## 2021-07-06 RX ORDER — FENTANYL CITRATE 50 UG/ML
INJECTION, SOLUTION INTRAMUSCULAR; INTRAVENOUS PRN
Status: DISCONTINUED | OUTPATIENT
Start: 2021-07-06 | End: 2021-07-06 | Stop reason: SDUPTHER

## 2021-07-06 RX ORDER — OXYCODONE HYDROCHLORIDE AND ACETAMINOPHEN 5; 325 MG/1; MG/1
1 TABLET ORAL PRN
Status: DISCONTINUED | OUTPATIENT
Start: 2021-07-06 | End: 2021-07-06 | Stop reason: HOSPADM

## 2021-07-06 RX ORDER — OXYCODONE HYDROCHLORIDE AND ACETAMINOPHEN 5; 325 MG/1; MG/1
2 TABLET ORAL PRN
Status: DISCONTINUED | OUTPATIENT
Start: 2021-07-06 | End: 2021-07-06 | Stop reason: HOSPADM

## 2021-07-06 RX ORDER — DEXAMETHASONE SODIUM PHOSPHATE 10 MG/ML
INJECTION INTRAMUSCULAR; INTRAVENOUS PRN
Status: DISCONTINUED | OUTPATIENT
Start: 2021-07-06 | End: 2021-07-06 | Stop reason: SDUPTHER

## 2021-07-06 RX ORDER — SODIUM CHLORIDE, SODIUM LACTATE, POTASSIUM CHLORIDE, CALCIUM CHLORIDE 600; 310; 30; 20 MG/100ML; MG/100ML; MG/100ML; MG/100ML
INJECTION, SOLUTION INTRAVENOUS CONTINUOUS
Status: DISCONTINUED | OUTPATIENT
Start: 2021-07-06 | End: 2021-07-06 | Stop reason: HOSPADM

## 2021-07-06 RX ORDER — MORPHINE SULFATE 2 MG/ML
1 INJECTION, SOLUTION INTRAMUSCULAR; INTRAVENOUS EVERY 5 MIN PRN
Status: DISCONTINUED | OUTPATIENT
Start: 2021-07-06 | End: 2021-07-06 | Stop reason: HOSPADM

## 2021-07-06 RX ORDER — MEPERIDINE HYDROCHLORIDE 50 MG/ML
12.5 INJECTION INTRAMUSCULAR; INTRAVENOUS; SUBCUTANEOUS EVERY 5 MIN PRN
Status: DISCONTINUED | OUTPATIENT
Start: 2021-07-06 | End: 2021-07-06 | Stop reason: HOSPADM

## 2021-07-06 RX ORDER — ASPIRIN 81 MG/1
324 TABLET, CHEWABLE ORAL DAILY
Status: DISCONTINUED | OUTPATIENT
Start: 2021-07-06 | End: 2021-07-06 | Stop reason: HOSPADM

## 2021-07-06 RX ORDER — PROMETHAZINE HYDROCHLORIDE 25 MG/ML
6.25 INJECTION, SOLUTION INTRAMUSCULAR; INTRAVENOUS
Status: DISCONTINUED | OUTPATIENT
Start: 2021-07-06 | End: 2021-07-06 | Stop reason: HOSPADM

## 2021-07-06 RX ORDER — BUPIVACAINE HYDROCHLORIDE 5 MG/ML
INJECTION, SOLUTION EPIDURAL; INTRACAUDAL PRN
Status: DISCONTINUED | OUTPATIENT
Start: 2021-07-06 | End: 2021-07-06 | Stop reason: ALTCHOICE

## 2021-07-06 RX ORDER — ONDANSETRON 2 MG/ML
INJECTION INTRAMUSCULAR; INTRAVENOUS PRN
Status: DISCONTINUED | OUTPATIENT
Start: 2021-07-06 | End: 2021-07-06 | Stop reason: SDUPTHER

## 2021-07-06 RX ORDER — ONDANSETRON 2 MG/ML
4 INJECTION INTRAMUSCULAR; INTRAVENOUS PRN
Status: DISCONTINUED | OUTPATIENT
Start: 2021-07-06 | End: 2021-07-06 | Stop reason: HOSPADM

## 2021-07-06 RX ORDER — LIDOCAINE HYDROCHLORIDE 20 MG/ML
INJECTION, SOLUTION INFILTRATION; PERINEURAL PRN
Status: DISCONTINUED | OUTPATIENT
Start: 2021-07-06 | End: 2021-07-06 | Stop reason: SDUPTHER

## 2021-07-06 RX ORDER — DIPHENHYDRAMINE HYDROCHLORIDE 50 MG/ML
12.5 INJECTION INTRAMUSCULAR; INTRAVENOUS
Status: DISCONTINUED | OUTPATIENT
Start: 2021-07-06 | End: 2021-07-06 | Stop reason: HOSPADM

## 2021-07-06 RX ORDER — MORPHINE SULFATE 2 MG/ML
2 INJECTION, SOLUTION INTRAMUSCULAR; INTRAVENOUS EVERY 5 MIN PRN
Status: DISCONTINUED | OUTPATIENT
Start: 2021-07-06 | End: 2021-07-06 | Stop reason: HOSPADM

## 2021-07-06 RX ADMIN — HYDROMORPHONE HYDROCHLORIDE 0.5 MG: 1 INJECTION, SOLUTION INTRAMUSCULAR; INTRAVENOUS; SUBCUTANEOUS at 10:15

## 2021-07-06 RX ADMIN — ASPIRIN 324 MG: 81 TABLET, CHEWABLE ORAL at 11:01

## 2021-07-06 RX ADMIN — DEXAMETHASONE SODIUM PHOSPHATE 8 MG: 10 INJECTION INTRAMUSCULAR; INTRAVENOUS at 09:28

## 2021-07-06 RX ADMIN — SODIUM CHLORIDE, POTASSIUM CHLORIDE, SODIUM LACTATE AND CALCIUM CHLORIDE: 600; 310; 30; 20 INJECTION, SOLUTION INTRAVENOUS at 07:50

## 2021-07-06 RX ADMIN — LIDOCAINE HYDROCHLORIDE 60 MG: 20 INJECTION, SOLUTION INFILTRATION; PERINEURAL at 09:24

## 2021-07-06 RX ADMIN — PROPOFOL 200 MG: 10 INJECTION, EMULSION INTRAVENOUS at 09:24

## 2021-07-06 RX ADMIN — SODIUM CHLORIDE, SODIUM LACTATE, POTASSIUM CHLORIDE, AND CALCIUM CHLORIDE: .6; .31; .03; .02 INJECTION, SOLUTION INTRAVENOUS at 09:23

## 2021-07-06 RX ADMIN — ONDANSETRON 4 MG: 2 INJECTION INTRAMUSCULAR; INTRAVENOUS at 09:28

## 2021-07-06 RX ADMIN — PROMETHAZINE HYDROCHLORIDE 6.25 MG: 25 INJECTION INTRAMUSCULAR; INTRAVENOUS at 10:35

## 2021-07-06 RX ADMIN — FENTANYL CITRATE 50 MCG: 50 INJECTION INTRAMUSCULAR; INTRAVENOUS at 09:24

## 2021-07-06 ASSESSMENT — PULMONARY FUNCTION TESTS
PIF_VALUE: 1
PIF_VALUE: 3
PIF_VALUE: 9
PIF_VALUE: 21
PIF_VALUE: 3
PIF_VALUE: 20
PIF_VALUE: 20
PIF_VALUE: 3
PIF_VALUE: 21
PIF_VALUE: 19
PIF_VALUE: 3
PIF_VALUE: 3
PIF_VALUE: 21
PIF_VALUE: 3
PIF_VALUE: 3
PIF_VALUE: 1
PIF_VALUE: 3
PIF_VALUE: 25
PIF_VALUE: 2
PIF_VALUE: 1
PIF_VALUE: 3
PIF_VALUE: 3
PIF_VALUE: 19

## 2021-07-06 ASSESSMENT — PAIN SCALES - GENERAL
PAINLEVEL_OUTOF10: 0
PAINLEVEL_OUTOF10: 4
PAINLEVEL_OUTOF10: 0
PAINLEVEL_OUTOF10: 0
PAINLEVEL_OUTOF10: 5
PAINLEVEL_OUTOF10: 0
PAINLEVEL_OUTOF10: 8
PAINLEVEL_OUTOF10: 4
PAINLEVEL_OUTOF10: 0

## 2021-07-06 ASSESSMENT — PAIN - FUNCTIONAL ASSESSMENT: PAIN_FUNCTIONAL_ASSESSMENT: 0-10

## 2021-07-06 ASSESSMENT — PAIN DESCRIPTION - DESCRIPTORS: DESCRIPTORS: ACHING

## 2021-07-06 NOTE — CONSULTS
[facility]  [phone]      Attending Physician: Jose Cortés MD  Reason for Consultation/Chief Complaint: CP    Subjective   History of Present Illness:  Lionel Arellano is a 77 y.o. patient who presented to the hospital with complaints of CP. Patient was in-house for a right carpal tunnel release and ulnar nerve decompression once her complaint of chest pain postoperative. Patient states she usually has chest pain about once a month or so and usually takes nitroglycerin at home. She states prior to surgery she was feeling fine with no chest pain pressure or angina. No dyspnea on exertion or exertional angina. After she had her surgery today she describes a substernal chest pressure with some radiation to her neck. It was not \"severe\" per patient because it did not really go down into her shoulders or her arms. This chest pain abated spontaneously by itself before staff was able to give her nitroglycerin and she is now chest pain-free. I discussed with her the possibility of ruling her out with serial troponins and possible overnight stay for observation she politely declined and states she wishes to go home with follow-up with her primary cardiologist        Past Medical History:   has a past medical history of Allergic rhinitis, Anxiety, Bilateral carpal tunnel syndrome, CAD (coronary artery disease), COVID-19, Depression, Endometriosis, Fibromyalgia, GERD (gastroesophageal reflux disease), Headache(784.0), Hiatal hernia, Hyperlipidemia, Irritable bowel syndrome, Left bundle branch block (LBBB), Osteoarthritis, SVT (supraventricular tachycardia) ([address] and WPW (Steffanie-Parkinson-White syndrome). Surgical History:   has a past surgical history that includes Nasal septum surgery (1982); Bunionectomy (1973); laparoscopy; ablation of dysrhythmic focus (1999); Colonoscopy (10/15/13); Cardiac surgery (2019); Cholecystectomy (2000);  Appendectomy (1982); Salpingo-oophorectomy (Left, 1986); Ovary surgery (Right, 1987); Ectopic pregnancy surgery (Right, 1988); Hysterectomy, total abdominal (1988); Upper gastrointestinal endoscopy (10/15/13); Arm Surgery (Left, 5/4/2021); and Carpal tunnel release (Left, 5/4/2021). Social History:   reports that she has never smoked. She has never used smokeless tobacco. She reports that she does not drink alcohol and does not use drugs. Family History:  family history includes Anxiety Disorder in her mother; Dementia in her mother; Depression in her mother; Diabetes in an other family member; Heart Attack in her brother; Heart Disease in her brother, father, and another family member; High Blood Pressure in her father; High Cholesterol in her mother and another family member; Hypertension in an other family member; Migraines in her father and another family member; Other in her father and mother. Home Medications:  Were reviewed and are listed in nursing record and/or below  Prior to Admission medications    Medication Sig Start Date End Date Taking? Authorizing Provider   traMADol (ULTRAM) 50 MG tablet Take 1 tablet by mouth every 6 hours as needed for Pain (fibromyalgia) for up to 30 days. 6/28/21 7/28/21 Yes LUCIA Muñoz CNP   rosuvastatin (CRESTOR) 20 MG tablet TAKE ONE TABLET BY MOUTH NIGHTLY 6/22/21  Yes LUCIA Peters CNP   citalopram (CELEXA) 40 MG tablet TAKE 1 TABLET BY MOUTH ONE TIME A DAY 1/28/21  Yes LUCIA Muñoz CNP   methocarbamol (ROBAXIN) 500 MG tablet TAKE ONE TABLET BY MOUTH THREE TIMES A DAY 1/13/21  Yes LUCIA Muñoz CNP   nitroGLYCERIN (NITROSTAT) 0.4 MG SL tablet up to max of 3 total doses.  If no relief after 1 dose, call 911. 11/30/20  Yes LUCIA Peters CNP   metoprolol succinate (TOPROL XL) 25 MG extended release tablet TAKE 1 TABLET BY MOUTH ONE TIME A DAY 11/16/20  Yes LUCIA Peters CNP   Calcium Carbonate-Vit D-Min (CALCIUM 600+D3 PLUS MINERALS) 600-800 MG-UNIT CHEW Take 1 tablet by mouth 2 times daily   Yes Historical Provider, MD   vitamin D (ERGOCALCIFEROL) 1.25 MG (07455 UT) CAPS capsule Take 1 capsule by mouth once a week 10/21/20  Yes LUCIA Chaidez CNP   Galcanezumab-gnlm (EMGALITY) 120 MG/ML SOAJ Inject 120 mg into the skin every 30 days 10/15/20  Yes LUCIA Chaidez CNP   Cholecalciferol (VITAMIN D3) 5000 UNITS TABS Take by mouth daily    Yes Historical Provider, MD   gabapentin (NEURONTIN) 100 MG capsule Take 1 capsule by mouth nightly for 30 days. Intended supply: 30 days 4/16/21 5/28/21  LUCIA Oneil CNP   omeprazole (PRILOSEC) 20 MG delayed release capsule Take 1 capsule by mouth every 12 hours 3/22/21   LUCIA Oneil CNP   Magnesium 200 MG CHEW Take 1 tablet by mouth daily     Historical Provider, MD   promethazine (PHENERGAN) 25 MG tablet Take 1 tablet by mouth every 6 hours as needed for Nausea 9/13/19   Bubba Gamez DO   aspirin 81 MG chewable tablet Take 1 tablet by mouth daily 6/5/19   Mary Jo Cruz MD   dicyclomine (BENTYL) 10 MG capsule Take 1 capsule by mouth 3 times daily (before meals)  Patient taking differently: Take 10 mg by mouth 3 times daily as needed  2/14/18   Bubba Gamez DO   diphenhydrAMINE (BENADRYL) 25 MG tablet Take 25 mg by mouth every 6 hours as needed.  OTC     Historical Provider, MD        CURRENT Medications:  lactated ringers infusion, Continuous  lidocaine 1 % (PF) injection 0.1 mL, Once PRN  meperidine (DEMEROL) injection 12.5 mg, Q5 Min PRN  HYDROmorphone (DILAUDID) injection 0.25 mg, Q5 Min PRN  HYDROmorphone (DILAUDID) injection 0.5 mg, Q5 Min PRN  morphine (PF) injection 1 mg, Q5 Min PRN  morphine (PF) injection 2 mg, Q5 Min PRN  oxyCODONE-acetaminophen (PERCOCET) 5-325 MG per tablet 1 tablet, PRN   Or  oxyCODONE-acetaminophen (PERCOCET) 5-325 MG per tablet 2 tablet, PRN  ondansetron (ZOFRAN) injection 4 mg, PRN  promethazine (PHENERGAN) injection 6.25 mg, Q15 Min PRN  diphenhydrAMINE (BENADRYL) injection 12.5 mg, Once PRN  hydrALAZINE (APRESOLINE) injection 5 mg, Q10 Min PRN  sodium chloride 0.9 % irrigation, Continuous PRN  bupivacaine (PF) (MARCAINE) 0.5 % injection, PRN  nitroGLYCERIN (NITROSTAT) SL tablet 0.4 mg, Once  aspirin chewable tablet 324 mg, Daily    lactated ringers infusion, Continuous  sodium chloride flush 0.9 % injection 10 mL, 2 times per day  sodium chloride flush 0.9 % injection 10 mL, PRN  0.9 % sodium chloride infusion, PRN        Allergies: Avelox [moxifloxacin hcl in nacl], Cephalexin, Darvon [propoxyphene hcl], Digoxin, Librax [chlordiazepoxide-methscopol], Lyrica [pregabalin], Motrin [ibuprofen micronized], Other, Pcn [penicillins], Prochlorperazine edisylate, Sulfa antibiotics, Tetracyclines & related, Cymbalta [duloxetine hcl], Nickel, Savella [milnacipran], and Statins     Review of Systems:   A 14 point review of symptoms completed. Pertinent positives identified in the HPI, all other review of symptoms negative as below.       Objective   PHYSICAL EXAM:    Vitals:    07/06/21 1030   BP: 134/75   Pulse: 75   Resp: 16   Temp:    SpO2: 95%    Weight: 175 lb (79.4 kg)         General Appearance:  Alert, cooperative, no distress, appears stated age   Head:  Normocephalic, without obvious abnormality, atraumatic   Eyes:  PERRL, conjunctiva/corneas clear   Nose: Nares normal, no drainage or sinus tenderness   Throat: Lips, mucosa, and tongue normal   Neck: Supple, symmetrical, trachea midline, no adenopathy, thyroid: not enlarged, symmetric, no tenderness/mass/nodules, no carotid bruit or JVD   Lungs:   Clear to auscultation bilaterally, respirations unlabored   Chest Wall:  No deformity or tenderness   Heart:  Regular rate and rhythm, S1, S2 normal, 1 out of 6 systolic murmur, rub or gallop   Abdomen:   Soft, non-tender, bowel sounds active all four quadrants,  no masses, no organomegaly   Extremities: Extremities normal, atraumatic, no cyanosis or edema   Pulses: 2+ and symmetric   Skin: Skin color, texture, turgor normal, no rashes or lesions   Pysch: Normal mood and affect   Neurologic: Normal gross motor and sensory exam.         Labs   CBC:   Lab Results   Component Value Date    WBC 6.4 2021    RBC 4.03 2021    HGB 13.0 2021    HCT 38.1 2021    MCV 94.5 2021    RDW 13.8 2021     2021     CMP:  Lab Results   Component Value Date     2021    K 4.2 2021    K 4.2 2021     2021    CO2 27 2021    BUN 10 2021    CREATININE 0.6 2021    GFRAA >60 2021    GFRAA >60 2012    AGRATIO 2.0 2021    LABGLOM >60 2021    GLUCOSE 95 2021    PROT 7.0 2021    PROT 7.1 2012    CALCIUM 9.2 2021    BILITOT 0.3 2021    ALKPHOS 77 2021    AST 12 2021    ALT 9 2021     PT/INR:  No results found for: PTINR  HgBA1c:No results found for: LABA1C  Lab Results   Component Value Date    TROPONINI <0.01 2021         Cardiac Data     Last EK2021 normal sinus rhythm. Incomplete left bundle branch block with poor R wave progression. No obvious ischemia. Overall unchanged from previous EKG 5/3/2021    Echo: 3/10/2021   Definity is used for myocardial border enhancement. The left ventricle is normal in size with normal wall thickness. Left ventricular systolic function is moderately reduced with a visually   estimated ejection fraction of 40%. The basal to mid inferoseptal segments, basal to mid anteroseptal segments   and entire inferior wall appear hypokinetic. Grade I diastolic dysfunction with normal LV pressure. Normal right ventricular size and function. Mild mitral regurgitation. Inadequate tricuspid valve regurgitation to estimate systolic pulmonary   artery pressure.       Compared to the prior resting study performed 2019, EF is slightly   improved with RWMA's noted above. Stress Test:    Cath:   Results: Cardiac cath October 8, 2019  Left ventricular pressure 10 mmHg  Aortic pressure 126 mmHg     Coronary anatomy:   The left main coronary artery is normal.      Left anterior descending artery has previously placed proximal stent that is widely patent.  Just distal to the stent is a mild narrowing of 20%.    Circumflex artery mild mid vessel plaque.  It is a dominant vessel.     The right coronary artery is a nondominant but is normal.     Left ventriculogram shows ejection fraction of 50 %. Normal wall motion. Studies:     Assessment and Plan     STAT consults for CP     1. Chest pain: resolved  2. CAD   - 6/2019 PCI to LAD  3. Hx of fibromyalgia  4. Hx of WPW   - s/p ablation  5. Hx of SVT/Atrial tach   - s/p ablation    PLAN  1. Patient story does sound consistent with history of chronic stable angina. She politely declines overnight stay her observation for serial troponins. 2.  Would therefore have her be discharged home she can take her as needed nitroglycerin for any chest discomfort knows to return to the hospital if chest pain persists past 3 nitroglycerin. 3.  Otherwise have asked her to follow-up with her primary cardiologist Dr. Gee Nagel. Patient Active Problem List   Diagnosis    Anxiety    Fibromyalgia    IBS (irritable bowel syndrome)    OA (osteoarthritis)    History of prior ablation treatment    Lipoma    History of colonic diverticulitis    Hyperlipidemia    Depression    Small bowel obstruction (HCC)    Migraine with aura and without status migrainosus, not intractable    Angina pectoris ([facility] [address]    H/O angiography    CAD S/P percutaneous coronary angioplasty    COVID-19    Bilateral carpal tunnel syndrome    Ulnar nerve compression, left    Cubital tunnel syndrome, bilateral           Thank you for allowing us to participate in the care of Carmen 9.  Please call me with any questions [phone]. Julia Wang MD, [facility] Cardiologist  [place]   [phone] [facility]  [phone] [address] [place] [address]  7/6/2021 11:12 AM    I will address the patient's cardiac risk factors and adjusted pharmacologic treatment as needed. In addition, I have reinforced the need for patient directed risk factor modification. All questions and concerns were addressed to the patient/family. Alternatives to my treatment were discussed. The note was completed using EMR. Every effort was made to ensure accuracy; however, inadvertent computerized transcription errors may be present.

## 2021-07-06 NOTE — OP NOTE
OPERATIVE REPORT          Patient:  Nathan William    YOB: 1955  Date of Service:  7/6/2021   Location:  [facility]      Preoperative Diagnoses:  Right  carpal tunnel syndrome & Right cubital tunnel syndrome     Postoperative Diagnoses:  Same    Procedures:   Right  Carpal Tunnel Release & Right Ulnar Nerve decompression at the Cubital Tunnel     Surgeon:    Taryn Weiss. Robert Lozada MD    Surgical Assistant:    [facility] Assistant    Anesthesia:   General                                   Blood Loss:    Minimal         Complications:    None                          Tourniquet Time:   6 minutes      Indications: Ms. Nathan William is a 77y.o.  year old female with Right  carpal tunnel syndrome & Right cubital tunnel syndrome . I have discussed preoperatively with her  the complications, limitations, expectations, alternatives and risk of the planned surgical care which she understood & all of her  questions were answered. Ms. Nathan William has provided written informed consent to proceed. After written consent was obtained and the proper operative sites were identified and marked, Ms. Nathan William was brought to the operating room and placed in the supine position on the operating room table with the Right arm extended upon a hand table. General anesthesia was induced & the Right upper extremity was prepped and draped in the usual sterile fashion. Procedure:   After Esmarch exsanguination, the pneuomo-tourniquet was inflated to 250 millimeters of Mercury about the arm. Attention was turned to the medial elbow. A curvilinear incision was fashioned over the posterior medial aspect of the elbow centered between the medial epicondyle and the tip of the olecranon. Dissection was carried carefully through the subcutaneous tissue identifying and protecting the superficial neurovascular structures.   The cubital tunnel was identified and cleared of overlying soft tissue. Careful incision allowed exposure of the ulnar nerve. The nerve was traced proximally and circumferential control of the nerve was obtained. It was dissected proximally to the level of the connection between the biceps and triceps muscles, approximately 3 cm proximal to the medial epicondyle. It was carefully mobilized from the medial intermuscular septum. It was traced distally, being completely freed along the course of the cubital tunnel, and was dissected distally to the level of the second motor branch as it split the heads of the FCU muscle. There was a tight fibrous band at the confluence of the heads of the FCU which was carefully divided. Digital palpation revealed that there were no further proximal or distal constriction about the nerve. The Ulnar Nerve was found to be stable in it's groove without tendency toward subluxation or snapping. Attention was turned to the palm. A 2 cm longitudinal incision was fashioned at the base of the palm, paralleling the longitudinal thenar crease. Dissection was carried carefully through the subcutaneous tissue identifying and protecting the neurovascular structures. The palmar fascia was incised longitudinally, exposing the transverse carpal ligament. The transverse carpal ligament was incised from its proximal to distal most extent, under direct visualization. The terminal 2 cm of antebrachial fascia was similarly incised under direct visualization. The contents of the carpal tunnel were inspected and found to be free of mass, lesion or other abnormality. Digital palpation revealed no further constriction about the median nerve. The incisions were all irrigated copiously with sterile saline for irrigation. The pneumo-tourniquet was deflated after a period of 6 minutes elevation & the fingers were immediately pink and well perfused. Hemostasis was easily obtained with direct pressure and electrocautery.   The incisions were closed in layers with interrupted sutures. The wounds were dressed with Adaptic & dry sterile dressings after local anesthetic was instilled for postoperative analgesia. Ms. Aung De Santiago was awakened from anesthesia having tolerated the procedure without apparent complication. She was returned to the recovery room in stable condition. At the conclusion of the procedure, all needle, instrument and sponge counts were correct. Aretta Saas Shauna Severs, MD   7/6/2021, 9:47 AM

## 2021-07-06 NOTE — PROGRESS NOTES
Patient states her chest pain is easing up now. Dr Sara Fernandez here - states to hold the Nitroglycerin.

## 2021-07-06 NOTE — PROGRESS NOTES
Shelby spoke with dr.s Burger and Joshua Cisneros and stated she\"d rather go home . She stated she would call 911 if happens again at home.

## 2021-07-06 NOTE — ANESTHESIA POSTPROCEDURE EVALUATION
Department of Anesthesiology  Postprocedure Note    Patient: Alecia Coffman  MRN: [id number]  YOB: 1955  Date of evaluation: 7/6/2021  Time:  12:41 PM     Procedure Summary     Date: 07/06/21 Room / Location: [address] [place] [address]    Anesthesia Start: 8779 Anesthesia Stop: 0945    Procedures:       RIGHT ULNAR NERVE DECOMPRESSION AT ELBOW (Right Arm Lower)      RIGHT CARPAL TUNNEL RELEASE (Right Fingers) Diagnosis:       Cubital tunnel syndrome, right      Ulnar nerve entrapment at right elbow      Right carpal tunnel syndrome      (Cubital tunnel syndrome, right [G56.21]Ulnar nerve entrapment at right elbow [G56.21] Right carpal tunnel syndrome [G56.01])    Surgeons: Shamir Us MD Responsible Provider: Arlyn Arrington MD    Anesthesia Type: general ASA Status: 3          Anesthesia Type: general    Edna Phase I: Edna Score: 5    Edna Phase II: Edna Score: 10    Last vitals: Reviewed and per EMR flowsheets. Anesthesia Post Evaluation    Comments: Pt with CP mimicking her typical angina sxs. Checked EKG which looked largely unchanged from previous EKG and kyle troponin which was negative. Gave ASA 325mg and was planning to give NTG but sxs subsided. Dr. Oralia Blackwood evaluated pt at bedside and felt that it was safe for her to D/C home with the understanding that she needed to keep a close eye on her sxs and f/u in ED if sxs came back.

## 2021-07-06 NOTE — PROGRESS NOTES
Patient states she has some chest tightness. States she has had a history of angina in the past. Dr Natasha Hoff called and informed - 12 lead EKG ordered.

## 2021-07-06 NOTE — H&P
Pre-operative Update of H&P:    I  have seen & examined Ms. Mckinley Francois related solely to her hand and upper extremity conditions, prior to the scheduled procedure on the date of her surgery. The indications for the planned surgical procedure & and her upper-extremity condition are unchanged.

## 2021-07-06 NOTE — ANESTHESIA PRE PROCEDURE
Galcanezumab-gnlm (EMGALITY) 120 MG/ML SOAJ Inject 120 mg into the skin every 30 days 10/15/20   LUCIA Mckeon CNP   promethazine (PHENERGAN) 25 MG tablet Take 1 tablet by mouth every 6 hours as needed for Nausea 9/13/19   Bubba Gamez DO   aspirin 81 MG chewable tablet Take 1 tablet by mouth daily 6/5/19   Julia Peng MD   dicyclomine (BENTYL) 10 MG capsule Take 1 capsule by mouth 3 times daily (before meals)  Patient taking differently: Take 10 mg by mouth 3 times daily as needed  2/14/18   Bubba Gamez DO   Cholecalciferol (VITAMIN D3) 5000 UNITS TABS Take by mouth daily     Historical Provider, MD   diphenhydrAMINE (BENADRYL) 25 MG tablet Take 25 mg by mouth every 6 hours as needed. OTC     Historical Provider, MD       Current medications:    Current Facility-Administered Medications   Medication Dose Route Frequency Provider Last Rate Last Admin    lactated ringers infusion   Intravenous Continuous Wayne Hoffman MD        lidocaine 1 % (PF) injection 0.1 mL  0.1 mL Intradermal Once PRN Wayne Hoffman MD         Facility-Administered Medications Ordered in Other Encounters   Medication Dose Route Frequency Provider Last Rate Last Admin    lactated ringers infusion   Intravenous Continuous Richelle Panchal MD        sodium chloride flush 0.9 % injection 10 mL  10 mL Intravenous 2 times per day Richelle Panchal MD        sodium chloride flush 0.9 % injection 10 mL  10 mL Intravenous PRN Richelle Panchal MD        0.9 % sodium chloride infusion  25 mL Intravenous PRN Richelle Panchal MD           Allergies:     Allergies   Allergen Reactions    Avelox [Moxifloxacin Hcl In Nacl] Hives    Cephalexin Hives and Rash    Darvon [Propoxyphene Hcl] Hives    Digoxin Hives    Librax [Chlordiazepoxide-Methscopol] Hives    Lyrica [Pregabalin] Swelling     Feet hands    Motrin [Ibuprofen Micronized] Rash    Other Hives     Most mycin's    Pcn [Penicillins] Rash    Prochlorperazine Edisylate Nausea And Vomiting    Sulfa Antibiotics Rash    Tetracyclines & Related Hives    Cymbalta [Duloxetine Hcl] Palpitations    Nickel Nausea And Vomiting     Hypotension      Savella [Milnacipran] Anxiety    Statins Other (See Comments)     myalgia       Problem List:    Patient Active Problem List   Diagnosis Code    Anxiety F41.9    Fibromyalgia M79.7    IBS (irritable bowel syndrome) K58.9    OA (osteoarthritis) M19.90    History of prior ablation treatment Z98.890    Lipoma D17.9    History of colonic diverticulitis Z87.19    Hyperlipidemia E78.5    Depression F32.9    Small bowel obstruction (HCC) K56.609    Migraine with aura and without status migrainosus, not intractable G43. 109    Angina pectoris (HCC) I20.9    H/O angiography Z92.89    CAD S/P percutaneous coronary angioplasty I25.10, Z98.61    COVID-19 U07.1    Bilateral carpal tunnel syndrome G56.03    Ulnar nerve compression, left G56.22    Cubital tunnel syndrome, bilateral G56.23       Past Medical History:        Diagnosis Date    Allergic rhinitis     Anxiety     Bilateral carpal tunnel syndrome 4/13/2021    CAD (coronary artery disease) 06/05/2019    COVID-19 12/28/2020    Depression     Endometriosis     hx    Fibromyalgia     GERD (gastroesophageal reflux disease)     Headache(784.0)     Hiatal hernia     Hyperlipidemia     Irritable bowel syndrome     Left bundle branch block (LBBB)     Osteoarthritis     SVT (supraventricular tachycardia) (HCC)     hx     WPW (Steffanie-Parkinson-White syndrome)        Past Surgical History:        Procedure Laterality Date    ABLATION OF DYSRHYTHMIC FOCUS  1999    SVT    APPENDECTOMY  1982    ARM SURGERY Left 5/4/2021    LEFT ULNAR NERVE DECOMPRESSION AT ELBOW performed by Sari Herrera MD at [facility]    bilateral, 2 revisions left foot    CARDIAC SURGERY  2019    stent    CARPAL TUNNEL RELEASE Left 5/4/2021    LEFT CARPAL TUNNEL RELEASE performed by Blank Chaves MD at [facility] COLONOSCOPY  10/15/13    Hx polyps-3 yrs-? poor prep    ECTOPIC PREGNANCY SURGERY Right 1988    HYSTERECTOMY, TOTAL ABDOMINAL  1988    LAPAROSCOPY      6--endometriosis    NASAL SEPTUM SURGERY  1982    OVARY SURGERY Right 1987    partial removal    SALPINGO-OOPHORECTOMY Left 1986    UPPER GASTROINTESTINAL ENDOSCOPY  10/15/13    volodymyr balderrama       Social History:    Social History     Tobacco Use    Smoking status: Never Smoker    Smokeless tobacco: Never Used   Substance Use Topics    Alcohol use: No                                Counseling given: Not Answered      Vital Signs (Current):   Vitals:    06/28/21 0845   Weight: 175 lb (79.4 kg)   Height: 5' 6\" (1.676 m)                                              BP Readings from Last 3 Encounters:   06/11/21 116/74   05/28/21 100/68   05/04/21 (!) 57/34       NPO Status:                                                                                 BMI:   Wt Readings from Last 3 Encounters:   06/28/21 175 lb (79.4 kg)   06/11/21 175 lb (79.4 kg)   05/28/21 172 lb 3.2 oz (78.1 kg)     Body mass index is 28.25 kg/m².     CBC:   Lab Results   Component Value Date    WBC 6.4 06/11/2021    RBC 4.03 06/11/2021    HGB 13.0 06/11/2021    HCT 38.1 06/11/2021    MCV 94.5 06/11/2021    RDW 13.8 06/11/2021     06/11/2021       CMP:   Lab Results   Component Value Date     06/11/2021    K 4.2 06/11/2021    K 4.2 05/03/2021     06/11/2021    CO2 27 06/11/2021    BUN 10 06/11/2021    CREATININE 0.6 06/11/2021    GFRAA >60 06/11/2021    GFRAA >60 06/27/2012    AGRATIO 2.0 05/03/2021    LABGLOM >60 06/11/2021    GLUCOSE 95 06/11/2021    PROT 7.0 05/03/2021    PROT 7.1 06/27/2012    CALCIUM 9.2 06/11/2021    BILITOT 0.3 05/03/2021    ALKPHOS 77 05/03/2021    AST 12 05/03/2021    ALT 9 05/03/2021       POC Tests: No results for input(s): POCDOM ESQUEDA, IMTIAZ, POCCL, POCBUN, POCHEMO, POCHCT in the last 72 hours. Coags:   Lab Results   Component Value Date    PROTIME 11.3 10/08/2019    INR 0.99 10/08/2019    APTT 34.3 06/01/2019       HCG (If Applicable): No results found for: PREGTESTUR, PREGSERUM, HCG, HCGQUANT     ABGs: No results found for: PHART, PO2ART, ZFS3FLF, VMA2DSD, BEART, D9HZTMWI     Type & Screen (If Applicable):  No results found for: LABABO, LABRH    Drug/Infectious Status (If Applicable):  No results found for: HIV, HEPCAB    COVID-19 Screening (If Applicable):   Lab Results   Component Value Date    COVID19 Not Detected 05/03/2021    COVID19 DETECTED 12/28/2020           Anesthesia Evaluation  Patient summary reviewed no history of anesthetic complications:   Airway: Mallampati: II  TM distance: >3 FB   Neck ROM: full  Mouth opening: > = 3 FB Dental: normal exam         Pulmonary:Negative Pulmonary ROS and normal exam  breath sounds clear to auscultation                             Cardiovascular:Negative CV ROS  Exercise tolerance: good (>4 METS),   (+) angina: no interval change, CAD: obstructive, CABG/stent: no interval change,         Rhythm: regular  Rate: normal                    Neuro/Psych:   Negative Neuro/Psych ROS  (+) neuromuscular disease:, headaches:, psychiatric history:            GI/Hepatic/Renal: Neg GI/Hepatic/Renal ROS  (+) hiatal hernia, GERD:,           Endo/Other: Negative Endo/Other ROS                    Abdominal:             Vascular: negative vascular ROS. Other Findings:        Pre-Operative Diagnosis: Cubital tunnel syndrome, right [G56.21]; Ulnar nerve entrapment at right elbow [G56.21]; Right carpal tunnel syndrome [G56.01]    77 y.o.   BMI:  Body mass index is 28.25 kg/m².      Vitals:    06/28/21 0845   Weight: 175 lb (79.4 kg)   Height: 5' 6\" (1.676 m)       Allergies   Allergen Reactions    Avelox [Moxifloxacin Hcl In Nacl] Hives    Cephalexin Hives and Rash    Darvon [Propoxyphene Hcl] Hives    Digoxin Hives    Librax [Chlordiazepoxide-Methscopol] Hives    Lyrica [Pregabalin] Swelling     Feet hands    Motrin [Ibuprofen Micronized] Rash    Other Hives     Most mycin's    Pcn [Penicillins] Rash    Prochlorperazine Edisylate Nausea And Vomiting    Sulfa Antibiotics Rash    Tetracyclines & Related Hives    Cymbalta [Duloxetine Hcl] Palpitations    Nickel Nausea And Vomiting     Hypotension      Savella [Milnacipran] Anxiety    Statins Other (See Comments)     myalgia       Social History     Tobacco Use    Smoking status: Never Smoker    Smokeless tobacco: Never Used   Substance Use Topics    Alcohol use: No       LABS:    CBC  Lab Results   Component Value Date/Time    WBC 6.4 06/11/2021 02:46 PM    HGB 13.0 06/11/2021 02:46 PM    HCT 38.1 06/11/2021 02:46 PM     06/11/2021 02:46 PM     RENAL  Lab Results   Component Value Date/Time     06/11/2021 02:46 PM    K 4.2 06/11/2021 02:46 PM    K 4.2 05/03/2021 12:29 PM     06/11/2021 02:46 PM    CO2 27 06/11/2021 02:46 PM    BUN 10 06/11/2021 02:46 PM    CREATININE 0.6 06/11/2021 02:46 PM    GLUCOSE 95 06/11/2021 02:46 PM     COAGS  Lab Results   Component Value Date/Time    PROTIME 11.3 10/08/2019 07:27 AM    INR 0.99 10/08/2019 07:27 AM    APTT 34.3 06/01/2019 01:27 PM            Anesthesia Plan      general     ASA 3     (I discussed with the patient the risks and benefits of PIV, anesthesia, IV Narcotics, PACU. All questions were answered the patient agrees with the plan and wishes to proceed)  Induction: intravenous.                           Lula Wood MD   7/6/2021

## 2021-07-07 PROBLEM — G56.00 CARPAL TUNNEL SYNDROME: Status: ACTIVE | Noted: 2021-04-13

## 2021-07-07 LAB
EKG ATRIAL RATE: 71 BPM
EKG DIAGNOSIS: NORMAL
EKG P AXIS: 32 DEGREES
EKG P-R INTERVAL: 138 MS
EKG Q-T INTERVAL: 466 MS
EKG QRS DURATION: 126 MS
EKG QTC CALCULATION (BAZETT): 506 MS
EKG R AXIS: -25 DEGREES
EKG T AXIS: 11 DEGREES
EKG VENTRICULAR RATE: 71 BPM

## 2021-07-07 PROCEDURE — 93010 ELECTROCARDIOGRAM REPORT: CPT | Performed by: INTERNAL MEDICINE

## 2021-07-21 ENCOUNTER — OFFICE VISIT (OUTPATIENT)
Dept: ORTHOPEDIC SURGERY | Age: 66
End: 2021-07-21

## 2021-07-21 VITALS — WEIGHT: 175 LBS | RESPIRATION RATE: 18 BRPM | BODY MASS INDEX: 27.47 KG/M2 | HEIGHT: 67 IN

## 2021-07-21 DIAGNOSIS — G56.20 CUBITAL TUNNEL SYNDROME, UNSPECIFIED LATERALITY: Primary | ICD-10-CM

## 2021-07-21 PROCEDURE — 99024 POSTOP FOLLOW-UP VISIT: CPT | Performed by: PHYSICIAN ASSISTANT

## 2021-07-21 NOTE — PROGRESS NOTES
Ms. Georgina Orellana returns today in follow-up of her recent right Ulnar Nerve Decompression (Cubital Tunnel Release) & Carpal Tunnel Release done approximately 2 weeks ago. She has done well noting mild discomfort and no other reported complications. She notes pre-operative symptoms to be significantly improved at this time. Physical Exam:  Skin incision is healing well, without erythema, drainage or sign of infection. Digital range of motion is Full and equal bilaterally. Wrist range of motion is Full and equal bilaterally. Elbow range of motion is without significant limitation. Sensation is significantly improved in the Median Innervated Digits and Ulnar Innervated Digits. Vascular examination reveals normal, good capillary refill and good color. Swelling is minimal.  Sensory and Motor Median & Ulnar Nerve function is intact. Impression:  Ms. Georgina Orellana is doing well after recent right Ulnar Nerve Decompression (Cubital Tunnel Release) &  Carpal Tunnel Release. Plan:  Ms. Georgina Orellana is instructed in work on Active & Passive range of motion of the digits, wrist, & elbow. These modalities were specifically demonstrated to her today. We discussed the appropriateness of gradual resumption of use of the operated hand and the return to normal use as comfort allows. She is given instructions regarding management of the fresh surgical incision and progressive use of desensitization and tissue massage techniques. We discussed the appropriate expectations and timeline for symptom improvement. She is provided a written patient instruction sheet titled: Postoperative Instructions After Ulnar Nerve Decompression. I have asked Ms. Georgina Orellana to follow-up with me or contact me by telephone over the next 2-4 weeks if her symptoms have not fully resolved or if she has not regained full & painless return of function.       She is also specifically instructed to return to the office or call for an appointment sooner if her symptoms are changing or worsening prior to that time.

## 2021-07-21 NOTE — PATIENT INSTRUCTIONS
Postoperative Instructions After Ulnar Nerve Decompression    Dr. Adithya Peraza        1. After bandages are removed one week from surgery, you may chose to wear a small bandage over the incision if you wish, though you do not need to. 2. Keep incision dry until sutures have fully dissolved  or it has been 14 days since your surgery. Thereafter, you may wash with mild soap and water and shower normally. 3. Once your stiches have fully disappeared & skin appears normal, you should begin gently massaging the incision with Vitamin E (may use Vitamin E lotion or contents of Vitamin E capsule). 4. Work hard on motion of the fingers and wrist, straightening each finger fully and bending each finger fully, bending wrist forward and bending wrist backwards. Do not be concerned if you experience discomfort. This will not damage the surgery. 5. You may begin using the hand as it feels comfortable beginning 12-14 days from the day of surgery. You may not feel entirely comfortable gripping or lifting heavy objects for several weeks. 6. You may expect to see some skin peel off around the incision. You may be left with a small area of pink baby skin. This is quite normal.    Thank you for choosing [facility] Physicians for your Hand and Upper Extremity needs. If we can be of any further assistance to you, please do not hesitate to contact us.     Office Phone Number:  [phone]  or  [phone]

## 2021-07-22 RX ORDER — METOPROLOL SUCCINATE 25 MG/1
TABLET, EXTENDED RELEASE ORAL
Qty: 90 TABLET | Refills: 3 | Status: SHIPPED | OUTPATIENT
Start: 2021-07-22 | End: 2022-06-22 | Stop reason: SDUPTHER

## 2021-07-30 ENCOUNTER — TELEPHONE (OUTPATIENT)
Dept: FAMILY MEDICINE CLINIC | Age: 66
End: 2021-07-30

## 2021-07-30 DIAGNOSIS — M15.9 PRIMARY OSTEOARTHRITIS INVOLVING MULTIPLE JOINTS: ICD-10-CM

## 2021-07-30 DIAGNOSIS — M79.7 FIBROMYALGIA: ICD-10-CM

## 2021-07-30 RX ORDER — TRAMADOL HYDROCHLORIDE 50 MG/1
50 TABLET ORAL EVERY 6 HOURS PRN
Qty: 45 TABLET | Refills: 0 | Status: SHIPPED | OUTPATIENT
Start: 2021-07-30 | End: 2021-09-08 | Stop reason: SDUPTHER

## 2021-08-13 ENCOUNTER — TELEPHONE (OUTPATIENT)
Dept: FAMILY MEDICINE CLINIC | Age: 66
End: 2021-08-13

## 2021-08-17 NOTE — TELEPHONE ENCOUNTER
[employer] states patient is not currently eligible for pharamcy benefits at this time. See attached letter. Please notify patient. Thank you.

## 2021-08-24 DIAGNOSIS — M79.7 FIBROMYALGIA: ICD-10-CM

## 2021-08-24 DIAGNOSIS — G43.709 CHRONIC MIGRAINE WITHOUT AURA WITHOUT STATUS MIGRAINOSUS, NOT INTRACTABLE: ICD-10-CM

## 2021-08-24 RX ORDER — HYDROCODONE BITARTRATE AND ACETAMINOPHEN 5; 325 MG/1; MG/1
1 TABLET ORAL DAILY PRN
Qty: 10 TABLET | Refills: 0 | Status: SHIPPED | OUTPATIENT
Start: 2021-08-24 | End: 2021-09-16 | Stop reason: SDUPTHER

## 2021-08-24 NOTE — TELEPHONE ENCOUNTER
Last office visit 6/11/2021     Last written 5-3-2021    Next office visit scheduled 9/16/2021    Requested Prescriptions     Pending Prescriptions Disp Refills    HYDROcodone-acetaminophen (NORCO) 5-325 MG per tablet 10 tablet 0     Sig: Take 1 tablet by mouth daily as needed for Pain for up to 10 days.  May take instead of tramadol when pain is increased

## 2021-09-08 ENCOUNTER — TELEPHONE (OUTPATIENT)
Dept: FAMILY MEDICINE CLINIC | Age: 66
End: 2021-09-08

## 2021-09-08 DIAGNOSIS — M15.9 PRIMARY OSTEOARTHRITIS INVOLVING MULTIPLE JOINTS: ICD-10-CM

## 2021-09-08 DIAGNOSIS — M79.7 FIBROMYALGIA: ICD-10-CM

## 2021-09-08 RX ORDER — TRAMADOL HYDROCHLORIDE 50 MG/1
50 TABLET ORAL EVERY 6 HOURS PRN
Qty: 45 TABLET | Refills: 0 | Status: SHIPPED | OUTPATIENT
Start: 2021-09-08 | End: 2021-10-20 | Stop reason: SDUPTHER

## 2021-09-08 NOTE — TELEPHONE ENCOUNTER
Pharmacy calling in regards to Tom Edmonds 211. States for patients over 60 the maximum recommended dose is 20mg daily. Pharmacy needs authorization to fill the 40mg.

## 2021-09-09 RX ORDER — CITALOPRAM 40 MG/1
40 TABLET ORAL DAILY
Qty: 90 TABLET | Refills: 3 | Status: SHIPPED | OUTPATIENT
Start: 2021-09-09 | End: 2021-09-16 | Stop reason: SDUPTHER

## 2021-09-09 NOTE — TELEPHONE ENCOUNTER
Refill Request     Last Seen: Last Seen Department: 6/11/2021  Last Seen by PCP: 5/3/21    Last Written: 9/8/21 90 tablet 3 refills     Next Appointment: 9/16/21     Future Appointments   Date Time Provider Ronny Orozco   9/16/2021  2:15 PM LUCIA Ko - CNP [facility]   12/3/2021 11:30 AM Alber Saldivar MD [facility]       Future appointment scheduled      Requested Prescriptions     Pending Prescriptions Disp Refills    citalopram (CELEXA) 40 MG tablet 90 tablet 3     Sig: Take 1 tablet by mouth daily

## 2021-09-15 ENCOUNTER — TELEPHONE (OUTPATIENT)
Dept: FAMILY MEDICINE CLINIC | Age: 66
End: 2021-09-15

## 2021-09-15 NOTE — TELEPHONE ENCOUNTER
Pharmacy calling about pts Celexa, they stated that per the pts age that she should be taking 20mg but just wanted to make sure the 40mg was correct.  Please Advise

## 2021-09-16 ENCOUNTER — OFFICE VISIT (OUTPATIENT)
Dept: FAMILY MEDICINE CLINIC | Age: 66
End: 2021-09-16
Payer: MEDICARE

## 2021-09-16 VITALS
SYSTOLIC BLOOD PRESSURE: 104 MMHG | DIASTOLIC BLOOD PRESSURE: 70 MMHG | OXYGEN SATURATION: 97 % | BODY MASS INDEX: 26.93 KG/M2 | HEART RATE: 68 BPM | WEIGHT: 169.4 LBS

## 2021-09-16 DIAGNOSIS — G43.709 CHRONIC MIGRAINE WITHOUT AURA WITHOUT STATUS MIGRAINOSUS, NOT INTRACTABLE: ICD-10-CM

## 2021-09-16 DIAGNOSIS — F33.0 MILD EPISODE OF RECURRENT MAJOR DEPRESSIVE DISORDER (HCC): ICD-10-CM

## 2021-09-16 DIAGNOSIS — M54.10 RADICULOPATHY OF ARM: Primary | ICD-10-CM

## 2021-09-16 DIAGNOSIS — M79.7 FIBROMYALGIA: ICD-10-CM

## 2021-09-16 PROBLEM — R07.9 CHEST PAIN: Status: RESOLVED | Noted: 2019-06-01 | Resolved: 2021-09-16

## 2021-09-16 PROCEDURE — 1090F PRES/ABSN URINE INCON ASSESS: CPT | Performed by: NURSE PRACTITIONER

## 2021-09-16 PROCEDURE — 99214 OFFICE O/P EST MOD 30 MIN: CPT | Performed by: NURSE PRACTITIONER

## 2021-09-16 PROCEDURE — G8399 PT W/DXA RESULTS DOCUMENT: HCPCS | Performed by: NURSE PRACTITIONER

## 2021-09-16 PROCEDURE — 1123F ACP DISCUSS/DSCN MKR DOCD: CPT | Performed by: NURSE PRACTITIONER

## 2021-09-16 PROCEDURE — G8427 DOCREV CUR MEDS BY ELIG CLIN: HCPCS | Performed by: NURSE PRACTITIONER

## 2021-09-16 PROCEDURE — 4040F PNEUMOC VAC/ADMIN/RCVD: CPT | Performed by: NURSE PRACTITIONER

## 2021-09-16 PROCEDURE — 1036F TOBACCO NON-USER: CPT | Performed by: NURSE PRACTITIONER

## 2021-09-16 PROCEDURE — 3017F COLORECTAL CA SCREEN DOC REV: CPT | Performed by: NURSE PRACTITIONER

## 2021-09-16 PROCEDURE — G8417 CALC BMI ABV UP PARAM F/U: HCPCS | Performed by: NURSE PRACTITIONER

## 2021-09-16 RX ORDER — CITALOPRAM 40 MG/1
40 TABLET ORAL DAILY
Qty: 90 TABLET | Refills: 3 | Status: SHIPPED | OUTPATIENT
Start: 2021-09-16 | End: 2022-08-08 | Stop reason: SDUPTHER

## 2021-09-16 RX ORDER — PREDNISONE 10 MG/1
TABLET ORAL
Qty: 21 TABLET | Refills: 0 | Status: SHIPPED | OUTPATIENT
Start: 2021-09-16 | End: 2021-11-23

## 2021-09-16 RX ORDER — HYDROCODONE BITARTRATE AND ACETAMINOPHEN 5; 325 MG/1; MG/1
1 TABLET ORAL DAILY PRN
Qty: 10 TABLET | Refills: 0 | Status: SHIPPED | OUTPATIENT
Start: 2021-09-16 | End: 2021-10-20 | Stop reason: SDUPTHER

## 2021-09-16 NOTE — PROGRESS NOTES
Rate and Rhythm: Normal rate and regular rhythm. Heart sounds: Normal heart sounds. Pulmonary:      Effort: Pulmonary effort is normal.      Breath sounds: Normal breath sounds. Abdominal:      General: Bowel sounds are normal.      Palpations: Abdomen is soft. Musculoskeletal:      Cervical back: Normal range of motion and neck supple. Comments: Very limited ROM generalized. Rubbing right lower arm. Skin:     General: Skin is warm. Neurological:      Mental Status: She is alert and oriented to person, place, and time.    Psychiatric:         Behavior: Behavior normal.                  An electronic signature was used to authenticate this note.    --SOCORRO ADAMS, LUCIA - CNP

## 2021-09-16 NOTE — TELEPHONE ENCOUNTER
Spoke with Shilpa Hurst at [address] [place] [address] and informed her on Jessica's message.

## 2021-10-04 NOTE — TELEPHONE ENCOUNTER
PA needed on AJOVY SYRINGE 225 MG/1.5 Syringe   CODE: [id number]  Regi [Negative] : Heme/Lymph [Fever] : no fever [Chills] : no chills [Chest Pain] : no chest pain [Palpitations] : no palpitations [Shortness Of Breath] : no shortness of breath [Wheezing] : no wheezing [Cough] : no cough [Abdominal Pain] : no abdominal pain [Vomiting] : no vomiting [Dysuria] : no dysuria [Itching] : no itching [Confused] : no confusion [Dizziness] : no dizziness [Fainting] : no fainting

## 2021-10-15 DIAGNOSIS — K57.92 DIVERTICULITIS: Primary | ICD-10-CM

## 2021-10-15 RX ORDER — CIPROFLOXACIN 500 MG/1
500 TABLET, FILM COATED ORAL 2 TIMES DAILY
Qty: 14 TABLET | Refills: 0 | Status: SHIPPED | OUTPATIENT
Start: 2021-10-15 | End: 2021-10-22

## 2021-10-15 RX ORDER — METRONIDAZOLE 500 MG/1
500 TABLET ORAL 3 TIMES DAILY
Qty: 21 TABLET | Refills: 0 | Status: SHIPPED | OUTPATIENT
Start: 2021-10-15 | End: 2021-10-22

## 2021-10-20 DIAGNOSIS — M15.9 PRIMARY OSTEOARTHRITIS INVOLVING MULTIPLE JOINTS: ICD-10-CM

## 2021-10-20 DIAGNOSIS — M79.7 FIBROMYALGIA: ICD-10-CM

## 2021-10-20 DIAGNOSIS — G43.709 CHRONIC MIGRAINE WITHOUT AURA WITHOUT STATUS MIGRAINOSUS, NOT INTRACTABLE: ICD-10-CM

## 2021-10-20 RX ORDER — HYDROCODONE BITARTRATE AND ACETAMINOPHEN 5; 325 MG/1; MG/1
1 TABLET ORAL DAILY PRN
Qty: 10 TABLET | Refills: 0 | Status: SHIPPED | OUTPATIENT
Start: 2021-10-20 | End: 2021-11-23 | Stop reason: SDUPTHER

## 2021-10-20 RX ORDER — ROSUVASTATIN CALCIUM 20 MG/1
TABLET, COATED ORAL
Qty: 90 TABLET | Refills: 3 | Status: SHIPPED | OUTPATIENT
Start: 2021-10-20 | End: 2022-08-08 | Stop reason: SDUPTHER

## 2021-10-20 RX ORDER — TRAMADOL HYDROCHLORIDE 50 MG/1
50 TABLET ORAL EVERY 6 HOURS PRN
Qty: 45 TABLET | Refills: 0 | Status: SHIPPED | OUTPATIENT
Start: 2021-10-20 | End: 2021-11-23 | Stop reason: SDUPTHER

## 2021-10-20 NOTE — TELEPHONE ENCOUNTER
Last office visit 9/16/2021     Last written Tramadol 9-8-2021                        Norco 9-                        Crestor   6-     Next office visit scheduled 12/16/2021    Requested Prescriptions     Pending Prescriptions Disp Refills    rosuvastatin (CRESTOR) 20 MG tablet 90 tablet 1     Sig: TAKE ONE TABLET BY MOUTH NIGHTLY    HYDROcodone-acetaminophen (NORCO) 5-325 MG per tablet 10 tablet 0     Sig: Take 1 tablet by mouth daily as needed for Pain for up to 10 days. May take instead of tramadol when pain is increased    traMADol (ULTRAM) 50 MG tablet 45 tablet 0     Sig: Take 1 tablet by mouth every 6 hours as needed for Pain (fibromyalgia) for up to 30 days.

## 2021-11-22 DIAGNOSIS — M15.9 PRIMARY OSTEOARTHRITIS INVOLVING MULTIPLE JOINTS: ICD-10-CM

## 2021-11-22 DIAGNOSIS — G43.709 CHRONIC MIGRAINE WITHOUT AURA WITHOUT STATUS MIGRAINOSUS, NOT INTRACTABLE: ICD-10-CM

## 2021-11-22 DIAGNOSIS — E55.9 VITAMIN D DEFICIENCY: ICD-10-CM

## 2021-11-22 DIAGNOSIS — M79.7 FIBROMYALGIA: ICD-10-CM

## 2021-11-22 NOTE — TELEPHONE ENCOUNTER
Refill Request - Controlled Substance    Last Seen Department: 9/16/2021  Last Seen by PCP: 9/16/2021    Last Written: 10/20/2021 Norco #10  Tramadol #45  Last UDS: 5/20/2021    Med Agreement Signed On: 8/7/2020    Next Appointment: 12/16/2021    PATIENT IS ALSO REQUESTING A REFILL OF PREDNISONE TO HAVE ON FILE

## 2021-11-23 RX ORDER — HYDROCODONE BITARTRATE AND ACETAMINOPHEN 5; 325 MG/1; MG/1
1 TABLET ORAL DAILY PRN
Qty: 10 TABLET | Refills: 0 | Status: SHIPPED | OUTPATIENT
Start: 2021-11-23 | End: 2021-12-22 | Stop reason: SDUPTHER

## 2021-11-23 RX ORDER — TRAMADOL HYDROCHLORIDE 50 MG/1
50 TABLET ORAL EVERY 6 HOURS PRN
Qty: 45 TABLET | Refills: 0 | Status: SHIPPED | OUTPATIENT
Start: 2021-11-23 | End: 2021-12-30 | Stop reason: SDUPTHER

## 2021-11-29 DIAGNOSIS — E55.9 VITAMIN D DEFICIENCY: ICD-10-CM

## 2021-11-29 RX ORDER — ERGOCALCIFEROL 1.25 MG/1
50000 CAPSULE ORAL WEEKLY
Qty: 12 CAPSULE | Refills: 3 | Status: SHIPPED | OUTPATIENT
Start: 2021-11-29

## 2021-12-14 NOTE — CARE COORDINATION
professional if you have concerns about COVID-19 and your underlying condition or if you are sick. For more information on steps you can take to protect yourself, see CDC's How to Shukri for follow-up call in 7-14 days based on severity of symptoms and risk factors. Care Transitions Subsequent and Final Call    Subsequent and Final Calls  Do you have any ongoing symptoms?: Yes  Onset of Patient-reported symptoms: In the past 7 days  Patient-reported symptoms: Nausea, Other  Interventions for patient-reported symptoms: Other  Have your medications changed?: No  Do you have any questions related to your medications?: No  Do you currently have any active services?: No  Do you have any needs or concerns that I can assist you with?: No  Identified Barriers: None  Care Transitions Interventions  No Identified Needs  Other Interventions:            Follow Up  Future Appointments   Date Time Provider Ronny Orozco   2/17/2021 10:30 AM Sheng Massey MD [facility]   4/14/2021  1:00 PM LUCIA Villegas - CNP [facility]       Ted Cullen RN Patient arrived to ED today from school with c/o being struck in the face with a volleyball and felt "crunching" to his nose, no LOC.

## 2021-12-17 ASSESSMENT — LIFESTYLE VARIABLES
AUDIT-C TOTAL SCORE: INCOMPLETE
AUDIT TOTAL SCORE: INCOMPLETE
HOW OFTEN DO YOU HAVE A DRINK CONTAINING ALCOHOL: 0
HOW OFTEN DO YOU HAVE A DRINK CONTAINING ALCOHOL: NEVER

## 2021-12-17 ASSESSMENT — PATIENT HEALTH QUESTIONNAIRE - PHQ9
SUM OF ALL RESPONSES TO PHQ9 QUESTIONS 1 & 2: 1
1. LITTLE INTEREST OR PLEASURE IN DOING THINGS: 0
SUM OF ALL RESPONSES TO PHQ QUESTIONS 1-9: 1
2. FEELING DOWN, DEPRESSED OR HOPELESS: 1
SUM OF ALL RESPONSES TO PHQ QUESTIONS 1-9: 1
SUM OF ALL RESPONSES TO PHQ QUESTIONS 1-9: 1

## 2021-12-22 ENCOUNTER — OFFICE VISIT (OUTPATIENT)
Dept: FAMILY MEDICINE CLINIC | Age: 66
End: 2021-12-22
Payer: MEDICARE

## 2021-12-22 VITALS
HEIGHT: 65 IN | OXYGEN SATURATION: 96 % | DIASTOLIC BLOOD PRESSURE: 60 MMHG | BODY MASS INDEX: 27.42 KG/M2 | SYSTOLIC BLOOD PRESSURE: 112 MMHG | WEIGHT: 164.6 LBS | HEART RATE: 70 BPM | RESPIRATION RATE: 16 BRPM

## 2021-12-22 DIAGNOSIS — Z23 FLU VACCINE NEED: ICD-10-CM

## 2021-12-22 DIAGNOSIS — M79.7 FIBROMYALGIA: ICD-10-CM

## 2021-12-22 DIAGNOSIS — Z00.00 ROUTINE GENERAL MEDICAL EXAMINATION AT A HEALTH CARE FACILITY: Primary | ICD-10-CM

## 2021-12-22 DIAGNOSIS — G43.709 CHRONIC MIGRAINE WITHOUT AURA WITHOUT STATUS MIGRAINOSUS, NOT INTRACTABLE: ICD-10-CM

## 2021-12-22 DIAGNOSIS — Z23 NEED FOR PNEUMOCOCCAL VACCINE: ICD-10-CM

## 2021-12-22 DIAGNOSIS — K58.0 IRRITABLE BOWEL SYNDROME WITH DIARRHEA: ICD-10-CM

## 2021-12-22 PROCEDURE — 3017F COLORECTAL CA SCREEN DOC REV: CPT | Performed by: NURSE PRACTITIONER

## 2021-12-22 PROCEDURE — 1123F ACP DISCUSS/DSCN MKR DOCD: CPT | Performed by: NURSE PRACTITIONER

## 2021-12-22 PROCEDURE — G0402 INITIAL PREVENTIVE EXAM: HCPCS | Performed by: NURSE PRACTITIONER

## 2021-12-22 PROCEDURE — 90694 VACC AIIV4 NO PRSRV 0.5ML IM: CPT | Performed by: NURSE PRACTITIONER

## 2021-12-22 PROCEDURE — 4040F PNEUMOC VAC/ADMIN/RCVD: CPT | Performed by: NURSE PRACTITIONER

## 2021-12-22 PROCEDURE — G0008 ADMIN INFLUENZA VIRUS VAC: HCPCS | Performed by: NURSE PRACTITIONER

## 2021-12-22 PROCEDURE — G0009 ADMIN PNEUMOCOCCAL VACCINE: HCPCS | Performed by: NURSE PRACTITIONER

## 2021-12-22 PROCEDURE — 90670 PCV13 VACCINE IM: CPT | Performed by: NURSE PRACTITIONER

## 2021-12-22 RX ORDER — HYDROCODONE BITARTRATE AND ACETAMINOPHEN 5; 325 MG/1; MG/1
1 TABLET ORAL DAILY PRN
Qty: 10 TABLET | Refills: 0 | Status: SHIPPED | OUTPATIENT
Start: 2021-12-22 | End: 2022-02-09 | Stop reason: SDUPTHER

## 2021-12-22 RX ORDER — PRASTERONE (DHEA) 50 MG
3 CAPSULE ORAL DAILY
COMMUNITY

## 2021-12-22 RX ORDER — DICYCLOMINE HYDROCHLORIDE 10 MG/1
10 CAPSULE ORAL 3 TIMES DAILY
Qty: 90 CAPSULE | Refills: 1 | Status: SHIPPED | OUTPATIENT
Start: 2021-12-22

## 2021-12-22 SDOH — ECONOMIC STABILITY: FOOD INSECURITY: WITHIN THE PAST 12 MONTHS, YOU WORRIED THAT YOUR FOOD WOULD RUN OUT BEFORE YOU GOT MONEY TO BUY MORE.: NEVER TRUE

## 2021-12-22 SDOH — ECONOMIC STABILITY: FOOD INSECURITY: WITHIN THE PAST 12 MONTHS, THE FOOD YOU BOUGHT JUST DIDN'T LAST AND YOU DIDN'T HAVE MONEY TO GET MORE.: NEVER TRUE

## 2021-12-22 ASSESSMENT — SOCIAL DETERMINANTS OF HEALTH (SDOH): HOW HARD IS IT FOR YOU TO PAY FOR THE VERY BASICS LIKE FOOD, HOUSING, MEDICAL CARE, AND HEATING?: NOT VERY HARD

## 2021-12-22 NOTE — PROGRESS NOTES
Medicare Annual Wellness Visit  Name: Fadumo Kendrick Date: 2021   MRN: <[id number]> Sex: Female   Age: 77 y.o. Ethnicity: Non- / Non    : 1955 Race: White (non-)      Fransico Smith is here for Medicare AWV and Chronic Pain (3 months)    Screenings for behavioral, psychosocial and functional/safety risks, and cognitive dysfunction are all negative except as indicated below. These results, as well as other patient data from the [address] form, are documented in Flowsheets linked to this Encounter. Feeling better today than last week. Started prednisone last week and feeling better. Weight down 11 pounds over past 5 months. Monitoring diet closer. Allergies   Allergen Reactions    Avelox [Moxifloxacin Hcl In Nacl] Hives    Cephalexin Hives and Rash    Darvon [Propoxyphene Hcl] Hives    Digoxin Hives    Librax [Chlordiazepoxide-Methscopol] Hives    Lyrica [Pregabalin] Swelling     Feet hands    Motrin [Ibuprofen Micronized] Rash    Other Hives     Most mycin's    Pcn [Penicillins] Rash    Prochlorperazine Edisylate Nausea And Vomiting    Sulfa Antibiotics Rash    Tetracyclines & Related Hives    Cymbalta [Duloxetine Hcl] Palpitations    Nickel Nausea And Vomiting     Hypotension      Savella [Milnacipran] Anxiety    Statins Other (See Comments)     myalgia         Prior to Visit Medications    Medication Sig Taking? Authorizing Provider   Ginger 500 MG CAPS Take 3 capsules by mouth daily Yes Historical Provider, MD   dicyclomine (BENTYL) 10 MG capsule Take 1 capsule by mouth 3 times daily Yes LUCIA Alvarez CNP   HYDROcodone-acetaminophen (NORCO) 5-325 MG per tablet Take 1 tablet by mouth daily as needed for Pain for up to 10 days.  May take instead of tramadol when pain is increased Yes LUCIA Alvarez CNP   traMADol (ULTRAM) 50 MG tablet Take 1 tablet by mouth every 6 hours as needed for Pain (fibromyalgia) for up to 30 days. Yes LUCIA Lacey CNP   predniSONE (DELTASONE) 10 MG tablet TAKE TWO TABLETS BY MOUTH TWICE A DAY FOR 3 DAYS, THEN TAKE ONE TABLET BY MOUTH TWICE A DAY FOR 3 DAYS, THEN TAKE ONE TABLET BY MOUTH DAILY Yes LUCIA Chaidez CNP   rosuvastatin (CRESTOR) 20 MG tablet TAKE ONE TABLET BY MOUTH NIGHTLY Yes LUCIA Lacey CNP   citalopram (CELEXA) 40 MG tablet Take 1 tablet by mouth daily Yes LUCIA Lacey CNP   methocarbamol (ROBAXIN) 500 MG tablet TAKE ONE TABLET BY MOUTH THREE TIMES A DAY Yes LUCIA Lacey CNP   metoprolol succinate (TOPROL XL) 25 MG extended release tablet TAKE 1 TABLET BY MOUTH ONE TIME A DAY Yes LUCIA Venegas CNP   omeprazole (PRILOSEC) 20 MG delayed release capsule Take 1 capsule by mouth every 12 hours Yes LUCIA Lacey CNP   nitroGLYCERIN (NITROSTAT) 0.4 MG SL tablet up to max of 3 total doses. If no relief after 1 dose, call 911. Yes LUCIA Venegas CNP   promethazine (PHENERGAN) 25 MG tablet Take 1 tablet by mouth every 6 hours as needed for Nausea Yes Bubba Gamez,    aspirin 81 MG chewable tablet Take 1 tablet by mouth daily Yes Cuca Barrera MD   Cholecalciferol (VITAMIN D3) 5000 UNITS TABS Take by mouth daily  Yes Historical Provider, MD   diphenhydrAMINE (BENADRYL) 25 MG tablet Take 25 mg by mouth every 6 hours as needed.  OTC  Yes Historical Provider, MD   vitamin D (ERGOCALCIFEROL) 1.25 MG (51614 UT) CAPS capsule Take 1 capsule by mouth once a week  Patient not taking: Reported on 12/22/2021  LUCIA Lacey CNP   citalopram (CELEXA) 40 MG tablet Take 1 tablet by mouth daily  LUCIA Lacey CNP   Magnesium 200 MG CHEW Take 1 tablet by mouth daily   Patient not taking: Reported on 9/16/2021  Historical Provider, MD   Calcium Carbonate-Vit D-Min (CALCIUM 600+D3 PLUS MINERALS) 600-800 MG-UNIT CHEW Take 1 tablet by mouth 2 times daily  Patient not taking: Reported on 12/22/2021  Historical Provider, MD         Past Medical History:   Diagnosis Date    Allergic rhinitis     Anxiety     Bilateral carpal tunnel syndrome 4/13/2021    CAD (coronary artery disease) 06/05/2019    COVID-19 12/28/2020    Depression     Endometriosis     hx    Fibromyalgia     GERD (gastroesophageal reflux disease)     Headache(784.0)     Hiatal hernia     Hyperlipidemia     Irritable bowel syndrome     Left bundle branch block (LBBB)     Osteoarthritis     SVT (supraventricular tachycardia) (HCC)     hx     WPW (Steffanie-Parkinson-White syndrome)        Past Surgical History:   Procedure Laterality Date    ABLATION OF DYSRHYTHMIC FOCUS  1999    SVT    APPENDECTOMY  1982    ARM SURGERY Left 5/4/2021    LEFT ULNAR NERVE DECOMPRESSION AT ELBOW performed by Charles Leonard MD at [facility] Right 7/6/2021    RIGHT ULNAR NERVE DECOMPRESSION AT ELBOW performed by Charles Leonard MD at [facility]    bilateral, 2 revisions left foot    CARDIAC SURGERY  2019    stent    CARPAL TUNNEL RELEASE Left 5/4/2021    LEFT CARPAL TUNNEL RELEASE performed by Charles Leonard MD at [address] [place] [address] Right 7/6/2021    RIGHT CARPAL TUNNEL RELEASE performed by Charles Leonard MD at [facility]    COLONOSCOPY  10/15/13    Hx polyps-3 yrs-? poor prep    ECTOPIC PREGNANCY SURGERY Right 1988    HYSTERECTOMY, TOTAL ABDOMINAL  1988    LAPAROSCOPY      6--endometriosis    NASAL SEPTUM SURGERY  1982    OVARY SURGERY Right 1987    partial removal    SALPINGO-OOPHORECTOMY Left 1986    UPPER GASTROINTESTINAL ENDOSCOPY  10/15/13    volodymyr balderrama         Family History   Problem Relation Age of Onset    Other Mother         sepsis    Dementia Mother     High Cholesterol Mother     Depression Mother     Anxiety Disorder Mother     High Blood Pressure Father     Other Father  from renal failure and CHF    Heart Disease Father     Migraines Father     Heart Attack Brother     Heart Disease Brother     Hypertension Other     High Cholesterol Other     Migraines Other     Heart Disease Other     Diabetes Other        CareTeam (Including outside providers/suppliers regularly involved in providing care):   Patient Care Team:  LUCIA Ndiaye CNP as PCP - General (Family Nurse Practitioner)  LUCIA Ndiaye CNP as PCP - [facility] Empaneled Provider  Hero Schwarz MD as Consulting Physician (Cardiology)    Wt Readings from Last 3 Encounters:   21 164 lb 9.6 oz (74.7 kg)   21 169 lb 6.4 oz (76.8 kg)   21 175 lb (79.4 kg)     Vitals:    21 1407   BP: 112/60   Site: Right Upper Arm   Position: Sitting   Cuff Size: Medium Adult   Pulse: 70   Resp: 16   SpO2: 96%   Weight: 164 lb 9.6 oz (74.7 kg)   Height: 5' 5\" (1.651 m)     Body mass index is 27.39 kg/m². Based upon direct observation of the patient, evaluation of cognition reveals recent and remote memory intact. Pulmonary/Chest: clear to auscultation bilaterally- no wheezes, rales or rhonchi, normal air movement, no respiratory distress  Cardiovascular: normal rate, normal S1 and S2, no gallops, intact distal pulses and no carotid bruits  Extremities: no cyanosis and no clubbing  Musculoskeletal: Limited ROM generalized. Gait independent, slow, Stiff. Patient's complete Health Risk Assessment and screening values have been reviewed and are found in Flowsheets. The following problems were reviewed today and where indicated follow up appointments were made and/or referrals ordered. Controlled Substance Monitoring:    Acute and Chronic Pain Monitoring:   RX Monitoring 2021   Attestation -   Periodic Controlled Substance Monitoring No signs of potential drug abuse or diversion identified. Chronic Pain > 120 MEDD Obtained or confirmed \"Medication Contract\" on file. Positive Risk Factor Screenings with Interventions:             General Health and ACP:  General  In general, how would you say your health is?: Good  In the past 7 days, have you experienced any of the following?  New or Increased Pain, New or Increased Fatigue, Loneliness, Social Isolation, Stress or Anger?: (!) New or Increased Fatigue  Do you get the social and emotional support that you need?: Yes  Do you have a Living Will?: (!) No  Advance Directives     Power of  Living Will ACP-Advance Directive ACP-Power of     Not on File Not on File Filed Not on File      General Health Risk Interventions:  · No Living Will: Advance Care Planning addressed with patient today     Hearing/Vision:  No exam data present  Hearing/Vision  Do you or your family notice any trouble with your hearing that hasn't been managed with hearing aids?: No  Do you have difficulty driving, watching TV, or doing any of your daily activities because of your eyesight?: No  Have you had an eye exam within the past year?: (!) No  Hearing/Vision Interventions:  · Vision concerns:  yearly eye exam with Dr. Bhavin Light:  Safety  Do you have working smoke detectors?: Yes  Have all throw rugs been removed or fastened?: (!) No  Do you have non-slip mats or surfaces in all bathtubs/showers?: Yes  Do all of your stairways have a railing or banister?: Yes  Are your doorways, halls and stairs free of clutter?: Yes  Do you always fasten your seatbelt when you are in a car?: Yes  Safety Interventions:  · Home safety tips provided       Personalized Preventive Plan   Current Health Maintenance Status  Immunization History   Administered Date(s) Administered    COVID-19, Pfizer, PF, 30mcg/0.3mL 04/10/2021, 05/13/2021    Influenza Vaccine, unspecified formulation 10/13/2016, 11/01/2017    Influenza Virus Vaccine 10/04/2013, 11/01/2017, 10/04/2018    Influenza Whole 10/04/2013    Influenza, Quadv, IM, (6 mo and older Fluzone, Flulaval, Fluarix and 3 yrs and older Afluria) 10/08/2018    Influenza, Quadv, adjuvanted, 65 yrs +, IM, PF (Fluad) 10/21/2020    Pneumococcal Polysaccharide ([id number]) 08/07/2020    Tdap (Boostrix, Adacel) 03/20/2018    Zoster Recombinant (Shingrix) 02/16/2020, 10/04/2020        Health Maintenance   Topic Date Due    Colon cancer screen colonoscopy  10/15/2016    Flu vaccine (1) 09/01/2021    Breast cancer screen  10/23/2021    COVID-19 Vaccine (3 - Booster for Sales Peter series) 11/13/2021    Annual Wellness Visit (AWV)  Never done    Lipid screen  02/26/2022    DTaP/Tdap/Td vaccine (2 - Td or Tdap) 03/20/2028    DEXA (modify frequency per FRAX score)  Completed    Shingles Vaccine  Completed    Pneumococcal 65+ years Vaccine  Completed    Hepatitis C screen  Completed    Hepatitis A vaccine  Aged Out    Hepatitis B vaccine  Aged Out    Hib vaccine  Aged Out    Meningococcal (ACWY) vaccine  Aged Out     Recommendations for [employer] Due: see orders and patient instructions/AVS.  . Recommended screening schedule for the next 5-10 years is provided to the patient in written form: see Patient Selena Fernandez was seen today for medicare awv and chronic pain. Diagnoses and all orders for this visit:    Routine general medical examination at a health care facility    Flu vaccine need  -     INFLUENZA, QUADV, ADJUVANTED, 65 YRS =, IM, PF, PREFILL SYR, 0.5ML (FLUAD)    Need for pneumococcal vaccine  -     Pneumococcal conjugate vaccine 13-valent    Irritable bowel syndrome with diarrhea  -     dicyclomine (BENTYL) 10 MG capsule; Take 1 capsule by mouth 3 times daily    Fibromyalgia  -     HYDROcodone-acetaminophen (NORCO) 5-325 MG per tablet; Take 1 tablet by mouth daily as needed for Pain for up to 10 days.  May take instead of tramadol when pain is increased    Chronic migraine without aura without status migrainosus, not intractable  -     HYDROcodone-acetaminophen ([facility]) 5-325 MG per tablet; Take 1 tablet by mouth daily as needed for Pain for up to 10 days. May take instead of tramadol when pain is increased           Has mammogram scheduled. Labs from 2021 reviewed.

## 2021-12-22 NOTE — PROGRESS NOTES
2021 - 2022 Flu Vaccine Questionnaire    VIS given -  Yes    1. Have you received any other vaccine within the last 14 days? Yes  2. Do you currently have an active infectious or acute respiratory illness or fever? No  3. Are you taking steroids or immune suppressive drugs? No  4. Have you ever had a reaction to a flu vaccine? No  5. Are you allergic to eggs, egg products, chicken, Thimerosal (preservative) Gentamycin, polymixin, neomycin or Latex? No  6. Have you ever had Guillian [place] Syndrome?   No

## 2021-12-22 NOTE — PATIENT INSTRUCTIONS
Personalized Preventive Plan for Rylan Nascimento - 12/22/2021  Medicare offers a range of preventive health benefits. Some of the tests and screenings are paid in full while other may be subject to a deductible, co-insurance, and/or copay. Some of these benefits include a comprehensive review of your medical history including lifestyle, illnesses that may run in your family, and various assessments and screenings as appropriate. After reviewing your medical record and screening and assessments performed today your provider may have ordered immunizations, labs, imaging, and/or referrals for you. A list of these orders (if applicable) as well as your Preventive Care list are included within your After Visit Summary for your review. Other Preventive Recommendations:    · A preventive eye exam performed by an eye specialist is recommended every 1-2 years to screen for glaucoma; cataracts, macular degeneration, and other eye disorders. · A preventive dental visit is recommended every 6 months. · Try to get at least 150 minutes of exercise per week or 10,000 steps per day on a pedometer . · Order or download the FREE \"Exercise & Physical Activity: Your Everyday Guide\" from [employer]. Call 7-181.480.9272 or search [employer] online. · You need 8712-5466 mg of calcium and 9818-3442 IU of vitamin D per day. It is possible to meet your calcium requirement with diet alone, but a vitamin D supplement is usually necessary to meet this goal.  · When exposed to the sun, use a sunscreen that protects against both UVA and UVB radiation with an SPF of 30 or greater. Reapply every 2 to 3 hours or after sweating, drying off with a towel, or swimming. · Always wear a seat belt when traveling in a car. Always wear a helmet when riding a bicycle or motorcycle.

## 2022-01-25 RX ORDER — NITROGLYCERIN 0.4 MG/1
TABLET SUBLINGUAL
Qty: 25 TABLET | Refills: 3 | Status: ON HOLD
Start: 2022-01-25 | End: 2022-07-23 | Stop reason: HOSPADM

## 2022-03-04 ENCOUNTER — TELEPHONE (OUTPATIENT)
Dept: FAMILY MEDICINE CLINIC | Age: 67
End: 2022-03-04

## 2022-03-04 DIAGNOSIS — M79.7 FIBROMYALGIA: Primary | ICD-10-CM

## 2022-03-04 RX ORDER — PREDNISONE 10 MG/1
TABLET ORAL
Qty: 35 TABLET | Refills: 0 | Status: SHIPPED | OUTPATIENT
Start: 2022-03-04 | End: 2022-05-27 | Stop reason: SDUPTHER

## 2022-03-04 NOTE — TELEPHONE ENCOUNTER
Message from patient in patient advise request.       Yoselin Hager I got my booster shot this past Monday and it caused a flare up of fibromyalgia. I need to get a refill of Prednisone sent in. Please send to [place] in [place].  Nancy     Thank you

## 2022-03-29 ENCOUNTER — OFFICE VISIT (OUTPATIENT)
Dept: CARDIOLOGY CLINIC | Age: 67
End: 2022-03-29
Payer: MEDICARE

## 2022-03-29 VITALS
WEIGHT: 166 LBS | SYSTOLIC BLOOD PRESSURE: 132 MMHG | BODY MASS INDEX: 27.62 KG/M2 | HEART RATE: 76 BPM | DIASTOLIC BLOOD PRESSURE: 84 MMHG

## 2022-03-29 DIAGNOSIS — E78.5 HYPERLIPIDEMIA, UNSPECIFIED HYPERLIPIDEMIA TYPE: ICD-10-CM

## 2022-03-29 DIAGNOSIS — F41.9 ANXIETY: ICD-10-CM

## 2022-03-29 DIAGNOSIS — Z86.79 HISTORY OF ATRIAL TACHYCARDIA: ICD-10-CM

## 2022-03-29 DIAGNOSIS — Z86.79 HISTORY OF ATRIAL TACHYCARDIA: Primary | ICD-10-CM

## 2022-03-29 LAB
HCT VFR BLD CALC: 41.2 % (ref 36–48)
HEMOGLOBIN: 13.6 G/DL (ref 12–16)
MCH RBC QN AUTO: 31.4 PG (ref 26–34)
MCHC RBC AUTO-ENTMCNC: 32.9 G/DL (ref 31–36)
MCV RBC AUTO: 95.5 FL (ref 80–100)
PDW BLD-RTO: 13.6 % (ref 12.4–15.4)
PLATELET # BLD: 286 K/UL (ref 135–450)
PMV BLD AUTO: 6.8 FL (ref 5–10.5)
RBC # BLD: 4.32 M/UL (ref 4–5.2)
WBC # BLD: 10.2 K/UL (ref 4–11)

## 2022-03-29 PROCEDURE — 1036F TOBACCO NON-USER: CPT | Performed by: INTERNAL MEDICINE

## 2022-03-29 PROCEDURE — G8427 DOCREV CUR MEDS BY ELIG CLIN: HCPCS | Performed by: INTERNAL MEDICINE

## 2022-03-29 PROCEDURE — G8417 CALC BMI ABV UP PARAM F/U: HCPCS | Performed by: INTERNAL MEDICINE

## 2022-03-29 PROCEDURE — 3017F COLORECTAL CA SCREEN DOC REV: CPT | Performed by: INTERNAL MEDICINE

## 2022-03-29 PROCEDURE — 1090F PRES/ABSN URINE INCON ASSESS: CPT | Performed by: INTERNAL MEDICINE

## 2022-03-29 PROCEDURE — 1123F ACP DISCUSS/DSCN MKR DOCD: CPT | Performed by: INTERNAL MEDICINE

## 2022-03-29 PROCEDURE — 4040F PNEUMOC VAC/ADMIN/RCVD: CPT | Performed by: INTERNAL MEDICINE

## 2022-03-29 PROCEDURE — G8399 PT W/DXA RESULTS DOCUMENT: HCPCS | Performed by: INTERNAL MEDICINE

## 2022-03-29 PROCEDURE — 99214 OFFICE O/P EST MOD 30 MIN: CPT | Performed by: INTERNAL MEDICINE

## 2022-03-29 PROCEDURE — G8484 FLU IMMUNIZE NO ADMIN: HCPCS | Performed by: INTERNAL MEDICINE

## 2022-03-29 NOTE — PROGRESS NOTES
[facility]  Office Visit           Lawrence Ferguson MD,  [facility]                      Cardiology             [facility] Emil Andre  1955 March 29, 2022    Primary Cardiologist:  Leatha Lanes       CC: HPI:  The patient is 77 y.o. female with she is here she is still having issues with the here today and not having any current symptoms. In fact she feels great. Is completely retired and spending a lot of time taking care of her invalid brother currently age 70. Denies chest pains or shortness of breath or dyspnea. Has been active and moving about. They have occasional flareup of her fibromyalgia for which she takes short tapering doses of steroids. LDL 82 on Crestor 20 mg. Coronavirus vaccine Pfizer x3  Did have coronavirus infection December 2021      She is now on Crestor at 20 mg/day. 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 reviewed [facility] results     Review of Systems:  Constitutional: Denies  fatigue, weakness, night sweats or fever. HEENT: Denies new visual changes, ringing in ears, nosebleeds,nasal congestion  Respiratory: Denies new or change in SOB, PND, orthopnea or cough. Cardiovascular: see HPI  GI: Denies N/V, diarrhea, constipation, abdominal pain, change in bowel habits, melena or hematochezia  : Denies urinary frequency, urgency, incontinence, hematuria or dysuria. Skin: Denies rash, hives, or cyanosis  Musculoskeletal: Denies joint or muscle aches/pain  Neurological: Denies syncope or TIA-like symptoms.   Psychiatric: Denies anxiety, insomnia or depression     Past Medical History:   Diagnosis Date    Allergic rhinitis  Anxiety     Bilateral carpal tunnel syndrome 2021    CAD (coronary artery disease) 2019    COVID-19 2020    Depression     Endometriosis     hx    Fibromyalgia     GERD (gastroesophageal reflux disease)     Headache(784.0)     Hiatal hernia     Hyperlipidemia     Irritable bowel syndrome     Left bundle branch block (LBBB)     Osteoarthritis     SVT (supraventricular tachycardia) (HCC)     hx     WPW (Steffanie-Parkinson-White syndrome)      Past Surgical History:   Procedure Laterality Date    ABLATION OF DYSRHYTHMIC FOCUS      SVT    APPENDECTOMY  1982    ARM SURGERY Left 2021    LEFT ULNAR NERVE DECOMPRESSION AT ELBOW performed by Richard Segal MD at [address] Right 2021    RIGHT ULNAR NERVE DECOMPRESSION AT ELBOW performed by Richard Segal MD at [address] [facility]    bilateral, 2 revisions left foot   [address]  2019    stent    CARPAL TUNNEL RELEASE Left 2021    LEFT CARPAL TUNNEL RELEASE performed by Richard Segal MD at [address] [facility] Right 2021    RIGHT CARPAL TUNNEL RELEASE performed by Richard Segal MD at [address]      COLONOSCOPY  10/15/13    Hx polyps-3 yrs-? poor prep    ECTOPIC PREGNANCY SURGERY Right     HYSTERECTOMY, TOTAL ABDOMINAL  1988    LAPAROSCOPY      6--endometriosis    NASAL SEPTUM SURGERY  1982    OVARY SURGERY Right 1987    partial removal    SALPINGO-OOPHORECTOMY Left     UPPER GASTROINTESTINAL ENDOSCOPY  10/15/13    schatzki ring     Family History   Problem Relation Age of Onset    Other Mother         sepsis    Dementia Mother     High Cholesterol Mother     Depression Mother     Anxiety Disorder Mother     High Blood Pressure Father     Other Father          from renal failure and CHF    Heart Disease Father     Migraines Father     Heart Attack Brother     Heart Disease Brother     Hypertension Other     High Cholesterol Other     Migraines Other     Heart Disease Other     Diabetes Other      Social History     Tobacco Use    Smoking status: Never Smoker    Smokeless tobacco: Never Used   Vaping Use    Vaping Use: Never used   Substance Use Topics    Alcohol use: No    Drug use: No       Allergies   Allergen Reactions    Avelox [Moxifloxacin Hcl In Nacl] Hives    Cephalexin Hives and Rash    Darvon [Propoxyphene Hcl] Hives    Digoxin Hives    Librax [Chlordiazepoxide-Methscopol] Hives    Lyrica [Pregabalin] Swelling     Feet hands    Motrin [Ibuprofen Micronized] Rash    Other Hives     Most mycin's    Pcn [Penicillins] Rash    Prochlorperazine Edisylate Nausea And Vomiting    Sulfa Antibiotics Rash    Tetracyclines & Related Hives    Cymbalta [Duloxetine Hcl] Palpitations    Nickel Nausea And Vomiting     Hypotension      Savella [Milnacipran] Anxiety    Statins Other (See Comments)     myalgia     Current Outpatient Medications   Medication Sig Dispense Refill    traMADol (ULTRAM) 50 MG tablet Take 1 tablet by mouth every 6 hours as needed for Pain (fibromyalgia) for up to 30 days. 45 tablet 0    predniSONE (DELTASONE) 10 MG tablet 2 tablets 2 times daily for 5 days, 1 tablet 2 times daily for 5 days, 1 tablet daily 35 tablet 0    nitroGLYCERIN (NITROSTAT) 0.4 MG SL tablet up to max of 3 total doses.  If no relief after 1 dose, call 911. 25 tablet 3    Ginger 500 MG CAPS Take 3 capsules by mouth daily      dicyclomine (BENTYL) 10 MG capsule Take 1 capsule by mouth 3 times daily 90 capsule 1    vitamin D (ERGOCALCIFEROL) 1.25 MG (29563 UT) CAPS capsule Take 1 capsule by mouth once a week 12 capsule 3    rosuvastatin (CRESTOR) 20 MG tablet TAKE ONE TABLET BY MOUTH NIGHTLY 90 tablet 3    citalopram (CELEXA) 40 MG tablet Take 1 tablet by mouth daily 90 tablet 3    methocarbamol (ROBAXIN) 500 MG tablet TAKE ONE TABLET BY MOUTH THREE TIMES A DAY 90 tablet 2    metoprolol succinate (TOPROL XL) 25 MG extended release tablet TAKE 1 TABLET BY MOUTH ONE TIME A DAY 90 tablet 3    omeprazole (PRILOSEC) 20 MG delayed release capsule Take 1 capsule by mouth every 12 hours 180 capsule 1    Magnesium 200 MG CHEW Take 1 tablet by mouth daily       Calcium Carbonate-Vit D-Min (CALCIUM 600+D3 PLUS MINERALS) 600-800 MG-UNIT CHEW Take 1 tablet by mouth 2 times daily       promethazine (PHENERGAN) 25 MG tablet Take 1 tablet by mouth every 6 hours as needed for Nausea 30 tablet 1    aspirin 81 MG chewable tablet Take 1 tablet by mouth daily 30 tablet 3    Cholecalciferol (VITAMIN D3) 5000 UNITS TABS Take by mouth daily       diphenhydrAMINE (BENADRYL) 25 MG tablet Take 25 mg by mouth every 6 hours as needed. OTC        No current facility-administered medications for this visit. Facility-Administered Medications Ordered in Other Visits   Medication Dose Route Frequency Provider Last Rate Last Admin    lactated ringers infusion   IntraVENous Continuous Luis Lee MD   New Bag at 07/06/21 3082    sodium chloride flush 0.9 % injection 10 mL  10 mL IntraVENous 2 times per day Luis Lee MD        sodium chloride flush 0.9 % injection 10 mL  10 mL IntraVENous PRN Luis Lee MD        0.9 % sodium chloride infusion  25 mL IntraVENous PRN Luis Lee MD           Physical Exam:   /84   Pulse 76   Wt 166 lb (75.3 kg)   LMP  (LMP Unknown)   BMI 27.62 kg/m²   BP Readings from Last 3 Encounters:   03/29/22 132/84   12/22/21 112/60   09/16/21 104/70     Pulse Readings from Last 3 Encounters:   03/29/22 76   12/22/21 70   09/16/21 68     Wt Readings from Last 3 Encounters:   03/29/22 166 lb (75.3 kg)   12/22/21 164 lb 9.6 oz (74.7 kg)   09/16/21 169 lb 6.4 oz (76.8 kg)     Constitutional: She is oriented to person, place, and time. She appears well-developed and well-nourished. In no acute distress. HEENT: Normocephalic and atraumatic. Sclerae anicteric.  No xanthelasmas. Conjunctiva white, no subconjunctival hemorrhage   External inspection of ears nose teeth & gums   Eyes:PERRLA EOM's intact. Neck: Neck supple. No JVD present. Carotids without bruits. No mass and no thyromegaly present. No lymphadenopathy present. Cardiovascular: RRR, normal S1 and S2; no murmur/gallop or rub, PMI nondisplaced  Pulmonary/Chest: Effort normal.  Lungs clear to auscultation. Chest wall nontender  Abdominal: soft, nontender, nondistended. + bowel sounds; no organomegaly or bruits. Aorta normal  Extremities: No edema, cyanosis, or clubbing. Pulses are 2+ radial/carotid/dorsalis pedis bilaterally. Cap refill brisk. Neurological: No cranial nerve deficit. Psychiatric: She has a normal mood and affect. Her speech is normal and behavior is normal.     Lab Review:   Lab Results   Component Value Date    TRIG 131 02/26/2021    HDL 77 02/26/2021    LDLCALC 82 02/26/2021    LABVLDL 26 02/26/2021     Lab Results   Component Value Date     06/11/2021    K 4.2 06/11/2021    K 4.2 05/03/2021     06/11/2021    CO2 27 06/11/2021    BUN 10 06/11/2021    CREATININE 0.6 06/11/2021    GLUCOSE 95 06/11/2021    CALCIUM 9.2 06/11/2021      Lab Results   Component Value Date    WBC 6.4 06/11/2021    HGB 13.0 06/11/2021    HCT 38.1 06/11/2021    MCV 94.5 06/11/2021     06/11/2021   Summary 3/10/21   Definity is used for myocardial border enhancement. The left ventricle is normal in size with normal wall thickness. Left ventricular systolic function is moderately reduced with a visually   estimated ejection fraction of 40%. The basal to mid inferoseptal segments, basal to mid anteroseptal segments   and entire inferior wall appear hypokinetic. Grade I diastolic dysfunction with normal LV pressure. Normal right ventricular size and function. Mild mitral regurgitation. Inadequate tricuspid valve regurgitation to estimate systolic pulmonary   artery pressure.       Compared to the prior resting study performed 5/30/2019, EF is slightly   improved with RWMA's noted above. Results: Cardiac cath October 8, 2019  Left ventricular pressure 10 mmHg  Aortic pressure 126 mmHg     Coronary anatomy:   The left main coronary artery is normal.      Left anterior descending artery has previously placed proximal stent that is widely patent. Just distal to the stent is a mild narrowing of 20%.    Circumflex artery mild mid vessel plaque. It is a dominant vessel.     The right coronary artery is a nondominant but is normal.     Left ventriculogram shows ejection fraction of 50 %. Normal wall motion. EKG on 8/21/2020 sinus rhythm 67/min.  IVCD. There is the appearance of preexcitation although I do not see alka WPW. EKG on 2/26/2021 sinus rhythm 80/min. Short CA interval and wide complexes consistent with preexcitation EKG. 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 On 5/3/2021 sinus rhythm at 72/min. Accelerated AV conduction consistent with with Steffanie-Parkinson-White. IVCD late transition. Impression:  No acute coronary lesions. The previously placed left coronary stent is widely patent.         Assessment:    recent LHC / stable left arm LHC no complications   October 2090  CAD, previous LAD stent  6/2019  / on GDMT /stable     Plan: We will get fasting lipid CMP hs-CRP CBC. Return to see me in 6 months. Tinea other medication as listed. sIaiah Quigley MD, [address] [facility]  Office Visit           Isaiah Quigley MD,  [facility] [place]                      Cardiology             Jael Bustos Postal  1955 March 29, 2022    Primary Cardiologist:  Jose Armando Ritchie       CC: HPI:  The patient is 77 y.o. female with she is here she is still having issues with the Covid vaccine virus that she had back in December. Slowly improving. She does have fibromyalgia and has chronic aches and pains. Did have the [employer] vaccine and no issues.   Is on medical leave at this time and is anticipating [facility] on July 1, 2021. Overall she looks fairly stable and hemodynamically stable. Still taking prednisone at 10 mg/day and is hoping to reduce it by 5 mg. Last LDL was 82. Still having occasional short runs of SVT likely. That has been significantly improved since her ablation procedure many years ago. She is now on Crestor at 20 mg/day. 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 reviewed [facility] results     Review of Systems:  Constitutional: Denies  fatigue, weakness, night sweats or fever. HEENT: Denies new visual changes, ringing in ears, nosebleeds,nasal congestion  Respiratory: Denies new or change in SOB, PND, orthopnea or cough. Cardiovascular: see HPI  GI: Denies N/V, diarrhea, constipation, abdominal pain, change in bowel habits, melena or hematochezia  : Denies urinary frequency, urgency, incontinence, hematuria or dysuria. Skin: Denies rash, hives, or cyanosis  Musculoskeletal: Denies joint or muscle aches/pain  Neurological: Denies syncope or TIA-like symptoms.   Psychiatric: Denies anxiety, insomnia or depression     Past Medical History:   Diagnosis Date    Allergic rhinitis     Anxiety     Bilateral carpal tunnel syndrome 4/13/2021    CAD (coronary artery disease) 06/05/2019    COVID-19 12/28/2020    Depression     Endometriosis     hx    Fibromyalgia     GERD (gastroesophageal reflux disease)     Headache(784.0)     Hiatal hernia     Hyperlipidemia     Irritable bowel syndrome     Left bundle branch block (LBBB)     Osteoarthritis     SVT (supraventricular tachycardia) (HCC)     hx     WPW (Steffanie-Parkinson-White syndrome)      Past Surgical History:   Procedure Laterality Date    ABLATION OF DYSRHYTHMIC FOCUS      SVT    APPENDECTOMY  1982    ARM SURGERY Left 2021    LEFT ULNAR NERVE DECOMPRESSION AT ELBOW performed by Jennifer Nelson MD at [address] [facility] Right 2021    RIGHT ULNAR NERVE DECOMPRESSION AT ELBOW performed by Jennifer Nelson MD at [address]    bilateral, 2 revisions left foot    CARDIAC SURGERY  2019    stent    CARPAL TUNNEL RELEASE Left 2021    LEFT CARPAL TUNNEL RELEASE performed by Jennifer Nelson MD at [address] [facility] Right 2021    RIGHT CARPAL TUNNEL RELEASE performed by Jennifer Nelson MD at [address]      COLONOSCOPY  10/15/13    Hx polyps-3 yrs-? poor prep    ECTOPIC PREGNANCY SURGERY Right     HYSTERECTOMY, TOTAL ABDOMINAL  1988    LAPAROSCOPY      6--endometriosis    NASAL SEPTUM SURGERY  1982    OVARY SURGERY Right     partial removal    SALPINGO-OOPHORECTOMY Left     UPPER GASTROINTESTINAL ENDOSCOPY  10/15/13    volodymyr balderrama     Family History   Problem Relation Age of Onset    Other Mother         sepsis    Dementia Mother     High Cholesterol Mother     Depression Mother     Anxiety Disorder Mother     High Blood Pressure Father     Other Father          from renal failure and CHF    Heart Disease Father     Migraines Father     Heart Attack Brother     Heart Disease Brother     Hypertension Other     High Cholesterol Other     Migraines Other     Heart Disease Other     Diabetes Other      Social History     Tobacco Use    Smoking status: Never Smoker    Smokeless tobacco: Never Used   Vaping Use    Vaping Use: Never used   Substance Use Topics    Alcohol use: No    Drug use: No       Allergies   Allergen Reactions    Avelox [Moxifloxacin Hcl In Nacl] Hives    Cephalexin Hives and Rash    Darvon [Propoxyphene Hcl] Hives    Digoxin Hives    Librax [Chlordiazepoxide-Methscopol] Hives    Lyrica [Pregabalin] Swelling     Feet hands    Motrin [Ibuprofen Micronized] Rash    Other Hives     Most mycin's    Pcn [Penicillins] Rash    Prochlorperazine Edisylate Nausea And Vomiting    Sulfa Antibiotics Rash    Tetracyclines & Related Hives    Cymbalta [Duloxetine Hcl] Palpitations    Nickel Nausea And Vomiting     Hypotension      Savella [Milnacipran] Anxiety    Statins Other (See Comments)     myalgia     Current Outpatient Medications   Medication Sig Dispense Refill    traMADol (ULTRAM) 50 MG tablet Take 1 tablet by mouth every 6 hours as needed for Pain (fibromyalgia) for up to 30 days. 45 tablet 0    predniSONE (DELTASONE) 10 MG tablet 2 tablets 2 times daily for 5 days, 1 tablet 2 times daily for 5 days, 1 tablet daily 35 tablet 0    nitroGLYCERIN (NITROSTAT) 0.4 MG SL tablet up to max of 3 total doses.  If no relief after 1 dose, call 911. 25 tablet 3    Ginger 500 MG CAPS Take 3 capsules by mouth daily      dicyclomine (BENTYL) 10 MG capsule Take 1 capsule by mouth 3 times daily 90 capsule 1    vitamin D (ERGOCALCIFEROL) 1.25 MG (74986 UT) CAPS capsule Take 1 capsule by mouth once a week 12 capsule 3    rosuvastatin (CRESTOR) 20 MG tablet TAKE ONE TABLET BY MOUTH NIGHTLY 90 tablet 3    citalopram (CELEXA) 40 MG tablet Take 1 tablet by mouth daily 90 tablet 3    methocarbamol (ROBAXIN) 500 MG tablet TAKE ONE TABLET BY MOUTH THREE TIMES A DAY 90 tablet 2    metoprolol succinate (TOPROL XL) 25 MG extended release tablet TAKE 1 TABLET BY MOUTH ONE TIME A DAY 90 tablet 3    omeprazole (PRILOSEC) 20 MG delayed release capsule Take 1 capsule by mouth every 12 hours 180 capsule 1    Magnesium 200 MG CHEW Take 1 tablet by mouth daily       Calcium Carbonate-Vit D-Min (CALCIUM 600+D3 PLUS MINERALS) 600-800 MG-UNIT CHEW Take 1 tablet by mouth 2 times daily       promethazine (PHENERGAN) 25 MG tablet Take 1 tablet by mouth every 6 hours as needed for Nausea 30 tablet 1    aspirin 81 MG chewable tablet Take 1 tablet by mouth daily 30 tablet 3    Cholecalciferol (VITAMIN D3) 5000 UNITS TABS Take by mouth daily       diphenhydrAMINE (BENADRYL) 25 MG tablet Take 25 mg by mouth every 6 hours as needed. OTC        No current facility-administered medications for this visit. Facility-Administered Medications Ordered in Other Visits   Medication Dose Route Frequency Provider Last Rate Last Admin    lactated ringers infusion   IntraVENous Continuous Elenita Durán MD   New Bag at 07/06/21 3068    sodium chloride flush 0.9 % injection 10 mL  10 mL IntraVENous 2 times per day Elenita Durán MD        sodium chloride flush 0.9 % injection 10 mL  10 mL IntraVENous PRN Elenita Durán MD        0.9 % sodium chloride infusion  25 mL IntraVENous PRN Elenita Durán MD           Physical Exam:   /84   Pulse 76   Wt 166 lb (75.3 kg)   LMP  (LMP Unknown)   BMI 27.62 kg/m²   BP Readings from Last 3 Encounters:   03/29/22 132/84   12/22/21 112/60   09/16/21 104/70     Pulse Readings from Last 3 Encounters:   03/29/22 76   12/22/21 70   09/16/21 68     Wt Readings from Last 3 Encounters:   03/29/22 166 lb (75.3 kg)   12/22/21 164 lb 9.6 oz (74.7 kg)   09/16/21 169 lb 6.4 oz (76.8 kg)     Constitutional: She is oriented to person, place, and time. She appears well-developed and well-nourished. In no acute distress. HEENT: Normocephalic and atraumatic. Sclerae anicteric. No xanthelasmas. Conjunctiva white, no subconjunctival hemorrhage   External inspection of ears nose teeth & gums   Eyes:PERRLA EOM's intact. Neck: Neck supple. No JVD present. Carotids without bruits. No mass and no thyromegaly present. No lymphadenopathy present. Cardiovascular: RRR, normal S1 and S2; no murmur/gallop or rub, PMI nondisplaced  Pulmonary/Chest: Effort normal.  Lungs clear to auscultation.  Chest wall nontender  Abdominal: soft, nontender, nondistended. + bowel sounds; no organomegaly or bruits. Aorta normal  Extremities: No edema, cyanosis, or clubbing. Pulses are 2+ radial/carotid/dorsalis pedis bilaterally. Cap refill brisk. Neurological: No cranial nerve deficit. Psychiatric: She has a normal mood and affect. Her speech is normal and behavior is normal.     Lab Review:   Lab Results   Component Value Date    TRIG 131 02/26/2021    HDL 77 02/26/2021    LDLCALC 82 02/26/2021    LABVLDL 26 02/26/2021     Lab Results   Component Value Date     06/11/2021    K 4.2 06/11/2021    K 4.2 05/03/2021     06/11/2021    CO2 27 06/11/2021    BUN 10 06/11/2021    CREATININE 0.6 06/11/2021    GLUCOSE 95 06/11/2021    CALCIUM 9.2 06/11/2021      Lab Results   Component Value Date    WBC 6.4 06/11/2021    HGB 13.0 06/11/2021    HCT 38.1 06/11/2021    MCV 94.5 06/11/2021     06/11/2021   Summary 3/10/21   Definity is used for myocardial border enhancement. The left ventricle is normal in size with normal wall thickness. Left ventricular systolic function is moderately reduced with a visually   estimated ejection fraction of 40%. The basal to mid inferoseptal segments, basal to mid anteroseptal segments   and entire inferior wall appear hypokinetic. Grade I diastolic dysfunction with normal LV pressure. Normal right ventricular size and function. Mild mitral regurgitation. Inadequate tricuspid valve regurgitation to estimate systolic pulmonary   artery pressure. Compared to the prior resting study performed 5/30/2019, EF is slightly   improved with RWMA's noted above. Results: Cardiac cath October 8, 2019  Left ventricular pressure 10 mmHg  Aortic pressure 126 mmHg     Coronary anatomy:   The left main coronary artery is normal.      Left anterior descending artery has previously placed proximal stent that is widely patent. Just distal to the stent is a mild narrowing of 20%.      Circumflex artery mild mid vessel plaque. It is a dominant vessel.     The right coronary artery is a nondominant but is normal.     Left ventriculogram shows ejection fraction of 50 %. Normal wall motion. EKG on 8/21/2020 sinus rhythm 67/min.  IVCD. There is the appearance of preexcitation although I do not see alka WPW. EKG on 2/26/2021 sinus rhythm 80/min. Short DE interval and wide complexes consistent with preexcitation EKG. 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 On 5/3/2021 sinus rhythm at 72/min. Accelerated AV conduction consistent with with Steffanie-Parkinson-White. IVCD late transition. Impression:  No acute coronary lesions. The previously placed left coronary stent is widely patent.         Assessment:    recent LHC / stable left arm LHC no complications   October 3960  CAD, previous LAD stent  6/2019  / on GDMT /stable     Plan:  I think overall is doing well from a cardiac perspective. Return to see me in 6 months. Continue current therapy. Planning to retire in July 1, 2021.   Vannesa Blackman MD, [facility]

## 2022-03-30 LAB
A/G RATIO: 2.1 (ref 1.1–2.2)
ALBUMIN SERPL-MCNC: 4.4 G/DL (ref 3.4–5)
ALP BLD-CCNC: 80 U/L (ref 40–129)
ALT SERPL-CCNC: 11 U/L (ref 10–40)
ANION GAP SERPL CALCULATED.3IONS-SCNC: 14 MMOL/L (ref 3–16)
AST SERPL-CCNC: 16 U/L (ref 15–37)
BILIRUB SERPL-MCNC: 0.5 MG/DL (ref 0–1)
BUN BLDV-MCNC: 11 MG/DL (ref 7–20)
C-REACTIVE PROTEIN: <3 MG/L (ref 0–5.1)
CALCIUM SERPL-MCNC: 9.5 MG/DL (ref 8.3–10.6)
CHLORIDE BLD-SCNC: 101 MMOL/L (ref 99–110)
CHOLESTEROL, TOTAL: 164 MG/DL (ref 0–199)
CO2: 27 MMOL/L (ref 21–32)
CREAT SERPL-MCNC: 0.7 MG/DL (ref 0.6–1.2)
GFR AFRICAN AMERICAN: >60
GFR NON-AFRICAN AMERICAN: >60
GLUCOSE BLD-MCNC: 84 MG/DL (ref 70–99)
HDLC SERPL-MCNC: 83 MG/DL (ref 40–60)
LDL CHOLESTEROL CALCULATED: 56 MG/DL
POTASSIUM SERPL-SCNC: 3.5 MMOL/L (ref 3.5–5.1)
SODIUM BLD-SCNC: 142 MMOL/L (ref 136–145)
TOTAL PROTEIN: 6.5 G/DL (ref 6.4–8.2)
TRIGL SERPL-MCNC: 127 MG/DL (ref 0–150)
VLDLC SERPL CALC-MCNC: 25 MG/DL

## 2022-04-14 ENCOUNTER — TELEPHONE (OUTPATIENT)
Dept: FAMILY MEDICINE CLINIC | Age: 67
End: 2022-04-14

## 2022-04-14 NOTE — TELEPHONE ENCOUNTER
Message from morgan via [employer]. Emory Lopez,      I need a new prescription for the generic Nexium. Please send to [facility] in [place] [address]. Hope you are doing well. Thanks for all you do.        Unable to pend, not on current medication list.

## 2022-04-15 ENCOUNTER — PATIENT MESSAGE (OUTPATIENT)
Dept: FAMILY MEDICINE CLINIC | Age: 67
End: 2022-04-15

## 2022-04-15 DIAGNOSIS — K21.9 GASTROESOPHAGEAL REFLUX DISEASE, UNSPECIFIED WHETHER ESOPHAGITIS PRESENT: Primary | ICD-10-CM

## 2022-04-18 ENCOUNTER — TELEPHONE (OUTPATIENT)
Dept: FAMILY MEDICINE CLINIC | Age: 67
End: 2022-04-18

## 2022-04-18 NOTE — TELEPHONE ENCOUNTER
Message from patient Via [employer]. Emory Lopez,     I  started with a sore throat on Monday and by Wednesday it went to my sinuses which is now an infection. My face and teeth hurt. Could you send in a prescription for the [address]

## 2022-04-20 ENCOUNTER — TELEPHONE (OUTPATIENT)
Dept: FAMILY MEDICINE CLINIC | Age: 67
End: 2022-04-20

## 2022-04-20 DIAGNOSIS — B96.89 ACUTE BACTERIAL SINUSITIS: Primary | ICD-10-CM

## 2022-04-20 DIAGNOSIS — J01.90 ACUTE BACTERIAL SINUSITIS: Primary | ICD-10-CM

## 2022-04-20 RX ORDER — AZITHROMYCIN 250 MG/1
TABLET, FILM COATED ORAL
Qty: 6 TABLET | Refills: 0 | Status: SHIPPED | OUTPATIENT
Start: 2022-04-20 | End: 2022-04-30

## 2022-04-20 NOTE — TELEPHONE ENCOUNTER
Pt wants tammy to call her.  Pt said she isn't feeling good and needs a z- pack for a sinus infection

## 2022-04-20 NOTE — TELEPHONE ENCOUNTER
I ordered the antibiotic to the pharmacy. However this message should have had additional information about symptoms, timing and not a message for and MA to call about the symptoms. This creates more steps to solve the issue.

## 2022-05-18 ENCOUNTER — TELEPHONE (OUTPATIENT)
Dept: FAMILY MEDICINE CLINIC | Age: 67
End: 2022-05-18

## 2022-05-18 DIAGNOSIS — R53.82 CHRONIC FATIGUE: Primary | ICD-10-CM

## 2022-05-18 DIAGNOSIS — R53.1 WEAKNESS: ICD-10-CM

## 2022-05-18 NOTE — TELEPHONE ENCOUNTER
Could you write and order for a metals blood test mercury. I have 2 teeth that have old mercury fillings . I have been researching my symptoms and mercury poisoning and I would like to check for it.

## 2022-05-19 ENCOUNTER — HOSPITAL ENCOUNTER (OUTPATIENT)
Dept: WOMENS IMAGING | Age: 67
Discharge: HOME | End: 2022-05-19
Payer: MEDICARE

## 2022-05-19 DIAGNOSIS — R53.1 WEAKNESS: ICD-10-CM

## 2022-05-19 DIAGNOSIS — R53.82 CHRONIC FATIGUE: ICD-10-CM

## 2022-05-19 DIAGNOSIS — Z12.31 SCREENING MAMMOGRAM FOR BREAST CANCER: ICD-10-CM

## 2022-05-19 PROCEDURE — 77063 BREAST TOMOSYNTHESIS BI: CPT

## 2022-05-22 LAB — MERCURY BLOOD: <2.5 UG/L

## 2022-05-26 DIAGNOSIS — M79.7 FIBROMYALGIA: ICD-10-CM

## 2022-05-26 DIAGNOSIS — M15.9 PRIMARY OSTEOARTHRITIS INVOLVING MULTIPLE JOINTS: ICD-10-CM

## 2022-05-27 RX ORDER — PREDNISONE 10 MG/1
TABLET ORAL
Qty: 35 TABLET | Refills: 0 | Status: ON HOLD
Start: 2022-05-27 | End: 2022-07-23 | Stop reason: HOSPADM

## 2022-05-27 RX ORDER — TRAMADOL HYDROCHLORIDE 50 MG/1
50 TABLET ORAL EVERY 6 HOURS PRN
Qty: 45 TABLET | Refills: 0 | Status: SHIPPED | OUTPATIENT
Start: 2022-05-27 | End: 2022-06-22 | Stop reason: SDUPTHER

## 2022-05-30 NOTE — PROGRESS NOTES
Pt has had a couple episodes of emesis in the last two hours. Zofran was given per order (see MAR), pt states it did not help. Pt refusing bentyl, asking for phenergan.  Delfin SALCEDO.   Electronically signed by Penelope Naylor RN on 6/5/2019 at 10:40 AM Labs/EKG/Imaging Studies/Medications

## 2022-06-22 ENCOUNTER — OFFICE VISIT (OUTPATIENT)
Dept: FAMILY MEDICINE CLINIC | Age: 67
End: 2022-06-22
Payer: MEDICARE

## 2022-06-22 VITALS
RESPIRATION RATE: 16 BRPM | HEART RATE: 77 BPM | DIASTOLIC BLOOD PRESSURE: 82 MMHG | OXYGEN SATURATION: 98 % | WEIGHT: 167.4 LBS | BODY MASS INDEX: 27.86 KG/M2 | SYSTOLIC BLOOD PRESSURE: 120 MMHG

## 2022-06-22 DIAGNOSIS — I25.118 CORONARY ARTERY DISEASE OF NATIVE ARTERY OF NATIVE HEART WITH STABLE ANGINA PECTORIS (HCC): ICD-10-CM

## 2022-06-22 DIAGNOSIS — G43.109 MIGRAINE WITH AURA AND WITHOUT STATUS MIGRAINOSUS, NOT INTRACTABLE: Primary | ICD-10-CM

## 2022-06-22 DIAGNOSIS — M15.9 PRIMARY OSTEOARTHRITIS INVOLVING MULTIPLE JOINTS: ICD-10-CM

## 2022-06-22 DIAGNOSIS — M79.7 FIBROMYALGIA: ICD-10-CM

## 2022-06-22 DIAGNOSIS — G43.709 CHRONIC MIGRAINE WITHOUT AURA WITHOUT STATUS MIGRAINOSUS, NOT INTRACTABLE: ICD-10-CM

## 2022-06-22 DIAGNOSIS — F33.0 MILD EPISODE OF RECURRENT MAJOR DEPRESSIVE DISORDER (HCC): ICD-10-CM

## 2022-06-22 PROCEDURE — 1090F PRES/ABSN URINE INCON ASSESS: CPT | Performed by: NURSE PRACTITIONER

## 2022-06-22 PROCEDURE — 99214 OFFICE O/P EST MOD 30 MIN: CPT | Performed by: NURSE PRACTITIONER

## 2022-06-22 PROCEDURE — 1036F TOBACCO NON-USER: CPT | Performed by: NURSE PRACTITIONER

## 2022-06-22 PROCEDURE — G8417 CALC BMI ABV UP PARAM F/U: HCPCS | Performed by: NURSE PRACTITIONER

## 2022-06-22 PROCEDURE — 3017F COLORECTAL CA SCREEN DOC REV: CPT | Performed by: NURSE PRACTITIONER

## 2022-06-22 PROCEDURE — G8427 DOCREV CUR MEDS BY ELIG CLIN: HCPCS | Performed by: NURSE PRACTITIONER

## 2022-06-22 PROCEDURE — 1123F ACP DISCUSS/DSCN MKR DOCD: CPT | Performed by: NURSE PRACTITIONER

## 2022-06-22 PROCEDURE — G8399 PT W/DXA RESULTS DOCUMENT: HCPCS | Performed by: NURSE PRACTITIONER

## 2022-06-22 RX ORDER — BUPROPION HYDROCHLORIDE 75 MG/1
75 TABLET ORAL 2 TIMES DAILY
Qty: 60 TABLET | Refills: 5 | Status: SHIPPED | OUTPATIENT
Start: 2022-06-22

## 2022-06-22 RX ORDER — HYDROCODONE BITARTRATE AND ACETAMINOPHEN 5; 325 MG/1; MG/1
1 TABLET ORAL DAILY PRN
Qty: 10 TABLET | Refills: 0 | Status: SHIPPED | OUTPATIENT
Start: 2022-06-22 | End: 2022-10-07 | Stop reason: SDUPTHER

## 2022-06-22 RX ORDER — METOPROLOL SUCCINATE 25 MG/1
TABLET, EXTENDED RELEASE ORAL
Qty: 90 TABLET | Refills: 3 | Status: SHIPPED | OUTPATIENT
Start: 2022-06-22

## 2022-06-22 RX ORDER — TRAMADOL HYDROCHLORIDE 50 MG/1
50 TABLET ORAL EVERY 6 HOURS PRN
Qty: 45 TABLET | Refills: 0 | Status: ON HOLD
Start: 2022-06-22 | End: 2022-07-23 | Stop reason: HOSPADM

## 2022-06-22 ASSESSMENT — PATIENT HEALTH QUESTIONNAIRE - PHQ9
SUM OF ALL RESPONSES TO PHQ QUESTIONS 1-9: 9
SUM OF ALL RESPONSES TO PHQ QUESTIONS 1-9: 9
10. IF YOU CHECKED OFF ANY PROBLEMS, HOW DIFFICULT HAVE THESE PROBLEMS MADE IT FOR YOU TO DO YOUR WORK, TAKE CARE OF THINGS AT HOME, OR GET ALONG WITH OTHER PEOPLE: 0
7. TROUBLE CONCENTRATING ON THINGS, SUCH AS READING THE NEWSPAPER OR WATCHING TELEVISION: 0
SUM OF ALL RESPONSES TO PHQ9 QUESTIONS 1 & 2: 4
4. FEELING TIRED OR HAVING LITTLE ENERGY: 2
2. FEELING DOWN, DEPRESSED OR HOPELESS: 2
SUM OF ALL RESPONSES TO PHQ QUESTIONS 1-9: 9
9. THOUGHTS THAT YOU WOULD BE BETTER OFF DEAD, OR OF HURTING YOURSELF: 0
5. POOR APPETITE OR OVEREATING: 0
1. LITTLE INTEREST OR PLEASURE IN DOING THINGS: 2
8. MOVING OR SPEAKING SO SLOWLY THAT OTHER PEOPLE COULD HAVE NOTICED. OR THE OPPOSITE, BEING SO FIGETY OR RESTLESS THAT YOU HAVE BEEN MOVING AROUND A LOT MORE THAN USUAL: 0
3. TROUBLE FALLING OR STAYING ASLEEP: 3
SUM OF ALL RESPONSES TO PHQ QUESTIONS 1-9: 9
6. FEELING BAD ABOUT YOURSELF - OR THAT YOU ARE A FAILURE OR HAVE LET YOURSELF OR YOUR FAMILY DOWN: 0

## 2022-06-22 NOTE — PROGRESS NOTES
Venice Sahu (:  1955) is a 79 y.o. female,Established patient, here for evaluation of the following chief complaint(s):  Chronic Pain (6 months)         ASSESSMENT/PLAN:  1. Migraine with aura and without status migrainosus, not intractable  -     metoprolol succinate (TOPROL XL) 25 MG extended release tablet; TAKE 1 TABLET BY MOUTH ONE TIME A DAY, Disp-90 tablet, R-3Normal  2. Mild episode of recurrent major depressive disorder (HCC)  -     buPROPion (WELLBUTRIN) 75 MG tablet; Take 1 tablet by mouth 2 times daily, Disp-60 tablet, R-5Normal  3. Coronary artery disease of native artery of native heart with stable angina pectoris ([facility] [address]  4. Fibromyalgia  -     traMADol (ULTRAM) 50 MG tablet; Take 1 tablet by mouth every 6 hours as needed for Pain (fibromyalgia) for up to 30 days. , Disp-45 tablet, R-0Normal  -     HYDROcodone-acetaminophen (NORCO) 5-325 MG per tablet; Take 1 tablet by mouth daily as needed for Pain for up to 10 days. May take instead of tramadol when pain is increased, Disp-10 tablet, R-0Normal  5. Primary osteoarthritis involving multiple joints  -     traMADol (ULTRAM) 50 MG tablet; Take 1 tablet by mouth every 6 hours as needed for Pain (fibromyalgia) for up to 30 days. , Disp-45 tablet, R-0Normal  6. Chronic migraine without aura without status migrainosus, not intractable  -     HYDROcodone-acetaminophen (NORCO) 5-325 MG per tablet; Take 1 tablet by mouth daily as needed for Pain for up to 10 days. May take instead of tramadol when pain is increased, Disp-10 tablet, R-0Normal        Controlled Substance Monitoring:    Acute and Chronic Pain Monitoring:   RX Monitoring 2022   Attestation -   Periodic Controlled Substance Monitoring No signs of potential drug abuse or diversion identified. Chronic Pain > 120 MEDD Obtained or confirmed \"Medication Contract\" on file. Will trial wellbutrin 75 mg taking 1 daily. May increase to 2 daily of needed.  Discussed taking 1 in the

## 2022-06-24 ENCOUNTER — TELEPHONE (OUTPATIENT)
Dept: FAMILY MEDICINE CLINIC | Age: 67
End: 2022-06-24

## 2022-06-24 DIAGNOSIS — K57.92 DIVERTICULITIS: Primary | ICD-10-CM

## 2022-06-24 RX ORDER — METRONIDAZOLE 250 MG/1
250 TABLET ORAL 3 TIMES DAILY
Qty: 21 TABLET | Refills: 0 | Status: SHIPPED | OUTPATIENT
Start: 2022-06-24 | End: 2022-07-01

## 2022-06-24 RX ORDER — CIPROFLOXACIN 500 MG/1
500 TABLET, FILM COATED ORAL 2 TIMES DAILY
Qty: 14 TABLET | Refills: 0 | Status: SHIPPED | OUTPATIENT
Start: 2022-06-24 | End: 2022-07-01

## 2022-06-24 NOTE — TELEPHONE ENCOUNTER
Pt calling in asking for a new prescription of Ciprofloxacin, and Metronidazole for an infection pt has. Pt was asked about an appt and she said she was just seen. Please advise what you would like pt to do.

## 2022-07-19 DIAGNOSIS — M79.7 FIBROMYALGIA: ICD-10-CM

## 2022-07-19 NOTE — TELEPHONE ENCOUNTER
Refill Request     CONFIRM preferrred pharmacy with the patient. If Mail Order Rx - Pend for 90 day refill.       Last Seen: Last Seen Department: 6/22/2022  Last Seen by PCP: 6/22/2022    Last Written: 09/08/2021 90 Tablet 2 Refills    Next Appointment:   Future Appointments   Date Time Provider Ronny Orozco   8/8/2022  2:15 PM Luisa Self, APRN - CNP [facility]   9/29/2022 10:45 AM Shamir Tracy MD [facility]   12/28/2022  2:45 PM Luisa Self, APRN - CNP [facility]       Future appointment scheduled      Requested Prescriptions     Pending Prescriptions Disp Refills    methocarbamol (ROBAXIN) 500 MG tablet 90 tablet 2     Sig: TAKE ONE TABLET BY MOUTH THREE TIMES A DAY

## 2022-07-20 RX ORDER — METHOCARBAMOL 500 MG/1
TABLET, FILM COATED ORAL
Qty: 90 TABLET | Refills: 2 | Status: SHIPPED | OUTPATIENT
Start: 2022-07-20

## 2022-07-21 ENCOUNTER — APPOINTMENT (OUTPATIENT)
Dept: GENERAL RADIOLOGY | Age: 67
DRG: 287 | End: 2022-07-21
Payer: MEDICARE

## 2022-07-21 ENCOUNTER — HOSPITAL ENCOUNTER (INPATIENT)
Age: 67
LOS: 2 days | Discharge: HOME | DRG: 287 | End: 2022-07-23
Attending: EMERGENCY MEDICINE | Admitting: INTERNAL MEDICINE
Payer: MEDICARE

## 2022-07-21 ENCOUNTER — APPOINTMENT (OUTPATIENT)
Dept: CT IMAGING | Age: 67
DRG: 287 | End: 2022-07-21
Payer: MEDICARE

## 2022-07-21 DIAGNOSIS — S09.90XA CLOSED HEAD INJURY, INITIAL ENCOUNTER: ICD-10-CM

## 2022-07-21 DIAGNOSIS — E87.6 HYPOKALEMIA: ICD-10-CM

## 2022-07-21 DIAGNOSIS — R07.9 CHEST PAIN, UNSPECIFIED TYPE: Primary | ICD-10-CM

## 2022-07-21 LAB
A/G RATIO: 1.8 (ref 1.1–2.2)
ALBUMIN SERPL-MCNC: 4.3 G/DL (ref 3.4–5)
ALP BLD-CCNC: 76 U/L (ref 40–129)
ALT SERPL-CCNC: 7 U/L (ref 10–40)
ANION GAP SERPL CALCULATED.3IONS-SCNC: 9 MMOL/L (ref 3–16)
AST SERPL-CCNC: 12 U/L (ref 15–37)
BILIRUB SERPL-MCNC: 0.6 MG/DL (ref 0–1)
BUN BLDV-MCNC: 11 MG/DL (ref 7–20)
CALCIUM SERPL-MCNC: 9.2 MG/DL (ref 8.3–10.6)
CHLORIDE BLD-SCNC: 102 MMOL/L (ref 99–110)
CO2: 29 MMOL/L (ref 21–32)
CREAT SERPL-MCNC: 0.7 MG/DL (ref 0.6–1.2)
GFR AFRICAN AMERICAN: >60
GFR NON-AFRICAN AMERICAN: >60
GLUCOSE BLD-MCNC: 109 MG/DL (ref 70–99)
HCT VFR BLD CALC: 35.3 % (ref 36–48)
HEMOGLOBIN: 12 G/DL (ref 12–16)
MCH RBC QN AUTO: 31.7 PG (ref 26–34)
MCHC RBC AUTO-ENTMCNC: 34 G/DL (ref 31–36)
MCV RBC AUTO: 93.2 FL (ref 80–100)
PDW BLD-RTO: 12.7 % (ref 12.4–15.4)
PLATELET # BLD: 268 K/UL (ref 135–450)
PMV BLD AUTO: 6.8 FL (ref 5–10.5)
POTASSIUM SERPL-SCNC: 3.2 MMOL/L (ref 3.5–5.1)
RBC # BLD: 3.79 M/UL (ref 4–5.2)
SODIUM BLD-SCNC: 140 MMOL/L (ref 136–145)
TOTAL PROTEIN: 6.7 G/DL (ref 6.4–8.2)
TROPONIN: <0.01 NG/ML
TROPONIN: <0.01 NG/ML
WBC # BLD: 10.4 K/UL (ref 4–11)

## 2022-07-21 PROCEDURE — 93005 ELECTROCARDIOGRAM TRACING: CPT | Performed by: EMERGENCY MEDICINE

## 2022-07-21 PROCEDURE — 4A023N7 MEASUREMENT OF CARDIAC SAMPLING AND PRESSURE, LEFT HEART, PERCUTANEOUS APPROACH: ICD-10-PCS | Performed by: INTERNAL MEDICINE

## 2022-07-21 PROCEDURE — 99285 EMERGENCY DEPT VISIT HI MDM: CPT

## 2022-07-21 PROCEDURE — 2060000000 HC ICU INTERMEDIATE R&B

## 2022-07-21 PROCEDURE — 80053 COMPREHEN METABOLIC PANEL: CPT

## 2022-07-21 PROCEDURE — 85027 COMPLETE CBC AUTOMATED: CPT

## 2022-07-21 PROCEDURE — 84484 ASSAY OF TROPONIN QUANT: CPT

## 2022-07-21 PROCEDURE — 71045 X-RAY EXAM CHEST 1 VIEW: CPT

## 2022-07-21 PROCEDURE — 96374 THER/PROPH/DIAG INJ IV PUSH: CPT

## 2022-07-21 PROCEDURE — B2111ZZ FLUOROSCOPY OF MULTIPLE CORONARY ARTERIES USING LOW OSMOLAR CONTRAST: ICD-10-PCS | Performed by: INTERNAL MEDICINE

## 2022-07-21 PROCEDURE — 70450 CT HEAD/BRAIN W/O DYE: CPT

## 2022-07-21 PROCEDURE — 6360000002 HC RX W HCPCS: Performed by: EMERGENCY MEDICINE

## 2022-07-21 PROCEDURE — 6370000000 HC RX 637 (ALT 250 FOR IP): Performed by: EMERGENCY MEDICINE

## 2022-07-21 RX ORDER — ACETAMINOPHEN 500 MG
500 TABLET ORAL EVERY 6 HOURS PRN
COMMUNITY

## 2022-07-21 RX ORDER — ASPIRIN 81 MG/1
81 TABLET, CHEWABLE ORAL DAILY
Status: DISCONTINUED | OUTPATIENT
Start: 2022-07-22 | End: 2022-07-23 | Stop reason: HOSPADM

## 2022-07-21 RX ORDER — ACETAMINOPHEN 650 MG/1
650 SUPPOSITORY RECTAL EVERY 6 HOURS PRN
Status: DISCONTINUED | OUTPATIENT
Start: 2022-07-21 | End: 2022-07-23 | Stop reason: HOSPADM

## 2022-07-21 RX ORDER — SODIUM CHLORIDE 0.9 % (FLUSH) 0.9 %
5-40 SYRINGE (ML) INJECTION PRN
Status: DISCONTINUED | OUTPATIENT
Start: 2022-07-21 | End: 2022-07-23 | Stop reason: HOSPADM

## 2022-07-21 RX ORDER — BUPROPION HYDROCHLORIDE 75 MG/1
75 TABLET ORAL 2 TIMES DAILY
Status: DISCONTINUED | OUTPATIENT
Start: 2022-07-22 | End: 2022-07-23 | Stop reason: HOSPADM

## 2022-07-21 RX ORDER — POLYETHYLENE GLYCOL 3350 17 G/17G
17 POWDER, FOR SOLUTION ORAL DAILY PRN
Status: DISCONTINUED | OUTPATIENT
Start: 2022-07-21 | End: 2022-07-23 | Stop reason: HOSPADM

## 2022-07-21 RX ORDER — METOPROLOL SUCCINATE 25 MG/1
25 TABLET, EXTENDED RELEASE ORAL DAILY
Status: DISCONTINUED | OUTPATIENT
Start: 2022-07-22 | End: 2022-07-23 | Stop reason: HOSPADM

## 2022-07-21 RX ORDER — ATORVASTATIN CALCIUM 80 MG/1
80 TABLET, FILM COATED ORAL NIGHTLY
Status: DISCONTINUED | OUTPATIENT
Start: 2022-07-22 | End: 2022-07-22

## 2022-07-21 RX ORDER — ENOXAPARIN SODIUM 100 MG/ML
40 INJECTION SUBCUTANEOUS DAILY
Status: DISCONTINUED | OUTPATIENT
Start: 2022-07-22 | End: 2022-07-23 | Stop reason: HOSPADM

## 2022-07-21 RX ORDER — PANTOPRAZOLE SODIUM 40 MG/1
40 TABLET, DELAYED RELEASE ORAL
Status: DISCONTINUED | OUTPATIENT
Start: 2022-07-22 | End: 2022-07-23 | Stop reason: HOSPADM

## 2022-07-21 RX ORDER — SODIUM CHLORIDE 0.9 % (FLUSH) 0.9 %
5-40 SYRINGE (ML) INJECTION EVERY 12 HOURS SCHEDULED
Status: DISCONTINUED | OUTPATIENT
Start: 2022-07-22 | End: 2022-07-23 | Stop reason: HOSPADM

## 2022-07-21 RX ORDER — ONDANSETRON 2 MG/ML
4 INJECTION INTRAMUSCULAR; INTRAVENOUS ONCE
Status: COMPLETED | OUTPATIENT
Start: 2022-07-21 | End: 2022-07-21

## 2022-07-21 RX ORDER — SODIUM CHLORIDE 9 MG/ML
INJECTION, SOLUTION INTRAVENOUS PRN
Status: DISCONTINUED | OUTPATIENT
Start: 2022-07-21 | End: 2022-07-23 | Stop reason: HOSPADM

## 2022-07-21 RX ORDER — ACETAMINOPHEN 325 MG/1
650 TABLET ORAL EVERY 6 HOURS PRN
Status: DISCONTINUED | OUTPATIENT
Start: 2022-07-21 | End: 2022-07-23 | Stop reason: HOSPADM

## 2022-07-21 RX ORDER — ONDANSETRON 2 MG/ML
4 INJECTION INTRAMUSCULAR; INTRAVENOUS EVERY 6 HOURS PRN
Status: DISCONTINUED | OUTPATIENT
Start: 2022-07-21 | End: 2022-07-22

## 2022-07-21 RX ORDER — ONDANSETRON 4 MG/1
4 TABLET, ORALLY DISINTEGRATING ORAL EVERY 8 HOURS PRN
Status: DISCONTINUED | OUTPATIENT
Start: 2022-07-21 | End: 2022-07-22

## 2022-07-21 RX ORDER — ACETAMINOPHEN 500 MG
1000 TABLET ORAL ONCE
Status: COMPLETED | OUTPATIENT
Start: 2022-07-21 | End: 2022-07-21

## 2022-07-21 RX ADMIN — ONDANSETRON 4 MG: 2 INJECTION INTRAMUSCULAR; INTRAVENOUS at 20:25

## 2022-07-21 RX ADMIN — ACETAMINOPHEN 1000 MG: 500 TABLET ORAL at 22:38

## 2022-07-21 ASSESSMENT — PAIN DESCRIPTION - FREQUENCY: FREQUENCY: CONTINUOUS

## 2022-07-21 ASSESSMENT — PAIN DESCRIPTION - DESCRIPTORS: DESCRIPTORS: ACHING

## 2022-07-21 ASSESSMENT — PAIN DESCRIPTION - LOCATION: LOCATION: SHOULDER

## 2022-07-21 ASSESSMENT — PAIN - FUNCTIONAL ASSESSMENT
PAIN_FUNCTIONAL_ASSESSMENT: NONE - DENIES PAIN
PAIN_FUNCTIONAL_ASSESSMENT: ACTIVITIES ARE NOT PREVENTED

## 2022-07-21 ASSESSMENT — PAIN SCALES - GENERAL
PAINLEVEL_OUTOF10: 7
PAINLEVEL_OUTOF10: 8

## 2022-07-21 ASSESSMENT — PAIN DESCRIPTION - ORIENTATION: ORIENTATION: LEFT

## 2022-07-21 ASSESSMENT — PAIN DESCRIPTION - ONSET: ONSET: ON-GOING

## 2022-07-21 ASSESSMENT — PAIN DESCRIPTION - PAIN TYPE: TYPE: ACUTE PAIN

## 2022-07-21 NOTE — ED PROVIDER NOTES
CHIEF COMPLAINT  Loss of Consciousness (Reports LOC after taking Nitro for CP three days ago but has been lightheaded off and on for past few days, states felt off balanced today )      HISTORY OF PRESENT ILLNESS  Everardo Perez is a 79 y.o. female with a history of CAD status post LAD stent, left bundle branch block, SVT, WPW, depression presenting for evaluation of syncopal episode, chest pain. She states 3 days ago she had substernal chest pain. She states that she took nitroglycerin for this. She states that she usually is sitting down when she takes nitroglycerin but was not this time and states that she became lightheaded and passed out. She states that she scraped her chin. She states since that time, she has been intermittently lightheaded. She felt off balance today and fell again however did not hit her head at this time. She denies any chest pain at this time. She states that she sent a message to her cardiologist but felt that she should come in for evaluation. She denies any focal weakness. No other complaints, modifying factors or associated symptoms. I have reviewed the following from the nursing documentation.    Past Medical History:   Diagnosis Date    Allergic rhinitis     Anxiety     Bilateral carpal tunnel syndrome 4/13/2021    CAD (coronary artery disease) 06/05/2019    COVID-19 12/28/2020    Depression     Endometriosis     hx    Fibromyalgia     GERD (gastroesophageal reflux disease)     Headache(784.0)     Hiatal hernia     Hyperlipidemia     Irritable bowel syndrome     Left bundle branch block (LBBB)     Osteoarthritis     SVT (supraventricular tachycardia) (HCC)     hx     WPW (Steffanie-Parkinson-White syndrome)      Past Surgical History:   Procedure Laterality Date    ABLATION OF DYSRHYTHMIC FOCUS  1999    SVT    APPENDECTOMY  1982    ARM SURGERY Left 5/4/2021    LEFT ULNAR NERVE DECOMPRESSION AT ELBOW performed by Donya Land MD at [address] [place] [address] Dr Right 2021    RIGHT ULNAR NERVE DECOMPRESSION AT ELBOW performed by Sandee Morales MD at [address]    bilateral, 2 revisions left foot    CARDIAC SURGERY  2019    stent    CARPAL TUNNEL RELEASE Left 2021    LEFT CARPAL TUNNEL RELEASE performed by Sandee Morales MD at [address] Right 2021    RIGHT CARPAL TUNNEL RELEASE performed by Sandee Morales MD at [address] [facility][note 3]  10/15/13    Hx polyps-3 yrs-? poor prep    ECTOPIC PREGNANCY SURGERY Right     HYSTERECTOMY, TOTAL ABDOMINAL (CERVIX REMOVED)      LAPAROSCOPY      6--endometriosis    [facility]    OVARY SURGERY Right     partial removal    SALPINGO-OOPHORECTOMY Left     UPPER GASTROINTESTINAL ENDOSCOPY  10/15/13    schatzki ring     Family History   Problem Relation Age of Onset    Other Mother         sepsis    Dementia Mother     High Cholesterol Mother     Depression Mother     Anxiety Disorder Mother     High Blood Pressure Father     Other Father          from renal failure and CHF    Heart Disease Father     Migraines Father     Heart Attack Brother     Heart Disease Brother     Hypertension Other     High Cholesterol Other     Migraines Other     Heart Disease Other     Diabetes Other      Social History     Socioeconomic History    Marital status:      Spouse name: Not on file    Number of children: 0    Years of education: Not on file    Highest education level: Not on file   Occupational History    Not on file   Tobacco Use    Smoking status: Never    Smokeless tobacco: Never   Vaping Use    Vaping Use: Never used   Substance and Sexual Activity    Alcohol use: No    Drug use: No    Sexual activity: Not Currently     Partners: Male   Other Topics Concern    Not on file   Social History Narrative    Not on file     Social Determinants of Health     Financial Resource Strain: Low Risk     Difficulty of Paying Living Expenses: Not very hard   Food Insecurity: No Food Insecurity    Worried About [address] [employer] in the Last Year: Never true    Ran Out of Food in the Last Year: Never true   Transportation Needs: No Transportation Needs    Lack of Transportation (Medical): No    Lack of Transportation (Non-Medical): No   Physical Activity: Not on file   Stress: Not on file   Social Connections: Not on file   Intimate Partner Violence: Not on file   Housing Stability: Not on file     Current Facility-Administered Medications   Medication Dose Route Frequency Provider Last Rate Last Admin    acetaminophen (TYLENOL) tablet 1,000 mg  1,000 mg Oral Once Shweta Anderson MD         Current Outpatient Medications   Medication Sig Dispense Refill    methocarbamol (ROBAXIN) 500 MG tablet TAKE ONE TABLET BY MOUTH THREE TIMES A DAY 90 tablet 2    buPROPion (WELLBUTRIN) 75 MG tablet Take 1 tablet by mouth 2 times daily 60 tablet 5    traMADol (ULTRAM) 50 MG tablet Take 1 tablet by mouth every 6 hours as needed for Pain (fibromyalgia) for up to 30 days. 45 tablet 0    metoprolol succinate (TOPROL XL) 25 MG extended release tablet TAKE 1 TABLET BY MOUTH ONE TIME A DAY 90 tablet 3    predniSONE (DELTASONE) 10 MG tablet 2 tablets 2 times daily for 5 days, 1 tablet 2 times daily for 5 days, 1 tablet daily 35 tablet 0    esomeprazole (NEXIUM) 20 MG delayed release capsule Take 1 capsule by mouth every morning (before breakfast) 90 capsule 3    nitroGLYCERIN (NITROSTAT) 0.4 MG SL tablet up to max of 3 total doses.  If no relief after 1 dose, call 911. 25 tablet 3    Ginger 500 MG CAPS Take 3 capsules by mouth daily      dicyclomine (BENTYL) 10 MG capsule Take 1 capsule by mouth 3 times daily 90 capsule 1    vitamin D (ERGOCALCIFEROL) 1.25 MG (93412 UT) CAPS capsule Take 1 capsule by mouth once a week 12 capsule 3    rosuvastatin (CRESTOR) 20 MG tablet TAKE ONE TABLET BY MOUTH NIGHTLY 90 tablet 3    citalopram (CELEXA) 40 MG tablet Take 1 tablet by mouth daily 90 tablet 3    Magnesium 200 MG CHEW Take 1 tablet by mouth daily       Calcium Carbonate-Vit D-Min (CALCIUM 600+D3 PLUS MINERALS) 600-800 MG-UNIT CHEW Take 1 tablet by mouth 2 times daily       promethazine (PHENERGAN) 25 MG tablet Take 1 tablet by mouth every 6 hours as needed for Nausea 30 tablet 1    aspirin 81 MG chewable tablet Take 1 tablet by mouth daily 30 tablet 3    Cholecalciferol (VITAMIN D3) 5000 UNITS TABS Take by mouth daily       diphenhydrAMINE (BENADRYL) 25 MG tablet Take 25 mg by mouth every 6 hours as needed. OTC        Facility-Administered Medications Ordered in Other Encounters   Medication Dose Route Frequency Provider Last Rate Last Admin    lactated ringers infusion   IntraVENous Continuous Pancho Carmona MD   New Bag at 07/06/21 6017    sodium chloride flush 0.9 % injection 10 mL  10 mL IntraVENous 2 times per day Pancho Carmona MD        sodium chloride flush 0.9 % injection 10 mL  10 mL IntraVENous PRN Pancho Carmona MD        0.9 % sodium chloride infusion  25 mL IntraVENous PRN Pancho Carmona MD         Allergies   Allergen Reactions    Avelox [Moxifloxacin Hcl In Nacl] Hives    Cephalexin Hives and Rash    Darvon [Propoxyphene Hcl] Hives    Digoxin Hives    Librax [Chlordiazepoxide-Methscopol] Hives    Lyrica [Pregabalin] Swelling     Feet hands    Motrin [Ibuprofen Micronized] Rash    Other Hives     Most mycin's    Pcn [Penicillins] Rash    Prochlorperazine Edisylate Nausea And Vomiting    Sulfa Antibiotics Rash    Tetracyclines & Related Hives    Cymbalta [Duloxetine Hcl] Palpitations    Nickel Nausea And Vomiting     Hypotension      Savella [Milnacipran] Anxiety    Statins Other (See Comments)     myalgia       REVIEW OF SYSTEMS  Positive and pertinent negatives as per HPI. All other systems were reviewed and are negative.     PHYSICAL EXAM  /70   Pulse 94   Temp 99.1 °F (37.3 °C) (Oral)   Resp 16   Ht 5' 6.5\" (1.689 m)   Wt 166 lb 6 oz (75.5 kg) LMP  (LMP Unknown)   SpO2 98%   BMI 26.45 kg/m²   GENERAL APPEARANCE: Awake and alert. Cooperative. HEAD: Normocephalic. Atraumatic. Scrape to the left side of the chin  EYES: PERRL. EOM's grossly intact. ENT: Mucous membranes are moist.   HEART: RRR. No harsh murmurs. Intact radial pulses 2+ bilaterally. LUNGS: Respirations unlabored without accessory muscle use. CTAB. Good air exchange. No wheezes, rales, or rhonchi. Speaking comfortably in full sentences. ABDOMEN: Soft. Non-distended. Non-tender. No guarding or rebound. EXTREMITIES: No peripheral edema. No acute deformities. SKIN: Warm and dry. No acute rashes. NEUROLOGICAL: Alert and oriented X 3. CN II-XII intact. No gross facial drooping. Strength 5/5, sensation intact. No pronator drift. Normal coordination. No focal deficits    LABS  I have reviewed all labs for this visit.    Results for orders placed or performed during the hospital encounter of 07/21/22   CBC   Result Value Ref Range    WBC 10.4 4.0 - 11.0 K/uL    RBC 3.79 (L) 4.00 - 5.20 M/uL    Hemoglobin 12.0 12.0 - 16.0 g/dL    Hematocrit 35.3 (L) 36.0 - 48.0 %    MCV 93.2 80.0 - 100.0 fL    MCH 31.7 26.0 - 34.0 pg    MCHC 34.0 31.0 - 36.0 g/dL    RDW 12.7 12.4 - 15.4 %    Platelets 710 103 - 761 K/uL    MPV 6.8 5.0 - 10.5 fL   Comprehensive Metabolic Panel   Result Value Ref Range    Sodium 140 136 - 145 mmol/L    Potassium 3.2 (L) 3.5 - 5.1 mmol/L    Chloride 102 99 - 110 mmol/L    CO2 29 21 - 32 mmol/L    Anion Gap 9 3 - 16    Glucose 109 (H) 70 - 99 mg/dL    BUN 11 7 - 20 mg/dL    Creatinine 0.7 0.6 - 1.2 mg/dL    GFR Non-African American >60 >60    GFR African American >60 >60    Calcium 9.2 8.3 - 10.6 mg/dL    Total Protein 6.7 6.4 - 8.2 g/dL    Albumin 4.3 3.4 - 5.0 g/dL    Albumin/Globulin Ratio 1.8 1.1 - 2.2    Total Bilirubin 0.6 0.0 - 1.0 mg/dL    Alkaline Phosphatase 76 40 - 129 U/L    ALT 7 (L) 10 - 40 U/L    AST 12 (L) 15 - 37 U/L   Troponin   Result Value Ref Range Troponin <0.01 <0.01 ng/mL   Troponin   Result Value Ref Range    Troponin <0.01 <0.01 ng/mL   EKG 12 Lead   Result Value Ref Range    Ventricular Rate 88 BPM    Atrial Rate 88 BPM    P-R Interval 116 ms    QRS Duration 124 ms    Q-T Interval 414 ms    QTc Calculation (Bazett) 500 ms    P Axis 23 degrees    R Axis -34 degrees    T Axis 96 degrees    Diagnosis       Normal sinus rhythmLeft axis deviationLeft bundle branch blockAbnormal ECG       EKG  The Ekg interpreted by myself in the emergency department in the absence of a cardiologist.  normal sinus rhythm with a rate of 88  Axis is   Left axis deviation  QTc is   500  Left bundle branch block is present  There is new T wave inversion in lead III, aVL compared to prior EKG dated 7/6/2021  No evidence of acute ischemia. RADIOLOGY  X-RAYS:  I have reviewed radiologic plain film image(s). ALL OTHER NON-PLAIN FILM IMAGES SUCH AS CT, ULTRASOUND AND MRI HAVE BEEN READ BY THE RADIOLOGIST. CT Head W/O Contrast   Final Result   Impression:   1. No evidence of recent intracranial hemorrhage. 2. Parenchymal volume loss and chronic small vessel ischemic changes in the white matter. XR CHEST PORTABLE   Final Result   Impression:   No airspace disease. No results found. Medications   acetaminophen (TYLENOL) tablet 1,000 mg (has no administration in time range)   ondansetron (ZOFRAN) injection 4 mg (4 mg IntraVENous Given 7/21/22 2025)        ED COURSE/MDM  Patient seen and evaluated. Old records reviewed. Labs and imaging reviewed and results discussed with patient. 80-year-old female presenting for evaluation of intermittent lightheadedness, syncopal episode, chest pain. She arrives with stable vital signs. She denies any chest pain at this time. She does have new T wave inversions on the EKG today compared to prior, no evidence of STEMI.   ED evaluation clued basic labs, cardiac panel, CT head because of her head injury, loss of consciousness, lightheadedness. She is not on anticoagulation. Patient request that I consult her cardiologist.  This consult has been placed. I did recommend to the patient that because of her symptoms, prior history that she is at least moderate risk in terms of heart score and especially in the setting of her symptoms and EKG changes, I have recommended admission. Initial troponin is negative. Potassium is mildly low at 3.2. Chest x-ray without acute process. Patient was nauseous and required Zofran. Because of her underlying risk factors, moderate heart score, new EKG changes, patient will be admitted. Spoke with cardiology, Dr. Antonio Dee who agreed with admission. Patient was signed out to medicine in stable condition. Is this patient to be included in the SEP-1 Core Measure due to severe sepsis or septic shock? No   Exclusion criteria - the patient is NOT to be included for SEP-1 Core Measure due to: Infection is not suspected        CLINICAL IMPRESSION  1. Chest pain, unspecified type    2. Closed head injury, initial encounter    3. Hypokalemia        Blood pressure 135/70, pulse 94, temperature 99.1 °F (37.3 °C), temperature source Oral, resp. rate 16, height 5' 6.5\" (1.689 m), weight 166 lb 6 oz (75.5 kg), SpO2 98 %, not currently breastfeeding. Sanjay Covarrubias was admitted in stable condition. This chart was generated in part by using Dragon Dictation system and may contain errors related to that system including errors in grammar, punctuation, and spelling, as well as words and phrases that may be inappropriate. If there are any questions or concerns please feel free to contact the dictating provider for clarification.         Jeanine Mckeon MD  07/21/22 8482

## 2022-07-22 ENCOUNTER — APPOINTMENT (OUTPATIENT)
Dept: CARDIAC CATH/INVASIVE PROCEDURES | Age: 67
DRG: 287 | End: 2022-07-22
Payer: MEDICARE

## 2022-07-22 PROBLEM — R55 SYNCOPE AND COLLAPSE: Status: ACTIVE | Noted: 2022-07-22

## 2022-07-22 LAB
ANION GAP SERPL CALCULATED.3IONS-SCNC: 9 MMOL/L (ref 3–16)
BILIRUBIN URINE: NEGATIVE
BLOOD, URINE: NEGATIVE
BUN BLDV-MCNC: 9 MG/DL (ref 7–20)
CALCIUM SERPL-MCNC: 8.8 MG/DL (ref 8.3–10.6)
CHLORIDE BLD-SCNC: 105 MMOL/L (ref 99–110)
CLARITY: CLEAR
CO2: 27 MMOL/L (ref 21–32)
COLOR: YELLOW
CREAT SERPL-MCNC: 0.6 MG/DL (ref 0.6–1.2)
EKG ATRIAL RATE: 73 BPM
EKG ATRIAL RATE: 88 BPM
EKG DIAGNOSIS: NORMAL
EKG DIAGNOSIS: NORMAL
EKG P AXIS: 23 DEGREES
EKG P AXIS: 25 DEGREES
EKG P-R INTERVAL: 116 MS
EKG P-R INTERVAL: 124 MS
EKG Q-T INTERVAL: 414 MS
EKG Q-T INTERVAL: 456 MS
EKG QRS DURATION: 124 MS
EKG QRS DURATION: 126 MS
EKG QTC CALCULATION (BAZETT): 500 MS
EKG QTC CALCULATION (BAZETT): 502 MS
EKG R AXIS: -26 DEGREES
EKG R AXIS: -34 DEGREES
EKG T AXIS: 67 DEGREES
EKG T AXIS: 96 DEGREES
EKG VENTRICULAR RATE: 73 BPM
EKG VENTRICULAR RATE: 88 BPM
GFR AFRICAN AMERICAN: >60
GFR NON-AFRICAN AMERICAN: >60
GLUCOSE BLD-MCNC: 111 MG/DL (ref 70–99)
GLUCOSE URINE: NEGATIVE MG/DL
HCT VFR BLD CALC: 33.5 % (ref 36–48)
HEMOGLOBIN: 11.7 G/DL (ref 12–16)
INR BLD: 1.11 (ref 0.87–1.14)
KETONES, URINE: NEGATIVE MG/DL
LEUKOCYTE ESTERASE, URINE: NEGATIVE
MAGNESIUM: 1.9 MG/DL (ref 1.8–2.4)
MCH RBC QN AUTO: 32.2 PG (ref 26–34)
MCHC RBC AUTO-ENTMCNC: 34.7 G/DL (ref 31–36)
MCV RBC AUTO: 92.7 FL (ref 80–100)
MICROSCOPIC EXAMINATION: NORMAL
NITRITE, URINE: NEGATIVE
PDW BLD-RTO: 12.8 % (ref 12.4–15.4)
PH UA: 6.5 (ref 5–8)
PLATELET # BLD: 249 K/UL (ref 135–450)
PMV BLD AUTO: 6.7 FL (ref 5–10.5)
POTASSIUM SERPL-SCNC: 3.5 MMOL/L (ref 3.5–5.1)
PRO-BNP: 59 PG/ML (ref 0–124)
PROTEIN UA: NEGATIVE MG/DL
PROTHROMBIN TIME: 14.2 SEC (ref 11.7–14.5)
RBC # BLD: 3.62 M/UL (ref 4–5.2)
SODIUM BLD-SCNC: 141 MMOL/L (ref 136–145)
SPECIFIC GRAVITY UA: <=1.005 (ref 1–1.03)
TROPONIN: <0.01 NG/ML
TROPONIN: <0.01 NG/ML
URINE REFLEX TO CULTURE: NORMAL
URINE TYPE: NORMAL
UROBILINOGEN, URINE: 0.2 E.U./DL
WBC # BLD: 9.7 K/UL (ref 4–11)

## 2022-07-22 PROCEDURE — 6370000000 HC RX 637 (ALT 250 FOR IP): Performed by: INTERNAL MEDICINE

## 2022-07-22 PROCEDURE — 2709999900 HC NON-CHARGEABLE SUPPLY

## 2022-07-22 PROCEDURE — 2060000000 HC ICU INTERMEDIATE R&B

## 2022-07-22 PROCEDURE — 93010 ELECTROCARDIOGRAM REPORT: CPT | Performed by: INTERNAL MEDICINE

## 2022-07-22 PROCEDURE — 99152 MOD SED SAME PHYS/QHP 5/>YRS: CPT

## 2022-07-22 PROCEDURE — 80048 BASIC METABOLIC PNL TOTAL CA: CPT

## 2022-07-22 PROCEDURE — 6360000004 HC RX CONTRAST MEDICATION: Performed by: INTERNAL MEDICINE

## 2022-07-22 PROCEDURE — 93005 ELECTROCARDIOGRAM TRACING: CPT | Performed by: STUDENT IN AN ORGANIZED HEALTH CARE EDUCATION/TRAINING PROGRAM

## 2022-07-22 PROCEDURE — 83880 ASSAY OF NATRIURETIC PEPTIDE: CPT

## 2022-07-22 PROCEDURE — 2580000003 HC RX 258: Performed by: INTERNAL MEDICINE

## 2022-07-22 PROCEDURE — C1760 CLOSURE DEV, VASC: HCPCS

## 2022-07-22 PROCEDURE — 97530 THERAPEUTIC ACTIVITIES: CPT

## 2022-07-22 PROCEDURE — 83735 ASSAY OF MAGNESIUM: CPT

## 2022-07-22 PROCEDURE — 6360000002 HC RX W HCPCS

## 2022-07-22 PROCEDURE — 93458 L HRT ARTERY/VENTRICLE ANGIO: CPT

## 2022-07-22 PROCEDURE — 2580000003 HC RX 258: Performed by: STUDENT IN AN ORGANIZED HEALTH CARE EDUCATION/TRAINING PROGRAM

## 2022-07-22 PROCEDURE — 97165 OT EVAL LOW COMPLEX 30 MIN: CPT

## 2022-07-22 PROCEDURE — 99153 MOD SED SAME PHYS/QHP EA: CPT

## 2022-07-22 PROCEDURE — 6360000002 HC RX W HCPCS: Performed by: STUDENT IN AN ORGANIZED HEALTH CARE EDUCATION/TRAINING PROGRAM

## 2022-07-22 PROCEDURE — 81003 URINALYSIS AUTO W/O SCOPE: CPT

## 2022-07-22 PROCEDURE — 99223 1ST HOSP IP/OBS HIGH 75: CPT | Performed by: INTERNAL MEDICINE

## 2022-07-22 PROCEDURE — C1769 GUIDE WIRE: HCPCS

## 2022-07-22 PROCEDURE — 36415 COLL VENOUS BLD VENIPUNCTURE: CPT

## 2022-07-22 PROCEDURE — 97535 SELF CARE MNGMENT TRAINING: CPT

## 2022-07-22 PROCEDURE — 6370000000 HC RX 637 (ALT 250 FOR IP): Performed by: STUDENT IN AN ORGANIZED HEALTH CARE EDUCATION/TRAINING PROGRAM

## 2022-07-22 PROCEDURE — 85027 COMPLETE CBC AUTOMATED: CPT

## 2022-07-22 PROCEDURE — 97161 PT EVAL LOW COMPLEX 20 MIN: CPT

## 2022-07-22 PROCEDURE — 97116 GAIT TRAINING THERAPY: CPT

## 2022-07-22 PROCEDURE — 85610 PROTHROMBIN TIME: CPT

## 2022-07-22 PROCEDURE — 93458 L HRT ARTERY/VENTRICLE ANGIO: CPT | Performed by: INTERNAL MEDICINE

## 2022-07-22 PROCEDURE — C1894 INTRO/SHEATH, NON-LASER: HCPCS

## 2022-07-22 PROCEDURE — 84484 ASSAY OF TROPONIN QUANT: CPT

## 2022-07-22 RX ORDER — PROCHLORPERAZINE EDISYLATE 5 MG/ML
5 INJECTION INTRAMUSCULAR; INTRAVENOUS EVERY 6 HOURS PRN
Status: DISCONTINUED | OUTPATIENT
Start: 2022-07-22 | End: 2022-07-23 | Stop reason: HOSPADM

## 2022-07-22 RX ORDER — SODIUM CHLORIDE 9 MG/ML
INJECTION, SOLUTION INTRAVENOUS CONTINUOUS
Status: ACTIVE | OUTPATIENT
Start: 2022-07-22 | End: 2022-07-22

## 2022-07-22 RX ORDER — 0.9 % SODIUM CHLORIDE 0.9 %
500 INTRAVENOUS SOLUTION INTRAVENOUS ONCE
Status: COMPLETED | OUTPATIENT
Start: 2022-07-22 | End: 2022-07-22

## 2022-07-22 RX ORDER — ACETAMINOPHEN 325 MG/1
650 TABLET ORAL EVERY 4 HOURS PRN
Status: DISCONTINUED | OUTPATIENT
Start: 2022-07-22 | End: 2022-07-23 | Stop reason: HOSPADM

## 2022-07-22 RX ORDER — POTASSIUM CHLORIDE 20 MEQ/1
20 TABLET, EXTENDED RELEASE ORAL ONCE
Status: COMPLETED | OUTPATIENT
Start: 2022-07-22 | End: 2022-07-22

## 2022-07-22 RX ORDER — ROSUVASTATIN CALCIUM 20 MG/1
40 TABLET, COATED ORAL NIGHTLY
Status: DISCONTINUED | OUTPATIENT
Start: 2022-07-22 | End: 2022-07-23 | Stop reason: HOSPADM

## 2022-07-22 RX ADMIN — ROSUVASTATIN CALCIUM 40 MG: 20 TABLET, COATED ORAL at 01:09

## 2022-07-22 RX ADMIN — ROSUVASTATIN CALCIUM 40 MG: 20 TABLET, COATED ORAL at 21:46

## 2022-07-22 RX ADMIN — SODIUM CHLORIDE 500 ML: 9 INJECTION, SOLUTION INTRAVENOUS at 02:41

## 2022-07-22 RX ADMIN — ACETAMINOPHEN 650 MG: 325 TABLET ORAL at 16:40

## 2022-07-22 RX ADMIN — BUPROPION HYDROCHLORIDE 75 MG: 75 TABLET, FILM COATED ORAL at 08:54

## 2022-07-22 RX ADMIN — BUPROPION HYDROCHLORIDE 75 MG: 75 TABLET, FILM COATED ORAL at 22:15

## 2022-07-22 RX ADMIN — ACETAMINOPHEN 325MG 650 MG: 325 TABLET ORAL at 22:11

## 2022-07-22 RX ADMIN — BUPROPION HYDROCHLORIDE 75 MG: 75 TABLET, FILM COATED ORAL at 01:10

## 2022-07-22 RX ADMIN — ASPIRIN 81 MG: 81 TABLET, CHEWABLE ORAL at 08:53

## 2022-07-22 RX ADMIN — SODIUM CHLORIDE, PRESERVATIVE FREE 10 ML: 5 INJECTION INTRAVENOUS at 08:55

## 2022-07-22 RX ADMIN — SODIUM CHLORIDE: 9 INJECTION, SOLUTION INTRAVENOUS at 16:40

## 2022-07-22 RX ADMIN — SODIUM CHLORIDE: 9 INJECTION, SOLUTION INTRAVENOUS at 05:11

## 2022-07-22 RX ADMIN — IOHEXOL 200 ML: 350 INJECTION, SOLUTION INTRAVENOUS at 15:23

## 2022-07-22 RX ADMIN — METOPROLOL SUCCINATE 25 MG: 25 TABLET, EXTENDED RELEASE ORAL at 08:52

## 2022-07-22 RX ADMIN — SODIUM CHLORIDE 500 ML: 9 INJECTION, SOLUTION INTRAVENOUS at 05:10

## 2022-07-22 RX ADMIN — ENOXAPARIN SODIUM 40 MG: 100 INJECTION SUBCUTANEOUS at 08:53

## 2022-07-22 RX ADMIN — SODIUM CHLORIDE, PRESERVATIVE FREE 10 ML: 5 INJECTION INTRAVENOUS at 21:46

## 2022-07-22 RX ADMIN — PANTOPRAZOLE SODIUM 40 MG: 40 TABLET, DELAYED RELEASE ORAL at 06:28

## 2022-07-22 RX ADMIN — POTASSIUM CHLORIDE 20 MEQ: 1500 TABLET, EXTENDED RELEASE ORAL at 01:10

## 2022-07-22 ASSESSMENT — PAIN SCALES - GENERAL
PAINLEVEL_OUTOF10: 3
PAINLEVEL_OUTOF10: 7
PAINLEVEL_OUTOF10: 6
PAINLEVEL_OUTOF10: 2
PAINLEVEL_OUTOF10: 3
PAINLEVEL_OUTOF10: 6
PAINLEVEL_OUTOF10: 2
PAINLEVEL_OUTOF10: 4
PAINLEVEL_OUTOF10: 0
PAINLEVEL_OUTOF10: 6
PAINLEVEL_OUTOF10: 6

## 2022-07-22 ASSESSMENT — PAIN - FUNCTIONAL ASSESSMENT
PAIN_FUNCTIONAL_ASSESSMENT: ACTIVITIES ARE NOT PREVENTED

## 2022-07-22 ASSESSMENT — PAIN DESCRIPTION - PAIN TYPE
TYPE: ACUTE PAIN

## 2022-07-22 ASSESSMENT — ENCOUNTER SYMPTOMS
BACK PAIN: 0
DIARRHEA: 0
STRIDOR: 0
CONSTIPATION: 1
ABDOMINAL PAIN: 0
NAUSEA: 0
VOMITING: 0
WHEEZING: 0
COUGH: 0
SHORTNESS OF BREATH: 0

## 2022-07-22 ASSESSMENT — PAIN DESCRIPTION - DESCRIPTORS
DESCRIPTORS: DISCOMFORT
DESCRIPTORS: SHARP;DISCOMFORT
DESCRIPTORS: DISCOMFORT
DESCRIPTORS: DISCOMFORT
DESCRIPTORS: ACHING
DESCRIPTORS: ACHING
DESCRIPTORS: DISCOMFORT;DULL

## 2022-07-22 ASSESSMENT — PAIN DESCRIPTION - LOCATION
LOCATION: SHOULDER
LOCATION: BREAST
LOCATION: HEAD
LOCATION: HEAD
LOCATION: SHOULDER

## 2022-07-22 ASSESSMENT — PAIN DESCRIPTION - ORIENTATION
ORIENTATION: MID
ORIENTATION: LEFT
ORIENTATION: MID

## 2022-07-22 ASSESSMENT — PAIN DESCRIPTION - FREQUENCY
FREQUENCY: CONTINUOUS
FREQUENCY: CONTINUOUS
FREQUENCY: INTERMITTENT
FREQUENCY: INTERMITTENT
FREQUENCY: CONTINUOUS

## 2022-07-22 ASSESSMENT — PAIN DESCRIPTION - ONSET
ONSET: ON-GOING

## 2022-07-22 NOTE — PROGRESS NOTES
Progress Note  PGY-1    Admit Date: 7/21/2022  Day: 1  Diet: Diet NPO    CC: syncope    Interval history: This AM patient was seen sitting upright in chair. She reported that her CP has improved since she was admitted. HPI: Carlos Garcia is a 79 y.o. female w/PMH CAD s/p stent, WPW s/p ablation, SVT, diverticulitis, and depression who presented to the ED w/a cc syncope. Patient was reported to have chest pain that was intermittent, occurring at rest and exertion, that has been ongoing for the past few months. Her symptoms improved with NTG however during a recent episode 3 days prior to presentation, experienced lightheadedness and syncope w/emesis with subsequent resolution followed by lightheadedness and a fall. At the ED, patient's VSS, ECG revealed new t wave inversions, troponin was negative, and patient was hypokalemic at 3.2. Patient received Zofran for nausea.  She was subsequently transffered to [facility]     Medications:     Scheduled Meds:   rosuvastatin  40 mg Oral Nightly    sodium chloride flush  5-40 mL IntraVENous 2 times per day    enoxaparin  40 mg SubCUTAneous Daily    aspirin  81 mg Oral Daily    buPROPion  75 mg Oral BID    metoprolol succinate  25 mg Oral Daily    pantoprazole  40 mg Oral QAM AC     Continuous Infusions:   sodium chloride 100 mL/hr at 07/22/22 0511    sodium chloride       PRN Meds:sodium chloride flush, sodium chloride, ondansetron **OR** ondansetron, acetaminophen **OR** acetaminophen, polyethylene glycol, perflutren lipid microspheres    Objective:   Vitals:   T-max:  Patient Vitals for the past 8 hrs:   BP Temp Temp src Pulse Resp SpO2 Weight   07/22/22 0749 132/74 98.2 °F (36.8 °C) Oral 75 18 95 % --   07/22/22 0746 -- -- -- 75 -- -- --   07/22/22 0745 -- -- -- -- -- -- 166 lb 7.2 oz (75.5 kg)   07/22/22 0600 -- -- -- 73 16 -- --   07/22/22 0410 117/65 98 °F (36.7 °C) Oral 77 18 98 % --   07/22/22 0200 -- -- -- 82 16 -- --       Intake/Output Summary (Last 24 hours) at 7/22/2022 0808  Last data filed at 7/22/2022 0745  Gross per 24 hour   Intake 330 ml   Output 400 ml   Net -70 ml       Review of Systems   Constitutional:  Negative for diaphoresis. Cardiovascular:  Negative for chest pain. Gastrointestinal:  Negative for abdominal pain. Musculoskeletal:         Pain at left shoulder   Neurological:  Negative for dizziness, light-headedness and headaches. Physical Exam  Constitutional:       Appearance: She is not ill-appearing or diaphoretic. Cardiovascular:      Rate and Rhythm: Normal rate. Pulmonary:      Effort: Pulmonary effort is normal.      Breath sounds: No wheezing, rhonchi or rales. Abdominal:      General: There is no distension. Palpations: Abdomen is soft. Tenderness: There is no abdominal tenderness. There is no guarding. Musculoskeletal:      Right lower leg: No edema. Left lower leg: No edema. Comments: Right shoulder full ROM with tenderness on forward flexion   Neurological:      Mental Status: She is alert and oriented to person, place, and time. LABS:    CBC:   Recent Labs     07/21/22 1903 07/22/22  0235   WBC 10.4 9.7   HGB 12.0 11.7*   HCT 35.3* 33.5*    249   MCV 93.2 92.7     Renal:    Recent Labs     07/21/22  1903      K 3.2*      CO2 29   BUN 11   CREATININE 0.7   GLUCOSE 109*   CALCIUM 9.2   ANIONGAP 9     Hepatic:   Recent Labs     07/21/22 1903   AST 12*   ALT 7*   BILITOT 0.6   PROT 6.7   LABALBU 4.3   ALKPHOS 76     Troponin:   Recent Labs     07/21/22  2102 07/22/22  0020 07/22/22  0235   TROPONINI <0.01 <0.01 <0.01     BNP: No results for input(s): BNP in the last 72 hours. Lipids: No results for input(s): CHOL, HDL in the last 72 hours. Invalid input(s): LDLCALCU, TRIGLYCERIDE  ABGs:  No results for input(s): PHART, VEK7LFO, PO2ART, HOQ7JYD, BEART, THGBART, L2FSMMLT, BMT7XTW in the last 72 hours.     INR: No results for input(s): INR in the last 72 hours. Lactate: No results for input(s): LACTATE in the last 72 hours. Cultures:  -----------------------------------------------------------------  RAD:   CT Head W/O Contrast   Final Result   Impression:   1. No evidence of recent intracranial hemorrhage. 2. Parenchymal volume loss and chronic small vessel ischemic changes in the white matter. XR CHEST PORTABLE   Final Result   Impression:   No airspace disease. Assessment/Plan:    Carlos Garcia is a 79 y.o. female w/PMH CAD s/p stent, WPW s/p ablation, SVT, diverticulitis, and depression who presented to the ED w/a cc syncope. #Syncope  Patient presented w/CP and syncope episodes w/hx SVT, CAD, and WPW w/ recent use of 1 tablet NTG vs 1/2 tablet which she reports she usually takes which may have contributed to her presentation w/changes on ECG  w/possible likely cardiogenic syncope.    -orthostats borderline to positive  -ECG on  presentation w/new t wave invesions  -monitor VS  -tele  -cardio consulted  -possible cath, stress test    #Angina  Patient has a history of CAD s/p stents and presented with CP.  -aspirin  -betablocker  -statin  -cardio consult  -no caffiene pending stress test or catheterization  -ECG PRN w/CP    #Hypokalemia  -received repletion  -monitor   #HTN  -metoprolol   -monitor BP  #Anxiety, Depression  -wellbutrin    Code Status: Full Code   FEN: Diet NPO  PPX: enoxaparin   DISPO: Nichole Reyes MD, PGY-1  Internal Medicine Resident  Contact via [facility]  07/22/22  8:08 AM    This patient has been staffed and discussed with Heide Kocher, MD.

## 2022-07-22 NOTE — PROCEDURES
5-Syrian pigtail, JL4  and JR4 diagnostic catheters. It was felt that the right coronary  artery was extremely small, as such attempts were made to be sure. Multiple different catheters were used to try and cannulate the right  coronary artery. This included AR1, AL1 and multipurpose diagnostic  catheters. It was felt that this small nondominant vessel represented  right coronary artery since her left coronary artery system was dominant  and large. As such, the procedure was ended. No complications  encountered. The brief right femoral arteriogram was obtained to  ascertain positioning appropriate for Angio-Seal.  It was well  positioned and she was successfully sealed with standard 6-Syrian  Angio-Seal device. No complications were encountered. Estimated blood loss less than 5cc. RESULTS:  HEMODYNAMICS:  Left ventricular end-diastolic pressure equals 12. There was no significant gradient across the aortic valve by pullback  post cineangiography. LEFT MAIN:  Left main is normal.    LEFT ANTERIOR DESCENDING:  The LAD courses to and wraps around the apex. It gave off a moderate-sized early diagonal branch. The stent in the  mid vessel was widely patent. There was no significant focal  obstructive lesion. INTERMEDIUS RAMUS BRANCH:  Intermedius courses the lateral wall. It is  free of significant obstructive disease. LEFT CIRCUMFLEX:  Circumflex is a large, dominant vessel. Gives off two  small early marginal, followed by a large third marginal branch, two  posterolateral branches before the PDA. The circumflex had luminal  irregularities, but no significant focal obstructive lesions. RIGHT CORONARY ARTERY:  Right coronary artery is a small, nondominant  vessel. It is normal.    IMPRESSION:  1. Preserved LV function. Estimated ejection fraction 55%. 2.  No significant obstructive coronary artery disease. 3.  Widely patent stent in the LAD.   4.  Successful Angio-Seal of right femoral arteriotomy site. 5.  Continue medical therapy.         Ania Chahal MD    D: 07/22/2022 15:33:58       T: 07/22/2022 16:21:28     [email or web address]  Job#: [id number]     Doc#: [id number]    CC:

## 2022-07-22 NOTE — CONSULTS
CAD    Home Medications:  Were reviewed and are listed in nursing record. and/or listed below  Prior to Admission medications    Medication Sig Start Date End Date Taking? Authorizing Provider   acetaminophen (TYLENOL) 500 MG tablet Take 500 mg by mouth every 6 hours as needed for Pain   Yes Historical Provider, MD   methocarbamol (ROBAXIN) 500 MG tablet TAKE ONE TABLET BY MOUTH THREE TIMES A DAY 7/20/22   LUCIA Ruiz CNP   buPROPion (WELLBUTRIN) 75 MG tablet Take 1 tablet by mouth 2 times daily 6/22/22   LUCIA Ruiz CNP   traMADol (ULTRAM) 50 MG tablet Take 1 tablet by mouth every 6 hours as needed for Pain (fibromyalgia) for up to 30 days. 6/22/22 7/22/22  LUCIA Ruiz CNP   metoprolol succinate (TOPROL XL) 25 MG extended release tablet TAKE 1 TABLET BY MOUTH ONE TIME A DAY 6/22/22   LUCIA Ruiz CNP   predniSONE (DELTASONE) 10 MG tablet 2 tablets 2 times daily for 5 days, 1 tablet 2 times daily for 5 days, 1 tablet daily 5/27/22   LUCIA Ruiz CNP   esomeprazole (NEXIUM) 20 MG delayed release capsule Take 1 capsule by mouth every morning (before breakfast) 4/15/22   LUCIA Ruiz CNP   nitroGLYCERIN (NITROSTAT) 0.4 MG SL tablet up to max of 3 total doses.  If no relief after 1 dose, call 911. 1/25/22   LUCIA Cody CNP   Ginger 500 MG CAPS Take 3 capsules by mouth daily    Historical Provider, MD   dicyclomine (BENTYL) 10 MG capsule Take 1 capsule by mouth 3 times daily 12/22/21   LUCIA Ruiz CNP   vitamin D (ERGOCALCIFEROL) 1.25 MG (85940 UT) CAPS capsule Take 1 capsule by mouth once a week 11/29/21   LUCIA Ruiz CNP   rosuvastatin (CRESTOR) 20 MG tablet TAKE ONE TABLET BY MOUTH NIGHTLY 10/20/21   LUCIA Ruiz CNP   citalopram (CELEXA) 40 MG tablet Take 1 tablet by mouth daily 9/16/21   LUCIA Ruiz CNP   citalopram (CELEXA) 40 MG tablet Take 1 tablet by mouth daily 9/8/21   Caffie Cantrell, APRN - CNP   Magnesium 200 MG CHEW Take 1 tablet by mouth daily     Historical Provider, MD   Calcium Carbonate-Vit D-Min (CALCIUM 600+D3 PLUS MINERALS) 600-800 MG-UNIT CHEW Take 1 tablet by mouth 2 times daily     Historical Provider, MD   promethazine (PHENERGAN) 25 MG tablet Take 1 tablet by mouth every 6 hours as needed for Nausea 9/13/19   Bubba Gamez DO   aspirin 81 MG chewable tablet Take 1 tablet by mouth daily 6/5/19   Radha Varela MD   Cholecalciferol (VITAMIN D3) 5000 UNITS TABS Take by mouth daily     Historical Provider, MD   diphenhydrAMINE (BENADRYL) 25 MG tablet Take 25 mg by mouth every 6 hours as needed. OTC     Historical Provider, MD        Allergies: Avelox [moxifloxacin hcl in nacl], Cephalexin, Darvon [propoxyphene hcl], Digoxin, Librax [chlordiazepoxide-methscopol], Lyrica [pregabalin], Motrin [ibuprofen micronized], Other, Pcn [penicillins], Prochlorperazine edisylate, Sulfa antibiotics, Tetracyclines & related, Cymbalta [duloxetine hcl], Nickel, Savella [milnacipran], and Statins     Review of Systems:     Constitutional: there has been no unanticipated weight loss. There's been no change in energy level, sleep pattern, or activity level. Eyes: No visual changes or diplopia. No scleral icterus. ENT: No Headaches, hearing loss or vertigo. No mouth sores or sore throat. Cardiovascular: No loss of consciousness. No hemoptysis, pleuritic pain, or phlebitis. Respiratory: No cough or wheezing, no sputum production. No hematemesis. Gastrointestinal: No abdominal pain, appetite loss, blood in stools. No change in bowel or bladder habits. Genitourinary: No dysuria, trouble voiding, or hematuria. Musculoskeletal:  No gait disturbance, weakness or joint complaints. Integumentary: No rash or pruritis. All other systems reviewed negative as done.      Physical Examination:    Vitals:    07/22/22 1200   BP: 134/75   Pulse: 77   Resp: 18 Temp: 98.4 °F (36.9 °C)   SpO2: 97%    Weight: 166 lb 7.2 oz (75.5 kg)         General Appearance:  Alert, cooperative, no distress, appears stated age   Head:  Normocephalic, without obvious abnormality, atraumatic   Eyes:  PERRL, conjunctiva/corneas clear       Nose: Nares normal, no drainage or sinus tenderness   Throat: Lips, mucosa, and tongue normal   Neck: Supple, symmetrical, trachea midline       Lungs:   Clear to auscultation bilaterally, respirations unlabored   Chest Wall:  No tenderness or deformity   Heart:  Regular rate and rhythm, S1, S2 normal, no murmur, rub or gallop   Abdomen:   Soft, non-tender, bowel sounds active all four quadrants           Extremities: Extremities normal, atraumatic, no cyanosis or edema       Skin: Skin color, texture, turgor normal, no rashes or lesions   Pysch: Normal mood and affect   Neurologic: Normal gross motor and sensory exam.         Labs  CBC:   Lab Results   Component Value Date/Time    WBC 9.7 07/22/2022 02:35 AM    RBC 3.62 07/22/2022 02:35 AM    HGB 11.7 07/22/2022 02:35 AM    HCT 33.5 07/22/2022 02:35 AM    MCV 92.7 07/22/2022 02:35 AM    RDW 12.8 07/22/2022 02:35 AM     07/22/2022 02:35 AM     CMP:    Lab Results   Component Value Date/Time     07/22/2022 09:54 AM    K 3.5 07/22/2022 09:54 AM    K 4.2 05/03/2021 12:29 PM     07/22/2022 09:54 AM    CO2 27 07/22/2022 09:54 AM    BUN 9 07/22/2022 09:54 AM    CREATININE 0.6 07/22/2022 09:54 AM    GFRAA >60 07/22/2022 09:54 AM    GFRAA >60 06/27/2012 04:57 PM    AGRATIO 1.8 07/21/2022 07:03 PM    LABGLOM >60 07/22/2022 09:54 AM    GLUCOSE 111 07/22/2022 09:54 AM    PROT 6.7 07/21/2022 07:03 PM    PROT 7.1 06/27/2012 04:57 PM    CALCIUM 8.8 07/22/2022 09:54 AM    BILITOT 0.6 07/21/2022 07:03 PM    ALKPHOS 76 07/21/2022 07:03 PM    AST 12 07/21/2022 07:03 PM    ALT 7 07/21/2022 07:03 PM     PT/INR:  No results found for: PTINR  Lab Results   Component Value Date    TROPONINI <0.01 07/22/2022 EKG:  I have reviewed EKG with the following interpretation:  Impression:  personally reviewed, NSR, LBBB, NSSTTW changes. ST changes in high lateral leads transient. Assessment  Patient Active Problem List   Diagnosis    Anxiety    Fibromyalgia    IBS (irritable bowel syndrome)    OA (osteoarthritis)    History of prior ablation treatment    Lipoma    History of colonic diverticulitis    Hyperlipidemia    Small bowel obstruction (HCC)    Migraine with aura and without status migrainosus, not intractable    Coronary artery disease of native artery of native heart with stable angina pectoris ([facility])    H/O angiography    CAD S/P percutaneous coronary angioplasty    COVID-19    Carpal tunnel syndrome    Ulnar nerve compression, left    Cubital tunnel syndrome, bilateral    History of atrial tachycardia    Mild episode of recurrent major depressive disorder (HCC)    Gastroesophageal reflux disease    Chest pain    Syncope and collapse         Plan:    Syncope likely due to NTG therapy.  ischemia however. Having progressive chest pain recently. Somewhat atypical fetures and unpredictable. She does have transient ST changes high lateral by EKG. Have recommended cath. Risk and bene explained.

## 2022-07-22 NOTE — ED NOTES
Report called to Casandra Valentine RN at [place] and informed squad here to  now.       Rupa Marks RN  07/21/22 1517

## 2022-07-22 NOTE — PROGRESS NOTES
Physical Therapy  Facility/Department: [facility] PCU  Physical Therapy Initial Assessment / treatment    Name: Atiya Koehler  : 1955  MRN: [id number]  Date of Service: 2022    Discharge Recommendations: Atiya Koehler scored a  on the AM-PAC short mobility form. At this time, no further PT is recommended upon discharge. Recommend patient returns to prior setting with prior services. PT Equipment Recommendations  Equipment Needed: No      Patient Diagnosis(es): The primary encounter diagnosis was Chest pain, unspecified type. Diagnoses of Closed head injury, initial encounter and Hypokalemia were also pertinent to this visit. Past Medical History:  has a past medical history of Allergic rhinitis, Anxiety, Bilateral carpal tunnel syndrome, CAD (coronary artery disease), COVID-19, Depression, Endometriosis, Fibromyalgia, GERD (gastroesophageal reflux disease), Headache(784.0), Hiatal hernia, Hyperlipidemia, Irritable bowel syndrome, Left bundle branch block (LBBB), Osteoarthritis, SVT (supraventricular tachycardia) ([facility] [address] and WPW (Steffanie-Parkinson-White syndrome). Past Surgical History:  has a past surgical history that includes Nasal septum surgery (); Bunionectomy (); laparoscopy; ablation of dysrhythmic focus (); Colonoscopy (10/15/13); Cardiac surgery (); Cholecystectomy (); Appendectomy (); Salpingo-oophorectomy (Left, ); Ovary surgery (Right, ); Ectopic pregnancy surgery (Right, ); Hysterectomy, total abdominal (); Upper gastrointestinal endoscopy (10/15/13); Arm Surgery (Left, 2021); Carpal tunnel release (Left, 2021); Arm Surgery (Right, 2021); and Carpal tunnel release (Right, 2021). Assessment   Body Structures, Functions, Activity Limitations Requiring Skilled Therapeutic Intervention: Decreased functional mobility   Assessment: Pt is 79 y.o. female admit from home. Pt has had 2 falls this past week.   Demos safe transfers today and requires SBA for safe amb. Pt is slightly below her functional baseline. Rec increased assist initially at home. Pt states  able to provide. Will follow to maximize safety and decrease risk for falls. Treatment Diagnosis: impaired gait  Therapy Prognosis: Good  Decision Making: Low Complexity  Requires PT Follow-Up: Yes     Plan   Plan  Plan:  (2-5x/week)  Current Treatment Recommendations: Balance training, Gait training, Functional mobility training, Stair training, Patient/Caregiver education & training, Therapeutic activities  Safety Devices  Type of Devices: Call light within reach, Chair alarm in place, Nurse notified, Gait belt, Left in chair     Restrictions  Position Activity Restriction  Other position/activity restrictions: up with assist     Subjective   General  Chart Reviewed: Yes  Additional Pertinent Hx: 79 y.o. female with a history of CAD status post LAD stent, left bundle branch block, SVT, WPW, depression, fibromyalgia presenting 7/21 for evaluation of syncopal episode, chest pain  Referring Practitioner: Larissa Hernandez MD  Diagnosis: hypokalemia  Subjective  Subjective: Pt found supine in bed. Alert, pleasant, and agrees to PT. Social/Functional History  Social/Functional History  Lives With: Spouse (+ pt's older brother - pt/spouse are caregive for brother)  Type of Home: House  Home Layout: Bed/Bath upstairs  Home Access: Stairs to enter without rails  Entrance Stairs - Number of Steps: 1 + 1 DINA; 1 step down to living room  Bathroom Shower/Tub: Walk-in shower  Bathroom Toilet: Standard (sink nearby)  Bathroom Equipment: Grab bars in shower, Shower chair  Home Equipment: Cane (transport w/c)  ADL Assistance: [facility] [address]: Independent  Ambulation Assistance: Independent  Transfer Assistance: Independent  Active :  Yes  Additional Comments: 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;  is working part time in the evening - looking for FT work; pt reports 2 falls this past week  Vision/Hearing  Vision  Vision: Impaired (wears glasses for driving)  Hearing  Hearing: Exceptions to [facility]  Hearing Exceptions: Bilateral hearing aid (hearing aids not here today)    Cognition   Orientation  Overall Orientation Status: Within Normal Limits     Objective         Gross Assessment  AROM: Within functional limits  Strength:  Within functional limits                    Bed mobility  Supine to Sit: Independent (HOB flat)  Scooting: Independent     Ambulation  Device: No Device  Assistance: Stand by assistance  Quality of Gait: appropriate danisha, decreased stride length, increased sway at times, fairly steady overall  Distance: 10 ft; 160 ft     Balance  Sitting - Static: Good  Sitting - Dynamic: Good (supervision)  Standing - Static: Good  Standing - Dynamic: Fair (SBA during amb)           OutComes Score                                                  AM-PAC Score  AM-PAC Inpatient Mobility Raw Score : 22 (07/22/22 1050)  AM-PAC Inpatient T-Scale Score : 53.28 (07/22/22 1050)  Mobility Inpatient CMS 0-100% Score: 20.91 (07/22/22 1050)  Mobility Inpatient CMS G-Code Modifier : CJ (07/22/22 1050)          Tinneti Score       Goals  Short Term Goals  Time Frame for Short term goals: discharge  Short term goal 1: Pt will amb 200 ft with supervision  Short term goal 2: Pt will negotiate 1 flight of stairs with CGA  Patient Goals   Patient goals : return home       Education  Patient Education  Education Given To: Patient  Education Provided: Role of Therapy  Education Method: Demonstration  Education Outcome: Verbalized understanding      Therapy Time   Individual Concurrent Group Co-treatment   Time In 0815         Time Out 0853         Minutes 38                 Timed Code Treatment Minutes:  23    Total Treatment Minutes:  38    If patient is discharged prior to next treatment, this note will serve as the discharge summary.   Meenu Mason, PT, DPT  [id number]

## 2022-07-22 NOTE — PROGRESS NOTES
Occupational Therapy  Facility/Department: [place] [address] PCU  Occupational Therapy Initial Assessment/Treatment and Discharge Summary    Name: Demetrice Martinez  : 1955  MRN: [id number]  Date of Service: 2022    Discharge Recommendations:  Demetrice Martinez scored a 23/24 on the AM-PAC ADL Inpatient form. Current research shows that an AM-PAC score of 18 or greater is typically associated with a discharge to the patient's home setting. Please see assessment section for further patient specific details. If patient discharges prior to next session this note will serve as a discharge summary. Please see below for the latest assessment towards goals. OT Equipment Recommendations  Equipment Needed: No  Other: pt plans to use shower chair initially       Patient Diagnosis(es): The primary encounter diagnosis was Chest pain, unspecified type. Diagnoses of Closed head injury, initial encounter and Hypokalemia were also pertinent to this visit. Past Medical History:  has a past medical history of Allergic rhinitis, Anxiety, Bilateral carpal tunnel syndrome, CAD (coronary artery disease), COVID-19, Depression, Endometriosis, Fibromyalgia, GERD (gastroesophageal reflux disease), Headache(784.0), Hiatal hernia, Hyperlipidemia, Irritable bowel syndrome, Left bundle branch block (LBBB), Osteoarthritis, SVT (supraventricular tachycardia) ([facility] [address] and WPW (Steffanie-Parkinson-White syndrome). Past Surgical History:  has a past surgical history that includes Nasal septum surgery (); Bunionectomy (); laparoscopy; ablation of dysrhythmic focus (); Colonoscopy (10/15/13); Cardiac surgery (); Cholecystectomy (); Appendectomy (); Salpingo-oophorectomy (Left, ); Ovary surgery (Right, ); Ectopic pregnancy surgery (Right, ); Hysterectomy, total abdominal (); Upper gastrointestinal endoscopy (10/15/13); Arm Surgery (Left, 2021); Carpal tunnel release (Left, 2021);  Arm Surgery (Right, 7/6/2021); and Carpal tunnel release (Right, 7/6/2021). Assessment   Assessment: Pt presents near her baseline function for ADLs and mobility. Demo functional tasks at SBA to independent level this date. Anticipate pt safe to d/c home with assist from  prn. 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 Pt has no acute OT needs, will sign off. Decision Making: Low Complexity  REQUIRES OT FOLLOW-UP: No  Activity Tolerance  Activity Tolerance: Patient Tolerated treatment well        Plan   Plan  Times per Week: d/c OT     Restrictions  Position Activity Restriction  Other position/activity restrictions: up with assist    Subjective   General  Chart Reviewed: Yes  Additional Pertinent Hx: 79 y.o. female with a history of CAD status post LAD stent, left bundle branch block, SVT, WPW, depression, fibromyalgia presenting 7/21 for evaluation of syncopal episode, chest pain  Family / Caregiver Present: No  Diagnosis: hypokalemia  Subjective  Subjective: Pt in bed upon OT entry, agreeable to OT evaluation and mobility/ADLs. Pain: Denied     Social/Functional History  Social/Functional History  Lives With: Spouse (+ pt's older brother - pt/spouse are caregive for brother)  Type of Home: House  Home Layout: Bed/Bath upstairs  Home Access: Stairs to enter without rails  Entrance Stairs - Number of Steps: 1 + 1 DINA; 1 step down to living room  Bathroom Shower/Tub: Walk-in shower  Bathroom Toilet: Standard (sink nearby)  Bathroom Equipment: Grab bars in shower, Shower chair  Home Equipment: Cane (transport w/c)  ADL Assistance: Independent  Homemaking Assistance: Independent  Ambulation Assistance: Independent  Transfer Assistance: Independent  Active :  Yes  Additional Comments: 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;  is working part time in the evening - looking for FT work; pt reports 2 falls this past week       Objective      Toilet Transfers  Toilet - Technique: Ambulating  Equipment Used: Standard toilet  Toilet Transfer: Supervision    UE Function  AROM: Within functional limits  Strength: Within functional limits  Coordination: Within functional limits  Sensation: Intact      ADL  Feeding: NPO (for testing today)  Grooming: Independent (comb hair, wash hands, brush teeth and wash face standing at sink)  LE Dressing: Independent (don shoes)  Toileting: Supervision          Bed mobility  Supine to Sit: Independent (HOB flat)  Scooting: Independent    Transfers  Stand Step Transfers: Supervision (bed to chair)  Sit to stand: Supervision (bed, chair)  Stand to sit: Supervision (bed, chair)  Transfer Comments: Ambulated hallway 160' using no AD with SBA, guarded posture and slow pace but no LOB observed. Pt reported L foot pain with WB- states pain began after she fell at home- but did not limit her mobility this date    Activity Tolerance: stood >8 minutes for grooming and mobility, no SOB or c/o fatigue    Vision  Vision: Impaired (wears glasses for driving)  Hearing  Hearing: Exceptions to [facility]  Hearing Exceptions: Bilateral hearing aid (hearing aids not here today)      Cognition  Overall Cognitive Status: WNL  Orientation  Overall Orientation Status: Within Normal Limits        Safety Devices  Type of Devices: Call light within reach; Chair alarm in place;Nurse notified;Gait belt;Left in chair                AM-PAC Score  AM-PAC Inpatient Daily Activity Raw Score: 23 (07/22/22 1138)  AM-PAC Inpatient ADL T-Scale Score : 51.12 (07/22/22 1138)  ADL Inpatient CMS 0-100% Score: 15.86 (07/22/22 1138)  ADL Inpatient CMS G-Code Modifier : CI (07/22/22 1138)      Therapy Time   Individual Concurrent Group Co-treatment   Time In 0814         Time Out 0852         Minutes 38         Timed Code Treatment Minutes: 23 Minutes +15 min rudy Ngo OT

## 2022-07-22 NOTE — CARE COORDINATION
Cm attempted to meet with pt at bedside. Pt in cath lab for cardiac cath. CM spoke with pt's  in room. He stated pt is active , gets around without any use of equipment. He does not anticipate any needs for dc.

## 2022-07-22 NOTE — CONSULTS
Brief Pre-Op Note/Sedation Assessment      Vincenzo Riojas  1955  [id number]  2:42 PM    Planned Procedure: Cardiac Catheterization Procedure  Post Procedure Plan: Return to same level of care  Consent: I have discussed with the patient and/or the patient representative the indication, alternatives, and the possible risks and/or complications of the planned procedure and the anesthesia methods. The patient and/or patient representative appear to understand and agree to proceed. Chief Complaint:   Chest Pain/Pressure      Indications for Cath Procedure:  Presentation:  Suspected CAD  2. Anginal Classification within 2 weeks:  CCS III - Symptoms with everyday living activities, i.e., moderate limitation  3. Angina Symptoms Assessment:  Atypical Chest Pain  4. Heart Failure Class within last 2 weeks:  No symptoms  5. Cardiovascular Instability:  No    Prior Ischemic Workup/Eval:  Pre-Procedural Medications: Yes: Aspirin, Beta Blockers, and STATIN  2. Stress Test Completed? No    Does Patient need surgery? Cath Valve Surgery:  No    Pre-Procedure Medical History:  Vital Signs:  /75   Pulse 77   Temp 98.4 °F (36.9 °C) (Oral)   Resp 18   Ht 5' 6.6\" (1.692 m)   Wt 166 lb 7.2 oz (75.5 kg)   LMP  (LMP Unknown)   SpO2 97%   BMI 26.38 kg/m²     Allergies:     Allergies   Allergen Reactions    Avelox [Moxifloxacin Hcl In Nacl] Hives    Cephalexin Hives and Rash    Darvon [Propoxyphene Hcl] Hives    Digoxin Hives    Librax [Chlordiazepoxide-Methscopol] Hives    Lyrica [Pregabalin] Swelling     Feet hands    Motrin [Ibuprofen Micronized] Rash    Other Hives     Most mycin's    Pcn [Penicillins] Rash    Prochlorperazine Edisylate Nausea And Vomiting    Sulfa Antibiotics Rash    Tetracyclines & Related Hives    Cymbalta [Duloxetine Hcl] Palpitations    Nickel Nausea And Vomiting     Hypotension      Savella [Milnacipran] Anxiety    Statins Other (See Comments)     myalgia     Medications: Current Facility-Administered Medications   Medication Dose Route Frequency Provider Last Rate Last Admin    rosuvastatin (CRESTOR) tablet 40 mg  40 mg Oral Nightly Wade Espinoza MD   40 mg at 07/22/22 0109    0.9 % sodium chloride infusion   IntraVENous Continuous Wade Espinoza  mL/hr at 07/22/22 0511 New Bag at 07/22/22 0511    prochlorperazine (COMPAZINE) injection 5 mg  5 mg IntraVENous Q6H PRN Belen Kathleen MD        sodium chloride flush 0.9 % injection 5-40 mL  5-40 mL IntraVENous 2 times per day Wade Espinoza MD        sodium chloride flush 0.9 % injection 5-40 mL  5-40 mL IntraVENous PRN Wade Espinoza MD   10 mL at 07/22/22 0855    0.9 % sodium chloride infusion   IntraVENous PRN Wade Espinoza MD        acetaminophen (TYLENOL) tablet 650 mg  650 mg Oral Q6H PRN Wade Espinoza MD        Or    acetaminophen (TYLENOL) suppository 650 mg  650 mg Rectal Q6H PRN Cecilia Wan MD        polyethylene glycol (GLYCOLAX) packet 17 g  17 g Oral Daily PRN Cecilia Wan MD        perflutren lipid microspheres (DEFINITY) injection 1.65 mg  1.5 mL IntraVENous ONCE PRN Cecilia Wan MD        enoxaparin (LOVENOX) injection 40 mg  40 mg SubCUTAneous Daily Cecilia Wan MD   40 mg at 07/22/22 8066    aspirin chewable tablet 81 mg  81 mg Oral Daily Cecilia Wan MD   81 mg at 07/22/22 0853    buPROPion (WELLBUTRIN) tablet 75 mg  75 mg Oral BID Wade Espinoza MD   75 mg at 07/22/22 0854    metoprolol succinate (TOPROL XL) extended release tablet 25 mg  25 mg Oral Daily Cecilia Wan MD   25 mg at 07/22/22 0852    pantoprazole (PROTONIX) tablet 40 mg  40 mg Oral QAM AC Cecilia Wan MD   40 mg at 07/22/22 2331     Facility-Administered Medications Ordered in Other Encounters   Medication Dose Route Frequency Provider Last Rate Last Admin    lactated ringers infusion   IntraVENous Continuous Dipti Ambrose MD   New Bag at 07/06/21 8040    sodium chloride flush 0.9 % injection 10 mL  10 mL IntraVENous 2 times per day Dipti Ambrose MD        sodium chloride flush 0.9 % injection 10 mL  10 mL IntraVENous PRN Les Orantes MD        0.9 % sodium chloride infusion  25 mL IntraVENous PRN Les Orantes MD           Past Medical History:    Past Medical History:   Diagnosis Date    Allergic rhinitis     Anxiety     Bilateral carpal tunnel syndrome 4/13/2021    CAD (coronary artery disease) 06/05/2019    COVID-19 12/28/2020    Depression     Endometriosis     hx    Fibromyalgia     GERD (gastroesophageal reflux disease)     Headache(784.0)     Hiatal hernia     Hyperlipidemia     Irritable bowel syndrome     Left bundle branch block (LBBB)     Osteoarthritis     SVT (supraventricular tachycardia) (HCC)     hx     WPW (Steffanie-Parkinson-White syndrome)        Surgical History:    Past Surgical History:   Procedure Laterality Date    ABLATION OF DYSRHYTHMIC FOCUS  1999    SVT    APPENDECTOMY  1982    ARM SURGERY Left 5/4/2021    LEFT ULNAR NERVE DECOMPRESSION AT ELBOW performed by Norm Meneses MD at [facility] Right 7/6/2021    RIGHT ULNAR NERVE DECOMPRESSION AT ELBOW performed by Norm Meneses MD at [address]    bilateral, 2 revisions left foot    CARDIAC SURGERY  2019    stent    CARPAL TUNNEL RELEASE Left 5/4/2021    LEFT CARPAL TUNNEL RELEASE performed by Norm Meneses MD at [address] [facility] Right 7/6/2021    RIGHT CARPAL TUNNEL RELEASE performed by Nrom Meneses MD at [address] [facility]  10/15/13    Hx polyps-3 yrs-? poor prep    ECTOPIC PREGNANCY SURGERY Right 1988    HYSTERECTOMY, TOTAL ABDOMINAL (CERVIX REMOVED)  1988    [facility]      6[facility]ri[facility]    OVARY SURGERY Right 1987    partial removal    SALPINGO-OOPHORECTOMY Left 1986    UPPER GASTROINTESTINAL ENDOSCOPY  10/15/13    schatzki ring             Pre-Sedation:  Pre-Sedation Documentation and Exam:  I have personally completed a history, physical exam & review of systems for this patient (see notes). Prior History of Anesthesia Complications:   none    Modified Mallampati:  II (soft palate, uvula, fauces visible)    ASA Classification:  Class 3 - A patient with severe systemic disease that limits activity but is not incapacitating    Edna Scale: Activity:  2 - Able to move 4 extremities voluntarily on command  Respiration:  2 - Able to breathe deeply and cough freely  Circulation:  2 - BP+/- 20mmHg of normal  Consciousness:  2 - Fully awake  Oxygen Saturation (color):  2 - Able to maintain oxygen saturation >92% on room air    Sedation/Anesthesia Plan:  Guard the patient's safety and welfare. Minimize physical discomfort and pain. Minimize negative psychological responses to treatment by providing sedation and analgesia and maximize the potential amnesia. Patient to meet pre-procedure discharge plan.     Medication Planned:  midazolam intravenously and fentanyl intravenously    Patient is an appropriate candidate for plan of sedation:   yes      Electronically signed by Robin Perez MD on 7/22/2022 at 2:42 PM

## 2022-07-22 NOTE — CONSULTS
Procedure: LHC, angioseal RFA  Complication: none  EBL<5 cc  Prelminary: LV uniform. EF 55%. LM normal. LAD stent patent mid, no obstructive CAD. Cx dom LI, no obstructive CAD. RCA nondom, normal    Successful angioseal RFA  Continue medical therapy.

## 2022-07-22 NOTE — H&P
Internal Medicine  PGY 3  History & Physical      CC syncope    History Obtained From:  patient, electronic medical record    HISTORY OF PRESENT ILLNESS:  79-year-old female with past medical history of CAD status post stenting, left bundle branch block, SVT, Steffanie-Parkinson-White status post ablation, diverticulitis, depression who presents with episode of presyncope. Patient reports that she has been having intermittent chest pain at rest and exertion over the past few months and follows with Dr. Amie Bahena. Usually she will take half a tablet of nitroglycerin and will have prompt relief. She reports that he had a similar episode about 3 days ago and had a full tablet of nitro and still half which helped with the chest pressure. However patient became lightheaded and passed out on the floor. She also had an episode of emesis. She reports she felt fine the next day but continued to feel lightheaded on Wednesday and Thursday and had a fall. She reports normal p.o. intake. Lightheadedness occurs with exertion, sometimes with standing. Thus she presented to the ED. In the ED she was hemodynamically stable, without chest pain. New T wave inversions were noted on EKG . Troponins were negative x3. Imaging was benign. Potassium was noted to be 3.2. She received Zofran for mild nausea and was transferred to [facility]. for cardiology evaluation.     Past Medical History:        Diagnosis Date    Allergic rhinitis     Anxiety     Bilateral carpal tunnel syndrome 4/13/2021    CAD (coronary artery disease) 06/05/2019    COVID-19 12/28/2020    Depression     Endometriosis     hx    Fibromyalgia     GERD (gastroesophageal reflux disease)     Headache(784.0)     Hiatal hernia     Hyperlipidemia     Irritable bowel syndrome     Left bundle branch block (LBBB)     Osteoarthritis     SVT (supraventricular tachycardia) (HCC)     hx     WPW (Steffanie-Parkinson-White syndrome)        Past Surgical History: Procedure Laterality Date    ABLATION OF DYSRHYTHMIC FOCUS  1999    SVT    APPENDECTOMY  1982    ARM SURGERY Left 5/4/2021    LEFT ULNAR NERVE DECOMPRESSION AT ELBOW [facility]  Right 7/6/2021    RIGHT ULNAR NERVE DECOMPRESSION AT ELBOW performed by Chun Morin MD at [address]    bilateral, 2 revisions left foot    CARDIAC SURGERY  2019    stent    CARPAL TUNNEL RELEASE Left 5/4/2021    LEFT CARPAL TUNNEL RELEASE performed by Chun Morin MD at [address] Right 7/6/2021    RIGHT CARPAL TUNNEL RELEASE performed by Chun Morin MD at [address] [facility]  10/15/13    Hx polyps-3 yrs-? poor prep    ECTOPIC PREGNANCY SURGERY Right 1988    HYSTERECTOMY, TOTAL ABDOMINAL (CERVIX REMOVED)  1988    LAPAROSCOPY      6--endometriosis    [facility]    OVARY SURGERY Right 1987    partial removal    SALPINGO-OOPHORECTOMY Left 1986    UPPER GASTROINTESTINAL ENDOSCOPY  10/15/13    schatzki ring       Medications Priorto Admission:    Medications Prior to Admission: acetaminophen (TYLENOL) 500 MG tablet, Take 500 mg by mouth every 6 hours as needed for Pain  methocarbamol (ROBAXIN) 500 MG tablet, TAKE ONE TABLET BY MOUTH THREE TIMES A DAY  buPROPion (WELLBUTRIN) 75 MG tablet, Take 1 tablet by mouth 2 times daily  traMADol (ULTRAM) 50 MG tablet, Take 1 tablet by mouth every 6 hours as needed for Pain (fibromyalgia) for up to 30 days. metoprolol succinate (TOPROL XL) 25 MG extended release tablet, TAKE 1 TABLET BY MOUTH ONE TIME A DAY  predniSONE (DELTASONE) 10 MG tablet, 2 tablets 2 times daily for 5 days, 1 tablet 2 times daily for 5 days, 1 tablet daily  esomeprazole (NEXIUM) 20 MG delayed release capsule, Take 1 capsule by mouth every morning (before breakfast)  nitroGLYCERIN (NITROSTAT) 0.4 MG SL tablet, up to max of 3 total doses.  If no relief after 1 dose, call 911.  Ginger 500 MG CAPS, Take 3 capsules by mouth daily  dicyclomine (BENTYL) 10 MG capsule, Take 1 capsule by mouth 3 times daily  vitamin D (ERGOCALCIFEROL) 1.25 MG (24176 UT) CAPS capsule, Take 1 capsule by mouth once a week  rosuvastatin (CRESTOR) 20 MG tablet, TAKE ONE TABLET BY MOUTH NIGHTLY  citalopram (CELEXA) 40 MG tablet, Take 1 tablet by mouth daily  Magnesium 200 MG CHEW, Take 1 tablet by mouth daily   Calcium Carbonate-Vit D-Min (CALCIUM 600+D3 PLUS MINERALS) 600-800 MG-UNIT CHEW, Take 1 tablet by mouth 2 times daily   promethazine (PHENERGAN) 25 MG tablet, Take 1 tablet by mouth every 6 hours as needed for Nausea  aspirin 81 MG chewable tablet, Take 1 tablet by mouth daily  Cholecalciferol (VITAMIN D3) 5000 UNITS TABS, Take by mouth daily   diphenhydrAMINE (BENADRYL) 25 MG tablet, Take 25 mg by mouth every 6 hours as needed. OTC     Allergies: Avelox [moxifloxacin hcl in nacl], Cephalexin, Darvon [propoxyphene hcl], Digoxin, Librax [chlordiazepoxide-methscopol], Lyrica [pregabalin], Motrin [ibuprofen micronized], Other, Pcn [penicillins], Prochlorperazine edisylate, Sulfa antibiotics, Tetracyclines & related, Cymbalta [duloxetine hcl], Nickel, Savella [milnacipran], and Statins    Social History:   TOBACCO:   reports that she has never smoked. She has never used smokeless tobacco.  ETOH:   reports no history of alcohol use.   DRUGS : denies  Patient currently lives at home    Family History:       Problem Relation Age of Onset    Other Mother         sepsis    Dementia Mother     High Cholesterol Mother     Depression Mother     Anxiety Disorder Mother     High Blood Pressure Father     Other Father          from renal failure and CHF    Heart Disease Father     Migraines Father     Heart Attack Brother     Heart Disease Brother     Hypertension Other     High Cholesterol Other     Migraines Other     Heart Disease Other     Diabetes Other        Review of Systems   Constitutional:  Negative for fatigue and fever. Respiratory:  Negative for cough, shortness of breath, wheezing and stridor. Cardiovascular:  Positive for chest pain (intermittent chest tightness). Negative for palpitations and leg swelling. Gastrointestinal:  Positive for constipation. Negative for abdominal pain, diarrhea, nausea and vomiting. Genitourinary:  Negative for dysuria and flank pain. Musculoskeletal:  Negative for arthralgias and back pain. Neurological:  Negative for light-headedness and headaches. Psychiatric/Behavioral:  Negative for confusion. ROS: A 10 point review of systems was conducted, significant findings as noted in HPI. Physical Exam  Constitutional:       Appearance: She is normal weight. HENT:      Head: Normocephalic and atraumatic. Mouth/Throat:      Mouth: Mucous membranes are moist.   Eyes:      Extraocular Movements: Extraocular movements intact. Cardiovascular:      Rate and Rhythm: Normal rate and regular rhythm. Pulses: Normal pulses. Heart sounds: Normal heart sounds. Pulmonary:      Effort: Pulmonary effort is normal.      Breath sounds: Normal breath sounds. Abdominal:      General: Abdomen is flat. There is no distension. Tenderness: There is no abdominal tenderness. Musculoskeletal:      Cervical back: Neck supple. Right lower leg: No edema. Left lower leg: No edema. Skin:     General: Skin is dry. Neurological:      General: No focal deficit present. Mental Status: She is alert and oriented to person, place, and time. Sensory: No sensory deficit. Motor: No weakness.    Psychiatric:         Mood and Affect: Mood normal.         Behavior: Behavior normal.     Physical exam:       Vitals:    07/21/22 2310   BP: 115/76   Pulse: 87   Resp: 18   Temp: 99.6 °F (37.6 °C)   SpO2: 99%       DATA:    Labs:  CBC:   Recent Labs     07/21/22  1903   WBC 10.4   HGB 12.0   HCT 35.3*          BMP:   Recent Labs 07/21/22 1903      K 3.2*      CO2 29   BUN 11   CREATININE 0.7   GLUCOSE 109*     LFT's:   Recent Labs     07/21/22 1903   AST 12*   ALT 7*   BILITOT 0.6   ALKPHOS 76     Troponin:   Recent Labs     07/21/22 1903 07/21/22 2102   TROPONINI <0.01 <0.01     BNP:No results for input(s): BNP in the last 72 hours. ABGs: No results for input(s): PHART, DVV5CRM, PO2ART in the last 72 hours. INR: No results for input(s): INR in the last 72 hours. U/A:No results for input(s): NITRITE, COLORU, PHUR, LABCAST, WBCUA, RBCUA, MUCUS, TRICHOMONAS, YEAST, BACTERIA, CLARITYU, SPECGRAV, LEUKOCYTESUR, UROBILINOGEN, BILIRUBINUR, BLOODU, GLUCOSEU, AMORPHOUS in the last 72 hours. Invalid input(s): KETONESU    CT Head W/O Contrast   Final Result   Impression:   1. No evidence of recent intracranial hemorrhage. 2. Parenchymal volume loss and chronic small vessel ischemic changes in the white matter. XR CHEST PORTABLE   Final Result   Impression:   No airspace disease. ASSESSMENT AND PLAN:    Syncope, likely cardiogenic versus medication/orthostatic  Nitrates exacerbated. Hx of SVT and arrhythmias in the past. Normal PO intake. Glu wnl. CT head normal after fall  EKG w/ new t wave inversions, normal troponin  - telemetery   - echocardiogram   - orthostatic vitals, small bolus  - cards c/s, appreciate recs    Angina  CAD s/p stents  Ischemic cardiomyopathy w/ EF 40%, no exacerbation   Reports intermittent chest pain/tightness  - cont asa, BB, statin   - cards c/s, appreciate recs.  No caffeine diet - stress or cath   -EKG prn if develops pain     Mild Hypokalemia   - replete   - Mg pending     HTN  - cont Toprol w/ parameters    Anxiety, depression   - cont Wellbutrin     Will discuss with attending physician Dr. Brandon Alvarez Status:Full code  FEN: no caffeine, regular diet  PPX: lovenox  DISPO: Jignesh Steward MD  7/22/2022,  12:26 AM

## 2022-07-22 NOTE — PLAN OF CARE
Problem: Discharge Planning  Goal: Discharge to home or other facility with appropriate resources  Outcome: Progressing  Flowsheets (Taken 7/21/2022 7667)  Discharge to home or other facility with appropriate resources:   Identify barriers to discharge with patient and caregiver   Arrange for needed discharge resources and transportation as appropriate   Identify discharge learning needs (meds, wound care, etc)     Problem: Pain  Goal: Verbalizes/displays adequate comfort level or baseline comfort level  Outcome: Progressing     Problem: Safety - Adult  Goal: Free from fall injury  Outcome: Progressing     Problem: ABCDS Injury Assessment  Goal: Absence of physical injury  Outcome: Progressing

## 2022-07-23 VITALS
TEMPERATURE: 98 F | DIASTOLIC BLOOD PRESSURE: 79 MMHG | HEART RATE: 67 BPM | HEIGHT: 67 IN | OXYGEN SATURATION: 97 % | RESPIRATION RATE: 16 BRPM | BODY MASS INDEX: 26.06 KG/M2 | WEIGHT: 166.01 LBS | SYSTOLIC BLOOD PRESSURE: 130 MMHG

## 2022-07-23 LAB
ANION GAP SERPL CALCULATED.3IONS-SCNC: 13 MMOL/L (ref 3–16)
BASOPHILS ABSOLUTE: 0 K/UL (ref 0–0.2)
BASOPHILS RELATIVE PERCENT: 0.7 %
BUN BLDV-MCNC: 8 MG/DL (ref 7–20)
CALCIUM SERPL-MCNC: 8.8 MG/DL (ref 8.3–10.6)
CHLORIDE BLD-SCNC: 104 MMOL/L (ref 99–110)
CO2: 23 MMOL/L (ref 21–32)
CREAT SERPL-MCNC: 0.7 MG/DL (ref 0.6–1.2)
EOSINOPHILS ABSOLUTE: 0.3 K/UL (ref 0–0.6)
EOSINOPHILS RELATIVE PERCENT: 4.8 %
GFR AFRICAN AMERICAN: >60
GFR NON-AFRICAN AMERICAN: >60
GLUCOSE BLD-MCNC: 101 MG/DL (ref 70–99)
HCT VFR BLD CALC: 33.2 % (ref 36–48)
HEMOGLOBIN: 11.1 G/DL (ref 12–16)
LV EF: 50 %
LVEF MODALITY: NORMAL
LYMPHOCYTES ABSOLUTE: 1.3 K/UL (ref 1–5.1)
LYMPHOCYTES RELATIVE PERCENT: 19.6 %
MAGNESIUM: 1.9 MG/DL (ref 1.8–2.4)
MCH RBC QN AUTO: 31.2 PG (ref 26–34)
MCHC RBC AUTO-ENTMCNC: 33.3 G/DL (ref 31–36)
MCV RBC AUTO: 93.7 FL (ref 80–100)
MONOCYTES ABSOLUTE: 0.4 K/UL (ref 0–1.3)
MONOCYTES RELATIVE PERCENT: 6.9 %
NEUTROPHILS ABSOLUTE: 4.4 K/UL (ref 1.7–7.7)
NEUTROPHILS RELATIVE PERCENT: 68 %
PDW BLD-RTO: 12.7 % (ref 12.4–15.4)
PLATELET # BLD: 236 K/UL (ref 135–450)
PMV BLD AUTO: 6.9 FL (ref 5–10.5)
POTASSIUM SERPL-SCNC: 3.8 MMOL/L (ref 3.5–5.1)
RBC # BLD: 3.54 M/UL (ref 4–5.2)
SODIUM BLD-SCNC: 140 MMOL/L (ref 136–145)
WBC # BLD: 6.5 K/UL (ref 4–11)

## 2022-07-23 PROCEDURE — 2580000003 HC RX 258: Performed by: STUDENT IN AN ORGANIZED HEALTH CARE EDUCATION/TRAINING PROGRAM

## 2022-07-23 PROCEDURE — 6370000000 HC RX 637 (ALT 250 FOR IP): Performed by: STUDENT IN AN ORGANIZED HEALTH CARE EDUCATION/TRAINING PROGRAM

## 2022-07-23 PROCEDURE — 80048 BASIC METABOLIC PNL TOTAL CA: CPT

## 2022-07-23 PROCEDURE — 93306 TTE W/DOPPLER COMPLETE: CPT

## 2022-07-23 PROCEDURE — 83735 ASSAY OF MAGNESIUM: CPT

## 2022-07-23 PROCEDURE — 6360000002 HC RX W HCPCS: Performed by: STUDENT IN AN ORGANIZED HEALTH CARE EDUCATION/TRAINING PROGRAM

## 2022-07-23 PROCEDURE — 36415 COLL VENOUS BLD VENIPUNCTURE: CPT

## 2022-07-23 PROCEDURE — 85025 COMPLETE CBC W/AUTO DIFF WBC: CPT

## 2022-07-23 RX ADMIN — PANTOPRAZOLE SODIUM 40 MG: 40 TABLET, DELAYED RELEASE ORAL at 06:42

## 2022-07-23 RX ADMIN — ENOXAPARIN SODIUM 40 MG: 100 INJECTION SUBCUTANEOUS at 07:45

## 2022-07-23 RX ADMIN — ACETAMINOPHEN 325MG 650 MG: 325 TABLET ORAL at 11:22

## 2022-07-23 RX ADMIN — BUPROPION HYDROCHLORIDE 75 MG: 75 TABLET, FILM COATED ORAL at 07:46

## 2022-07-23 RX ADMIN — SODIUM CHLORIDE, PRESERVATIVE FREE 10 ML: 5 INJECTION INTRAVENOUS at 07:46

## 2022-07-23 RX ADMIN — METOPROLOL SUCCINATE 25 MG: 25 TABLET, EXTENDED RELEASE ORAL at 07:46

## 2022-07-23 RX ADMIN — ASPIRIN 81 MG: 81 TABLET, CHEWABLE ORAL at 07:46

## 2022-07-23 ASSESSMENT — PAIN DESCRIPTION - DESCRIPTORS: DESCRIPTORS: ACHING

## 2022-07-23 ASSESSMENT — PAIN SCALES - GENERAL
PAINLEVEL_OUTOF10: 0
PAINLEVEL_OUTOF10: 3
PAINLEVEL_OUTOF10: 0

## 2022-07-23 ASSESSMENT — ENCOUNTER SYMPTOMS
GASTROINTESTINAL NEGATIVE: 1
RESPIRATORY NEGATIVE: 1
EYES NEGATIVE: 1

## 2022-07-23 ASSESSMENT — PAIN DESCRIPTION - LOCATION: LOCATION: HEAD

## 2022-07-23 NOTE — DISCHARGE INSTRUCTIONS
Please follow up with your primary care physician in 1 week  Please follow-up with your cardiologist, Dr. Alma Armando in 2 weeks  Please stop taking nitroglycerin.   If you have any issues please contact your primary care doctor

## 2022-07-23 NOTE — PLAN OF CARE
Problem: Discharge Planning  Goal: Discharge to home or other facility with appropriate resources  7/22/2022 2019 by Marielos Robb RN  Outcome: Progressing  Flowsheets (Taken 7/22/2022 2019)  Discharge to home or other facility with appropriate resources:   Identify barriers to discharge with patient and caregiver   Arrange for needed discharge resources and transportation as appropriate   Identify discharge learning needs (meds, wound care, etc)  Note: Pt v/s stable. No signs of bleeding post-cath. Bed rest and fluids complete. Problem: Pain  Goal: Verbalizes/displays adequate comfort level or baseline comfort level  7/22/2022 2019 by Marielos Robb RN  Outcome: Progressing  Flowsheets (Taken 7/22/2022 2019)  Verbalizes/displays adequate comfort level or baseline comfort level:   Encourage patient to monitor pain and request assistance   Assess pain using appropriate pain scale   Administer analgesics based on type and severity of pain and evaluate response   Implement non-pharmacological measures as appropriate and evaluate response  Note: Pt reported some pain relief with rest and repositioning. Pt also stated that they had some relief w/ pharmacological interventions. 7/22/2022 9433 by Yady Yu RN  Outcome: Progressing     Problem: Discharge Planning  Goal: Discharge to home or other facility with appropriate resources  7/22/2022 2019 by Marielos Robb RN  Outcome: Progressing  Flowsheets (Taken 7/22/2022 2019)  Discharge to home or other facility with appropriate resources:   Identify barriers to discharge with patient and caregiver   Arrange for needed discharge resources and transportation as appropriate   Identify discharge learning needs (meds, wound care, etc)  Note: Pt v/s stable. No signs of bleeding post-cath. Bed rest and fluids complete.      Problem: Pain  Goal: Verbalizes/displays adequate comfort level or baseline comfort level  7/22/2022 2019 by Marielos Robb RN  Outcome: Progressing  Flowsheets (Taken 7/22/2022 2019)  Verbalizes/displays adequate comfort level or baseline comfort level:   Encourage patient to monitor pain and request assistance   Assess pain using appropriate pain scale   Administer analgesics based on type and severity of pain and evaluate response   Implement non-pharmacological measures as appropriate and evaluate response  Note: Pt reported some pain relief with rest and repositioning. Pt also stated that they had some relief w/ pharmacological interventions. Problem: Safety - Adult  Goal: Free from fall injury  7/22/2022 2019 by Dean Plaza RN  Outcome: Progressing  Note: Pt is a Fall Risk. See Louise Paz Fall Risk Score. Pt bed in low position and side rails up. Call light and belongings in reach. Pt encouraged to call for assistance. Will continue with hourly rounds for PO intake, pain needs, toileting, and repositioning as needed.

## 2022-07-23 NOTE — PLAN OF CARE
Problem: Discharge Planning  Goal: Discharge to home or other facility with appropriate resources  7/23/2022 0925 by Sameera Esquivel RN  Outcome: Progressing  Flowsheets (Taken 7/23/2022 9730)  Discharge to home or other facility with appropriate resources:   Identify barriers to discharge with patient and caregiver   Arrange for needed discharge resources and transportation as appropriate   Identify discharge learning needs (meds, wound care, etc)  7/23/2022 0024 by Christel Paulino RN  Outcome: Progressing  7/22/2022 2019 by Alejandro Motley RN  Outcome: Progressing  Flowsheets (Taken 7/22/2022 2019)  Discharge to home or other facility with appropriate resources:   Identify barriers to discharge with patient and caregiver   Arrange for needed discharge resources and transportation as appropriate   Identify discharge learning needs (meds, wound care, etc)  Note: Pt v/s stable. No signs of bleeding post-cath. Bed rest and fluids complete.

## 2022-07-23 NOTE — PROGRESS NOTES
Progress Note  PGY-2    Admit Date: 7/21/2022  Day: 2  Diet: ADULT DIET; Regular; Low Fat/Low Chol/High Fiber/2 gm Na    CC: Syncope    Interval history:   Pt seen at bedside. No acute overnight events reported. She is Aox4. Mariza Dominguez Complains of some left shoulder discomfort that is improving since the fall. Had echo done this morning. EF: 50%. Mildly sclerotic aortic valve but opens well. Hemodynamically stable. Medications:     Scheduled Meds:   rosuvastatin  40 mg Oral Nightly    sodium chloride flush  5-40 mL IntraVENous 2 times per day    enoxaparin  40 mg SubCUTAneous Daily    aspirin  81 mg Oral Daily    buPROPion  75 mg Oral BID    metoprolol succinate  25 mg Oral Daily    pantoprazole  40 mg Oral QAM AC     Continuous Infusions:   sodium chloride       PRN Meds:prochlorperazine, acetaminophen, sodium chloride flush, sodium chloride, acetaminophen **OR** acetaminophen, polyethylene glycol, perflutren lipid microspheres    Objective:   Vitals:   T-max:  Patient Vitals for the past 8 hrs:   BP Temp Temp src Pulse Resp SpO2 Weight   07/23/22 1123 130/79 98 °F (36.7 °C) Axillary 67 16 97 % --   07/23/22 0741 120/77 98.4 °F (36.9 °C) Oral 78 18 99 % --   07/23/22 0600 -- -- -- -- -- -- 166 lb 0.1 oz (75.3 kg)       Intake/Output Summary (Last 24 hours) at 7/23/2022 1204  Last data filed at 7/23/2022 1127  Gross per 24 hour   Intake 820 ml   Output 1450 ml   Net -630 ml       Review of Systems   Constitutional: Negative. HENT: Negative. Eyes: Negative. Respiratory: Negative. Cardiovascular: Negative. Gastrointestinal: Negative. Genitourinary: Negative. Musculoskeletal:         Mild shoulder pain 3/10      Physical Exam  Constitutional:       Appearance: She is not ill-appearing. HENT:      Head: Normocephalic. Mouth/Throat:      Mouth: Mucous membranes are dry. Pharynx: Oropharynx is clear. Eyes:      Pupils: Pupils are equal, round, and reactive to light.    Cardiovascular: Rate and Rhythm: Normal rate and regular rhythm. Pulses: Normal pulses. Heart sounds: Normal heart sounds. Pulmonary:      Effort: Pulmonary effort is normal.      Breath sounds: No wheezing or rales. Abdominal:      General: Bowel sounds are normal. There is no distension. Palpations: Abdomen is soft. Tenderness: There is no abdominal tenderness. Musculoskeletal:      Right lower leg: No edema. Left lower leg: No edema. Skin:     General: Skin is warm and dry. Capillary Refill: Capillary refill takes less than 2 seconds. Neurological:      General: No focal deficit present. Mental Status: She is alert and oriented to person, place, and time. Mental status is at baseline. LABS:    CBC:   Recent Labs     07/21/22 1903 07/22/22  0235 07/23/22  0545   WBC 10.4 9.7 6.5   HGB 12.0 11.7* 11.1*   HCT 35.3* 33.5* 33.2*    249 236   MCV 93.2 92.7 93.7     Renal:    Recent Labs     07/21/22  1903 07/22/22  0954 07/23/22  0545    141 140   K 3.2* 3.5 3.8    105 104   CO2 29 27 23   BUN 11 9 8   CREATININE 0.7 0.6 0.7   GLUCOSE 109* 111* 101*   CALCIUM 9.2 8.8 8.8   MG  --  1.90 1.90   ANIONGAP 9 9 13     Hepatic:   Recent Labs     07/21/22 1903   AST 12*   ALT 7*   BILITOT 0.6   PROT 6.7   LABALBU 4.3   ALKPHOS 76     Troponin:   Recent Labs     07/21/22  2102 07/22/22  0020 07/22/22  0235   TROPONINI <0.01 <0.01 <0.01     BNP: No results for input(s): BNP in the last 72 hours. Lipids: No results for input(s): CHOL, HDL in the last 72 hours. Invalid input(s): LDLCALCU, TRIGLYCERIDE  ABGs:  No results for input(s): PHART, VFF7KYE, PO2ART, GVX8VCV, BEART, THGBART, E0WSFBPL, YCP1VYR in the last 72 hours. INR:   Recent Labs     07/22/22  1138   INR 1.11     Lactate: No results for input(s): LACTATE in the last 72 hours.   Cultures:  -----------------------------------------------------------------  RAD:   CT Head W/O Contrast   Final Result Impression:   1. No evidence of recent intracranial hemorrhage. 2. Parenchymal volume loss and chronic small vessel ischemic changes in the white matter. XR CHEST PORTABLE   Final Result   Impression:   No airspace disease. Assessment/Plan:   Travis Simon is a 79 y.o. female, PMHx: HTN    Atypical chest pain andSyncope  Patient presented w/ CP and syncope episodes w/hx SVT, CAD, and WPW w/ recent use of 1 tablet NTG vs 1/2 tablet which she reports she usually takes which may have contributed to her presentation w/changes on ECG  w/possible likely cardiogenic syncope. Orthostats borderline to positive  ECG on presentation w/new t wave inversions  [facility] 7/22: EF: 55%. No significant obstructive CAD. Patent stent in LAD. -Continuous telemetry  -[facility] done 7/22.   -cardio following  -Atorvastatin  -ASA 81mg daily   -Discontinue Nitroglycerin     Hypokalemia - resolved  -received repletion  -monitor on Daily BMP    HTN  Optimal control  -metoprolol  -monitor BP    Anxiety, Depression  -wellbutrin    Code Status: Full Code   FEN: ADULT DIET;  Regular; Low Fat/Low Chol/High Fiber/2 gm Na  PPX: Lovenox  ELOS: -  DISPO: pending home DC  Barriers to d/c: -    Marlon Mcduffie MD, PGY-2  Internal Medicine Resident  Contact via [address] [place] [address]  07/23/22    This patient has been staffed and discussed with Moo Nguyen MD.

## 2022-07-23 NOTE — DISCHARGE SUMMARY
INTERNAL MEDICINE DEPARTMENT  DISCHARGE SUMMARY    Patient ID: Kern Boast                                             Discharge Date: 7/23/2022   Patient's PCP: LUCIA Solomon - CNP                                          Discharge Physician: Dr Dorcas Altman MD  Admit Date: 7/21/2022   Admitting Physician: Mary Morrison MD    PROBLEMS DURING HOSPITALIZATION:  Present on Admission:   Chest pain   Syncope and collapse      DISCHARGE DIAGNOSES:  Syncopal episode  Atypical chest pain  ACS ruled out  CAD  HTN    Hospital Course:     Kern Boast is a 79 y.o. female, PMHx: CAD status post stenting, left bundle branch block, SVT, Steffanie-Parkinson-White status post ablation, diverticulitis, depression who presents with episode of presyncope. Pt reportedly took a double her recommended nitroglycerin dose. In the ED  New T wave inversions were noted on EKG . Troponins were negative x3. Imaging was benign. Potassium was noted to be 3.2. She underwent a LHC on 7/22 which revealed EF: 55%. No significant obstructive CAD. Patent stent in LAD. Pt also had echocardiogram done which revealed an EF of 50% and no valvular issues. On the basis of discharge, patient reported feeling stable. The patient was found to not be in any acute distress, with vital signs within normal limits, and no abnormalities on physical examination. Further, the patient expressed appropriate understanding - asked to discontinue nitroglycerin tabs, and agreement with, the discharge recommendations, medications and plan.        Physical Exam:  Vitals: /79   Pulse 67   Temp 98 °F (36.7 °C) (Axillary)   Resp 16   Ht 5' 6.6\" (1.692 m)   Wt 166 lb 0.1 oz (75.3 kg)   LMP  (LMP Unknown)   SpO2 97%   BMI 26.31 kg/m²   I/O :   Intake/Output Summary (Last 24 hours) at 7/23/2022 1206  Last data filed at 7/23/2022 1127  Gross per 24 hour   Intake 820 ml   Output 1450 ml   Net -630 ml         General appearance: alert, appears stated age and cooperative  HEENT: Head: Normocephalic, no lesions, without obvious abnormality. Lungs: clear to auscultation bilaterally  Heart: regular rate and rhythm, S1, S2 normal, no murmur, click, rub or gallop  Abdomen: soft, non-tender; bowel sounds normal; no masses,  no organomegaly  Extremities: extremities normal, atraumatic, no cyanosis or edema  Neurologic: Mental status: Alert, oriented, thought content appropriate    Labs: For convenience and continuity at follow-up the following most recent labs are provided:      CBC:    Lab Results   Component Value Date/Time    WBC 6.5 07/23/2022 05:45 AM    HGB 11.1 07/23/2022 05:45 AM    HCT 33.2 07/23/2022 05:45 AM     07/23/2022 05:45 AM       Renal:    Lab Results   Component Value Date/Time     07/23/2022 05:45 AM    K 3.8 07/23/2022 05:45 AM    K 4.2 05/03/2021 12:29 PM     07/23/2022 05:45 AM    CO2 23 07/23/2022 05:45 AM    BUN 8 07/23/2022 05:45 AM    CREATININE 0.7 07/23/2022 05:45 AM    CALCIUM 8.8 07/23/2022 05:45 AM    PHOS 3.3 06/06/2019 05:08 AM       Consults: cardiology  Significant Diagnostic Studies:    Left heart cath    Treatments:  telemetry    Disposition: home  Discharged Condition: Stable  Follow Up: Primary Care Physician in one week.  Follow up with cardiologist.     DISCHARGE MEDICATION:     Medication List        CONTINUE taking these medications      acetaminophen 500 MG tablet  Commonly known as: TYLENOL     aspirin 81 MG chewable tablet  Take 1 tablet by mouth daily     buPROPion 75 MG tablet  Commonly known as: WELLBUTRIN  Take 1 tablet by mouth 2 times daily     Calcium 600+D3 Plus Minerals 600-800 MG-UNIT Chew     citalopram 40 MG tablet  Commonly known as: CELEXA  Take 1 tablet by mouth daily     dicyclomine 10 MG capsule  Commonly known as: BENTYL  Take 1 capsule by mouth 3 times daily     esomeprazole 20 MG delayed release capsule  Commonly known as: NEXIUM  Take 1 capsule by mouth every morning (before breakfast)     Ginger 500 MG Caps     Magnesium 200 MG Chew     methocarbamol 500 MG tablet  Commonly known as: ROBAXIN  TAKE ONE TABLET BY MOUTH THREE TIMES A DAY     metoprolol succinate 25 MG extended release tablet  Commonly known as: TOPROL XL  TAKE 1 TABLET BY MOUTH ONE TIME A DAY     promethazine 25 MG tablet  Commonly known as: PHENERGAN  Take 1 tablet by mouth every 6 hours as needed for Nausea     rosuvastatin 20 MG tablet  Commonly known as: CRESTOR  TAKE ONE TABLET BY MOUTH NIGHTLY     vitamin D 1.25 MG (48627 UT) Caps capsule  Commonly known as: ERGOCALCIFEROL  Take 1 capsule by mouth once a week     Vitamin D3 125 MCG (5000 UT) Tabs            STOP taking these medications      diphenhydrAMINE 25 MG tablet  Commonly known as: BENADRYL     nitroGLYCERIN 0.4 MG SL tablet  Commonly known as: NITROSTAT     predniSONE 10 MG tablet  Commonly known as: DELTASONE     traMADol 50 MG tablet  Commonly known as: ULTRAM            Activity: activity as tolerated  Diet: cardiac diet  Wound Care: none needed    Time Spent on discharge is more than 30 minutes    Signed:  Kaylah Pan MD,  PGY-2  7/23/2022

## 2022-07-23 NOTE — CARE COORDINATION
Case Management Assessment            Discharge Note                    Date / Time of Note: 7/23/2022 11:56 AM                  Discharge Note Completed by: Carine Espinoza RN    Patient Name: Charmaine Peña   YOB: 1955  Diagnosis: Hypokalemia [E87.6]  Closed head injury, initial encounter [S09.90XA]  Chest pain, unspecified type [R07.9]  Chest pain [R07.9]   Date / Time: 7/21/2022  6:14 PM    Current PCP: Debbie Curling, [address] patient: No    Hospitalization in the last 30 days: No    Advance Directives:  Code Status: Full Code  [place] DNR form completed and on chart: Not Indicated    Financial:  Payor: MEDICARE / Plan: MEDICARE PART A AND B / Product Type: *No Product type* /      Pharmacy:    [facility] [id number] - Verlin Schirmer, [address] [phone] [facility] [phone]  [address] [place] [address]  Phone: [phone] Fax: [phone]      Assistance purchasing medications?: Potential Assistance Purchasing Medications: No  Assistance provided by Case Management: None at this time    Does patient want to participate in local refill/ meds to beds program?: Yes    Meds To Beds General Rules:  1. Can ONLY be done Monday- Friday between 8:30am-5pm  2. Prescription(s) must be in pharmacy by 3pm to be filled same day  3. Copy of patient's insurance/ prescription drug card and patient face sheet must be sent along with the prescription(s)  4. Cost of Rx cannot be added to hospital bill. If financial assistance is needed, please contact unit  or ;  or  CANNOT provide pharmacy voucher for patients co-pays  5.  Patients can then  the prescription on their way out of the hospital at discharge, or pharmacy can deliver to the bedside if staff is available. (payment due at time of pick-up or delivery - cash, check, or card accepted)     Able to afford home medications/ co-pay costs:

## 2022-07-23 NOTE — PLAN OF CARE
Problem: Discharge Planning  Goal: Discharge to home or other facility with appropriate resources  7/23/2022 0024 by Brinda Cline RN  Outcome: Progressing     Problem: Pain  Goal: Verbalizes/displays adequate comfort level or baseline comfort level  7/23/2022 0024 by Brinda Cline RN  Outcome: Progressing  7/22/2022 2019 by Carina Kumar RN  Outcome: Progressing  Flowsheets (Taken 7/22/2022 2019)  Verbalizes/displays adequate comfort level or baseline comfort level:   Encourage patient to monitor pain and request assistance   Assess pain using appropriate pain scale   Administer analgesics based on type and severity of pain and evaluate response   Implement non-pharmacological measures as appropriate and evaluate response  Note: Pt reported some pain relief with rest and repositioning. Pt also stated that they had some relief w/ pharmacological interventions.      Problem: Safety - Adult  Goal: Free from fall injury  7/23/2022 0024 by Brinda Cline RN  Outcome: Progressing  Flowsheets (Taken 7/22/2022 2200)  Free From Fall Injury: Instruct family/caregiver on patient safety     Problem: ABCDS Injury Assessment  Goal: Absence of physical injury  Outcome: Progressing  Flowsheets (Taken 7/22/2022 2200)  Absence of Physical Injury: Implement safety measures based on patient assessment

## 2022-07-25 ENCOUNTER — TELEPHONE (OUTPATIENT)
Dept: FAMILY MEDICINE CLINIC | Age: 67
End: 2022-07-25

## 2022-07-25 NOTE — TELEPHONE ENCOUNTER
[facility] Transitions Initial Follow Up Call    Outreach made within 2 business days of discharge: Yes    Patient: Mary Calloway Patient : 1955   MRN: [id number]  Reason for Admission: There are no discharge diagnoses documented for the most recent discharge. Discharge Date: 22       Spoke with: Patient    Discharge department/facility: [facility]    Non-face-to-face services provided:  Scheduled appointment with PCP-22  Obtained and reviewed discharge summary and/or continuity of care documents    TCM Interactive Patient Contact:  Was patient able to fill all prescriptions: Yes  Was patient instructed to bring all medications to the follow-up visit: Yes  Is patient taking all medications as directed in the discharge summary?  Yes  Does patient understand their discharge instructions: Yes  Does patient have questions or concerns that need addressed prior to 7-14 day follow up office visit: no    Scheduled appointment with PCP within 7-14 days    Follow Up  Future Appointments   Date Time Provider Ronny Orozco   2022  2:00 PM LUCIA Yang CNP  [facility] - CINDY   2022  2:15 PM LUCIA Yang CNP  [facility]   2022 10:45 AM Shamir Tracy MD [facility]   2022  2:45 PM LUCIA Yang [facility] - CINDY Navarro RN

## 2022-08-01 ENCOUNTER — OFFICE VISIT (OUTPATIENT)
Dept: CARDIOLOGY CLINIC | Age: 67
End: 2022-08-01
Payer: MEDICARE

## 2022-08-01 VITALS
BODY MASS INDEX: 26.31 KG/M2 | WEIGHT: 166 LBS | DIASTOLIC BLOOD PRESSURE: 80 MMHG | SYSTOLIC BLOOD PRESSURE: 134 MMHG | HEART RATE: 82 BPM

## 2022-08-01 DIAGNOSIS — I50.22 CHRONIC SYSTOLIC (CONGESTIVE) HEART FAILURE (HCC): ICD-10-CM

## 2022-08-01 DIAGNOSIS — Z86.79 HISTORY OF ATRIAL TACHYCARDIA: Primary | ICD-10-CM

## 2022-08-01 PROCEDURE — 1123F ACP DISCUSS/DSCN MKR DOCD: CPT | Performed by: NURSE PRACTITIONER

## 2022-08-01 PROCEDURE — 99214 OFFICE O/P EST MOD 30 MIN: CPT | Performed by: NURSE PRACTITIONER

## 2022-08-01 NOTE — PROGRESS NOTES
[facility]     Outpatient Follow Up Note  Dr Walter Ledesma MD,  Miko Dee RN, APRN,CNP CVNP      CHIEF COMPLAINT / HPI:  Follow Up   Yfn Vanessa is 79 y.o. female who presents today with a history of syncope & cp   She had taken nitro and stated her b/p was low and she presyncope event   transient ST changes high lateral by EKG followed by [facility] no sign stenosis   TTE  revealed an EF of 50% and no valvular issues. [facility] 7/22/2022   Prelminary: LV uniform. EF 55%. LM normal. LAD stent patent mid, no obstructive CAD. Cx dom LI, no obstructive CAD. RCA nondom, normal    PMH:  CAD status post stenting, left bundle branch block, SVT, Steffanie-Parkinson-White status post ablation, diverticulitis, depressionfibromyalgia anxiety GERD     Interval history: VSS wt stable no c/o cp SOB edema    Right groin no complications   No c/o palpitations presyncope or syncope since in hosp   asa / BB/statin      I spent a total of 35 minutes and greater than 50% of the time was spent counseling with patient  coordinating care regarding her diagnosis, reviewing labs, cardiac work up, treatments and plan of care.     Past Medical History:   Diagnosis Date    Allergic rhinitis     Anxiety     Bilateral carpal tunnel syndrome 4/13/2021    CAD (coronary artery disease) 06/05/2019    COVID-19 12/28/2020    Depression     Endometriosis     hx    Fibromyalgia     GERD (gastroesophageal reflux disease)     Headache(784.0)     Hiatal hernia     Hyperlipidemia     Irritable bowel syndrome     Left bundle branch block (LBBB)     Osteoarthritis     SVT (supraventricular tachycardia) (HCC)     hx     WPW (Steffanie-Parkinson-White syndrome)      Social History:    Social History     Tobacco Use   Smoking Status Never   Smokeless Tobacco Never     Current Medications:  Current Outpatient Medications   Medication Sig Dispense Refill    acetaminophen (TYLENOL) 500 MG tablet Take 500 mg by mouth every 6 hours as needed for Pain      methocarbamol (ROBAXIN) 500 MG tablet TAKE ONE TABLET BY MOUTH THREE TIMES A DAY 90 tablet 2    buPROPion (WELLBUTRIN) 75 MG tablet Take 1 tablet by mouth 2 times daily 60 tablet 5    metoprolol succinate (TOPROL XL) 25 MG extended release tablet TAKE 1 TABLET BY MOUTH ONE TIME A DAY 90 tablet 3    esomeprazole (NEXIUM) 20 MG delayed release capsule Take 1 capsule by mouth every morning (before breakfast) 90 capsule 3    Ginger 500 MG CAPS Take 3 capsules by mouth daily      dicyclomine (BENTYL) 10 MG capsule Take 1 capsule by mouth 3 times daily 90 capsule 1    vitamin D (ERGOCALCIFEROL) 1.25 MG (53433 UT) CAPS capsule Take 1 capsule by mouth once a week 12 capsule 3    rosuvastatin (CRESTOR) 20 MG tablet TAKE ONE TABLET BY MOUTH NIGHTLY 90 tablet 3    citalopram (CELEXA) 40 MG tablet Take 1 tablet by mouth daily 90 tablet 3    Magnesium 200 MG CHEW Take 1 tablet by mouth daily       Calcium Carbonate-Vit D-Min (CALCIUM 600+D3 PLUS MINERALS) 600-800 MG-UNIT CHEW Take 1 tablet by mouth 2 times daily       promethazine (PHENERGAN) 25 MG tablet Take 1 tablet by mouth every 6 hours as needed for Nausea 30 tablet 1    aspirin 81 MG chewable tablet Take 1 tablet by mouth daily 30 tablet 3    Cholecalciferol (VITAMIN D3) 5000 UNITS TABS Take by mouth daily        No current facility-administered medications for this visit.      Facility-Administered Medications Ordered in Other Visits   Medication Dose Route Frequency Provider Last Rate Last Admin    lactated ringers infusion   IntraVENous Continuous Parul Horton MD   New Bag at 07/06/21 9920    sodium chloride flush 0.9 % injection 10 mL  10 mL IntraVENous 2 times per day Parul Horton MD        sodium chloride flush 0.9 % injection 10 mL  10 mL IntraVENous PRN Parul Horton MD        0.9 % sodium chloride infusion  25 mL IntraVENous PRN Parul Horton MD         REVIEW OF SYSTEMS:    CONSTITUTIONAL: No major weight gain or loss, night sweats, fever, fatigue, or weakness. HEENT: No new vision difficulties or ringing in the ears. RESPIRATORY: No new SOB, PND, orthopnea or cough. CARDIOVASCULAR: See HPI  GI: No N/V/D, constipation, or abdominal pain. No black/tarry stools  : No urinary urgency, incontinence, or hematuria. SKIN: No cyanosis or skin lesions. MUSCULOSKELETAL: No new muscle or joint pain. NEUROLOGICAL: No syncope or TIA-like symptoms. PSYCHIATRIC: No anxiety, pain, insomnia or depression        Vitals:    08/01/22 1116   BP: 134/80   Pulse: 82   Weight: 166 lb (75.3 kg)      VITALS:  /80   Pulse 82   Wt 166 lb (75.3 kg)   LMP  (LMP Unknown)   BMI 26.31 kg/m²   CONSTITUTIONAL: Cooperative, no apparent distress, and appears well nourished / developed  NEUROLOGIC:  Awake and orientated to person, place, and time. PSYCH: Calm affect. SKIN: Warm and dry. HEENT: Sclera non-icteric, normocephalic, neck supple. RESPIRATORY:  No increased work of breathing and clear to auscultation, no crackles or wheezing. CARDIOVASCULAR:  Regular rate and rhythm without murmur. Normal S1 and S2. No gallops or rubs. Normal PMI. No elevation of JVP. Normal carotid pulses with no bruits. GI:  Normal bowel sounds. Non-distended. Non-tender to palpation  EXT: No edema. No calf tenderness. Pulses are present bilaterally.     Wt Readings from Last 3 Encounters:   08/01/22 166 lb (75.3 kg)   07/23/22 166 lb 0.1 oz (75.3 kg)   06/22/22 167 lb 6.4 oz (75.9 kg)      Pulse Readings from Last 3 Encounters:   08/01/22 82   07/23/22 67   06/22/22 77     BP Readings from Last 3 Encounters:   08/01/22 134/80   07/23/22 130/79   06/22/22 120/82        DATA:    Lab Results   Component Value Date    ALT 7 (L) 07/21/2022    AST 12 (L) 07/21/2022    ALKPHOS 76 07/21/2022    BILITOT 0.6 07/21/2022     Lab Results   Component Value Date    CREATININE 0.7 07/23/2022    BUN 8 07/23/2022     07/23/2022    K 3.8 07/23/2022     07/23/2022    CO2 23 07/23/2022 Lab Results   Component Value Date    TSH 0.66 08/03/2018    A8PMZJX 7.0 06/03/2019     Lab Results   Component Value Date    WBC 6.5 07/23/2022    HGB 11.1 (L) 07/23/2022    HCT 33.2 (L) 07/23/2022    MCV 93.7 07/23/2022     07/23/2022     Lab Results   Component Value Date    TRIG 127 03/29/2022    TRIG 131 02/26/2021    TRIG 107 08/17/2020     Lab Results   Component Value Date    HDL 83 (H) 03/29/2022    HDL 77 (H) 02/26/2021    HDL 58 08/17/2020     Lab Results   Component Value Date    LDLCALC 56 03/29/2022    LDLCALC 82 02/26/2021    LDLCALC 50 08/17/2020     Lab Results   Component Value Date    LABVLDL 25 03/29/2022    LABVLDL 26 02/26/2021    LABVLDL 21 08/17/2020       Radiology Review:  Pertinent images / reports were reviewed as a part of this visit and reveals the following:    Assessment:     Hx pre syncope/ syncope  after taking nitro & cp   She had taken nitro and stated her b/p was low and she presyncope event   transient ST changes high lateral by EKG followed by [facility] no sign stenosis   TTE  revealed an EF of 50% and no valvular issues. [facility] 7/22/2022   Prelminary: LV uniform. EF 55%. LM normal. LAD stent patent mid, no obstructive CAD. Cx dom LI, no obstructive CAD.   RCA nondom, normal    Hx CAD status post stenting 2019     left bundle branch block, SVT,   Steffanie-Parkinson-White status post ablation    Hx diverticulitis GERD   Stable     Depression / fibromyalgia / anxiety   On Wellbutrin Celexa     HD  LDL 56 optimal     Low Vit D   Supplement recommend   recheck      Plan:   Blood work fu in 6 months   Overall the patient is stable from CV standpoint    One Luanne Cross     Documentation of today's visit sent to PCP

## 2022-08-08 ENCOUNTER — OFFICE VISIT (OUTPATIENT)
Dept: FAMILY MEDICINE CLINIC | Age: 67
End: 2022-08-08
Payer: MEDICARE

## 2022-08-08 VITALS
SYSTOLIC BLOOD PRESSURE: 100 MMHG | OXYGEN SATURATION: 94 % | RESPIRATION RATE: 18 BRPM | BODY MASS INDEX: 25.9 KG/M2 | WEIGHT: 163.4 LBS | HEART RATE: 80 BPM | DIASTOLIC BLOOD PRESSURE: 60 MMHG

## 2022-08-08 DIAGNOSIS — M79.7 FIBROMYALGIA: ICD-10-CM

## 2022-08-08 DIAGNOSIS — F33.0 MILD EPISODE OF RECURRENT MAJOR DEPRESSIVE DISORDER (HCC): ICD-10-CM

## 2022-08-08 DIAGNOSIS — E78.2 MIXED HYPERLIPIDEMIA: ICD-10-CM

## 2022-08-08 DIAGNOSIS — Z12.11 COLON CANCER SCREENING: Primary | ICD-10-CM

## 2022-08-08 DIAGNOSIS — G43.109 MIGRAINE WITH AURA AND WITHOUT STATUS MIGRAINOSUS, NOT INTRACTABLE: ICD-10-CM

## 2022-08-08 PROCEDURE — G8427 DOCREV CUR MEDS BY ELIG CLIN: HCPCS | Performed by: NURSE PRACTITIONER

## 2022-08-08 PROCEDURE — 1090F PRES/ABSN URINE INCON ASSESS: CPT | Performed by: NURSE PRACTITIONER

## 2022-08-08 PROCEDURE — G8399 PT W/DXA RESULTS DOCUMENT: HCPCS | Performed by: NURSE PRACTITIONER

## 2022-08-08 PROCEDURE — 99214 OFFICE O/P EST MOD 30 MIN: CPT | Performed by: NURSE PRACTITIONER

## 2022-08-08 PROCEDURE — 1123F ACP DISCUSS/DSCN MKR DOCD: CPT | Performed by: NURSE PRACTITIONER

## 2022-08-08 PROCEDURE — 1036F TOBACCO NON-USER: CPT | Performed by: NURSE PRACTITIONER

## 2022-08-08 PROCEDURE — G8417 CALC BMI ABV UP PARAM F/U: HCPCS | Performed by: NURSE PRACTITIONER

## 2022-08-08 PROCEDURE — 1111F DSCHRG MED/CURRENT MED MERGE: CPT | Performed by: NURSE PRACTITIONER

## 2022-08-08 PROCEDURE — 3017F COLORECTAL CA SCREEN DOC REV: CPT | Performed by: NURSE PRACTITIONER

## 2022-08-08 RX ORDER — ROSUVASTATIN CALCIUM 20 MG/1
TABLET, COATED ORAL
Qty: 90 TABLET | Refills: 3 | Status: SHIPPED | OUTPATIENT
Start: 2022-08-08

## 2022-08-08 RX ORDER — CITALOPRAM 40 MG/1
40 TABLET ORAL DAILY
Qty: 90 TABLET | Refills: 3 | Status: SHIPPED | OUTPATIENT
Start: 2022-08-08

## 2022-08-08 NOTE — PROGRESS NOTES
Charmaine Peña (:  1955) is a 79 y.o. female,Established patient, here for evaluation of the following chief complaint(s):  Follow-Up from Hospital and Depression         ASSESSMENT/PLAN:  1. Colon cancer screening  -     [facility]FL - Berkley Gee MD, Gastroenterology, [facility]  2. Mild episode of recurrent major depressive disorder (HCC)  -     citalopram (CELEXA) 40 MG tablet; Take 1 tablet by mouth in the morning., Disp-90 tablet, R-3Normal  3. Mixed hyperlipidemia  -     rosuvastatin (CRESTOR) 20 MG tablet; TAKE ONE TABLET BY MOUTH NIGHTLY, Disp-90 tablet, R-3Normal      Controlled Substance Monitoring:    Acute and Chronic Pain Monitoring:   RX Monitoring 2022   Attestation -   Periodic Controlled Substance Monitoring No signs of potential drug abuse or diversion identified. Chronic Pain > 120 MEDD Obtained or confirmed \"Medication Contract\" on file. Has labs ordered by Dr. Wesly Cano. Return in about 6 months (around 2023) for fibromyalgia . Subjective   SUBJECTIVE/OBJECTIVE:  HPI    For routine follow chronic pain. Now taking wellbutrin 75 mg 2 tablets daily for fibromyalgia and feeling better. Pleased with use. Recent hospitalization related to syncopal event after taking nitroglycein after having chest pain. Awoke with nausea an took phenergan and lie down. Continued to feel off and nauseated. Felt needed to be checked out and went to ER. Admitted and cardiac evaluation stable. Review of Systems   All other systems reviewed and are negative. Objective   Physical Exam  Constitutional:       Appearance: Normal appearance. She is well-developed. HENT:      Head: Normocephalic and atraumatic. Neck:      Thyroid: No thyromegaly. Cardiovascular:      Rate and Rhythm: Normal rate and regular rhythm. Pulmonary:      Effort: Pulmonary effort is normal.      Breath sounds: Normal breath sounds.    Abdominal:      General: Bowel sounds are normal. Palpations: Abdomen is soft. Musculoskeletal:         General: Normal range of motion. Cervical back: Normal range of motion and neck supple. Skin:     General: Skin is warm. Neurological:      Mental Status: She is alert and oriented to person, place, and time.    Psychiatric:         Behavior: Behavior normal.                An electronic signature was used to authenticate this note.    --SOCORRO ADAMS, LUCIA - CNP

## 2022-09-09 DIAGNOSIS — F33.0 MILD EPISODE OF RECURRENT MAJOR DEPRESSIVE DISORDER (HCC): ICD-10-CM

## 2022-09-09 RX ORDER — CITALOPRAM 40 MG/1
TABLET ORAL
Qty: 90 TABLET | Refills: 3 | OUTPATIENT
Start: 2022-09-09

## 2022-09-09 NOTE — TELEPHONE ENCOUNTER
.Refill Request     CONFIRM preferrred pharmacy with the patient. If Mail Order Rx - Pend for 90 day refill.       Last Seen: Last Seen Department: 8/8/2022  Last Seen by PCP: 8/8/2022    Last Written: 8-8-22 90 with 3     Next Appointment:   Future Appointments   Date Time Provider Ronny Orozco   9/29/2022 10:45 AM Jayda Tovar MD [facility]   12/28/2022  2:45 PM Shelby [place], APRN - CNP [facility]   2/1/2023 11:45 AM Jayda Tovar MD [facility]   2/8/2023  1:00 PM Shelby [place], APRN - CNP [facility]       Future appointment scheduled      Requested Prescriptions     Pending Prescriptions Disp Refills    citalopram (CELEXA) 40 MG tablet [Pharmacy Med Name: CITALOPRAM HBR 40 MG TABLET] 90 tablet 3     Sig: TAKE ONE TABLET BY MOUTH DAILY

## 2022-09-21 ENCOUNTER — PATIENT MESSAGE (OUTPATIENT)
Dept: CARDIOLOGY CLINIC | Age: 67
End: 2022-09-21

## 2022-09-21 NOTE — TELEPHONE ENCOUNTER
From: Manuela Baptiste  To: Azul De Oliveira  Sent: 9/21/2022 9:18 AM EDT  Subject: Update     I just wanted to let you know that I have not had any more chest pressure since I had the angiogram. My is feeling fine. It just needed to be augered out.

## 2022-10-07 ENCOUNTER — TELEPHONE (OUTPATIENT)
Dept: FAMILY MEDICINE CLINIC | Age: 67
End: 2022-10-07

## 2022-10-07 DIAGNOSIS — G43.709 CHRONIC MIGRAINE WITHOUT AURA WITHOUT STATUS MIGRAINOSUS, NOT INTRACTABLE: ICD-10-CM

## 2022-10-07 DIAGNOSIS — M79.7 FIBROMYALGIA: ICD-10-CM

## 2022-10-07 RX ORDER — HYDROCODONE BITARTRATE AND ACETAMINOPHEN 5; 325 MG/1; MG/1
1 TABLET ORAL DAILY PRN
Qty: 10 TABLET | Refills: 0 | Status: SHIPPED | OUTPATIENT
Start: 2022-10-07 | End: 2022-10-17

## 2022-10-07 NOTE — TELEPHONE ENCOUNTER
Message from patient via [address] [place] [address] [address]. Emory Lopez,  I have been getting cluster headaches for a week now. The Tramadol and Tylenol aren't helping. I wanted to know if you would send a refill in for the [facility] to [facility] in [facility]. Unfortunately this is the time of year that the fibromyalgia acts up and the migraines start up.

## 2022-10-20 DIAGNOSIS — E78.2 MIXED HYPERLIPIDEMIA: ICD-10-CM

## 2022-10-20 NOTE — TELEPHONE ENCOUNTER
.Refill Request     CONFIRM preferrred pharmacy with the patient. If Mail Order Rx - Pend for 90 day refill. Last Seen: Last Seen Department: 8/8/2022  Last Seen by PCP: 8/8/2022    Last Written: 8-8-22 90 with 3     If no future appointment scheduled, route STAFF MESSAGE with patient name to the [facility] for scheduling. Next Appointment:   Future Appointments   Date Time Provider Ronny Orozco   12/28/2022  2:45 PM Jannell Rajas, APRN - CNP [facility]   2/1/2023 11:45 AM Karely Mckeon MD [facility]   2/8/2023  1:00 PM Jannell Rajas, APRN - CNP [facility]       Message sent to [facility] to schedule appt with patient?   N/A      Requested Prescriptions     Pending Prescriptions Disp Refills    rosuvastatin (CRESTOR) 20 MG tablet [Pharmacy Med Name: ROSUVASTATIN CALCIUM 20 MG TAB] 90 tablet 1     Sig: TAKE ONE TABLET BY MOUTH ONCE NIGHTLY

## 2022-10-21 RX ORDER — ROSUVASTATIN CALCIUM 20 MG/1
TABLET, COATED ORAL
Qty: 90 TABLET | Refills: 1 | OUTPATIENT
Start: 2022-10-21

## 2022-11-07 ENCOUNTER — OFFICE VISIT (OUTPATIENT)
Dept: FAMILY MEDICINE CLINIC | Age: 67
End: 2022-11-07
Payer: MEDICARE

## 2022-11-07 VITALS
HEART RATE: 70 BPM | RESPIRATION RATE: 18 BRPM | BODY MASS INDEX: 27.3 KG/M2 | WEIGHT: 172.2 LBS | DIASTOLIC BLOOD PRESSURE: 62 MMHG | OXYGEN SATURATION: 94 % | SYSTOLIC BLOOD PRESSURE: 100 MMHG

## 2022-11-07 DIAGNOSIS — M79.7 FIBROMYALGIA: ICD-10-CM

## 2022-11-07 DIAGNOSIS — Z23 FLU VACCINE NEED: ICD-10-CM

## 2022-11-07 DIAGNOSIS — J01.90 ACUTE BACTERIAL SINUSITIS: Primary | ICD-10-CM

## 2022-11-07 DIAGNOSIS — B96.89 ACUTE BACTERIAL SINUSITIS: Primary | ICD-10-CM

## 2022-11-07 DIAGNOSIS — H69.82 DYSFUNCTION OF LEFT EUSTACHIAN TUBE: ICD-10-CM

## 2022-11-07 PROCEDURE — 1090F PRES/ABSN URINE INCON ASSESS: CPT | Performed by: NURSE PRACTITIONER

## 2022-11-07 PROCEDURE — 3017F COLORECTAL CA SCREEN DOC REV: CPT | Performed by: NURSE PRACTITIONER

## 2022-11-07 PROCEDURE — G8417 CALC BMI ABV UP PARAM F/U: HCPCS | Performed by: NURSE PRACTITIONER

## 2022-11-07 PROCEDURE — 1123F ACP DISCUSS/DSCN MKR DOCD: CPT | Performed by: NURSE PRACTITIONER

## 2022-11-07 PROCEDURE — 1036F TOBACCO NON-USER: CPT | Performed by: NURSE PRACTITIONER

## 2022-11-07 PROCEDURE — G8399 PT W/DXA RESULTS DOCUMENT: HCPCS | Performed by: NURSE PRACTITIONER

## 2022-11-07 PROCEDURE — 90694 VACC AIIV4 NO PRSRV 0.5ML IM: CPT | Performed by: NURSE PRACTITIONER

## 2022-11-07 PROCEDURE — G8427 DOCREV CUR MEDS BY ELIG CLIN: HCPCS | Performed by: NURSE PRACTITIONER

## 2022-11-07 PROCEDURE — G8484 FLU IMMUNIZE NO ADMIN: HCPCS | Performed by: NURSE PRACTITIONER

## 2022-11-07 PROCEDURE — G0008 ADMIN INFLUENZA VIRUS VAC: HCPCS | Performed by: NURSE PRACTITIONER

## 2022-11-07 PROCEDURE — 99214 OFFICE O/P EST MOD 30 MIN: CPT | Performed by: NURSE PRACTITIONER

## 2022-11-07 RX ORDER — AZITHROMYCIN 250 MG/1
TABLET, FILM COATED ORAL
Qty: 6 TABLET | Refills: 0 | Status: SHIPPED | OUTPATIENT
Start: 2022-11-07 | End: 2022-11-17

## 2022-11-07 NOTE — PROGRESS NOTES
2022 - 2023 Flu Vaccine Questionnaire    VIS given -  Yes    Have you received any other vaccine within the last 14 days? No  2. Do you currently have an active infectious or acute respiratory illness or fever? No  3. Are you taking steroids or immune suppressive drugs? No  4. Have you ever had a reaction to a flu vaccine? No  5. Are you allergic to eggs, egg products, chicken, Thimerosal (preservative) Gentamycin, polymixin, neomycin or Latex? No  6. Have you ever had Guillian Bryant Syndrome?   No

## 2022-11-07 NOTE — PROGRESS NOTES
Justus Berger (:  1955) is a 79 y.o. female,Established patient, here for evaluation of the following chief complaint(s):  Ear Fullness (L ) and Sinusitis         ASSESSMENT/PLAN:  1. Acute bacterial sinusitis  -     azithromycin (ZITHROMAX) 250 MG tablet; 2 tablets today followed by 1 tablet daily for 4 days, Disp-6 tablet, R-0Normal  2. Dysfunction of left eustachian tube  3. Fibromyalgia  4. Flu vaccine need  -     Influenza, FLUAD, (age 72 y+), IM, Preservative Free, 0.5 mL    Recommend mucinex and saline nasal spray. Controlled Substance Monitoring:    Acute and Chronic Pain Monitoring:   RX Monitoring 2022   Attestation -   Periodic Controlled Substance Monitoring No signs of potential drug abuse or diversion identified. Chronic Pain > 120 MEDD Obtained or confirmed \"Medication Contract\" on file. No follow-ups on file. Subjective   SUBJECTIVE/OBJECTIVE:  HPI    Left ear with shooting pain over the past 2 weeks. Now worsening. Wears hearing device but hearing is decreased. Sinus yellow mucus with pressure left face past few days. Review of Systems   All other systems reviewed and are negative. Objective   Physical Exam  Constitutional:       Appearance: Normal appearance. HENT:      Right Ear: Tympanic membrane and ear canal normal.      Left Ear: Tympanic membrane and ear canal normal.   Cardiovascular:      Rate and Rhythm: Normal rate. Pulmonary:      Effort: Pulmonary effort is normal.   Neurological:      Mental Status: She is alert and oriented to person, place, and time.    Psychiatric:         Behavior: Behavior normal.                An electronic signature was used to authenticate this note.    --SOCORRO ADAMS, LUCIA - CNP

## 2022-11-08 DIAGNOSIS — E55.9 VITAMIN D DEFICIENCY: ICD-10-CM

## 2022-11-08 NOTE — TELEPHONE ENCOUNTER
Refill Request     CONFIRM preferrred pharmacy with the patient. If Mail Order Rx - Pend for 90 day refill. Last Seen: Last Seen Department: 11/7/2022  Last Seen by PCP: 11/7/2022    Last Written: 11/29/2021 12 capsule 3 refills     If no future appointment scheduled, route STAFF MESSAGE with patient name to the [facility] for scheduling. Next Appointment:   Future Appointments   Date Time Provider Ronny Orozco   12/28/2022  2:45 PM Marvene Ridgel, APRN - CNP [facility]   2/1/2023 11:45 AM Jorge Delatorre MD [facility]   2/8/2023  1:00 PM Marvene Ridgel, APRN - CNP [facility]       Message sent to [address] [place] [address] to schedule appt with patient?   NO      Requested Prescriptions     Pending Prescriptions Disp Refills    vitamin D (ERGOCALCIFEROL) 1.25 MG (20119 UT) CAPS capsule [Pharmacy Med Name: VIT D2 (ERGOCAL) 1.25MG(50,000U) CP] 12 capsule 3     Sig: TAKE ONE CAPSULE BY MOUTH ONCE WEEKLY

## 2022-11-09 RX ORDER — ERGOCALCIFEROL 1.25 MG/1
CAPSULE ORAL
Qty: 12 CAPSULE | Refills: 3 | Status: SHIPPED | OUTPATIENT
Start: 2022-11-09

## 2022-12-14 DIAGNOSIS — F33.0 MILD EPISODE OF RECURRENT MAJOR DEPRESSIVE DISORDER (HCC): ICD-10-CM

## 2022-12-14 RX ORDER — BUPROPION HYDROCHLORIDE 75 MG/1
TABLET ORAL
Qty: 180 TABLET | Refills: 3 | Status: SHIPPED
Start: 2022-12-14 | End: 2023-02-08 | Stop reason: DRUGHIGH

## 2022-12-14 NOTE — TELEPHONE ENCOUNTER
.Refill Request     CONFIRM preferrred pharmacy with the patient. If Mail Order Rx - Pend for 90 day refill. Last Seen: Last Seen Department: 11/7/2022  Last Seen by PCP: 11/7/2022    Last Written: 6-22-22 60 with 5     If no future appointment scheduled, route STAFF MESSAGE with patient name to the [facility] for scheduling. Next Appointment:   Future Appointments   Date Time Provider Ronny Orozco   12/28/2022  2:45 PM Alecia Downy, APRN - CNP [facility]   2/1/2023 11:45 AM Nallely Monge MD [facility]   2/8/2023  1:00 PM Alecia Downy, APRN - CNP [facility]       Message sent to [address] [place] [address] to schedule appt with patient?   N/A      Requested Prescriptions     Pending Prescriptions Disp Refills    buPROPion (WELLBUTRIN) 75 MG tablet [Pharmacy Med Name: buPROPion HCL 75 MG TABLET] 180 tablet 1     Sig: TAKE ONE TABLET BY MOUTH TWICE A DAY

## 2022-12-19 ENCOUNTER — TELEPHONE (OUTPATIENT)
Dept: FAMILY MEDICINE CLINIC | Age: 67
End: 2022-12-19

## 2022-12-19 DIAGNOSIS — B96.89 ACUTE BACTERIAL SINUSITIS: Primary | ICD-10-CM

## 2022-12-19 DIAGNOSIS — M79.7 FIBROMYALGIA: ICD-10-CM

## 2022-12-19 DIAGNOSIS — J01.90 ACUTE BACTERIAL SINUSITIS: Primary | ICD-10-CM

## 2022-12-19 RX ORDER — PREDNISONE 10 MG/1
TABLET ORAL
Qty: 21 TABLET | Refills: 0 | Status: SHIPPED | OUTPATIENT
Start: 2022-12-19

## 2022-12-19 RX ORDER — AZITHROMYCIN 250 MG/1
TABLET, FILM COATED ORAL
Qty: 6 TABLET | Refills: 0 | Status: SHIPPED | OUTPATIENT
Start: 2022-12-19 | End: 2022-12-29

## 2022-12-28 ENCOUNTER — OFFICE VISIT (OUTPATIENT)
Dept: FAMILY MEDICINE CLINIC | Age: 67
End: 2022-12-28
Payer: MEDICARE

## 2022-12-28 VITALS
HEART RATE: 81 BPM | RESPIRATION RATE: 18 BRPM | WEIGHT: 176.6 LBS | SYSTOLIC BLOOD PRESSURE: 138 MMHG | DIASTOLIC BLOOD PRESSURE: 70 MMHG | OXYGEN SATURATION: 95 % | HEIGHT: 65 IN | BODY MASS INDEX: 29.42 KG/M2

## 2022-12-28 DIAGNOSIS — R73.01 IFG (IMPAIRED FASTING GLUCOSE): ICD-10-CM

## 2022-12-28 DIAGNOSIS — Z12.11 COLON CANCER SCREENING: ICD-10-CM

## 2022-12-28 DIAGNOSIS — Z00.00 INITIAL MEDICARE ANNUAL WELLNESS VISIT: Primary | ICD-10-CM

## 2022-12-28 PROCEDURE — 1123F ACP DISCUSS/DSCN MKR DOCD: CPT | Performed by: NURSE PRACTITIONER

## 2022-12-28 PROCEDURE — G0438 PPPS, INITIAL VISIT: HCPCS | Performed by: NURSE PRACTITIONER

## 2022-12-28 PROCEDURE — G8484 FLU IMMUNIZE NO ADMIN: HCPCS | Performed by: NURSE PRACTITIONER

## 2022-12-28 PROCEDURE — 3017F COLORECTAL CA SCREEN DOC REV: CPT | Performed by: NURSE PRACTITIONER

## 2022-12-28 SDOH — ECONOMIC STABILITY: INCOME INSECURITY: IN THE LAST 12 MONTHS, WAS THERE A TIME WHEN YOU WERE NOT ABLE TO PAY THE MORTGAGE OR RENT ON TIME?: NO

## 2022-12-28 SDOH — ECONOMIC STABILITY: FOOD INSECURITY: WITHIN THE PAST 12 MONTHS, YOU WORRIED THAT YOUR FOOD WOULD RUN OUT BEFORE YOU GOT MONEY TO BUY MORE.: NEVER TRUE

## 2022-12-28 SDOH — ECONOMIC STABILITY: FOOD INSECURITY: WITHIN THE PAST 12 MONTHS, THE FOOD YOU BOUGHT JUST DIDN'T LAST AND YOU DIDN'T HAVE MONEY TO GET MORE.: NEVER TRUE

## 2022-12-28 SDOH — ECONOMIC STABILITY: HOUSING INSECURITY: IN THE LAST 12 MONTHS, HOW MANY PLACES HAVE YOU LIVED?: 1

## 2022-12-28 SDOH — ECONOMIC STABILITY: HOUSING INSECURITY
IN THE LAST 12 MONTHS, WAS THERE A TIME WHEN YOU DID NOT HAVE A STEADY PLACE TO SLEEP OR SLEPT IN A SHELTER (INCLUDING NOW)?: NO

## 2022-12-28 ASSESSMENT — LIFESTYLE VARIABLES
HOW OFTEN DO YOU HAVE A DRINK CONTAINING ALCOHOL: NEVER
HOW MANY STANDARD DRINKS CONTAINING ALCOHOL DO YOU HAVE ON A TYPICAL DAY: 1 OR 2

## 2022-12-28 ASSESSMENT — PATIENT HEALTH QUESTIONNAIRE - PHQ9
SUM OF ALL RESPONSES TO PHQ QUESTIONS 1-9: 11
1. LITTLE INTEREST OR PLEASURE IN DOING THINGS: 2
8. MOVING OR SPEAKING SO SLOWLY THAT OTHER PEOPLE COULD HAVE NOTICED. OR THE OPPOSITE, BEING SO FIGETY OR RESTLESS THAT YOU HAVE BEEN MOVING AROUND A LOT MORE THAN USUAL: 0
6. FEELING BAD ABOUT YOURSELF - OR THAT YOU ARE A FAILURE OR HAVE LET YOURSELF OR YOUR FAMILY DOWN: 0
SUM OF ALL RESPONSES TO PHQ QUESTIONS 1-9: 11
4. FEELING TIRED OR HAVING LITTLE ENERGY: 3
3. TROUBLE FALLING OR STAYING ASLEEP: 2
5. POOR APPETITE OR OVEREATING: 0
SUM OF ALL RESPONSES TO PHQ9 QUESTIONS 1 & 2: 4
SUM OF ALL RESPONSES TO PHQ QUESTIONS 1-9: 11
7. TROUBLE CONCENTRATING ON THINGS, SUCH AS READING THE NEWSPAPER OR WATCHING TELEVISION: 2
SUM OF ALL RESPONSES TO PHQ QUESTIONS 1-9: 11
9. THOUGHTS THAT YOU WOULD BE BETTER OFF DEAD, OR OF HURTING YOURSELF: 0
10. IF YOU CHECKED OFF ANY PROBLEMS, HOW DIFFICULT HAVE THESE PROBLEMS MADE IT FOR YOU TO DO YOUR WORK, TAKE CARE OF THINGS AT HOME, OR GET ALONG WITH OTHER PEOPLE: 1
2. FEELING DOWN, DEPRESSED OR HOPELESS: 2

## 2022-12-28 ASSESSMENT — SOCIAL DETERMINANTS OF HEALTH (SDOH): HOW HARD IS IT FOR YOU TO PAY FOR THE VERY BASICS LIKE FOOD, HOUSING, MEDICAL CARE, AND HEATING?: NOT HARD AT ALL

## 2022-12-28 NOTE — PATIENT INSTRUCTIONS
Preventing Falls: Care Instructions  Overview     Getting around your home safely can be a challenge if you have injuries or health problems that make it easy for you to fall. Loose rugs and furniture in walkways are among the dangers for many older people who have problems walking or who have poor eyesight. People who have conditions such as arthritis, osteoporosis, or dementia also have to be careful not to fall. You can make your home safer with a few simple measures. Follow-up care is a key part of your treatment and safety. Be sure to make and go to all appointments, and call your doctor if you are having problems. It's also a good idea to know your test results and keep a list of the medicines you take. How can you care for yourself at home? Taking care of yourself  Exercise regularly to improve your strength, muscle tone, and balance. Walk if you can. Swimming may be a good choice if you cannot walk easily. Have your vision and hearing checked each year or any time you notice a change. If you have trouble seeing and hearing, you might not be able to avoid objects and could lose your balance. Know the side effects of the medicines you take. Ask your doctor or pharmacist whether the medicines you take can affect your balance. Sleeping pills or sedatives can affect your balance. Limit the amount of alcohol you drink. Alcohol can impair your balance and other senses. Ask your doctor whether calluses or corns on your feet need to be removed. If you wear loose-fitting shoes because of calluses or corns, you can lose your balance and fall. Talk to your doctor if you have numbness in your feet. You may get dizzy if you do not drink enough water. To prevent dehydration, drink plenty of fluids. Choose water and other clear liquids. If you have kidney, heart, or liver disease and have to limit fluids, talk with your doctor before you increase the amount of fluids you drink.   Preventing falls at home  Remove raised doorway thresholds, throw rugs, and clutter. Repair loose carpet or raised areas in the floor. Move furniture and electrical cords to keep them out of walking paths. Use nonskid floor wax, and wipe up spills right away, especially on ceramic tile floors. If you use a walker or cane, put rubber tips on it. If you use crutches, clean the bottoms of them regularly with an abrasive pad, such as steel wool. Keep your house well lit, especially stairways, porches, and outside walkways. Use night-lights in areas such as hallways and bathrooms. Add extra light switches or use remote switches (such as switches that go on or off when you clap your hands) to make it easier to turn lights on if you have to get up during the night. Install sturdy handrails on stairways. Move items in your cabinets so that the things you use a lot are on the lower shelves (about waist level). Keep a cordless phone and a flashlight with new batteries by your bed. If possible, put a phone in each of the main rooms of your house, or carry a cell phone in case you fall and cannot reach a phone. Or, you can wear a device around your neck or wrist. You push a button that sends a signal for help. Wear low-heeled shoes that fit well and give your feet good support. Use footwear with nonskid soles. Check the heels and soles of your shoes for wear. Repair or replace worn heels or soles. Do not wear socks without shoes on smooth floors, such as wood. Walk on the grass when the sidewalks are slippery. If you live in an area that gets snow and ice in the winter, sprinkle salt on slippery steps and sidewalks. Or ask a family member or friend to do this for you. Preventing falls in the bath  Install grab bars and nonskid mats inside and outside your shower or tub and near the toilet and sinks. Use shower chairs and bath benches. Use a hand-held shower head that will allow you to sit while showering.   Get into a tub or shower by putting the weaker leg in first. Get out of a tub or shower with your strong side first.  Repair loose toilet seats and consider installing a raised toilet seat to make getting on and off the toilet easier. Keep your bathroom door unlocked while you are in the shower. Where can you learn more? Go to http://www.saldivar.com/ and enter G117 to learn more about \"Preventing Falls: Care Instructions. \"  Current as of: May 4, 2022               Content Version: 13.5  © 5389-1811 Healthwise, [employer]. Care instructions adapted under license by [facility] ([facility]). If you have questions about a medical condition or this instruction, always ask your healthcare professional. [address] any warranty or liability for your use of this information. Learning About Mindfulness for Stress  What are mindfulness and stress? Stress is what you feel when you have to handle more than you are used to. A lot of things can cause stress. You may feel stress when you go on a job interview, take a test, or run a race. This kind of short-term stress is normal and even useful. It can help you if you need to work hard or react quickly. Stress also can last a long time. Long-term stress is caused by stressful situations or events. Examples of long-term stress include long-term health problems, ongoing problems at work, and conflicts in your family. Long-term stress can harm your health. Mindfulness is a focus only on things happening in the present moment. It's a process of purposefully paying attention to and being aware of your surroundings, your emotions, your thoughts, and how your body feels. You are aware of these things, but you aren't judging these experiences as \"good\" or \"bad. \" Mindfulness can help you learn to calm your mind and body to help you cope with illness, pain, and stress. How does mindfulness help to relieve stress? Mindfulness can help quiet your mind and relax your body. Studies show that it can help some people sleep better, feel less anxious, and bring their blood pressure down. And it's been shown to help some people live and cope better with certain health problems like heart disease, depression, chronic pain, and cancer. How do you practice mindfulness? To be mindful is to pay attention, to be present, and to be accepting. When you're mindful, you do just one thing and you pay close attention to that one thing. For example, you may sit quietly and notice your emotions or how your food tastes and smells. When you're present, you focus on the things that are happening right now. You let go of your thoughts about the past and the future. When you dwell on the past or the future, you miss moments that can heal and strengthen you. You may miss moments like hearing a child laugh or seeing a friendly face when you think you're all alone. When you're accepting, you don't  the present moment. Instead you accept your thoughts and feelings as they come. You can practice anytime, anywhere, and in any way you choose. You can practice in many ways. Here are a few ideas:  While doing your chores, like washing the dishes, let your mind focus on what's in your hand. What does the dish feel like? Is the water warm or cold? Go outside and take a few deep breaths. What is the air like? Is it warm or cold? When you can, take some time at the start of your day to sit alone and think. Take a slow walk by yourself. Count your steps while you breathe in and out. Try yoga breathing exercises, stretches, and poses to strengthen and relax your muscles. At work, if you can, try to stop for a few moments each hour. Note how your body feels. Let yourself regroup and let your mind settle before you return to what you were doing. If you struggle with anxiety or \"worry thoughts,\" imagine your mind as a blue jed and your worry thoughts as clouds.  Now imagine those worry thoughts floating across hearing tests as your doctor suggests. They can show whether your hearing has changed. Your hearing aid may need to be adjusted. Use other devices as needed. These may include:  Telephone amplifiers and hearing aids that can connect to a television, stereo, radio, or microphone. Devices that use lights or vibrations. These alert you to the doorbell, a ringing telephone, or a baby monitor. Television closed-captioning. This shows the words at the bottom of the screen. Most new TVs can do this. TTY (text telephone). This lets you type messages back and forth on the telephone instead of talking or listening. These devices are also called TDD. When messages are typed on the keyboard, they are sent over the phone line to a receiving TTY. The message is shown on a monitor. Use text messaging, social media, and email if it is hard for you to communicate by telephone. Try to learn a listening technique called speechreading. It is not lipreading. You pay attention to people's gestures, expressions, posture, and tone of voice. These clues can help you understand what a person is saying. Face the person you are talking to, and have them face you. Make sure the lighting is good. You need to see the other person's face clearly. Think about counseling if you need help to adjust to your hearing loss. When should you call for help? Watch closely for changes in your health, and be sure to contact your doctor if:    You think your hearing is getting worse.     You have new symptoms, such as dizziness or nausea. Where can you learn more? Go to http://www.saldivar.com/ and enter R798 to learn more about \"Hearing Loss: Care Instructions. \"  Current as of: May 4, 2022               Content Version: 13.5  © 9010-5017 Healthwise, [employer]. Care instructions adapted under license by [facility] ([facility]).  If you have questions about a medical condition or this instruction, always ask your healthcare professional. [employer], [facility] disclaims any warranty or liability for your use of this information. Advance Directives: Care Instructions  Overview  An advance directive is a legal way to state your wishes at the end of your life. It tells your family and your doctor what to do if you can't say what you want. There are two main types of advance directives. You can change them any time your wishes change. Living will. This form tells your family and your doctor your wishes about life support and other treatment. The form is also called a declaration. Medical power of . This form lets you name a person to make treatment decisions for you when you can't speak for yourself. This person is called a health care agent (health care proxy, health care surrogate). The form is also called a durable power of  for health care. If you do not have an advance directive, decisions about your medical care may be made by a family member, or by a doctor or a  who doesn't know you. It may help to think of an advance directive as a gift to the people who care for you. If you have one, they won't have to make tough decisions by themselves. For more information, including forms for your state, see the [address] website (www.caringinfo.org/planning/advance-directives/). Follow-up care is a key part of your treatment and safety. Be sure to make and go to all appointments, and call your doctor if you are having problems. It's also a good idea to know your test results and keep a list of the medicines you take. What should you include in an advance directive? Many states have a unique advance directive form. (It may ask you to address specific issues.) Or you might use a universal form that's approved by many states. If your form doesn't tell you what to address, it may be hard to know what to include in your advance directive. Use the questions below to help you get started.   Who do you want to make decisions about your medical care if you are not able to? What life-support measures do you want if you have a serious illness that gets worse over time or can't be cured? What are you most afraid of that might happen? (Maybe you're afraid of having pain, losing your independence, or being kept alive by machines.)  Where would you prefer to die? (Your home? A hospital? A nursing home?)  Do you want to donate your organs when you die? Do you want certain Worship practices performed before you die? When should you call for help? Be sure to contact your doctor if you have any questions. Where can you learn more? Go to http://www.saldivar.com/ and enter R264 to learn more about \"Advance Directives: Care Instructions. \"  Current as of: June 16, 2022               Content Version: 13.5  © 4016-8483 Healthwise, [employer]. Care instructions adapted under license by [facility] ([facility]). If you have questions about a medical condition or this instruction, always ask your healthcare professional. [place] [address] any warranty or liability for your use of this information. Personalized Preventive Plan for Kavitha Sahu - 12/28/2022  Medicare offers a range of preventive health benefits. Some of the tests and screenings are paid in full while other may be subject to a deductible, co-insurance, and/or copay. Some of these benefits include a comprehensive review of your medical history including lifestyle, illnesses that may run in your family, and various assessments and screenings as appropriate. After reviewing your medical record and screening and assessments performed today your provider may have ordered immunizations, labs, imaging, and/or referrals for you. A list of these orders (if applicable) as well as your Preventive Care list are included within your After Visit Summary for your review.     Other Preventive Recommendations:    A preventive eye exam performed by an eye specialist is recommended every 1-2 years to screen for glaucoma; cataracts, macular degeneration, and other eye disorders. A preventive dental visit is recommended every 6 months. Try to get at least 150 minutes of exercise per week or 10,000 steps per day on a pedometer . Order or download the FREE \"Exercise & Physical Activity: Your Everyday Guide\" from [employer]. Call 5-320.975.8782 or search [employer] online. You need 5090-5393 mg of calcium and 6318-3215 IU of vitamin D per day. It is possible to meet your calcium requirement with diet alone, but a vitamin D supplement is usually necessary to meet this goal.  When exposed to the sun, use a sunscreen that protects against both UVA and UVB radiation with an SPF of 30 or greater. Reapply every 2 to 3 hours or after sweating, drying off with a towel, or swimming. Always wear a seat belt when traveling in a car. Always wear a helmet when riding a bicycle or motorcycle.

## 2022-12-28 NOTE — PROGRESS NOTES
Medicare Annual Wellness Visit    Kathleen Mayo is here for Medicare AWV    Assessment & Plan   Initial Medicare annual wellness visit  Colon cancer screening  -     Zully De Santiago MD, Gastroenterology, [facility]  IFG (impaired fasting glucose)  -     Hemoglobin A1C; Future         accompanies her to the office today. He feels she sleeps too much. Pastor Ovalle admits she is up early to care for her brother but then goes back to bed and is not up until late morning. Discussed need to avoid going back to bed, may select a slow pace but should be active on a daily basis. Suggested  should provide reminders for her to wake her up and appropriate tasks to do. Discussed a few of her medications can cause drowsiness and should be evaluated for this. She is not wanting to change anything but both are aware of that precaution. Has labs ordered by cardiology. Will check HgA1c when those are done. Recommendations for Preventive Services Due: see orders and patient instructions/AVS.  Recommended screening schedule for the next 5-10 years is provided to the patient in written form: see Patient Instructions/AVS.     Return for Medicare Annual Wellness Visit in 1 year. Subjective             Patient's complete Health Risk Assessment and screening values have been reviewed and are found in Flowsheets. The following problems were reviewed today and where indicated follow up appointments were made and/or referrals ordered.     Positive Risk Factor Screenings with Interventions:    Fall Risk:  Do you feel unsteady or are you worried about falling? : no  2 or more falls in past year?: (!) yes  Fall with injury in past year?: no     Interventions:    Safety discussed     Depression:  PHQ-2 Score: 4  PHQ-9 Total Score: 11    Interpretation:   1-4 = minimal  5-9 = mild  10-14 = moderate  15-19 = moderately severe  20-27 = severe  Interventions:  Discussed need to maintain activity Hearing Screen:  Do you or your family notice any trouble with your hearing that hasn't been managed with hearing aids?: (!) Yes    Interventions:  Wearing hearing devices. Due for follow and has seen audiologist with Dr. Durga Walker in the past        Advanced Directives:  Do you have a Living Will?: (!) No    Intervention:  has NO advanced directive - information provided                       Objective   Vitals:    12/28/22 1504   BP: 138/70   Site: Left Upper Arm   Position: Sitting   Cuff Size: Large Adult   Pulse: 81   Resp: 18   SpO2: 95%   Weight: 176 lb 9.6 oz (80.1 kg)   Height: 5' 5\" (1.651 m)      Body mass index is 29.39 kg/m². Allergies   Allergen Reactions    Avelox [Moxifloxacin Hcl In Nacl] Hives    Cephalexin Hives and Rash    Darvon [Propoxyphene Hcl] Hives    Digoxin Hives    Librax [Chlordiazepoxide-Methscopol] Hives    Lyrica [Pregabalin] Swelling     Feet hands    Motrin [Ibuprofen Micronized] Rash    Other Hives     Most mycin's    Pcn [Penicillins] Rash    Prochlorperazine Edisylate Nausea And Vomiting    Sulfa Antibiotics Rash    Tetracyclines & Related Hives    Cymbalta [Duloxetine Hcl] Palpitations    Nickel Nausea And Vomiting     Hypotension      Savella [Milnacipran] Anxiety    Statins Other (See Comments)     myalgia     Prior to Visit Medications    Medication Sig Taking? Authorizing Provider   predniSONE (DELTASONE) 10 MG tablet 2 tablets 2 times daily for 3 days, 1 tablet 2 times daily for 3 days, 1 tablet daily. Yes LUCIA Villanueva CNP   buPROPion (WELLBUTRIN) 75 MG tablet TAKE ONE TABLET BY MOUTH TWICE A DAY Yes LUCIA Chaidez CNP   vitamin D (ERGOCALCIFEROL) 1.25 MG (59458 UT) CAPS capsule TAKE ONE CAPSULE BY MOUTH ONCE WEEKLY Yes LUCIA Chaidez CNP   citalopram (CELEXA) 40 MG tablet Take 1 tablet by mouth in the morning.  Yes LUCIA Villanueva CNP   rosuvastatin (CRESTOR) 20 MG tablet TAKE ONE TABLET BY MOUTH NIGHTLY Yes LUCIA Collins CNP   acetaminophen (TYLENOL) 500 MG tablet Take 500 mg by mouth every 6 hours as needed for Pain Yes Historical Provider, MD   methocarbamol (ROBAXIN) 500 MG tablet TAKE ONE TABLET BY MOUTH THREE TIMES A DAY Yes LUCIA Chaidez CNP   metoprolol succinate (TOPROL XL) 25 MG extended release tablet TAKE 1 TABLET BY MOUTH ONE TIME A DAY Yes LUCIA Chaidez CNP   esomeprazole (NEXIUM) 20 MG delayed release capsule Take 1 capsule by mouth every morning (before breakfast) Yes LUCIA Collins CNP   dicyclomine (BENTYL) 10 MG capsule Take 1 capsule by mouth 3 times daily Yes LUCIA Collins CNP   promethazine (PHENERGAN) 25 MG tablet Take 1 tablet by mouth every 6 hours as needed for Nausea Yes Bubba Gamez DO   aspirin 81 MG chewable tablet Take 1 tablet by mouth daily Yes Rebeka Bauer MD   Cholecalciferol (VITAMIN D3) 5000 UNITS TABS Take by mouth daily  Yes Historical Provider, MD   nitroGLYCERIN (NITROSTAT) 0.4 MG SL tablet up to max of 3 total doses. If no relief after 1 dose, call 911.   LUCIA Comer CNP   citalopram (CELEXA) 40 MG tablet Take 1 tablet by mouth daily  LUCIA Collins CNP   Calcium Carbonate-Vit D-Min (CALCIUM 600+D3 PLUS MINERALS) 600-800 MG-UNIT CHEW Take 1 tablet by mouth 2 times daily   Patient not taking: No sig reported  Historical Provider, MD Dye (Including outside providers/suppliers regularly involved in providing care):   Patient Care Team:  LUCIA Collins CNP as PCP - General (Family Nurse Practitioner)  LUCIA Collins CNP as PCP - [facility] Empaneled Provider  Mallory Nash MD as Consulting Physician (Cardiology)     Reviewed and updated this visit:  Tobacco  Allergies  Meds  Problems  Med Hx  Surg Hx  Soc Hx  Fam Hx

## 2023-02-01 ENCOUNTER — OFFICE VISIT (OUTPATIENT)
Dept: CARDIOLOGY CLINIC | Age: 68
End: 2023-02-01
Payer: MEDICARE

## 2023-02-01 VITALS
DIASTOLIC BLOOD PRESSURE: 80 MMHG | WEIGHT: 178.4 LBS | BODY MASS INDEX: 29.69 KG/M2 | HEART RATE: 84 BPM | SYSTOLIC BLOOD PRESSURE: 124 MMHG

## 2023-02-01 DIAGNOSIS — Z98.890 HISTORY OF PRIOR ABLATION TREATMENT: ICD-10-CM

## 2023-02-01 DIAGNOSIS — I50.22 CHRONIC SYSTOLIC (CONGESTIVE) HEART FAILURE (HCC): Primary | ICD-10-CM

## 2023-02-01 PROCEDURE — G8427 DOCREV CUR MEDS BY ELIG CLIN: HCPCS | Performed by: INTERNAL MEDICINE

## 2023-02-01 PROCEDURE — 1123F ACP DISCUSS/DSCN MKR DOCD: CPT | Performed by: INTERNAL MEDICINE

## 2023-02-01 PROCEDURE — 93000 ELECTROCARDIOGRAM COMPLETE: CPT | Performed by: INTERNAL MEDICINE

## 2023-02-01 PROCEDURE — G8417 CALC BMI ABV UP PARAM F/U: HCPCS | Performed by: INTERNAL MEDICINE

## 2023-02-01 PROCEDURE — G8399 PT W/DXA RESULTS DOCUMENT: HCPCS | Performed by: INTERNAL MEDICINE

## 2023-02-01 PROCEDURE — 1090F PRES/ABSN URINE INCON ASSESS: CPT | Performed by: INTERNAL MEDICINE

## 2023-02-01 PROCEDURE — 1036F TOBACCO NON-USER: CPT | Performed by: INTERNAL MEDICINE

## 2023-02-01 PROCEDURE — 3017F COLORECTAL CA SCREEN DOC REV: CPT | Performed by: INTERNAL MEDICINE

## 2023-02-01 PROCEDURE — 99214 OFFICE O/P EST MOD 30 MIN: CPT | Performed by: INTERNAL MEDICINE

## 2023-02-01 PROCEDURE — G8484 FLU IMMUNIZE NO ADMIN: HCPCS | Performed by: INTERNAL MEDICINE

## 2023-02-01 NOTE — PROGRESS NOTES
[address]  Office Visit           Kathy Colvin MD,  [facility]                      Cardiology             Chet Mancilla  1955 February 1, 2023    Primary Cardiologist:  Killian De Santiago       CC: HPI:  The patient is 79 y.o. female with follow-up for ischemic heart disease. She does have a history of coronary stent in June 2021 subsequent cath in September showed that all to be stable. She did have a cath last July by Dr. Андрей Dunn and coronaries were stable and did not require any intervention. History of SVT with ablation procedure in the past and hyperlipidemia and generally continuing to do fairly well from a cardiac perspective. 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 Coronavirus vaccine Pfizer x3  Did have coronavirus infection December 2021      She is now on Crestor at 20 mg/day. 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 reviewed [facility] results     Review of Systems:  Constitutional: Denies  fatigue, weakness, night sweats or fever. HEENT: Denies new visual changes, ringing in ears, nosebleeds,nasal congestion  Respiratory: Denies new or change in SOB, PND, orthopnea or cough. Cardiovascular: see HPI  GI: Denies N/V, diarrhea, constipation, abdominal pain, change in bowel habits, melena or hematochezia  : Denies urinary frequency, urgency, incontinence, hematuria or dysuria. Skin: Denies rash, hives, or cyanosis  Musculoskeletal: Denies joint or muscle aches/pain  Neurological: Denies syncope or TIA-like symptoms.   Psychiatric: Denies anxiety, insomnia or depression     Past Medical History:   Diagnosis Date    Allergic rhinitis     Anxiety     Bilateral carpal tunnel syndrome 2021    CAD (coronary artery disease) 2019    COVID-19 2020    Depression     Endometriosis     hx    Fibromyalgia     GERD (gastroesophageal reflux disease)     Headache(784.0)     Hiatal hernia     Hyperlipidemia     Irritable bowel syndrome     Left bundle branch block (LBBB)     Osteoarthritis     SVT (supraventricular tachycardia) (HCC)     hx     WPW (Steffanie-Parkinson-White syndrome)      Past Surgical History:   Procedure Laterality Date    ABLATION OF DYSRHYTHMIC FOCUS      SVT    APPENDECTOMY  1982    ARM SURGERY Left 2021    LEFT ULNAR NERVE DECOMPRESSION AT ELBOW performed by Ernestina Orozco MD at [address] [facility]  Right 2021    RIGHT ULNAR NERVE DECOMPRESSION AT ELBOW performed by Ernestina Orozco MD at [address]    bilateral, 2 revisions left foot    CARDIAC SURGERY  2019    stent    CARPAL TUNNEL RELEASE Left 2021    LEFT CARPAL TUNNEL RELEASE performed by Ernestina Orozco MD at [address] [facility] Right 2021    RIGHT CARPAL TUNNEL RELEASE performed by Ernestina Orozco MD at [address] [facility]  10/15/13    Hx polyps-3 yrs-? poor prep    ECTOPIC PREGNANCY SURGERY Right     HYSTERECTOMY, TOTAL ABDOMINAL (CERVIX REMOVED)  1988    LAPAROSCOPY      6--endometriosis    [address] [facility] ,[address]    OVARY SURGERY Right 1987    partial removal    SALPINGO-OOPHORECTOMY Left     UPPER GASTROINTESTINAL ENDOSCOPY  10/15/13    schatzki ring     Family History   Problem Relation Age of Onset    Other Mother         sepsis    Dementia Mother     High Cholesterol Mother     Depression Mother     Anxiety Disorder Mother     High Blood Pressure Father     Other Father          from renal failure and CHF    Heart Disease Father     Migraines Father     Heart Attack Brother     Heart Disease Brother     Hypertension Other     High Cholesterol Other     Migraines Other     Heart Disease Other     Diabetes Other      Social History     Tobacco Use    Smoking status: Never    Smokeless tobacco: Never   Vaping Use    Vaping Use: Never used   Substance Use Topics    Alcohol use: No    Drug use: No       Allergies   Allergen Reactions    Avelox [Moxifloxacin Hcl In Nacl] Hives    Cephalexin Hives and Rash    Darvon [Propoxyphene Hcl] Hives    Digoxin Hives    Librax [Chlordiazepoxide-Methscopol] Hives    Lyrica [Pregabalin] Swelling     Feet hands    Motrin [Ibuprofen Micronized] Rash    Other Hives     Most mycin's    Pcn [Penicillins] Rash    Prochlorperazine Edisylate Nausea And Vomiting    Sulfa Antibiotics Rash    Tetracyclines & Related Hives    Cymbalta [Duloxetine Hcl] Palpitations    Nickel Nausea And Vomiting     Hypotension      Savella [Milnacipran] Anxiety    Statins Other (See Comments)     myalgia     Current Outpatient Medications   Medication Sig Dispense Refill    predniSONE (DELTASONE) 10 MG tablet 2 tablets 2 times daily for 3 days, 1 tablet 2 times daily for 3 days, 1 tablet daily. 21 tablet 0    buPROPion (WELLBUTRIN) 75 MG tablet TAKE ONE TABLET BY MOUTH TWICE A  tablet 3    vitamin D (ERGOCALCIFEROL) 1.25 MG (03972 UT) CAPS capsule TAKE ONE CAPSULE BY MOUTH ONCE WEEKLY 12 capsule 3    citalopram (CELEXA) 40 MG tablet Take 1 tablet by mouth in the morning.  90 tablet 3    rosuvastatin (CRESTOR) 20 MG tablet TAKE ONE TABLET BY MOUTH NIGHTLY 90 tablet 3    acetaminophen (TYLENOL) 500 MG tablet Take 500 mg by mouth every 6 hours as needed for Pain      methocarbamol (ROBAXIN) 500 MG tablet TAKE ONE TABLET BY MOUTH THREE TIMES A DAY 90 tablet 2    metoprolol succinate (TOPROL XL) 25 MG extended release tablet TAKE 1 TABLET BY MOUTH ONE TIME A DAY 90 tablet 3    esomeprazole (NEXIUM) 20 MG delayed release capsule Take 1 capsule by mouth every morning (before breakfast) 90 capsule 3 dicyclomine (BENTYL) 10 MG capsule Take 1 capsule by mouth 3 times daily 90 capsule 1    Calcium Carbonate-Vit D-Min (CALCIUM 600+D3 PLUS MINERALS) 600-800 MG-UNIT CHEW Take 1 tablet by mouth 2 times daily      promethazine (PHENERGAN) 25 MG tablet Take 1 tablet by mouth every 6 hours as needed for Nausea 30 tablet 1    aspirin 81 MG chewable tablet Take 1 tablet by mouth daily 30 tablet 3    Cholecalciferol (VITAMIN D3) 5000 UNITS TABS Take by mouth daily        No current facility-administered medications for this visit. Facility-Administered Medications Ordered in Other Visits   Medication Dose Route Frequency Provider Last Rate Last Admin    lactated ringers infusion   IntraVENous Continuous Josiane Nolan MD   New Bag at 07/06/21 5829    sodium chloride flush 0.9 % injection 10 mL  10 mL IntraVENous 2 times per day Josiane Nolan MD        sodium chloride flush 0.9 % injection 10 mL  10 mL IntraVENous PRN Josiane Nolan MD        0.9 % sodium chloride infusion  25 mL IntraVENous PRN Josiane Nolan MD           Physical Exam:   /80   Pulse 84   Wt 178 lb 6.4 oz (80.9 kg)   LMP  (LMP Unknown)   BMI 29.69 kg/m²   BP Readings from Last 3 Encounters:   02/01/23 124/80   12/28/22 138/70   11/07/22 100/62     Pulse Readings from Last 3 Encounters:   02/01/23 84   12/28/22 81   11/07/22 70     Wt Readings from Last 3 Encounters:   02/01/23 178 lb 6.4 oz (80.9 kg)   12/28/22 176 lb 9.6 oz (80.1 kg)   11/07/22 172 lb 3.2 oz (78.1 kg)     Constitutional: She is oriented to person, place, and time. She appears well-developed and well-nourished. In no acute distress. HEENT: Normocephalic and atraumatic. Sclerae anicteric. No xanthelasmas. Conjunctiva white, no subconjunctival hemorrhage   External inspection of ears nose teeth & gums   Eyes:PERRLA EOM's intact. Neck: Neck supple. No JVD present. Carotids without bruits. No mass and no thyromegaly present.  No lymphadenopathy present. Cardiovascular: RRR, normal S1 and S2; no murmur/gallop or rub, PMI nondisplaced  Pulmonary/Chest: Effort normal.  Lungs clear to auscultation. Chest wall nontender  Abdominal: soft, nontender, nondistended. + bowel sounds; no organomegaly or bruits. Aorta normal  Extremities: No edema, cyanosis, or clubbing. Pulses are 2+ radial/carotid/dorsalis pedis bilaterally. Cap refill brisk. Neurological: No cranial nerve deficit. Psychiatric: She has a normal mood and affect. Her speech is normal and behavior is normal.     Lab Review:   Lab Results   Component Value Date/Time    TRIG 127 03/29/2022 11:35 AM    HDL 83 03/29/2022 11:35 AM    LDLCALC 56 03/29/2022 11:35 AM    LABVLDL 25 03/29/2022 11:35 AM     Lab Results   Component Value Date/Time     07/23/2022 05:45 AM    K 3.8 07/23/2022 05:45 AM    K 4.2 05/03/2021 12:29 PM     07/23/2022 05:45 AM    CO2 23 07/23/2022 05:45 AM    BUN 8 07/23/2022 05:45 AM    CREATININE 0.7 07/23/2022 05:45 AM    GLUCOSE 101 07/23/2022 05:45 AM    CALCIUM 8.8 07/23/2022 05:45 AM      Lab Results   Component Value Date    WBC 6.5 07/23/2022    HGB 11.1 (L) 07/23/2022    HCT 33.2 (L) 07/23/2022    MCV 93.7 07/23/2022     07/23/2022   Summary 3/10/21   Definity is used for myocardial border enhancement. The left ventricle is normal in size with normal wall thickness. Left ventricular systolic function is moderately reduced with a visually   estimated ejection fraction of 40%. The basal to mid inferoseptal segments, basal to mid anteroseptal segments   and entire inferior wall appear hypokinetic. Grade I diastolic dysfunction with normal LV pressure. Normal right ventricular size and function. Mild mitral regurgitation. Inadequate tricuspid valve regurgitation to estimate systolic pulmonary   artery pressure. Compared to the prior resting study performed 5/30/2019, EF is slightly   improved with RWMA's noted above.        Results: Cardiac cath October 8, 2019  Left ventricular pressure 10 mmHg  Aortic pressure 126 mmHg     Coronary anatomy:   The left main coronary artery is normal.      Left anterior descending artery has previously placed proximal stent that is widely patent. Just distal to the stent is a mild narrowing of 20%. Circumflex artery mild mid vessel plaque. It is a dominant vessel. The right coronary artery is a nondominant but is normal.     Left ventriculogram shows ejection fraction of 50 %. Normal wall motion. EKG on 8/21/2020 sinus rhythm 67/min.  IVCD. There is the appearance of preexcitation although I do not see alka WPW. EKG on 2/26/2021 sinus rhythm 80/min. Short KS interval and wide complexes consistent with preexcitation EKG. 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 On 5/3/2021 sinus rhythm at 72/min. Accelerated AV conduction consistent with with Steffanie-Parkinson-White. IVCD late transition. Impression:  No acute coronary lesions. The previously placed left coronary stent is widely patent. Assessment:    recent LHC / stable left arm LHC no complications   October 3709  CAD, previous LAD stent  6/2019  / on GDMT /stable     Plan: We will get fasting lipid CMP hs-CRP CBC. Return to see me in 6 months. Tinea other medication as listed. Jd Cardenas MD, [address] [facility]  Office Visit           Jd Cardenas MD,  [facility] [place]                      Cardiology             James Zamarripa  1955 February 1, 2023    Primary Cardiologist:  Adwoa Noel       CC: HPI:  The patient is 79 y.o. female with she is here she is still having issues with the Covid vaccine virus that she had back in December. Slowly improving. She does have fibromyalgia and has chronic aches and pains. Did have the [employer] vaccine and no issues. Is on medical leave at this time and is anticipating shelter on July 1, 2021. Overall she looks fairly stable and hemodynamically stable.   Still taking prednisone at 10 mg/day and is hoping to reduce it by 5 mg. Last LDL was 82. Still having occasional short runs of SVT likely. That has been significantly improved since her ablation procedure many years ago. She is now on Crestor at 20 mg/day. 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 reviewed [facility] results     Review of Systems:  Constitutional: Denies  fatigue, weakness, night sweats or fever. HEENT: Denies new visual changes, ringing in ears, nosebleeds,nasal congestion  Respiratory: Denies new or change in SOB, PND, orthopnea or cough. Cardiovascular: see HPI  GI: Denies N/V, diarrhea, constipation, abdominal pain, change in bowel habits, melena or hematochezia  : Denies urinary frequency, urgency, incontinence, hematuria or dysuria. Skin: Denies rash, hives, or cyanosis  Musculoskeletal: Denies joint or muscle aches/pain  Neurological: Denies syncope or TIA-like symptoms.   Psychiatric: Denies anxiety, insomnia or depression     Past Medical History:   Diagnosis Date    Allergic rhinitis     Anxiety     Bilateral carpal tunnel syndrome 4/13/2021    CAD (coronary artery disease) 06/05/2019    COVID-19 12/28/2020    Depression     Endometriosis     hx    Fibromyalgia     GERD (gastroesophageal reflux disease)     Headache(784.0)     Hiatal hernia     Hyperlipidemia     Irritable bowel syndrome     Left bundle branch block (LBBB)     Osteoarthritis     SVT (supraventricular tachycardia) (HCC)     hx     WPW (Steffanie-Parkinson-White syndrome)      Past Surgical History:   Procedure Laterality Date    ABLATION OF DYSRHYTHMIC FOCUS  1999    SVT    APPENDECTOMY  1982 ARM SURGERY Left 2021    LEFT ULNAR NERVE DECOMPRESSION AT ELBOW performed by Jenna Cuellar MD at [address] [facility] [address] [facility] [address] Right 2021    RIGHT ULNAR NERVE DECOMPRESSION AT ELBOW performed by Jenna Cuellar MD at [address]    bilateral, 2 revisions left foot    CARDIAC SURGERY  2019    stent    CARPAL TUNNEL RELEASE Left 2021    LEFT CARPAL TUNNEL RELEASE performed by Jenna Cuellar MD at [address] [facility] [address] Right 2021    RIGHT CARPAL TUNNEL RELEASE performed by Jenna Cuellar MD at [address] [facility]  10/15/13    Hx polyps-3 yrs-? poor prep    ECTOPIC PREGNANCY SURGERY Right     HYSTERECTOMY, TOTAL ABDOMINAL (CERVIX REMOVED)  1988    LAPAROSCOPY      6--endometriosis    [address] [facility] [address]    OVARY SURGERY Right     partial removal    SALPINGO-OOPHORECTOMY Left     UPPER GASTROINTESTINAL ENDOSCOPY  10/15/13    volodymyr balderrama     Family History   Problem Relation Age of Onset    Other Mother         sepsis    Dementia Mother     High Cholesterol Mother     Depression Mother     Anxiety Disorder Mother     High Blood Pressure Father     Other Father          from renal failure and CHF    Heart Disease Father     Migraines Father     Heart Attack Brother     Heart Disease Brother     Hypertension Other     High Cholesterol Other     Migraines Other     Heart Disease Other     Diabetes Other      Social History     Tobacco Use    Smoking status: Never    Smokeless tobacco: Never   Vaping Use    Vaping Use: Never used   Substance Use Topics    Alcohol use: No    Drug use: No       Allergies   Allergen Reactions    Avelox [Moxifloxacin Hcl In Nacl] Hives    Cephalexin Hives and Rash    Darvon [Propoxyphene Hcl] Hives    Digoxin Hives    Librax [Chlordiazepoxide-Methscopol] Hives    Lyrica [Pregabalin] Swelling     Feet hands    Motrin [Ibuprofen Micronized] Rash    Other Hives     Most mycin's Pcn [Penicillins] Rash    Prochlorperazine Edisylate Nausea And Vomiting    Sulfa Antibiotics Rash    Tetracyclines & Related Hives    Cymbalta [Duloxetine Hcl] Palpitations    Nickel Nausea And Vomiting     Hypotension      Savella [Milnacipran] Anxiety    Statins Other (See Comments)     myalgia     Current Outpatient Medications   Medication Sig Dispense Refill    predniSONE (DELTASONE) 10 MG tablet 2 tablets 2 times daily for 3 days, 1 tablet 2 times daily for 3 days, 1 tablet daily. 21 tablet 0    buPROPion (WELLBUTRIN) 75 MG tablet TAKE ONE TABLET BY MOUTH TWICE A  tablet 3    vitamin D (ERGOCALCIFEROL) 1.25 MG (29359 UT) CAPS capsule TAKE ONE CAPSULE BY MOUTH ONCE WEEKLY 12 capsule 3    citalopram (CELEXA) 40 MG tablet Take 1 tablet by mouth in the morning. 90 tablet 3    rosuvastatin (CRESTOR) 20 MG tablet TAKE ONE TABLET BY MOUTH NIGHTLY 90 tablet 3    acetaminophen (TYLENOL) 500 MG tablet Take 500 mg by mouth every 6 hours as needed for Pain      methocarbamol (ROBAXIN) 500 MG tablet TAKE ONE TABLET BY MOUTH THREE TIMES A DAY 90 tablet 2    metoprolol succinate (TOPROL XL) 25 MG extended release tablet TAKE 1 TABLET BY MOUTH ONE TIME A DAY 90 tablet 3    esomeprazole (NEXIUM) 20 MG delayed release capsule Take 1 capsule by mouth every morning (before breakfast) 90 capsule 3    dicyclomine (BENTYL) 10 MG capsule Take 1 capsule by mouth 3 times daily 90 capsule 1    Calcium Carbonate-Vit D-Min (CALCIUM 600+D3 PLUS MINERALS) 600-800 MG-UNIT CHEW Take 1 tablet by mouth 2 times daily      promethazine (PHENERGAN) 25 MG tablet Take 1 tablet by mouth every 6 hours as needed for Nausea 30 tablet 1    aspirin 81 MG chewable tablet Take 1 tablet by mouth daily 30 tablet 3    Cholecalciferol (VITAMIN D3) 5000 UNITS TABS Take by mouth daily        No current facility-administered medications for this visit.      Facility-Administered Medications Ordered in Other Visits   Medication Dose Route Frequency Provider Last Rate Last Admin    lactated ringers infusion   IntraVENous Continuous Ailin Bernal MD   New Bag at 07/06/21 3169    sodium chloride flush 0.9 % injection 10 mL  10 mL IntraVENous 2 times per day Ailin Bernal MD        sodium chloride flush 0.9 % injection 10 mL  10 mL IntraVENous PRN Ailin Bernal MD        0.9 % sodium chloride infusion  25 mL IntraVENous PRN Ailin Bernal MD           Physical Exam:   /80   Pulse 84   Wt 178 lb 6.4 oz (80.9 kg)   LMP  (LMP Unknown)   BMI 29.69 kg/m²   BP Readings from Last 3 Encounters:   02/01/23 124/80   12/28/22 138/70   11/07/22 100/62     Pulse Readings from Last 3 Encounters:   02/01/23 84   12/28/22 81   11/07/22 70     Wt Readings from Last 3 Encounters:   02/01/23 178 lb 6.4 oz (80.9 kg)   12/28/22 176 lb 9.6 oz (80.1 kg)   11/07/22 172 lb 3.2 oz (78.1 kg)     Constitutional: She is oriented to person, place, and time. She appears well-developed and well-nourished. In no acute distress. HEENT: Normocephalic and atraumatic. Sclerae anicteric. No xanthelasmas. Conjunctiva white, no subconjunctival hemorrhage   External inspection of ears nose teeth & gums   Eyes:PERRLA EOM's intact. Neck: Neck supple. No JVD present. Carotids without bruits. No mass and no thyromegaly present. No lymphadenopathy present. Cardiovascular: RRR, normal S1 and S2; no murmur/gallop or rub, PMI nondisplaced  Pulmonary/Chest: Effort normal.  Lungs clear to auscultation. Chest wall nontender  Abdominal: soft, nontender, nondistended. + bowel sounds; no organomegaly or bruits. Aorta normal  Extremities: No edema, cyanosis, or clubbing. Pulses are 2+ radial/carotid/dorsalis pedis bilaterally. Cap refill brisk. Neurological: No cranial nerve deficit. Psychiatric: She has a normal mood and affect.  Her speech is normal and behavior is normal.     Lab Review:   Lab Results   Component Value Date/Time    TRIG 127 03/29/2022 11:35 AM    HDL 83 03/29/2022 11:35 AM LDLCALC 56 03/29/2022 11:35 AM    LABVLDL 25 03/29/2022 11:35 AM     Lab Results   Component Value Date/Time     07/23/2022 05:45 AM    K 3.8 07/23/2022 05:45 AM    K 4.2 05/03/2021 12:29 PM     07/23/2022 05:45 AM    CO2 23 07/23/2022 05:45 AM    BUN 8 07/23/2022 05:45 AM    CREATININE 0.7 07/23/2022 05:45 AM    GLUCOSE 101 07/23/2022 05:45 AM    CALCIUM 8.8 07/23/2022 05:45 AM      Lab Results   Component Value Date    WBC 6.5 07/23/2022    HGB 11.1 (L) 07/23/2022    HCT 33.2 (L) 07/23/2022    MCV 93.7 07/23/2022     07/23/2022   Summary 3/10/21   Definity is used for myocardial border enhancement. The left ventricle is normal in size with normal wall thickness. Left ventricular systolic function is moderately reduced with a visually   estimated ejection fraction of 40%. The basal to mid inferoseptal segments, basal to mid anteroseptal segments   and entire inferior wall appear hypokinetic. Grade I diastolic dysfunction with normal LV pressure. Normal right ventricular size and function. Mild mitral regurgitation. Inadequate tricuspid valve regurgitation to estimate systolic pulmonary   artery pressure. Compared to the prior resting study performed 5/30/2019, EF is slightly   improved with RWMA's noted above. Results: Cardiac cath October 8, 2019  Left ventricular pressure 10 mmHg  Aortic pressure 126 mmHg     Coronary anatomy:   The left main coronary artery is normal.      Left anterior descending artery has previously placed proximal stent that is widely patent. Just distal to the stent is a mild narrowing of 20%. Circumflex artery mild mid vessel plaque. It is a dominant vessel. The right coronary artery is a nondominant but is normal.     Left ventriculogram shows ejection fraction of 50 %. Normal wall motion. EKG on 8/21/2020 sinus rhythm 67/min.  IVCD. There is the appearance of preexcitation although I do not see alka WPW.   EKG on 2/26/2021 sinus rhythm 80/min. Short UT interval and wide complexes consistent with preexcitation EKG. 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 On 5/3/2021 sinus rhythm at 72/min. Accelerated AV conduction consistent with with Steffanie-Parkinson-White. IVCD late transition. On 2/1/2023 sinus rhythm at 81/min. Accelerated AV conduction. I really do not appreciate WPW excitation on this study. Possible anteroseptal infarct. Impression:  No acute coronary lesions. The previously placed left coronary stent is widely patent. Assessment:  Cath 7.22/22 ok b  recent LHC / stable left arm LHC no complications   October 4092  CAD, previous LAD stent  6/2019  / on GDMT /stable     Plan:  I continue her current therapy. Her last LDL was 56 on current therapy. Return to see me in 6 months.   Steve Mosqueda MD, [facility]

## 2023-02-08 ENCOUNTER — OFFICE VISIT (OUTPATIENT)
Dept: FAMILY MEDICINE CLINIC | Age: 68
End: 2023-02-08
Payer: MEDICARE

## 2023-02-08 VITALS
OXYGEN SATURATION: 98 % | SYSTOLIC BLOOD PRESSURE: 130 MMHG | BODY MASS INDEX: 29.69 KG/M2 | WEIGHT: 178.4 LBS | DIASTOLIC BLOOD PRESSURE: 80 MMHG | RESPIRATION RATE: 16 BRPM | HEART RATE: 89 BPM

## 2023-02-08 DIAGNOSIS — I25.118 CORONARY ARTERY DISEASE OF NATIVE ARTERY OF NATIVE HEART WITH STABLE ANGINA PECTORIS (HCC): ICD-10-CM

## 2023-02-08 DIAGNOSIS — F33.0 MILD EPISODE OF RECURRENT MAJOR DEPRESSIVE DISORDER (HCC): ICD-10-CM

## 2023-02-08 DIAGNOSIS — K21.9 GASTROESOPHAGEAL REFLUX DISEASE, UNSPECIFIED WHETHER ESOPHAGITIS PRESENT: ICD-10-CM

## 2023-02-08 DIAGNOSIS — G43.709 CHRONIC MIGRAINE WITHOUT AURA WITHOUT STATUS MIGRAINOSUS, NOT INTRACTABLE: ICD-10-CM

## 2023-02-08 DIAGNOSIS — M15.9 PRIMARY OSTEOARTHRITIS INVOLVING MULTIPLE JOINTS: ICD-10-CM

## 2023-02-08 DIAGNOSIS — M79.7 FIBROMYALGIA: ICD-10-CM

## 2023-02-08 PROCEDURE — 99214 OFFICE O/P EST MOD 30 MIN: CPT | Performed by: NURSE PRACTITIONER

## 2023-02-08 PROCEDURE — 1123F ACP DISCUSS/DSCN MKR DOCD: CPT | Performed by: NURSE PRACTITIONER

## 2023-02-08 RX ORDER — HYDROCODONE BITARTRATE AND ACETAMINOPHEN 5; 325 MG/1; MG/1
1 TABLET ORAL DAILY PRN
Qty: 10 TABLET | Refills: 0 | Status: SHIPPED | OUTPATIENT
Start: 2023-02-08 | End: 2023-02-18

## 2023-02-08 RX ORDER — BUPROPION HYDROCHLORIDE 75 MG/1
TABLET ORAL
Qty: 90 TABLET | Refills: 5 | Status: SHIPPED | OUTPATIENT
Start: 2023-02-08

## 2023-02-08 RX ORDER — TRAMADOL HYDROCHLORIDE 50 MG/1
50 TABLET ORAL EVERY 6 HOURS PRN
Qty: 45 TABLET | Refills: 0 | Status: SHIPPED | OUTPATIENT
Start: 2023-02-08 | End: 2023-03-10

## 2023-02-08 ASSESSMENT — PATIENT HEALTH QUESTIONNAIRE - PHQ9
1. LITTLE INTEREST OR PLEASURE IN DOING THINGS: 1
5. POOR APPETITE OR OVEREATING: 0
4. FEELING TIRED OR HAVING LITTLE ENERGY: 2
7. TROUBLE CONCENTRATING ON THINGS, SUCH AS READING THE NEWSPAPER OR WATCHING TELEVISION: 0
3. TROUBLE FALLING OR STAYING ASLEEP: 1
6. FEELING BAD ABOUT YOURSELF - OR THAT YOU ARE A FAILURE OR HAVE LET YOURSELF OR YOUR FAMILY DOWN: 0
2. FEELING DOWN, DEPRESSED OR HOPELESS: 1
10. IF YOU CHECKED OFF ANY PROBLEMS, HOW DIFFICULT HAVE THESE PROBLEMS MADE IT FOR YOU TO DO YOUR WORK, TAKE CARE OF THINGS AT HOME, OR GET ALONG WITH OTHER PEOPLE: 1
SUM OF ALL RESPONSES TO PHQ QUESTIONS 1-9: 5
8. MOVING OR SPEAKING SO SLOWLY THAT OTHER PEOPLE COULD HAVE NOTICED. OR THE OPPOSITE, BEING SO FIGETY OR RESTLESS THAT YOU HAVE BEEN MOVING AROUND A LOT MORE THAN USUAL: 0
SUM OF ALL RESPONSES TO PHQ QUESTIONS 1-9: 5
9. THOUGHTS THAT YOU WOULD BE BETTER OFF DEAD, OR OF HURTING YOURSELF: 0
SUM OF ALL RESPONSES TO PHQ9 QUESTIONS 1 & 2: 2
SUM OF ALL RESPONSES TO PHQ QUESTIONS 1-9: 5
SUM OF ALL RESPONSES TO PHQ QUESTIONS 1-9: 5

## 2023-02-08 NOTE — PROGRESS NOTES
Brady Lerma (:  1955) is a 79 y.o. female,Established patient, here for evaluation of the following chief complaint(s):  Chronic Pain (Fibromyalgia)         ASSESSMENT/PLAN:  1. Mild episode of recurrent major depressive disorder (HCC)  -     buPROPion (WELLBUTRIN) 75 MG tablet; 2 tablet in the morning and 1 tablet about 8 hours later. , Disp-90 tablet, R-5Normal  2. Coronary artery disease of native artery of native heart with stable angina pectoris ([facility]  3. Gastroesophageal reflux disease, unspecified whether esophagitis present  4. Fibromyalgia  -     traMADol (ULTRAM) 50 MG tablet; Take 1 tablet by mouth every 6 hours as needed for Pain (fibromyalgia) for up to 30 days. , Disp-45 tablet, R-0Normal  -     HYDROcodone-acetaminophen (NORCO) 5-325 MG per tablet; Take 1 tablet by mouth daily as needed for Pain for up to 10 days. May take instead of tramadol when pain is increased, Disp-10 tablet, R-0Normal  5. Primary osteoarthritis involving multiple joints  -     traMADol (ULTRAM) 50 MG tablet; Take 1 tablet by mouth every 6 hours as needed for Pain (fibromyalgia) for up to 30 days. , Disp-45 tablet, R-0Normal  6. Chronic migraine without aura without status migrainosus, not intractable  -     HYDROcodone-acetaminophen (NORCO) 5-325 MG per tablet; Take 1 tablet by mouth daily as needed for Pain for up to 10 days. May take instead of tramadol when pain is increased, Disp-10 tablet, R-0Normal      Controlled Substance Monitoring:    Acute and Chronic Pain Monitoring:   RX Monitoring 2022   Attestation -   Periodic Controlled Substance Monitoring No signs of potential drug abuse or diversion identified. Chronic Pain > 120 MEDD Obtained or confirmed \"Medication Contract\" on file. Will increase wellbutrin to 2 tablet in the morning and 1 tablet in the afternoon. Discussed possible change from celexa to lexapro. Will consider after trial of increased wellbutrin.          Return in about 3 months (around 5/8/2023) for depression. Subjective   SUBJECTIVE/OBJECTIVE:  HPI      For routine follow up for chronic pain. Taking norco as needed for fibromyalgia. Taking tramadol as needed for headaches. Has had depression in the past. Currently felt to be stable. Now not sleeping as much as in the past since back to working. Taking celexa 40 mg and welbutrin 75 mg 2 times daily. 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 Review of Systems   All other systems reviewed and are negative. Objective   Physical Exam  Constitutional:       Appearance: Normal appearance. HENT:      Right Ear: Tympanic membrane and ear canal normal.      Left Ear: Tympanic membrane and ear canal normal.   Cardiovascular:      Rate and Rhythm: Normal rate. Pulmonary:      Effort: Pulmonary effort is normal.   Musculoskeletal:         General: Normal range of motion. Neurological:      Mental Status: She is alert and oriented to person, place, and time.    Psychiatric:         Behavior: Behavior normal.                An electronic signature was used to authenticate this note.    --LUCIA MITCHELL - CNP

## 2023-02-10 DIAGNOSIS — M79.7 FIBROMYALGIA: ICD-10-CM

## 2023-02-10 RX ORDER — PREDNISONE 10 MG/1
TABLET ORAL
Qty: 21 TABLET | Refills: 0 | Status: SHIPPED | OUTPATIENT
Start: 2023-02-10

## 2023-02-10 NOTE — TELEPHONE ENCOUNTER
Refill Request     CONFIRM preferrred pharmacy with the patient. If Mail Order Rx - Pend for 90 day refill. Last Seen: Last Seen Department: 2023  Last Seen by PCP: 2023    Last Written: 21 with 0 2022     If no future appointment scheduled, route STAFF MESSAGE with patient name to the [facility] for scheduling. Next Appointment:   Future Appointments   Date Time Provider Ronny Orozco   2023  3:15 PM LUCIA Carranza - CNP [facility]  Sindhu - CINDY   2023 11:30 AM Lynn Granados MD [facility]       Message sent to [address] [place] [address] to schedule appt with patient? NO      Requested Prescriptions     Pending Prescriptions Disp Refills    predniSONE (DELTASONE) 10 MG tablet 21 tablet 0     Si tablets 2 times daily for 3 days, 1 tablet 2 times daily for 3 days, 1 tablet daily.

## 2023-03-28 DIAGNOSIS — R11.0 NAUSEA: ICD-10-CM

## 2023-03-28 DIAGNOSIS — M79.7 FIBROMYALGIA: ICD-10-CM

## 2023-03-29 RX ORDER — PREDNISONE 10 MG/1
TABLET ORAL
Qty: 21 TABLET | Refills: 0 | Status: SHIPPED | OUTPATIENT
Start: 2023-03-29 | End: 2023-05-08

## 2023-03-29 RX ORDER — PROMETHAZINE HYDROCHLORIDE 25 MG/1
25 TABLET ORAL EVERY 6 HOURS PRN
Qty: 30 TABLET | Refills: 0 | Status: SHIPPED | OUTPATIENT
Start: 2023-03-29

## 2023-04-06 DIAGNOSIS — K57.92 DIVERTICULITIS: Primary | ICD-10-CM

## 2023-04-06 RX ORDER — METRONIDAZOLE 250 MG/1
250 TABLET ORAL 3 TIMES DAILY
Qty: 21 TABLET | Refills: 0 | Status: SHIPPED | OUTPATIENT
Start: 2023-04-06 | End: 2023-04-13

## 2023-04-06 RX ORDER — CIPROFLOXACIN 500 MG/1
500 TABLET, FILM COATED ORAL 2 TIMES DAILY
Qty: 14 TABLET | Refills: 0 | Status: SHIPPED | OUTPATIENT
Start: 2023-04-06 | End: 2023-04-13

## 2023-04-26 DIAGNOSIS — G43.709 CHRONIC MIGRAINE WITHOUT AURA WITHOUT STATUS MIGRAINOSUS, NOT INTRACTABLE: ICD-10-CM

## 2023-04-26 DIAGNOSIS — M79.7 FIBROMYALGIA: ICD-10-CM

## 2023-04-26 DIAGNOSIS — M15.9 PRIMARY OSTEOARTHRITIS INVOLVING MULTIPLE JOINTS: ICD-10-CM

## 2023-04-26 NOTE — TELEPHONE ENCOUNTER
Refill Request - Controlled Substance    CONFIRM preferred pharmacy with the patient. If Mail Order Rx - Pend for 90 day refill. Last Seen Department: 2/8/2023  Last Seen by PCP: 2/8/2023    Last Written:   Tramadol-02/08/2023 45 tablet 0 refills   Norco-03/29/2023 10 tablet 0 refills     Last UDS: 11/08/2018    Med Agreement Signed On: 08/07/2020    If no future appointment scheduled:  Review the last OV with PCP and review information for follow-up visit,  Route STAFF MESSAGE with patient name to the [facility] for scheduling with the following information:            -  Timing of next visit           -  Visit type ie Physical, OV, etc           -  Diagnoses/Reason ie. COPD, HTN - Do not use MEDICATION, Follow-up or CHECK UP - Give reason for visit        Next Appointment:   Future Appointments   Date Time Provider Ronny Orozco   5/8/2023  3:15 PM LUCIA Berger - CNP [facility]  Karissaci - DYD   8/2/2023 11:30 AM Jayda Tovar MD [facility]       Message sent to [address] [place] [address] to schedule appt with patient? NO      Requested Prescriptions     Pending Prescriptions Disp Refills    HYDROcodone-acetaminophen (NORCO) 5-325 MG per tablet 10 tablet 0     Sig: Take 1 tablet by mouth daily as needed for Pain for up to 10 days. May take instead of tramadol when pain is increased    traMADol (ULTRAM) 50 MG tablet 45 tablet 0     Sig: Take 1 tablet by mouth every 6 hours as needed for Pain (fibromyalgia) for up to 30 days.

## 2023-04-27 RX ORDER — HYDROCODONE BITARTRATE AND ACETAMINOPHEN 5; 325 MG/1; MG/1
1 TABLET ORAL DAILY PRN
Qty: 10 TABLET | Refills: 0 | Status: SHIPPED | OUTPATIENT
Start: 2023-04-27 | End: 2023-05-07

## 2023-04-27 RX ORDER — TRAMADOL HYDROCHLORIDE 50 MG/1
50 TABLET ORAL EVERY 6 HOURS PRN
Qty: 45 TABLET | Refills: 0 | Status: SHIPPED | OUTPATIENT
Start: 2023-04-27 | End: 2023-05-27

## 2023-04-28 DIAGNOSIS — M79.7 FIBROMYALGIA: ICD-10-CM

## 2023-04-28 DIAGNOSIS — G43.709 CHRONIC MIGRAINE WITHOUT AURA WITHOUT STATUS MIGRAINOSUS, NOT INTRACTABLE: ICD-10-CM

## 2023-04-28 DIAGNOSIS — M15.9 PRIMARY OSTEOARTHRITIS INVOLVING MULTIPLE JOINTS: ICD-10-CM

## 2023-04-28 RX ORDER — HYDROCODONE BITARTRATE AND ACETAMINOPHEN 5; 325 MG/1; MG/1
1 TABLET ORAL DAILY PRN
Qty: 10 TABLET | Refills: 0 | Status: CANCELLED | OUTPATIENT
Start: 2023-04-28 | End: 2023-05-08

## 2023-04-28 RX ORDER — TRAMADOL HYDROCHLORIDE 50 MG/1
50 TABLET ORAL EVERY 6 HOURS PRN
Qty: 45 TABLET | Refills: 0 | Status: CANCELLED | OUTPATIENT
Start: 2023-04-28 | End: 2023-05-28

## 2023-04-30 RX ORDER — METHOCARBAMOL 500 MG/1
TABLET, FILM COATED ORAL
Qty: 90 TABLET | Refills: 0 | Status: SHIPPED | OUTPATIENT
Start: 2023-04-30

## 2023-05-08 ENCOUNTER — TELEMEDICINE (OUTPATIENT)
Dept: FAMILY MEDICINE CLINIC | Age: 68
End: 2023-05-08
Payer: MEDICARE

## 2023-05-08 DIAGNOSIS — K21.9 GASTROESOPHAGEAL REFLUX DISEASE, UNSPECIFIED WHETHER ESOPHAGITIS PRESENT: ICD-10-CM

## 2023-05-08 DIAGNOSIS — J40 BRONCHITIS: Primary | ICD-10-CM

## 2023-05-08 DIAGNOSIS — M79.7 FIBROMYALGIA: ICD-10-CM

## 2023-05-08 DIAGNOSIS — R05.1 ACUTE COUGH: ICD-10-CM

## 2023-05-08 DIAGNOSIS — F33.0 MILD EPISODE OF RECURRENT MAJOR DEPRESSIVE DISORDER (HCC): ICD-10-CM

## 2023-05-08 PROBLEM — R07.9 CHEST PAIN: Status: RESOLVED | Noted: 2022-07-21 | Resolved: 2023-05-08

## 2023-05-08 PROBLEM — R55 SYNCOPE AND COLLAPSE: Status: RESOLVED | Noted: 2022-07-22 | Resolved: 2023-05-08

## 2023-05-08 PROCEDURE — 99214 OFFICE O/P EST MOD 30 MIN: CPT | Performed by: NURSE PRACTITIONER

## 2023-05-08 PROCEDURE — 1123F ACP DISCUSS/DSCN MKR DOCD: CPT | Performed by: NURSE PRACTITIONER

## 2023-05-08 RX ORDER — AZITHROMYCIN 250 MG/1
TABLET, FILM COATED ORAL
Qty: 6 TABLET | Refills: 0 | Status: SHIPPED | OUTPATIENT
Start: 2023-05-08 | End: 2023-05-18

## 2023-05-08 NOTE — PROGRESS NOTES
Mita Hernandez (:  1955) is a Established patient, presenting virtually for evaluation of the following:    Assessment & Plan   Below is the assessment and plan developed based on review of pertinent history, physical exam, labs, studies, and medications. 1. Bronchitis  -     azithromycin (ZITHROMAX) 250 MG tablet; 2 tablets today followed by 1 tablet daily for 4 days, Disp-6 tablet, R-0Normal  2. Acute cough  3. Fibromyalgia  4. Mild episode of recurrent major depressive disorder ([facility]  5. Gastroesophageal reflux disease, unspecified whether esophagitis present  -     esomeprazole (NEXIUM) 20 MG delayed release capsule; Take 1 capsule by mouth every morning (before breakfast), Disp-90 capsule, R-3Normal      Continue mucinex    Increase fluids    Has prednisone at home to take if needed    No follow-ups on file. Controlled Substance Monitoring:    Acute and Chronic Pain Monitoring:   RX Monitoring 2023   Attestation -   Periodic Controlled Substance Monitoring No signs of potential drug abuse or diversion identified. Chronic Pain > 120 MEDD Obtained or confirmed \"Medication Contract\" on file. Subjective   HPI    For follow up depression. Feels she is better. No longer working. Was asked to do billing from home but declined. Has a house they are preparing to sell and her nephew is living there and is not able to move. He has a girlfriend and daughter. Girlfriend takes methadone. Thinks has bronchitis. Scratchy throat, cough. Taking mucinex and tylenol. Cough productive yellow mucus. Started 5 days ago. Denies fevers. Taking norco and tramadol as needed for fibromyalgia and migraine headaches. Differing on the treatment based on symptoms. Occ taking prednisone taper. Feel sis well controlled. Caring for brother at home. Review of Systems   All other systems reviewed and are negative.        Objective   Patient-Reported Vitals  No data recorded     Physical

## 2023-05-24 DIAGNOSIS — Z86.79 HISTORY OF ATRIAL TACHYCARDIA: ICD-10-CM

## 2023-05-24 LAB
DEPRECATED RDW RBC AUTO: 13.7 % (ref 12.4–15.4)
HCT VFR BLD AUTO: 39.7 % (ref 36–48)
HGB BLD-MCNC: 13.3 G/DL (ref 12–16)
MCH RBC QN AUTO: 32.3 PG (ref 26–34)
MCHC RBC AUTO-ENTMCNC: 33.5 G/DL (ref 31–36)
MCV RBC AUTO: 96.6 FL (ref 80–100)
PLATELET # BLD AUTO: 326 K/UL (ref 135–450)
PMV BLD AUTO: 7 FL (ref 5–10.5)
RBC # BLD AUTO: 4.11 M/UL (ref 4–5.2)
WBC # BLD AUTO: 7.9 K/UL (ref 4–11)

## 2023-05-25 LAB
25(OH)D3 SERPL-MCNC: 31.8 NG/ML
ALBUMIN SERPL-MCNC: 4.5 G/DL (ref 3.4–5)
ALBUMIN/GLOB SERPL: 1.9 {RATIO} (ref 1.1–2.2)
ALP SERPL-CCNC: 102 U/L (ref 40–129)
ALT SERPL-CCNC: 9 U/L (ref 10–40)
ANION GAP SERPL CALCULATED.3IONS-SCNC: 14 MMOL/L (ref 3–16)
AST SERPL-CCNC: 15 U/L (ref 15–37)
BILIRUB DIRECT SERPL-MCNC: <0.2 MG/DL (ref 0–0.3)
BILIRUB INDIRECT SERPL-MCNC: ABNORMAL MG/DL (ref 0–1)
BILIRUB SERPL-MCNC: 0.4 MG/DL (ref 0–1)
BUN SERPL-MCNC: 16 MG/DL (ref 7–20)
CALCIUM SERPL-MCNC: 9 MG/DL (ref 8.3–10.6)
CHLORIDE SERPL-SCNC: 103 MMOL/L (ref 99–110)
CHOLEST SERPL-MCNC: 169 MG/DL (ref 0–199)
CO2 SERPL-SCNC: 24 MMOL/L (ref 21–32)
CREAT SERPL-MCNC: 0.8 MG/DL (ref 0.6–1.2)
GFR SERPLBLD CREATININE-BSD FMLA CKD-EPI: >60 ML/MIN/{1.73_M2}
GLUCOSE SERPL-MCNC: 91 MG/DL (ref 70–99)
HDLC SERPL-MCNC: 72 MG/DL (ref 40–60)
LDLC SERPL CALC-MCNC: 67 MG/DL
MAGNESIUM SERPL-MCNC: 2.2 MG/DL (ref 1.8–2.4)
POTASSIUM SERPL-SCNC: 3.9 MMOL/L (ref 3.5–5.1)
PROT SERPL-MCNC: 6.9 G/DL (ref 6.4–8.2)
SODIUM SERPL-SCNC: 141 MMOL/L (ref 136–145)
TRIGL SERPL-MCNC: 148 MG/DL (ref 0–150)
TSH SERPL DL<=0.005 MIU/L-ACNC: 1.42 UIU/ML (ref 0.27–4.2)
VLDLC SERPL CALC-MCNC: 30 MG/DL

## 2023-07-17 ENCOUNTER — TELEPHONE (OUTPATIENT)
Dept: FAMILY MEDICINE CLINIC | Age: 68
End: 2023-07-17

## 2023-07-17 DIAGNOSIS — L30.9 DERMATITIS: Primary | ICD-10-CM

## 2023-07-17 DIAGNOSIS — G43.709 CHRONIC MIGRAINE WITHOUT AURA WITHOUT STATUS MIGRAINOSUS, NOT INTRACTABLE: ICD-10-CM

## 2023-07-17 DIAGNOSIS — M79.7 FIBROMYALGIA: ICD-10-CM

## 2023-07-17 RX ORDER — TRIAMCINOLONE ACETONIDE 5 MG/G
CREAM TOPICAL
Qty: 30 G | Refills: 0 | Status: SHIPPED | OUTPATIENT
Start: 2023-07-17 | End: 2023-07-24

## 2023-07-17 RX ORDER — HYDROCODONE BITARTRATE AND ACETAMINOPHEN 5; 325 MG/1; MG/1
1 TABLET ORAL DAILY PRN
Qty: 10 TABLET | Refills: 0 | Status: SHIPPED | OUTPATIENT
Start: 2023-07-17 | End: 2023-07-27

## 2023-07-17 NOTE — TELEPHONE ENCOUNTER
----- Message from [address] [place] [address]. Vickie Schulz sent at 7/17/2023 12:42 PM EDT -----  Regarding: Bug bites  Contact: [phone]  Hi, I need to get a prescription of steriod cream. The mosquitoes just love to bite me and the over the counter bite cream does not help. I  also need a refill of my Norco. This is migraine season. I  usually take Excedrin migraine but sometimes I need the stronger medicine. I hope your summer is going great. Please send to [facility] in [place].      Thanks  Neal Agosto

## 2023-08-02 ENCOUNTER — TELEPHONE (OUTPATIENT)
Dept: CARDIOLOGY CLINIC | Age: 68
End: 2023-08-02

## 2023-08-02 ENCOUNTER — OFFICE VISIT (OUTPATIENT)
Dept: CARDIOLOGY CLINIC | Age: 68
End: 2023-08-02
Payer: MEDICARE

## 2023-08-02 VITALS
SYSTOLIC BLOOD PRESSURE: 130 MMHG | WEIGHT: 175 LBS | HEIGHT: 65 IN | DIASTOLIC BLOOD PRESSURE: 82 MMHG | BODY MASS INDEX: 29.16 KG/M2 | HEART RATE: 70 BPM

## 2023-08-02 DIAGNOSIS — I48.91 ATRIAL FIBRILLATION, UNSPECIFIED TYPE (HCC): Primary | ICD-10-CM

## 2023-08-02 DIAGNOSIS — R06.02 SOB (SHORTNESS OF BREATH): ICD-10-CM

## 2023-08-02 DIAGNOSIS — R07.9 CHEST PAIN, UNSPECIFIED TYPE: ICD-10-CM

## 2023-08-02 PROCEDURE — 93246 EXT ECG>7D<15D RECORDING: CPT | Performed by: INTERNAL MEDICINE

## 2023-08-02 PROCEDURE — 93000 ELECTROCARDIOGRAM COMPLETE: CPT | Performed by: INTERNAL MEDICINE

## 2023-08-02 PROCEDURE — 1123F ACP DISCUSS/DSCN MKR DOCD: CPT | Performed by: INTERNAL MEDICINE

## 2023-08-02 PROCEDURE — 99214 OFFICE O/P EST MOD 30 MIN: CPT | Performed by: INTERNAL MEDICINE

## 2023-08-02 NOTE — PROGRESS NOTES
tricuspid valve regurgitation to estimate systolic pulmonary   artery pressure. Compared to the prior resting study performed 5/30/2019, EF is slightly   improved with RWMA's noted above. Results: Cardiac cath October 8, 2019  Left ventricular pressure 10 mmHg  Aortic pressure 126 mmHg     Coronary anatomy:   The left main coronary artery is normal.      Left anterior descending artery has previously placed proximal stent that is widely patent. Just distal to the stent is a mild narrowing of 20%. Circumflex artery mild mid vessel plaque. It is a dominant vessel. The right coronary artery is a nondominant but is normal.     Left ventriculogram shows ejection fraction of 50 %. Normal wall motion. EKG on 8/21/2020 sinus rhythm 67/min.  IVCD. There is the appearance of preexcitation although I do not see alka WPW. EKG on 2/26/2021 sinus rhythm 80/min. Short ID interval and wide complexes consistent with preexcitation EKG. 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 On 5/3/2021 sinus rhythm at 72/min. Accelerated AV conduction consistent with with Steffanie-Parkinson-White. IVCD late transition. Impression:  EKG on 8/2/2023 shows sinus rhythm at 70/min. Wide-complex short ID interval consistent with WPW. PVC is noted. Late transition. No acute coronary lesions. The previously placed left coronary stent is widely patent. Assessment:    recent LHC / stable left arm [facility] no complications   October 9870  CAD, previous LAD stent  6/2019  / on GDMT /stable     Plan: We will plan to have her wear a ZIO for 4 weeks to determine her A-fib burden. We will start Eliquis at 5 mg twice daily and get an echocardiogram.  Return in 6 weeks.   Gui Ha MD, [facility]

## 2023-08-14 ENCOUNTER — OFFICE VISIT (OUTPATIENT)
Dept: FAMILY MEDICINE CLINIC | Age: 68
End: 2023-08-14

## 2023-08-14 VITALS
HEART RATE: 81 BPM | BODY MASS INDEX: 28.79 KG/M2 | DIASTOLIC BLOOD PRESSURE: 84 MMHG | SYSTOLIC BLOOD PRESSURE: 114 MMHG | WEIGHT: 173 LBS | OXYGEN SATURATION: 99 % | RESPIRATION RATE: 18 BRPM | TEMPERATURE: 98.6 F

## 2023-08-14 DIAGNOSIS — R41.3 MEMORY LOSS: ICD-10-CM

## 2023-08-14 DIAGNOSIS — R41.3 MEMORY LOSS: Primary | ICD-10-CM

## 2023-08-14 ASSESSMENT — PATIENT HEALTH QUESTIONNAIRE - PHQ9
SUM OF ALL RESPONSES TO PHQ QUESTIONS 1-9: 0
SUM OF ALL RESPONSES TO PHQ QUESTIONS 1-9: 0
1. LITTLE INTEREST OR PLEASURE IN DOING THINGS: 0
2. FEELING DOWN, DEPRESSED OR HOPELESS: 0
SUM OF ALL RESPONSES TO PHQ QUESTIONS 1-9: 0
SUM OF ALL RESPONSES TO PHQ QUESTIONS 1-9: 0
SUM OF ALL RESPONSES TO PHQ9 QUESTIONS 1 & 2: 0

## 2023-08-14 NOTE — PROGRESS NOTES
Malissa Hill (:  1955) is a 76 y.o. female,Established patient, here for evaluation of the following chief complaint(s):  Memory Loss         ASSESSMENT/PLAN:  1. Memory loss  -     Vitamin B12 & Folate; Future      Started a pre  vitamin and can continue. B complex is recommended but not to take both    Reviewed CT scan of head from 2022 and MRI brain 2017, both with chronic mild small vessel white matter disease. Felt to be stable. Wishes to stop wellbutrin, feels is the cause. Reduce wellbutrin to 2 in the morning. If after 4 weeks if emotionally stable can decrease to 1 daily. Not likely to cause memory loss    Should avoid phenergan and pain medication as much as possible. Will not repeat head scan at this time.  purchased her a smart watch. When feeling \"weird\" monitored her EKG and had some episodes of atrial fib and inconclusive. Seen by Dr. Lupe Rome and started on eliquis and involved in a workup. Return if no improvement or worsens      No follow-ups on file. Subjective   SUBJECTIVE/OBJECTIVE:  HPI    Concerned about memory loss over the past 2 months. Paying some bills 2 times. Recently was in [employer] and looking for her keys and she had left them in the car. Trying to think of a part when in discussion with her , a washer. Recently left her phone lying on the counter at the bank. Had things in her hands and forgot to pick it up. Denies becoming lost when driving. Mother with dementia at age of 72. Brother with schizophrenia. Maternal grandmother with metal retardation and schizophrenia. Review of Systems   All other systems reviewed and are negative. Objective   Physical Exam  Constitutional:       Appearance: Normal appearance. She is well-developed. HENT:      Head: Normocephalic and atraumatic. Neck:      Thyroid: No thyromegaly. Cardiovascular:      Rate and Rhythm: Normal rate and regular rhythm.    Pulmonary:

## 2023-08-15 LAB
FOLATE SERPL-MCNC: >20 NG/ML (ref 4.78–24.2)
VIT B12 SERPL-MCNC: 617 PG/ML (ref 211–911)

## 2023-08-16 DIAGNOSIS — G43.109 MIGRAINE WITH AURA AND WITHOUT STATUS MIGRAINOSUS, NOT INTRACTABLE: ICD-10-CM

## 2023-08-16 RX ORDER — METOPROLOL SUCCINATE 25 MG/1
TABLET, EXTENDED RELEASE ORAL
Qty: 90 TABLET | Refills: 3 | Status: SHIPPED | OUTPATIENT
Start: 2023-08-16

## 2023-08-16 NOTE — TELEPHONE ENCOUNTER
Refill Request     CONFIRM preferred pharmacy with the patient. If Mail Order Rx - Pend for 90 day refill. Last Seen: Last Seen Department: 8/14/2023  Last Seen by PCP: 8/14/2023    Last Written: 6/22/2022 90 tablet 3 refills    If no future appointment scheduled:  Review the last OV with PCP and review information for follow-up visit,  Route STAFF MESSAGE with patient name to the [facility] for scheduling with the following information:            -  Timing of next visit           -  Visit type ie Physical, OV, etc           -  Diagnoses/Reason ie. COPD, HTN - Do not use MEDICATION, Follow-up or CHECK UP - Give reason for visit      Next Appointment:   Future Appointments   Date Time Provider [address]   8/22/2023  7:00 AM [facility] ROOM 03 100 [facility]       Message sent to [employer] to schedule appt with patient? YES  Return in about 6 months (around 11/8/2023) for chronic pain.     Requested Prescriptions     Pending Prescriptions Disp Refills    metoprolol succinate (TOPROL XL) 25 MG extended release tablet [Pharmacy Med Name: METOPROLOL SUCC ER 25 MG TAB] 90 tablet 3     Sig: TAKE ONE TABLET BY MOUTH DAILY

## 2023-08-22 ENCOUNTER — HOSPITAL ENCOUNTER (OUTPATIENT)
Dept: NON INVASIVE DIAGNOSTICS | Age: 68
Discharge: HOME | End: 2023-08-22
Payer: MEDICARE

## 2023-08-22 DIAGNOSIS — R07.9 CHEST PAIN, UNSPECIFIED TYPE: ICD-10-CM

## 2023-08-22 DIAGNOSIS — R06.02 SOB (SHORTNESS OF BREATH): ICD-10-CM

## 2023-08-22 LAB
LV EF: 50 %
LVEF MODALITY: NORMAL

## 2023-08-22 PROCEDURE — 93306 TTE W/DOPPLER COMPLETE: CPT

## 2023-08-31 PROCEDURE — 93248 EXT ECG>7D<15D REV&INTERPJ: CPT | Performed by: INTERNAL MEDICINE

## 2023-09-06 ENCOUNTER — PATIENT MESSAGE (OUTPATIENT)
Dept: CARDIOLOGY CLINIC | Age: 68
End: 2023-09-06

## 2023-09-06 DIAGNOSIS — I48.91 ATRIAL FIBRILLATION, UNSPECIFIED TYPE (HCC): Primary | ICD-10-CM

## 2023-09-07 ENCOUNTER — TELEPHONE (OUTPATIENT)
Dept: CARDIOLOGY CLINIC | Age: 68
End: 2023-09-07

## 2023-09-07 NOTE — TELEPHONE ENCOUNTER
From: Soraya Coffey  To: Dr. Figueredo Castro: 9/6/2023 7:37 PM EDT  Subject: Heart monitor resunlts    Hi, I just wanted to check and see what the results of my heart was. I think that the echo was the same as the previous one.

## 2023-09-15 ENCOUNTER — TELEPHONE (OUTPATIENT)
Dept: PHARMACY | Facility: CLINIC | Age: 68
End: 2023-09-15

## 2023-09-15 DIAGNOSIS — E78.2 MIXED HYPERLIPIDEMIA: ICD-10-CM

## 2023-09-15 RX ORDER — ROSUVASTATIN CALCIUM 20 MG/1
TABLET, COATED ORAL
Qty: 90 TABLET | Refills: 3 | Status: SHIPPED | OUTPATIENT
Start: 2023-09-15

## 2023-09-15 NOTE — TELEPHONE ENCOUNTER
Refill Request     CONFIRM preferred pharmacy with the patient. If Mail Order Rx - Pend for 90 day refill. Last Seen: Last Seen Department: 8/14/2023    Last Seen by PCP: 8/14/2023    Last Written: 8/8/22 90 tablet 3 refills    If no future appointment scheduled:  Review the last OV with PCP and review information for follow-up visit,  Route STAFF MESSAGE with patient name to the [facility] for scheduling with the following information:            -  Timing of next visit           -  Visit type ie Physical, OV, etc           -  Diagnoses/Reason ie. COPD, HTN - Do not use MEDICATION, Follow-up or CHECK UP - Give reason for visit      Next Appointment: 11/29/23  Future Appointments   Date Time Provider [address]   11/29/2023  2:00 PM LUCIA Lao - JARVIS WHITE       Message sent to [address] [place] [address] to schedule appt with patient?   NO      Requested Prescriptions     Pending Prescriptions Disp Refills    rosuvastatin (CRESTOR) 20 MG tablet [Pharmacy Med Name: ROSUVASTATIN CALCIUM 20 MG TAB] 90 tablet 3     Sig: TAKE ONE TABLET BY MOUTH ONCE NIGHTLY

## 2023-09-15 NOTE — TELEPHONE ENCOUNTER
POPULATION HEALTH CLINICAL PHARMACY: ADHERENCE REVIEW  Identified care gap per [employer]: fills at [facility]: Statin adherence    ASSESSMENT   [address] Records claims through 23 (Prior Year [address] = not reported; YTD [address] = 78%; Potential Fail Date: 23):   ROSUVASTATIN TAB 20MG last filled on 23 for 90 day supply. Next refill due: 23    Prescribed si tablet/capsule daily    Per Reconcile Dispense History: last filled on 23 for 90 day supply. Per [address]:  0 refills remaining. New rx request sent to provider. Last picked up 23. Lab Results   Component Value Date    CHOL 169 2023    TRIG 148 2023    HDL 72 (H) 2023    LDLCALC 67 2023     ALT   Date Value Ref Range Status   2023 9 (L) 10 - 40 U/L Final     AST   Date Value Ref Range Status   2023 15 15 - 37 U/L Final     The 10-year ASCVD risk score (Frankie CALL, et al., 2019) is: 5.3%    Values used to calculate the score:      Age: 76 years      Sex: Female      Is Non- : No      Diabetic: No      Tobacco smoker: No      Systolic Blood Pressure: 712 mmHg      Is BP treated: No      HDL Cholesterol: 72 mg/dL      Total Cholesterol: 169 mg/dL     PLAN    The following are interventions that have been identified:   Patient overdue refilling Rosuvastatin and active on home medication list.   Patient needs refills for Rosuvastatin  Patient eligible for 100 day supply of Rosuvastatin    Reached patient to review. Patient stated she recently picked this up. Had bottles on hand and checked and stated she picked this up recently but could not tell when. Stated she had a 90 d/s supply on hand and that she was taking this as prescribed consistently, 1 tablet daily. Advised that no refills remain and that provider office had been contacted for new prescription.      Patient will likely fail measure due to prescription being last picked up 23 and patient

## 2023-09-18 DIAGNOSIS — E78.2 MIXED HYPERLIPIDEMIA: ICD-10-CM

## 2023-09-18 DIAGNOSIS — M79.7 FIBROMYALGIA: ICD-10-CM

## 2023-09-18 DIAGNOSIS — G43.709 CHRONIC MIGRAINE WITHOUT AURA WITHOUT STATUS MIGRAINOSUS, NOT INTRACTABLE: ICD-10-CM

## 2023-09-18 RX ORDER — ROSUVASTATIN CALCIUM 20 MG/1
TABLET, COATED ORAL
Qty: 90 TABLET | Refills: 3 | Status: CANCELLED | OUTPATIENT
Start: 2023-09-18

## 2023-09-18 RX ORDER — HYDROCODONE BITARTRATE AND ACETAMINOPHEN 5; 325 MG/1; MG/1
1 TABLET ORAL DAILY PRN
Qty: 10 TABLET | Refills: 0 | Status: CANCELLED | OUTPATIENT
Start: 2023-09-18 | End: 2023-09-28

## 2023-10-01 DIAGNOSIS — F33.0 MILD EPISODE OF RECURRENT MAJOR DEPRESSIVE DISORDER (HCC): ICD-10-CM

## 2023-10-01 NOTE — TELEPHONE ENCOUNTER
Refill Request     CONFIRM preferred pharmacy with the patient. If Mail Order Rx - Pend for 90 day refill. Last Seen: Last Seen Department: 8/14/2023  Last Seen by PCP: 8/14/2023    Last Written: 8/8/2022    If no future appointment scheduled:  Review the last OV with PCP and review information for follow-up visit,  Route STAFF MESSAGE with patient name to the [facility] for scheduling with the following information:            -  Timing of next visit           -  Visit type ie Physical, OV, etc           -  Diagnoses/Reason ie. COPD, HTN - Do not use MEDICATION, Follow-up or CHECK UP - Give reason for visit      Next Appointment:   Future Appointments   Date Time Provider [address]   11/29/2023  2:00 PM LUCIA Reddy - CNP [facility]  Sindhu WHITE       Message sent to [address] [place] [address] to schedule appt with patient?   NO      Requested Prescriptions     Pending Prescriptions Disp Refills    citalopram (CELEXA) 40 MG tablet [Pharmacy Med Name: CITALOPRAM HBR 40 MG TABLET] 90 tablet 3     Sig: TAKE ONE TABLET BY MOUTH EVERY MORNING

## 2023-10-02 RX ORDER — CITALOPRAM 40 MG/1
40 TABLET ORAL EVERY MORNING
Qty: 90 TABLET | Refills: 3 | Status: SHIPPED | OUTPATIENT
Start: 2023-10-02

## 2023-10-03 ENCOUNTER — PATIENT MESSAGE (OUTPATIENT)
Dept: FAMILY MEDICINE CLINIC | Age: 68
End: 2023-10-03

## 2023-10-03 DIAGNOSIS — M79.7 FIBROMYALGIA: ICD-10-CM

## 2023-10-03 NOTE — TELEPHONE ENCOUNTER
Refill Request     CONFIRM preferred pharmacy with the patient. If Mail Order Rx - Pend for 90 day refill. Last Seen: Last Seen Department: 2023  Last Seen by PCP: 2023    Last Written: 3/29/2023    If no future appointment scheduled:  Review the last OV with PCP and review information for follow-up visit,  Route STAFF MESSAGE with patient name to the [facility] for scheduling with the following information:            -  Timing of next visit           -  Visit type ie Physical, OV, etc           -  Diagnoses/Reason ie. COPD, HTN - Do not use MEDICATION, Follow-up or CHECK UP - Give reason for visit      Next Appointment:   Future Appointments   Date Time Provider [address]   2023  2:00 PM Page LUCIA Venegas - JARVIS DENNY  Sindhu - CINDY       Message sent to [address] [place] [address] to schedule appt with patient? NO      Requested Prescriptions     Pending Prescriptions Disp Refills    predniSONE (DELTASONE) 10 MG tablet 21 tablet 0     Si tablets 2 times daily for 3 days, 1 tablet 2 times daily for 3 days, 1 tablet daily.

## 2023-10-03 NOTE — TELEPHONE ENCOUNTER
From: Reese Finnegan  To: Esther Shin  Sent: 10/3/2023 10:53 AM EDT  Subject: Refill     Hi Jessica, could you send in a refill of Prednisone. I have have a flair of my fibromyalgia this past week that has been bad. I broke down and took my last Prednisone pill yesterday and today isn't as bad today. This pain starts up between August and September. Please send to [facility] in [place]. Thanks.

## 2023-10-04 RX ORDER — PREDNISONE 10 MG/1
TABLET ORAL
Qty: 21 TABLET | Refills: 0 | Status: SHIPPED | OUTPATIENT
Start: 2023-10-04

## 2023-10-25 DIAGNOSIS — M79.7 FIBROMYALGIA: ICD-10-CM

## 2023-10-25 DIAGNOSIS — K58.0 IRRITABLE BOWEL SYNDROME WITH DIARRHEA: ICD-10-CM

## 2023-10-26 RX ORDER — DICYCLOMINE HYDROCHLORIDE 10 MG/1
10 CAPSULE ORAL 3 TIMES DAILY
Qty: 90 CAPSULE | Refills: 1 | Status: SHIPPED | OUTPATIENT
Start: 2023-10-26

## 2023-10-26 RX ORDER — METHOCARBAMOL 500 MG/1
TABLET, FILM COATED ORAL
Qty: 90 TABLET | Refills: 0 | Status: SHIPPED | OUTPATIENT
Start: 2023-10-26

## 2023-10-26 NOTE — TELEPHONE ENCOUNTER
Refill Request     CONFIRM preferred pharmacy with the patient. If Mail Order Rx - Pend for 90 day refill. Last Seen: Last Seen Department: 8/14/2023  Last Seen by PCP: Visit date not found    Last Written: 4/30/2023    If no future appointment scheduled:  Review the last OV with PCP and review information for follow-up visit,  Route STAFF MESSAGE with patient name to the [facility] for scheduling with the following information:            -  Timing of next visit           -  Visit type ie Physical, OV, etc           -  Diagnoses/Reason ie. COPD, HTN - Do not use MEDICATION, Follow-up or CHECK UP - Give reason for visit      Next Appointment:   Future Appointments   Date Time Provider [address]   11/29/2023  2:00 PM Aaron Pan, APRN - CNP [facility]  Sindhu - CINDY       Message sent to [address] [place] [address] to schedule appt with patient?   NO      Requested Prescriptions     Pending Prescriptions Disp Refills    methocarbamol (ROBAXIN) 500 MG tablet 90 tablet 0     Sig: TAKE ONE TABLET BY MOUTH THREE TIMES A DAY

## 2023-10-26 NOTE — TELEPHONE ENCOUNTER
Refill Request     CONFIRM preferred pharmacy with the patient. If Mail Order Rx - Pend for 90 day refill. Last Seen: Last Seen Department: 8/14/2023  Last Seen by PCP: 8/14/2023    Last Written: 12/22/2021    If no future appointment scheduled:  Review the last OV with PCP and review information for follow-up visit,  Route STAFF MESSAGE with patient name to the [facility] for scheduling with the following information:            -  Timing of next visit           -  Visit type ie Physical, OV, etc           -  Diagnoses/Reason ie. COPD, HTN - Do not use MEDICATION, Follow-up or CHECK UP - Give reason for visit      Next Appointment:   Future Appointments   Date Time Provider [address]   11/29/2023  2:00 PM Prasanth Pan, APRN - CNP [facility]  S[facility] - CINDY       Message sent to [address] [place] [address] to schedule appt with patient?   NO      Requested Prescriptions     Pending Prescriptions Disp Refills    dicyclomine (BENTYL) 10 MG capsule 90 capsule 1     Sig: Take 1 capsule by mouth 3 times daily

## 2023-11-14 ENCOUNTER — TELEPHONE (OUTPATIENT)
Dept: FAMILY MEDICINE CLINIC | Age: 68
End: 2023-11-14

## 2023-11-14 NOTE — TELEPHONE ENCOUNTER
Pt is asking for a refill on her norco in regards to she has a headache and it is not going away she has tried tylenol and muscle relaxer and her headache is just not going away she stated that this is being caused by her fibromyalgia advised pt that she may need to be seen in regards to this headache please advise on sending in norco to the pharmacy for the pt.  Thank you [facility] in [place]

## 2023-11-15 DIAGNOSIS — M79.7 FIBROMYALGIA: ICD-10-CM

## 2023-11-15 DIAGNOSIS — G43.709 CHRONIC MIGRAINE WITHOUT AURA WITHOUT STATUS MIGRAINOSUS, NOT INTRACTABLE: ICD-10-CM

## 2023-11-15 RX ORDER — HYDROCODONE BITARTRATE AND ACETAMINOPHEN 5; 325 MG/1; MG/1
1 TABLET ORAL DAILY PRN
Qty: 10 TABLET | Refills: 0 | Status: SHIPPED | OUTPATIENT
Start: 2023-11-15 | End: 2023-11-25

## 2023-11-26 DIAGNOSIS — F33.0 MILD EPISODE OF RECURRENT MAJOR DEPRESSIVE DISORDER (HCC): ICD-10-CM

## 2023-11-26 RX ORDER — BUPROPION HYDROCHLORIDE 75 MG/1
TABLET ORAL
Qty: 90 TABLET | Refills: 1 | Status: SHIPPED | OUTPATIENT
Start: 2023-11-26

## 2023-12-06 ENCOUNTER — TELEPHONE (OUTPATIENT)
Dept: FAMILY MEDICINE CLINIC | Age: 68
End: 2023-12-06

## 2023-12-06 NOTE — TELEPHONE ENCOUNTER
Patient called into nurse triage with complaints of migraines wanting to schedule an appointment. Patient stated that she has been having these migraines for a long time, taking tylenol and norco with relief. Patient states that she has seen her provider for this for years. Patient denies fever, chills, shortness of breath. Patient scheduled for appointment, and informed if her symptoms get worse to call the office back. Patient  verbalized understanding.

## 2023-12-13 ENCOUNTER — OFFICE VISIT (OUTPATIENT)
Dept: FAMILY MEDICINE CLINIC | Age: 68
End: 2023-12-13

## 2023-12-13 VITALS
OXYGEN SATURATION: 96 % | WEIGHT: 173.8 LBS | RESPIRATION RATE: 18 BRPM | DIASTOLIC BLOOD PRESSURE: 70 MMHG | SYSTOLIC BLOOD PRESSURE: 130 MMHG | BODY MASS INDEX: 28.92 KG/M2 | HEART RATE: 81 BPM

## 2023-12-13 DIAGNOSIS — M79.7 FIBROMYALGIA: Primary | ICD-10-CM

## 2023-12-13 DIAGNOSIS — G43.109 MIGRAINE WITH AURA AND WITHOUT STATUS MIGRAINOSUS, NOT INTRACTABLE: ICD-10-CM

## 2023-12-13 DIAGNOSIS — Z86.79 HISTORY OF ATRIAL TACHYCARDIA: ICD-10-CM

## 2023-12-13 DIAGNOSIS — G43.709 CHRONIC MIGRAINE WITHOUT AURA WITHOUT STATUS MIGRAINOSUS, NOT INTRACTABLE: ICD-10-CM

## 2023-12-13 DIAGNOSIS — Z12.31 ENCOUNTER FOR SCREENING MAMMOGRAM FOR MALIGNANT NEOPLASM OF BREAST: ICD-10-CM

## 2023-12-13 RX ORDER — HYDROCODONE BITARTRATE AND ACETAMINOPHEN 5; 325 MG/1; MG/1
1 TABLET ORAL DAILY PRN
Qty: 10 TABLET | Refills: 0 | Status: SHIPPED | OUTPATIENT
Start: 2023-12-13 | End: 2023-12-23

## 2023-12-13 RX ORDER — PREDNISONE 10 MG/1
TABLET ORAL
Qty: 21 TABLET | Refills: 0 | Status: SHIPPED | OUTPATIENT
Start: 2023-12-13

## 2023-12-13 NOTE — PROGRESS NOTES
Renee Garcia (:  1955) is a 76 y.o. female,Established patient, here for evaluation of the following chief complaint(s):  Headache (Cluster )         ASSESSMENT/PLAN:  1. Fibromyalgia  -     HYDROcodone-acetaminophen (NORCO) 5-325 MG per tablet; Take 1 tablet by mouth daily as needed for Pain for up to 10 days. May take instead of tramadol when pain is increased, Disp-10 tablet, R-0Normal  -     predniSONE (DELTASONE) 10 MG tablet; 2 tablets 2 times daily for 3 days, 1 tablet 2 times daily for 3 days, 1 tablet daily. , Disp-21 tablet, R-0Normal  2. Chronic migraine without aura without status migrainosus, not intractable  -     HYDROcodone-acetaminophen (NORCO) 5-325 MG per tablet; Take 1 tablet by mouth daily as needed for Pain for up to 10 days. May take instead of tramadol when pain is increased, Disp-10 tablet, R-0Normal  3. History of atrial tachycardia  -     CBC with Auto Differential; Future  4. Encounter for screening mammogram for malignant neoplasm of breast  -     [facility] DIGITAL SCREEN BILATERAL; Future  5. Migraine with aura and without status migrainosus, not intractable  -     Silvia Samaniego MD, Neurology, [facility]    Wondering about follow up MRI brain for evaluation of white matter disease and headache. Would recommend follow up with neuro for further evaluation. Controlled Substance Monitoring:    Acute and Chronic Pain Monitoring:   RX Monitoring Periodic Controlled Substance Monitoring Chronic Pain > 120 MEDD   2023   1:55 PM No signs of potential drug abuse or diversion identified. Obtained or confirmed \"Medication Contract\" on file. Recording EKG on her watch and phone when needed. Due to schedule follow up with Dr. Linh Fernández. Encouraged to call to schedule. Started eliquis in August. Will recheck CBC for follow up. Return in about 6 months (around 2024) for Fasting labs, hyperlipidemia, chronic pain.          Subjective

## 2023-12-14 LAB
BASOPHILS # BLD: 0.1 K/UL (ref 0–0.2)
BASOPHILS NFR BLD: 0.9 %
DEPRECATED RDW RBC AUTO: 13.2 % (ref 12.4–15.4)
EOSINOPHIL # BLD: 0.3 K/UL (ref 0–0.6)
EOSINOPHIL NFR BLD: 4.7 %
HCT VFR BLD AUTO: 38.2 % (ref 36–48)
HGB BLD-MCNC: 13.3 G/DL (ref 12–16)
LYMPHOCYTES # BLD: 1.9 K/UL (ref 1–5.1)
LYMPHOCYTES NFR BLD: 28.4 %
MCH RBC QN AUTO: 32.1 PG (ref 26–34)
MCHC RBC AUTO-ENTMCNC: 34.7 G/DL (ref 31–36)
MCV RBC AUTO: 92.7 FL (ref 80–100)
MONOCYTES # BLD: 0.5 K/UL (ref 0–1.3)
MONOCYTES NFR BLD: 7.3 %
NEUTROPHILS # BLD: 3.9 K/UL (ref 1.7–7.7)
NEUTROPHILS NFR BLD: 58.7 %
PLATELET # BLD AUTO: 299 K/UL (ref 135–450)
PMV BLD AUTO: 7 FL (ref 5–10.5)
RBC # BLD AUTO: 4.13 M/UL (ref 4–5.2)
WBC # BLD AUTO: 6.7 K/UL (ref 4–11)

## 2024-01-23 ENCOUNTER — OFFICE VISIT (OUTPATIENT)
Dept: FAMILY MEDICINE CLINIC | Age: 69
End: 2024-01-23
Payer: COMMERCIAL

## 2024-01-23 VITALS
HEIGHT: 65 IN | HEART RATE: 69 BPM | WEIGHT: 173.2 LBS | DIASTOLIC BLOOD PRESSURE: 74 MMHG | OXYGEN SATURATION: 96 % | BODY MASS INDEX: 28.86 KG/M2 | SYSTOLIC BLOOD PRESSURE: 148 MMHG | TEMPERATURE: 98.2 F

## 2024-01-23 DIAGNOSIS — N30.00 ACUTE CYSTITIS WITHOUT HEMATURIA: Primary | ICD-10-CM

## 2024-01-23 LAB
BILIRUBIN, POC: NEGATIVE
BLOOD URINE, POC: NORMAL
CLARITY, POC: NORMAL
COLOR, POC: YELLOW
GLUCOSE URINE, POC: NEGATIVE
KETONES, POC: NORMAL
LEUKOCYTE EST, POC: NORMAL
NITRITE, POC: POSITIVE
PH, POC: 5.5
PROTEIN, POC: NORMAL
SPECIFIC GRAVITY, POC: 1.02
UROBILINOGEN, POC: NORMAL

## 2024-01-23 PROCEDURE — 81002 URINALYSIS NONAUTO W/O SCOPE: CPT | Performed by: NURSE PRACTITIONER

## 2024-01-23 PROCEDURE — 99213 OFFICE O/P EST LOW 20 MIN: CPT | Performed by: NURSE PRACTITIONER

## 2024-01-23 PROCEDURE — 1123F ACP DISCUSS/DSCN MKR DOCD: CPT | Performed by: NURSE PRACTITIONER

## 2024-01-23 RX ORDER — NITROFURANTOIN 25; 75 MG/1; MG/1
100 CAPSULE ORAL 2 TIMES DAILY
Qty: 20 CAPSULE | Refills: 0 | Status: CANCELLED | OUTPATIENT
Start: 2024-01-23 | End: 2024-02-02

## 2024-01-23 RX ORDER — CIPROFLOXACIN 500 MG/1
500 TABLET, FILM COATED ORAL 2 TIMES DAILY
Qty: 14 TABLET | Refills: 0 | Status: SHIPPED | OUTPATIENT
Start: 2024-01-23 | End: 2024-01-30

## 2024-01-23 SDOH — ECONOMIC STABILITY: INCOME INSECURITY: HOW HARD IS IT FOR YOU TO PAY FOR THE VERY BASICS LIKE FOOD, HOUSING, MEDICAL CARE, AND HEATING?: NOT HARD AT ALL

## 2024-01-23 SDOH — ECONOMIC STABILITY: FOOD INSECURITY: WITHIN THE PAST 12 MONTHS, THE FOOD YOU BOUGHT JUST DIDN'T LAST AND YOU DIDN'T HAVE MONEY TO GET MORE.: NEVER TRUE

## 2024-01-23 SDOH — ECONOMIC STABILITY: FOOD INSECURITY: WITHIN THE PAST 12 MONTHS, YOU WORRIED THAT YOUR FOOD WOULD RUN OUT BEFORE YOU GOT MONEY TO BUY MORE.: NEVER TRUE

## 2024-01-23 ASSESSMENT — ENCOUNTER SYMPTOMS
RECTAL PAIN: 0
BLOOD IN STOOL: 0
SINUS PAIN: 0
DIARRHEA: 0
NAUSEA: 0
CONSTIPATION: 0
VOMITING: 0
SINUS PRESSURE: 0
COUGH: 0

## 2024-01-23 ASSESSMENT — PATIENT HEALTH QUESTIONNAIRE - PHQ9
1. LITTLE INTEREST OR PLEASURE IN DOING THINGS: 0
3. TROUBLE FALLING OR STAYING ASLEEP: 0
5. POOR APPETITE OR OVEREATING: 0
9. THOUGHTS THAT YOU WOULD BE BETTER OFF DEAD, OR OF HURTING YOURSELF: 0
SUM OF ALL RESPONSES TO PHQ QUESTIONS 1-9: 1
7. TROUBLE CONCENTRATING ON THINGS, SUCH AS READING THE NEWSPAPER OR WATCHING TELEVISION: 0
SUM OF ALL RESPONSES TO PHQ QUESTIONS 1-9: 1
2. FEELING DOWN, DEPRESSED OR HOPELESS: 0
SUM OF ALL RESPONSES TO PHQ QUESTIONS 1-9: 1
SUM OF ALL RESPONSES TO PHQ9 QUESTIONS 1 & 2: 0
6. FEELING BAD ABOUT YOURSELF - OR THAT YOU ARE A FAILURE OR HAVE LET YOURSELF OR YOUR FAMILY DOWN: 0
4. FEELING TIRED OR HAVING LITTLE ENERGY: 1
10. IF YOU CHECKED OFF ANY PROBLEMS, HOW DIFFICULT HAVE THESE PROBLEMS MADE IT FOR YOU TO DO YOUR WORK, TAKE CARE OF THINGS AT HOME, OR GET ALONG WITH OTHER PEOPLE: 1
8. MOVING OR SPEAKING SO SLOWLY THAT OTHER PEOPLE COULD HAVE NOTICED. OR THE OPPOSITE, BEING SO FIGETY OR RESTLESS THAT YOU HAVE BEEN MOVING AROUND A LOT MORE THAN USUAL: 0
SUM OF ALL RESPONSES TO PHQ QUESTIONS 1-9: 1

## 2024-01-23 ASSESSMENT — ANXIETY QUESTIONNAIRES
3. WORRYING TOO MUCH ABOUT DIFFERENT THINGS: 0
4. TROUBLE RELAXING: 0
IF YOU CHECKED OFF ANY PROBLEMS ON THIS QUESTIONNAIRE, HOW DIFFICULT HAVE THESE PROBLEMS MADE IT FOR YOU TO DO YOUR WORK, TAKE CARE OF THINGS AT HOME, OR GET ALONG WITH OTHER PEOPLE: NOT DIFFICULT AT ALL
2. NOT BEING ABLE TO STOP OR CONTROL WORRYING: 0
1. FEELING NERVOUS, ANXIOUS, OR ON EDGE: 0
5. BEING SO RESTLESS THAT IT IS HARD TO SIT STILL: 0
6. BECOMING EASILY ANNOYED OR IRRITABLE: 0
7. FEELING AFRAID AS IF SOMETHING AWFUL MIGHT HAPPEN: 0
GAD7 TOTAL SCORE: 0

## 2024-01-23 NOTE — PROGRESS NOTES
Shelby Cordero (:  1955) is a 68 y.o. female,Established patient, here for evaluation of the following chief complaint(s):  Lower Back Pain (Sxs x1 week. ) and Urinary Frequency       ASSESSMENT/PLAN:  1. Acute cystitis without hematuria  -     POCT Urinalysis no Micro  -     Culture, Urine  -     ciprofloxacin (CIPRO) 500 MG tablet; Take 1 tablet by mouth 2 times daily for 7 days, Disp-14 tablet, R-0Normal  -UA showed blood, protein and ketones, which gives suspicion to kidney stone, however discussed option of obtaining KUB to further evaluate, pt declined and would like to wait as pain is minimal.   -UA also showed nitrates and leuks, will send for culture. Started on cipro as pt states she tolerates. Reviewed allergies. Medication risk, benefits and side effects explained. Previous medications reviewed and does show prior Cipro prescriptions.   -Advise that as culture results, medication is subject to change, pt verbalized understanding.  Drink plenty of water   Wipe front to back after urinating  Urinate after sexual intercourse   Avoid bath bombs, bubble baths etc    Change pads/tampons frequently   6. Go to Er or notify office if develop high fever with back pain, significant worsening of symptoms, chest pain or shortness of breath   7. Take antibiotic as prescribed  8. Will call with results of urine culture and base further treatement on results  9. Follow up if symptoms worsen or fail to improve     Return if symptoms worsen or fail to improve.       Subjective   SUBJECTIVE/OBJECTIVE:  Presents with flank pain x4 days.   States she though maybe she pulled her back when shoveling.   Throbbing, shooting pain.  Denies Burning, urgency,   Reports frequency.  Taking azo and cranberry pills at home without relief.        Review of Systems   Constitutional:  Negative for chills and fever.   HENT:  Negative for sinus pressure and sinus pain.    Respiratory:  Negative for cough.    Cardiovascular:

## 2024-01-23 NOTE — PATIENT INSTRUCTIONS
Drink plenty of water   Wipe front to back after urinating  Urinate after sexual intercourse   Avoid bath bombs, bubble baths etc    Change pads/tampons frequently   6. Go to Er or notify office if develop high fever with back pain, significant worsening of symptoms, chest pain or shortness of breath   7. Take antibiotic as prescribed  8. Will call with results of urine culture and base further treatement on results  9. Follow up if symptoms worsen or fail to improve

## 2024-01-25 LAB
BACTERIA UR CULT: ABNORMAL
ORGANISM: ABNORMAL

## 2024-01-31 ENCOUNTER — PATIENT MESSAGE (OUTPATIENT)
Dept: FAMILY MEDICINE CLINIC | Age: 69
End: 2024-01-31

## 2024-01-31 DIAGNOSIS — G43.709 CHRONIC MIGRAINE WITHOUT AURA WITHOUT STATUS MIGRAINOSUS, NOT INTRACTABLE: ICD-10-CM

## 2024-01-31 DIAGNOSIS — M79.7 FIBROMYALGIA: ICD-10-CM

## 2024-01-31 RX ORDER — HYDROCODONE BITARTRATE AND ACETAMINOPHEN 5; 325 MG/1; MG/1
1 TABLET ORAL DAILY PRN
Qty: 10 TABLET | Refills: 0 | Status: SHIPPED | OUTPATIENT
Start: 2024-01-31 | End: 2024-02-10

## 2024-01-31 NOTE — TELEPHONE ENCOUNTER
From: Shelby Cordero  To: Nancy Pan  Sent: 1/31/2024 12:16 PM EST  Subject: Refill    Hi Jessica,can I get a refill on the Norco please. I use [facility] in [facility].   Thank you.

## 2024-01-31 NOTE — TELEPHONE ENCOUNTER
Refill Request - Controlled Substance    CONFIRM preferred pharmacy with the patient.    If Mail Order Rx - Pend for 90 day refill.        Last Seen Department: 1/23/2024  Last Seen by PCP: 12/13/2023    Last Written: 12/13/2023    Last UDS: 11/8/2018    Med Agreement Signed On: 8/7/2020    If no future appointment scheduled:  Review the last OV with PCP and review information for follow-up visit,  Route STAFF MESSAGE with patient name to the  Pool for scheduling with the following information:            -  Timing of next visit           -  Visit type ie Physical, OV, etc           -  Diagnoses/Reason ie. COPD, HTN - Do not use MEDICATION, Follow-up or CHECK UP - Give reason for visit        Next Appointment:   Future Appointments   Date Time Provider Department Center   4/1/2024 12:20 PM Ryland Darling MD [facility] Neurology -   5/3/2024 11:45 AM Noah Martinez MD [facility]   6/13/2024 11:00 AM Nancy Pan APRN - CNP [facility]       Message sent to  to schedule appt with patient?  NO      Requested Prescriptions     Pending Prescriptions Disp Refills    HYDROcodone-acetaminophen (NORCO) 5-325 MG per tablet 10 tablet 0     Sig: Take 1 tablet by mouth daily as needed for Pain for up to 10 days. May take instead of tramadol when pain is increased

## 2024-02-09 ENCOUNTER — OFFICE VISIT (OUTPATIENT)
Dept: FAMILY MEDICINE CLINIC | Age: 69
End: 2024-02-09

## 2024-02-09 VITALS
WEIGHT: 170.2 LBS | OXYGEN SATURATION: 97 % | DIASTOLIC BLOOD PRESSURE: 70 MMHG | RESPIRATION RATE: 20 BRPM | HEART RATE: 84 BPM | BODY MASS INDEX: 28.32 KG/M2 | TEMPERATURE: 98.6 F | SYSTOLIC BLOOD PRESSURE: 110 MMHG

## 2024-02-09 DIAGNOSIS — J01.90 ACUTE BACTERIAL SINUSITIS: Primary | ICD-10-CM

## 2024-02-09 DIAGNOSIS — R05.1 ACUTE COUGH: ICD-10-CM

## 2024-02-09 DIAGNOSIS — I50.22 CHRONIC SYSTOLIC (CONGESTIVE) HEART FAILURE (HCC): ICD-10-CM

## 2024-02-09 DIAGNOSIS — I25.118 CORONARY ARTERY DISEASE OF NATIVE ARTERY OF NATIVE HEART WITH STABLE ANGINA PECTORIS (HCC): ICD-10-CM

## 2024-02-09 DIAGNOSIS — I48.91 ATRIAL FIBRILLATION, UNSPECIFIED TYPE (HCC): ICD-10-CM

## 2024-02-09 DIAGNOSIS — F33.0 MILD EPISODE OF RECURRENT MAJOR DEPRESSIVE DISORDER (HCC): ICD-10-CM

## 2024-02-09 DIAGNOSIS — B96.89 ACUTE BACTERIAL SINUSITIS: Primary | ICD-10-CM

## 2024-02-09 RX ORDER — AZITHROMYCIN 250 MG/1
TABLET, FILM COATED ORAL
Qty: 6 TABLET | Refills: 0 | Status: SHIPPED | OUTPATIENT
Start: 2024-02-09 | End: 2024-02-19

## 2024-02-09 NOTE — PROGRESS NOTES
time.   Psychiatric:         Behavior: Behavior normal.                  An electronic signature was used to authenticate this note.    --LUCIA MITCHELL - CNP

## 2024-02-09 NOTE — TELEPHONE ENCOUNTER
February 11, 2024    Kendall Cannon, PT  [address] [facility] [address] [facility]  [facility] [address]    Patient: Uvaldo Domingo   YOB: 1948   Date of Visit: 2/9/2024       Dear Dominick Cheema MD  [id number]  [place] [address]    The attached plan of care is being sent to you because your patient’s medical reimbursement requires that you certify the plan of care. Your signature is required to allow uninterrupted insurance coverage.      You may indicate your approval by signing below and faxing this form back to us at Dept Fax: [phone].    Please call Dept: [phone] with any questions or concerns.    Thank you for this referral,        Kendall Cannon, PT  [facility] [address] [facility]  [address] [facility] [id number] [phone]    Payer: Payor: GENERIC COMMERCIAL / Plan: GENERIC COMMERCIAL / Product Type: *No Product type* /                                                                         Date:     Dear Kendall Cannon PT,     Re: Mr. Uvaldo Domingo, MRN:[id number]    I certify that I have reviewed the attached plan of care and it is medically necessary for Mr. Uvaldo Domingo (1948) who is under my care.          ______________________________________                    _________________  Provider name and credentials                                           Date and time                                                                                           Plan of Care 2/9/24   Effective from: 2/9/2024  Effective to: 4/20/2024    Plan ID: [id number]            Participants as of Finalize on 2/11/2024    Name Type Comments Contact Info    Dominick Cheema MD Referring Provider  [phone]    Kendall Cannon PT Physical Therapist  [phone]       Last Plan Note     Author: Kendall Cannon PT Status: Incomplete Last edited: 2/9/2024  3:30 PM       Physical Therapy Evaluation    Patient Name: Uvaldo Domingo  MRN:  Received APPROVAL for Aimovig 140MG/ML auto-injectors from 07/16/2019 to 01/15/2020. Please advise patient. Thank you. [id number]  Today's Date: 2/11/2024       Visit #: 1  Visits approved: 60/yr.  Certification dates: 2/9/24-4/20/24.    Precautions:  Precautions  Precautions Comment: None. Low fall risk.    Subjective/History    Oswestry: 24%    DOI: 11/1/23.  Mechanism of injury: Insidious. Long (~2 yr) hx of LBP. Worse ~ DOI for no apparent reason.  Area of symptoms: Intermittent ache at right low back. Pain Assessment: 0-10  Pain Score: 2  Aggravating factors: Sit few min. Stand 30 min. In/out of car.  Easing factors: Massage.  Red flags: None.   Occupation: Retired.   Recreation/Hobbies/Sports: Sedentary.  Living situation: Unremarkable.   Barriers to treatment: none.   Preferred language: English.    Objective Findings  Observation/gait: Mild left upper LS convexity. Min LS lordosis.  AROM LS flex: Fingers to mid-tib- min LBP. Ext: ~15- stiff. SB: Fingers to knee bilat- right LBP each way.  SLR:~60 bilat- HS stretch- no change with DF.  Muscle activation/Resisted testing: TA activation: Poor.  Palpation: Tight bilat LS paraspinals,  IV- LS PA: Local pain and 9jt stiffness RPA L4-5, stiff throughout.    Treatment:   Therex: Self low LS right rot mob in hooklying x 2 min// SB: wnl bilat- decr pain- HEP 1-2 min, 2x/day as helpful.  Pelvic tilts- HEP 1 min, 2x/day.   TA hold- HEP throughout day.    Assessment  Patient presents with decr ROM, local pain and jt stiffness consistent with L4-5 dysfunction, multilevel LS DJD.    Plan  Physical therapy 1-2 times/week for 6/8 wks. Therapy may include the following: Therapeutic exercise, manual therapy, education/home program.     Goals: Sit 45 min through meal w/o pain. Stand 45 min for meal prep w/o pain. In/out of car w/o pain.              Current Participants as of 2/11/2024    Name Type Comments Contact Info    Dominick Cheema MD Referring Provider  [phone]    Signature pending    Kendall Cannon PT Physical Therapist  [phone]    Signature pending

## 2024-03-07 ENCOUNTER — PATIENT MESSAGE (OUTPATIENT)
Dept: FAMILY MEDICINE CLINIC | Age: 69
End: 2024-03-07

## 2024-03-08 DIAGNOSIS — M79.7 FIBROMYALGIA: ICD-10-CM

## 2024-03-08 DIAGNOSIS — G43.709 CHRONIC MIGRAINE WITHOUT AURA WITHOUT STATUS MIGRAINOSUS, NOT INTRACTABLE: ICD-10-CM

## 2024-03-08 RX ORDER — HYDROCODONE BITARTRATE AND ACETAMINOPHEN 5; 325 MG/1; MG/1
1 TABLET ORAL DAILY PRN
Qty: 10 TABLET | Refills: 0 | Status: SHIPPED | OUTPATIENT
Start: 2024-03-08 | End: 2024-03-18

## 2024-03-08 NOTE — TELEPHONE ENCOUNTER
Refill Request - Controlled Substance    CONFIRM preferred pharmacy with the patient.    If Mail Order Rx - Pend for 90 day refill.        Last Seen Department: 2/9/2024  Last Seen by PCP: 2/9/2024    Last Written: 1/31/2024    Last UDS: 11/8/2018    Med Agreement Signed On: 8/7/2020    If no future appointment scheduled:  Review the last OV with PCP and review information for follow-up visit,  Route STAFF MESSAGE with patient name to the  Pool for scheduling with the following information:            -  Timing of next visit           -  Visit type ie Physical, OV, etc           -  Diagnoses/Reason ie. COPD, HTN - Do not use MEDICATION, Follow-up or CHECK UP - Give reason for visit        Next Appointment:   Future Appointments   Date Time Provider Department Center   4/1/2024 12:20 PM Ryland Darling MD [facility] Neurology -   5/3/2024 11:45 AM Noah Martinez MD [facility]   6/13/2024 11:00 AM Nancy Pan APRN - CNP [facility]       Message sent to  to schedule appt with patient?  NO      Requested Prescriptions     Pending Prescriptions Disp Refills    HYDROcodone-acetaminophen (NORCO) 5-325 MG per tablet 10 tablet 0     Sig: Take 1 tablet by mouth daily as needed for Pain for up to 10 days. May take instead of tramadol when pain is increased

## 2024-03-08 NOTE — TELEPHONE ENCOUNTER
From: Shelby Cordero  To: Nancy Pan  Sent: 3/7/2024 4:39 PM EST  Subject: Refill       Could you have Jessica send in a refill of Norco. I am having really bad headaches. It is almost every day    Tylenol is not working.     Thank you

## 2024-03-20 ENCOUNTER — PATIENT MESSAGE (OUTPATIENT)
Dept: FAMILY MEDICINE CLINIC | Age: 69
End: 2024-03-20

## 2024-03-20 NOTE — TELEPHONE ENCOUNTER
From: ARDEN GOMEZ  To: Shelby Cordero  Sent: 3/20/2024 2:43 PM EDT  Subject: Colonoscopy Due          We are working on getting everyone caught up on their Health Maintenance and we see you are due for the colon cancer screening.     If you have had a colonoscopy in the last 5-10 years, please send a [employer] message with the month/year, doctor, and location so we can obtain a report. If you do not have MyChart, you can contact the office so that we can obtain the report and update your records.   If you have completed a Fit Test in the last year or a Cologuard test in the last 3 years, please inform the office where this was completed.     If you have not had a colonoscopy, FIT test, or Cologuard test but would like to go ahead and get this process started. Please send a [employer] message or call the office to arrange these vital screening tests.     The main recommendation is a colonoscopy. The office can place a referral for you to get this scheduled. If you choose to not have a colonoscopy, here are some other options:   The Fit test which is done yearly. The office can provide this test to you, and you can mail this back to the office or drop this off to be tested.  The Cologuard which is done every 3 years can be ordered by your physician. This test can be mailed to you and the test can be picked up at your home.    If you want one of these tests, please let the office know.     If you would like to discuss with your provider to determine which option is best for you, please call the office to schedule or discuss at your next scheduled appointment.   If you have any questions please reach out to our office as our staff will be more than happy to assist you.    Thank you,   Your Healthcare Team

## 2024-04-01 ENCOUNTER — OFFICE VISIT (OUTPATIENT)
Dept: NEUROLOGY | Age: 69
End: 2024-04-01
Payer: COMMERCIAL

## 2024-04-01 VITALS
WEIGHT: 170 LBS | HEIGHT: 65 IN | OXYGEN SATURATION: 98 % | DIASTOLIC BLOOD PRESSURE: 68 MMHG | BODY MASS INDEX: 28.32 KG/M2 | HEART RATE: 67 BPM | SYSTOLIC BLOOD PRESSURE: 112 MMHG

## 2024-04-01 DIAGNOSIS — R41.3 MEMORY LOSS: Primary | ICD-10-CM

## 2024-04-01 DIAGNOSIS — F02.A0 MILD LATE ONSET ALZHEIMER'S DEMENTIA WITHOUT BEHAVIORAL DISTURBANCE, PSYCHOTIC DISTURBANCE, MOOD DISTURBANCE, OR ANXIETY (HCC): ICD-10-CM

## 2024-04-01 DIAGNOSIS — G30.1 MILD LATE ONSET ALZHEIMER'S DEMENTIA WITHOUT BEHAVIORAL DISTURBANCE, PSYCHOTIC DISTURBANCE, MOOD DISTURBANCE, OR ANXIETY (HCC): ICD-10-CM

## 2024-04-01 PROCEDURE — 99204 OFFICE O/P NEW MOD 45 MIN: CPT | Performed by: PSYCHIATRY & NEUROLOGY

## 2024-04-01 PROCEDURE — 1123F ACP DISCUSS/DSCN MKR DOCD: CPT | Performed by: PSYCHIATRY & NEUROLOGY

## 2024-04-01 RX ORDER — NITROGLYCERIN 0.6 MG/1
0.6 TABLET SUBLINGUAL EVERY 5 MIN PRN
COMMUNITY

## 2024-04-01 RX ORDER — DONEPEZIL HYDROCHLORIDE 5 MG/1
5 TABLET, FILM COATED ORAL NIGHTLY
Qty: 30 TABLET | Refills: 2 | Status: SHIPPED | OUTPATIENT
Start: 2024-04-01

## 2024-04-01 NOTE — PROGRESS NOTES
alcohol and does not use drugs.     Family history:    Family History   Problem Relation Age of Onset    Other Mother         sepsis    Dementia Mother     High Cholesterol Mother     Depression Mother         Due to the death of my dad and broth Jorge Ward    Anxiety Disorder Mother     High Blood Pressure Father     Other Father          from renal failure and CHF    Heart Disease Father          of End stage kidney disease    Migraines Father     High Cholesterol Father     Heart Attack Brother         CAD doed of a massive heart attack    Heart Disease Brother     Hypertension Other     High Cholesterol Other     Migraines Other     Heart Disease Other     Diabetes Other         Review of system:  No chest pain, shortness of breath, palpitation, cough, fever, abdominal pain, vomiting, diarrhea, dysuria, vertigo, joint pain, change in speech/vision or new onset of weakness/numbness. Remaining as per HPI.      /68 (Site: Left Upper Arm)   Pulse 67   Ht 1.651 m (5' 5\")   Wt 77.1 kg (170 lb)   LMP  (LMP Unknown)   SpO2 98% Comment: RA  BMI 28.29 kg/m²     Neurological examination:  MENTAL STATUS: AAOx3  LANG/SPEECH: Fluent, intact naming, repetition & comprehension  CRANIAL NERVES:    II: Pupils equal and reactive, no RAPD, normal visual field and fundus    III, IV, VI: EOM intact, no gaze preference or deviation    V: normal    VII: no facial asymmetry    VIII: normal hearing to speech  MOTOR: 5/5 in both upper and lower extremities  REFLEXES: 2/4 throughout, bilateral flexor planters  SENSORY: Normal to touch, temperature & pin prick in all extremiteis  COORD: Normal finger to nose and heel to shin, no tremor, no dysmetria    MMSE 26/30.  Orientation to time 5/5, place 5/5, registration 3/3, recall 3/3, calculation 2/5, and drawing 0/1.    Imaging, procedure, and laboratory data:   I reviewed the brain imagings and labs.    Impression:      ICD-10-CM    1. Memory loss  R41.3 EEG Extended More

## 2024-04-01 NOTE — PATIENT INSTRUCTIONS
YOU MUST CONFIRM YOUR APPOINTMENT 1 DAY PRIOR OR IT WILL BE CANCELLED!!   Our office will call you 3 times the day prior to your appointment in an attempt to confirm.  Please return our call ASAP or confirm your appt through [email or web address] no later than 3 pm the day before your appointment.  If we do not hear back from you by 3 pm to confirm, your appointment will be cancelled & someone will be added into that slot from our wait list.       EEG PREP:  1. Patient must NOT have more than 5 hours of sleep prior to the EEG  2. Patient should have CLEAN hair, no hairspray, gel, cream rinse, hair pins, or chemical treatments within 48 hours prior to EEG  3. NO caffeine, pain medications or sedatives on the day of the test   4. Arrive 30 minutes prior to appointment time check in at Registration Desk on 1st floor of hospital main entrance (by the [employer] [facility]).  5. Procedure will last 60-90 minutes.

## 2024-04-04 ENCOUNTER — TELEMEDICINE (OUTPATIENT)
Age: 69
End: 2024-04-04
Payer: COMMERCIAL

## 2024-04-04 DIAGNOSIS — J06.9 VIRAL URI WITH COUGH: Primary | ICD-10-CM

## 2024-04-04 PROCEDURE — 99213 OFFICE O/P EST LOW 20 MIN: CPT | Performed by: NURSE PRACTITIONER

## 2024-04-04 PROCEDURE — 1123F ACP DISCUSS/DSCN MKR DOCD: CPT | Performed by: NURSE PRACTITIONER

## 2024-04-04 ASSESSMENT — ENCOUNTER SYMPTOMS
HOARSE VOICE: 0
SINUS PAIN: 1
NAUSEA: 1
SINUS PRESSURE: 1
SORE THROAT: 0
SINUS COMPLAINT: 1

## 2024-04-04 NOTE — PROGRESS NOTES
Shelby Cordero (:  1955) is a Established patient, here for evaluation of the following:    Congestion (X 2 days with drainage)       Assessment & Plan:  Below is the assessment and plan developed based on review of pertinent history, physical exam, labs, studies, and medications.  1. Viral URI with cough  Discussed that her symptoms were most consistent with a viral illness and that antibiotics would be detrimental. Recommend nasal decongestant but she does not like to use nasal sprays. Advised patient that she can use mucinex DM and neti pot but avoid combination cold/cough formulas as they can have antihistamines which can cause problems for people with dementia.  . She has prednisone that she takes for her fibro flares and thinks taking a low dose of prednisone for a few days will help as she has done this in the past. I recommended f/u with pcp next week if her symptoms do not improve.   The patient would benefit from future follow up with their usual PCP. As of the end of their Virtualist Visit today, follow up visit status is as follows: No PCP availability    Return if symptoms worsen or fail to improve.    Subjective:   Sinus Problem (Symptoms started x2 days ago, pt c/o severe sinus pain/pressure, pressure worse when bending over, nasal congestion/drainage, thick yellow mucus, also developing nausea from all the drainage. /Taking mucinex, sinus tylenol, and using neti pot with no relief. She has a cough only when she has sinus drainage.  Her nausea came after she ate an egg and cheese sandwich. She has not vomited or had any diarrhea. She denies the possibility that this could be COVID or allergies.      Sinus Problem  This is a new problem. Episode onset: 2 days ago. The problem is unchanged. The pain is moderate. Associated symptoms include congestion, headaches and sinus pressure. Pertinent negatives include no hoarse voice, sneezing or sore throat. Past treatments include nasal

## 2024-04-05 DIAGNOSIS — J01.90 ACUTE BACTERIAL SINUSITIS: Primary | ICD-10-CM

## 2024-04-05 DIAGNOSIS — B96.89 ACUTE BACTERIAL SINUSITIS: Primary | ICD-10-CM

## 2024-04-05 RX ORDER — AZITHROMYCIN 250 MG/1
TABLET, FILM COATED ORAL
Qty: 6 TABLET | Refills: 0 | Status: SHIPPED | OUTPATIENT
Start: 2024-04-05 | End: 2024-04-15

## 2024-04-08 DIAGNOSIS — G43.709 CHRONIC MIGRAINE WITHOUT AURA WITHOUT STATUS MIGRAINOSUS, NOT INTRACTABLE: ICD-10-CM

## 2024-04-08 DIAGNOSIS — M79.7 FIBROMYALGIA: ICD-10-CM

## 2024-04-08 NOTE — TELEPHONE ENCOUNTER
Pt calling for refill, states that she still in a lot of pain and doesn't know what she should do, states she really low on these two medications ...    Please advise thanks

## 2024-04-08 NOTE — TELEPHONE ENCOUNTER
Refill Request - Controlled Substance    CONFIRM preferred pharmacy with the patient.    If Mail Order Rx - Pend for 90 day refill.        Last Seen Department: 2024  Last Seen by PCP: 2024    Last Written: norco 5-325 mg 3/8/24 #10 refills 0  Prednisone 23 #21 refills 0  Last UDS: 2018    Med Agreement Signed On: 20    If no future appointment scheduled:  Review the last OV with PCP and review information for follow-up visit,  Route STAFF MESSAGE with patient name to the  Pool for scheduling with the following information:            -  Timing of next visit           -  Visit type ie Physical, OV, etc           -  Diagnoses/Reason ie. COPD, HTN - Do not use MEDICATION, Follow-up or CHECK UP - Give reason for visit        Next Appointment:   Future Appointments   Date Time Provider Department Center   2024  9:30 AM [facility] EEG ROOM 1 [facility] [place]   5/3/2024 11:45 AM Noah Martinez MD [facility]   2024 11:00 AM Nancy Pan, APRN - CNP [facility] - [facility]   2024 12:00 PM Ryland Darling MD [facility] Neurology -       Message sent to  to schedule appt with patient?  NO      Requested Prescriptions     Pending Prescriptions Disp Refills    predniSONE (DELTASONE) 10 MG tablet 21 tablet 0     Si tablets 2 times daily for 3 days, 1 tablet 2 times daily for 3 days, 1 tablet daily.    HYDROcodone-acetaminophen (NORCO) 5-325 MG per tablet 10 tablet 0     Sig: Take 1 tablet by mouth daily as needed for Pain for up to 10 days. May take instead of tramadol when pain is increased

## 2024-04-10 RX ORDER — HYDROCODONE BITARTRATE AND ACETAMINOPHEN 5; 325 MG/1; MG/1
1 TABLET ORAL DAILY PRN
Qty: 10 TABLET | Refills: 0 | Status: SHIPPED | OUTPATIENT
Start: 2024-04-10 | End: 2024-04-20

## 2024-04-10 RX ORDER — PREDNISONE 10 MG/1
TABLET ORAL
Qty: 21 TABLET | Refills: 0 | Status: SHIPPED | OUTPATIENT
Start: 2024-04-10

## 2024-04-17 ENCOUNTER — HOSPITAL ENCOUNTER (OUTPATIENT)
Dept: NEUROLOGY | Age: 69
Discharge: HOME | End: 2024-04-17
Payer: COMMERCIAL

## 2024-04-17 DIAGNOSIS — R41.3 MEMORY LOSS: ICD-10-CM

## 2024-04-17 PROCEDURE — 95813 EEG EXTND MNTR 61-119 MIN: CPT | Performed by: PSYCHIATRY & NEUROLOGY

## 2024-04-17 PROCEDURE — 95813 EEG EXTND MNTR 61-119 MIN: CPT

## 2024-04-17 NOTE — PROCEDURES
INTERPRETATION:  This 61-minute, computer-assisted video EEG recording is mildly abnormal.  It showed mild degree of generalized slowing background activity.  No potentially epileptiform activity was present during the recording.      The EEG findings were consistent with mild degree of generalized non-specific cerebral dysfunction.    CLASSIFICATION:  Dysrhythmia grade 1, generalized.  Sleep - unsuccessful.  EKG channel.    DESCRIPTION:    BACKGROUND:  The awake recording revealed 9 Hz alpha activity over the posterior head region.  There were increased 4 to 7 Hz theta activity into the EEG background.  Given the extensive study, the patient still did not sleep.  The EEG showed normal V waves during drowsiness.  There was no significant change on the EEG background with photic stimulation.  Hyperventilation was omitted due to old age.      INTERICTAL DISCHARGES: None    CLINICAL EVENTS:  None    The EKG channel was unremarkable.

## 2024-04-17 NOTE — PROGRESS NOTES
EEG completed and available for interpretation on the [facility] database .

## 2024-04-18 ENCOUNTER — PATIENT MESSAGE (OUTPATIENT)
Dept: NEUROLOGY | Age: 69
End: 2024-04-18

## 2024-04-19 ENCOUNTER — TELEPHONE (OUTPATIENT)
Dept: CARDIOLOGY CLINIC | Age: 69
End: 2024-04-19

## 2024-04-19 NOTE — TELEPHONE ENCOUNTER
Patient called stating she is having some teeth pulled today and states she wwas told to stop taking Eliquis (5mg) 3 days prior to her appointment.. patient would like to know when she can start taking Eliquis 5mg again. Patients # [phone]

## 2024-04-22 ENCOUNTER — PATIENT MESSAGE (OUTPATIENT)
Dept: CARDIOLOGY CLINIC | Age: 69
End: 2024-04-22

## 2024-04-23 RX ORDER — NITROGLYCERIN 0.6 MG/1
0.6 TABLET SUBLINGUAL EVERY 5 MIN PRN
Qty: 25 TABLET | Refills: 2 | Status: SHIPPED | OUTPATIENT
Start: 2024-04-23

## 2024-04-24 ENCOUNTER — OFFICE VISIT (OUTPATIENT)
Age: 69
End: 2024-04-24
Payer: COMMERCIAL

## 2024-04-24 VITALS
BODY MASS INDEX: 27.99 KG/M2 | SYSTOLIC BLOOD PRESSURE: 118 MMHG | OXYGEN SATURATION: 98 % | HEART RATE: 79 BPM | DIASTOLIC BLOOD PRESSURE: 64 MMHG | WEIGHT: 168 LBS | HEIGHT: 65 IN | RESPIRATION RATE: 12 BRPM

## 2024-04-24 DIAGNOSIS — G89.29 CHRONIC INTRACTABLE HEADACHE, UNSPECIFIED HEADACHE TYPE: ICD-10-CM

## 2024-04-24 DIAGNOSIS — R41.3 MEMORY LOSS: Primary | ICD-10-CM

## 2024-04-24 DIAGNOSIS — M26.609 TMJ (TEMPOROMANDIBULAR JOINT DISORDER): ICD-10-CM

## 2024-04-24 DIAGNOSIS — R51.9 CHRONIC INTRACTABLE HEADACHE, UNSPECIFIED HEADACHE TYPE: ICD-10-CM

## 2024-04-24 PROCEDURE — 1123F ACP DISCUSS/DSCN MKR DOCD: CPT | Performed by: PSYCHIATRY & NEUROLOGY

## 2024-04-24 PROCEDURE — 99214 OFFICE O/P EST MOD 30 MIN: CPT | Performed by: PSYCHIATRY & NEUROLOGY

## 2024-04-24 NOTE — PATIENT INSTRUCTIONS
YOU MUST CONFIRM YOUR APPOINTMENT 1 DAY PRIOR OR IT WILL BE CANCELLED!!   Our office will call you 3 times the day prior to your appointment in an attempt to confirm.  Please return our call ASAP or confirm your appt through [employer] no later than 3 pm the day before your appointment.  If we do not hear back from you by 3 pm to confirm, your appointment will be cancelled & someone will be added into that slot from our wait list.

## 2024-04-29 ENCOUNTER — TELEPHONE (OUTPATIENT)
Dept: FAMILY MEDICINE CLINIC | Age: 69
End: 2024-04-29

## 2024-04-29 DIAGNOSIS — K57.92 DIVERTICULITIS: Primary | ICD-10-CM

## 2024-04-29 RX ORDER — CIPROFLOXACIN 250 MG/1
250 TABLET, FILM COATED ORAL 2 TIMES DAILY
Qty: 14 TABLET | Refills: 0 | Status: SHIPPED | OUTPATIENT
Start: 2024-04-29 | End: 2024-05-06

## 2024-04-29 RX ORDER — METRONIDAZOLE 250 MG/1
250 TABLET ORAL 3 TIMES DAILY
Qty: 21 TABLET | Refills: 0 | Status: SHIPPED | OUTPATIENT
Start: 2024-04-29 | End: 2024-05-06

## 2024-04-29 NOTE — TELEPHONE ENCOUNTER
Pt states she has a flare up of diverticulitis that started on 4/28/24.   She is asking for flagyl and cipro to be sent in.   Rupali Yoon.   Please advise if you will send this in or if you require an appointment.

## 2024-05-21 DIAGNOSIS — M79.7 FIBROMYALGIA: ICD-10-CM

## 2024-05-21 NOTE — TELEPHONE ENCOUNTER
Refill Request     CONFIRM preferred pharmacy with the patient.    If Mail Order Rx - Pend for 90 day refill.      Last Seen: Last Seen Department: 2/9/2024  Last Seen by PCP: 1/23/2024    Last Written: 10/26/2023    If no future appointment scheduled:  Review the last OV with PCP and review information for follow-up visit,  Route STAFF MESSAGE with patient name to the  Pool for scheduling with the following information:            -  Timing of next visit           -  Visit type ie Physical, OV, etc           -  Diagnoses/Reason ie. COPD, HTN - Do not use MEDICATION, Follow-up or CHECK UP - Give reason for visit      Next Appointment:   Future Appointments   Date Time Provider Department Center   6/13/2024 11:00 AM Nancy Pan APRN - CNP [facility]   6/24/2024 12:00 PM Ryland Darling MD [facility] Neurology -       Message sent to  to schedule appt with patient?  NO      Requested Prescriptions     Pending Prescriptions Disp Refills    methocarbamol (ROBAXIN) 500 MG tablet [Pharmacy Med Name: METHOCARBAMOL 500 MG TABLET] 90 tablet 0     Sig: TAKE ONE TABLET BY MOUTH THREE TIMES A DAY

## 2024-05-22 RX ORDER — METHOCARBAMOL 500 MG/1
TABLET, FILM COATED ORAL
Qty: 90 TABLET | Refills: 1 | Status: SHIPPED | OUTPATIENT
Start: 2024-05-22

## 2024-06-03 DIAGNOSIS — F33.0 MILD EPISODE OF RECURRENT MAJOR DEPRESSIVE DISORDER (HCC): ICD-10-CM

## 2024-06-03 RX ORDER — BUPROPION HYDROCHLORIDE 75 MG/1
TABLET ORAL
Qty: 90 TABLET | Refills: 1 | Status: ON HOLD | OUTPATIENT
Start: 2024-06-03

## 2024-06-03 NOTE — TELEPHONE ENCOUNTER
Refill Request     CONFIRM preferred pharmacy with the patient.    If Mail Order Rx - Pend for 90 day refill.      Last Seen: Last Seen Department: 2/9/2024  Last Seen by PCP: 2/9/2024    Last Written: 11/26/2023 90 tab 1 refills     If no future appointment scheduled:  Review the last OV with PCP and review information for follow-up visit,  Route STAFF MESSAGE with patient name to the  Pool for scheduling with the following information:            -  Timing of next visit           -  Visit type ie Physical, OV, etc           -  Diagnoses/Reason ie. COPD, HTN - Do not use MEDICATION, Follow-up or CHECK UP - Give reason for visit      Next Appointment:   Future Appointments   Date Time Provider Department Center   6/13/2024 11:00 AM Nancy Pan APRN - CNP [facility] [place] - [facility]   6/24/2024 12:00 PM Ryland Darling MD [facility] Neurology -       Message sent to  to schedule appt with patient?  NO      Requested Prescriptions     Pending Prescriptions Disp Refills    buPROPion (WELLBUTRIN) 75 MG tablet 90 tablet 1     Sig: TAKE TWO TABLETS BY MOUTH EVERY MORNING

## 2024-06-05 DIAGNOSIS — M79.7 FIBROMYALGIA: ICD-10-CM

## 2024-06-05 RX ORDER — APIXABAN 5 MG/1
5 TABLET, FILM COATED ORAL 2 TIMES DAILY
Qty: 60 TABLET | Refills: 0 | Status: ON HOLD | OUTPATIENT
Start: 2024-06-05

## 2024-06-05 RX ORDER — PREDNISONE 10 MG/1
TABLET ORAL
Qty: 21 TABLET | Refills: 0 | Status: ON HOLD | OUTPATIENT
Start: 2024-06-05

## 2024-06-05 RX ORDER — PREDNISONE 10 MG/1
TABLET ORAL
Qty: 21 TABLET | Refills: 0 | OUTPATIENT
Start: 2024-06-05

## 2024-06-05 NOTE — TELEPHONE ENCOUNTER
Left message for patient.   
Patient advised   
Refill Request     CONFIRM preferred pharmacy with the patient.    If Mail Order Rx - Pend for 90 day refill.      Last Seen: Last Seen Department: 2024    Last Seen by PCP: 2024    Last Written: 4/10/24 21 tablet 0 refills    If no future appointment scheduled:  Review the last OV with PCP and review information for follow-up visit,  Route STAFF MESSAGE with patient name to the  Pool for scheduling with the following information:            -  Timing of next visit           -  Visit type ie Physical, OV, etc           -  Diagnoses/Reason ie. COPD, HTN - Do not use MEDICATION, Follow-up or CHECK UP - Give reason for visit      Next Appointment: 24  Future Appointments   Date Time Provider Department Center   2024 11:00 AM Nancy Pan APRN - CNP [facility] - [facility]   2024 12:00 PM Ryland Darling MD [facility] Neurology -       Message sent to  to schedule appt with patient?  NO      Requested Prescriptions     Pending Prescriptions Disp Refills    predniSONE (DELTASONE) 10 MG tablet 21 tablet 0     Si tablets 2 times daily for 3 days, 1 tablet 2 times daily for 3 days, 1 tablet daily.       
Spoke with pt, she stated that she is in a fibromyalgia flare up and DP prescribes her this steroid please advise if you would like pt to sched an appt to be seen rather it be a VV or OV. She stated that she is in the area and could be seen today. Please adivse  
Offered, declined by mother

## 2024-06-08 ENCOUNTER — APPOINTMENT (OUTPATIENT)
Dept: CT IMAGING | Age: 69
DRG: 068 | End: 2024-06-08
Payer: COMMERCIAL

## 2024-06-08 ENCOUNTER — APPOINTMENT (OUTPATIENT)
Dept: GENERAL RADIOLOGY | Age: 69
DRG: 068 | End: 2024-06-08
Payer: COMMERCIAL

## 2024-06-08 ENCOUNTER — HOSPITAL ENCOUNTER (INPATIENT)
Age: 69
LOS: 2 days | Discharge: HOME | DRG: 068 | End: 2024-06-10
Attending: EMERGENCY MEDICINE | Admitting: STUDENT IN AN ORGANIZED HEALTH CARE EDUCATION/TRAINING PROGRAM
Payer: COMMERCIAL

## 2024-06-08 DIAGNOSIS — R20.0 NUMBNESS: ICD-10-CM

## 2024-06-08 DIAGNOSIS — I63.9 CEREBROVASCULAR ACCIDENT (CVA), UNSPECIFIED MECHANISM (HCC): Primary | ICD-10-CM

## 2024-06-08 DIAGNOSIS — M15.9 PRIMARY OSTEOARTHRITIS INVOLVING MULTIPLE JOINTS: ICD-10-CM

## 2024-06-08 LAB
ALBUMIN SERPL-MCNC: 4.2 G/DL (ref 3.4–5)
ALBUMIN/GLOB SERPL: 1.6 {RATIO} (ref 1.1–2.2)
ALP SERPL-CCNC: 103 U/L (ref 40–129)
ALT SERPL-CCNC: 11 U/L (ref 10–40)
ANION GAP SERPL CALCULATED.3IONS-SCNC: 9 MMOL/L (ref 3–16)
AST SERPL-CCNC: 14 U/L (ref 15–37)
BASOPHILS # BLD: 0.1 K/UL (ref 0–0.2)
BASOPHILS NFR BLD: 0.8 %
BILIRUB SERPL-MCNC: 0.3 MG/DL (ref 0–1)
BUN SERPL-MCNC: 13 MG/DL (ref 7–20)
CALCIUM SERPL-MCNC: 9.2 MG/DL (ref 8.3–10.6)
CHLORIDE SERPL-SCNC: 101 MMOL/L (ref 99–110)
CO2 SERPL-SCNC: 29 MMOL/L (ref 21–32)
CREAT SERPL-MCNC: 0.7 MG/DL (ref 0.6–1.2)
DEPRECATED RDW RBC AUTO: 14 % (ref 12.4–15.4)
EKG ATRIAL RATE: 61 BPM
EKG DIAGNOSIS: NORMAL
EKG P AXIS: 35 DEGREES
EKG P-R INTERVAL: 130 MS
EKG Q-T INTERVAL: 476 MS
EKG QRS DURATION: 126 MS
EKG QTC CALCULATION (BAZETT): 479 MS
EKG R AXIS: -28 DEGREES
EKG T AXIS: 60 DEGREES
EKG VENTRICULAR RATE: 61 BPM
EOSINOPHIL # BLD: 0.3 K/UL (ref 0–0.6)
EOSINOPHIL NFR BLD: 3.1 %
GFR SERPLBLD CREATININE-BSD FMLA CKD-EPI: >90 ML/MIN/{1.73_M2}
GLUCOSE SERPL-MCNC: 81 MG/DL (ref 70–99)
HCT VFR BLD AUTO: 40.1 % (ref 36–48)
HGB BLD-MCNC: 13.3 G/DL (ref 12–16)
INR PPP: 1.31 (ref 0.85–1.15)
LYMPHOCYTES # BLD: 3 K/UL (ref 1–5.1)
LYMPHOCYTES NFR BLD: 29.5 %
MAGNESIUM SERPL-MCNC: 2 MG/DL (ref 1.8–2.4)
MCH RBC QN AUTO: 31.4 PG (ref 26–34)
MCHC RBC AUTO-ENTMCNC: 33.2 G/DL (ref 31–36)
MCV RBC AUTO: 94.6 FL (ref 80–100)
MONOCYTES # BLD: 0.7 K/UL (ref 0–1.3)
MONOCYTES NFR BLD: 6.9 %
NEUTROPHILS # BLD: 6 K/UL (ref 1.7–7.7)
NEUTROPHILS NFR BLD: 59.7 %
PLATELET # BLD AUTO: 316 K/UL (ref 135–450)
PMV BLD AUTO: 6.5 FL (ref 5–10.5)
POTASSIUM SERPL-SCNC: 3.1 MMOL/L (ref 3.5–5.1)
PROT SERPL-MCNC: 6.8 G/DL (ref 6.4–8.2)
PROTHROMBIN TIME: 16.5 SEC (ref 11.9–14.9)
RBC # BLD AUTO: 4.24 M/UL (ref 4–5.2)
SODIUM SERPL-SCNC: 139 MMOL/L (ref 136–145)
TROPONIN, HIGH SENSITIVITY: 7 NG/L (ref 0–14)
WBC # BLD AUTO: 10 K/UL (ref 4–11)

## 2024-06-08 PROCEDURE — 6370000000 HC RX 637 (ALT 250 FOR IP)

## 2024-06-08 PROCEDURE — 6360000004 HC RX CONTRAST MEDICATION: Performed by: EMERGENCY MEDICINE

## 2024-06-08 PROCEDURE — 99285 EMERGENCY DEPT VISIT HI MDM: CPT

## 2024-06-08 PROCEDURE — 80053 COMPREHEN METABOLIC PANEL: CPT

## 2024-06-08 PROCEDURE — 70498 CT ANGIOGRAPHY NECK: CPT

## 2024-06-08 PROCEDURE — 2580000003 HC RX 258: Performed by: STUDENT IN AN ORGANIZED HEALTH CARE EDUCATION/TRAINING PROGRAM

## 2024-06-08 PROCEDURE — 85025 COMPLETE CBC W/AUTO DIFF WBC: CPT

## 2024-06-08 PROCEDURE — 93010 ELECTROCARDIOGRAM REPORT: CPT | Performed by: INTERNAL MEDICINE

## 2024-06-08 PROCEDURE — 85610 PROTHROMBIN TIME: CPT

## 2024-06-08 PROCEDURE — 84484 ASSAY OF TROPONIN QUANT: CPT

## 2024-06-08 PROCEDURE — 36415 COLL VENOUS BLD VENIPUNCTURE: CPT

## 2024-06-08 PROCEDURE — 71045 X-RAY EXAM CHEST 1 VIEW: CPT

## 2024-06-08 PROCEDURE — 93005 ELECTROCARDIOGRAM TRACING: CPT | Performed by: EMERGENCY MEDICINE

## 2024-06-08 PROCEDURE — 6370000000 HC RX 637 (ALT 250 FOR IP): Performed by: STUDENT IN AN ORGANIZED HEALTH CARE EDUCATION/TRAINING PROGRAM

## 2024-06-08 PROCEDURE — 1200000000 HC SEMI PRIVATE

## 2024-06-08 PROCEDURE — 4A03X5D MEASUREMENT OF ARTERIAL FLOW, INTRACRANIAL, EXTERNAL APPROACH: ICD-10-PCS | Performed by: INTERNAL MEDICINE

## 2024-06-08 PROCEDURE — 70450 CT HEAD/BRAIN W/O DYE: CPT

## 2024-06-08 PROCEDURE — 83735 ASSAY OF MAGNESIUM: CPT

## 2024-06-08 RX ORDER — ROSUVASTATIN CALCIUM 10 MG/1
20 TABLET, COATED ORAL NIGHTLY
Status: DISCONTINUED | OUTPATIENT
Start: 2024-06-08 | End: 2024-06-10 | Stop reason: HOSPADM

## 2024-06-08 RX ORDER — SODIUM CHLORIDE 0.9 % (FLUSH) 0.9 %
5-40 SYRINGE (ML) INJECTION EVERY 12 HOURS SCHEDULED
Status: DISCONTINUED | OUTPATIENT
Start: 2024-06-08 | End: 2024-06-10 | Stop reason: HOSPADM

## 2024-06-08 RX ORDER — SODIUM CHLORIDE 9 MG/ML
INJECTION, SOLUTION INTRAVENOUS PRN
Status: DISCONTINUED | OUTPATIENT
Start: 2024-06-08 | End: 2024-06-10 | Stop reason: HOSPADM

## 2024-06-08 RX ORDER — LABETALOL HYDROCHLORIDE 5 MG/ML
10 INJECTION, SOLUTION INTRAVENOUS EVERY 4 HOURS PRN
Status: DISCONTINUED | OUTPATIENT
Start: 2024-06-08 | End: 2024-06-10 | Stop reason: HOSPADM

## 2024-06-08 RX ORDER — METOPROLOL SUCCINATE 25 MG/1
25 TABLET, EXTENDED RELEASE ORAL DAILY
Status: DISCONTINUED | OUTPATIENT
Start: 2024-06-09 | End: 2024-06-10 | Stop reason: HOSPADM

## 2024-06-08 RX ORDER — SODIUM CHLORIDE 0.9 % (FLUSH) 0.9 %
5-40 SYRINGE (ML) INJECTION PRN
Status: DISCONTINUED | OUTPATIENT
Start: 2024-06-08 | End: 2024-06-10 | Stop reason: HOSPADM

## 2024-06-08 RX ORDER — ASPIRIN 81 MG/1
81 TABLET, CHEWABLE ORAL DAILY
Status: DISCONTINUED | OUTPATIENT
Start: 2024-06-09 | End: 2024-06-10

## 2024-06-08 RX ORDER — MAGNESIUM SULFATE IN WATER 40 MG/ML
2000 INJECTION, SOLUTION INTRAVENOUS PRN
Status: DISCONTINUED | OUTPATIENT
Start: 2024-06-08 | End: 2024-06-10 | Stop reason: HOSPADM

## 2024-06-08 RX ORDER — ENOXAPARIN SODIUM 100 MG/ML
40 INJECTION SUBCUTANEOUS DAILY
Status: DISCONTINUED | OUTPATIENT
Start: 2024-06-08 | End: 2024-06-08

## 2024-06-08 RX ORDER — CETIRIZINE HYDROCHLORIDE 5 MG/1
5 TABLET ORAL DAILY
COMMUNITY

## 2024-06-08 RX ORDER — POTASSIUM CHLORIDE 7.45 MG/ML
10 INJECTION INTRAVENOUS PRN
Status: DISCONTINUED | OUTPATIENT
Start: 2024-06-08 | End: 2024-06-10 | Stop reason: HOSPADM

## 2024-06-08 RX ORDER — ACETAMINOPHEN 325 MG/1
650 TABLET ORAL EVERY 4 HOURS PRN
Status: DISCONTINUED | OUTPATIENT
Start: 2024-06-08 | End: 2024-06-10 | Stop reason: HOSPADM

## 2024-06-08 RX ORDER — DONEPEZIL HYDROCHLORIDE 5 MG/1
5 TABLET, FILM COATED ORAL NIGHTLY
Status: DISCONTINUED | OUTPATIENT
Start: 2024-06-08 | End: 2024-06-10 | Stop reason: HOSPADM

## 2024-06-08 RX ORDER — POTASSIUM CHLORIDE 20 MEQ/1
40 TABLET, EXTENDED RELEASE ORAL PRN
Status: DISCONTINUED | OUTPATIENT
Start: 2024-06-08 | End: 2024-06-10 | Stop reason: HOSPADM

## 2024-06-08 RX ORDER — ONDANSETRON 2 MG/ML
4 INJECTION INTRAMUSCULAR; INTRAVENOUS EVERY 6 HOURS PRN
Status: DISCONTINUED | OUTPATIENT
Start: 2024-06-08 | End: 2024-06-10 | Stop reason: HOSPADM

## 2024-06-08 RX ORDER — POLYETHYLENE GLYCOL 3350 17 G/17G
17 POWDER, FOR SOLUTION ORAL DAILY PRN
Status: DISCONTINUED | OUTPATIENT
Start: 2024-06-08 | End: 2024-06-10 | Stop reason: HOSPADM

## 2024-06-08 RX ORDER — HYDROCODONE BITARTRATE AND ACETAMINOPHEN 5; 325 MG/1; MG/1
1 TABLET ORAL EVERY 6 HOURS PRN
Status: ON HOLD | COMMUNITY
End: 2024-06-10

## 2024-06-08 RX ORDER — ONDANSETRON 4 MG/1
4 TABLET, ORALLY DISINTEGRATING ORAL EVERY 8 HOURS PRN
Status: DISCONTINUED | OUTPATIENT
Start: 2024-06-08 | End: 2024-06-10 | Stop reason: HOSPADM

## 2024-06-08 RX ADMIN — APIXABAN 5 MG: 5 TABLET, FILM COATED ORAL at 22:08

## 2024-06-08 RX ADMIN — SODIUM CHLORIDE, PRESERVATIVE FREE 10 ML: 5 INJECTION INTRAVENOUS at 22:09

## 2024-06-08 RX ADMIN — ACETAMINOPHEN 650 MG: 325 TABLET ORAL at 22:20

## 2024-06-08 RX ADMIN — POTASSIUM CHLORIDE 40 MEQ: 1500 TABLET, EXTENDED RELEASE ORAL at 16:08

## 2024-06-08 RX ADMIN — DONEPEZIL HYDROCHLORIDE 5 MG: 5 TABLET, FILM COATED ORAL at 22:09

## 2024-06-08 RX ADMIN — ROSUVASTATIN 20 MG: 10 TABLET, FILM COATED ORAL at 22:09

## 2024-06-08 RX ADMIN — IOPAMIDOL 75 ML: 755 INJECTION, SOLUTION INTRAVENOUS at 12:00

## 2024-06-08 ASSESSMENT — PAIN - FUNCTIONAL ASSESSMENT
PAIN_FUNCTIONAL_ASSESSMENT: NONE - DENIES PAIN
PAIN_FUNCTIONAL_ASSESSMENT: NONE - DENIES PAIN

## 2024-06-08 ASSESSMENT — LIFESTYLE VARIABLES
HOW MANY STANDARD DRINKS CONTAINING ALCOHOL DO YOU HAVE ON A TYPICAL DAY: PATIENT DOES NOT DRINK
HOW OFTEN DO YOU HAVE A DRINK CONTAINING ALCOHOL: NEVER

## 2024-06-08 NOTE — H&P
Hospital Medicine History & Physical      Date of Admission: 6/8/2024    Date of Service:  Pt seen/examined on 06/08/24     [x]Admitted to Inpatient with expected LOS greater than two midnights due to medical therapy.  []Placed in Observation status.    Chief Admission Complaint:  numbness    Presenting Admission History:      69 y.o. female who presented to [facility] with numbness.  PMHx significant for CAD, HLD, OA, GERD.      Patient stated that last night around 9:30 PM she started to have chin numbness and tingling.  Patient states that she did some neurochecks and found that she had no swallowing issues and no speech issues.  Patient stated that symptoms did improve a little bit with time.  However, in the a.m. on 6/8/2024 patient stated her symptoms worsened.  She called her PCP and was informed to go to the emergency department for further evaluation and treatment.  Currently still has numbness and tingling of the left facial region.  Denies any other major neurosymptoms. Denies fevers, chills, sweats. Denies chest pain & palpitations. Denies shortness of breath and cough. Denies nausea, vomiting, or diarrhea. Denies changes in urination.     Patient presented to the emergency department afebrile with a temperature of 97.9 Fahrenheit.  Respiratory rate 18 with an oxygen saturation 98% on room air.  Heart rate 64.  Blood pressure 141/76.  Potassium 3.1.  Troponin 7.  Liver panel unremarkable.  CBC unremarkable.  PT 16.5, INR 1.31.  CT head without contrast showed no acute intracranial abnormality CTA head/neck showed moderate stenosis of the right supraclinoid ICA, no significant stenosis or large vessel occlusion of the [place].  Chest x-ray showed no acute cardiopulmonary pathology.  ECG showed normal sinus rhythm with a rate of 61.  No intervention was given while in the emergency department it was at this time patient be admitted to hospital service for further evaluation and

## 2024-06-08 NOTE — ED NOTES
CODE STROKE TIMELINE  1136-  to [facility] for stroke assessment  1145-code stroke called by   1145-Called CT RM 2  1146-Pt to CT by Aileen MIGUEL  1146-Called  stroke team  1147-Called Lab  1149- stroke team returned page, transferred to

## 2024-06-08 NOTE — ED PROVIDER NOTES
97.9 °F (36.6 °C)   TempSrc: Oral   SpO2: 98%   Weight: 76.2 kg (168 lb)   Height: 1.651 m (5' 5\")             Is this patient to be included in the SEP-1 Core Measure due to severe sepsis or septic shock?   No   Exclusion criteria - the patient is NOT to be included for SEP-1 Core Measure due to:  Infection is not suspected        CRITICAL CARE TIME:  I personally saw the patient and independently provided 35 minutes of non-concurrent critical care out of the total shared critical care time excluding separately billable procedures.      CC/HPI Summary, DDx, ED Course, and Reassessment:     Patient with known history of CVA on Eliquis currently presents to the emergency department complaining of right facial numbness starting last night approximately 9:30 PM.  Vital stable on arrival.  NIH of 1.  Stroke alert initiated.  Patient is not a TNK candidate at this time secondary to nondisabling symptoms as well as Eliquis.  Basic labs reveal stable hemoglobin and hematocrit.  No significant electrolyte abnormality.  Renal function stable.  CT/CTA were completed.  No adrenal hemorrhage.  Right ICA stenosis noted.  Patient will be admitted for further evaluation and treatment.       Patient was given the following medications:   Medications - No data to display     CONSULTS:   None   Discussion with Other Professionals: None   Social Determinants : None   Chronic Conditions: History of early dementia and CVA currently on oral anticoagulation.           FINAL IMPRESSION    1. Cerebrovascular accident (CVA), unspecified mechanism (HCC)    2. Numbness           DISPOSITION/PLAN   DISPOSITION  : Patient will be admitted.                (Please note that portions of this note were completed with a voice recognition program.  Efforts were made to edit the dictations but occasionally words are mis-transcribed.)       Ayo Keen II, DO (electronically signed)          Aoy Keen II, DO  06/08/24 [id number]

## 2024-06-08 NOTE — CONSULTS
Consult Placed     Who: GARRISON RING   Date:6/8/24  Time:351     Electronically signed by Loretta Carvajal on 6/8/2024 at 3:51 PM

## 2024-06-08 NOTE — PLAN OF CARE
Problem: Discharge Planning  Goal: Discharge to home or other facility with appropriate resources  Outcome: Progressing  Flowsheets (Taken 6/8/2024 1700)  Discharge to home or other facility with appropriate resources:   Identify barriers to discharge with patient and caregiver   Arrange for needed discharge resources and transportation as appropriate     Problem: ABCDS Injury Assessment  Goal: Absence of physical injury  Outcome: Progressing  Flowsheets (Taken 6/8/2024 1700)  Absence of Physical Injury: Implement safety measures based on patient assessment     Problem: Neurosensory - Adult  Goal: Achieves stable or improved neurological status  Outcome: Progressing  Flowsheets (Taken 6/8/2024 1700)  Achieves stable or improved neurological status:   Assess for and report changes in neurological status   Maintain blood pressure and fluid volume within ordered parameters to optimize cerebral perfusion and minimize risk of hemorrhage   Monitor temperature, glucose, and sodium. Initiate appropriate interventions as ordered     Problem: Neurosensory - Adult  Goal: Achieves maximal functionality and self care  Outcome: Progressing  Flowsheets (Taken 6/8/2024 1700)  Achieves maximal functionality and self care:   Monitor swallowing and airway patency with patient fatigue and changes in neurological status   Encourage and assist patient to increase activity and self care with guidance from physical therapy/occupational therapy

## 2024-06-09 ENCOUNTER — APPOINTMENT (OUTPATIENT)
Dept: MRI IMAGING | Age: 69
DRG: 068 | End: 2024-06-09
Payer: COMMERCIAL

## 2024-06-09 LAB
CHOLEST SERPL-MCNC: 136 MG/DL (ref 0–199)
HDLC SERPL-MCNC: 62 MG/DL (ref 40–60)
LDLC SERPL CALC-MCNC: 50 MG/DL
TRIGL SERPL-MCNC: 120 MG/DL (ref 0–150)
VLDLC SERPL CALC-MCNC: 24 MG/DL

## 2024-06-09 PROCEDURE — 2580000003 HC RX 258: Performed by: STUDENT IN AN ORGANIZED HEALTH CARE EDUCATION/TRAINING PROGRAM

## 2024-06-09 PROCEDURE — 80061 LIPID PANEL: CPT

## 2024-06-09 PROCEDURE — 1200000000 HC SEMI PRIVATE

## 2024-06-09 PROCEDURE — 92610 EVALUATE SWALLOWING FUNCTION: CPT

## 2024-06-09 PROCEDURE — 83036 HEMOGLOBIN GLYCOSYLATED A1C: CPT

## 2024-06-09 PROCEDURE — 36415 COLL VENOUS BLD VENIPUNCTURE: CPT

## 2024-06-09 PROCEDURE — 6370000000 HC RX 637 (ALT 250 FOR IP)

## 2024-06-09 PROCEDURE — 70551 MRI BRAIN STEM W/O DYE: CPT

## 2024-06-09 RX ADMIN — ASPIRIN 81 MG 81 MG: 81 TABLET ORAL at 09:33

## 2024-06-09 RX ADMIN — METOPROLOL SUCCINATE 25 MG: 25 TABLET, EXTENDED RELEASE ORAL at 09:34

## 2024-06-09 RX ADMIN — ACETAMINOPHEN 650 MG: 325 TABLET ORAL at 22:33

## 2024-06-09 RX ADMIN — DONEPEZIL HYDROCHLORIDE 5 MG: 5 TABLET, FILM COATED ORAL at 20:14

## 2024-06-09 RX ADMIN — SODIUM CHLORIDE, PRESERVATIVE FREE 10 ML: 5 INJECTION INTRAVENOUS at 20:14

## 2024-06-09 RX ADMIN — ACETAMINOPHEN 650 MG: 325 TABLET ORAL at 05:12

## 2024-06-09 RX ADMIN — APIXABAN 5 MG: 5 TABLET, FILM COATED ORAL at 09:34

## 2024-06-09 RX ADMIN — SODIUM CHLORIDE, PRESERVATIVE FREE 10 ML: 5 INJECTION INTRAVENOUS at 09:34

## 2024-06-09 RX ADMIN — APIXABAN 5 MG: 5 TABLET, FILM COATED ORAL at 20:14

## 2024-06-09 RX ADMIN — ROSUVASTATIN 20 MG: 10 TABLET, FILM COATED ORAL at 20:14

## 2024-06-09 RX ADMIN — ACETAMINOPHEN 650 MG: 325 TABLET ORAL at 14:57

## 2024-06-09 ASSESSMENT — PAIN SCALES - GENERAL: PAINLEVEL_OUTOF10: 3

## 2024-06-09 NOTE — PLAN OF CARE
Problem: Discharge Planning  Goal: Discharge to home or other facility with appropriate resources  6/8/2024 2248 by Meenu [place]aza RN  Outcome: Progressing  6/8/2024 1700 by Kayla Berry RN  Outcome: Progressing  Flowsheets (Taken 6/8/2024 1700)  Discharge to home or other facility with appropriate resources:   Identify barriers to discharge with patient and caregiver   Arrange for needed discharge resources and transportation as appropriate     Problem: ABCDS Injury Assessment  Goal: Absence of physical injury  6/8/2024 2248 by Meenu [place] RN  Outcome: Progressing  6/8/2024 1700 by Kayla Berry RN  Outcome: Progressing  Flowsheets (Taken 6/8/2024 1700)  Absence of Physical Injury: Implement safety measures based on patient assessment     Problem: Neurosensory - Adult  Goal: Achieves stable or improved neurological status  6/8/2024 2248 by Meenu [place] RN  Outcome: Progressing  6/8/2024 1700 by Kayla Berry RN  Outcome: Progressing  Flowsheets (Taken 6/8/2024 1700)  Achieves stable or improved neurological status:   Assess for and report changes in neurological status   Maintain blood pressure and fluid volume within ordered parameters to optimize cerebral perfusion and minimize risk of hemorrhage   Monitor temperature, glucose, and sodium. Initiate appropriate interventions as ordered  Goal: Achieves maximal functionality and self care  6/8/2024 2248 by Meenu [place] RN  Outcome: Progressing  6/8/2024 1700 by Kayla Berry RN  Outcome: Progressing  Flowsheets (Taken 6/8/2024 1700)  Achieves maximal functionality and self care:   Monitor swallowing and airway patency with patient fatigue and changes in neurological status   Encourage and assist patient to increase activity and self care with guidance from physical therapy/occupational therapy

## 2024-06-09 NOTE — PLAN OF CARE
TODAY'S DATE:  6/9/2024      Current NIHSS 1      Discussed personal risk factors for Stroke/TIA with patient/family, and ways to reduce the risk for a recurrent stroke.     Patient's personal risk factors which were identified are:   []   Alcohol Abuse: check with your physician before any alcohol consumption.   []   Atrial fibrillation: may cause blood clots  []   Drug Abuse: Seek help, talk with your doctor  []   Clotting Disorder  []   Diabetes  []   Family history of stroke or heart disease  [x]   High Blood Pressure/Hypertension: work with your physician  [x]   High cholesterol: monitor cholesterol levels with your physician  []   Overweight/Obesity: work with your physician for your ideal body weight  []   Physical Inactivity: get regular exercise as directed by your physician  [x]   Personal history of previous TIA or stroke  []   Poor Diet: decrease salt (sodium) in your diet, follow diet directed by physician  []   Smoking: stop smoking      Reviewed the Following Education with Patient and/or Family:   - Signs and Symptoms of Stroke  - Personal risk factors   - How to activate EMS (911)   - Importance of Follow Up Appointments at Discharge   - Importance of Compliance with Medications Prescribed at Discharge  - Available community resources and stroke advocacy groups if needed    Patient and/or family member: verbalized understanding.     Stroke Education booklet given to patient/family (or verified, if given already), which reviews above information. yes         Electronically signed by Meenu [place] RN on 6/9/2024 at 5:43 AM

## 2024-06-09 NOTE — PLAN OF CARE
SLP Evaluation Completed. All note are found in EMR    Corby Frank MA, CCC-SLP  SP#[id number]  Speech-Language Pathologist  Phone: [phone]  Speech Desk: [phone]

## 2024-06-09 NOTE — PLAN OF CARE
Problem: Discharge Planning  Goal: Discharge to home or other facility with appropriate resources  Outcome: Progressing  Flowsheets (Taken 6/9/2024 1652)  Discharge to home or other facility with appropriate resources:   Identify barriers to discharge with patient and caregiver   Arrange for needed discharge resources and transportation as appropriate   Identify discharge learning needs (meds, wound care, etc)     Problem: ABCDS Injury Assessment  Goal: Absence of physical injury  Outcome: Progressing  Flowsheets (Taken 6/9/2024 1652)  Absence of Physical Injury: Implement safety measures based on patient assessment     Problem: Neurosensory - Adult  Goal: Achieves stable or improved neurological status  Outcome: Progressing  Flowsheets (Taken 6/9/2024 1652)  Achieves stable or improved neurological status:   Assess for and report changes in neurological status   Initiate measures to prevent increased intracranial pressure     Problem: Neurosensory - Adult  Goal: Achieves maximal functionality and self care  Outcome: Progressing  Flowsheets (Taken 6/9/2024 1652)  Achieves maximal functionality and self care:   Monitor swallowing and airway patency with patient fatigue and changes in neurological status   Encourage and assist patient to increase activity and self care with guidance from physical therapy/occupational therapy   Encourage visually impaired, hearing impaired and aphasic patients to use assistive/communication devices

## 2024-06-10 ENCOUNTER — APPOINTMENT (OUTPATIENT)
Age: 69
DRG: 068 | End: 2024-06-10
Attending: STUDENT IN AN ORGANIZED HEALTH CARE EDUCATION/TRAINING PROGRAM
Payer: COMMERCIAL

## 2024-06-10 ENCOUNTER — HOSPITAL ENCOUNTER (INPATIENT)
Dept: VASCULAR LAB | Age: 69
Discharge: HOME | DRG: 068 | End: 2024-06-12
Attending: STUDENT IN AN ORGANIZED HEALTH CARE EDUCATION/TRAINING PROGRAM
Payer: COMMERCIAL

## 2024-06-10 VITALS
TEMPERATURE: 98.1 F | HEIGHT: 65 IN | BODY MASS INDEX: 27.99 KG/M2 | RESPIRATION RATE: 18 BRPM | HEART RATE: 61 BPM | OXYGEN SATURATION: 96 % | WEIGHT: 168 LBS | DIASTOLIC BLOOD PRESSURE: 58 MMHG | SYSTOLIC BLOOD PRESSURE: 111 MMHG

## 2024-06-10 PROBLEM — I63.9 CEREBROVASCULAR ACCIDENT (CVA) (HCC): Status: ACTIVE | Noted: 2024-06-10

## 2024-06-10 LAB
ECHO AO ROOT DIAM: 2.2 CM
ECHO AO ROOT INDEX: 1.2 CM/M2
ECHO AV CUSP MM: 1.6 CM
ECHO AV PEAK GRADIENT: 6 MMHG
ECHO AV PEAK VELOCITY: 1.3 M/S
ECHO BSA: 1.87 M2
ECHO EST RA PRESSURE: 3 MMHG
ECHO LA AREA 2C: 15 CM2
ECHO LA AREA 4C: 19.5 CM2
ECHO LA DIAMETER INDEX: 1.2 CM/M2
ECHO LA DIAMETER: 2.2 CM
ECHO LA MAJOR AXIS: 5.8 CM
ECHO LA MINOR AXIS: 5.2 CM
ECHO LA TO AORTIC ROOT RATIO: 1
ECHO LA VOL BP: 46 ML (ref 22–52)
ECHO LA VOL MOD A2C: 36 ML (ref 22–52)
ECHO LA VOL MOD A4C: 55 ML (ref 22–52)
ECHO LA VOL/BSA BIPLANE: 25 ML/M2 (ref 16–34)
ECHO LA VOLUME INDEX MOD A2C: 20 ML/M2 (ref 16–34)
ECHO LA VOLUME INDEX MOD A4C: 30 ML/M2 (ref 16–34)
ECHO LV E' LATERAL VELOCITY: 7 CM/S
ECHO LV E' SEPTAL VELOCITY: 4 CM/S
ECHO LV FRACTIONAL SHORTENING: 27 % (ref 28–44)
ECHO LV INTERNAL DIMENSION DIASTOLE INDEX: 2.01 CM/M2
ECHO LV INTERNAL DIMENSION DIASTOLIC: 3.7 CM (ref 3.9–5.3)
ECHO LV INTERNAL DIMENSION SYSTOLIC INDEX: 1.47 CM/M2
ECHO LV INTERNAL DIMENSION SYSTOLIC: 2.7 CM
ECHO LV IVSD: 1 CM (ref 0.6–0.9)
ECHO LV MASS 2D: 112.5 G (ref 67–162)
ECHO LV MASS INDEX 2D: 61.2 G/M2 (ref 43–95)
ECHO LV POSTERIOR WALL DIASTOLIC: 1 CM (ref 0.6–0.9)
ECHO LV RELATIVE WALL THICKNESS RATIO: 0.54
ECHO MV A VELOCITY: 0.88 M/S
ECHO MV E DECELERATION TIME (DT): 238 MS
ECHO MV E VELOCITY: 0.66 M/S
ECHO MV E/A RATIO: 0.75
ECHO MV E/E' LATERAL: 9.43
ECHO MV E/E' RATIO (AVERAGED): 12.96
ECHO MV E/E' SEPTAL: 16.5
ECHO PV MAX VELOCITY: 0.8 M/S
ECHO PV PEAK GRADIENT: 2 MMHG
ECHO RA AREA 4C: 11 CM2
ECHO RA END SYSTOLIC VOLUME APICAL 4 CHAMBER INDEX BSA: 11 ML/M2
ECHO RA VOLUME: 20 ML
ECHO RIGHT VENTRICULAR SYSTOLIC PRESSURE (RVSP): 32 MMHG
ECHO RV BASAL DIMENSION: 2.5 CM
ECHO RV FREE WALL PEAK S': 11 CM/S
ECHO RV LONGITUDINAL DIMENSION: 5.9 CM
ECHO RV MID DIMENSION: 2.1 CM
ECHO RV TAPSE: 1.8 CM (ref 1.7–?)
ECHO TV E WAVE: 0.5 M/S
ECHO TV REGURGITANT MAX VELOCITY: 2.67 M/S
ECHO TV REGURGITANT PEAK GRADIENT: 29 MMHG
EST. AVERAGE GLUCOSE BLD GHB EST-MCNC: 114 MG/DL
HBA1C MFR BLD: 5.6 %
LEFT VENTRICULAR EJECTION FRACTION MODE: NORMAL
LV EF: 45 % (ref 45–50)

## 2024-06-10 PROCEDURE — 99223 1ST HOSP IP/OBS HIGH 75: CPT | Performed by: PSYCHIATRY & NEUROLOGY

## 2024-06-10 PROCEDURE — 93880 EXTRACRANIAL BILAT STUDY: CPT

## 2024-06-10 PROCEDURE — 93306 TTE W/DOPPLER COMPLETE: CPT

## 2024-06-10 PROCEDURE — APPSS30 APP SPLIT SHARED TIME 16-30 MINUTES

## 2024-06-10 PROCEDURE — 6370000000 HC RX 637 (ALT 250 FOR IP): Performed by: INTERNAL MEDICINE

## 2024-06-10 PROCEDURE — 6370000000 HC RX 637 (ALT 250 FOR IP)

## 2024-06-10 RX ORDER — CIPROFLOXACIN 500 MG/1
500 TABLET, FILM COATED ORAL EVERY 12 HOURS SCHEDULED
Qty: 14 TABLET | Refills: 0 | Status: SHIPPED | OUTPATIENT
Start: 2024-06-10 | End: 2024-06-17

## 2024-06-10 RX ORDER — PANTOPRAZOLE SODIUM 40 MG/1
40 TABLET, DELAYED RELEASE ORAL
Status: DISCONTINUED | OUTPATIENT
Start: 2024-06-10 | End: 2024-06-10 | Stop reason: HOSPADM

## 2024-06-10 RX ORDER — METRONIDAZOLE 500 MG/1
500 TABLET ORAL 3 TIMES DAILY
Qty: 21 TABLET | Refills: 0 | Status: SHIPPED | OUTPATIENT
Start: 2024-06-10 | End: 2024-06-17

## 2024-06-10 RX ORDER — CLOPIDOGREL BISULFATE 75 MG/1
75 TABLET ORAL DAILY
Qty: 20 TABLET | Refills: 0 | Status: SHIPPED | OUTPATIENT
Start: 2024-06-10

## 2024-06-10 RX ORDER — CIPROFLOXACIN 500 MG/1
500 TABLET, FILM COATED ORAL EVERY 12 HOURS SCHEDULED
Status: DISCONTINUED | OUTPATIENT
Start: 2024-06-10 | End: 2024-06-10 | Stop reason: HOSPADM

## 2024-06-10 RX ORDER — CLOPIDOGREL BISULFATE 75 MG/1
75 TABLET ORAL DAILY
Status: DISCONTINUED | OUTPATIENT
Start: 2024-06-10 | End: 2024-06-10 | Stop reason: HOSPADM

## 2024-06-10 RX ORDER — BUPROPION HYDROCHLORIDE 100 MG/1
50 TABLET ORAL 2 TIMES DAILY
Status: DISCONTINUED | OUTPATIENT
Start: 2024-06-10 | End: 2024-06-10 | Stop reason: HOSPADM

## 2024-06-10 RX ORDER — METRONIDAZOLE 250 MG/1
500 TABLET ORAL 3 TIMES DAILY
Status: DISCONTINUED | OUTPATIENT
Start: 2024-06-10 | End: 2024-06-10 | Stop reason: HOSPADM

## 2024-06-10 RX ORDER — PANTOPRAZOLE SODIUM 40 MG/1
40 TABLET, DELAYED RELEASE ORAL
Status: DISCONTINUED | OUTPATIENT
Start: 2024-06-11 | End: 2024-06-10

## 2024-06-10 RX ORDER — CITALOPRAM 20 MG/1
40 TABLET ORAL EVERY MORNING
Status: DISCONTINUED | OUTPATIENT
Start: 2024-06-10 | End: 2024-06-10 | Stop reason: HOSPADM

## 2024-06-10 RX ORDER — HYDROCODONE BITARTRATE AND ACETAMINOPHEN 5; 325 MG/1; MG/1
1 TABLET ORAL EVERY 6 HOURS PRN
Qty: 5 TABLET | Refills: 0 | Status: SHIPPED | OUTPATIENT
Start: 2024-06-10 | End: 2024-06-13

## 2024-06-10 RX ADMIN — ASPIRIN 81 MG 81 MG: 81 TABLET ORAL at 08:53

## 2024-06-10 RX ADMIN — PANTOPRAZOLE SODIUM 40 MG: 40 TABLET, DELAYED RELEASE ORAL at 09:57

## 2024-06-10 RX ADMIN — CITALOPRAM HYDROBROMIDE 40 MG: 20 TABLET ORAL at 09:57

## 2024-06-10 RX ADMIN — APIXABAN 5 MG: 5 TABLET, FILM COATED ORAL at 08:53

## 2024-06-10 RX ADMIN — ACETAMINOPHEN 650 MG: 325 TABLET ORAL at 05:19

## 2024-06-10 RX ADMIN — BUPROPION HYDROCHLORIDE 50 MG: 100 TABLET, FILM COATED ORAL at 09:57

## 2024-06-10 RX ADMIN — CLOPIDOGREL BISULFATE 75 MG: 75 TABLET ORAL at 16:53

## 2024-06-10 RX ADMIN — METOPROLOL SUCCINATE 25 MG: 25 TABLET, EXTENDED RELEASE ORAL at 08:53

## 2024-06-10 RX ADMIN — METRONIDAZOLE 500 MG: 250 TABLET ORAL at 16:53

## 2024-06-10 NOTE — PLAN OF CARE
Problem: Discharge Planning  Goal: Discharge to home or other facility with appropriate resources  6/9/2024 2151 by Meenu [place] RN  Outcome: Progressing  6/9/2024 1652 by Kayla Berry RN  Outcome: Progressing  Flowsheets (Taken 6/9/2024 1652)  Discharge to home or other facility with appropriate resources:   Identify barriers to discharge with patient and caregiver   Arrange for needed discharge resources and transportation as appropriate   Identify discharge learning needs (meds, wound care, etc)     Problem: ABCDS Injury Assessment  Goal: Absence of physical injury  6/9/2024 2151 by Meenu [place] RN  Outcome: Progressing  6/9/2024 1652 by Kayla Berry RN  Outcome: Progressing  Flowsheets (Taken 6/9/2024 1652)  Absence of Physical Injury: Implement safety measures based on patient assessment     Problem: Neurosensory - Adult  Goal: Achieves stable or improved neurological status  6/9/2024 2151 by Meenu [place] RN  Outcome: Progressing  6/9/2024 1652 by Kayla Berry RN  Outcome: Progressing  Flowsheets (Taken 6/9/2024 1652)  Achieves stable or improved neurological status:   Assess for and report changes in neurological status   Initiate measures to prevent increased intracranial pressure  Goal: Achieves maximal functionality and self care  6/9/2024 2151 by Meenu [place] RN  Outcome: Progressing  6/9/2024 1652 by Kayla Berry RN  Outcome: Progressing  Flowsheets (Taken 6/9/2024 1652)  Achieves maximal functionality and self care:   Monitor swallowing and airway patency with patient fatigue and changes in neurological status   Encourage and assist patient to increase activity and self care with guidance from physical therapy/occupational therapy   Encourage visually impaired, hearing impaired and aphasic patients to use assistive/communication devices

## 2024-06-10 NOTE — CARE COORDINATION
Case Management Assessment  Initial Evaluation    Date/Time of Evaluation: 6/10/2024 4:05 PM  Assessment Completed by: Mariza Morton RN    If patient is discharged prior to next notation, then this note serves as note for discharge by case management.    Patient Name: Shelby Cordero                   YOB: 1955  Diagnosis: Numbness [R20.0]  Cerebrovascular accident (CVA), unspecified mechanism (HCC) [I63.9]                   Date / Time: 6/8/2024 11:27 AM    Patient Admission Status: Inpatient   Readmission Risk (Low < 19, Mod (19-27), High > 27): Readmission Risk Score: 7.1    Current PCP: Nancy Pan APRN - CNP  PCP verified by CM?      Chart Reviewed: Yes      History Provided by:    Patient Orientation:      Patient Cognition:      Hospitalization in the last 30 days (Readmission):  No    If yes, Readmission Assessment in CM Navigator will be completed.    Advance Directives:      Code Status: Full Code   Patient's Primary Decision Maker is:      Primary Decision Maker: Av Cordero - Spouse - [phone]    Discharge Planning:    Patient lives with: Spouse/Significant Other, Family Members Type of Home: House  Primary Care Giver:    Patient Support Systems include: Spouse/Significant Other, Family Members   Current Financial resources:    Current community resources:    Current services prior to admission: None            Current DME:              Type of Home Care services:  None    ADLS  Prior functional level:    Current functional level:      PT AM-PAC:   /24  OT AM-PAC:   /24    Family can provide assistance at DC:    Would you like Case Management to discuss the discharge plan with any other family members/significant others, and if so, who?    Plans to Return to Present Housing:    Other Identified Issues/Barriers to RETURNING to current housing:   Potential Assistance needed at discharge: N/A            Potential DME:    Patient expects to discharge to: House  Plan for

## 2024-06-10 NOTE — ACP (ADVANCE CARE PLANNING)
Advance Care Planning     General Advance Care Planning (ACP) Conversation    Date of Conversation: 6/8/2024  Conducted with: Patient with Decision Making Capacity  Other persons present: None    Healthcare Decision Maker:    Primary Decision Maker: Av Cordero - Spouse - [phone]  Click here to complete Healthcare Decision Makers including selection of the Healthcare Decision Maker Relationship (ie \"Primary\").  Today we documented Decision Maker(s) consistent with Legal Next of Kin hierarchy.    Content/Action Overview:  Has NO ACP documents-Information provided  Reviewed DNR/DNI and patient elects Full Code (Attempt Resuscitation)      Length of Voluntary ACP Conversation in minutes:  <16 minutes (Non-Billable)    Mariza Morton RN

## 2024-06-10 NOTE — CONSULTS
In patient Neurology consult        [facility]      MD Shelby Goode  1955    Date of Service: 6/10/2024    Referring Physician: Italia Blank MD      Reason for the consult and CC: L facial numbness    HPI:   The patient is a 69 y.o.  years old female with a PMHx of CAD, HLD, OA, & GERD who presented to the hospital on 6/8 with c/o L facial numbness. Pt reportedly developed chin numbness in the evening on 6/7 which improved, but in the morning on 6/8 her symptoms worsened and she described it as L facial numbness. CTH & MRI revealed no acute findings. CTA revealed moderate stenosis of the right supraclinoid ICA. At this time, pt states that her symptoms have improved but not resolved completely.        Constitutional:   Vitals:    06/09/24 2000 06/10/24 0000 06/10/24 0500 06/10/24 0851   BP: 126/69 120/71 (!) 144/86 114/71   Pulse: 72 70 74 70   Resp: 16 17 18 18   Temp: 98 °F (36.7 °C) 97.6 °F (36.4 °C) 97.4 °F (36.3 °C) 97.3 °F (36.3 °C)   TempSrc: Oral Oral Oral Oral   SpO2: 96% 96% 96% 96%   Weight:       Height:             I personally reviewed and updated social history, past medical history, medications, allergy, surgical history, and family history as documented in the patient's electronic health records.       ROS: 10-14 ROS reviewed with the patient/nurse/family which were unremarkable except mentioned in H&P.    General appearance:  Normal development and appear in no acute distress.   Mental Status:   Oriented to person, place, problem, and time.    Memory: Good immediate recall.  Intact remote memory  Normal attention span and concentration.  Language: intact naming, repeating and fluency   Good fund of Knowledge. Aware of current events and vocabulary   Cranial Nerves:   II: Visual fields: Full. Pupils: equal, round, reactive to light, bilaterally  III,IV,VI: Extra Ocular Movements are intact. No nystagmus  V: Facial sensation is intact  VII: Mild R facial

## 2024-06-10 NOTE — DISCHARGE SUMMARY
abnormality. SOFT TISSUES/SKULL: No acute abnormality of the visualized skull or soft tissues. CTA NECK: AORTIC ARCH/ARCH VESSELS: No dissection or arterial injury.  No significant stenosis of the brachiocephalic or subclavian arteries. CAROTID ARTERIES: No dissection, arterial injury, or hemodynamically significant stenosis by NASCET criteria. VERTEBRAL ARTERIES: No dissection, arterial injury, or significant stenosis. SOFT TISSUES: No focal consolidation within the visualized lung apices.  No acute abnormality within the visualized superior mediastinum. BONES: The osseous structures appear osteopenic.  No acute osseous abnormality seen.  Degenerative changes of the cervical spine most severe at C5-C6.  There is minimal grade 1 anterolisthesis at C3-C4 with minimal retrolisthesis at C5-C6.  There may be mild-to-moderate spinal canal stenosis at C5-C6. CTA HEAD: ANTERIOR CIRCULATION: There is atherosclerosis of the internal carotid arteries bilaterally.  This may contribute to moderate stenosis of the right supraclinoid ICA.  No significant stenosis of the left internal carotid artery.  No significant stenosis of the anterior cerebral or middle cerebral arteries.  No aneurysm identified. POSTERIOR CIRCULATION: No significant stenosis of the vertebral, basilar, or posterior cerebral arteries. No aneurysm. OTHER: No dural venous sinus thrombosis on this non-dedicated study.     1. No acute intracranial abnormality.  These results were sent to the CORE Team on 6/8/2024 at 12:10 pm to be communicated to the referring/covering health care provider/office. 2. No flow limiting stenosis or dissection of the cervical carotid/vertebral arteries.. 3. Atherosclerosis contributes to moderate stenosis of the right supraclinoid ICA. 4. Otherwise, no significant stenosis or large vessel occlusion of the [id number]. 5. Degenerative changes of the cervical spine with suggestion of mild-to-moderate spinal canal stenosis at

## 2024-06-10 NOTE — PLAN OF CARE
Problem: Discharge Planning  Goal: Discharge to home or other facility with appropriate resources  Outcome: Completed     Problem: ABCDS Injury Assessment  Goal: Absence of physical injury  Outcome: Completed     Problem: Neurosensory - Adult  Goal: Achieves stable or improved neurological status  Outcome: Completed  Goal: Achieves maximal functionality and self care  Outcome: Completed     Problem: Safety - Adult  Goal: Free from fall injury  Outcome: Completed

## 2024-06-11 ENCOUNTER — HOSPITAL ENCOUNTER (EMERGENCY)
Age: 69
Discharge: HOME | End: 2024-06-12
Attending: STUDENT IN AN ORGANIZED HEALTH CARE EDUCATION/TRAINING PROGRAM
Payer: COMMERCIAL

## 2024-06-11 ENCOUNTER — TELEPHONE (OUTPATIENT)
Dept: FAMILY MEDICINE CLINIC | Age: 69
End: 2024-06-11

## 2024-06-11 ENCOUNTER — CARE COORDINATION (OUTPATIENT)
Dept: CASE MANAGEMENT | Age: 69
End: 2024-06-11

## 2024-06-11 ENCOUNTER — APPOINTMENT (OUTPATIENT)
Dept: GENERAL RADIOLOGY | Age: 69
End: 2024-06-11
Payer: COMMERCIAL

## 2024-06-11 DIAGNOSIS — J10.1 INFLUENZA A: Primary | ICD-10-CM

## 2024-06-11 DIAGNOSIS — R11.2 NAUSEA AND VOMITING, UNSPECIFIED VOMITING TYPE: ICD-10-CM

## 2024-06-11 LAB
ECHO BSA: 1.87 M2
VAS LEFT CCA DIST EDV: 25.5 CM/S
VAS LEFT CCA DIST PSV: 77 CM/S
VAS LEFT CCA MID EDV: 21.1 CM/S
VAS LEFT CCA MID PSV: 85.1 CM/S
VAS LEFT CCA PROX EDV: 26.7 CM/S
VAS LEFT CCA PROX PSV: 77.7 CM/S
VAS LEFT ECA EDV: 11.2 CM/S
VAS LEFT ECA PSV: 63.4 CM/S
VAS LEFT ICA DIST EDV: 25.2 CM/S
VAS LEFT ICA DIST PSV: 65.9 CM/S
VAS LEFT ICA MID EDV: 25.1 CM/S
VAS LEFT ICA MID PSV: 77.3 CM/S
VAS LEFT ICA PROX EDV: 28 CM/S
VAS LEFT ICA PROX PSV: 77.7 CM/S
VAS LEFT ICA/CCA PSV: 0.91
VAS LEFT SUBCLAVIAN PROX PSV: 91.7 CM/S
VAS LEFT VERTEBRAL EDV: 17.6 CM/S
VAS LEFT VERTEBRAL PSV: 58.2 CM/S
VAS RIGHT CCA DIST EDV: 21.1 CM/S
VAS RIGHT CCA DIST PSV: 67.1 CM/S
VAS RIGHT CCA MID EDV: 19.3 CM/S
VAS RIGHT CCA MID PSV: 65.9 CM/S
VAS RIGHT CCA PROX EDV: 14.3 CM/S
VAS RIGHT CCA PROX PSV: 72.7 CM/S
VAS RIGHT ECA EDV: 15.4 CM/S
VAS RIGHT ECA PSV: 48.3 CM/S
VAS RIGHT ICA DIST EDV: 23.8 CM/S
VAS RIGHT ICA DIST PSV: 60.2 CM/S
VAS RIGHT ICA MID EDV: 25.5 CM/S
VAS RIGHT ICA MID PSV: 77.7 CM/S
VAS RIGHT ICA PROX EDV: 21.7 CM/S
VAS RIGHT ICA PROX PSV: 70.8 CM/S
VAS RIGHT ICA/CCA PSV: 1.18
VAS RIGHT SUBCLAVIAN PROX EDV: 0 CM/S
VAS RIGHT SUBCLAVIAN PROX PSV: 75.8 CM/S
VAS RIGHT VERTEBRAL EDV: 15.4 CM/S
VAS RIGHT VERTEBRAL PSV: 44.8 CM/S

## 2024-06-11 PROCEDURE — 93005 ELECTROCARDIOGRAM TRACING: CPT | Performed by: STUDENT IN AN ORGANIZED HEALTH CARE EDUCATION/TRAINING PROGRAM

## 2024-06-11 PROCEDURE — 80053 COMPREHEN METABOLIC PANEL: CPT

## 2024-06-11 PROCEDURE — 85025 COMPLETE CBC W/AUTO DIFF WBC: CPT

## 2024-06-11 PROCEDURE — 99285 EMERGENCY DEPT VISIT HI MDM: CPT

## 2024-06-11 PROCEDURE — 83605 ASSAY OF LACTIC ACID: CPT

## 2024-06-11 PROCEDURE — 84484 ASSAY OF TROPONIN QUANT: CPT

## 2024-06-11 PROCEDURE — 36415 COLL VENOUS BLD VENIPUNCTURE: CPT

## 2024-06-11 PROCEDURE — 96374 THER/PROPH/DIAG INJ IV PUSH: CPT

## 2024-06-11 PROCEDURE — 83690 ASSAY OF LIPASE: CPT

## 2024-06-11 RX ORDER — ONDANSETRON 2 MG/ML
8 INJECTION INTRAMUSCULAR; INTRAVENOUS ONCE
Status: COMPLETED | OUTPATIENT
Start: 2024-06-11 | End: 2024-06-12

## 2024-06-11 RX ORDER — SODIUM CHLORIDE, SODIUM LACTATE, POTASSIUM CHLORIDE, AND CALCIUM CHLORIDE .6; .31; .03; .02 G/100ML; G/100ML; G/100ML; G/100ML
1000 INJECTION, SOLUTION INTRAVENOUS ONCE
Status: COMPLETED | OUTPATIENT
Start: 2024-06-11 | End: 2024-06-12

## 2024-06-11 NOTE — CARE COORDINATION
Care Transitions Initial Follow Up Call    Call within 2 business days of discharge: Yes    Patient Current Location:  Home: [address] [place] [id number]    Care Transition Nurse contacted the patient by telephone to perform post hospital discharge assessment. Verified name and  with patient as identifiers. Provided introduction to self, and explanation of the Care Transition Nurse role.     Patient: Shelby Cordero Patient : 1955   MRN: [id number]  Reason for Admission: numbness  Discharge Date: 6/10/24 RARS: Readmission Risk Score: 7.1      Last Discharge Facility       Date Complaint Diagnosis Description Type Department Provider    24 Numbness Cerebrovascular accident (CVA), unspecified mechanism (HCC) ... ED to Hosp-Admission (Discharged) (ADMITTED) Italia Salomon MD; Ayo Keen.            Was this an external facility discharge? No Discharge Facility:     Challenges to be reviewed by the provider   Additional needs identified to be addressed with provider: Yes  none         Method of communication with provider: none.    .Patient answered call and verified name/. Stated she needed about 5 minutes and requested a call back in 5 minutes. CTN returned call per request at designated time and no answer. VM left stating purpose of call along with my contact information and request for a return call.    Incoming call from patient after getting voicemail. Verified . Pleasant and agreeable to transition call. Doing well since discharge, but has developed dry non-productive cough. Denied any SOB. Continues to have numbness around chin area, but lessened. Confirmed she has AVS and medications reviewed. Taking as directed. Follow up with PCP noted for later this week. Neuro scheduled follow up . Denied any speech or swallowing difficulties. Denied pain or any acute needs at present time.  Agreeable to f/u calls.  Educated on the use of urgent care or physician’s 24 hr

## 2024-06-11 NOTE — TELEPHONE ENCOUNTER
Care Transitions Initial Follow Up Call    Outreach made within 2 business days of discharge: Yes    Patient: Shelby Cordero Patient : 1955   MRN: [id number]  Reason for Admission: There are no discharge diagnoses documented for the most recent discharge.  Discharge Date: 6/10/24       Spoke with: Shelby    Discharge department/facility: [facility] Interactive Patient Contact:  Was patient able to fill all prescriptions: Yes  Was patient instructed to bring all medications to the follow-up visit: Yes  Is patient taking all medications as directed in the discharge summary? Yes  Does patient understand their discharge instructions: Yes  Does patient have questions or concerns that need addressed prior to 7-14 day follow up office visit: no    Scheduled appointment with PCP within 7-14 days    Follow Up  Future Appointments   Date Time Provider Department Center   2024 11:00 AM [facility]   2024 12:00 PM Ryland Darling MD AND NEURO Neurology -       Lucy Begum LPN

## 2024-06-12 ENCOUNTER — APPOINTMENT (OUTPATIENT)
Dept: CT IMAGING | Age: 69
End: 2024-06-12
Payer: COMMERCIAL

## 2024-06-12 ENCOUNTER — APPOINTMENT (OUTPATIENT)
Dept: GENERAL RADIOLOGY | Age: 69
End: 2024-06-12
Payer: COMMERCIAL

## 2024-06-12 VITALS
HEART RATE: 78 BPM | TEMPERATURE: 99.2 F | DIASTOLIC BLOOD PRESSURE: 64 MMHG | RESPIRATION RATE: 16 BRPM | OXYGEN SATURATION: 94 % | SYSTOLIC BLOOD PRESSURE: 107 MMHG

## 2024-06-12 LAB
ALBUMIN SERPL-MCNC: 4.1 G/DL (ref 3.4–5)
ALBUMIN/GLOB SERPL: 1.4 {RATIO} (ref 1.1–2.2)
ALP SERPL-CCNC: 102 U/L (ref 40–129)
ALT SERPL-CCNC: 11 U/L (ref 10–40)
ANION GAP SERPL CALCULATED.3IONS-SCNC: 14 MMOL/L (ref 3–16)
AST SERPL-CCNC: 17 U/L (ref 15–37)
BASE EXCESS BLDV CALC-SCNC: -0.6 MMOL/L (ref -3–3)
BASOPHILS # BLD: 0.1 K/UL (ref 0–0.2)
BASOPHILS NFR BLD: 0.5 %
BILIRUB SERPL-MCNC: 0.4 MG/DL (ref 0–1)
BILIRUB UR QL STRIP.AUTO: NEGATIVE
BUN SERPL-MCNC: 10 MG/DL (ref 7–20)
CALCIUM SERPL-MCNC: 9.4 MG/DL (ref 8.3–10.6)
CHLORIDE SERPL-SCNC: 97 MMOL/L (ref 99–110)
CLARITY UR: CLEAR
CO2 BLDV-SCNC: 24 MMOL/L
CO2 SERPL-SCNC: 22 MMOL/L (ref 21–32)
COHGB MFR BLDV: 2.3 % (ref 0–1.5)
COLOR UR: ABNORMAL
CREAT SERPL-MCNC: 0.8 MG/DL (ref 0.6–1.2)
DEPRECATED RDW RBC AUTO: 14.1 % (ref 12.4–15.4)
EKG ATRIAL RATE: 97 BPM
EKG DIAGNOSIS: NORMAL
EKG P AXIS: 18 DEGREES
EKG P-R INTERVAL: 126 MS
EKG Q-T INTERVAL: 382 MS
EKG QRS DURATION: 120 MS
EKG QTC CALCULATION (BAZETT): 485 MS
EKG R AXIS: -46 DEGREES
EKG T AXIS: 98 DEGREES
EKG VENTRICULAR RATE: 97 BPM
EOSINOPHIL # BLD: 0.1 K/UL (ref 0–0.6)
EOSINOPHIL NFR BLD: 0.8 %
FLUAV RNA RESP QL NAA+PROBE: DETECTED
FLUBV RNA RESP QL NAA+PROBE: NOT DETECTED
GFR SERPLBLD CREATININE-BSD FMLA CKD-EPI: 80 ML/MIN/{1.73_M2}
GLUCOSE SERPL-MCNC: 128 MG/DL (ref 70–99)
GLUCOSE UR STRIP.AUTO-MCNC: NEGATIVE MG/DL
HCO3 BLDV-SCNC: 23.2 MMOL/L (ref 23–29)
HCT VFR BLD AUTO: 41.3 % (ref 36–48)
HGB BLD-MCNC: 13.8 G/DL (ref 12–16)
HGB UR QL STRIP.AUTO: NEGATIVE
KETONES UR STRIP.AUTO-MCNC: NEGATIVE MG/DL
LACTATE BLDV-SCNC: 1.4 MMOL/L (ref 0.4–1.9)
LACTATE BLDV-SCNC: 1.9 MMOL/L (ref 0.4–1.9)
LEUKOCYTE ESTERASE UR QL STRIP.AUTO: NEGATIVE
LIPASE SERPL-CCNC: 30 U/L (ref 13–60)
LYMPHOCYTES # BLD: 0.8 K/UL (ref 1–5.1)
LYMPHOCYTES NFR BLD: 7.6 %
MCH RBC QN AUTO: 32 PG (ref 26–34)
MCHC RBC AUTO-ENTMCNC: 33.5 G/DL (ref 31–36)
MCV RBC AUTO: 95.6 FL (ref 80–100)
METHGB MFR BLDV: 0.1 %
MONOCYTES # BLD: 0.7 K/UL (ref 0–1.3)
MONOCYTES NFR BLD: 6.2 %
NEUTROPHILS # BLD: 9.1 K/UL (ref 1.7–7.7)
NEUTROPHILS NFR BLD: 84.9 %
NITRITE UR QL STRIP.AUTO: NEGATIVE
O2 THERAPY: ABNORMAL
PCO2 BLDV: 35.9 MMHG (ref 40–50)
PH BLDV: 7.43 [PH] (ref 7.35–7.45)
PH UR STRIP.AUTO: 7 [PH] (ref 5–8)
PLATELET # BLD AUTO: 298 K/UL (ref 135–450)
PMV BLD AUTO: 6.8 FL (ref 5–10.5)
PO2 BLDV: 43.5 MMHG (ref 25–40)
POTASSIUM SERPL-SCNC: 4.2 MMOL/L (ref 3.5–5.1)
PROT SERPL-MCNC: 7.1 G/DL (ref 6.4–8.2)
PROT UR STRIP.AUTO-MCNC: NEGATIVE MG/DL
RBC # BLD AUTO: 4.32 M/UL (ref 4–5.2)
SAO2 % BLDV: 82 %
SARS-COV-2 RNA RESP QL NAA+PROBE: NOT DETECTED
SODIUM SERPL-SCNC: 133 MMOL/L (ref 136–145)
SP GR UR STRIP.AUTO: 1.02 (ref 1–1.03)
TROPONIN, HIGH SENSITIVITY: <6 NG/L (ref 0–14)
TROPONIN, HIGH SENSITIVITY: <6 NG/L (ref 0–14)
UA COMPLETE W REFLEX CULTURE PNL UR: ABNORMAL
UA DIPSTICK W REFLEX MICRO PNL UR: ABNORMAL
URN SPEC COLLECT METH UR: ABNORMAL
UROBILINOGEN UR STRIP-ACNC: 0.2 E.U./DL
WBC # BLD AUTO: 10.7 K/UL (ref 4–11)

## 2024-06-12 PROCEDURE — 36415 COLL VENOUS BLD VENIPUNCTURE: CPT

## 2024-06-12 PROCEDURE — 93010 ELECTROCARDIOGRAM REPORT: CPT | Performed by: INTERNAL MEDICINE

## 2024-06-12 PROCEDURE — 6360000004 HC RX CONTRAST MEDICATION: Performed by: STUDENT IN AN ORGANIZED HEALTH CARE EDUCATION/TRAINING PROGRAM

## 2024-06-12 PROCEDURE — 71045 X-RAY EXAM CHEST 1 VIEW: CPT

## 2024-06-12 PROCEDURE — 6360000002 HC RX W HCPCS: Performed by: STUDENT IN AN ORGANIZED HEALTH CARE EDUCATION/TRAINING PROGRAM

## 2024-06-12 PROCEDURE — 84484 ASSAY OF TROPONIN QUANT: CPT

## 2024-06-12 PROCEDURE — 82803 BLOOD GASES ANY COMBINATION: CPT

## 2024-06-12 PROCEDURE — 87636 SARSCOV2 & INF A&B AMP PRB: CPT

## 2024-06-12 PROCEDURE — 83605 ASSAY OF LACTIC ACID: CPT

## 2024-06-12 PROCEDURE — 74177 CT ABD & PELVIS W/CONTRAST: CPT

## 2024-06-12 PROCEDURE — 6370000000 HC RX 637 (ALT 250 FOR IP): Performed by: STUDENT IN AN ORGANIZED HEALTH CARE EDUCATION/TRAINING PROGRAM

## 2024-06-12 PROCEDURE — 81003 URINALYSIS AUTO W/O SCOPE: CPT

## 2024-06-12 PROCEDURE — 2580000003 HC RX 258: Performed by: STUDENT IN AN ORGANIZED HEALTH CARE EDUCATION/TRAINING PROGRAM

## 2024-06-12 PROCEDURE — 87040 BLOOD CULTURE FOR BACTERIA: CPT

## 2024-06-12 RX ORDER — OSELTAMIVIR PHOSPHATE 75 MG/1
75 CAPSULE ORAL 2 TIMES DAILY
Qty: 10 CAPSULE | Refills: 0 | Status: SHIPPED | OUTPATIENT
Start: 2024-06-12 | End: 2024-06-17

## 2024-06-12 RX ORDER — ACETAMINOPHEN 500 MG
1000 TABLET ORAL ONCE
Status: COMPLETED | OUTPATIENT
Start: 2024-06-12 | End: 2024-06-12

## 2024-06-12 RX ORDER — OSELTAMIVIR PHOSPHATE 75 MG/1
75 CAPSULE ORAL ONCE
Status: COMPLETED | OUTPATIENT
Start: 2024-06-12 | End: 2024-06-12

## 2024-06-12 RX ORDER — ACETAMINOPHEN 500 MG
1000 TABLET ORAL EVERY 6 HOURS PRN
Qty: 180 TABLET | Refills: 0 | Status: SHIPPED | OUTPATIENT
Start: 2024-06-12

## 2024-06-12 RX ORDER — PROCHLORPERAZINE EDISYLATE 5 MG/ML
10 INJECTION INTRAMUSCULAR; INTRAVENOUS ONCE
Status: DISCONTINUED | OUTPATIENT
Start: 2024-06-12 | End: 2024-06-12 | Stop reason: HOSPADM

## 2024-06-12 RX ORDER — PROMETHAZINE HYDROCHLORIDE 25 MG/1
25 TABLET ORAL 3 TIMES DAILY PRN
Qty: 12 TABLET | Refills: 0 | Status: SHIPPED | OUTPATIENT
Start: 2024-06-12 | End: 2024-06-19

## 2024-06-12 RX ADMIN — SODIUM CHLORIDE, POTASSIUM CHLORIDE, SODIUM LACTATE AND CALCIUM CHLORIDE 1000 ML: 600; 310; 30; 20 INJECTION, SOLUTION INTRAVENOUS at 00:08

## 2024-06-12 RX ADMIN — OSELTAMIVIR PHOSPHATE 75 MG: 75 CAPSULE ORAL at 01:13

## 2024-06-12 RX ADMIN — ONDANSETRON 8 MG: 2 INJECTION INTRAMUSCULAR; INTRAVENOUS at 00:08

## 2024-06-12 RX ADMIN — IOPAMIDOL 75 ML: 755 INJECTION, SOLUTION INTRAVENOUS at 01:42

## 2024-06-12 RX ADMIN — ACETAMINOPHEN 1000 MG: 500 TABLET ORAL at 01:13

## 2024-06-13 PROBLEM — Z86.73 HISTORY OF STROKE: Status: ACTIVE | Noted: 2024-06-13

## 2024-06-13 NOTE — PROGRESS NOTES
Physician Progress Note      PATIENT:               TEOFILO BIRD  [facility] #:                  [id number]  :                       1955  ADMIT DATE:       2024 11:27 AM  DISCH DATE:        6/10/2024 6:18 PM  RESPONDING  PROVIDER #:        Italia Richmond MD          QUERY TEXT:    Pt admitted -6/10 with left facial numbness. Noted documentation of \"TIA   due to right carotid stenosis\" on 6/10 by Neurology. If possible, please   document in progress notes:    The medical record reflects the following:  Risk Factors: 69 year old female  Clinical Indicators: MRI Brain : No acute infarct or other acute   intracranial process.  Neurology 6/10: \"From neurology perspective, this is high risk TIA due to   right carotid stenosis. This can be symptomatic carotid stenosis. L facial   tingling/numbness-- likely TIA. Moderate stenosis R ICA.\"  DC summary: \"CVA/TIA - acute thromboembolic likely. \"  Treatment: Stop aspirin, start plavix, Continue eliquis. Continue statin,   PT/OT, Neuro checks. ECHO, MRI Brain, CTHead, CTA Head/Neck, CT Abd, CXR x2.   Vas Dup carotid bilateral.  Options provided:  -- Left facial tingling/numbness due to carotid stenosis, CVA ruled out  -- Defer to neurology consultant documentation regarding etiology of left   facial tingling/numbness  -- Other - I will add my own diagnosis  -- Disagree - Not applicable / Not valid  -- Disagree - Clinically unable to determine / Unknown  -- Refer to Clinical Documentation Reviewer    PROVIDER RESPONSE TEXT:    Defer to neurology consultant documentation regarding etiology of left facial   tingling/numbness    Query created by: Meenu Elizalde on 2024 11:47 AM      Electronically signed by:  Italia Richmond MD 2024 12:36 PM          
4 Eyes Skin Assessment and Patient belongings     The patient is being assess for  Admission    I agree that 2 Nurses have performed a thorough Head to Toe Skin Assessment on the patient. ALL assessment sites listed below have been assessed.       Areas assessed by both nurses: Ivet   [x]   Head, Face, and Ears   [x]   Shoulders, Back, and Chest  [x]   Arms, Elbows, and Hands   [x]   Coccyx, Sacrum, and IschIum  [x]   Legs, Feet, and Heels        Does the Patient have Skin Breakdown?  No         Jaquan Prevention initiated:  No   Wound Care Orders initiated:  No      [facility] nurse consulted for Pressure Injury (Stage 3,4, Unstageable, DTI, NWPT, and Complex wounds), New and Established Ostomies:  No      I agree that 2 Nurses have reviewed patient belongings with the patient/family and documented in the flowsheet upon admission or transfer to the unit.     Pt belongings:  Rings, watch, pants, shirt, socks, undergarments, jacket, shoes. Cell phone, book, .           Nurse 1 eSignature: Electronically signed by Kayla Berry RN on 6/8/24 at 3:36 PM EDT    **SHARE this note so that the co-signing nurse is able to place an eSignature**    Nurse 2 eSignature: Electronically signed by Jacqueline Ortez RN on 6/8/24 at 4:56 PM EDT   
Patient discharged to home. IV removed with no complications, telemetry removed CMU notified. Discharge education complete with verbal understanding. All belongings sent home with patient. Patient transported via wheelchair to private vehicle.  Scripts sent with patient  
Physical Therapy/Occupational Therapy      Pt resting in bed. Pt reports she is at baseline with mobility. Independent with transfers and ambulation. No concerns or changes with coordination, balance or sensation in extremities. Will sign off. No charge.     Adriana Mcdaniel, PT, DPT  Mily Manzanares, OTR/L    
[]Yes  [x]No    ------------------------------------------------------------------------------------------------------------------------------------------------------------------------    [facility]  (this section is simply meant as an aid to accurate billing and does not necessarily reflect ongoing patient care which is documented elsewhere in the medical record)     MODERATE      Medications:  Personally reviewed in detail in conjunction w/ labs as documented for evidence of drug toxicity.     Infusion Medications    sodium chloride       Scheduled Medications    sodium chloride flush  5-40 mL IntraVENous 2 times per day    aspirin  81 mg Oral Daily    apixaban  5 mg Oral BID    metoprolol succinate  25 mg Oral Daily    donepezil  5 mg Oral Nightly    rosuvastatin  20 mg Oral Nightly     PRN Meds: sodium chloride flush, sodium chloride, ondansetron **OR** ondansetron, polyethylene glycol, labetalol, perflutren lipid microspheres, potassium chloride **OR** potassium alternative oral replacement **OR** potassium chloride, magnesium sulfate, acetaminophen     Labs:  Personally reviewed and interpreted for clinical significance.     Recent Labs     06/08/24  1145   WBC 10.0   HGB 13.3   HCT 40.1        Recent Labs     06/08/24  1145      K 3.1*      CO2 29   BUN 13   CREATININE 0.7   CALCIUM 9.2   MG 2.00     Recent Labs     06/08/24  1145   TROPHS 7     No results for input(s): \"LABA1C\" in the last 72 hours.  Recent Labs     06/08/24  1145   AST 14*   ALT 11   BILITOT 0.3   ALKPHOS 103     Recent Labs     06/08/24  1145   INR 1.31*       Urine Cultures:   Lab Results   Component Value Date/Time    LABURIN >100,000 CFU/ml 01/23/2024 11:23 AM     Blood Cultures:   No results found for: \"BC\"  No results found for: \"BLOODCULT2\"  Organism:   Lab Results   Component Value Date/Time    ORG Escherichia coli 01/23/2024 11:23 AM         Julito Sinha DO  
      Pt Education: SLP educated the patient re: Role of SLP, rationale for completion of assessment, anatomical components of swallow structures as they pertain to airway protection, results of assessment, and recommendations  Pt Education Response: verbalized understanding    Duration/Frequency of Tx: 1-2 x week for LOS    Individuals Consulted:   [x]  Patient     []  NP         [x]  RN   []  RD                   []  MD      []  Family Member                        []  PA    []  Other:      Safety Devices / Report:   [x]  All fall risk precautions in place []  Safety handoff completed with RN  []  Bed alarm in place  []  Left in bed     []  Chair alarm in place  []  Left in chair   []  Call light in reach   []  Other:       Total Treatment Time / Charges       Time in Time out Total Time / units   Swallow Eval/Tx Time  1217 1235 18 min / 1 unit     Signature:  Corby Frank MA, CCC-SLP  SP#[id number]  Speech-Language Pathologist  Phone: [phone]  Speech Desk: [phone]

## 2024-06-13 NOTE — ED PROVIDER NOTES
[facility]  ED  EMERGENCY DEPARTMENT ENCOUNTER        Pt Name: Shelby Cordero  MRN: [id number]  Birthdate 1955  Date of evaluation: 6/11/2024  Provider: August Mckeon MD  PCP: Nancy Pan APRN - CNP  Note Started: 8:46 PM EDT 6/12/24    CHIEF COMPLAINT       Chief Complaint   Patient presents with    Nausea     With emesis, was recently admitted and discharge, no HA, on abx,        HISTORY OF PRESENT ILLNESS: 1 or more Elements     History from : Patient    Limitations to history : None    Shelby Cordero is a 69 y.o. female who presents with nausea and vomiting, myalgias, generalized weakness for the past day, associated with cough, was recently admitted, states that she had an ultrasound tech performing her cardiogram and had a cough and that he coughed on her.  She is concerned that she came down with an illness because of this.  States was  placed on antibiotics for diverticulitis empirically, states persistent left lower quadrant abdominal pain despite this.  Symptoms not otherwise alleviated or exacerbated by other factors.    Nursing Notes were all reviewed and agreed with or any disagreements were addressed in the HPI.    REVIEW OF SYSTEMS :      Positives and Pertinent negatives as per HPI.  ROS otherwise unremarkable.    SURGICAL HISTORY     Past Surgical History:   Procedure Laterality Date    ABLATION OF DYSRHYTHMIC FOCUS  1999    SVT    APPENDECTOMY  1982    ARM SURGERY Left 05/04/2021    LEFT ULNAR NERVE DECOMPRESSION AT ELBOW performed by Jeff Peraza MD at [facility] OR    ARM SURGERY Right 07/06/2021    RIGHT ULNAR NERVE DECOMPRESSION AT ELBOW performed by Jeff Peraza MD at [facility] OR    BUNIONECTOMY  1973    bilateral, 2 revisions left foot    CARDIAC SURGERY  2019    stent    CARPAL TUNNEL RELEASE Left 05/04/2021    LEFT CARPAL TUNNEL RELEASE performed by Jeff Peraza MD at [facility] OR    CARPAL TUNNEL RELEASE Right 07/06/2021    RIGHT CARPAL  TUNNEL RELEASE performed by Jeff Peraza MD at [facility] OR    CHOLECYSTECTOMY  2000    COLONOSCOPY  10/15/2013    Hx polyps-3 yrs-? poor prep    ECTOPIC PREGNANCY SURGERY Right 1988    HYSTERECTOMY, TOTAL ABDOMINAL (CERVIX REMOVED)  1988    HYSTERECTOMY, VAGINAL      LAPAROSCOPY      6--endometriosis    NASAL SEPTUM SURGERY  1982    OVARY SURGERY Right 1987    partial removal    SALPINGO-OOPHORECTOMY Left 1986    UPPER GASTROINTESTINAL ENDOSCOPY  10/15/2013    volodymyr ring       CURRENTMEDICATIONS       Discharge Medication List as of 6/12/2024  2:48 AM        CONTINUE these medications which have NOT CHANGED    Details   HYDROcodone-acetaminophen (NORCO) 5-325 MG per tablet Take 1 tablet by mouth every 6 hours as needed for Pain for up to 3 days. Max Daily Amount: 4 tablets, Disp-5 tablet, R-0Print      ciprofloxacin (CIPRO) 500 MG tablet Take 1 tablet by mouth every 12 hours for 7 days, Disp-14 tablet, R-0Print      metroNIDAZOLE (FLAGYL) 500 MG tablet Take 1 tablet by mouth 3 times daily for 7 days, Disp-21 tablet, R-0Print      clopidogrel (PLAVIX) 75 MG tablet Take 1 tablet by mouth daily, Disp-20 tablet, R-0Print      cetirizine (ZYRTEC) 5 MG tablet Take 1 tablet by mouth dailyHistorical Med      ELIQUIS 5 MG TABS tablet TAKE 1 TABLET BY MOUTH TWICE A DAY, Disp-60 tablet, R-0Normal      buPROPion (WELLBUTRIN) 75 MG tablet TAKE TWO TABLETS BY MOUTH EVERY MORNING, Disp-90 tablet, R-1Normal      methocarbamol (ROBAXIN) 500 MG tablet TAKE ONE TABLET BY MOUTH THREE TIMES A DAY, Disp-90 tablet, R-1Normal      nitroglycerin (NITROSTAT) 0.6 MG SL tablet Place 1 tablet under the tongue every 5 minutes as needed for Chest pain up to max of 3 total doses. If no relief after 1 dose, call 911., Disp-25 tablet, R-2Normal      donepezil (ARICEPT) 5 MG tablet Take 1 tablet by mouth nightly, Disp-30 tablet, R-2Normal      dicyclomine (BENTYL) 10 MG capsule Take 1 capsule by mouth 3 times daily, Disp-90 capsule, R-1Normal

## 2024-06-16 LAB
BACTERIA BLD CULT ORG #2: NORMAL
BACTERIA BLD CULT: NORMAL

## 2024-06-17 ENCOUNTER — OFFICE VISIT (OUTPATIENT)
Dept: FAMILY MEDICINE CLINIC | Age: 69
End: 2024-06-17

## 2024-06-17 VITALS
DIASTOLIC BLOOD PRESSURE: 60 MMHG | WEIGHT: 163.4 LBS | RESPIRATION RATE: 18 BRPM | SYSTOLIC BLOOD PRESSURE: 100 MMHG | HEART RATE: 58 BPM | BODY MASS INDEX: 27.19 KG/M2 | OXYGEN SATURATION: 98 %

## 2024-06-17 DIAGNOSIS — R20.0 NUMBNESS: ICD-10-CM

## 2024-06-17 DIAGNOSIS — Z09 HOSPITAL DISCHARGE FOLLOW-UP: ICD-10-CM

## 2024-06-17 DIAGNOSIS — I63.231 CEREBROVASCULAR ACCIDENT (CVA) DUE TO STENOSIS OF RIGHT CAROTID ARTERY (HCC): Primary | ICD-10-CM

## 2024-06-17 PROBLEM — G45.9 TIA (TRANSIENT ISCHEMIC ATTACK): Status: RESOLVED | Noted: 2024-06-07 | Resolved: 2024-06-17

## 2024-06-17 PROBLEM — G45.9 TIA (TRANSIENT ISCHEMIC ATTACK): Status: ACTIVE | Noted: 2024-06-07

## 2024-06-17 NOTE — PROGRESS NOTES
Post-Discharge Transitional Care Follow Up      Shelby Cordero   YOB: 1955    Date of Office Visit:  6/17/2024  Date of Hospital Admission: 6/8/2024  Date of Hospital Discharge: 6/10/2024  Readmission Risk Score (high >=14%. Medium >=10%):Readmission Risk Score: 7.1      Care management risk score Rising risk (score 2-5) and Complex Care (Scores >=6): No Risk Score On File     Non face to face  following discharge, date last encounter closed (first attempt may have been earlier): 06/11/2024     Call initiated 2 business days of discharge: Yes     Cerebrovascular accident (CVA) due to stenosis of right carotid artery (HCC)  -     [facility] DISCHARGE MEDS RECONCILED W/ CURRENT OUTPATIENT MED LIST  Numbness  -     [facility] DISCHARGE MEDS RECONCILED W/ CURRENT OUTPATIENT MED LIST  Hospital discharge follow-up  -     [facility] DISCHARGE MEDS RECONCILED W/ CURRENT OUTPATIENT MED LIST      Following with Dr. Mir 6/24/2024.    Discussed plan to continue eliquis and plavix ongoing. Will discuss refills with Dr. Mir on plavix and eliquis with cardiology    Due for follow up with cardiology. Last seen 8/2023    Reviewed labs from 6/2024. Stable    Encouraged to increase activity gradually      Medical Decision Making: moderate complexity    Return in about 3 months (around 9/17/2024) for AWV.           Subjective:   HPI    Inpatient course: Discharge summary reviewed- see chart.    Interval history/Current status:     Admitted with left facial burning > 24 hours intermittent occurrence. Felt face was symmetrical. Next morning sensation was increased. Went to ER and dx with CVA. Following with neurology.    Now taking aricept for sensation of being in a cloud. Had instant improvement. Feels related to having covid.     ER visit 6/12/2024 diagnosed with influenza A. Continue with mild cough. Was taking cipro for diverticulitis. CT scan normal Continued the cipro. Denies ongoing fevers. Appetite reported to be good.

## 2024-06-18 ENCOUNTER — CARE COORDINATION (OUTPATIENT)
Dept: CASE MANAGEMENT | Age: 69
End: 2024-06-18

## 2024-06-18 NOTE — CARE COORDINATION
Care Transitions Note  Follow Up Call     Patient Current Location:  [place]    Care Transition Nurse contacted the patient by telephone. Verified name and  as identifiers.    Additional needs identified to be addressed with provider   No needs identified                 Method of communication with provider: none.    Care Summary Note: CTN spoke with patient who reported she is feeling a little better. Patient tested positive for Flu A on . Patient reported her cough is getting better and phlegm is now a light yellow from green. Patient reported she is tired and weak but knows it will take time to get better. Ctn encouraged patient to increase fluids and rest. Patient reported she still has some face numbness from TIA but it is also improving. Patient encouraged to notify the provider right away if symptoms worsen such as worsening cough, sob, fevers, or unable to keep fluids down.  Patient had follow up with PCP and was instructed to continue Plavix as instructed by Dr. Gamboa and to not start ASA back.     Plan of care updates since last contact:  Review of patient management of conditions/medications: .       Advance Care Planning:   Does patient have an Advance Directive: reviewed during previous call, see note. .    Medication Review:  Medications changed since last call, reviewed today.     Remote Patient Monitoring:  Offered patient enrollment in the Remote Patient Monitoring (RPM) program for in-home monitoring: Patient is not eligible for RPM program because: insurance coverage.    Assessments:  Care Transitions Subsequent and Final Call    Subsequent and Final Calls  Do you have any ongoing symptoms?: Yes  Patient-reported symptoms: Cough  Have your medications changed?: Yes  Do you have any questions related to your medications?: No  Do you currently have any active services?: No  Do you have any needs or concerns that I can assist you with?: No  Identified Barriers: None  Care Transitions

## 2024-06-21 ENCOUNTER — CARE COORDINATION (OUTPATIENT)
Dept: CASE MANAGEMENT | Age: 69
End: 2024-06-21

## 2024-06-21 NOTE — CARE COORDINATION
Care Transitions Note    Follow Up Call     Patient Current Location:  [place]    Care Transition Nurse contacted the patient by telephone. Verified name and  as identifiers.    Additional needs identified to be addressed with provider   No needs identified                 Method of communication with provider: none.    Care Summary Note: CTN spoke with patient who reported she is doing better. Patient reported her flu symptoms are improving. Patient reported cough is improving and feels she is getting her strength back. Patient encouraged to continue fluids and rest and if any worsening symptoms to notify provider right away. CTN advised patient of use of urgent care or physician’s 24 hr access line if assistance is needed after hours.    Plan of care updates since last contact:  Education: .       Advance Care Planning:   Does patient have an Advance Directive: reviewed during previous call, see note. .    Medication Review:  No changes since last call.     Remote Patient Monitoring:  Offered patient enrollment in the Remote Patient Monitoring (RPM) program for in-home monitoring: Patient is not eligible for RPM program because: insurance coverage.    Assessments:  Care Transitions Subsequent and Final Call    Subsequent and Final Calls  Do you have any ongoing symptoms?: No  Have your medications changed?: No  Do you have any questions related to your medications?: No  Do you currently have any active services?: No  Do you have any needs or concerns that I can assist you with?: No  Identified Barriers: None  Care Transitions Interventions  Other Interventions:              Follow Up Appointment:   Reviewed upcoming appointment(s).  Future Appointments         Provider Specialty Dept Phone    2024 12:00 PM Ryland Darling MD Neurology [phone]    2024 2:45 PM Dawn Higgins APRN - CNP Cardiology [phone]    2024 3:30 PM Nancy Pan APRN - [facility]

## 2024-06-24 ENCOUNTER — OFFICE VISIT (OUTPATIENT)
Dept: NEUROLOGY | Age: 69
End: 2024-06-24
Payer: COMMERCIAL

## 2024-06-24 VITALS
HEIGHT: 65 IN | OXYGEN SATURATION: 99 % | HEART RATE: 74 BPM | BODY MASS INDEX: 27.16 KG/M2 | SYSTOLIC BLOOD PRESSURE: 112 MMHG | WEIGHT: 163 LBS | DIASTOLIC BLOOD PRESSURE: 64 MMHG

## 2024-06-24 DIAGNOSIS — F02.A0 MILD LATE ONSET ALZHEIMER'S DEMENTIA WITHOUT BEHAVIORAL DISTURBANCE, PSYCHOTIC DISTURBANCE, MOOD DISTURBANCE, OR ANXIETY (HCC): Primary | ICD-10-CM

## 2024-06-24 DIAGNOSIS — G30.1 MILD LATE ONSET ALZHEIMER'S DEMENTIA WITHOUT BEHAVIORAL DISTURBANCE, PSYCHOTIC DISTURBANCE, MOOD DISTURBANCE, OR ANXIETY (HCC): Primary | ICD-10-CM

## 2024-06-24 DIAGNOSIS — Z86.73 OLD CEREBROVASCULAR ACCIDENT (CVA) WITHOUT LATE EFFECT: ICD-10-CM

## 2024-06-24 PROCEDURE — 1123F ACP DISCUSS/DSCN MKR DOCD: CPT | Performed by: PSYCHIATRY & NEUROLOGY

## 2024-06-24 PROCEDURE — 99214 OFFICE O/P EST MOD 30 MIN: CPT | Performed by: PSYCHIATRY & NEUROLOGY

## 2024-06-24 RX ORDER — DONEPEZIL HYDROCHLORIDE 10 MG/1
10 TABLET, FILM COATED ORAL EVERY MORNING
Qty: 90 TABLET | Refills: 1 | Status: SHIPPED | OUTPATIENT
Start: 2024-06-24

## 2024-06-24 NOTE — PATIENT INSTRUCTIONS
YOU MUST CONFIRM YOUR APPOINTMENT 1 DAY PRIOR OR IT WILL BE CANCELLED!!   Our office will call you 3 times the day prior to your appointment in an attempt to confirm.  Please return our call ASAP or confirm your appt through [employer] no later than 3 pm the day before your appointment.  If we do not hear back from you by 3 pm to confirm, your appointment will be cancelled & someone will be added into that slot from our wait list.

## 2024-06-28 ENCOUNTER — CARE COORDINATION (OUTPATIENT)
Dept: CASE MANAGEMENT | Age: 69
End: 2024-06-28

## 2024-06-28 RX ORDER — DONEPEZIL HYDROCHLORIDE 5 MG/1
5 TABLET, FILM COATED ORAL NIGHTLY
Qty: 30 TABLET | Refills: 2 | OUTPATIENT
Start: 2024-06-28

## 2024-06-28 NOTE — CARE COORDINATION
Care Transitions Note    Follow Up Call     Patient Current Location:  [place]    Care Transition Nurse contacted the patient by telephone. Verified name and  as identifiers.    Additional needs identified to be addressed with provider   No needs identified                 Method of communication with provider: none.    Care Summary Note: CTN spoke with patient who reported she has a migraine today and she took some pain medication and is trying to rest. Patient reported she did send a message to neurologist regarding headaches. CTN encouraged patient is symptoms worsen to return to the ER. Patient denied any other concerns at this time.     Plan of care updates since last contact:  Review of patient management of conditions/medications: .       Advance Care Planning:   Does patient have an Advance Directive: reviewed during previous call, see note. .    Medication Review:  No changes since last call.     Remote Patient Monitoring:  Offered patient enrollment in the Remote Patient Monitoring (RPM) program for in-home monitoring: Patient is not eligible for RPM program because: insurance coverage.    Assessments:  Care Transitions Subsequent and Final Call    Subsequent and Final Calls  Do you have any ongoing symptoms?: Yes  Patient-reported symptoms: Other  Interventions for patient-reported symptoms: Notified PCP/Physician  Have your medications changed?: No  Do you have any questions related to your medications?: No  Do you currently have any active services?: No  Do you have any needs or concerns that I can assist you with?: No  Identified Barriers: None  Care Transitions Interventions  Other Interventions:              Follow Up Appointment:   SHERRY appointment attended as scheduled   Future Appointments         Provider Specialty Dept Phone    2024 2:45 PM Dawn Higgins APRN - CNP Cardiology [phone]    2024 3:30 PM Nancy Pan APRN - CNP Family Medicine [phone]    2024 12:00

## 2024-07-01 ENCOUNTER — CARE COORDINATION (OUTPATIENT)
Dept: CASE MANAGEMENT | Age: 69
End: 2024-07-01

## 2024-07-01 ENCOUNTER — TELEPHONE (OUTPATIENT)
Dept: CARDIOLOGY CLINIC | Age: 69
End: 2024-07-01

## 2024-07-01 ENCOUNTER — OFFICE VISIT (OUTPATIENT)
Dept: CARDIOLOGY CLINIC | Age: 69
End: 2024-07-01
Payer: COMMERCIAL

## 2024-07-01 VITALS
HEIGHT: 65 IN | WEIGHT: 166 LBS | SYSTOLIC BLOOD PRESSURE: 132 MMHG | HEART RATE: 80 BPM | DIASTOLIC BLOOD PRESSURE: 70 MMHG | BODY MASS INDEX: 27.66 KG/M2

## 2024-07-01 DIAGNOSIS — E78.00 PURE HYPERCHOLESTEROLEMIA: ICD-10-CM

## 2024-07-01 DIAGNOSIS — R00.2 PALPITATIONS: ICD-10-CM

## 2024-07-01 DIAGNOSIS — I25.10 CAD S/P PERCUTANEOUS CORONARY ANGIOPLASTY: ICD-10-CM

## 2024-07-01 DIAGNOSIS — M79.7 FIBROMYALGIA: ICD-10-CM

## 2024-07-01 DIAGNOSIS — I50.22 CHRONIC SYSTOLIC (CONGESTIVE) HEART FAILURE (HCC): Primary | ICD-10-CM

## 2024-07-01 DIAGNOSIS — D68.69 SECONDARY HYPERCOAGULABLE STATE (HCC): ICD-10-CM

## 2024-07-01 DIAGNOSIS — I48.0 PAROXYSMAL ATRIAL FIBRILLATION (HCC): ICD-10-CM

## 2024-07-01 DIAGNOSIS — Z98.61 CAD S/P PERCUTANEOUS CORONARY ANGIOPLASTY: ICD-10-CM

## 2024-07-01 DIAGNOSIS — I25.118 CORONARY ARTERY DISEASE OF NATIVE ARTERY OF NATIVE HEART WITH STABLE ANGINA PECTORIS (HCC): ICD-10-CM

## 2024-07-01 PROCEDURE — 1123F ACP DISCUSS/DSCN MKR DOCD: CPT | Performed by: NURSE PRACTITIONER

## 2024-07-01 PROCEDURE — 99215 OFFICE O/P EST HI 40 MIN: CPT | Performed by: NURSE PRACTITIONER

## 2024-07-01 RX ORDER — CLOPIDOGREL BISULFATE 75 MG/1
75 TABLET ORAL DAILY
Qty: 90 TABLET | Refills: 3 | Status: SHIPPED | OUTPATIENT
Start: 2024-07-01

## 2024-07-01 NOTE — CARE COORDINATION
Ryland Darling MD Neurology [phone]            Care Transition Nurse provided contact information.  Plan for follow-up call in 6-10 days based on severity of symptoms and risk factors.  Plan for next call: self management-.      Ngozi LAMBERT, RN, [facility]  Care Transition Nurse  [phone] mobile

## 2024-07-01 NOTE — PROGRESS NOTES
No black/tarry stools  : No urinary urgency, incontinence, or hematuria.  SKIN: No cyanosis or skin lesions.  MUSCULOSKELETAL: No new muscle or joint pain.  NEUROLOGICAL: No syncope or TIA-like symptoms.  PSYCHIATRIC: No anxiety, pain, insomnia or depression        Vitals:    07/01/24 1544   BP: 132/70   Pulse: 80   Weight: 75.3 kg (166 lb)   Height: 1.651 m (5' 5\")      VITALS:  /70   Pulse 80   Ht 1.651 m (5' 5\")   Wt 75.3 kg (166 lb)   LMP  (LMP Unknown)   BMI 27.62 kg/m²   CONSTITUTIONAL: Cooperative, no apparent distress, and appears well nourished / developed  NEUROLOGIC:  Awake and orientated to person, place, and time.  PSYCH: Calm affect.  SKIN: Warm and dry.  HEENT: Sclera non-icteric, normocephalic, neck supple.  RESPIRATORY:  No increased work of breathing and clear to auscultation, no crackles or wheezing.  CARDIOVASCULAR:  Regular rate and rhythm without murmur. Normal S1 and S2. No gallops or rubs. Normal PMI. No elevation of JVP. Normal carotid pulses with no bruits.    GI:  Normal bowel sounds. Non-distended. Non-tender to palpation  EXT: No edema. No calf tenderness. Pulses are present bilaterally.    Wt Readings from Last 3 Encounters:   07/01/24 75.3 kg (166 lb)   06/24/24 73.9 kg (163 lb)   06/17/24 74.1 kg (163 lb 6.4 oz)      Pulse Readings from Last 3 Encounters:   07/01/24 80   06/24/24 74   06/17/24 58     BP Readings from Last 3 Encounters:   07/01/24 132/70   06/24/24 112/64   06/17/24 100/60        DATA:    Lab Results   Component Value Date    ALT 11 06/11/2024    AST 17 06/11/2024    ALKPHOS 102 06/11/2024    BILITOT 0.4 06/11/2024     Lab Results   Component Value Date    CREATININE 0.8 06/11/2024    BUN 10 06/11/2024     (L) 06/11/2024    K 4.2 06/11/2024    CL 97 (L) 06/11/2024    CO2 22 06/11/2024     Lab Results   Component Value Date    TSH 0.94 06/03/2019     Lab Results   Component Value Date    WBC 10.7 06/11/2024    HGB 13.8 06/11/2024    HCT 41.3

## 2024-07-01 NOTE — TELEPHONE ENCOUNTER
Pt is requesting Rx refill. She has an appt for today with Dawn CARRASCO. Pt has early dementia and does not want to forget to mention during the appt.       Medication  clopidogrel (PLAVIX) 75 MG tablet [[id number]]  clopidogrel (PLAVIX) 75 MG tablet [[id number]]    Order Details  Dose: 75 mg Route: Oral Frequency: DAILY   Dispense Quantity: 20 tablet Refills: 0          Sig: Take 1 tablet by mouth daily               Pharmacy    [facility] [id number] - [facility] [address] [place] [address] - P [phone] - F [phone]  [address] [place] [id number]  Phone: [phone]  Fax: [phone]

## 2024-07-02 ENCOUNTER — TELEPHONE (OUTPATIENT)
Dept: CARDIOLOGY CLINIC | Age: 69
End: 2024-07-02

## 2024-07-08 ENCOUNTER — CARE COORDINATION (OUTPATIENT)
Dept: CASE MANAGEMENT | Age: 69
End: 2024-07-08

## 2024-07-08 NOTE — CARE COORDINATION
Care Transitions Note    Final Call     Patient Current Location:  [place]    Care Transition Nurse contacted the patient by telephone. Verified name and  as identifiers.    Patient graduated from the Care Transitions program on 24.  Patient/family has the ability to self manage at this time..      Advance Care Planning:   Does patient have an Advance Directive: reviewed during previous call, see note. .    Handoff:   Patient was not referred to the ACM team due to no additional needs identified.       Care Summary Note: CTN spoke with patient who reported she is feeling better. Patient reported her headaches have improved and she feels she is getting her strength back. Patient reported the numbness in her face is improving as well. Patient denied any other issues or concerns at this time.     Assessments:  Care Transitions Subsequent and Final Call    Subsequent and Final Calls  Do you have any ongoing symptoms?: No  Have your medications changed?: No  Do you have any questions related to your medications?: No  Do you currently have any active services?: No  Do you have any needs or concerns that I can assist you with?: No  Identified Barriers: None  Care Transitions Interventions  Other Interventions:              Upcoming Appointments:    Future Appointments         Provider Specialty Dept Phone    2024 1:15 PM Dawn Higgins APRN - CNP Cardiology [phone]    2024 3:30 PM Nancy Pan APRN - CNP Family Medicine [phone]    2024 12:00 PM Ryland Darling MD Neurology [phone]            Patient has agreed to contact primary care provider and/or specialist for any further questions, concerns, or needs.  Ngozi WOODN, RN, [facility]  Care Transition Nurse  [phone] mobile

## 2024-07-14 ENCOUNTER — APPOINTMENT (OUTPATIENT)
Dept: GENERAL RADIOLOGY | Age: 69
End: 2024-07-14
Payer: COMMERCIAL

## 2024-07-14 ENCOUNTER — HOSPITAL ENCOUNTER (OUTPATIENT)
Age: 69
Setting detail: OBSERVATION
Discharge: HOME | End: 2024-07-15
Attending: EMERGENCY MEDICINE | Admitting: STUDENT IN AN ORGANIZED HEALTH CARE EDUCATION/TRAINING PROGRAM
Payer: COMMERCIAL

## 2024-07-14 ENCOUNTER — APPOINTMENT (OUTPATIENT)
Dept: CT IMAGING | Age: 69
End: 2024-07-14
Payer: COMMERCIAL

## 2024-07-14 DIAGNOSIS — I63.9 CEREBROVASCULAR ACCIDENT (CVA), UNSPECIFIED MECHANISM (HCC): Primary | ICD-10-CM

## 2024-07-14 PROBLEM — G45.9 TIA (TRANSIENT ISCHEMIC ATTACK): Status: ACTIVE | Noted: 2024-07-14

## 2024-07-14 LAB
ALBUMIN SERPL-MCNC: 4.3 G/DL (ref 3.4–5)
ALBUMIN/GLOB SERPL: 1.3 {RATIO} (ref 1.1–2.2)
ALP SERPL-CCNC: 108 U/L (ref 40–129)
ALT SERPL-CCNC: 11 U/L (ref 10–40)
ANION GAP SERPL CALCULATED.3IONS-SCNC: 7 MMOL/L (ref 3–16)
AST SERPL-CCNC: 14 U/L (ref 15–37)
BASOPHILS # BLD: 0.1 K/UL (ref 0–0.2)
BASOPHILS NFR BLD: 1 %
BILIRUB SERPL-MCNC: 0.3 MG/DL (ref 0–1)
BILIRUB UR QL STRIP.AUTO: NEGATIVE
BUN SERPL-MCNC: 13 MG/DL (ref 7–20)
CALCIUM SERPL-MCNC: 9.5 MG/DL (ref 8.3–10.6)
CHLORIDE SERPL-SCNC: 104 MMOL/L (ref 99–110)
CLARITY UR: CLEAR
CO2 SERPL-SCNC: 28 MMOL/L (ref 21–32)
COLOR UR: YELLOW
CREAT SERPL-MCNC: 0.7 MG/DL (ref 0.6–1.2)
DEPRECATED RDW RBC AUTO: 13.5 % (ref 12.4–15.4)
EKG ATRIAL RATE: 67 BPM
EKG DIAGNOSIS: NORMAL
EKG P AXIS: 29 DEGREES
EKG P-R INTERVAL: 130 MS
EKG Q-T INTERVAL: 456 MS
EKG QRS DURATION: 122 MS
EKG QTC CALCULATION (BAZETT): 481 MS
EKG R AXIS: -31 DEGREES
EKG T AXIS: 32 DEGREES
EKG VENTRICULAR RATE: 67 BPM
EOSINOPHIL # BLD: 0.3 K/UL (ref 0–0.6)
EOSINOPHIL NFR BLD: 4.1 %
GFR SERPLBLD CREATININE-BSD FMLA CKD-EPI: >90 ML/MIN/{1.73_M2}
GLUCOSE SERPL-MCNC: 88 MG/DL (ref 70–99)
GLUCOSE UR STRIP.AUTO-MCNC: NEGATIVE MG/DL
HCT VFR BLD AUTO: 39.9 % (ref 36–48)
HGB BLD-MCNC: 13.3 G/DL (ref 12–16)
HGB UR QL STRIP.AUTO: NEGATIVE
INR PPP: 1.06 (ref 0.85–1.15)
KETONES UR STRIP.AUTO-MCNC: NEGATIVE MG/DL
LEUKOCYTE ESTERASE UR QL STRIP.AUTO: NEGATIVE
LYMPHOCYTES # BLD: 1.7 K/UL (ref 1–5.1)
LYMPHOCYTES NFR BLD: 22.2 %
MCH RBC QN AUTO: 31.7 PG (ref 26–34)
MCHC RBC AUTO-ENTMCNC: 33.2 G/DL (ref 31–36)
MCV RBC AUTO: 95.4 FL (ref 80–100)
MONOCYTES # BLD: 0.6 K/UL (ref 0–1.3)
MONOCYTES NFR BLD: 7.3 %
NEUTROPHILS # BLD: 5.1 K/UL (ref 1.7–7.7)
NEUTROPHILS NFR BLD: 65.4 %
NITRITE UR QL STRIP.AUTO: NEGATIVE
PH UR STRIP.AUTO: 7 [PH] (ref 5–8)
PLATELET # BLD AUTO: 307 K/UL (ref 135–450)
PMV BLD AUTO: 6.3 FL (ref 5–10.5)
POTASSIUM SERPL-SCNC: 3.9 MMOL/L (ref 3.5–5.1)
PROT SERPL-MCNC: 7.5 G/DL (ref 6.4–8.2)
PROT UR STRIP.AUTO-MCNC: NEGATIVE MG/DL
PROTHROMBIN TIME: 14 SEC (ref 11.9–14.9)
RBC # BLD AUTO: 4.18 M/UL (ref 4–5.2)
SODIUM SERPL-SCNC: 139 MMOL/L (ref 136–145)
SP GR UR STRIP.AUTO: 1.01 (ref 1–1.03)
TROPONIN, HIGH SENSITIVITY: 6 NG/L (ref 0–14)
UA COMPLETE W REFLEX CULTURE PNL UR: NORMAL
UA DIPSTICK W REFLEX MICRO PNL UR: NORMAL
URN SPEC COLLECT METH UR: NORMAL
UROBILINOGEN UR STRIP-ACNC: 0.2 E.U./DL
WBC # BLD AUTO: 7.8 K/UL (ref 4–11)

## 2024-07-14 PROCEDURE — 6360000004 HC RX CONTRAST MEDICATION: Performed by: PHYSICIAN ASSISTANT

## 2024-07-14 PROCEDURE — 71045 X-RAY EXAM CHEST 1 VIEW: CPT

## 2024-07-14 PROCEDURE — 70450 CT HEAD/BRAIN W/O DYE: CPT

## 2024-07-14 PROCEDURE — 70496 CT ANGIOGRAPHY HEAD: CPT

## 2024-07-14 PROCEDURE — 85610 PROTHROMBIN TIME: CPT

## 2024-07-14 PROCEDURE — G0378 HOSPITAL OBSERVATION PER HR: HCPCS

## 2024-07-14 PROCEDURE — 6370000000 HC RX 637 (ALT 250 FOR IP): Performed by: NURSE PRACTITIONER

## 2024-07-14 PROCEDURE — 36415 COLL VENOUS BLD VENIPUNCTURE: CPT

## 2024-07-14 PROCEDURE — 93005 ELECTROCARDIOGRAM TRACING: CPT | Performed by: PHYSICIAN ASSISTANT

## 2024-07-14 PROCEDURE — 99285 EMERGENCY DEPT VISIT HI MDM: CPT

## 2024-07-14 PROCEDURE — 84484 ASSAY OF TROPONIN QUANT: CPT

## 2024-07-14 PROCEDURE — 80053 COMPREHEN METABOLIC PANEL: CPT

## 2024-07-14 PROCEDURE — 93010 ELECTROCARDIOGRAM REPORT: CPT | Performed by: INTERNAL MEDICINE

## 2024-07-14 PROCEDURE — 6370000000 HC RX 637 (ALT 250 FOR IP): Performed by: PHYSICIAN ASSISTANT

## 2024-07-14 PROCEDURE — 6370000000 HC RX 637 (ALT 250 FOR IP): Performed by: STUDENT IN AN ORGANIZED HEALTH CARE EDUCATION/TRAINING PROGRAM

## 2024-07-14 PROCEDURE — 2580000003 HC RX 258: Performed by: STUDENT IN AN ORGANIZED HEALTH CARE EDUCATION/TRAINING PROGRAM

## 2024-07-14 PROCEDURE — 81003 URINALYSIS AUTO W/O SCOPE: CPT

## 2024-07-14 PROCEDURE — 85025 COMPLETE CBC W/AUTO DIFF WBC: CPT

## 2024-07-14 RX ORDER — POTASSIUM CHLORIDE 7.45 MG/ML
10 INJECTION INTRAVENOUS PRN
Status: DISCONTINUED | OUTPATIENT
Start: 2024-07-14 | End: 2024-07-15 | Stop reason: HOSPADM

## 2024-07-14 RX ORDER — MAGNESIUM SULFATE IN WATER 40 MG/ML
2000 INJECTION, SOLUTION INTRAVENOUS PRN
Status: DISCONTINUED | OUTPATIENT
Start: 2024-07-14 | End: 2024-07-15 | Stop reason: HOSPADM

## 2024-07-14 RX ORDER — METOPROLOL SUCCINATE 25 MG/1
25 TABLET, EXTENDED RELEASE ORAL DAILY
Status: DISCONTINUED | OUTPATIENT
Start: 2024-07-14 | End: 2024-07-15 | Stop reason: HOSPADM

## 2024-07-14 RX ORDER — CITALOPRAM 20 MG/1
40 TABLET ORAL EVERY MORNING
Status: DISCONTINUED | OUTPATIENT
Start: 2024-07-15 | End: 2024-07-15 | Stop reason: HOSPADM

## 2024-07-14 RX ORDER — POTASSIUM CHLORIDE 20 MEQ/1
40 TABLET, EXTENDED RELEASE ORAL PRN
Status: DISCONTINUED | OUTPATIENT
Start: 2024-07-14 | End: 2024-07-15 | Stop reason: HOSPADM

## 2024-07-14 RX ORDER — ACETAMINOPHEN 325 MG/1
650 TABLET ORAL EVERY 6 HOURS PRN
Status: DISCONTINUED | OUTPATIENT
Start: 2024-07-14 | End: 2024-07-15 | Stop reason: HOSPADM

## 2024-07-14 RX ORDER — DONEPEZIL HYDROCHLORIDE 5 MG/1
10 TABLET, FILM COATED ORAL NIGHTLY
Status: DISCONTINUED | OUTPATIENT
Start: 2024-07-14 | End: 2024-07-15 | Stop reason: HOSPADM

## 2024-07-14 RX ORDER — ACETAMINOPHEN 650 MG/1
650 SUPPOSITORY RECTAL EVERY 6 HOURS PRN
Status: DISCONTINUED | OUTPATIENT
Start: 2024-07-14 | End: 2024-07-15 | Stop reason: HOSPADM

## 2024-07-14 RX ORDER — DONEPEZIL HYDROCHLORIDE 5 MG/1
10 TABLET, FILM COATED ORAL EVERY MORNING
Status: DISCONTINUED | OUTPATIENT
Start: 2024-07-15 | End: 2024-07-14

## 2024-07-14 RX ORDER — ONDANSETRON 2 MG/ML
4 INJECTION INTRAMUSCULAR; INTRAVENOUS EVERY 6 HOURS PRN
Status: DISCONTINUED | OUTPATIENT
Start: 2024-07-14 | End: 2024-07-15 | Stop reason: HOSPADM

## 2024-07-14 RX ORDER — SODIUM CHLORIDE 0.9 % (FLUSH) 0.9 %
5-40 SYRINGE (ML) INJECTION EVERY 12 HOURS SCHEDULED
Status: DISCONTINUED | OUTPATIENT
Start: 2024-07-14 | End: 2024-07-15 | Stop reason: HOSPADM

## 2024-07-14 RX ORDER — BUPROPION HYDROCHLORIDE 75 MG/1
75 TABLET ORAL 2 TIMES DAILY
Status: DISCONTINUED | OUTPATIENT
Start: 2024-07-14 | End: 2024-07-15 | Stop reason: HOSPADM

## 2024-07-14 RX ORDER — PROMETHAZINE HYDROCHLORIDE 25 MG/1
25 TABLET ORAL EVERY 6 HOURS PRN
COMMUNITY

## 2024-07-14 RX ORDER — CLOPIDOGREL BISULFATE 75 MG/1
75 TABLET ORAL DAILY
Status: DISCONTINUED | OUTPATIENT
Start: 2024-07-14 | End: 2024-07-15 | Stop reason: HOSPADM

## 2024-07-14 RX ORDER — ONDANSETRON 4 MG/1
4 TABLET, ORALLY DISINTEGRATING ORAL EVERY 8 HOURS PRN
Status: DISCONTINUED | OUTPATIENT
Start: 2024-07-14 | End: 2024-07-15 | Stop reason: HOSPADM

## 2024-07-14 RX ORDER — SODIUM CHLORIDE 9 MG/ML
INJECTION, SOLUTION INTRAVENOUS PRN
Status: DISCONTINUED | OUTPATIENT
Start: 2024-07-14 | End: 2024-07-15 | Stop reason: HOSPADM

## 2024-07-14 RX ORDER — POLYETHYLENE GLYCOL 3350 17 G/17G
17 POWDER, FOR SOLUTION ORAL DAILY PRN
Status: DISCONTINUED | OUTPATIENT
Start: 2024-07-14 | End: 2024-07-15 | Stop reason: HOSPADM

## 2024-07-14 RX ORDER — ROSUVASTATIN CALCIUM 10 MG/1
40 TABLET, COATED ORAL NIGHTLY
Status: DISCONTINUED | OUTPATIENT
Start: 2024-07-14 | End: 2024-07-15 | Stop reason: HOSPADM

## 2024-07-14 RX ORDER — HYDROCODONE BITARTRATE AND ACETAMINOPHEN 5; 325 MG/1; MG/1
1 TABLET ORAL EVERY 6 HOURS PRN
Status: ON HOLD | COMMUNITY
End: 2024-07-15 | Stop reason: HOSPADM

## 2024-07-14 RX ORDER — ACETAMINOPHEN 500 MG
1000 TABLET ORAL ONCE
Status: COMPLETED | OUTPATIENT
Start: 2024-07-14 | End: 2024-07-14

## 2024-07-14 RX ORDER — SODIUM CHLORIDE 0.9 % (FLUSH) 0.9 %
5-40 SYRINGE (ML) INJECTION PRN
Status: DISCONTINUED | OUTPATIENT
Start: 2024-07-14 | End: 2024-07-15 | Stop reason: HOSPADM

## 2024-07-14 RX ADMIN — DONEPEZIL HYDROCHLORIDE 10 MG: 5 TABLET, FILM COATED ORAL at 21:21

## 2024-07-14 RX ADMIN — ROSUVASTATIN 40 MG: 10 TABLET, FILM COATED ORAL at 21:22

## 2024-07-14 RX ADMIN — CLOPIDOGREL BISULFATE 75 MG: 75 TABLET ORAL at 21:22

## 2024-07-14 RX ADMIN — ACETAMINOPHEN 1000 MG: 500 TABLET ORAL at 12:24

## 2024-07-14 RX ADMIN — IOPAMIDOL 75 ML: 755 INJECTION, SOLUTION INTRAVENOUS at 11:47

## 2024-07-14 RX ADMIN — BUPROPION HYDROCHLORIDE 75 MG: 75 TABLET, FILM COATED ORAL at 21:22

## 2024-07-14 RX ADMIN — ACETAMINOPHEN 650 MG: 325 TABLET ORAL at 16:36

## 2024-07-14 RX ADMIN — METOPROLOL SUCCINATE 25 MG: 25 TABLET, EXTENDED RELEASE ORAL at 21:22

## 2024-07-14 RX ADMIN — Medication 10 ML: at 21:23

## 2024-07-14 RX ADMIN — APIXABAN 5 MG: 5 TABLET, FILM COATED ORAL at 21:22

## 2024-07-14 ASSESSMENT — PAIN - FUNCTIONAL ASSESSMENT
PAIN_FUNCTIONAL_ASSESSMENT: 0-10

## 2024-07-14 ASSESSMENT — PAIN SCALES - GENERAL
PAINLEVEL_OUTOF10: 3
PAINLEVEL_OUTOF10: 3
PAINLEVEL_OUTOF10: 0
PAINLEVEL_OUTOF10: 4
PAINLEVEL_OUTOF10: 4
PAINLEVEL_OUTOF10: 5

## 2024-07-14 ASSESSMENT — PAIN DESCRIPTION - ORIENTATION: ORIENTATION: POSTERIOR

## 2024-07-14 ASSESSMENT — PAIN DESCRIPTION - DESCRIPTORS: DESCRIPTORS: SORE

## 2024-07-14 ASSESSMENT — PAIN DESCRIPTION - LOCATION
LOCATION: HEAD
LOCATION: HEAD

## 2024-07-14 NOTE — PLAN OF CARE
Problem: Discharge Planning  Goal: Discharge to home or other facility with appropriate resources  Outcome: Progressing  Flowsheets  Taken 7/14/2024 1535  Discharge to home or other facility with appropriate resources: Identify barriers to discharge with patient and caregiver  Taken 7/14/2024 1528  Discharge to home or other facility with appropriate resources: Identify barriers to discharge with patient and caregiver     Problem: Pain  Goal: Verbalizes/displays adequate comfort level or baseline comfort level  Outcome: Progressing     Problem: ABCDS Injury Assessment  Goal: Absence of physical injury  Outcome: Progressing     Problem: Neurosensory - Adult  Goal: Achieves stable or improved neurological status  Outcome: Progressing  Goal: Absence of seizures  Outcome: Progressing  Goal: Remains free of injury related to seizures activity  Outcome: Progressing  Goal: Achieves maximal functionality and self care  Outcome: Progressing

## 2024-07-14 NOTE — PROGRESS NOTES
4 Eyes Skin Assessment and Patient belongings     The patient is being assess for  Admission    I agree that 2 Nurses have performed a thorough Head to Toe Skin Assessment on the patient. ALL assessment sites listed below have been assessed.       Areas assessed by both nurses:   [x]   Head, Face, and Ears   [x]   Shoulders, Back, and Chest  [x]   Arms, Elbows, and Hands   [x]   Coccyx, Sacrum, and IschIum  [x]   Legs, Feet, and Heels        Does the Patient have Skin Breakdown?  No         Jaquan Prevention initiated:  No   Wound Care Orders initiated:  No      [facility] nurse consulted for Pressure Injury (Stage 3,4, Unstageable, DTI, NWPT, and Complex wounds), New and Established Ostomies:  No      I agree that 2 Nurses have reviewed patient belongings with the patient/family and documented in the flowsheet upon admission or transfer to the unit.     Belongings  Dental Appliances: None  Vision - Corrective Lenses: None  Hearing Aid: Bilateral hearing aids, At bedside  Clothing: Pants, Shirt, Undergarments, Footwear, At bedside  Jewelry: Ring, At bedside  Electronic Devices: Cell Phone, At bedside  Weapons (Notify Protective Services/Security): None  Home Medications: None  Valuables Given To: Patient  Provide Name(s) of Who Valuable(s) Were Given To: n/a       Nurse 1 eSignature: Electronically signed by Nataly Daniel RN on 7/14/24 at 6:06 PM EDT    **SHARE this note so that the co-signing nurse is able to place an eSignature**    Nurse 2 eSignature: Electronically signed by Jo-Ann Cardoso RN on 7/14/24 at 6:06 PM EDT

## 2024-07-14 NOTE — ED NOTES
Code Stroke   ALESSANDRA Barker called a code stroke @1126  Called CT for bed @1126  Called  Stroke Team @1127  Called Lab @1127   Stroke Team returned page @1128, transferred to ALESSANDRA Barker

## 2024-07-14 NOTE — H&P
V2.0  History and Physical      Name:  Shelby Cordero /Age/Sex: 1955  (69 y.o. female)   MRN & CSN:  [id number] & [id number] Encounter Date/Time: 2024 4:09 PM EDT   Location:  [facility]/0368-01 PCP: Nancy Pan APRN - CNP       Hospital Day: 1    Assessment and Plan:   Shelby Cordero is a 69 y.o. female with a pmh of recent ED visit for left-sided face and arm and hand along with leg numbness, CAD chronic systolic heart failure, fibromyalgia chronic anxiety and chronic GERD IBS with history of WPW atrial fibrillation who presents with TIA (transient ischemic attack)    Hospital Problems             Last Modified POA    * (Principal) TIA (transient ischemic attack) 2024 Yes       Left-sided numbness last known well was around 4 to 5 days ago.  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     Medical Decision Making:  The following items were considered in medical decision making:  Discussion of patient care with other providers  Reviewed clinical lab tests if any  Reviewed radiology tests if any  Reviewed other diagnostic tests/interventions  Independent review of radiologic images if any  Microbiology cultures and other micro tests if any    Estimated time spent for medical decision-making encompassing complexity of the case, history taking, medication review, physical examination, communication with family, RN, , discussion with specialists, and ancillary staff members to provide accurate care for the patient was around 25 minutes.    The billing in this case is level complexity of the case due to the combination of above and specifically because of 3.    Goals status was discussed in detail with

## 2024-07-15 ENCOUNTER — APPOINTMENT (OUTPATIENT)
Age: 69
End: 2024-07-15
Attending: STUDENT IN AN ORGANIZED HEALTH CARE EDUCATION/TRAINING PROGRAM
Payer: COMMERCIAL

## 2024-07-15 ENCOUNTER — APPOINTMENT (OUTPATIENT)
Dept: MRI IMAGING | Age: 69
End: 2024-07-15
Payer: COMMERCIAL

## 2024-07-15 ENCOUNTER — TELEPHONE (OUTPATIENT)
Dept: FAMILY MEDICINE CLINIC | Age: 69
End: 2024-07-15

## 2024-07-15 ENCOUNTER — TELEPHONE (OUTPATIENT)
Dept: CARDIOLOGY CLINIC | Age: 69
End: 2024-07-15

## 2024-07-15 VITALS
BODY MASS INDEX: 26.65 KG/M2 | OXYGEN SATURATION: 94 % | TEMPERATURE: 98.1 F | HEART RATE: 68 BPM | SYSTOLIC BLOOD PRESSURE: 116 MMHG | HEIGHT: 66 IN | RESPIRATION RATE: 16 BRPM | DIASTOLIC BLOOD PRESSURE: 61 MMHG | WEIGHT: 165.8 LBS

## 2024-07-15 PROBLEM — G45.1 TIA INVOLVING RIGHT INTERNAL CAROTID ARTERY: Status: ACTIVE | Noted: 2024-07-14

## 2024-07-15 PROBLEM — I10 HTN (HYPERTENSION), BENIGN: Status: ACTIVE | Noted: 2024-07-15

## 2024-07-15 PROBLEM — I48.0 PAF (PAROXYSMAL ATRIAL FIBRILLATION) (HCC): Status: ACTIVE | Noted: 2024-02-09

## 2024-07-15 LAB
ANION GAP SERPL CALCULATED.3IONS-SCNC: 7 MMOL/L (ref 3–16)
BUN SERPL-MCNC: 11 MG/DL (ref 7–20)
CALCIUM SERPL-MCNC: 9.3 MG/DL (ref 8.3–10.6)
CHLORIDE SERPL-SCNC: 104 MMOL/L (ref 99–110)
CO2 SERPL-SCNC: 27 MMOL/L (ref 21–32)
CREAT SERPL-MCNC: 0.7 MG/DL (ref 0.6–1.2)
DEPRECATED RDW RBC AUTO: 13.7 % (ref 12.4–15.4)
ECHO AO ROOT DIAM: 2.8 CM
ECHO AO ROOT INDEX: 1.51 CM/M2
ECHO AV AREA PEAK VELOCITY: 2.3 CM2
ECHO AV AREA/BSA PEAK VELOCITY: 1.2 CM2/M2
ECHO AV CUSP MM: 1.8 CM
ECHO AV PEAK GRADIENT: 6 MMHG
ECHO AV PEAK VELOCITY: 1.2 M/S
ECHO AV VELOCITY RATIO: 0.67
ECHO EST RA PRESSURE: 3 MMHG
ECHO IVC PROX: 1.4 CM
ECHO LA AREA 2C: 21.7 CM2
ECHO LA AREA 4C: 22.1 CM2
ECHO LA DIAMETER INDEX: 1.62 CM/M2
ECHO LA DIAMETER: 3 CM
ECHO LA MAJOR AXIS: 6.1 CM
ECHO LA MINOR AXIS: 6 CM
ECHO LA TO AORTIC ROOT RATIO: 1.07
ECHO LA VOL BP: 65 ML (ref 22–52)
ECHO LA VOL MOD A2C: 65 ML (ref 22–52)
ECHO LA VOL MOD A4C: 65 ML (ref 22–52)
ECHO LA VOL/BSA BIPLANE: 35 ML/M2 (ref 16–34)
ECHO LA VOLUME INDEX MOD A2C: 35 ML/M2 (ref 16–34)
ECHO LA VOLUME INDEX MOD A4C: 35 ML/M2 (ref 16–34)
ECHO LV E' LATERAL VELOCITY: 4 CM/S
ECHO LV E' SEPTAL VELOCITY: 5 CM/S
ECHO LV EDV 3D: 94 ML
ECHO LV EDV INDEX 3D: 51 ML/M2
ECHO LV EJECTION FRACTION 3D: 52 %
ECHO LV ESV 3D: 45 ML
ECHO LV ESV INDEX 3D: 24 ML/M2
ECHO LV FRACTIONAL SHORTENING: 26 % (ref 28–44)
ECHO LV INTERNAL DIMENSION DIASTOLE INDEX: 2.32 CM/M2
ECHO LV INTERNAL DIMENSION DIASTOLIC: 4.3 CM (ref 3.9–5.3)
ECHO LV INTERNAL DIMENSION SYSTOLIC INDEX: 1.73 CM/M2
ECHO LV INTERNAL DIMENSION SYSTOLIC: 3.2 CM
ECHO LV ISOVOLUMETRIC RELAXATION TIME (IVRT): 116 MS
ECHO LV IVSD: 0.8 CM (ref 0.6–0.9)
ECHO LV MASS 2D: 114.2 G (ref 67–162)
ECHO LV MASS 3D INDEX: 60 G/M2
ECHO LV MASS 3D: 111 G
ECHO LV MASS INDEX 2D: 61.7 G/M2 (ref 43–95)
ECHO LV POSTERIOR WALL DIASTOLIC: 0.9 CM (ref 0.6–0.9)
ECHO LV RELATIVE WALL THICKNESS RATIO: 0.42
ECHO LVOT AREA: 3.5 CM2
ECHO LVOT DIAM: 2.1 CM
ECHO LVOT MEAN GRADIENT: 1 MMHG
ECHO LVOT PEAK GRADIENT: 3 MMHG
ECHO LVOT PEAK VELOCITY: 0.8 M/S
ECHO LVOT STROKE VOLUME INDEX: 34.2 ML/M2
ECHO LVOT SV: 63.4 ML
ECHO LVOT VTI: 18.3 CM
ECHO MV A VELOCITY: 0.89 M/S
ECHO MV E DECELERATION TIME (DT): 218 MS
ECHO MV E VELOCITY: 0.6 M/S
ECHO MV E/A RATIO: 0.67
ECHO MV E/E' LATERAL: 15
ECHO MV E/E' RATIO (AVERAGED): 13.5
ECHO MV E/E' SEPTAL: 12
ECHO RA AREA 4C: 16.5 CM2
ECHO RA END SYSTOLIC VOLUME APICAL 4 CHAMBER INDEX BSA: 23 ML/M2
ECHO RA VOLUME: 42 ML
ECHO RIGHT VENTRICULAR SYSTOLIC PRESSURE (RVSP): 19 MMHG
ECHO RV EJECTION FRACTION 3D: 65 %
ECHO RV FRACTIONAL AREA CHANGE: 65 %
ECHO RV FREE WALL PEAK S': 10 CM/S
ECHO RV TAPSE: 2 CM (ref 1.7–?)
ECHO TV REGURGITANT MAX VELOCITY: 2.02 M/S
ECHO TV REGURGITANT PEAK GRADIENT: 16 MMHG
GFR SERPLBLD CREATININE-BSD FMLA CKD-EPI: >90 ML/MIN/{1.73_M2}
GLUCOSE SERPL-MCNC: 97 MG/DL (ref 70–99)
HCT VFR BLD AUTO: 40.4 % (ref 36–48)
HGB BLD-MCNC: 13.4 G/DL (ref 12–16)
MAGNESIUM SERPL-MCNC: 2.1 MG/DL (ref 1.8–2.4)
MCH RBC QN AUTO: 31.7 PG (ref 26–34)
MCHC RBC AUTO-ENTMCNC: 33.1 G/DL (ref 31–36)
MCV RBC AUTO: 95.8 FL (ref 80–100)
PHOSPHATE SERPL-MCNC: 2.9 MG/DL (ref 2.5–4.9)
PLATELET # BLD AUTO: 301 K/UL (ref 135–450)
PMV BLD AUTO: 6.4 FL (ref 5–10.5)
POTASSIUM SERPL-SCNC: 4.2 MMOL/L (ref 3.5–5.1)
RBC # BLD AUTO: 4.22 M/UL (ref 4–5.2)
SODIUM SERPL-SCNC: 138 MMOL/L (ref 136–145)
WBC # BLD AUTO: 7.3 K/UL (ref 4–11)

## 2024-07-15 PROCEDURE — 92610 EVALUATE SWALLOWING FUNCTION: CPT

## 2024-07-15 PROCEDURE — 99222 1ST HOSP IP/OBS MODERATE 55: CPT | Performed by: PSYCHIATRY & NEUROLOGY

## 2024-07-15 PROCEDURE — 92526 ORAL FUNCTION THERAPY: CPT

## 2024-07-15 PROCEDURE — 85027 COMPLETE CBC AUTOMATED: CPT

## 2024-07-15 PROCEDURE — G0378 HOSPITAL OBSERVATION PER HR: HCPCS

## 2024-07-15 PROCEDURE — 70551 MRI BRAIN STEM W/O DYE: CPT

## 2024-07-15 PROCEDURE — 93306 TTE W/DOPPLER COMPLETE: CPT

## 2024-07-15 PROCEDURE — 36415 COLL VENOUS BLD VENIPUNCTURE: CPT

## 2024-07-15 PROCEDURE — APPSS45 APP SPLIT SHARED TIME 31-45 MINUTES

## 2024-07-15 PROCEDURE — 6370000000 HC RX 637 (ALT 250 FOR IP): Performed by: STUDENT IN AN ORGANIZED HEALTH CARE EDUCATION/TRAINING PROGRAM

## 2024-07-15 PROCEDURE — 84100 ASSAY OF PHOSPHORUS: CPT

## 2024-07-15 PROCEDURE — 80048 BASIC METABOLIC PNL TOTAL CA: CPT

## 2024-07-15 PROCEDURE — 2580000003 HC RX 258: Performed by: STUDENT IN AN ORGANIZED HEALTH CARE EDUCATION/TRAINING PROGRAM

## 2024-07-15 PROCEDURE — 93306 TTE W/DOPPLER COMPLETE: CPT | Performed by: INTERNAL MEDICINE

## 2024-07-15 PROCEDURE — 83735 ASSAY OF MAGNESIUM: CPT

## 2024-07-15 PROCEDURE — 76376 3D RENDER W/INTRP POSTPROCES: CPT | Performed by: INTERNAL MEDICINE

## 2024-07-15 RX ORDER — METOPROLOL SUCCINATE 25 MG/1
25 TABLET, EXTENDED RELEASE ORAL DAILY
Qty: 30 TABLET | Refills: 3 | Status: SHIPPED | OUTPATIENT
Start: 2024-07-16

## 2024-07-15 RX ORDER — METHOCARBAMOL 750 MG/1
750 TABLET, FILM COATED ORAL 3 TIMES DAILY
Status: DISCONTINUED | OUTPATIENT
Start: 2024-07-15 | End: 2024-07-15 | Stop reason: HOSPADM

## 2024-07-15 RX ORDER — BUTALBITAL, ACETAMINOPHEN AND CAFFEINE 50; 325; 40 MG/1; MG/1; MG/1
1 TABLET ORAL ONCE
Status: COMPLETED | OUTPATIENT
Start: 2024-07-15 | End: 2024-07-15

## 2024-07-15 RX ORDER — ROSUVASTATIN CALCIUM 40 MG/1
40 TABLET, COATED ORAL NIGHTLY
Qty: 30 TABLET | Refills: 3 | Status: SHIPPED | OUTPATIENT
Start: 2024-07-15

## 2024-07-15 RX ORDER — BUTALBITAL, ACETAMINOPHEN AND CAFFEINE 50; 325; 40 MG/1; MG/1; MG/1
1 TABLET ORAL EVERY 4 HOURS PRN
Qty: 180 TABLET | Refills: 0 | Status: SHIPPED | OUTPATIENT
Start: 2024-07-15

## 2024-07-15 RX ADMIN — ACETAMINOPHEN 650 MG: 325 TABLET ORAL at 00:59

## 2024-07-15 RX ADMIN — ONDANSETRON 4 MG: 4 TABLET, ORALLY DISINTEGRATING ORAL at 12:44

## 2024-07-15 RX ADMIN — CITALOPRAM HYDROBROMIDE 40 MG: 20 TABLET ORAL at 08:39

## 2024-07-15 RX ADMIN — ACETAMINOPHEN 650 MG: 325 TABLET ORAL at 08:39

## 2024-07-15 RX ADMIN — BUPROPION HYDROCHLORIDE 75 MG: 75 TABLET, FILM COATED ORAL at 08:39

## 2024-07-15 RX ADMIN — METHOCARBAMOL 750 MG: 750 TABLET ORAL at 13:31

## 2024-07-15 RX ADMIN — Medication 10 ML: at 08:41

## 2024-07-15 RX ADMIN — ONDANSETRON 4 MG: 4 TABLET, ORALLY DISINTEGRATING ORAL at 01:02

## 2024-07-15 RX ADMIN — BUTALBITAL, ACETAMINOPHEN AND CAFFEINE 1 TABLET: 325; 50; 40 TABLET ORAL at 16:03

## 2024-07-15 RX ADMIN — CLOPIDOGREL BISULFATE 75 MG: 75 TABLET ORAL at 08:39

## 2024-07-15 RX ADMIN — APIXABAN 5 MG: 5 TABLET, FILM COATED ORAL at 08:39

## 2024-07-15 ASSESSMENT — PAIN SCALES - GENERAL
PAINLEVEL_OUTOF10: 2
PAINLEVEL_OUTOF10: 3
PAINLEVEL_OUTOF10: 5
PAINLEVEL_OUTOF10: 8
PAINLEVEL_OUTOF10: 8
PAINLEVEL_OUTOF10: 5
PAINLEVEL_OUTOF10: 7

## 2024-07-15 ASSESSMENT — PAIN DESCRIPTION - DESCRIPTORS
DESCRIPTORS: ACHING
DESCRIPTORS: ACHING

## 2024-07-15 ASSESSMENT — PAIN DESCRIPTION - ORIENTATION: ORIENTATION: RIGHT;MID

## 2024-07-15 ASSESSMENT — PAIN DESCRIPTION - LOCATION
LOCATION: HEAD
LOCATION: HEAD

## 2024-07-15 NOTE — PLAN OF CARE
Problem: Discharge Planning  Goal: Discharge to home or other facility with appropriate resources  7/15/2024 0105 by Marta Mills RN  Outcome: Progressing     Problem: Pain  Goal: Verbalizes/displays adequate comfort level or baseline comfort level  7/15/2024 0105 by Marta Mills RN  Outcome: Progressing     Problem: ABCDS Injury Assessment  Goal: Absence of physical injury  7/15/2024 0105 by Marta Mills RN  Outcome: Progressing     Problem: Neurosensory - Adult  Goal: Achieves stable or improved neurological status  7/15/2024 0105 by Marta Mills RN  Outcome: Progressing

## 2024-07-15 NOTE — TELEPHONE ENCOUNTER
Requested Prescriptions     Pending Prescriptions Disp Refills    apixaban (ELIQUIS) 5 MG TABS tablet 60 tablet 3     Sig: Take 1 tablet by mouth 2 times daily            Checked Correct Pharmacy: Yes    Any changes since last refill? No     Number: 180    Refills: 3    Last Office Visit: 7/1/24    Next Office Visit: 8/5/24

## 2024-07-15 NOTE — PROGRESS NOTES
Speech Language Pathology  Clinical Bedside Swallow Assessment  Facility/Department: [facility] B3 - MED SURG        Recommendations:  Diet recommendation: IDDSI 7 Regular Solids; IDDSI 0 Thin Liquids; Meds whole with thin liquids  Instrumentation: Not clinically indicated at this time. Will continue to monitor  Risk management: upright for all intake, stay upright for at least 60 mins after intake, small bites/sips, oral care 2-3x/day to reduce adverse affects in the event of aspiration, alternate bites/sips, slow rate of intake, general GERD precautions, and hold PO and contact SLP if s/s of aspiration or worsening respiratory status develop.      NAME:Shelby Cordero  : 1955 (69 y.o.)   MRN: [id number]  ROOM: [address] [place], [place] [address]  ADMISSION DATE: 2024  PATIENT DIAGNOSIS(ES): TIA (transient ischemic attack) [G45.9]  Cerebrovascular accident (CVA), unspecified mechanism ([facility]) [I63.9]  Chief Complaint   Patient presents with    Tingling     Left side of face started 1 hour ago     Patient Active Problem List    Diagnosis Date Noted    Chronic systolic (congestive) heart failure 2022    TIA (transient ischemic attack) 2024    Secondary hypercoagulable state ([facility]) 2024    History of stroke 2024    Cerebrovascular accident (CVA) ([facility]) 06/10/2024    Numbness 2024    Atrial fibrillation, unspecified type ([facility]) 2024    Gastroesophageal reflux disease 04/15/2022    Mild episode of recurrent major depressive disorder ([facility]) 2021    Cubital tunnel syndrome, bilateral 2021    Carpal tunnel syndrome 2021    Ulnar nerve compression, left 2021    COVID-19 2021    CAD S/P percutaneous coronary angioplasty     Coronary artery disease of native artery of native heart with stable angina pectoris ([facility]) 2019    H/O angiography 2019    Migraine with aura and without status migrainosus, not intractable 06/15/2017    Small bowel obstruction ([facility]) 10/26/2016

## 2024-07-15 NOTE — CARE COORDINATION
Case Management Assessment  Initial Evaluation    Date/Time of Evaluation: 7/15/2024 2:24 PM  Assessment Completed by: Mariza Morton RN    If patient is discharged prior to next notation, then this note serves as note for discharge by case management.    Patient Name: Shelby Cordero                   YOB: 1955  Diagnosis: TIA (transient ischemic attack) [G45.9]  Cerebrovascular accident (CVA), unspecified mechanism ([facility]) [I63.9]                   Date / Time: 7/14/2024 11:18 AM    Patient Admission Status: Observation   Readmission Risk (Low < 19, Mod (19-27), High > 27): Readmission Risk Score: 7.1    Current PCP: Nancy Pan APRN - CNP  PCP verified by CM?      Chart Reviewed: Yes      History Provided by:    Patient Orientation:      Patient Cognition:      Hospitalization in the last 30 days (Readmission):  No    If yes, Readmission Assessment in CM Navigator will be completed.    Advance Directives:      Code Status: Full Code   Patient's Primary Decision Maker is:      Primary Decision Maker: Av Cordero - Spouse - [phone]    Discharge Planning:    Patient lives with: Spouse/Significant Other, Family Members Type of Home: House  Primary Care Giver:    Patient Support Systems include: Spouse/Significant Other, Family Members   Current Financial resources:    Current community resources:    Current services prior to admission: None            Current DME:              Type of Home Care services:  None    ADLS  Prior functional level:    Current functional level:      PT AM-PAC:   /24  OT AM-PAC:   /24    Family can provide assistance at DC:    Would you like Case Management to discuss the discharge plan with any other family members/significant others, and if so, who?    Plans to Return to Present Housing:    Other Identified Issues/Barriers to RETURNING to current housing:   Potential Assistance needed at discharge: N/A            Potential DME:    Patient expects to

## 2024-07-15 NOTE — DISCHARGE SUMMARY
No dissection, arterial injury, or hemodynamically significant stenosis by NASCET criteria. VERTEBRAL ARTERIES: No dissection, arterial injury, or significant stenosis. SOFT TISSUES: The lung apices are clear.  No cervical or superior mediastinal lymphadenopathy.  The larynx and pharynx are unremarkable.  No acute abnormality of the salivary and thyroid glands. BONES: No acute osseous abnormality. CTA HEAD: ANTERIOR CIRCULATION: Focal moderate narrowing of the right supraclinoid ICA related to atherosclerotic calcification.  Otherwise, no significant stenosis of the intracranial internal carotid, anterior cerebral, or middle cerebral arteries. No aneurysm. POSTERIOR CIRCULATION: No significant stenosis of the vertebral, basilar, or posterior cerebral arteries. No aneurysm. OTHER: No dural venous sinus thrombosis on this non-dedicated study. BRAIN: No mass effect or midline shift. No extra-axial fluid collection. The gray-white differentiation is maintained.     1. Focal moderate narrowing of the right supraclinoid ICA related to atherosclerotic calcification. 2. Otherwise, no significant stenosis or occlusion of the major arterial vessels of the head and neck.     CT HEAD WO CONTRAST    Addendum Date: 7/14/2024    ADDENDUM: Please note, findings were communicated verbally to Jett APARICIO at 12:06 p.m. on 07/14/2024 by Jennifer Tomlinson.     Result Date: 7/14/2024  EXAMINATION: CT OF THE HEAD WITHOUT CONTRAST  7/14/2024 11:36 am TECHNIQUE: CT of the head was performed without the administration of intravenous contrast. Automated exposure control, iterative reconstruction, and/or weight based adjustment of the mA/kV was utilized to reduce the radiation dose to as low as reasonably achievable. COMPARISON: 06/09/2024 and 06/08/2024 HISTORY: ORDERING SYSTEM PROVIDED HISTORY: Stroke Symptoms TECHNOLOGIST PROVIDED HISTORY: Has a \"code stroke\" or \"stroke alert\" been called?->Yes Reason for exam:->Stroke Symptoms Decision

## 2024-07-15 NOTE — CONSULTS
Consult Placed     Who: Who: GARRISON RING  Date:7/15/2024  Time:081         Electronically signed by Felipe Santiago on 7/15/2024 at 8:19 AM    
any headache, double vision or blurred vision.  MRI showed no acute findings.  CTA showed intracranial stenosis.    On examination:  No acute distress  Awake and alert x3.  Fluent speech.  Appears appropriate with intact recent and remote memory.  Pupil reactive and symmetric, extraocular motor intact, no ophthalmoplegia, face is symmetric and tongue is midline  No focal weakness with symmetric DTR  Normal tone  No sensory disturbance or abnormal movement    Impression:  Recurrent left facial paresthesia.  No specific etiology.  Less likely recurrent TIA.  Possible atypical migraine, vascular or atypical facial pain.  Less likely focal seizure.  A-fib  Hypertension  Hyperlipidemia      Recommendation:  Lengthy discussion with the patient regarding stroke education and prevention  Continue Plavix and Eliquis  No need to change blood thinner  Statin  Echo  Blood pressure monitor at home  Continue current blood pressure medication including Lopressor at the current dose  PT and OT  Fioricet as needed for migraine  Can be discharged when medically stable and follow-up with Dr. Gamboa outpatient      Electronically signed by Aneesh Monzon MD on 7/15/24 at 2:31 PM EDT

## 2024-07-15 NOTE — ED PROVIDER NOTES
I independently reviewed the ECG as follows:    Sinus rhythm at a rate of 67 without ectopy.  Leftward axis -31.  Prolonged  with left bundle branch block.  Mildly prolonged QTc 481 otherwise normal intervals.  No evidence of acute ischemia.  EKG is unchanged from June 11, 2024.    Please reference my attending note if I had further involvement in the care of this patient otherwise I did not participate in the care of this patient beyond evaluation of this ECG.  Please reference the [facility]'s documentation for further details.        Damian Schaeffer MD  07/14/24 0274    
spelling, as well as words and phrases that may be inappropriate. If there are any questions or concerns please feel free to contact the dictating provider for clarification.     Ayo Keen II, DO   [facility] [facility]       Ayo Keen II, DO  07/14/24 4236    
Notable for the following components:       Result Value    AST 14 (*)     All other components within normal limits   CBC WITH AUTO DIFFERENTIAL   TROPONIN   PROTIME-INR   URINALYSIS WITH REFLEX TO CULTURE   POCT GLUCOSE       When ordered only abnormal lab results are displayed. All other labs were within normal range or not returned as of this dictation.    EKG: When ordered, EKG's are interpreted by the Emergency Department Physician in the absence of a cardiologist.  Please see their note for interpretation of EKG.    RADIOLOGY:   Non-plain film images such as CT, Ultrasound and MRI are read by the radiologist. Plain radiographic images are visualized and preliminarily interpreted by the ED Provider with the below findings:    Single view chest x-ray interpreted by myself independently showing no evidence of infiltrates or widened mediastinum    Interpretation per the Radiologist below, if available at the time of this note:    XR CHEST PORTABLE   Final Result   No radiographic evidence of acute pulmonary abnormality seen         CTA HEAD NECK W CONTRAST   Final Result   1. Focal moderate narrowing of the right supraclinoid ICA related to   atherosclerotic calcification.   2. Otherwise, no significant stenosis or occlusion of the major arterial   vessels of the head and neck.         CT HEAD WO CONTRAST   Final Result   Addendum (preliminary) 1 of 1   ADDENDUM:   Please note, findings were communicated verbally to Jett APARICIO at   12:06 p.m. on 07/14/2024 by Jennifer Tomlinson.         Final   No acute transcortical infarct or intracranial hemorrhage.  Mild-to-moderate   chronic microangiopathy.  MR follow-up may be helpful as clinically indicated.           No results found.     No results found.    PROCEDURES   Unless otherwise noted below, none     Procedures    CRITICAL CARE TIME (.cctime)     There was a high probability of life-threatening clinical deterioration in the patient's condition requiring my

## 2024-07-15 NOTE — PROGRESS NOTES
Pt ok for discharge per MD. Discharge instructions and script given. IV removed without complications.  No questions or concerns at this time. Pt awaiting  to come and .

## 2024-07-16 ENCOUNTER — CARE COORDINATION (OUTPATIENT)
Dept: CASE MANAGEMENT | Age: 69
End: 2024-07-16

## 2024-07-16 ENCOUNTER — TELEPHONE (OUTPATIENT)
Age: 69
End: 2024-07-16

## 2024-07-16 DIAGNOSIS — Z86.73 HISTORY OF STROKE: Primary | ICD-10-CM

## 2024-07-16 PROCEDURE — 1111F DSCHRG MED/CURRENT MED MERGE: CPT | Performed by: NURSE PRACTITIONER

## 2024-07-16 NOTE — TELEPHONE ENCOUNTER
Provider doesn't have any available openings in his schedule before end of August.  Will have to watch for cancellations.  Dr. Ruiz's notes said to \"follow up with Dr. Gamboa if symptoms recur.\"

## 2024-07-16 NOTE — CARE COORDINATION
Bedside shift change report given to Juve Maddox RN (oncoming nurse) by Ladi Wheeler RN (offgoing nurse). Report included the following information SBAR, Kardex, Intake/Output, MAR and Recent Results. Care Transitions Note    Initial Call - Call within 2 business days of discharge: Yes    Patient Current Location:  [place]    Care Transition Nurse contacted the patient by telephone to perform post hospital discharge assessment, verified name and  as identifiers. Provided introduction to self, and explanation of the Care Transition Nurse role.     Patient: Shelby Cordero    Patient : 1955   MRN: [id number]    Reason for Admission: CVA   Discharge Date: 7/15/24  RURS: Readmission Risk Score: 7.1      Last Discharge Facility       Date Complaint Diagnosis Description Type Department Provider    24 Tingling Cerebrovascular accident (CVA), unspecified mechanism ([facility]) ED to Hosp-Admission (Discharged) (ADMITTED) [facility] Bry Cameron, ; Ayo Keen...            Was this an external facility discharge? No    Additional needs identified to be addressed with provider   No needs identified             Method of communication with provider: none.    Patients top risk factors for readmission: functional physical ability and medical condition-.    Interventions to address risk factors:   Education: .  Review of patient management of conditions/medications: .    Care Summary Note: CTN spoke with patient who reported she is doing better today and denied any headaches Patient reported she is taking it easy today and will get back to her normal routine tomorrow. CTN reviewed all medications with patient who reported she is taking all as prescribed. CTN discussed s/sx to monitor and when to report to the provider. CTN advised patient of use of urgent care or physician’s 24 hr access line if assistance is needed after hours.        Care Transition Nurse reviewed discharge instructions, medical action plan, and red flags with patient. The patient was given an opportunity to ask questions; all questions answered at this time.. The patient verbalized understanding.   Were discharge instructions available to

## 2024-07-16 NOTE — TELEPHONE ENCOUNTER
Pt was dc on 7/15/24. Pt was consulted with Dr. Ruiz. Pt is established with Dr. Gamboa and would like a hospital follow up for acute L face numbness.        Impression: 7/15/24  Acute L lower face numbness/tinging-- MRI with no evidence of acute stroke. CTA w/ no evidence of LVO. Symptoms concerning for TIA vs migraine  Afib  CAD  GERD  Hyperlipidemia     Recommendation:  Continue plavix  Continue eliquis  Continue statin     F/u outpatient Dr. Gamboa if symptoms recur  May add fioricet prn for headache  May discharge if medically stable      Please advise.

## 2024-07-17 ENCOUNTER — TELEPHONE (OUTPATIENT)
Dept: FAMILY MEDICINE CLINIC | Age: 69
End: 2024-07-17

## 2024-07-17 NOTE — TELEPHONE ENCOUNTER
Left message for patient that for tomorrows appt per PCP wanted to changed it to her AWV, and Cx out the September appt.

## 2024-07-18 ENCOUNTER — OFFICE VISIT (OUTPATIENT)
Dept: FAMILY MEDICINE CLINIC | Age: 69
End: 2024-07-18

## 2024-07-18 VITALS
SYSTOLIC BLOOD PRESSURE: 118 MMHG | WEIGHT: 163.4 LBS | BODY MASS INDEX: 28.95 KG/M2 | DIASTOLIC BLOOD PRESSURE: 60 MMHG | OXYGEN SATURATION: 97 % | HEART RATE: 70 BPM | HEIGHT: 63 IN

## 2024-07-18 DIAGNOSIS — Z00.00 MEDICARE ANNUAL WELLNESS VISIT, SUBSEQUENT: Primary | ICD-10-CM

## 2024-07-18 PROBLEM — R20.0 NUMBNESS: Status: RESOLVED | Noted: 2024-06-08 | Resolved: 2024-07-18

## 2024-07-18 PROBLEM — U07.1 COVID-19: Status: RESOLVED | Noted: 2021-01-01 | Resolved: 2024-07-18

## 2024-07-18 PROBLEM — G56.00 CARPAL TUNNEL SYNDROME: Status: RESOLVED | Noted: 2021-04-13 | Resolved: 2024-07-18

## 2024-07-18 PROBLEM — G56.22 ULNAR NERVE COMPRESSION, LEFT: Status: RESOLVED | Noted: 2021-04-13 | Resolved: 2024-07-18

## 2024-07-18 PROBLEM — D68.69 SECONDARY HYPERCOAGULABLE STATE (HCC): Status: RESOLVED | Noted: 2024-07-01 | Resolved: 2024-07-18

## 2024-07-18 PROBLEM — I63.9 CEREBROVASCULAR ACCIDENT (CVA) (HCC): Status: RESOLVED | Noted: 2024-06-10 | Resolved: 2024-07-18

## 2024-07-18 SDOH — HEALTH STABILITY: PHYSICAL HEALTH: ON AVERAGE, HOW MANY DAYS PER WEEK DO YOU ENGAGE IN MODERATE TO STRENUOUS EXERCISE (LIKE A BRISK WALK)?: 3 DAYS

## 2024-07-18 SDOH — HEALTH STABILITY: PHYSICAL HEALTH: ON AVERAGE, HOW MANY MINUTES DO YOU ENGAGE IN EXERCISE AT THIS LEVEL?: 30 MIN

## 2024-07-18 ASSESSMENT — LIFESTYLE VARIABLES
HOW MANY STANDARD DRINKS CONTAINING ALCOHOL DO YOU HAVE ON A TYPICAL DAY: 0
HOW OFTEN DO YOU HAVE SIX OR MORE DRINKS ON ONE OCCASION: 1
HOW OFTEN DO YOU HAVE A DRINK CONTAINING ALCOHOL: NEVER
HOW OFTEN DO YOU HAVE A DRINK CONTAINING ALCOHOL: 1
HOW MANY STANDARD DRINKS CONTAINING ALCOHOL DO YOU HAVE ON A TYPICAL DAY: PATIENT DOES NOT DRINK

## 2024-07-18 ASSESSMENT — PATIENT HEALTH QUESTIONNAIRE - PHQ9
2. FEELING DOWN, DEPRESSED OR HOPELESS: NOT AT ALL
10. IF YOU CHECKED OFF ANY PROBLEMS, HOW DIFFICULT HAVE THESE PROBLEMS MADE IT FOR YOU TO DO YOUR WORK, TAKE CARE OF THINGS AT HOME, OR GET ALONG WITH OTHER PEOPLE: NOT DIFFICULT AT ALL
6. FEELING BAD ABOUT YOURSELF - OR THAT YOU ARE A FAILURE OR HAVE LET YOURSELF OR YOUR FAMILY DOWN: NOT AT ALL
4. FEELING TIRED OR HAVING LITTLE ENERGY: NOT AT ALL
SUM OF ALL RESPONSES TO PHQ QUESTIONS 1-9: 0
SUM OF ALL RESPONSES TO PHQ QUESTIONS 1-9: 0
7. TROUBLE CONCENTRATING ON THINGS, SUCH AS READING THE NEWSPAPER OR WATCHING TELEVISION: NOT AT ALL
5. POOR APPETITE OR OVEREATING: NOT AT ALL
1. LITTLE INTEREST OR PLEASURE IN DOING THINGS: NOT AT ALL
8. MOVING OR SPEAKING SO SLOWLY THAT OTHER PEOPLE COULD HAVE NOTICED. OR THE OPPOSITE, BEING SO FIGETY OR RESTLESS THAT YOU HAVE BEEN MOVING AROUND A LOT MORE THAN USUAL: NOT AT ALL
9. THOUGHTS THAT YOU WOULD BE BETTER OFF DEAD, OR OF HURTING YOURSELF: NOT AT ALL
SUM OF ALL RESPONSES TO PHQ QUESTIONS 1-9: 0
SUM OF ALL RESPONSES TO PHQ9 QUESTIONS 1 & 2: 0
3. TROUBLE FALLING OR STAYING ASLEEP: NOT AT ALL
SUM OF ALL RESPONSES TO PHQ QUESTIONS 1-9: 0

## 2024-07-18 NOTE — PATIENT INSTRUCTIONS
Discharge instructions and follow up care reviewed with pt's caregiver, whom verbalized understanding.   
member, or by a doctor or a  who doesn't know you.  It may help to think of an advance directive as a gift to the people who care for you. If you have one, they won't have to make tough decisions by themselves.  For more information, including forms for your state, see the [employer] website (www.caringinfo.org/planning/advance-directives/).  Follow-up care is a key part of your treatment and safety. Be sure to make and go to all appointments, and call your doctor if you are having problems. It's also a good idea to know your test results and keep a list of the medicines you take.  What should you include in an advance directive?  Many states have a unique advance directive form. (It may ask you to address specific issues.) Or you might use a universal form that's approved by many states.  If your form doesn't tell you what to address, it may be hard to know what to include in your advance directive. Use the questions below to help you get started.  Who do you want to make decisions about your medical care if you are not able to?  What life-support measures do you want if you have a serious illness that gets worse over time or can't be cured?  What are you most afraid of that might happen? (Maybe you're afraid of having pain, losing your independence, or being kept alive by machines.)  Where would you prefer to die? (Your home? A hospital? A nursing home?)  Do you want to donate your organs when you die?  Do you want certain [facility] practices performed before you die?  When should you call for help?  Be sure to contact your doctor if you have any questions.  Where can you learn more?  Go to https://www.[employer].net/patientEd and enter R264 to learn more about \"Advance Directives: Care Instructions.\"  Current as of: November 16, 2023  Content Version: 14.1  © 6044-8413 Healthwise, Incorporated.   Care instructions adapted under license by [employer]. If you have questions about a medical condition or this

## 2024-07-18 NOTE — ACP (ADVANCE CARE PLANNING)
Advance Care Planning     Advance Care Planning (ACP) Physician/NP/PA Conversation    Date of Conversation: 7/18/2024  Conducted with: Patient with Decision Making Capacity    Healthcare Decision Maker:      Primary Decision Maker: Av Cordero - Spouse - [phone]    Click here to complete Healthcare Decision Makers including selection of the Healthcare Decision Maker Relationship (ie \"Primary\")      Care Preferences:    Hospitalization:  \"If your health worsens and it becomes clear that your chance of recovery is unlikely, what would be your preference regarding hospitalization?\"  The patient would prefer hospitalization.    Ventilation:  \"If you were unable to breath on your own and your chance of recovery was unlikely, what would be your preference about the use of a ventilator (breathing machine) if it was available to you?\"  The patient would desire the use of a ventilator.    Resuscitation:  \"In the event your heart stopped as a result of an underlying serious health condition, would you want attempts made to restart your heart, or would you prefer a natural death?\"  Yes, attempt to resuscitate.    treatment goals    Conversation Outcomes / Follow-Up Plan:  ACP incomplete - refer to ACP Clinical Specialist  Reviewed DNR/DNI and patient elects Full Code (Attempt Resuscitation)    Length of Voluntary ACP Conversation in minutes:  <16 minutes (Non-Billable)    LUCIA MITCHELL - CNP

## 2024-07-18 NOTE — PROGRESS NOTES
Medicare Annual Wellness Visit    Shelby Cordero is here for Medicare AWV    Assessment & Plan   Medicare annual wellness visit, subsequent  -     Ambulatory Referral to [facility] Clinical Specialist    Planning to follow up with Dr. Mir    No change in medications. Refills not needed    Recommendations for Preventive Services Due: see orders and patient instructions/AVS.  Recommended screening schedule for the next 5-10 years is provided to the patient in written form: see Patient Instructions/AVS.     Return in about 6 months (around 1/18/2025) for chronic pain.     Subjective       Hospitalized 7/14/2024 related to tingling in face. Felt was related to migraine, neg for TIA, CVA. While in hospital neuro ordered fioricet. States she called the office to discuss and staff indicated to her that would be OK to take. States she was given fioricet #15 days taking 1 every 4 hours.     Patient's complete Health Risk Assessment and screening values have been reviewed and are found in Flowsheets. The following problems were reviewed today and where indicated follow up appointments were made and/or referrals ordered.    Positive Risk Factor Screenings with Interventions:    Fall Risk:  Do you feel unsteady or are you worried about falling? : no  2 or more falls in past year?: (!) yes  Fall with injury in past year?: no     Interventions:    Reviewed medications, home hazards, visual acuity, and co-morbidities that can increase risk for falls  Has tripped while walking in the house.     Cognitive:   Clock Drawing Test (CDT): (!) Abnormal  Words recalled: 3 Words Recalled  Total Score: 3  Total Score Interpretation: Normal Mini-Cog  Interventions:  Taking aricept and following with neurology           General HRA Questions:  Select all that apply: (!) New or Increased Pain, New or Increased Fatigue  Interventions - Pain:  Taking medication for chronic pain as needed  Interventions Fatigue:  chronic          Hearing 
immune

## 2024-07-19 ENCOUNTER — CLINICAL DOCUMENTATION (OUTPATIENT)
Dept: SPIRITUAL SERVICES | Age: 69
End: 2024-07-19

## 2024-07-19 NOTE — ACP (ADVANCE CARE PLANNING)
Advance Care Planning   Ambulatory ACP Specialist Patient Outreach    Date:  7/19/2024    ACP Specialist:  Lyndsay Hay LPN    Outreach call to patient in follow-up to ACP Specialist referral from:Nancy Pan APRN - CNP    [] PCP  [x] Provider   [] Ambulatory Care Management [] Other     For:                  [x] Advance Directive Assistance              [] Complete Portable DNR order              [] Complete POST/POLST/MOST              [] Code Status Discussion             [] Discuss Goals of Care             [] Early ACP Decision-Making              [] Other (Specify)    Date Referral Received:7/18/24    Next Step:   [] ACP scheduled conversation  [] Outreach again in one week               [x] Email / Mail ACP Info Sheets  [x] Email / Mail Advance Directive   [] Closing referral.  Routing closure to referring provider/staff and to ACP Specialist .    [] Closure letter mailed to patient with invitation to contact ACP Specialist if / when ready.   [x] Other (Specify here):  Requesting in person     [x] At this time, Healthcare Decision Maker Is:     Primary Decision Maker: Charles Corderoan - [facility] - [phone]          [] Primary agent named in scanned advance directive.    [x] Legal Next of Kin.     [] Unable to determine legal decision maker at this time.    Outreaches:       [x] 1st -  Date:  7/19/24               Intervention:  [x] Spoke with Patient   [] Left Voice mail [] Email / Mail    [] Mint Labshart  [] Other (Specify) :     Outcomes:.Writer contacted patient on preferred phone ([phone]) to discuss ACP.  Patient is agreeable to a conversation with ACP Specialist and is requesting an in person appointment.  Referral will be forwarded to Manju Keen to be scheduled with a .  Patient stated that [facility] is the closest facility that she would be able to meet at.   A copy of [place] [employer] forms and ACP information sheets sent to patient's confirmed email address on file with ACP

## 2024-07-21 ENCOUNTER — PATIENT MESSAGE (OUTPATIENT)
Dept: NEUROLOGY | Age: 69
End: 2024-07-21

## 2024-07-22 ENCOUNTER — OFFICE VISIT (OUTPATIENT)
Dept: NEUROLOGY | Age: 69
End: 2024-07-22
Payer: COMMERCIAL

## 2024-07-22 VITALS
DIASTOLIC BLOOD PRESSURE: 70 MMHG | HEART RATE: 63 BPM | HEIGHT: 63 IN | SYSTOLIC BLOOD PRESSURE: 112 MMHG | BODY MASS INDEX: 28.88 KG/M2 | WEIGHT: 163 LBS | OXYGEN SATURATION: 97 %

## 2024-07-22 DIAGNOSIS — M26.609 TMJ (TEMPOROMANDIBULAR JOINT DISORDER): Primary | ICD-10-CM

## 2024-07-22 DIAGNOSIS — R51.9 ACUTE INTRACTABLE HEADACHE, UNSPECIFIED HEADACHE TYPE: ICD-10-CM

## 2024-07-22 PROCEDURE — 3074F SYST BP LT 130 MM HG: CPT | Performed by: PSYCHIATRY & NEUROLOGY

## 2024-07-22 PROCEDURE — 1123F ACP DISCUSS/DSCN MKR DOCD: CPT | Performed by: PSYCHIATRY & NEUROLOGY

## 2024-07-22 PROCEDURE — 99214 OFFICE O/P EST MOD 30 MIN: CPT | Performed by: PSYCHIATRY & NEUROLOGY

## 2024-07-22 PROCEDURE — 3078F DIAST BP <80 MM HG: CPT | Performed by: PSYCHIATRY & NEUROLOGY

## 2024-07-22 RX ORDER — NORTRIPTYLINE HYDROCHLORIDE 10 MG/1
10 CAPSULE ORAL NIGHTLY
Qty: 30 CAPSULE | Refills: 2 | Status: SHIPPED | OUTPATIENT
Start: 2024-07-22

## 2024-07-22 RX ORDER — TIZANIDINE 2 MG/1
2 TABLET ORAL 2 TIMES DAILY
Qty: 60 TABLET | Refills: 2 | Status: SHIPPED | OUTPATIENT
Start: 2024-07-22

## 2024-07-22 NOTE — TELEPHONE ENCOUNTER
From: Shelby Cordero  To: Dr. Ryland Herhaimillie  Sent: 7/21/2024 12:05 PM EDT  Subject: Migraine hangover effects    Dr. Gamboa, it has now been a week since the start of my complex migraine headache. I still feel off balance to the degree that I don't feel safe driving or walking very far. I stopped taking the pain medication that was prescribed by the neurologist at [facility] prescribed because it made me feel out of it.   What else can I do to get rid of this? What can I take to prevent these headaches?     Please advise .  
Will discuss with Dr. Gamboa at [facility] today.   
no loss of consciousness, no gait abnormality, no headache, no sensory deficits, and no weakness.

## 2024-07-22 NOTE — PROGRESS NOTES
6--endometriosis    NASAL SEPTUM SURGERY  1982    OVARY SURGERY Right 1987    partial removal    SALPINGO-OOPHORECTOMY Left 1986    UPPER GASTROINTESTINAL ENDOSCOPY  10/15/2013    volodymyr ring        Medication:    Current Outpatient Medications   Medication Sig Dispense Refill    tiZANidine (ZANAFLEX) 2 MG tablet Take 1 tablet by mouth in the morning and at bedtime 60 tablet 2    nortriptyline (PAMELOR) 10 MG capsule Take 1 capsule by mouth nightly 30 capsule 2    apixaban (ELIQUIS) 5 MG TABS tablet Take 1 tablet by mouth 2 times daily 60 tablet 0    rosuvastatin (CRESTOR) 40 MG tablet Take 1 tablet by mouth nightly 30 tablet 3    metoprolol succinate (TOPROL XL) 25 MG extended release tablet Take 1 tablet by mouth daily 30 tablet 3    promethazine (PHENERGAN) 25 MG tablet Take 1 tablet by mouth every 6 hours as needed for Nausea      clopidogrel (PLAVIX) 75 MG tablet Take 1 tablet by mouth daily 90 tablet 3    donepezil (ARICEPT) 10 MG tablet Take 1 tablet by mouth every morning 90 tablet 1    cetirizine (ZYRTEC) 5 MG tablet Take 1 tablet by mouth daily      buPROPion (WELLBUTRIN) 75 MG tablet TAKE TWO TABLETS BY MOUTH EVERY MORNING (Patient taking differently: Take 1 tablet by mouth 2 times daily) 90 tablet 1    nitroglycerin (NITROSTAT) 0.6 MG SL tablet Place 1 tablet under the tongue every 5 minutes as needed for Chest pain up to max of 3 total doses. If no relief after 1 dose, call 911. 25 tablet 2    dicyclomine (BENTYL) 10 MG capsule Take 1 capsule by mouth 3 times daily 90 capsule 1    citalopram (CELEXA) 40 MG tablet TAKE ONE TABLET BY MOUTH EVERY MORNING 90 tablet 3    esomeprazole (NEXIUM) 20 MG delayed release capsule Take 1 capsule by mouth every morning (before breakfast) 90 capsule 3    Cholecalciferol (VITAMIN D3) 5000 UNITS TABS Take by mouth daily       No current facility-administered medications for this visit.     Facility-Administered Medications Ordered in Other Visits   Medication Dose

## 2024-07-22 NOTE — PATIENT INSTRUCTIONS

## 2024-07-23 ENCOUNTER — CARE COORDINATION (OUTPATIENT)
Dept: CASE MANAGEMENT | Age: 69
End: 2024-07-23

## 2024-07-23 ENCOUNTER — CLINICAL DOCUMENTATION (OUTPATIENT)
Dept: SPIRITUAL SERVICES | Age: 69
End: 2024-07-23

## 2024-07-23 NOTE — CARE COORDINATION
Cardiology [phone]    12/23/2024 12:00 PM Ryland Darling MD Neurology [phone]    1/20/2025 1:00 PM Nancy Pan, LUCIA - [facility] [phone]            Care Transition Nurse provided contact information.  Plan for follow-up call in 6-10 days based on severity of symptoms and risk factors.  Plan for next call: self management-changes to meds helping?      Ngozi LAMBERT, RN, [facility]  Care Transition Nurse  [phone] mobile

## 2024-07-23 NOTE — ACP (ADVANCE CARE PLANNING)
Advance Care Planning   Ambulatory ACP Specialist Patient Outreach    Date:  7/23/2024    ACP Specialist:  Sincere Geiger    Outreach call to patient in follow-up to ACP Specialist referral from:Nancy Pan APRN - CNP    [x] PCP  [] Provider   [] Ambulatory Care Management [] Other     For:                  [x] Advance Directive Assistance              [] Complete Portable DNR order              [] Complete POST/POLST/MOST              [] Code Status Discussion             [] Discuss Goals of Care             [] Early ACP Decision-Making              [] Other (Specify)    Date Referral Received:7/18/24     Next Step:   [x] ACP scheduled conversation  [] Outreach again in one week               [] Email / Mail ACP Info Sheets  [] Email / Mail Advance Directive   [] Closing referral.  Routing closure to referring provider/staff and to ACP Specialist .    [] Closure letter mailed to patient with invitation to contact ACP Specialist if / when ready.   [] Other (Specify here):       [x] At this time, Healthcare Decision Maker Is:         [] Primary agent named in scanned advance directive.    [x] Legal Next of Kin.     [] Unable to determine legal decision maker at this time.    Outreaches:       [] 1st -  Date:                 Intervention:  [] Spoke with Patient   [] Left Voice mail [] Email / Mail    [] MyChart  [] Other (Specify) :     Outcomes:           [x] 2nd -  Date:  7/23/24               Intervention:  [x] Spoke with Patient  [] Left Voice mail [] Email / Mail    [] MyChart  [] Other (Specify) :              Outcomes:Scheduled an in-person ACP appt for 10a on 7/29/24 at [facility] in 's office. Pt was pleasant and appreciative.                [] 3rd -  Date:                Intervention:  [] Spoke with Patient   [] Left Voice mail [] Email / Mail    [] MyChart  [] Other (Specify) :       Outcomes:           []  Additional Outreach -  Date:     (Specify Dates &

## 2024-07-29 ENCOUNTER — CLINICAL DOCUMENTATION (OUTPATIENT)
Dept: SPIRITUAL SERVICES | Age: 69
End: 2024-07-29

## 2024-07-29 NOTE — ACP (ADVANCE CARE PLANNING)
Advance Care Planning   Ambulatory ACP Specialist Patient Outreach    Date:  7/29/2024    ACP Specialist:  Sincere Geiger    Outreach call to patient in follow-up to ACP Specialist referral from:Nancy Pan APRN - CNP    [x] PCP  [] Provider   [] Ambulatory Care Management [] Other     For:                  [x] Advance Directive Assistance              [] Complete Portable DNR order              [] Complete POST/POLST/MOST              [] Code Status Discussion             [] Discuss Goals of Care             [] Early ACP Decision-Making              [] Other (Specify)    Date Referral Received:7/18/24    Next Step:   [] ACP scheduled conversation  [] Outreach again in one week               [] Email / Mail ACP Info Sheets  [] Email / Mail Advance Directive   [] Closing referral.  Routing closure to referring provider/staff and to ACP Specialist .    [] Closure letter mailed to patient with invitation to contact ACP Specialist if / when ready.   [x] Other (Await pt to call back on 7/29/24 to reschedule ACP appt.)       [] At this time, Healthcare Decision Maker Is:         [] Primary agent named in scanned advance directive.    [] Legal Next of Kin.     [] Unable to determine legal decision maker at this time.    Outreaches:       [] 1st -  Date:                 Intervention:  [] Spoke with Patient   [] Left Voice mail [] Email / Mail    [] MyChart  [] Other (Specify) :     Outcomes:           [] 2nd -  Date:                 Intervention:  [] Spoke with Patient  [] Left Voice mail [] Email / Mail    [] MyChart  [] Other (Specify) :              Outcomes:                [x] 3rd -  Date:  7/29/24              Intervention:  [x] Spoke with Patient   [] Left Voice mail [] Email / Mail    [] MyChart  [] Other (Specify) :       Outcomes: Pt left a message cancelling her scheduled in-person ACP appt for 10a on 7/29/24 at [facility] in 's office. This  called the pt, and

## 2024-07-30 ENCOUNTER — CARE COORDINATION (OUTPATIENT)
Dept: CASE MANAGEMENT | Age: 69
End: 2024-07-30

## 2024-07-30 NOTE — CARE COORDINATION
Care Transitions Note    Follow Up Call     Attempted to reach patient for transitions of care follow up.  Unable to reach patient.      Outreach Attempts:   HIPAA compliant voicemail left for patient.     Care Summary Note: lvm    Follow Up Appointment:   Future Appointments         Provider Specialty Dept Phone    8/5/2024 1:15 PM Dawn Higgins APRN - CNP Cardiology [phone]    12/23/2024 11:45 AM Ryland Darling MD Neurology [phone]    1/20/2025 1:00 PM Nancy Pan APRN - CNP Family Medicine [phone]            Plan for follow-up call in 6-10 days based on severity of symptoms and risk factors. Plan for next call: self management-.    Ngozi WOODN, RN, [facility]  Care Transition Nurse  [phone] mobile

## 2024-08-02 DIAGNOSIS — F33.0 MILD EPISODE OF RECURRENT MAJOR DEPRESSIVE DISORDER (HCC): ICD-10-CM

## 2024-08-02 RX ORDER — CITALOPRAM 20 MG/1
20 TABLET ORAL EVERY MORNING
Qty: 90 TABLET | Refills: 1 | Status: SHIPPED | OUTPATIENT
Start: 2024-08-02

## 2024-08-05 ENCOUNTER — PATIENT MESSAGE (OUTPATIENT)
Dept: NEUROLOGY | Age: 69
End: 2024-08-05

## 2024-08-05 ENCOUNTER — CLINICAL DOCUMENTATION (OUTPATIENT)
Dept: SPIRITUAL SERVICES | Age: 69
End: 2024-08-05

## 2024-08-05 NOTE — ACP (ADVANCE CARE PLANNING)
[facility] in 's office. Pt was pleasant and appreciative.     Thank you for this referral.    Noah Jett MHA, MDiv, [facility]  Outpatient Spiritual Care Coordinator  [facility] Sites  [phone] ofc

## 2024-08-05 NOTE — TELEPHONE ENCOUNTER
From: Shelby Cordero  To: Dr. Ryland Darling  Sent: 8/5/2024 9:44 AM EDT  Subject: Nortripyline    Dr. Mir   I want to discontinue the Nortripyline. I am having anger issues on it and I don't like how it makes me feel. The other medications that I am on was the perfect combination.   I see my dentist this week and I will address the issues with TMJ with him.     How do I go off the medication?    Please advise.

## 2024-08-06 ENCOUNTER — CARE COORDINATION (OUTPATIENT)
Dept: CASE MANAGEMENT | Age: 69
End: 2024-08-06

## 2024-08-06 NOTE — CARE COORDINATION
12/23/2024 11:45 AM Ryland Darling MD Neurology [phone]    1/20/2025 1:00 PM Nancy Pan APRN - [facility] [phone]            Care Transition Nurse provided contact information.  Plan for follow-up call in 6-10 days based on severity of symptoms and risk factors.  Plan for next call: self management-.       Ngozi WOODN, RN, [facility]  Care Transition Nurse  [phone] mobile

## 2024-08-13 ENCOUNTER — CARE COORDINATION (OUTPATIENT)
Dept: CASE MANAGEMENT | Age: 69
End: 2024-08-13

## 2024-08-13 NOTE — CARE COORDINATION
Care Transitions Note    Final Call     Attempted to reach patient for transitions of care follow up.  Unable to reach patient.      Outreach Attempts:   HIPAA compliant voicemail left for patient.     Patient graduated from the Care Transitions program on 8/13/24.       Handoff:   Patient was not referred to the ACM team due to unable to contact patient.      Care Summary Note: LVM    Assessments:  Care Transitions Subsequent and Final Call    Subsequent and Final Calls  Care Transitions Interventions  Other Interventions:              Upcoming Appointments:    Future Appointments         Provider Specialty Dept Phone    12/23/2024 11:45 AM Ryland Darling MD Neurology [phone]    1/20/2025 1:00 PM Nancy Pan, APRN - CNP Family Medicine [phone]            Ngozi LAMBERT, RN, [facility]  Care Transition Nurse  [phone] mobile

## 2024-08-14 ENCOUNTER — CLINICAL DOCUMENTATION (OUTPATIENT)
Age: 69
End: 2024-08-14

## 2024-08-14 NOTE — PROGRESS NOTES
Advance Care Planning     Advance Care Planning Inpatient Note  Spiritual Care Department    Today's Date: 8/14/2024  Unit: [facility] [place] [facility]    Received request from patient.  Upon review of chart and communication with care team, patient's decision making abilities are not in question.. Patient was/were present in the room during visit.    Goals of ACP Conversation:  Discuss advance care planning documents  Facilitate a discussion related to patient's goals of care as they align with the patient's values and beliefs.    Health Care Decision Makers:       Primary Decision Maker: Av Cordero - Spouse - [phone]    Secondary Decision Maker: CliftonStacie - Niece/Nephew - [phone]    Supplemental (Other) Decision Maker: WidmyCathleen xie - Brother/Sister - [phone]  Summary:  Completed New Documents  Updated Healthcare Decision Maker    Advance Care Planning Documents (Patient Wishes):  Healthcare Power of /Advance Directive Appointment of Health Care Agent  Living Will/Advance Directive     Assessment:  Shelby engaged in conversation about her wishes for HCPOA and Living Will documentation. She indicated she is enrolled in the organ donation registry through the [place] [employer].      Interventions:  Provided education on documents for clarity and greater understanding  Assisted in the completion of documents according to patient's wishes at this time  Encouraged ongoing ACP conversation with future decision makers and loved ones    Care Preferences Communicated:   No    Outcomes/Plan:  ACP Discussion: Completed  New advance directive completed.  Returned original document(s) to patient, as well as copies for distribution to appointed agents  Copy of advance directive given to staff to scan into medical record.    Electronically signed by Chaplain LATONIA on 8/14/2024 at 3:23 PM

## 2024-08-19 ENCOUNTER — HOSPITAL ENCOUNTER (OUTPATIENT)
Dept: WOMENS IMAGING | Age: 69
Discharge: HOME | End: 2024-08-19
Payer: COMMERCIAL

## 2024-08-19 VITALS — BODY MASS INDEX: 25.71 KG/M2 | HEIGHT: 66 IN | WEIGHT: 160 LBS

## 2024-08-19 DIAGNOSIS — Z12.31 ENCOUNTER FOR SCREENING MAMMOGRAM FOR MALIGNANT NEOPLASM OF BREAST: ICD-10-CM

## 2024-08-19 PROCEDURE — 77063 BREAST TOMOSYNTHESIS BI: CPT

## 2024-08-20 ENCOUNTER — TELEPHONE (OUTPATIENT)
Dept: FAMILY MEDICINE CLINIC | Age: 69
End: 2024-08-20

## 2024-08-20 ENCOUNTER — TELEPHONE (OUTPATIENT)
Dept: CARDIOLOGY CLINIC | Age: 69
End: 2024-08-20

## 2024-08-20 DIAGNOSIS — M79.7 FIBROMYALGIA: ICD-10-CM

## 2024-08-20 NOTE — TELEPHONE ENCOUNTER
ENTRY FOR [facility] MAMMOGRAM RAFFLE    Completion of mammogram before Oct 31st equals 1 entry. Completion same day as order/visit with [facility] will equal 2 entries.    Mammogram Completion date: 8/19/2024      RAFFLE TIX #: [id number]      [facility] Raffle will be held on Friday Nov 1st.  4 winners will be selected. (One from each office POD)  If chosen office staff will contact patient to coordinate raffle basket collection.

## 2024-08-20 NOTE — TELEPHONE ENCOUNTER
Refill Request     CONFIRM preferred pharmacy with the patient.    If Mail Order Rx - Pend for 90 day refill.      Last Seen: Last Seen Department: 7/18/2024  Last Seen by PCP: 7/18/2024    Last Written: 6/5/24 #21 refills 0    If no future appointment scheduled:  Review the last OV with PCP and review information for follow-up visit,  Route STAFF MESSAGE with patient name to the  Pool for scheduling with the following information:            -  Timing of next visit           -  Visit type ie Physical, OV, etc           -  Diagnoses/Reason ie. COPD, HTN - Do not use MEDICATION, Follow-up or CHECK UP - Give reason for visit      Next Appointment:   Future Appointments   Date Time Provider Department Center   12/23/2024 11:45 AM Ryland Darling MD AND NEURO Neurology -   1/20/2025  1:00 PM Nancy Pan APRN - CNP [facility]   1/21/2025  1:00 PM Dawn Higgins APRN - CNP [facility]       Message sent to  to schedule appt with patient?  YES    Return in about 6 months (around 1/18/2025) for chronic pain.    Requested Prescriptions     Pending Prescriptions Disp Refills    predniSONE (DELTASONE) 10 MG tablet [Pharmacy Med Name: PREDNISONE 10 MG TABLET] 21 tablet 0     Sig: TAKE 2 TABLETS BY MOUTH 2 TIMES A DAY FOR 3 DAYS THEN TAKE 1 TABLET 2 TIMES A DAY FOR 3 DAYS THEN TAKE 1 TABLET DAILY

## 2024-08-20 NOTE — TELEPHONE ENCOUNTER
[facility] Medication Refills:    Medication  apixaban (ELIQUIS) 5 MG TABS tablet [[id number]]  apixaban (ELIQUIS) 5 MG TABS tablet [[id number]]    Order Details  Dose: 5 mg Route: Oral Frequency: 2 TIMES DAILY   Dispense Quantity: 60 tablet Refills: 0          Sig: Take 1 tablet by mouth 2 times daily       Pharmacy    [facility] [id number] [facility] [address] [facility] [address] - P [phone] - F [phone]  [address] [place] [id number]  Phone: [phone]  Fax: [phone]       Last Office Visit: 07/01/24    Next Office Visit: 01/21/25    Last Refill: 07/15/24    Last Labs: 07/15/24

## 2024-08-21 RX ORDER — PREDNISONE 10 MG/1
TABLET ORAL
Qty: 21 TABLET | Refills: 0 | Status: SHIPPED | OUTPATIENT
Start: 2024-08-21

## 2024-08-27 ENCOUNTER — OFFICE VISIT (OUTPATIENT)
Age: 69
End: 2024-08-27

## 2024-08-27 VITALS
RESPIRATION RATE: 16 BRPM | HEIGHT: 66 IN | OXYGEN SATURATION: 98 % | TEMPERATURE: 97 F | SYSTOLIC BLOOD PRESSURE: 107 MMHG | WEIGHT: 160 LBS | BODY MASS INDEX: 25.71 KG/M2 | HEART RATE: 67 BPM | DIASTOLIC BLOOD PRESSURE: 67 MMHG

## 2024-08-27 DIAGNOSIS — K57.92 DIVERTICULITIS: Primary | ICD-10-CM

## 2024-08-27 RX ORDER — METRONIDAZOLE 500 MG/1
500 TABLET ORAL 2 TIMES DAILY
Qty: 14 TABLET | Refills: 0 | Status: SHIPPED | OUTPATIENT
Start: 2024-08-27 | End: 2024-09-03

## 2024-08-27 RX ORDER — CIPROFLOXACIN 250 MG/1
250 TABLET, FILM COATED ORAL 2 TIMES DAILY
Qty: 14 TABLET | Refills: 0 | Status: SHIPPED | OUTPATIENT
Start: 2024-08-27 | End: 2024-09-03

## 2024-08-27 ASSESSMENT — ENCOUNTER SYMPTOMS
VOMITING: 0
BLOOD IN STOOL: 0
CONSTIPATION: 0
ABDOMINAL PAIN: 1
DIARRHEA: 1
NAUSEA: 0

## 2024-08-28 NOTE — PROGRESS NOTES
Shelby Cordero (:  1955) is a 69 y.o. female,New patient, here for evaluation of the following chief complaint(s):  Abdominal Pain (LLQ pain starting yesterday after eating. 1 episode of vomiting 5-10 mins after eating with constipation, began having diarrhea today. Last normal bowel was . History of IBS and diverticulitis. )      ASSESSMENT/PLAN:    ICD-10-CM    1. Diverticulitis  K57.92 ciprofloxacin (CIPRO) 250 MG tablet     metroNIDAZOLE (FLAGYL) 500 MG tablet        Diverticulitis  Cipro and Flagyl  Drink fluids  After acute episode is over increase dietary fiber  If you develop severe abdominal pain, rectal bleeding or intractable vomiting you should go to the ED.    Patient verbalized understanding of printed and verbal discharge instructions including follow up care.      Follow up in 7 days if symptoms persist or if symptoms worsen.    SUBJECTIVE/OBJECTIVE:  Complained of left lower quadrant abdominal pain which started yesterday. Patient states symptoms are consistent with her previous episodes of diverticulitis.               Vitals:    24   BP: 107/67   Pulse: 67   Resp: 16   Temp: 97 °F (36.1 °C)   TempSrc: Infrared   SpO2: 98%   Weight: 72.6 kg (160 lb)   Height: 1.676 m (5' 6\")       Review of Systems   Gastrointestinal:  Positive for abdominal pain and diarrhea. Negative for blood in stool, constipation, nausea and vomiting.       Physical Exam  Vitals and nursing note reviewed.   Constitutional:       Appearance: Normal appearance.   Cardiovascular:      Rate and Rhythm: Normal rate and regular rhythm.   Pulmonary:      Effort: Pulmonary effort is normal.      Breath sounds: Normal breath sounds.   Abdominal:      General: Bowel sounds are normal. There is no distension.      Tenderness: There is no abdominal tenderness. There is no guarding or rebound.   Skin:     General: Skin is warm and dry.   Neurological:      Mental Status: She is alert and oriented to person,

## 2024-09-01 DIAGNOSIS — F33.0 MILD EPISODE OF RECURRENT MAJOR DEPRESSIVE DISORDER (HCC): ICD-10-CM

## 2024-09-02 NOTE — TELEPHONE ENCOUNTER
Bupropion was sent in on 6/3/2024 #90 with 1 refill. Will need to call pharmacy tomorrow, Tuesday to see that they have refill on file still.

## 2024-09-03 RX ORDER — BUPROPION HYDROCHLORIDE 75 MG/1
TABLET ORAL
Qty: 90 TABLET | Refills: 1 | OUTPATIENT
Start: 2024-09-03

## 2024-09-12 DIAGNOSIS — F33.0 MILD EPISODE OF RECURRENT MAJOR DEPRESSIVE DISORDER (HCC): ICD-10-CM

## 2024-09-12 RX ORDER — BUPROPION HYDROCHLORIDE 75 MG/1
TABLET ORAL
Qty: 180 TABLET | Refills: 1 | Status: SHIPPED | OUTPATIENT
Start: 2024-09-12

## 2024-09-21 DIAGNOSIS — E55.9 VITAMIN D DEFICIENCY: ICD-10-CM

## 2024-09-21 NOTE — TELEPHONE ENCOUNTER
Have Your Skin Lesions Been Treated?: not been treated Refill Request     CONFIRM preferred pharmacy with the patient.    If Mail Order Rx - Pend for 90 day refill.      Last Seen: Last Seen Department: 7/18/2024  Last Seen by PCP: 7/18/2024    Last Written: 11/9/2022    If no future appointment scheduled:  Review the last OV with PCP and review information for follow-up visit,  Route STAFF MESSAGE with patient name to the  Pool for scheduling with the following information:            -  Timing of next visit           -  Visit type ie Physical, OV, etc           -  Diagnoses/Reason ie. COPD, HTN - Do not use MEDICATION, Follow-up or CHECK UP - Give reason for visit      Next Appointment:   Future Appointments   Date Time Provider Department Center   12/23/2024 11:45 AM Ryland Darling MD AND NEURO Neurology -   1/20/2025  1:00 PM Nancy Pan APRN - CNP [facility]   1/21/2025  1:00 PM Dawn Higgins APRN - CNP [facility]       Message sent to  to schedule appt with patient?  NO      Requested Prescriptions     Pending Prescriptions Disp Refills    vitamin D (ERGOCALCIFEROL) 1.25 MG (07890 UT) CAPS capsule [Pharmacy Med Name: VIT D2 (ERGOCAL) 1.25MG(50,000U) CP] 12 capsule 3     Sig: TAKE ONE CAPSULE BY MOUTH ONCE WEEKLY        Is This A New Presentation, Or A Follow-Up?: Skin Lesions How Severe Is Your Skin Lesion?: moderate

## 2024-09-23 NOTE — TELEPHONE ENCOUNTER
Please check with Shelby to see if she is taking this dose of vit D? It was discontinued previously and hasn't been ordered for almost 2 years. Did she request this of was it the pharmacy?

## 2024-09-24 NOTE — TELEPHONE ENCOUNTER
Spoke with patient she stated that she was on Vit D3 and had Vit D2 left over so she started taking the Vit D2 50,000  She is asking instead of reordering the medication can you place a lab order for her to have her Vitamin D drawn to see what she should be on. Please advise

## 2024-09-25 ENCOUNTER — TELEPHONE (OUTPATIENT)
Age: 69
End: 2024-09-25

## 2024-09-25 DIAGNOSIS — E55.9 VITAMIN D DEFICIENCY: ICD-10-CM

## 2024-09-25 RX ORDER — ERGOCALCIFEROL 1.25 MG/1
50000 CAPSULE, LIQUID FILLED ORAL WEEKLY
Qty: 12 CAPSULE | Refills: 3 | Status: SHIPPED | OUTPATIENT
Start: 2024-09-25

## 2024-09-25 RX ORDER — ERGOCALCIFEROL 1.25 MG/1
CAPSULE, LIQUID FILLED ORAL
Qty: 12 CAPSULE | Refills: 3 | OUTPATIENT
Start: 2024-09-25

## 2024-09-25 NOTE — TELEPHONE ENCOUNTER
Either of the forms for Vit D supplementation are adequate, the lab will not state what type of supplement only if it is normal range. She has been well established to be low in vit D in the past, she does not store vit D adequately. I do not think repeating the lab level is needed at this time. She should just take one of those forms of Vit D. If it is vit D 3 OTC she would take 1 daily. If it is the px vit D 2 50,000 it is 1 weekly.

## 2024-10-01 DIAGNOSIS — G43.709 CHRONIC MIGRAINE WITHOUT AURA WITHOUT STATUS MIGRAINOSUS, NOT INTRACTABLE: ICD-10-CM

## 2024-10-01 DIAGNOSIS — M15.0 PRIMARY OSTEOARTHRITIS INVOLVING MULTIPLE JOINTS: ICD-10-CM

## 2024-10-01 DIAGNOSIS — M79.7 FIBROMYALGIA: ICD-10-CM

## 2024-10-01 RX ORDER — HYDROCODONE BITARTRATE AND ACETAMINOPHEN 5; 325 MG/1; MG/1
1 TABLET ORAL DAILY PRN
Qty: 10 TABLET | Refills: 0 | Status: SHIPPED | OUTPATIENT
Start: 2024-10-01 | End: 2024-10-11

## 2024-10-01 NOTE — TELEPHONE ENCOUNTER
Patient calling needing provider to refill [place] for migraine. She is currently sitting at [facility] with her brother that is admitted needing this medication sent to the [facility].         Please advise.

## 2024-10-01 NOTE — TELEPHONE ENCOUNTER
Refill Request - Controlled Substance    CONFIRM preferred pharmacy with the patient.    If Mail Order Rx - Pend for 90 day refill.        Last Seen Department: 7/18/2024  Last Seen by PCP: 7/18/2024    Last Written: 4/10/24 #10 - 0 refills     Last UDS: 11/8/18    Med Agreement Signed On: 8/7/20    If no future appointment scheduled:  Review the last OV with PCP and review information for follow-up visit,  Route STAFF MESSAGE with patient name to the  Pool for scheduling with the following information:            -  Timing of next visit           -  Visit type ie Physical, OV, etc           -  Diagnoses/Reason ie. COPD, HTN - Do not use MEDICATION, Follow-up or CHECK UP - Give reason for visit        Next Appointment:   Future Appointments   Date Time Provider Department Center   12/23/2024 11:45 AM Ryland Darling MD AND NEURO Neurology -   1/20/2025  1:00 PM [facility]   1/21/2025  1:00 PM Dawn Higgins APRN - CNP [facility]       Message sent to  to schedule appt with patient?  NO      Requested Prescriptions     Pending Prescriptions Disp Refills    HYDROcodone-acetaminophen (NORCO) 5-325 MG per tablet 10 tablet 0     Sig: Take 1 tablet by mouth daily as needed for Pain for up to 10 days. May take instead of tramadol when pain is increased

## 2024-10-05 ENCOUNTER — OFFICE VISIT (OUTPATIENT)
Age: 69
End: 2024-10-05

## 2024-10-05 VITALS
HEART RATE: 67 BPM | TEMPERATURE: 97.8 F | SYSTOLIC BLOOD PRESSURE: 108 MMHG | OXYGEN SATURATION: 99 % | DIASTOLIC BLOOD PRESSURE: 70 MMHG

## 2024-10-05 DIAGNOSIS — J01.90 ACUTE SINUSITIS, RECURRENCE NOT SPECIFIED, UNSPECIFIED LOCATION: Primary | ICD-10-CM

## 2024-10-05 RX ORDER — BENZONATATE 200 MG/1
200 CAPSULE ORAL 3 TIMES DAILY PRN
Qty: 21 CAPSULE | Refills: 0 | Status: SHIPPED | OUTPATIENT
Start: 2024-10-05 | End: 2024-10-12

## 2024-10-05 RX ORDER — PREDNISONE 20 MG/1
20 TABLET ORAL 2 TIMES DAILY
Qty: 10 TABLET | Refills: 0 | Status: SHIPPED | OUTPATIENT
Start: 2024-10-05 | End: 2024-10-10

## 2024-10-05 RX ORDER — AZITHROMYCIN 250 MG/1
TABLET, FILM COATED ORAL
Qty: 6 TABLET | Refills: 0 | Status: SHIPPED | OUTPATIENT
Start: 2024-10-05

## 2024-10-05 NOTE — PATIENT INSTRUCTIONS
Take any prescribed medications as directed.     You may additionally take over-the-counter antihistamine-decongestant combination such as Allegra-D, Zyrtec-D, or Claritin-D.     Alternate Tylenol and Motrin every 4 hours as directed as needed for chills, fever.     Follow-up with your primary care physician or return if not improved.     Go to the ER if you develop significant shortness of breath, difficulty breathing, high fever, chest pain or other major concerning symptoms.

## 2024-10-05 NOTE — PROGRESS NOTES
Shelby Cordero (:  1955) is a 69 y.o. female,  here for evaluation of the following chief complaint(s): Congestion (Pt states she believes she has bronchitis as her brother is admitted in the hospital with it and now has similar symptoms/  ), Cough (Green production pt states/Pt states she has been sweating it out and trying to rest ), and Otalgia (Left ear pain pt states started a few days ago)    Shelby Cordero is a Established patient.   Last Urgent Care Visit: 2024  I have reviewed the patient's medications; see Medication Reconciliation.    ASSESSMENT/PLAN:  Diagnosis:     ICD-10-CM    1. Acute sinusitis, recurrence not specified, unspecified location  J01.90              Medical Decision Making:   Patient was seen at Urgent Care today for productive cough; sinus/nasal congestion; sinus pain and pressure; chills; malaise; myalgias; x 5 day(s)   Pt Denies significant respiratory history:      Based on history and physical examination, differential diagnosis includes but is not limited to: viral uri, allergies, sinusitis, bronchitis, Covid, flu, pneumonia.     On presentation, pt was afrebile, not tachycardic or hypoxic. Lungs clear.      I have low clinical concern for Covid and/or Flu.    Presentation is consistent with sinusitis. There may be some possible bronchitis.     Pt is recommended symptomatic care with OTC antihistamine-decongestants (Claritin, Zyrtec, Allegra) along with nasal spray such as Afrin, Flonase, or Nasonex as directed.  Prescription(s) provided: Zpak, Prednisone, and Tessalon Pearls    Patient was discharged home with follow-up and return precautions.    Patient did not have elevated blood pressure greater than 120/80. Therefore, referral to PCP for HTN is not indicated      Results:  No results found for any visits on 10/05/24.       SUBJECTIVE/OBJECTIVE:  HPI    This is a 69 y.o. female that presents today with complaint of: productive cough; chest congestion;

## 2024-10-13 ENCOUNTER — PATIENT MESSAGE (OUTPATIENT)
Dept: CARDIOLOGY CLINIC | Age: 69
End: 2024-10-13

## 2024-10-14 ENCOUNTER — PATIENT MESSAGE (OUTPATIENT)
Dept: FAMILY MEDICINE CLINIC | Age: 69
End: 2024-10-14

## 2024-10-15 ENCOUNTER — TELEPHONE (OUTPATIENT)
Dept: CARDIOLOGY CLINIC | Age: 69
End: 2024-10-15

## 2024-10-15 RX ORDER — AZITHROMYCIN 250 MG/1
TABLET, FILM COATED ORAL
Qty: 6 TABLET | Refills: 0 | Status: SHIPPED | OUTPATIENT
Start: 2024-10-15 | End: 2024-10-25

## 2024-10-15 RX ORDER — METOPROLOL SUCCINATE 25 MG/1
25 TABLET, EXTENDED RELEASE ORAL DAILY
Qty: 30 TABLET | Refills: 3 | Status: SHIPPED | OUTPATIENT
Start: 2024-10-15

## 2024-10-15 NOTE — TELEPHONE ENCOUNTER
Patient called the office this morning stating that she is upset about not having the Zpak sent into the pharmacy yesterday or today. She doesn't understand why this hasn't been done. I explained that most [employer] messages can take 24-48 hours for a response and that Jessica CARRASCO is out of the office today.     Patient states that this is an \"urgent\" matter since she has to care for her brother and needs something today. Will forward to Jessica CARRASCO and Noemi APARICIO.

## 2024-10-15 NOTE — TELEPHONE ENCOUNTER
Pt has been out of medication. Please ensure Rx goes to correct [facility] and not the outpatient [facility] Medication Refills:      metoprolol succinate (TOPROL XL) 25 MG extended release tablet [[id number]]    Order Details  Dose: 25 mg Route: Oral Frequency: DAILY   Dispense Quantity: 30 tablet Refills: 3          Sig: Take 1 tablet by mouth daily         Start Date: 07/16/24       [facility] [id number] [id number] [facility] [address] - P [phone] - F [phone] [[id number]]     Last Office Visit: 07.01.2024    Next Office Visit: 01.21.2025    Last Refill:     Last Labs: 07.15.2024

## 2024-10-25 ENCOUNTER — OFFICE VISIT (OUTPATIENT)
Dept: FAMILY MEDICINE CLINIC | Age: 69
End: 2024-10-25

## 2024-10-25 ENCOUNTER — PATIENT MESSAGE (OUTPATIENT)
Dept: FAMILY MEDICINE CLINIC | Age: 69
End: 2024-10-25

## 2024-10-25 VITALS
BODY MASS INDEX: 25.02 KG/M2 | DIASTOLIC BLOOD PRESSURE: 60 MMHG | SYSTOLIC BLOOD PRESSURE: 130 MMHG | WEIGHT: 155 LBS | TEMPERATURE: 98.5 F | HEART RATE: 85 BPM | OXYGEN SATURATION: 97 %

## 2024-10-25 DIAGNOSIS — J01.90 ACUTE BACTERIAL SINUSITIS: Primary | ICD-10-CM

## 2024-10-25 DIAGNOSIS — B96.89 ACUTE BACTERIAL SINUSITIS: Primary | ICD-10-CM

## 2024-10-25 RX ORDER — LEVOFLOXACIN 500 MG/1
500 TABLET, FILM COATED ORAL DAILY
Qty: 7 TABLET | Refills: 0 | Status: SHIPPED | OUTPATIENT
Start: 2024-10-25 | End: 2024-11-01

## 2024-10-25 NOTE — PROGRESS NOTES
Shelby Cordero (:  1955) is a 69 y.o. female,Established patient, here for evaluation of the following chief complaint(s):  Sinusitis (X3 weeks. Will not go away with 2 rounds of antibiotics )         Assessment & Plan  Acute bacterial sinusitis       Orders:    levoFLOXacin (LEVAQUIN) 500 MG tablet; Take 1 tablet by mouth daily for 7 days  continue mucinex    Saline nasal irrigation 2 times daily for mucus and moisture        No follow-ups on file.       Subjective   HPI    4 weeks ago started with cough, sinus pressure, ear pain. Was at the hospital with her brother. Went to Urgent care. Tx for sinusitis and given zithromax, prednisone and tessalon. Felt a little better. 10/15 called and was given another round of zithromax.   Continues with symptoms, feels not able to clear sinus. Pressure in bilateral ears.  Taking mucinex, zyrtec, continues taking prednisone she had at home.     Review of Systems   All other systems reviewed and are negative.         Objective   Physical Exam  Constitutional:       Appearance: Normal appearance. She is well-developed.   HENT:      Head: Normocephalic and atraumatic.   Neck:      Thyroid: No thyromegaly.   Cardiovascular:      Rate and Rhythm: Normal rate and regular rhythm.      Heart sounds: Normal heart sounds.   Pulmonary:      Effort: Pulmonary effort is normal.      Breath sounds: Normal breath sounds.   Musculoskeletal:         General: Normal range of motion.      Cervical back: Normal range of motion and neck supple.   Skin:     General: Skin is warm.   Neurological:      Mental Status: She is alert and oriented to person, place, and time.   Psychiatric:         Behavior: Behavior normal.                  An electronic signature was used to authenticate this note.    --SOCORRO ADAMS, APRN - CNP

## 2024-11-14 DIAGNOSIS — M26.609 TMJ (TEMPOROMANDIBULAR JOINT DISORDER): ICD-10-CM

## 2024-11-14 DIAGNOSIS — R51.9 ACUTE INTRACTABLE HEADACHE, UNSPECIFIED HEADACHE TYPE: Primary | ICD-10-CM

## 2024-11-14 DIAGNOSIS — K21.9 GASTROESOPHAGEAL REFLUX DISEASE, UNSPECIFIED WHETHER ESOPHAGITIS PRESENT: ICD-10-CM

## 2024-11-15 DIAGNOSIS — G43.709 CHRONIC MIGRAINE WITHOUT AURA WITHOUT STATUS MIGRAINOSUS, NOT INTRACTABLE: ICD-10-CM

## 2024-11-15 DIAGNOSIS — M79.7 FIBROMYALGIA: ICD-10-CM

## 2024-11-15 RX ORDER — METOPROLOL SUCCINATE 25 MG/1
25 TABLET, EXTENDED RELEASE ORAL DAILY
Qty: 30 TABLET | Refills: 5 | Status: SHIPPED | OUTPATIENT
Start: 2024-11-15

## 2024-11-15 RX ORDER — HYDROCODONE BITARTRATE AND ACETAMINOPHEN 5; 325 MG/1; MG/1
1 TABLET ORAL DAILY PRN
Qty: 10 TABLET | Refills: 0 | Status: SHIPPED | OUTPATIENT
Start: 2024-11-15 | End: 2024-11-25

## 2024-11-15 NOTE — TELEPHONE ENCOUNTER
Refill Request - Controlled Substance    CONFIRM preferred pharmacy with the patient.    If Mail Order Rx - Pend for 90 day refill.        Last Seen Department: 10/25/2024  Last Seen by PCP: 10/25/2024    Last Written: 10/1/2024    Last UDS: 11/8/2018    Med Agreement Signed On: 8/7/2020    If no future appointment scheduled:  Review the last OV with PCP and review information for follow-up visit,  Route STAFF MESSAGE with patient name to the  Pool for scheduling with the following information:            -  Timing of next visit           -  Visit type ie Physical, OV, etc           -  Diagnoses/Reason ie. COPD, HTN - Do not use MEDICATION, Follow-up or CHECK UP - Give reason for visit        Next Appointment:   Future Appointments   Date Time Provider Department Center   12/23/2024 11:45 AM Ryland Darling MD AND NEURO Neurology -   1/20/2025  1:00 PM Nancy Pan APRN - CNP [facility]   1/21/2025  1:00 PM [facility]       Message sent to  to schedule appt with patient?  NO      Requested Prescriptions     Pending Prescriptions Disp Refills    HYDROcodone-acetaminophen (NORCO) 5-325 MG per tablet 10 tablet 0     Sig: Take 1 tablet by mouth daily as needed for Pain for up to 10 days. May take instead of tramadol when pain is increased

## 2024-11-15 NOTE — TELEPHONE ENCOUNTER
Last seen: 7/22/24    Next appt: 12/23/24    Last filled: 7/22/72 for 30-day supply with 2 refills

## 2024-11-15 NOTE — TELEPHONE ENCOUNTER
Refill Request     CONFIRM preferred pharmacy with the patient.    If Mail Order Rx - Pend for 90 day refill.      Last Seen: Last Seen Department: 10/25/2024  Last Seen by PCP: 10/25/2024    Last Written: 5/8/23 90 with 3 refills     If no future appointment scheduled:  Review the last OV with PCP and review information for follow-up visit,  Route STAFF MESSAGE with patient name to the  Pool for scheduling with the following information:            -  Timing of next visit           -  Visit type ie Physical, OV, etc           -  Diagnoses/Reason ie. COPD, HTN - Do not use MEDICATION, Follow-up or CHECK UP - Give reason for visit      Next Appointment:   Future Appointments   Date Time Provider Department Center   12/23/2024 11:45 AM Ryland Darling MD AND NEURO Neurology -   1/20/2025  1:00 PM Nancy Pan APRN - CNP [facility]   1/21/2025  1:00 PM Dawn Higgins APRN - CNP [facility]       Message sent to  to schedule appt with patient?  NO      Requested Prescriptions     Pending Prescriptions Disp Refills    esomeprazole (NEXIUM) 20 MG delayed release capsule 90 capsule 3     Sig: Take 1 capsule by mouth every morning (before breakfast)

## 2024-11-18 RX ORDER — TIZANIDINE 2 MG/1
2 TABLET ORAL 2 TIMES DAILY
Qty: 60 TABLET | Refills: 0 | Status: SHIPPED | OUTPATIENT
Start: 2024-11-18

## 2024-11-22 ENCOUNTER — CLINICAL DOCUMENTATION (OUTPATIENT)
Age: 69
End: 2024-11-22

## 2024-11-22 NOTE — ACP (ADVANCE CARE PLANNING)
Followed up with Pt to see if still needed to reschedule Advance Directives appointment. Pt stated she did need to make changes to forms, but would call us when ready to go over them. No other needs at this time.    Surendra Weldon

## 2024-11-24 ENCOUNTER — HOSPITAL ENCOUNTER (EMERGENCY)
Age: 69
Discharge: HOME | End: 2024-11-24
Payer: COMMERCIAL

## 2024-11-24 VITALS
RESPIRATION RATE: 18 BRPM | HEART RATE: 97 BPM | OXYGEN SATURATION: 96 % | HEIGHT: 67 IN | BODY MASS INDEX: 24.64 KG/M2 | SYSTOLIC BLOOD PRESSURE: 126 MMHG | WEIGHT: 157 LBS | DIASTOLIC BLOOD PRESSURE: 70 MMHG | TEMPERATURE: 99.5 F

## 2024-11-24 DIAGNOSIS — R52 BODY ACHES: Primary | ICD-10-CM

## 2024-11-24 LAB
ALBUMIN SERPL-MCNC: 3.8 G/DL (ref 3.4–5)
ALBUMIN/GLOB SERPL: 1.5 {RATIO} (ref 1.1–2.2)
ALP SERPL-CCNC: 87 U/L (ref 40–129)
ALT SERPL-CCNC: 12 U/L (ref 10–40)
ANION GAP SERPL CALCULATED.3IONS-SCNC: 10 MMOL/L (ref 3–16)
AST SERPL-CCNC: 20 U/L (ref 15–37)
BASOPHILS # BLD: 0 K/UL (ref 0–0.2)
BASOPHILS NFR BLD: 0.8 %
BILIRUB SERPL-MCNC: 0.4 MG/DL (ref 0–1)
BUN SERPL-MCNC: 7 MG/DL (ref 7–20)
CALCIUM SERPL-MCNC: 8.6 MG/DL (ref 8.3–10.6)
CHLORIDE SERPL-SCNC: 103 MMOL/L (ref 99–110)
CO2 SERPL-SCNC: 25 MMOL/L (ref 21–32)
CREAT SERPL-MCNC: 0.6 MG/DL (ref 0.6–1.2)
DEPRECATED RDW RBC AUTO: 13.9 % (ref 12.4–15.4)
EOSINOPHIL # BLD: 0 K/UL (ref 0–0.6)
EOSINOPHIL NFR BLD: 0.2 %
FLUAV RNA RESP QL NAA+PROBE: NOT DETECTED
FLUBV RNA RESP QL NAA+PROBE: NOT DETECTED
GFR SERPLBLD CREATININE-BSD FMLA CKD-EPI: >90 ML/MIN/{1.73_M2}
GLUCOSE SERPL-MCNC: 115 MG/DL (ref 70–99)
HCT VFR BLD AUTO: 38.3 % (ref 36–48)
HGB BLD-MCNC: 13.4 G/DL (ref 12–16)
LYMPHOCYTES # BLD: 0.4 K/UL (ref 1–5.1)
LYMPHOCYTES NFR BLD: 8 %
MCH RBC QN AUTO: 32.5 PG (ref 26–34)
MCHC RBC AUTO-ENTMCNC: 35 G/DL (ref 31–36)
MCV RBC AUTO: 92.9 FL (ref 80–100)
MONOCYTES # BLD: 0.4 K/UL (ref 0–1.3)
MONOCYTES NFR BLD: 6.8 %
NEUTROPHILS # BLD: 4.6 K/UL (ref 1.7–7.7)
NEUTROPHILS NFR BLD: 84.2 %
PLATELET # BLD AUTO: 253 K/UL (ref 135–450)
PMV BLD AUTO: 6.4 FL (ref 5–10.5)
POTASSIUM SERPL-SCNC: 4.2 MMOL/L (ref 3.5–5.1)
PROT SERPL-MCNC: 6.4 G/DL (ref 6.4–8.2)
RBC # BLD AUTO: 4.13 M/UL (ref 4–5.2)
SARS-COV-2 RNA RESP QL NAA+PROBE: NOT DETECTED
SODIUM SERPL-SCNC: 138 MMOL/L (ref 136–145)
WBC # BLD AUTO: 5.5 K/UL (ref 4–11)

## 2024-11-24 PROCEDURE — 6370000000 HC RX 637 (ALT 250 FOR IP): Performed by: NURSE PRACTITIONER

## 2024-11-24 PROCEDURE — 99283 EMERGENCY DEPT VISIT LOW MDM: CPT

## 2024-11-24 PROCEDURE — 87636 SARSCOV2 & INF A&B AMP PRB: CPT

## 2024-11-24 PROCEDURE — 80053 COMPREHEN METABOLIC PANEL: CPT

## 2024-11-24 PROCEDURE — 85025 COMPLETE CBC W/AUTO DIFF WBC: CPT

## 2024-11-24 RX ORDER — ONDANSETRON 4 MG/1
4 TABLET, ORALLY DISINTEGRATING ORAL ONCE
Status: COMPLETED | OUTPATIENT
Start: 2024-11-24 | End: 2024-11-24

## 2024-11-24 RX ORDER — ACETAMINOPHEN 325 MG/1
650 TABLET ORAL ONCE
Status: COMPLETED | OUTPATIENT
Start: 2024-11-24 | End: 2024-11-24

## 2024-11-24 RX ADMIN — ONDANSETRON 4 MG: 4 TABLET, ORALLY DISINTEGRATING ORAL at 19:31

## 2024-11-24 RX ADMIN — ACETAMINOPHEN 650 MG: 325 TABLET ORAL at 19:31

## 2024-11-24 ASSESSMENT — PAIN SCALES - GENERAL
PAINLEVEL_OUTOF10: 10
PAINLEVEL_OUTOF10: 10

## 2024-11-24 ASSESSMENT — PAIN DESCRIPTION - LOCATION: LOCATION: GENERALIZED

## 2024-11-24 ASSESSMENT — PAIN - FUNCTIONAL ASSESSMENT: PAIN_FUNCTIONAL_ASSESSMENT: 0-10

## 2024-11-24 ASSESSMENT — PAIN DESCRIPTION - DESCRIPTORS: DESCRIPTORS: ACHING

## 2024-11-24 ASSESSMENT — PAIN DESCRIPTION - PAIN TYPE: TYPE: ACUTE PAIN

## 2024-11-29 NOTE — ED PROVIDER NOTES
DIAGNOSTIC RESULTS   LABS:    Labs Reviewed   CBC WITH AUTO DIFFERENTIAL - Abnormal; Notable for the following components:       Result Value    Lymphocytes Absolute 0.4 (*)     All other components within normal limits   COMPREHENSIVE METABOLIC PANEL W/ REFLEX TO MG FOR LOW K - Abnormal; Notable for the following components:    Glucose 115 (*)     All other components within normal limits   COVID-19 & INFLUENZA COMBO       When ordered only abnormal lab results are displayed. All other labs were within normal range or not returned as of this dictation.    EKG: When ordered, EKG's are interpreted by the Emergency Department Physician in the absence of a cardiologist.  Please see their note for interpretation of EKG.    RADIOLOGY:   Non-plain film images such as CT, Ultrasound and MRI are read by the radiologist. Plain radiographic images are visualized and preliminarily interpreted by the ED Provider with the below findings:          Interpretation per the Radiologist below, if available at the time of this note:    No orders to display     No results found.     No results found.    PROCEDURES   Unless otherwise noted below, none     Procedures    CRITICAL CARE TIME (.cctime)         EMERGENCY DEPARTMENT COURSE and DIFFERENTIAL DIAGNOSIS/MDM:   Vitals:    Vitals:    11/24/24 1914 11/24/24 2010   BP: 131/76 126/70   Pulse: (!) 107 97   Resp: 16 18   Temp: 98.2 °F (36.8 °C) 99.5 °F (37.5 °C)   TempSrc: Oral Oral   SpO2: 97% 96%   Weight: 71.2 kg (157 lb)    Height: 1.702 m (5' 7\")        Patient was given the following medications:  Medications   acetaminophen (TYLENOL) tablet 650 mg (650 mg Oral Given 11/24/24 1931)   ondansetron (ZOFRAN-ODT) disintegrating tablet 4 mg (4 mg Oral Given 11/24/24 1931)             Is this patient to be included in the SEP-1 Core Measure due to severe sepsis or septic shock?   No   Exclusion criteria - the patient is NOT to be included for SEP-1 Core Measure due to:  2+ SIRS  LUCIA - CNP  [address]  [address]  [facility] [address]  [phone]    Schedule an appointment as soon as possible for a visit       [facility] ED  [address] [id number] [phone]  Go to   If symptoms worsen      DISCHARGE MEDICATIONS:  Discharge Medication List as of 11/24/2024  8:26 PM          DISCONTINUED MEDICATIONS:  Discharge Medication List as of 11/24/2024  8:26 PM                 (Please note that portions of this note were completed with a voice recognition program.  Efforts were made to edit the dictations but occasionally words are mis-transcribed.)    LUCIA Benitez CNP (electronically signed)      Nigel Gallo APRN - CNP  11/29/24 1132

## 2024-12-20 ENCOUNTER — TELEPHONE (OUTPATIENT)
Age: 69
End: 2024-12-20

## 2024-12-20 DIAGNOSIS — R51.9 ACUTE INTRACTABLE HEADACHE, UNSPECIFIED HEADACHE TYPE: ICD-10-CM

## 2024-12-20 DIAGNOSIS — M26.609 TMJ (TEMPOROMANDIBULAR JOINT DISORDER): ICD-10-CM

## 2024-12-20 NOTE — TELEPHONE ENCOUNTER
Pt requesting refill on tizanidine 2mg.  Pt uses [facility].

## 2024-12-23 ENCOUNTER — TELEPHONE (OUTPATIENT)
Dept: FAMILY MEDICINE CLINIC | Age: 69
End: 2024-12-23

## 2024-12-23 ENCOUNTER — OFFICE VISIT (OUTPATIENT)
Dept: NEUROLOGY | Age: 69
End: 2024-12-23
Payer: COMMERCIAL

## 2024-12-23 VITALS
RESPIRATION RATE: 14 BRPM | HEART RATE: 68 BPM | DIASTOLIC BLOOD PRESSURE: 72 MMHG | WEIGHT: 156.4 LBS | BODY MASS INDEX: 24.55 KG/M2 | SYSTOLIC BLOOD PRESSURE: 118 MMHG | OXYGEN SATURATION: 98 % | HEIGHT: 67 IN

## 2024-12-23 DIAGNOSIS — R51.9 ACUTE INTRACTABLE HEADACHE, UNSPECIFIED HEADACHE TYPE: ICD-10-CM

## 2024-12-23 DIAGNOSIS — F41.9 ANXIETY: ICD-10-CM

## 2024-12-23 DIAGNOSIS — K57.92 DIVERTICULITIS: ICD-10-CM

## 2024-12-23 DIAGNOSIS — M26.609 TMJ (TEMPOROMANDIBULAR JOINT DISORDER): ICD-10-CM

## 2024-12-23 DIAGNOSIS — F02.A0 MILD LATE ONSET ALZHEIMER'S DEMENTIA WITHOUT BEHAVIORAL DISTURBANCE, PSYCHOTIC DISTURBANCE, MOOD DISTURBANCE, OR ANXIETY (HCC): Primary | ICD-10-CM

## 2024-12-23 DIAGNOSIS — G30.1 MILD LATE ONSET ALZHEIMER'S DEMENTIA WITHOUT BEHAVIORAL DISTURBANCE, PSYCHOTIC DISTURBANCE, MOOD DISTURBANCE, OR ANXIETY (HCC): Primary | ICD-10-CM

## 2024-12-23 PROCEDURE — 1123F ACP DISCUSS/DSCN MKR DOCD: CPT | Performed by: PSYCHIATRY & NEUROLOGY

## 2024-12-23 PROCEDURE — 3074F SYST BP LT 130 MM HG: CPT | Performed by: PSYCHIATRY & NEUROLOGY

## 2024-12-23 PROCEDURE — 1159F MED LIST DOCD IN RCRD: CPT | Performed by: PSYCHIATRY & NEUROLOGY

## 2024-12-23 PROCEDURE — 99214 OFFICE O/P EST MOD 30 MIN: CPT | Performed by: PSYCHIATRY & NEUROLOGY

## 2024-12-23 PROCEDURE — 3078F DIAST BP <80 MM HG: CPT | Performed by: PSYCHIATRY & NEUROLOGY

## 2024-12-23 RX ORDER — METRONIDAZOLE 500 MG/1
500 TABLET ORAL 2 TIMES DAILY
Qty: 14 TABLET | Refills: 0 | Status: SHIPPED | OUTPATIENT
Start: 2024-12-23 | End: 2024-12-30

## 2024-12-23 RX ORDER — CIPROFLOXACIN 250 MG/1
250 TABLET, FILM COATED ORAL 2 TIMES DAILY
Qty: 14 TABLET | Refills: 0 | Status: SHIPPED | OUTPATIENT
Start: 2024-12-23 | End: 2024-12-30

## 2024-12-23 RX ORDER — TIZANIDINE 2 MG/1
2 TABLET ORAL 2 TIMES DAILY
Qty: 60 TABLET | Refills: 0 | Status: SHIPPED | OUTPATIENT
Start: 2024-12-23 | End: 2024-12-23 | Stop reason: SDUPTHER

## 2024-12-23 RX ORDER — MEMANTINE HYDROCHLORIDE 5 MG/1
5 TABLET ORAL 2 TIMES DAILY
Qty: 60 TABLET | Refills: 2 | Status: SHIPPED | OUTPATIENT
Start: 2024-12-23

## 2024-12-23 RX ORDER — TIZANIDINE 2 MG/1
2 TABLET ORAL 2 TIMES DAILY
Qty: 90 TABLET | Refills: 1 | Status: SHIPPED | OUTPATIENT
Start: 2024-12-23

## 2024-12-23 RX ORDER — DONEPEZIL HYDROCHLORIDE 10 MG/1
10 TABLET, FILM COATED ORAL EVERY MORNING
Qty: 90 TABLET | Refills: 1 | Status: SHIPPED | OUTPATIENT
Start: 2024-12-23

## 2024-12-23 NOTE — TELEPHONE ENCOUNTER
Patient calling asking if provider can send Cipro and Flagyl into her pharmacy. She is having a flare up on diverticulitis.     Please advise    Pharmacy - [facility] Yoon

## 2024-12-23 NOTE — PATIENT INSTRUCTIONS
YOU MUST CONFIRM YOUR APPOINTMENT 1 DAY PRIOR OR IT WILL BE CANCELLED!!   Our office will call you 3 times the day prior to your appointment in an attempt to confirm.  Please return our call ASAP or confirm your appt through [employer] no later than 3 pm the day before your appointment.  If we do not hear back from you by 3 pm to confirm, your appointment will be cancelled & someone will be added into that slot from our wait list.

## 2024-12-23 NOTE — PROGRESS NOTES
Neurology outpatient follow-up visit    Patient name: Shelby Cordero    Chief Complaint:  Memory loss.    History of present illness:  This is a 68 years old right-handed female.  Initially, the patient has appointment today for chronic migraine headache.  However, the patient has more concern regarding to memory difficulty.  The onset was about a year ago.  The course is slowly progressive.  The patient reports occasional word finding difficulty.  But the patient is able to maintain her daily activities.  The patient also denies significant depression or sleep disorder.  The patient has previous MRI brain 6 years ago which showed generalized brain atrophy and small vessel disease.  She also had a recent CT brain 2 years ago which confirmed the same findings.    Education: 1 year in college.    MMSE 26/30.  Orientation to time 5/5, place 5/5, registration 3/3, recall 3/3, calculation 2/5, and drawing 0/1.    Interval History:  04/26/24: The patient is here for follow-up clinical headache.  The patient reports worsening of headache at this time.  The headache is exclusively on the right side above her ear.  The patient had tooth extraction with some infection prior to this onset.  The patient tried to course of antibiotics without significant benefit.  The headache is constant without significant nausea, phonophobia or photophobia.  For her dementia, the patient reports some benefit from donepezil.  The patient also denies significant side effect from donepezil.    06/24/24: The patient is here for follow-up dementia.  The patient was brought to the hospital few weeks ago due to new onset of numbness on her left face.  The patient remains to have mild degree of numbness.  The MRI brain at that time showed no significant ischemic stroke.  We recommended Plavix with Eliquis due to intracranial atherosclerosis.  The patient denies significant side effect from donepezil.  The patient reports some benefit from

## 2024-12-26 ENCOUNTER — TELEPHONE (OUTPATIENT)
Age: 69
End: 2024-12-26

## 2024-12-26 NOTE — TELEPHONE ENCOUNTER
Pt has been taking Namenda 5mg Bid x3 days.    Pt c/o feeling unsteady and unbalanced since yesterday.    Pt wanting to know if she can take Namenda QD or should she continue to take it Bid?    Pt can be reached at [phone]

## 2024-12-27 NOTE — TELEPHONE ENCOUNTER
Per Dr. Gamboa: She can do once a day until the next F/U appt.    Pt informed of Dr. Gamboa's response.  She will take donepezil nightly and one namenda tab in a.m.

## 2024-12-31 ENCOUNTER — APPOINTMENT (OUTPATIENT)
Dept: CT IMAGING | Age: 69
End: 2024-12-31
Payer: COMMERCIAL

## 2024-12-31 ENCOUNTER — HOSPITAL ENCOUNTER (EMERGENCY)
Age: 69
Discharge: HOME | End: 2024-12-31
Payer: COMMERCIAL

## 2024-12-31 VITALS
TEMPERATURE: 98.7 F | DIASTOLIC BLOOD PRESSURE: 79 MMHG | RESPIRATION RATE: 16 BRPM | HEIGHT: 67 IN | BODY MASS INDEX: 24.8 KG/M2 | OXYGEN SATURATION: 97 % | HEART RATE: 76 BPM | WEIGHT: 158 LBS | SYSTOLIC BLOOD PRESSURE: 169 MMHG

## 2024-12-31 DIAGNOSIS — S09.90XA INJURY OF HEAD, INITIAL ENCOUNTER: Primary | ICD-10-CM

## 2024-12-31 DIAGNOSIS — S00.83XA CONTUSION OF FOREHEAD, INITIAL ENCOUNTER: ICD-10-CM

## 2024-12-31 PROCEDURE — 99284 EMERGENCY DEPT VISIT MOD MDM: CPT

## 2024-12-31 PROCEDURE — 70486 CT MAXILLOFACIAL W/O DYE: CPT

## 2024-12-31 PROCEDURE — 6370000000 HC RX 637 (ALT 250 FOR IP)

## 2024-12-31 PROCEDURE — 70450 CT HEAD/BRAIN W/O DYE: CPT

## 2024-12-31 RX ORDER — ACETAMINOPHEN 500 MG
1000 TABLET ORAL
Status: COMPLETED | OUTPATIENT
Start: 2024-12-31 | End: 2024-12-31

## 2024-12-31 RX ADMIN — ACETAMINOPHEN 1000 MG: 500 TABLET ORAL at 00:59

## 2024-12-31 ASSESSMENT — PAIN DESCRIPTION - PAIN TYPE: TYPE: ACUTE PAIN

## 2024-12-31 ASSESSMENT — PAIN DESCRIPTION - LOCATION
LOCATION: HEAD
LOCATION: FACE

## 2024-12-31 ASSESSMENT — PAIN SCALES - GENERAL
PAINLEVEL_OUTOF10: 6
PAINLEVEL_OUTOF10: 6

## 2024-12-31 ASSESSMENT — PAIN - FUNCTIONAL ASSESSMENT: PAIN_FUNCTIONAL_ASSESSMENT: 0-10

## 2024-12-31 ASSESSMENT — PAIN DESCRIPTION - DESCRIPTORS: DESCRIPTORS: ACHING

## 2024-12-31 NOTE — ED PROVIDER NOTES
[facility]  ED  Emergency Department Encounter    Patient Name: Shelby Cordero  MRN: [id number]  YOB: 1955  Date of Evaluation: 12/31/2024  Provider: Nancy Pan APRN - CNP  Note Started: 3:36 AM EST 12/31/24    CHIEF COMPLAINT  Head Injury (Pt states was bending over to  dog bowl and hit head on counter. Denies LOC pt states she is on blood thinners )    SHARED SERVICE VISIT  SLAVA. I have evaluated this patient.      HISTORY OF PRESENT ILLNESS  History From: Patient.    Limitations to history : None    Social Determinants Significantly Affecting Health : None    Shelby Cordero is a 69 y.o. female who presents to the ED for evaluation of head injury onset tonight prior to arrival.  Patient states that she was bending over to  a dog bowl when she accidentally struck her right forehead and eyebrow region on the countertop.  Patient denies loss of consciousness.  Patient states that she is on Plavix and Eliquis.  Patient does admit to headache.  Patient denies any focal neurological symptoms.  Patient denies fall.    No other complaints, modifying factors or associated symptoms.     Nursing notes reviewed were all reviewed and agreed with or any disagreements were addressed in the HPI.    PMH:  Past Medical History:   Diagnosis Date    Allergic rhinitis     Anxiety     Atrial fibrillation ([facility]) June 2023    Bilateral carpal tunnel syndrome 04/13/2021    CAD (coronary artery disease) 06/05/2019    CHF (congestive heart failure) ([facility])     Systolic    Congenital heart disease     COVID-19 12/28/2020    Depression     Endometriosis     hx    Fibromyalgia     GERD (gastroesophageal reflux disease)     Headache(784.0)     Hearing loss     Hiatal hernia     Hyperlipidemia     Irritable bowel syndrome     Left bundle branch block (LBBB)     Osteoarthritis     SVT (supraventricular tachycardia) ([facility])     hx     WPW (Steffanie-Parkinson-White syndrome)      Surgical

## 2025-01-02 ENCOUNTER — OFFICE VISIT (OUTPATIENT)
Dept: FAMILY MEDICINE CLINIC | Age: 70
End: 2025-01-02
Payer: COMMERCIAL

## 2025-01-02 VITALS
SYSTOLIC BLOOD PRESSURE: 124 MMHG | TEMPERATURE: 96.9 F | BODY MASS INDEX: 24.91 KG/M2 | OXYGEN SATURATION: 97 % | DIASTOLIC BLOOD PRESSURE: 64 MMHG | HEIGHT: 66 IN | HEART RATE: 76 BPM | WEIGHT: 155 LBS

## 2025-01-02 DIAGNOSIS — S00.83XD CONTUSION OF FOREHEAD, SUBSEQUENT ENCOUNTER: ICD-10-CM

## 2025-01-02 DIAGNOSIS — S09.90XD INJURY OF HEAD, SUBSEQUENT ENCOUNTER: Primary | ICD-10-CM

## 2025-01-02 PROCEDURE — 1159F MED LIST DOCD IN RCRD: CPT | Performed by: FAMILY MEDICINE

## 2025-01-02 PROCEDURE — 3078F DIAST BP <80 MM HG: CPT | Performed by: FAMILY MEDICINE

## 2025-01-02 PROCEDURE — G2211 COMPLEX E/M VISIT ADD ON: HCPCS | Performed by: FAMILY MEDICINE

## 2025-01-02 PROCEDURE — 1123F ACP DISCUSS/DSCN MKR DOCD: CPT | Performed by: FAMILY MEDICINE

## 2025-01-02 PROCEDURE — 99214 OFFICE O/P EST MOD 30 MIN: CPT | Performed by: FAMILY MEDICINE

## 2025-01-02 PROCEDURE — 3074F SYST BP LT 130 MM HG: CPT | Performed by: FAMILY MEDICINE

## 2025-01-02 RX ORDER — PROMETHAZINE HYDROCHLORIDE 25 MG/1
25 TABLET ORAL EVERY 8 HOURS PRN
Qty: 30 TABLET | Refills: 3 | Status: SHIPPED | OUTPATIENT
Start: 2025-01-02

## 2025-01-02 ASSESSMENT — PATIENT HEALTH QUESTIONNAIRE - PHQ9
8. MOVING OR SPEAKING SO SLOWLY THAT OTHER PEOPLE COULD HAVE NOTICED. OR THE OPPOSITE, BEING SO FIGETY OR RESTLESS THAT YOU HAVE BEEN MOVING AROUND A LOT MORE THAN USUAL: NOT AT ALL
2. FEELING DOWN, DEPRESSED OR HOPELESS: NOT AT ALL
SUM OF ALL RESPONSES TO PHQ QUESTIONS 1-9: 0
7. TROUBLE CONCENTRATING ON THINGS, SUCH AS READING THE NEWSPAPER OR WATCHING TELEVISION: NOT AT ALL
3. TROUBLE FALLING OR STAYING ASLEEP: NOT AT ALL
5. POOR APPETITE OR OVEREATING: NOT AT ALL
4. FEELING TIRED OR HAVING LITTLE ENERGY: NOT AT ALL
SUM OF ALL RESPONSES TO PHQ9 QUESTIONS 1 & 2: 0
SUM OF ALL RESPONSES TO PHQ QUESTIONS 1-9: 0
6. FEELING BAD ABOUT YOURSELF - OR THAT YOU ARE A FAILURE OR HAVE LET YOURSELF OR YOUR FAMILY DOWN: NOT AT ALL
SUM OF ALL RESPONSES TO PHQ QUESTIONS 1-9: 0
10. IF YOU CHECKED OFF ANY PROBLEMS, HOW DIFFICULT HAVE THESE PROBLEMS MADE IT FOR YOU TO DO YOUR WORK, TAKE CARE OF THINGS AT HOME, OR GET ALONG WITH OTHER PEOPLE: NOT DIFFICULT AT ALL
1. LITTLE INTEREST OR PLEASURE IN DOING THINGS: NOT AT ALL
9. THOUGHTS THAT YOU WOULD BE BETTER OFF DEAD, OR OF HURTING YOURSELF: NOT AT ALL
SUM OF ALL RESPONSES TO PHQ QUESTIONS 1-9: 0

## 2025-01-02 ASSESSMENT — ENCOUNTER SYMPTOMS: PHOTOPHOBIA: 0

## 2025-01-02 NOTE — PROGRESS NOTES
Shelby Cordero is a 69 y.o. female    Chief Complaint   Patient presents with    Follow-up     Hit head hard on counter- still sore       HPI:    HPI    This is a new patient to me.     ED follow-up for head injury.  She was bending over to  a dog bowl when she excellently struck her right forehead and eyebrow area on the countertop.  She did not lose any consciousness.  She is currently on Plavix and Eliquis.  She did have a mild headache.  No numbness or tingling on one side of the body.    In the ED, she had a CT of the head without contrast and a CT of the facial bones without contrast.  The findings were unremarkable besides a small amount of right supraorbital soft tissue swelling.    ROS:    Review of Systems   Eyes:  Negative for photophobia.       /64 (Site: Right Upper Arm, Position: Sitting, Cuff Size: Medium Adult)   Pulse 76   Temp 96.9 °F (36.1 °C) (Temporal)   Ht 1.689 m (5' 6.5\")   Wt 70.3 kg (155 lb)   LMP  (LMP Unknown)   SpO2 97%   BMI 24.65 kg/m²     Physical Exam:    Physical Exam  Constitutional:       General: She is not in acute distress.     Appearance: She is well-developed and normal weight. She is not toxic-appearing.   HENT:      Head: Normocephalic.   Cardiovascular:      Rate and Rhythm: Normal rate and regular rhythm.      Pulses: Normal pulses.      Heart sounds: No murmur heard.  Pulmonary:      Effort: Pulmonary effort is normal. No respiratory distress.      Breath sounds: Normal breath sounds. No wheezing.   Musculoskeletal:      Cervical back: Normal range of motion. No rigidity.   Lymphadenopathy:      Cervical: No cervical adenopathy.   Neurological:      Mental Status: She is alert.   Psychiatric:         Mood and Affect: Mood normal.         Behavior: Behavior normal.         Thought Content: Thought content normal.     Mild supraorbital ecchymosis and swelling with mild TTP.    Current Outpatient Medications   Medication Sig Dispense Refill

## 2025-01-17 ENCOUNTER — TELEPHONE (OUTPATIENT)
Dept: FAMILY MEDICINE CLINIC | Age: 70
End: 2025-01-17

## 2025-01-17 NOTE — TELEPHONE ENCOUNTER
Refill Request - Controlled Substance    CONFIRM preferred pharmacy with the patient.    If Mail Order Rx - Pend for 90 day refill.        Last Seen Department: 1/2/2025  Last Seen by PCP: 10/25/2024    Last Written: 11/15/24 10 tabs 0 refills    Last UDS: 4/5/17    Med Agreement Signed On: 8/7/20    If no future appointment scheduled:  Review the last OV with PCP and review information for follow-up visit,  Route STAFF MESSAGE with patient name to the  Pool for scheduling with the following information:            -  Timing of next visit           -  Visit type ie Physical, OV, etc           -  Diagnoses/Reason ie. COPD, HTN - Do not use MEDICATION, Follow-up or CHECK UP - Give reason for visit        Next Appointment:   Future Appointments   Date Time Provider Department Center   1/20/2025  1:00 PM Nancy Pan APRN - CNP [facility]   1/22/2025  1:00 PM Dawn Higgins APRN - CNP [facility]   3/17/2025 12:00 PM Ryland Darling MD AND NEURO Neurology -       Message sent to  to schedule appt with patient?  NO      Requested Prescriptions      No prescriptions requested or ordered in this encounter

## 2025-01-20 ENCOUNTER — TELEPHONE (OUTPATIENT)
Dept: FAMILY MEDICINE CLINIC | Age: 70
End: 2025-01-20

## 2025-01-20 ENCOUNTER — OFFICE VISIT (OUTPATIENT)
Dept: FAMILY MEDICINE CLINIC | Age: 70
End: 2025-01-20
Payer: COMMERCIAL

## 2025-01-20 VITALS
SYSTOLIC BLOOD PRESSURE: 102 MMHG | WEIGHT: 154 LBS | DIASTOLIC BLOOD PRESSURE: 60 MMHG | HEART RATE: 68 BPM | OXYGEN SATURATION: 95 % | HEIGHT: 65 IN | BODY MASS INDEX: 25.66 KG/M2

## 2025-01-20 DIAGNOSIS — Z00.00 MEDICARE ANNUAL WELLNESS VISIT, SUBSEQUENT: Primary | ICD-10-CM

## 2025-01-20 DIAGNOSIS — F33.0 MILD EPISODE OF RECURRENT MAJOR DEPRESSIVE DISORDER (HCC): ICD-10-CM

## 2025-01-20 DIAGNOSIS — M79.7 FIBROMYALGIA: ICD-10-CM

## 2025-01-20 DIAGNOSIS — G43.709 CHRONIC MIGRAINE WITHOUT AURA WITHOUT STATUS MIGRAINOSUS, NOT INTRACTABLE: ICD-10-CM

## 2025-01-20 PROCEDURE — 3017F COLORECTAL CA SCREEN DOC REV: CPT | Performed by: NURSE PRACTITIONER

## 2025-01-20 PROCEDURE — 3078F DIAST BP <80 MM HG: CPT | Performed by: NURSE PRACTITIONER

## 2025-01-20 PROCEDURE — G0439 PPPS, SUBSEQ VISIT: HCPCS | Performed by: NURSE PRACTITIONER

## 2025-01-20 PROCEDURE — 1123F ACP DISCUSS/DSCN MKR DOCD: CPT | Performed by: NURSE PRACTITIONER

## 2025-01-20 PROCEDURE — 3074F SYST BP LT 130 MM HG: CPT | Performed by: NURSE PRACTITIONER

## 2025-01-20 RX ORDER — CITALOPRAM HYDROBROMIDE 20 MG/1
20 TABLET ORAL EVERY MORNING
Qty: 90 TABLET | Refills: 1 | Status: SHIPPED | OUTPATIENT
Start: 2025-01-20

## 2025-01-20 RX ORDER — BUPROPION HYDROCHLORIDE 75 MG/1
TABLET ORAL
Qty: 180 TABLET | Refills: 1 | Status: SHIPPED | OUTPATIENT
Start: 2025-01-20

## 2025-01-20 RX ORDER — PREDNISONE 10 MG/1
TABLET ORAL
Qty: 21 TABLET | Refills: 0 | Status: SHIPPED | OUTPATIENT
Start: 2025-01-20

## 2025-01-20 RX ORDER — HYDROCODONE BITARTRATE AND ACETAMINOPHEN 5; 325 MG/1; MG/1
1 TABLET ORAL DAILY PRN
Qty: 10 TABLET | Refills: 0 | Status: SHIPPED | OUTPATIENT
Start: 2025-01-20 | End: 2025-01-30

## 2025-01-20 SDOH — ECONOMIC STABILITY: FOOD INSECURITY: WITHIN THE PAST 12 MONTHS, YOU WORRIED THAT YOUR FOOD WOULD RUN OUT BEFORE YOU GOT MONEY TO BUY MORE.: NEVER TRUE

## 2025-01-20 SDOH — ECONOMIC STABILITY: FOOD INSECURITY: WITHIN THE PAST 12 MONTHS, THE FOOD YOU BOUGHT JUST DIDN'T LAST AND YOU DIDN'T HAVE MONEY TO GET MORE.: NEVER TRUE

## 2025-01-20 ASSESSMENT — PATIENT HEALTH QUESTIONNAIRE - PHQ9
6. FEELING BAD ABOUT YOURSELF - OR THAT YOU ARE A FAILURE OR HAVE LET YOURSELF OR YOUR FAMILY DOWN: NOT AT ALL
SUM OF ALL RESPONSES TO PHQ9 QUESTIONS 1 & 2: 0
SUM OF ALL RESPONSES TO PHQ QUESTIONS 1-9: 1
8. MOVING OR SPEAKING SO SLOWLY THAT OTHER PEOPLE COULD HAVE NOTICED. OR THE OPPOSITE, BEING SO FIGETY OR RESTLESS THAT YOU HAVE BEEN MOVING AROUND A LOT MORE THAN USUAL: NOT AT ALL
4. FEELING TIRED OR HAVING LITTLE ENERGY: SEVERAL DAYS
1. LITTLE INTEREST OR PLEASURE IN DOING THINGS: NOT AT ALL
7. TROUBLE CONCENTRATING ON THINGS, SUCH AS READING THE NEWSPAPER OR WATCHING TELEVISION: NOT AT ALL
SUM OF ALL RESPONSES TO PHQ QUESTIONS 1-9: 1
SUM OF ALL RESPONSES TO PHQ QUESTIONS 1-9: 1
9. THOUGHTS THAT YOU WOULD BE BETTER OFF DEAD, OR OF HURTING YOURSELF: NOT AT ALL
3. TROUBLE FALLING OR STAYING ASLEEP: NOT AT ALL
5. POOR APPETITE OR OVEREATING: NOT AT ALL
10. IF YOU CHECKED OFF ANY PROBLEMS, HOW DIFFICULT HAVE THESE PROBLEMS MADE IT FOR YOU TO DO YOUR WORK, TAKE CARE OF THINGS AT HOME, OR GET ALONG WITH OTHER PEOPLE: NOT DIFFICULT AT ALL
SUM OF ALL RESPONSES TO PHQ QUESTIONS 1-9: 1

## 2025-01-20 NOTE — TELEPHONE ENCOUNTER
Patient calling asking if provider can send Prednisone 10 mg tablets to her pharmacy.     Please advise    Thank you

## 2025-01-20 NOTE — PROGRESS NOTES
Nausea And Vomiting     Hypotension      Savella [Milnacipran] Anxiety    Statins Other (See Comments)     myalgia     Prior to Visit Medications    Medication Sig Taking? Authorizing Provider   buPROPion (WELLBUTRIN) 75 MG tablet TAKE 2 TABLETS BY MOUTH EVERY MORNING Yes aNncy Pan APRN - CNP   citalopram (CELEXA) 20 MG tablet Take 1 tablet by mouth every morning Yes Nancy Pan APRN - CNP   HYDROcodone-acetaminophen (NORCO) 5-325 MG per tablet Take 1 tablet by mouth daily as needed for Pain for up to 10 days. May take instead of tramadol when pain is increased Yes Nancy Pan APRN - CNP   dextromethorphan-guaiFENesin (MUCINEX DM)  MG per extended release tablet Take 1 tablet by mouth every 12 hours as needed Yes ProviderLuís MD   promethazine (PHENERGAN) 25 MG tablet Take 1 tablet by mouth every 8 hours as needed for Nausea Yes Socorro Cohn,    tiZANidine (ZANAFLEX) 2 MG tablet Take 1 tablet by mouth in the morning and at bedtime Yes Ryland Darling MD   memantine (NAMENDA) 5 MG tablet Take 1 tablet by mouth 2 times daily Yes Ryland Darling MD   donepezil (ARICEPT) 10 MG tablet Take 1 tablet by mouth every morning Yes Ryland Darling MD   metoprolol succinate (TOPROL XL) 25 MG extended release tablet Take 1 tablet by mouth daily Yes Dawn Higgins APRN - CNP   esomeprazole (NEXIUM) 20 MG delayed release capsule Take 1 capsule by mouth every morning (before breakfast) Yes Nancy Pan APRN - CNP   apixaban (ELIQUIS) 5 MG TABS tablet Take 1 tablet by mouth 2 times daily Yes Dawn Higgins APRN - CNP   rosuvastatin (CRESTOR) 40 MG tablet Take 1 tablet by mouth nightly Yes Bry Cameron,    clopidogrel (PLAVIX) 75 MG tablet Take 1 tablet by mouth daily Yes Dawn Higgins APRN - CNP   cetirizine (ZYRTEC) 5 MG tablet Take 1 tablet by mouth daily Yes ProviderLuís MD   nitroglycerin (NITROSTAT) 0.6 MG SL

## 2025-01-21 ENCOUNTER — HOSPITAL ENCOUNTER (OUTPATIENT)
Age: 70
Discharge: HOME | End: 2025-01-21
Payer: COMMERCIAL

## 2025-01-21 DIAGNOSIS — I50.22 CHRONIC SYSTOLIC (CONGESTIVE) HEART FAILURE (HCC): ICD-10-CM

## 2025-01-21 DIAGNOSIS — M79.7 FIBROMYALGIA: ICD-10-CM

## 2025-01-21 DIAGNOSIS — I25.118 CORONARY ARTERY DISEASE OF NATIVE ARTERY OF NATIVE HEART WITH STABLE ANGINA PECTORIS (HCC): ICD-10-CM

## 2025-01-21 DIAGNOSIS — Z98.61 CAD S/P PERCUTANEOUS CORONARY ANGIOPLASTY: ICD-10-CM

## 2025-01-21 DIAGNOSIS — I25.10 CAD S/P PERCUTANEOUS CORONARY ANGIOPLASTY: ICD-10-CM

## 2025-01-21 DIAGNOSIS — E78.00 PURE HYPERCHOLESTEROLEMIA: ICD-10-CM

## 2025-01-21 LAB
25(OH)D3 SERPL-MCNC: 28.6 NG/ML
ALBUMIN SERPL-MCNC: 4.5 G/DL (ref 3.4–5)
ALBUMIN/GLOB SERPL: 1.8 {RATIO} (ref 1.1–2.2)
ALP SERPL-CCNC: 85 U/L (ref 40–129)
ALT SERPL-CCNC: 13 U/L (ref 10–40)
ANION GAP SERPL CALCULATED.3IONS-SCNC: 10 MMOL/L (ref 3–16)
AST SERPL-CCNC: 19 U/L (ref 15–37)
BILIRUB DIRECT SERPL-MCNC: <0.1 MG/DL (ref 0–0.3)
BILIRUB INDIRECT SERPL-MCNC: 0.2 MG/DL (ref 0–1)
BILIRUB SERPL-MCNC: 0.3 MG/DL (ref 0–1)
BUN SERPL-MCNC: 13 MG/DL (ref 7–20)
CALCIUM SERPL-MCNC: 9.4 MG/DL (ref 8.3–10.6)
CHLORIDE SERPL-SCNC: 105 MMOL/L (ref 99–110)
CHOLEST SERPL-MCNC: 148 MG/DL (ref 0–199)
CO2 SERPL-SCNC: 28 MMOL/L (ref 21–32)
CREAT SERPL-MCNC: 0.7 MG/DL (ref 0.6–1.2)
DEPRECATED RDW RBC AUTO: 13.1 % (ref 12.4–15.4)
GFR SERPLBLD CREATININE-BSD FMLA CKD-EPI: >90 ML/MIN/{1.73_M2}
GLUCOSE SERPL-MCNC: 91 MG/DL (ref 70–99)
HCT VFR BLD AUTO: 40.3 % (ref 36–48)
HDLC SERPL-MCNC: 73 MG/DL (ref 40–60)
HGB BLD-MCNC: 13.7 G/DL (ref 12–16)
LDLC SERPL CALC-MCNC: 49 MG/DL
MAGNESIUM SERPL-MCNC: 2.26 MG/DL (ref 1.8–2.4)
MCH RBC QN AUTO: 32.1 PG (ref 26–34)
MCHC RBC AUTO-ENTMCNC: 33.9 G/DL (ref 31–36)
MCV RBC AUTO: 94.6 FL (ref 80–100)
PLATELET # BLD AUTO: 282 K/UL (ref 135–450)
PMV BLD AUTO: 7.2 FL (ref 5–10.5)
POTASSIUM SERPL-SCNC: 3.9 MMOL/L (ref 3.5–5.1)
PROT SERPL-MCNC: 7 G/DL (ref 6.4–8.2)
RBC # BLD AUTO: 4.27 M/UL (ref 4–5.2)
SODIUM SERPL-SCNC: 143 MMOL/L (ref 136–145)
TRIGL SERPL-MCNC: 132 MG/DL (ref 0–150)
TSH SERPL DL<=0.005 MIU/L-ACNC: 1.42 UIU/ML (ref 0.27–4.2)
VLDLC SERPL CALC-MCNC: 26 MG/DL
WBC # BLD AUTO: 6.8 K/UL (ref 4–11)

## 2025-01-21 PROCEDURE — 82306 VITAMIN D 25 HYDROXY: CPT

## 2025-01-21 PROCEDURE — 85027 COMPLETE CBC AUTOMATED: CPT

## 2025-01-21 PROCEDURE — 80061 LIPID PANEL: CPT

## 2025-01-21 PROCEDURE — 84443 ASSAY THYROID STIM HORMONE: CPT

## 2025-01-21 PROCEDURE — 80053 COMPREHEN METABOLIC PANEL: CPT

## 2025-01-21 PROCEDURE — 36415 COLL VENOUS BLD VENIPUNCTURE: CPT

## 2025-01-21 PROCEDURE — 82248 BILIRUBIN DIRECT: CPT

## 2025-01-21 PROCEDURE — 83735 ASSAY OF MAGNESIUM: CPT

## 2025-01-22 ENCOUNTER — TELEPHONE (OUTPATIENT)
Dept: CARDIOLOGY CLINIC | Age: 70
End: 2025-01-22

## 2025-01-22 NOTE — TELEPHONE ENCOUNTER
----- Message from LUCIA Ramos CNP sent at 1/22/2025 11:08 AM EST -----  Vit D supplement at least 800 units per day  OTC   Ask pharmacist if needs help with finding vitamins on the shelf

## 2025-01-23 ENCOUNTER — TELEPHONE (OUTPATIENT)
Dept: ADMINISTRATIVE | Age: 70
End: 2025-01-23

## 2025-01-23 NOTE — TELEPHONE ENCOUNTER
Submitted PA for HYDROcodone-Acetaminophen 5-325MG tablets   [facility] Key: [id number]) STATUS: PENDING.    Follow up done daily; if no decision with in three days we will refax.  If another three days goes by with no decision will call the insurance for status.

## 2025-01-26 ENCOUNTER — OFFICE VISIT (OUTPATIENT)
Age: 70
End: 2025-01-26

## 2025-01-26 VITALS
DIASTOLIC BLOOD PRESSURE: 68 MMHG | HEART RATE: 69 BPM | SYSTOLIC BLOOD PRESSURE: 120 MMHG | HEIGHT: 65 IN | BODY MASS INDEX: 25.66 KG/M2 | TEMPERATURE: 97.9 F | OXYGEN SATURATION: 96 % | WEIGHT: 154 LBS | RESPIRATION RATE: 16 BRPM

## 2025-01-26 DIAGNOSIS — M25.511 ACUTE PAIN OF RIGHT SHOULDER: ICD-10-CM

## 2025-01-26 DIAGNOSIS — S66.911A WRIST STRAIN, RIGHT, INITIAL ENCOUNTER: Primary | ICD-10-CM

## 2025-01-26 NOTE — PROGRESS NOTES
Shelby Cordero (: 1955) is a 69 y.o. female, New patient, here for evaluation of the following chief complaint(s):  Wrist Pain (Right wrist pain X's 1 week and is progressively getting worse and is tender )          ASSESSMENT/PLAN:    ICD-10-CM    1. Wrist strain, right, initial encounter  S66.911A XR WRIST RIGHT (MIN 3 VIEWS)     Mouna Preston MD, Orthopedic Surgery (Hand, Wrist), [facility]      2. Acute pain of right shoulder  M25.511 XR SHOULDER RIGHT (MIN 2 VIEWS)     Omar Adkins MD, Orthopedics and Sports Medicine (Knee; Shoulder; Elbow; Hip), [facility]        Wrist strain  Given patient presentation and symptoms of right wrist pain and right shoulder pain, is a caregiver with lifting demands, x-ray obtained to rule out concern for hairline fractures, arthritic flare  Initial independent x-ray impression: no acute fractures   Radiology impression:  pending    RICE protocol recommended  Wrist wrapped in ACE bandage  Recommended OTC Tylenol for pain  Recommended OTC Voltaren gel for shoulder pain  Ortho referral provided    Discussed PCP follow up for persisting or worsening symptoms, or to return to the clinic if unable to obtain PCP follow up for worsening symptoms.    The patient tolerated their visit well. The patient and/or the family were informed of the results of any tests, a time was given to answer questions, a plan was proposed and they agreed with plan. Reviewed AVS with treatment instructions and answered questions - pt/family expresses understanding and agreement with the discussed treatment plan and AVS instructions.      SUBJECTIVE/OBJECTIVE:  HPI:   69 y.o. female presents for complaint of right wrist pain  x 7 days.    Admits getting worse and referring up the arm. Reports has carpal tunnel and had surgery for it a couple years ago. Woke up with tight wrist. 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

## 2025-01-26 NOTE — PATIENT INSTRUCTIONS
Right wrist sprain  Xrays were negative per my read  If official radiologist report shows anything different, we will call you.   Apply ice to the affected area per instructions below  Follow RICE (Rest, Ice, Compression, Elevation) for the next week  Keep elevated as often as possible  Tylenol as needed for pain  Use Ace wrap for added support  Ortho referral provided for follow up    ICE INSTRUCTIONS  ICE CUBES OR CRUSHED ICE IN A ZIPLOCK FREEZER BAG  APPLY TO AFFECTED AREA 20 MIN ON AND 40 MIN OFF AS OFTEN AS POSSIBLE FOR THE NEXT 3-5 DAYS  APPLY DIRECTLY TO SKIN.  IF TOO PAINFUL PLACE WET CLOTH IN BETWEEN ICE AND SKIN  IF STILL PAINFUL AND SWOLLEN TRY A CONTRAST BATH  Fill one container with warm water and another with cold water.   Immerse the affected area in warm water for 1-3 minutes.   Immediately immerse the affected area in cold water for 1 minute.   Repeat the process for about 20 minutes, ending with cold water.    Right Shoulder Strain  Xrays were negative per my read  If official radiologist report shows anything different, we will call you.   Apply ice to the affected area per instructions below  Follow RICE (Rest, Ice, Compression, Elevation) for the next week  OTC Tylenol for pain as needed  Ortho referral provided for follow up

## 2025-01-27 NOTE — TELEPHONE ENCOUNTER
The medication is APPROVED THRU 02/22/2025    If this requires a response please respond to the pool ( P [facility] MEDICATION PRE-AUTH).      Thank you please advise patient.

## 2025-01-28 ENCOUNTER — TELEPHONE (OUTPATIENT)
Dept: CARDIOLOGY CLINIC | Age: 70
End: 2025-01-28

## 2025-01-28 NOTE — TELEPHONE ENCOUNTER
Patient would like to pickup samples of Eliquis. Patient would also like to if there is a copay card for Eliquis.    # [phone]      Medication  apixaban (ELIQUIS) 5 MG TABS tablet [[id number]]  apixaban (ELIQUIS) 5 MG TABS tablet [[id number]]    Order Details  Dose: 5 mg Route: Oral Frequency: 2 TIMES DAILY   Dispense Quantity: 60 tablet Refills: 5          Sig: Take 1 tablet by mouth 2 times daily

## 2025-01-28 NOTE — TELEPHONE ENCOUNTER
Refill Request     CONFIRM preferred pharmacy with the patient.    If Mail Order Rx - Pend for 90 day refill.      Last Seen: Last Seen Department: 1/20/2025  Last Seen by PCP: 1/20/2025    Last Written: 7/15/24 #30 - 3 refills     If no future appointment scheduled:  Review the last OV with PCP and review information for follow-up visit,  Route STAFF MESSAGE with patient name to the  Pool for scheduling with the following information:            -  Timing of next visit           -  Visit type ie Physical, OV, etc           -  Diagnoses/Reason ie. COPD, HTN - Do not use MEDICATION, Follow-up or CHECK UP - Give reason for visit      Next Appointment:   Future Appointments   Date Time Provider Department Center   2/3/2025  1:00 PM Omar Spicer MD [facility]   2/12/2025  1:00 PM Mouna Ruiz MD [facility]   3/17/2025 12:00 PM Ryland Darling MD AND NEURO Neurology -   7/21/2025 11:00 AM Nancy Pan APRN - CNP [facility] DEP       Message sent to  to schedule appt with patient?  NO      Requested Prescriptions     Pending Prescriptions Disp Refills    rosuvastatin (CRESTOR) 40 MG tablet 30 tablet 3     Sig: Take 1 tablet by mouth nightly

## 2025-01-29 RX ORDER — ROSUVASTATIN CALCIUM 40 MG/1
40 TABLET, COATED ORAL NIGHTLY
Qty: 30 TABLET | Refills: 5 | Status: SHIPPED | OUTPATIENT
Start: 2025-01-29

## 2025-02-10 ENCOUNTER — TELEPHONE (OUTPATIENT)
Dept: CARDIOLOGY CLINIC | Age: 70
End: 2025-02-10

## 2025-02-10 ENCOUNTER — TELEPHONE (OUTPATIENT)
Dept: PRIMARY CARE CLINIC | Age: 70
End: 2025-02-10

## 2025-02-10 NOTE — TELEPHONE ENCOUNTER
Pt called stating that her Insurance states she needs a letter stating that she has  CAD, CHF so that she can be appoved for benefits. He states she is going to drop his information off to the office.

## 2025-02-10 NOTE — TELEPHONE ENCOUNTER
Pt came in dropped off insurance forms I scanned it in media and place forms on Ma desk, advise pt that MA was not in office due to both providers begin out.

## 2025-02-10 NOTE — TELEPHONE ENCOUNTER
Pt called stating that she dropped off insurance paperwork that needs to be filled out. States that she will call tomorrow about this  matter.

## 2025-02-11 ENCOUNTER — OFFICE VISIT (OUTPATIENT)
Dept: ORTHOPEDIC SURGERY | Age: 70
End: 2025-02-11
Payer: MEDICARE

## 2025-02-11 VITALS — BODY MASS INDEX: 26.29 KG/M2 | HEIGHT: 64 IN | WEIGHT: 154 LBS

## 2025-02-11 DIAGNOSIS — M25.531 RIGHT WRIST PAIN: Primary | ICD-10-CM

## 2025-02-11 PROCEDURE — 3017F COLORECTAL CA SCREEN DOC REV: CPT | Performed by: STUDENT IN AN ORGANIZED HEALTH CARE EDUCATION/TRAINING PROGRAM

## 2025-02-11 PROCEDURE — G8427 DOCREV CUR MEDS BY ELIG CLIN: HCPCS | Performed by: STUDENT IN AN ORGANIZED HEALTH CARE EDUCATION/TRAINING PROGRAM

## 2025-02-11 PROCEDURE — 1036F TOBACCO NON-USER: CPT | Performed by: STUDENT IN AN ORGANIZED HEALTH CARE EDUCATION/TRAINING PROGRAM

## 2025-02-11 PROCEDURE — G8399 PT W/DXA RESULTS DOCUMENT: HCPCS | Performed by: STUDENT IN AN ORGANIZED HEALTH CARE EDUCATION/TRAINING PROGRAM

## 2025-02-11 PROCEDURE — 1123F ACP DISCUSS/DSCN MKR DOCD: CPT | Performed by: STUDENT IN AN ORGANIZED HEALTH CARE EDUCATION/TRAINING PROGRAM

## 2025-02-11 PROCEDURE — 1090F PRES/ABSN URINE INCON ASSESS: CPT | Performed by: STUDENT IN AN ORGANIZED HEALTH CARE EDUCATION/TRAINING PROGRAM

## 2025-02-11 PROCEDURE — 1159F MED LIST DOCD IN RCRD: CPT | Performed by: STUDENT IN AN ORGANIZED HEALTH CARE EDUCATION/TRAINING PROGRAM

## 2025-02-11 PROCEDURE — 99204 OFFICE O/P NEW MOD 45 MIN: CPT | Performed by: STUDENT IN AN ORGANIZED HEALTH CARE EDUCATION/TRAINING PROGRAM

## 2025-02-11 PROCEDURE — 1125F AMNT PAIN NOTED PAIN PRSNT: CPT | Performed by: STUDENT IN AN ORGANIZED HEALTH CARE EDUCATION/TRAINING PROGRAM

## 2025-02-11 PROCEDURE — G8419 CALC BMI OUT NRM PARAM NOF/U: HCPCS | Performed by: STUDENT IN AN ORGANIZED HEALTH CARE EDUCATION/TRAINING PROGRAM

## 2025-02-11 RX ORDER — METHYLPREDNISOLONE 4 MG/1
TABLET ORAL
Qty: 1 KIT | Refills: 0 | Status: SHIPPED | OUTPATIENT
Start: 2025-02-11 | End: 2025-02-17

## 2025-02-11 SDOH — HEALTH STABILITY: PHYSICAL HEALTH: ON AVERAGE, HOW MANY DAYS PER WEEK DO YOU ENGAGE IN MODERATE TO STRENUOUS EXERCISE (LIKE A BRISK WALK)?: 3 DAYS

## 2025-02-11 SDOH — HEALTH STABILITY: PHYSICAL HEALTH: ON AVERAGE, HOW MANY MINUTES DO YOU ENGAGE IN EXERCISE AT THIS LEVEL?: 30 MIN

## 2025-02-13 RX ORDER — MEMANTINE HYDROCHLORIDE 5 MG/1
5 TABLET ORAL 2 TIMES DAILY
Qty: 60 TABLET | Refills: 1 | Status: SHIPPED | OUTPATIENT
Start: 2025-02-13

## 2025-02-13 NOTE — TELEPHONE ENCOUNTER
LOV: 12/23/24  Next appt: 3/17/25  Last refill: 12/23/24 60 tab 2 refill. Pt will run out before next appt

## 2025-03-11 ENCOUNTER — TELEPHONE (OUTPATIENT)
Age: 70
End: 2025-03-11

## 2025-03-11 NOTE — TELEPHONE ENCOUNTER
I asked Isadora to call Shelby back and cx the appt she made with Dr. Robb as he is not absorbing Dr. Gamboa's established patients.  I also asked her to move Shelby to 4/1 or 4/2 with Dr. Gamboa at which point, they can discuss her memantine dose.

## 2025-03-14 DIAGNOSIS — M79.7 FIBROMYALGIA: ICD-10-CM

## 2025-03-14 DIAGNOSIS — G43.709 CHRONIC MIGRAINE WITHOUT AURA WITHOUT STATUS MIGRAINOSUS, NOT INTRACTABLE: ICD-10-CM

## 2025-03-14 NOTE — TELEPHONE ENCOUNTER
Refill Request - Controlled Substance    CONFIRM preferred pharmacy with the patient.    If Mail Order Rx - Pend for 90 day refill.        Last Seen Department: 1/20/2025  Last Seen by PCP: 1/20/2025    Last Written: 1/20/25 10 tabs     Last UDS: 11/8/2018    Med Agreement Signed On: 8/7/20    If no future appointment scheduled:  Review the last OV with PCP and review information for follow-up visit,  Route STAFF MESSAGE with patient name to the  Pool for scheduling with the following information:            -  Timing of next visit           -  Visit type ie Physical, OV, etc           -  Diagnoses/Reason ie. COPD, HTN - Do not use MEDICATION, Follow-up or CHECK UP - Give reason for visit        Next Appointment:   Future Appointments   Date Time Provider Department Center   4/2/2025 10:45 AM Ryland Darling MD AND NEURO Neurology -   7/21/2025 11:00 AM Nancy Pan, LUCIA - CNP [facility] DEP       Message sent to  to schedule appt with patient?  NO      Requested Prescriptions     Pending Prescriptions Disp Refills    HYDROcodone-acetaminophen (NORCO) 5-325 MG per tablet 10 tablet 0     Sig: Take 1 tablet by mouth daily as needed for Pain for up to 10 days. May take instead of tramadol when pain is increased

## 2025-03-17 RX ORDER — HYDROCODONE BITARTRATE AND ACETAMINOPHEN 5; 325 MG/1; MG/1
1 TABLET ORAL DAILY PRN
Qty: 10 TABLET | Refills: 0 | Status: SHIPPED | OUTPATIENT
Start: 2025-03-17 | End: 2025-03-27

## 2025-03-17 NOTE — TELEPHONE ENCOUNTER
Rx refill request    Medication  apixaban (ELIQUIS) 5 MG TABS tablet [[id number]]  apixaban (ELIQUIS) 5 MG TABS tablet [[id number]]    Order Details  Dose: 5 mg Route: Oral Frequency: 2 TIMES DAILY   Dispense Quantity: 60 tablet Refills: 5          Sig: Take 1 tablet by mouth 2 times daily       Pharmacy    [facility] [id number] [facility] [id number] [facility] [address] - P [phone] - F [phone]  [address] [place] [id number]  Phone: [phone]  Fax: [phone]

## 2025-03-20 ENCOUNTER — TELEPHONE (OUTPATIENT)
Age: 70
End: 2025-03-20

## 2025-03-20 ENCOUNTER — OFFICE VISIT (OUTPATIENT)
Age: 70
End: 2025-03-20

## 2025-03-20 VITALS
HEIGHT: 65 IN | BODY MASS INDEX: 25.76 KG/M2 | TEMPERATURE: 98 F | WEIGHT: 154.6 LBS | OXYGEN SATURATION: 97 % | DIASTOLIC BLOOD PRESSURE: 70 MMHG | HEART RATE: 67 BPM | SYSTOLIC BLOOD PRESSURE: 109 MMHG

## 2025-03-20 DIAGNOSIS — B96.89 ACUTE BACTERIAL SINUSITIS: Primary | ICD-10-CM

## 2025-03-20 DIAGNOSIS — J01.90 ACUTE BACTERIAL SINUSITIS: Primary | ICD-10-CM

## 2025-03-20 RX ORDER — LEVOFLOXACIN 500 MG/1
500 TABLET, FILM COATED ORAL DAILY
Qty: 7 TABLET | Refills: 0 | Status: SHIPPED | OUTPATIENT
Start: 2025-03-20 | End: 2025-03-27

## 2025-03-20 ASSESSMENT — ENCOUNTER SYMPTOMS
SINUS COMPLAINT: 1
SINUS PRESSURE: 1
SWOLLEN GLANDS: 1
SHORTNESS OF BREATH: 0
SORE THROAT: 0
HOARSE VOICE: 0
COUGH: 1

## 2025-03-20 NOTE — PATIENT INSTRUCTIONS
persist beyond 5 days or if symptoms worsen  If you develop shortness of breath, increased work of breathing, lightheadedness, or chest pain, contact 911, or follow up immediately with the nearest Emergency Department for further evaluation

## 2025-03-20 NOTE — TELEPHONE ENCOUNTER
Spoke with patient to assist with technical difficulties. Pt unable to connect within allotted time. Advised UC. Pt verbalized she understood.

## 2025-03-20 NOTE — PROGRESS NOTES
Shelby Cordero (: 1955) is a 69 y.o. female, here for evaluation of the following chief complaint(s): Sinus Problem (Sinus pressure for 2 weeks.)    Shelby Cordero is a: Established patient.   Last Urgent Care Visit: 2025   I have reviewed the patient's medications and basic medical history; see Medication Reconciliation.    ASSESSMENT/PLAN:    ICD-10-CM    1. Acute bacterial sinusitis  J01.90     B96.89           No point of care tests were completed  Symptoms include congestion, sinus pressure/pain, symptoms lasting > or equal to 10 days with exam findings of congestion, rhinorrhea, pharyngeal erythema, post nasal drip, maxillary sinus tenderness, purulent nasal discharge, there is concern Acute Bacterial Sinusitis  Low concern for respiratory distress, community acquired pneumonia, PE  Recommended:  OTC Pseudoephedrine, Flonase, Zyrtec, and Saline nasal spray for congestion relief, advised to avoid with hx HTN or cardiac issues  Acetaminophen (Tylenol) and/or ibuprofen (Motrin, Advil) for aches/pains as needed, provided there are no contraindications  Prescribed:   Levaquin 500mg Qday for acute bacterial sinusitis  Recommended several other OTC and home remedy treatments for symptomatic relief as well  Strict ED follow up instructions provided  Discussed PCP follow up for persisting or worsening symptoms, or to return to the clinic if unable to obtain PCP follow up for worsening symptoms  The patient and/or the family were informed of the results of any tests, a time was given to answer questions, a plan was proposed and they agreed with plan. Reviewed AVS with treatment instructions and answered questions - pt/family expresses understanding and agreement with the discussed treatment plan and AVS instructions.  BP was less than 130/80. Referral to PCP not indicated.    SUBJECTIVE/OBJECTIVE    Sinus Problem  This is a new problem. The current episode started 1 to 4 weeks ago (over 2 weeks

## 2025-03-31 ENCOUNTER — OFFICE VISIT (OUTPATIENT)
Dept: NEUROLOGY | Age: 70
End: 2025-03-31
Payer: MEDICARE

## 2025-03-31 VITALS
SYSTOLIC BLOOD PRESSURE: 112 MMHG | DIASTOLIC BLOOD PRESSURE: 68 MMHG | RESPIRATION RATE: 16 BRPM | HEIGHT: 65 IN | WEIGHT: 152.5 LBS | OXYGEN SATURATION: 97 % | BODY MASS INDEX: 25.41 KG/M2 | HEART RATE: 74 BPM

## 2025-03-31 DIAGNOSIS — G30.1 MILD LATE ONSET ALZHEIMER'S DEMENTIA WITHOUT BEHAVIORAL DISTURBANCE, PSYCHOTIC DISTURBANCE, MOOD DISTURBANCE, OR ANXIETY (HCC): Primary | ICD-10-CM

## 2025-03-31 DIAGNOSIS — F02.A0 MILD LATE ONSET ALZHEIMER'S DEMENTIA WITHOUT BEHAVIORAL DISTURBANCE, PSYCHOTIC DISTURBANCE, MOOD DISTURBANCE, OR ANXIETY (HCC): Primary | ICD-10-CM

## 2025-03-31 DIAGNOSIS — M26.609 TMJ (TEMPOROMANDIBULAR JOINT DISORDER): ICD-10-CM

## 2025-03-31 DIAGNOSIS — R51.9 ACUTE INTRACTABLE HEADACHE, UNSPECIFIED HEADACHE TYPE: ICD-10-CM

## 2025-03-31 PROCEDURE — 1160F RVW MEDS BY RX/DR IN RCRD: CPT | Performed by: PSYCHIATRY & NEUROLOGY

## 2025-03-31 PROCEDURE — 1123F ACP DISCUSS/DSCN MKR DOCD: CPT | Performed by: PSYCHIATRY & NEUROLOGY

## 2025-03-31 PROCEDURE — 3017F COLORECTAL CA SCREEN DOC REV: CPT | Performed by: PSYCHIATRY & NEUROLOGY

## 2025-03-31 PROCEDURE — 1090F PRES/ABSN URINE INCON ASSESS: CPT | Performed by: PSYCHIATRY & NEUROLOGY

## 2025-03-31 PROCEDURE — 3078F DIAST BP <80 MM HG: CPT | Performed by: PSYCHIATRY & NEUROLOGY

## 2025-03-31 PROCEDURE — 1036F TOBACCO NON-USER: CPT | Performed by: PSYCHIATRY & NEUROLOGY

## 2025-03-31 PROCEDURE — G8419 CALC BMI OUT NRM PARAM NOF/U: HCPCS | Performed by: PSYCHIATRY & NEUROLOGY

## 2025-03-31 PROCEDURE — 3074F SYST BP LT 130 MM HG: CPT | Performed by: PSYCHIATRY & NEUROLOGY

## 2025-03-31 PROCEDURE — 1159F MED LIST DOCD IN RCRD: CPT | Performed by: PSYCHIATRY & NEUROLOGY

## 2025-03-31 PROCEDURE — G8427 DOCREV CUR MEDS BY ELIG CLIN: HCPCS | Performed by: PSYCHIATRY & NEUROLOGY

## 2025-03-31 PROCEDURE — 99214 OFFICE O/P EST MOD 30 MIN: CPT | Performed by: PSYCHIATRY & NEUROLOGY

## 2025-03-31 PROCEDURE — G8399 PT W/DXA RESULTS DOCUMENT: HCPCS | Performed by: PSYCHIATRY & NEUROLOGY

## 2025-03-31 RX ORDER — MEMANTINE HYDROCHLORIDE 10 MG/1
10 TABLET ORAL 2 TIMES DAILY
Qty: 180 TABLET | Refills: 1 | Status: SHIPPED | OUTPATIENT
Start: 2025-03-31

## 2025-03-31 RX ORDER — DONEPEZIL HYDROCHLORIDE 10 MG/1
10 TABLET, FILM COATED ORAL EVERY MORNING
Qty: 90 TABLET | Refills: 1 | Status: SHIPPED | OUTPATIENT
Start: 2025-03-31

## 2025-03-31 RX ORDER — TIZANIDINE 2 MG/1
2 TABLET ORAL 2 TIMES DAILY
Qty: 90 TABLET | Refills: 1 | Status: SHIPPED | OUTPATIENT
Start: 2025-03-31

## 2025-03-31 NOTE — PATIENT INSTRUCTIONS
YOU MUST CONFIRM YOUR APPOINTMENT 1 DAY PRIOR OR IT WILL BE CANCELLED!!   Our office will call you 3 times the day prior to your appointment in an attempt to confirm.  Please return our call ASAP or confirm your appt through [employer] no later than 3 pm the day before your appointment.  If we do not hear back from you by 3 pm to confirm, your appointment will be cancelled & someone will be added into that slot from our wait list.

## 2025-04-04 ENCOUNTER — APPOINTMENT (OUTPATIENT)
Dept: GENERAL RADIOLOGY | Age: 70
End: 2025-04-04
Payer: MEDICARE

## 2025-04-04 ENCOUNTER — APPOINTMENT (OUTPATIENT)
Dept: CT IMAGING | Age: 70
End: 2025-04-04
Payer: MEDICARE

## 2025-04-04 ENCOUNTER — HOSPITAL ENCOUNTER (EMERGENCY)
Age: 70
Discharge: HOME | End: 2025-04-04
Attending: EMERGENCY MEDICINE
Payer: MEDICARE

## 2025-04-04 VITALS
HEART RATE: 62 BPM | SYSTOLIC BLOOD PRESSURE: 122 MMHG | TEMPERATURE: 98 F | RESPIRATION RATE: 13 BRPM | OXYGEN SATURATION: 95 % | WEIGHT: 155.2 LBS | BODY MASS INDEX: 25.83 KG/M2 | DIASTOLIC BLOOD PRESSURE: 63 MMHG

## 2025-04-04 DIAGNOSIS — R51.9 ACUTE NONINTRACTABLE HEADACHE, UNSPECIFIED HEADACHE TYPE: Primary | ICD-10-CM

## 2025-04-04 DIAGNOSIS — R20.2 NUMBNESS AND TINGLING: ICD-10-CM

## 2025-04-04 DIAGNOSIS — R20.0 NUMBNESS AND TINGLING: ICD-10-CM

## 2025-04-04 LAB
ANION GAP SERPL CALCULATED.3IONS-SCNC: 7 MMOL/L (ref 3–16)
BASOPHILS # BLD: 0.1 K/UL (ref 0–0.2)
BASOPHILS NFR BLD: 0.9 %
BUN SERPL-MCNC: 14 MG/DL (ref 7–20)
CALCIUM SERPL-MCNC: 9.4 MG/DL (ref 8.3–10.6)
CHLORIDE SERPL-SCNC: 102 MMOL/L (ref 99–110)
CO2 SERPL-SCNC: 29 MMOL/L (ref 21–32)
CREAT SERPL-MCNC: 0.7 MG/DL (ref 0.6–1.2)
DEPRECATED RDW RBC AUTO: 13.1 % (ref 12.4–15.4)
EOSINOPHIL # BLD: 0.4 K/UL (ref 0–0.6)
EOSINOPHIL NFR BLD: 4.6 %
GFR SERPLBLD CREATININE-BSD FMLA CKD-EPI: >90 ML/MIN/{1.73_M2}
GLUCOSE SERPL-MCNC: 93 MG/DL (ref 70–99)
HCT VFR BLD AUTO: 38.1 % (ref 36–48)
HGB BLD-MCNC: 13.1 G/DL (ref 12–16)
LYMPHOCYTES # BLD: 2.3 K/UL (ref 1–5.1)
LYMPHOCYTES NFR BLD: 28.8 %
MCH RBC QN AUTO: 32.1 PG (ref 26–34)
MCHC RBC AUTO-ENTMCNC: 34.4 G/DL (ref 31–36)
MCV RBC AUTO: 93.4 FL (ref 80–100)
MONOCYTES # BLD: 0.5 K/UL (ref 0–1.3)
MONOCYTES NFR BLD: 6.6 %
NEUTROPHILS # BLD: 4.6 K/UL (ref 1.7–7.7)
NEUTROPHILS NFR BLD: 59.1 %
PLATELET # BLD AUTO: 283 K/UL (ref 135–450)
PMV BLD AUTO: 6.5 FL (ref 5–10.5)
POTASSIUM SERPL-SCNC: 4.2 MMOL/L (ref 3.5–5.1)
RBC # BLD AUTO: 4.08 M/UL (ref 4–5.2)
SODIUM SERPL-SCNC: 138 MMOL/L (ref 136–145)
TROPONIN, HIGH SENSITIVITY: <6 NG/L (ref 0–14)
WBC # BLD AUTO: 7.9 K/UL (ref 4–11)

## 2025-04-04 PROCEDURE — 99285 EMERGENCY DEPT VISIT HI MDM: CPT

## 2025-04-04 PROCEDURE — 36415 COLL VENOUS BLD VENIPUNCTURE: CPT

## 2025-04-04 PROCEDURE — 84484 ASSAY OF TROPONIN QUANT: CPT

## 2025-04-04 PROCEDURE — 85025 COMPLETE CBC W/AUTO DIFF WBC: CPT

## 2025-04-04 PROCEDURE — 6360000002 HC RX W HCPCS: Performed by: EMERGENCY MEDICINE

## 2025-04-04 PROCEDURE — 71045 X-RAY EXAM CHEST 1 VIEW: CPT

## 2025-04-04 PROCEDURE — 93005 ELECTROCARDIOGRAM TRACING: CPT | Performed by: EMERGENCY MEDICINE

## 2025-04-04 PROCEDURE — 70450 CT HEAD/BRAIN W/O DYE: CPT

## 2025-04-04 PROCEDURE — 6370000000 HC RX 637 (ALT 250 FOR IP): Performed by: EMERGENCY MEDICINE

## 2025-04-04 PROCEDURE — 80048 BASIC METABOLIC PNL TOTAL CA: CPT

## 2025-04-04 PROCEDURE — 96374 THER/PROPH/DIAG INJ IV PUSH: CPT

## 2025-04-04 RX ORDER — SUMATRIPTAN SUCCINATE 25 MG/1
50 TABLET ORAL ONCE
Status: COMPLETED | OUTPATIENT
Start: 2025-04-04 | End: 2025-04-04

## 2025-04-04 RX ORDER — METOCLOPRAMIDE HYDROCHLORIDE 5 MG/ML
5 INJECTION INTRAMUSCULAR; INTRAVENOUS ONCE
Status: COMPLETED | OUTPATIENT
Start: 2025-04-04 | End: 2025-04-04

## 2025-04-04 RX ORDER — HYDROCODONE BITARTRATE AND ACETAMINOPHEN 5; 325 MG/1; MG/1
1 TABLET ORAL
Refills: 0 | Status: COMPLETED | OUTPATIENT
Start: 2025-04-04 | End: 2025-04-04

## 2025-04-04 RX ORDER — PROCHLORPERAZINE EDISYLATE 5 MG/ML
10 INJECTION INTRAMUSCULAR; INTRAVENOUS ONCE
Status: DISCONTINUED | OUTPATIENT
Start: 2025-04-04 | End: 2025-04-04 | Stop reason: HOSPADM

## 2025-04-04 RX ORDER — ACETAMINOPHEN 500 MG
1000 TABLET ORAL
Status: COMPLETED | OUTPATIENT
Start: 2025-04-04 | End: 2025-04-04

## 2025-04-04 RX ORDER — DIPHENHYDRAMINE HYDROCHLORIDE 50 MG/ML
10 INJECTION, SOLUTION INTRAMUSCULAR; INTRAVENOUS ONCE
Status: DISCONTINUED | OUTPATIENT
Start: 2025-04-04 | End: 2025-04-04 | Stop reason: HOSPADM

## 2025-04-04 RX ORDER — METOCLOPRAMIDE HYDROCHLORIDE 5 MG/ML
5 INJECTION INTRAMUSCULAR; INTRAVENOUS ONCE
Status: DISCONTINUED | OUTPATIENT
Start: 2025-04-04 | End: 2025-04-04

## 2025-04-04 RX ADMIN — METOCLOPRAMIDE 5 MG: 5 INJECTION, SOLUTION INTRAMUSCULAR; INTRAVENOUS at 17:02

## 2025-04-04 RX ADMIN — HYDROCODONE BITARTRATE AND ACETAMINOPHEN 1 TABLET: 5; 325 TABLET ORAL at 17:03

## 2025-04-04 RX ADMIN — ACETAMINOPHEN 1000 MG: 500 TABLET ORAL at 15:44

## 2025-04-04 RX ADMIN — SUMATRIPTAN SUCCINATE 50 MG: 25 TABLET ORAL at 17:03

## 2025-04-04 ASSESSMENT — PAIN SCALES - GENERAL
PAINLEVEL_OUTOF10: 5
PAINLEVEL_OUTOF10: 10
PAINLEVEL_OUTOF10: 5

## 2025-04-04 ASSESSMENT — PAIN - FUNCTIONAL ASSESSMENT: PAIN_FUNCTIONAL_ASSESSMENT: 0-10

## 2025-04-04 ASSESSMENT — PAIN DESCRIPTION - LOCATION: LOCATION: HEAD

## 2025-04-04 ASSESSMENT — PAIN DESCRIPTION - DESCRIPTORS: DESCRIPTORS: BURNING

## 2025-04-04 ASSESSMENT — PAIN DESCRIPTION - PAIN TYPE: TYPE: ACUTE PAIN

## 2025-04-04 NOTE — ED PROVIDER NOTES
Emergency Department Provider Note  Location: [facility] EMERGENCY DEPARTMENT  4/4/2025     Patient Identification  Shelby Cordero is a 69 y.o. female    Chief Complaint  Tingling (Woke up this morning with a headache on the right side of her head. States she took her regular medications but says she didn't feel like she needed anything for pain because the pain wasn't too bad. She reports tingling/burning sensation that goes up the left side of her neck and into the left side of her face. Pt reporting \"It's either a TIA or a migraine\" ) and Headache (Started on the right side of her head this morning and is now on the left side of her head. Has taken no medication for pain )          HPI  (History provided by patient and spouse/SO)  Patient presents with new tingling and burning of her left face.  She describes burning discomfort that radiates from her left low face to her left upper head.  Denies significant headache discomfort.  No recent trauma or injury.  Denies recent illness, fever, runny nose, cough, vomiting, diarrhea.  No vision changes.  No weakness or facial droop.  No extremity changes.  No chest pain or shortness of breath.    Nursing Notes reviewed.    Allergies:   Allergies   Allergen Reactions    Avelox [Moxifloxacin Hcl In Nacl] Hives    Cephalexin Hives and Rash    Darvon [Propoxyphene Hcl] Hives    Digoxin Hives    Librax [Chlordiazepoxide-Methscopol] Hives    Lyrica [Pregabalin] Swelling     Feet hands    Motrin [Ibuprofen Micronized] Rash    Other Hives     Most mycin's    Pcn [Penicillins] Rash    Prochlorperazine Edisylate Nausea And Vomiting    Sulfa Antibiotics Rash    Tetracyclines & Related Hives    Promethazine      vomiting    Cymbalta [Duloxetine Hcl] Palpitations    Nickel Nausea And Vomiting     Hypotension      Savella [Milnacipran] Anxiety    Statins Other (See Comments)     myalgia       Past medical history:  has a past medical history of Allergic rhinitis, Anxiety,  (Bazett) 479 ms    P Axis 38 degrees    R Axis -35 degrees    T Axis 78 degrees    Diagnosis       Sinus bradycardiaLeft axis deviationLeft bundle branch blockAbnormal ECGWhen compared with ECG of 14-JUL-2024 12:01,No significant change was found       Procedures  Procedures    ED Course     Diagnostic studies reviewed.  Patient's workup is largely reassuring.  Labs are normal.  CT shows chronic microvascular changes and atrophy without acute changes    Patient related to the nurse and myself that she has had intermittent nonexertional chest pain over the past few months, last was 1 week ago.  She was encouraged to continue to follow-up with her primary care doctor and cardiologist for further evaluation of the symptoms.    During the patient's ED course she states she seemed to develop a migraine type headache with throbbing on the left side.  She states she has multiple allergies to medications in the antiemetic/Compazine class.  She requested Norco and Imitrex.  After further ED observation her symptoms have improved.    She continues to have a reassuring neurologic exam-normal speech, no weakness  tingling sensation on the left side of her face has now improved.    Symptoms seem atypical for stroke/TIA given the burning and tingling nature of her left facial sensation change.  Patient seems well versed in TIA evaluation and states \" even if it was a TIA I am already on Eliquis so there is nothing you would do for me.\"  She feels well and would like to go home.  Will discharge with primary care follow-up and return precautions    I discussed the results with patient/family including incidental findings.    Is this patient to be included in the SEP-1 core measure? No Exclusion criteria - the patient is NOT to be included for SEP-1 Core Measure due to: Infection is not suspected    Consults/discussion with other professionals: None    Total critical care time is 32 minutes, which excludes separately billable

## 2025-04-04 NOTE — ED NOTES
Ok for d/c. Stable and ambulatory. Edu pt and family bedside re:f/u w/ PCP and continue home meds. All questions answered. Pt left w/ family to drive and all personal belongings.

## 2025-04-04 NOTE — DISCHARGE INSTRUCTIONS
You were seen for headache/burning and numbness/tingling of your left face  Your vital signs, exam, EKG, laboratory test, and imaging were reassuring-no acute changes seen.    Rest.  Continue take your medications as prescribed.  Follow-up with primary care.  Return to the ER with any worsening symptoms, weakness, or new concerns

## 2025-04-04 NOTE — ED NOTES
Per pt, has had midsternal chest \"pressure\" intermittently for 1 month. Per pt, takes 1 SL nitro and pressure is resolved. MD made aware.

## 2025-04-05 LAB
EKG ATRIAL RATE: 59 BPM
EKG DIAGNOSIS: NORMAL
EKG P AXIS: 38 DEGREES
EKG P-R INTERVAL: 124 MS
EKG Q-T INTERVAL: 484 MS
EKG QRS DURATION: 122 MS
EKG QTC CALCULATION (BAZETT): 479 MS
EKG R AXIS: -35 DEGREES
EKG T AXIS: 78 DEGREES
EKG VENTRICULAR RATE: 59 BPM

## 2025-04-05 PROCEDURE — 93010 ELECTROCARDIOGRAM REPORT: CPT | Performed by: INTERNAL MEDICINE

## 2025-04-07 ENCOUNTER — TELEPHONE (OUTPATIENT)
Age: 70
End: 2025-04-07

## 2025-04-07 NOTE — TELEPHONE ENCOUNTER
Pt went to ER on 4/4. Did CT scan, came back normal, discharged with dx of migraine.    Pt is still having tightness on left side of face.    Was in ER on 4/4, they dx as migraine, but pt believes it was TIA.    Pls advise.

## 2025-04-08 ENCOUNTER — OFFICE VISIT (OUTPATIENT)
Dept: FAMILY MEDICINE CLINIC | Age: 70
End: 2025-04-08
Payer: MEDICARE

## 2025-04-08 VITALS
HEART RATE: 71 BPM | WEIGHT: 155 LBS | DIASTOLIC BLOOD PRESSURE: 50 MMHG | BODY MASS INDEX: 25.79 KG/M2 | OXYGEN SATURATION: 96 % | SYSTOLIC BLOOD PRESSURE: 80 MMHG

## 2025-04-08 DIAGNOSIS — K57.92 ACUTE DIVERTICULITIS: ICD-10-CM

## 2025-04-08 DIAGNOSIS — K57.92 ACUTE DIVERTICULITIS: Primary | ICD-10-CM

## 2025-04-08 PROCEDURE — 3078F DIAST BP <80 MM HG: CPT | Performed by: PHYSICIAN ASSISTANT

## 2025-04-08 PROCEDURE — 1036F TOBACCO NON-USER: CPT | Performed by: PHYSICIAN ASSISTANT

## 2025-04-08 PROCEDURE — 1090F PRES/ABSN URINE INCON ASSESS: CPT | Performed by: PHYSICIAN ASSISTANT

## 2025-04-08 PROCEDURE — 3017F COLORECTAL CA SCREEN DOC REV: CPT | Performed by: PHYSICIAN ASSISTANT

## 2025-04-08 PROCEDURE — 99213 OFFICE O/P EST LOW 20 MIN: CPT | Performed by: PHYSICIAN ASSISTANT

## 2025-04-08 PROCEDURE — G8427 DOCREV CUR MEDS BY ELIG CLIN: HCPCS | Performed by: PHYSICIAN ASSISTANT

## 2025-04-08 PROCEDURE — G8399 PT W/DXA RESULTS DOCUMENT: HCPCS | Performed by: PHYSICIAN ASSISTANT

## 2025-04-08 PROCEDURE — 1123F ACP DISCUSS/DSCN MKR DOCD: CPT | Performed by: PHYSICIAN ASSISTANT

## 2025-04-08 PROCEDURE — G8419 CALC BMI OUT NRM PARAM NOF/U: HCPCS | Performed by: PHYSICIAN ASSISTANT

## 2025-04-08 PROCEDURE — 3074F SYST BP LT 130 MM HG: CPT | Performed by: PHYSICIAN ASSISTANT

## 2025-04-08 PROCEDURE — 1159F MED LIST DOCD IN RCRD: CPT | Performed by: PHYSICIAN ASSISTANT

## 2025-04-08 RX ORDER — METRONIDAZOLE 500 MG/1
500 TABLET ORAL 2 TIMES DAILY
Qty: 10 TABLET | Refills: 0 | Status: SHIPPED | OUTPATIENT
Start: 2025-04-08 | End: 2025-04-13

## 2025-04-08 RX ORDER — CIPROFLOXACIN 500 MG/1
500 TABLET, FILM COATED ORAL 2 TIMES DAILY
Qty: 10 TABLET | Refills: 0 | Status: SHIPPED | OUTPATIENT
Start: 2025-04-08 | End: 2025-04-13

## 2025-04-08 ASSESSMENT — ENCOUNTER SYMPTOMS
NAUSEA: 1
BLOOD IN STOOL: 0
ABDOMINAL DISTENTION: 1
DIARRHEA: 1
VOMITING: 0
ABDOMINAL PAIN: 1
CONSTIPATION: 1

## 2025-04-09 ENCOUNTER — RESULTS FOLLOW-UP (OUTPATIENT)
Dept: FAMILY MEDICINE CLINIC | Age: 70
End: 2025-04-09

## 2025-04-09 DIAGNOSIS — G43.709 CHRONIC MIGRAINE WITHOUT AURA WITHOUT STATUS MIGRAINOSUS, NOT INTRACTABLE: ICD-10-CM

## 2025-04-09 DIAGNOSIS — M79.7 FIBROMYALGIA: ICD-10-CM

## 2025-04-09 LAB
ALBUMIN SERPL-MCNC: 4.2 G/DL (ref 3.4–5)
ALBUMIN/GLOB SERPL: 2.1 {RATIO} (ref 1.1–2.2)
ALP SERPL-CCNC: 92 U/L (ref 40–129)
ALT SERPL-CCNC: 13 U/L (ref 10–40)
ANION GAP SERPL CALCULATED.3IONS-SCNC: 9 MMOL/L (ref 3–16)
AST SERPL-CCNC: 20 U/L (ref 15–37)
BILIRUB SERPL-MCNC: 0.3 MG/DL (ref 0–1)
BUN SERPL-MCNC: 13 MG/DL (ref 7–20)
CALCIUM SERPL-MCNC: 9.2 MG/DL (ref 8.3–10.6)
CHLORIDE SERPL-SCNC: 102 MMOL/L (ref 99–110)
CO2 SERPL-SCNC: 27 MMOL/L (ref 21–32)
CREAT SERPL-MCNC: 0.7 MG/DL (ref 0.6–1.2)
DEPRECATED RDW RBC AUTO: 12.9 % (ref 12.4–15.4)
GFR SERPLBLD CREATININE-BSD FMLA CKD-EPI: >90 ML/MIN/{1.73_M2}
GLUCOSE SERPL-MCNC: 108 MG/DL (ref 70–99)
HCT VFR BLD AUTO: 39.3 % (ref 36–48)
HGB BLD-MCNC: 13.2 G/DL (ref 12–16)
MCH RBC QN AUTO: 31.8 PG (ref 26–34)
MCHC RBC AUTO-ENTMCNC: 33.7 G/DL (ref 31–36)
MCV RBC AUTO: 94.4 FL (ref 80–100)
PLATELET # BLD AUTO: 288 K/UL (ref 135–450)
PMV BLD AUTO: 7.6 FL (ref 5–10.5)
POTASSIUM SERPL-SCNC: 4 MMOL/L (ref 3.5–5.1)
PROT SERPL-MCNC: 6.2 G/DL (ref 6.4–8.2)
RBC # BLD AUTO: 4.16 M/UL (ref 4–5.2)
SODIUM SERPL-SCNC: 138 MMOL/L (ref 136–145)
WBC # BLD AUTO: 10 K/UL (ref 4–11)

## 2025-04-09 RX ORDER — HYDROCODONE BITARTRATE AND ACETAMINOPHEN 5; 325 MG/1; MG/1
1 TABLET ORAL DAILY PRN
Qty: 10 TABLET | Refills: 0 | Status: SHIPPED | OUTPATIENT
Start: 2025-04-09 | End: 2025-04-19

## 2025-04-09 NOTE — TELEPHONE ENCOUNTER
.Refill Request - Controlled Substance    CONFIRM preferred pharmacy with the patient.    If Mail Order Rx - Pend for 90 day refill.        Last Seen Department: 4/8/2025  Last Seen by PCP: 1/20/2025    Last Written: 3-17-25 10 with 0     Last UDS: 11-8-18    Med Agreement Signed On: 8-7-20    If no future appointment scheduled:  Review the last OV with PCP and review information for follow-up visit,  Route STAFF MESSAGE with patient name to the  Pool for scheduling with the following information:            -  Timing of next visit           -  Visit type ie Physical, OV, etc           -  Diagnoses/Reason ie. COPD, HTN - Do not use MEDICATION, Follow-up or CHECK UP - Give reason for visit        Next Appointment:   Future Appointments   Date Time Provider Department Center   7/21/2025 11:00 AM Nancy Pan, LUCIA - CNP [facility] DEP       Message sent to  to schedule appt with patient?  NO      Requested Prescriptions     Pending Prescriptions Disp Refills    HYDROcodone-acetaminophen (NORCO) 5-325 MG per tablet 10 tablet 0     Sig: Take 1 tablet by mouth daily as needed for Pain for up to 10 days. May take instead of tramadol when pain is increased

## 2025-04-10 NOTE — TELEPHONE ENCOUNTER
Pt was seen in ER in June & July 2024 for the same symptoms - acute left-sided facial numbness/burning/tingling that results in a headache.  MRI showed no acute findings. CTA showed intracranial stenosis.  Recurrent left facial paresthesia.  No specific etiology.  Less likely recurrent TIA.  Possible atypical migraine, vascular or atypical facial pain.  Less likely focal seizure.     She needs to look for neurology referral elsewhere to establish care as this is a recurrent problem & likely caused by atypical migraine.  If PCP cannot manage, she need a new neurologist.  She should be able to go to [facility].  Pt advised of above.  She attributes her symptoms to increased stress & weather.

## 2025-04-10 NOTE — TELEPHONE ENCOUNTER
Patient aware that MA reports that TIA was ruled out on CT.     She states that she is still having tightness on left side of face. Around her mouth and eye.  States that the whole left side of her face doesn't have the typical [place] Palsy feeling.     She would like advice on what to do, how long it will last or if she needs to be seen and evaluated.     Writer did tell her to follow up with PCP as well.     Please advise

## 2025-04-16 ENCOUNTER — OFFICE VISIT (OUTPATIENT)
Dept: FAMILY MEDICINE CLINIC | Age: 70
End: 2025-04-16
Payer: MEDICARE

## 2025-04-16 VITALS
RESPIRATION RATE: 18 BRPM | SYSTOLIC BLOOD PRESSURE: 130 MMHG | DIASTOLIC BLOOD PRESSURE: 72 MMHG | BODY MASS INDEX: 24.4 KG/M2 | WEIGHT: 146.6 LBS | HEART RATE: 62 BPM | OXYGEN SATURATION: 98 %

## 2025-04-16 DIAGNOSIS — E55.9 VITAMIN D DEFICIENCY: ICD-10-CM

## 2025-04-16 DIAGNOSIS — F41.9 ANXIETY: ICD-10-CM

## 2025-04-16 DIAGNOSIS — G43.109 MIGRAINE WITH AURA AND WITHOUT STATUS MIGRAINOSUS, NOT INTRACTABLE: ICD-10-CM

## 2025-04-16 DIAGNOSIS — G45.1 TIA INVOLVING RIGHT INTERNAL CAROTID ARTERY: ICD-10-CM

## 2025-04-16 DIAGNOSIS — M79.7 FIBROMYALGIA: Primary | ICD-10-CM

## 2025-04-16 PROCEDURE — 1159F MED LIST DOCD IN RCRD: CPT | Performed by: NURSE PRACTITIONER

## 2025-04-16 PROCEDURE — 1160F RVW MEDS BY RX/DR IN RCRD: CPT | Performed by: NURSE PRACTITIONER

## 2025-04-16 PROCEDURE — 1090F PRES/ABSN URINE INCON ASSESS: CPT | Performed by: NURSE PRACTITIONER

## 2025-04-16 PROCEDURE — 3075F SYST BP GE 130 - 139MM HG: CPT | Performed by: NURSE PRACTITIONER

## 2025-04-16 PROCEDURE — 99214 OFFICE O/P EST MOD 30 MIN: CPT | Performed by: NURSE PRACTITIONER

## 2025-04-16 PROCEDURE — 3017F COLORECTAL CA SCREEN DOC REV: CPT | Performed by: NURSE PRACTITIONER

## 2025-04-16 PROCEDURE — G8427 DOCREV CUR MEDS BY ELIG CLIN: HCPCS | Performed by: NURSE PRACTITIONER

## 2025-04-16 PROCEDURE — 1123F ACP DISCUSS/DSCN MKR DOCD: CPT | Performed by: NURSE PRACTITIONER

## 2025-04-16 PROCEDURE — 3078F DIAST BP <80 MM HG: CPT | Performed by: NURSE PRACTITIONER

## 2025-04-16 PROCEDURE — G8420 CALC BMI NORM PARAMETERS: HCPCS | Performed by: NURSE PRACTITIONER

## 2025-04-16 PROCEDURE — G8399 PT W/DXA RESULTS DOCUMENT: HCPCS | Performed by: NURSE PRACTITIONER

## 2025-04-16 PROCEDURE — 1036F TOBACCO NON-USER: CPT | Performed by: NURSE PRACTITIONER

## 2025-04-16 RX ORDER — ERGOCALCIFEROL 1.25 MG/1
50000 CAPSULE, LIQUID FILLED ORAL WEEKLY
Qty: 12 CAPSULE | Refills: 3 | Status: SHIPPED | OUTPATIENT
Start: 2025-04-16

## 2025-04-16 NOTE — ASSESSMENT & PLAN NOTE
Referral to new neurologist    Orders:    VAL - Liam Alfaro DO, Neurology, [facility]

## 2025-04-16 NOTE — PROGRESS NOTES
Shelby Cordero (:  1955) is a 69 y.o. female,Established patient, here for evaluation of the following chief complaint(s):  ED Follow-up         Assessment & Plan  Fibromyalgia    Referral to new neurologist    Orders:    Liam Florentino DO, [place], [place]    Migraine with aura and without status migrainosus, not intractable    See above    Orders:    Liam Florentino DO, [place], [place]    TIA involving right internal carotid artery    Stable. See above    Orders:    Liam Florentino DO, [place], [place]    Vitamin D deficiency   Chronic, at goal (stable), continue current treatment plan    Orders:    vitamin D (ERGOCALCIFEROL) 1.25 MG (68909 UT) CAPS capsule; Take 1 capsule by mouth once a week    Anxiety    Will refer to Dr. Orantes.        Keep appointment 2025    No follow-ups on file.       Subjective   HPI      Has sensation left side of face and was seen at the ER. Evaluation was non acute however she feels she had a TIA.     Was following with Dr. Mir, no longer in his practice. Not established with another neurologist.     Having stress that she feels she needs to deal with. Would like to follow up with behavorial health. Aware of Dr. Orantes being on staff. Stress from family and husbands health. Blew up last week and left for a couple of days.       Review of Systems   All other systems reviewed and are negative.         Objective   Physical Exam  Constitutional:       Appearance: Normal appearance. She is well-developed.   HENT:      Head: Normocephalic and atraumatic.   Neck:      Thyroid: No thyromegaly.   Cardiovascular:      Rate and Rhythm: Normal rate and regular rhythm.   Pulmonary:      Effort: Pulmonary effort is normal.      Breath sounds: Normal breath sounds.   Abdominal:      General: Bowel sounds are normal.      Palpations: Abdomen is soft.   Musculoskeletal:         General: Normal range of motion.      Cervical back: Normal range of

## 2025-04-24 ENCOUNTER — OFFICE VISIT (OUTPATIENT)
Dept: FAMILY MEDICINE CLINIC | Age: 70
End: 2025-04-24

## 2025-04-24 VITALS
HEART RATE: 80 BPM | DIASTOLIC BLOOD PRESSURE: 70 MMHG | SYSTOLIC BLOOD PRESSURE: 130 MMHG | OXYGEN SATURATION: 98 % | RESPIRATION RATE: 18 BRPM | HEIGHT: 65 IN | BODY MASS INDEX: 25.43 KG/M2 | WEIGHT: 152.6 LBS

## 2025-04-24 DIAGNOSIS — I50.22 CHRONIC SYSTOLIC (CONGESTIVE) HEART FAILURE (HCC): ICD-10-CM

## 2025-04-24 DIAGNOSIS — H91.93 DECREASED HEARING OF BOTH EARS: Primary | ICD-10-CM

## 2025-04-24 DIAGNOSIS — I48.0 PAROXYSMAL ATRIAL FIBRILLATION (HCC): ICD-10-CM

## 2025-04-24 NOTE — PROGRESS NOTES
Shelby Cordero (:  1955) is a 69 y.o. female,Established patient, here for evaluation of the following chief complaint(s):  ear (Left and right cleaning)         Assessment & Plan  Decreased hearing of both ears    Recommend follow up with audiology for evaluation of devices if still a problem.          Chronic systolic (congestive) heart failure (HCC)   Monitored by specialist- no acute findings meriting change in the plan         Paroxysmal atrial fibrillation (HCC)   Monitored by specialist- no acute findings meriting change in the plan           No follow-ups on file.       Subjective   HPI      Last week noted decreased hearing.  looked and noted wax accumulation. Wears hearing aids, contributes. Denies pain.       Review of Systems   All other systems reviewed and are negative.         Objective   Physical Exam  Constitutional:       Appearance: Normal appearance. She is well-developed.   HENT:      Head: Normocephalic and atraumatic.      Right Ear: Tympanic membrane and ear canal normal.      Left Ear: Tympanic membrane and ear canal normal.      Ears:      Comments: Bilateral canals negative for wax being noted.   Neck:      Thyroid: No thyromegaly.   Cardiovascular:      Rate and Rhythm: Normal rate and regular rhythm.   Pulmonary:      Effort: Pulmonary effort is normal.      Breath sounds: Normal breath sounds.   Musculoskeletal:         General: Normal range of motion.      Cervical back: Normal range of motion and neck supple.   Skin:     General: Skin is warm.   Neurological:      Mental Status: She is alert and oriented to person, place, and time.   Psychiatric:         Behavior: Behavior normal.                  An electronic signature was used to authenticate this note.    --SOCORRO ADAMS, APRN - CNP

## 2025-05-05 DIAGNOSIS — M79.7 FIBROMYALGIA: ICD-10-CM

## 2025-05-05 RX ORDER — METHOCARBAMOL 500 MG/1
500 TABLET, FILM COATED ORAL 3 TIMES DAILY PRN
Qty: 40 TABLET | Refills: 1 | Status: SHIPPED | OUTPATIENT
Start: 2025-05-05

## 2025-05-11 ENCOUNTER — OFFICE VISIT (OUTPATIENT)
Age: 70
End: 2025-05-11

## 2025-05-11 ENCOUNTER — RESULTS FOLLOW-UP (OUTPATIENT)
Age: 70
End: 2025-05-11

## 2025-05-11 VITALS
TEMPERATURE: 98 F | DIASTOLIC BLOOD PRESSURE: 71 MMHG | BODY MASS INDEX: 24.75 KG/M2 | WEIGHT: 154 LBS | SYSTOLIC BLOOD PRESSURE: 114 MMHG | HEART RATE: 74 BPM | HEIGHT: 66 IN | OXYGEN SATURATION: 97 %

## 2025-05-11 DIAGNOSIS — M54.50 ACUTE BILATERAL LOW BACK PAIN WITHOUT SCIATICA: Primary | ICD-10-CM

## 2025-05-11 LAB
BILIRUBIN, POC: NORMAL
BLOOD URINE, POC: NEGATIVE
CLARITY, POC: CLEAR
COLOR, POC: YELLOW
GLUCOSE URINE, POC: NEGATIVE MG/DL
KETONES, POC: NEGATIVE MG/DL
LEUKOCYTE EST, POC: NEGATIVE
NITRITE, POC: NEGATIVE
PH, POC: 5.5
PROTEIN, POC: NEGATIVE MG/DL
SPECIFIC GRAVITY, POC: 1.03
UROBILINOGEN, POC: 0.2 MG/DL

## 2025-05-11 RX ORDER — LIDOCAINE 4 G/G
1 PATCH TOPICAL EVERY 24 HOURS
Qty: 7 PATCH | Refills: 0 | Status: SHIPPED | OUTPATIENT
Start: 2025-05-11 | End: 2025-05-18

## 2025-05-11 RX ORDER — PREDNISONE 20 MG/1
20 TABLET ORAL
Qty: 5 TABLET | Refills: 0 | Status: SHIPPED | OUTPATIENT
Start: 2025-05-11 | End: 2025-05-16

## 2025-05-13 LAB — BACTERIA UR CULT: NORMAL

## 2025-05-15 ENCOUNTER — TELEMEDICINE (OUTPATIENT)
Dept: PSYCHOLOGY | Age: 70
End: 2025-05-15

## 2025-05-15 DIAGNOSIS — M79.7 FIBROMYALGIA: ICD-10-CM

## 2025-05-15 DIAGNOSIS — G30.9 MILD COGNITIVE IMPAIRMENT (MCI) DUE TO ALZHEIMER'S DISEASE (HCC): Primary | ICD-10-CM

## 2025-05-15 DIAGNOSIS — G31.84 MILD COGNITIVE IMPAIRMENT (MCI) DUE TO ALZHEIMER'S DISEASE (HCC): Primary | ICD-10-CM

## 2025-05-15 DIAGNOSIS — G43.709 CHRONIC MIGRAINE WITHOUT AURA WITHOUT STATUS MIGRAINOSUS, NOT INTRACTABLE: ICD-10-CM

## 2025-05-15 RX ORDER — HYDROCODONE BITARTRATE AND ACETAMINOPHEN 5; 325 MG/1; MG/1
1 TABLET ORAL DAILY PRN
Qty: 15 TABLET | Refills: 0 | Status: SHIPPED | OUTPATIENT
Start: 2025-05-15 | End: 2025-05-30

## 2025-05-15 NOTE — PROGRESS NOTES
Behavioral Health Consultation  Luma Orantes PsyD  Clinical Psychologist  5/15/2025    Name: Shelby Cordero  YOB: 1955  PCP: Nancy Pan APRN - CNP     Time spent with Shelby: 30 minutes  This is her first [facility] appointment.  Reason for Consult:  stress           Shelby Cordero, was evaluated through a synchronous (real-time) audio-video encounter. The patient (or guardian if applicable) is aware that this is a billable service, which includes applicable co-pays. This Virtual Visit was conducted with patient's (and/or legal guardian's) consent. Patient identification was verified, and a caregiver was present when appropriate.   The patient was located at Home: [address] [place] [address]  Provider was located at Other: [place]  Confirm you are appropriately licensed, registered, or certified to deliver care in the state where the patient is located as indicated above. If you are not or unsure, please re-schedule the visit: Yes, I confirm.      Total time spent for this encounter:  30 min    --Luma Orantes PSYD on 5/15/2025 at 8:39 AM    An electronic signature was used to authenticate this note.         S:  Shelby is a 70 y.o. female seen per Nancy Pan APRN - CNP addressing \"trouble dealing with stress\". 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  Per neurology note 3/31/25 (Dr. Darling), she was diagnosed with cindy atrophy and small vessel disease after an MRI in 2019.  A CT 2 years ago confirmed these findings. MMSE that date was 26/30.  Shelby describes difficulty regulating anger and irritability but reports her medications have helped.  She expresses a recent \"melt down\" after feeling frustrated that her needs are not prioritized, resulting in leaving the home for 2 days. Since returning, she reports a good relationship with

## 2025-05-15 NOTE — TELEPHONE ENCOUNTER
Refill Request - Controlled Substance    CONFIRM preferred pharmacy with the patient.    If Mail Order Rx - Pend for 90 day refill.        Last Seen Department: 4/24/2025  Last Seen by PCP: 4/24/2025    Last Written: 4/9/25 #10 - 0 refills     Last UDS: 11/8/18    Med Agreement Signed On: 8/7/20    If no future appointment scheduled:  Review the last OV with PCP and review information for follow-up visit,  Route STAFF MESSAGE with patient name to the  Pool for scheduling with the following information:            -  Timing of next visit           -  Visit type ie Physical, OV, etc           -  Diagnoses/Reason ie. COPD, HTN - Do not use MEDICATION, Follow-up or CHECK UP - Give reason for visit        Next Appointment:   Future Appointments   Date Time Provider Department Center   6/23/2025  2:30 PM L[facility]a Orantes PSYD [facility]   7/21/2025 11:00 AM Nancy Pan, LUCIA - CNP [facility] DEP       Message sent to  to schedule appt with patient?  NO      Requested Prescriptions     Pending Prescriptions Disp Refills    HYDROcodone-acetaminophen (NORCO) 5-325 MG per tablet 10 tablet 0     Sig: Take 1 tablet by mouth daily as needed for Pain for up to 10 days. May take instead of tramadol when pain is increased Max Daily Amount: 1 tablet

## 2025-05-15 NOTE — TELEPHONE ENCOUNTER
Patient asking if provider can please prescribe 15 tablets a month due to increased headaches at this time from the weather

## 2025-05-20 ENCOUNTER — HOSPITAL ENCOUNTER (OUTPATIENT)
Dept: CT IMAGING | Age: 70
Discharge: HOME | End: 2025-05-20
Payer: MEDICARE

## 2025-05-20 ENCOUNTER — OFFICE VISIT (OUTPATIENT)
Dept: FAMILY MEDICINE CLINIC | Age: 70
End: 2025-05-20

## 2025-05-20 ENCOUNTER — RESULTS FOLLOW-UP (OUTPATIENT)
Dept: FAMILY MEDICINE CLINIC | Age: 70
End: 2025-05-20

## 2025-05-20 VITALS
TEMPERATURE: 98.1 F | DIASTOLIC BLOOD PRESSURE: 62 MMHG | HEIGHT: 66 IN | OXYGEN SATURATION: 98 % | WEIGHT: 154.4 LBS | SYSTOLIC BLOOD PRESSURE: 116 MMHG | BODY MASS INDEX: 24.81 KG/M2 | HEART RATE: 83 BPM

## 2025-05-20 DIAGNOSIS — R10.31 ACUTE BILATERAL LOWER ABDOMINAL PAIN: ICD-10-CM

## 2025-05-20 DIAGNOSIS — R19.7 DIARRHEA, UNSPECIFIED TYPE: ICD-10-CM

## 2025-05-20 DIAGNOSIS — R10.32 ACUTE BILATERAL LOWER ABDOMINAL PAIN: ICD-10-CM

## 2025-05-20 DIAGNOSIS — R19.7 DIARRHEA, UNSPECIFIED TYPE: Primary | ICD-10-CM

## 2025-05-20 LAB
BILIRUBIN, POC: NEGATIVE
BLOOD URINE, POC: NORMAL
CLARITY, POC: NORMAL
COLOR, POC: YELLOW
GLUCOSE URINE, POC: NEGATIVE MG/DL
KETONES, POC: NEGATIVE MG/DL
LEUKOCYTE EST, POC: NORMAL
NITRITE, POC: NEGATIVE
PH, POC: 5.5
PROTEIN, POC: NEGATIVE MG/DL
SPECIFIC GRAVITY, POC: 1.02
UROBILINOGEN, POC: 0.2 MG/DL

## 2025-05-20 PROCEDURE — 6360000004 HC RX CONTRAST MEDICATION

## 2025-05-20 PROCEDURE — 74177 CT ABD & PELVIS W/CONTRAST: CPT

## 2025-05-20 RX ORDER — IOPAMIDOL 755 MG/ML
75 INJECTION, SOLUTION INTRAVASCULAR
Status: COMPLETED | OUTPATIENT
Start: 2025-05-20 | End: 2025-05-20

## 2025-05-20 RX ADMIN — IOPAMIDOL 75 ML: 755 INJECTION, SOLUTION INTRAVENOUS at 13:07

## 2025-05-20 SDOH — ECONOMIC STABILITY: FOOD INSECURITY: WITHIN THE PAST 12 MONTHS, YOU WORRIED THAT YOUR FOOD WOULD RUN OUT BEFORE YOU GOT MONEY TO BUY MORE.: NEVER TRUE

## 2025-05-20 SDOH — ECONOMIC STABILITY: FOOD INSECURITY: WITHIN THE PAST 12 MONTHS, THE FOOD YOU BOUGHT JUST DIDN'T LAST AND YOU DIDN'T HAVE MONEY TO GET MORE.: NEVER TRUE

## 2025-05-20 ASSESSMENT — ENCOUNTER SYMPTOMS
VOMITING: 0
RECTAL PAIN: 0
NAUSEA: 1
SHORTNESS OF BREATH: 0
CONSTIPATION: 1
ABDOMINAL PAIN: 1
BLOOD IN STOOL: 0
CHEST TIGHTNESS: 0
DIARRHEA: 1
ANAL BLEEDING: 0
COUGH: 0

## 2025-05-20 ASSESSMENT — PATIENT HEALTH QUESTIONNAIRE - PHQ9
SUM OF ALL RESPONSES TO PHQ QUESTIONS 1-9: 0
SUM OF ALL RESPONSES TO PHQ QUESTIONS 1-9: 0
1. LITTLE INTEREST OR PLEASURE IN DOING THINGS: NOT AT ALL
2. FEELING DOWN, DEPRESSED OR HOPELESS: NOT AT ALL
SUM OF ALL RESPONSES TO PHQ QUESTIONS 1-9: 0
SUM OF ALL RESPONSES TO PHQ QUESTIONS 1-9: 0

## 2025-05-20 NOTE — PROGRESS NOTES
Have you been to the ER, urgent care clinic since your last visit?  Hospitalized since your last visit?   NO    Have you seen or consulted any other health care providers outside our system since your last visit?   NO      “Have you had a colorectal cancer screening such as a colonoscopy/FIT/Cologuard?    NO  Due 5/23/25  Date of last Colonoscopy: 10/15/2013  No cologuard on file  No FIT/FOBT on file   No flexible sigmoidoscopy on file       
tablet, TAKE 2 TABLETS BY MOUTH EVERY MORNING, Disp: 180 tablet, Rfl: 1    citalopram (CELEXA) 20 MG tablet, Take 1 tablet by mouth every morning, Disp: 90 tablet, Rfl: 1    dextromethorphan-guaiFENesin (MUCINEX DM)  MG per extended release tablet, Take 1 tablet by mouth every 12 hours as needed, Disp: , Rfl:     promethazine (PHENERGAN) 25 MG tablet, Take 1 tablet by mouth every 8 hours as needed for Nausea, Disp: 30 tablet, Rfl: 3    metoprolol succinate (TOPROL XL) 25 MG extended release tablet, Take 1 tablet by mouth daily, Disp: 30 tablet, Rfl: 5    esomeprazole (NEXIUM) 20 MG delayed release capsule, Take 1 capsule by mouth every morning (before breakfast), Disp: 90 capsule, Rfl: 3    clopidogrel (PLAVIX) 75 MG tablet, Take 1 tablet by mouth daily, Disp: 90 tablet, Rfl: 3    cetirizine (ZYRTEC) 5 MG tablet, Take 1 tablet by mouth daily, Disp: , Rfl:     nitroglycerin (NITROSTAT) 0.6 MG SL tablet, Place 1 tablet under the tongue every 5 minutes as needed for Chest pain up to max of 3 total doses. If no relief after 1 dose, call 911., Disp: 25 tablet, Rfl: 2    dicyclomine (BENTYL) 10 MG capsule, Take 1 capsule by mouth 3 times daily, Disp: 90 capsule, Rfl: 1    Cholecalciferol (VITAMIN D3) 5000 UNITS TABS, Take by mouth daily, Disp: , Rfl:       Vitals:    05/20/25 0840   BP: 116/62   BP Site: Right Upper Arm   Patient Position: Sitting   BP Cuff Size: Medium Adult   Pulse: 83   Temp: 98.1 °F (36.7 °C)   TempSrc: Temporal   SpO2: 98%   Weight: 70 kg (154 lb 6.4 oz)   Height: 1.676 m (5' 5.98\")       Review of Systems   Constitutional:  Negative for appetite change, chills, fatigue and fever.   Respiratory:  Negative for cough, chest tightness and shortness of breath.    Cardiovascular:  Negative for chest pain and palpitations.   Gastrointestinal:  Positive for abdominal pain, constipation, diarrhea and nausea. Negative for anal bleeding, blood in stool, rectal pain and vomiting.   Genitourinary:

## 2025-06-18 ENCOUNTER — PATIENT MESSAGE (OUTPATIENT)
Dept: FAMILY MEDICINE CLINIC | Age: 70
End: 2025-06-18

## 2025-06-18 DIAGNOSIS — G43.709 CHRONIC MIGRAINE WITHOUT AURA WITHOUT STATUS MIGRAINOSUS, NOT INTRACTABLE: ICD-10-CM

## 2025-06-18 DIAGNOSIS — M79.7 FIBROMYALGIA: ICD-10-CM

## 2025-06-18 NOTE — TELEPHONE ENCOUNTER
Refill Request - Controlled Substance    CONFIRM preferred pharmacy with the patient.    If Mail Order Rx - Pend for 90 day refill.        Last Seen Department: 5/20/2025  Last Seen by PCP: 4/24/2025    Last Written: 5/15/2025    Last UDS: 11/8/18    Med Agreement Signed On: 8/7/2020    If no future appointment scheduled:  Review the last OV with PCP and review information for follow-up visit,  Route STAFF MESSAGE with patient name to the  Pool for scheduling with the following information:            -  Timing of next visit           -  Visit type ie Physical, OV, etc           -  Diagnoses/Reason ie. COPD, HTN - Do not use MEDICATION, Follow-up or CHECK UP - Give reason for visit        Next Appointment:   Future Appointments   Date Time Provider Department Center   6/23/2025  2:30 PM Luma Orantes PSYD [facility]   7/21/2025 11:00 AM Nancy Pan, APRN - CNP [facility] DEP       Message sent to  to schedule appt with patient?  NO      Requested Prescriptions     Pending Prescriptions Disp Refills    HYDROcodone-acetaminophen (NORCO) 5-325 MG per tablet 15 tablet 0     Sig: Take 1 tablet by mouth daily as needed for Pain for up to 15 days. May take instead of tramadol when pain is increased Max Daily Amount: 1 tablet

## 2025-06-19 ENCOUNTER — APPOINTMENT (OUTPATIENT)
Dept: CT IMAGING | Age: 70
End: 2025-06-19
Payer: MEDICARE

## 2025-06-19 ENCOUNTER — HOSPITAL ENCOUNTER (OUTPATIENT)
Age: 70
Setting detail: OBSERVATION
Discharge: HOME | End: 2025-06-20
Attending: STUDENT IN AN ORGANIZED HEALTH CARE EDUCATION/TRAINING PROGRAM | Admitting: HOSPITALIST
Payer: MEDICARE

## 2025-06-19 DIAGNOSIS — R51.9 ACUTE NONINTRACTABLE HEADACHE, UNSPECIFIED HEADACHE TYPE: ICD-10-CM

## 2025-06-19 DIAGNOSIS — R20.8 DECREASED SENSATION: ICD-10-CM

## 2025-06-19 DIAGNOSIS — H81.12 BENIGN PAROXYSMAL POSITIONAL VERTIGO OF LEFT EAR: Primary | ICD-10-CM

## 2025-06-19 DIAGNOSIS — R42 VERTIGO: ICD-10-CM

## 2025-06-19 PROBLEM — G45.9 TIA (TRANSIENT ISCHEMIC ATTACK): Status: ACTIVE | Noted: 2025-06-19

## 2025-06-19 LAB
ALBUMIN SERPL-MCNC: 4.5 G/DL (ref 3.4–5)
ALBUMIN/GLOB SERPL: 2.1 {RATIO} (ref 1.1–2.2)
ALP SERPL-CCNC: 122 U/L (ref 40–129)
ALT SERPL-CCNC: 10 U/L (ref 10–40)
ANION GAP SERPL CALCULATED.3IONS-SCNC: 12 MMOL/L (ref 3–16)
AST SERPL-CCNC: 15 U/L (ref 15–37)
BASOPHILS # BLD: 0.1 K/UL (ref 0–0.2)
BASOPHILS NFR BLD: 0.9 %
BILIRUB SERPL-MCNC: <0.2 MG/DL (ref 0–1)
BUN SERPL-MCNC: 13 MG/DL (ref 7–20)
CALCIUM SERPL-MCNC: 9.4 MG/DL (ref 8.3–10.6)
CHLORIDE SERPL-SCNC: 106 MMOL/L (ref 99–110)
CO2 SERPL-SCNC: 22 MMOL/L (ref 21–32)
CREAT SERPL-MCNC: 0.9 MG/DL (ref 0.6–1.2)
DEPRECATED RDW RBC AUTO: 13.4 % (ref 12.4–15.4)
EOSINOPHIL # BLD: 0.3 K/UL (ref 0–0.6)
EOSINOPHIL NFR BLD: 4.1 %
GFR SERPLBLD CREATININE-BSD FMLA CKD-EPI: 69 ML/MIN/{1.73_M2}
GLUCOSE SERPL-MCNC: 89 MG/DL (ref 70–99)
HCT VFR BLD AUTO: 38.3 % (ref 36–48)
HGB BLD-MCNC: 13 G/DL (ref 12–16)
INR PPP: 1.16 (ref 0.86–1.14)
LYMPHOCYTES # BLD: 2.1 K/UL (ref 1–5.1)
LYMPHOCYTES NFR BLD: 25.6 %
MCH RBC QN AUTO: 32.5 PG (ref 26–34)
MCHC RBC AUTO-ENTMCNC: 34 G/DL (ref 31–36)
MCV RBC AUTO: 95.6 FL (ref 80–100)
MONOCYTES # BLD: 0.5 K/UL (ref 0–1.3)
MONOCYTES NFR BLD: 6.4 %
NEUTROPHILS # BLD: 5.2 K/UL (ref 1.7–7.7)
NEUTROPHILS NFR BLD: 63 %
PLATELET # BLD AUTO: 281 K/UL (ref 135–450)
PMV BLD AUTO: 7 FL (ref 5–10.5)
POTASSIUM SERPL-SCNC: 3.8 MMOL/L (ref 3.5–5.1)
PROT SERPL-MCNC: 6.6 G/DL (ref 6.4–8.2)
PROTHROMBIN TIME: 15.1 SEC (ref 12.1–14.9)
RBC # BLD AUTO: 4.01 M/UL (ref 4–5.2)
SODIUM SERPL-SCNC: 140 MMOL/L (ref 136–145)
TROPONIN, HIGH SENSITIVITY: <6 NG/L (ref 0–14)
WBC # BLD AUTO: 8.3 K/UL (ref 4–11)

## 2025-06-19 PROCEDURE — 6360000002 HC RX W HCPCS: Performed by: STUDENT IN AN ORGANIZED HEALTH CARE EDUCATION/TRAINING PROGRAM

## 2025-06-19 PROCEDURE — 84484 ASSAY OF TROPONIN QUANT: CPT

## 2025-06-19 PROCEDURE — 2580000003 HC RX 258: Performed by: STUDENT IN AN ORGANIZED HEALTH CARE EDUCATION/TRAINING PROGRAM

## 2025-06-19 PROCEDURE — 6370000000 HC RX 637 (ALT 250 FOR IP): Performed by: NURSE PRACTITIONER

## 2025-06-19 PROCEDURE — 6370000000 HC RX 637 (ALT 250 FOR IP): Performed by: STUDENT IN AN ORGANIZED HEALTH CARE EDUCATION/TRAINING PROGRAM

## 2025-06-19 PROCEDURE — 93005 ELECTROCARDIOGRAM TRACING: CPT | Performed by: STUDENT IN AN ORGANIZED HEALTH CARE EDUCATION/TRAINING PROGRAM

## 2025-06-19 PROCEDURE — G0378 HOSPITAL OBSERVATION PER HR: HCPCS

## 2025-06-19 PROCEDURE — 80053 COMPREHEN METABOLIC PANEL: CPT

## 2025-06-19 PROCEDURE — 70496 CT ANGIOGRAPHY HEAD: CPT

## 2025-06-19 PROCEDURE — 96375 TX/PRO/DX INJ NEW DRUG ADDON: CPT

## 2025-06-19 PROCEDURE — 96361 HYDRATE IV INFUSION ADD-ON: CPT

## 2025-06-19 PROCEDURE — 96374 THER/PROPH/DIAG INJ IV PUSH: CPT

## 2025-06-19 PROCEDURE — 85610 PROTHROMBIN TIME: CPT

## 2025-06-19 PROCEDURE — 70450 CT HEAD/BRAIN W/O DYE: CPT

## 2025-06-19 PROCEDURE — 2500000003 HC RX 250 WO HCPCS: Performed by: NURSE PRACTITIONER

## 2025-06-19 PROCEDURE — 6360000004 HC RX CONTRAST MEDICATION: Performed by: STUDENT IN AN ORGANIZED HEALTH CARE EDUCATION/TRAINING PROGRAM

## 2025-06-19 PROCEDURE — 85025 COMPLETE CBC W/AUTO DIFF WBC: CPT

## 2025-06-19 PROCEDURE — 99285 EMERGENCY DEPT VISIT HI MDM: CPT

## 2025-06-19 RX ORDER — PROMETHAZINE HYDROCHLORIDE 25 MG/1
25 TABLET ORAL EVERY 8 HOURS PRN
Status: DISCONTINUED | OUTPATIENT
Start: 2025-06-19 | End: 2025-06-20 | Stop reason: HOSPADM

## 2025-06-19 RX ORDER — METOCLOPRAMIDE HYDROCHLORIDE 5 MG/ML
10 INJECTION INTRAMUSCULAR; INTRAVENOUS ONCE
Status: COMPLETED | OUTPATIENT
Start: 2025-06-19 | End: 2025-06-19

## 2025-06-19 RX ORDER — SODIUM CHLORIDE 0.9 % (FLUSH) 0.9 %
5-40 SYRINGE (ML) INJECTION PRN
Status: DISCONTINUED | OUTPATIENT
Start: 2025-06-19 | End: 2025-06-20 | Stop reason: HOSPADM

## 2025-06-19 RX ORDER — IOPAMIDOL 755 MG/ML
75 INJECTION, SOLUTION INTRAVASCULAR
Status: COMPLETED | OUTPATIENT
Start: 2025-06-19 | End: 2025-06-19

## 2025-06-19 RX ORDER — ACETAMINOPHEN 500 MG
1000 TABLET ORAL ONCE
Status: COMPLETED | OUTPATIENT
Start: 2025-06-19 | End: 2025-06-19

## 2025-06-19 RX ORDER — HYDROCODONE BITARTRATE AND ACETAMINOPHEN 5; 325 MG/1; MG/1
1 TABLET ORAL DAILY PRN
Qty: 15 TABLET | Refills: 0 | Status: SHIPPED | OUTPATIENT
Start: 2025-06-19 | End: 2025-07-04

## 2025-06-19 RX ORDER — METOPROLOL SUCCINATE 25 MG/1
25 TABLET, EXTENDED RELEASE ORAL DAILY
Status: DISCONTINUED | OUTPATIENT
Start: 2025-06-20 | End: 2025-06-20 | Stop reason: HOSPADM

## 2025-06-19 RX ORDER — DIPHENHYDRAMINE HYDROCHLORIDE 50 MG/ML
12.5 INJECTION, SOLUTION INTRAMUSCULAR; INTRAVENOUS ONCE
Status: COMPLETED | OUTPATIENT
Start: 2025-06-19 | End: 2025-06-19

## 2025-06-19 RX ORDER — PANTOPRAZOLE SODIUM 40 MG/1
40 TABLET, DELAYED RELEASE ORAL
Status: DISCONTINUED | OUTPATIENT
Start: 2025-06-20 | End: 2025-06-20 | Stop reason: HOSPADM

## 2025-06-19 RX ORDER — MECLIZINE HCL 12.5 MG 12.5 MG/1
12.5 TABLET ORAL 3 TIMES DAILY PRN
Status: DISCONTINUED | OUTPATIENT
Start: 2025-06-19 | End: 2025-06-20 | Stop reason: HOSPADM

## 2025-06-19 RX ORDER — ONDANSETRON 2 MG/ML
4 INJECTION INTRAMUSCULAR; INTRAVENOUS EVERY 6 HOURS PRN
Status: DISCONTINUED | OUTPATIENT
Start: 2025-06-19 | End: 2025-06-20 | Stop reason: HOSPADM

## 2025-06-19 RX ORDER — 0.9 % SODIUM CHLORIDE 0.9 %
1000 INTRAVENOUS SOLUTION INTRAVENOUS ONCE
Status: COMPLETED | OUTPATIENT
Start: 2025-06-19 | End: 2025-06-19

## 2025-06-19 RX ORDER — CLOPIDOGREL BISULFATE 75 MG/1
75 TABLET ORAL DAILY
Status: DISCONTINUED | OUTPATIENT
Start: 2025-06-20 | End: 2025-06-20 | Stop reason: HOSPADM

## 2025-06-19 RX ORDER — SODIUM CHLORIDE 9 MG/ML
INJECTION, SOLUTION INTRAVENOUS PRN
Status: DISCONTINUED | OUTPATIENT
Start: 2025-06-19 | End: 2025-06-20 | Stop reason: HOSPADM

## 2025-06-19 RX ORDER — BUPROPION HYDROCHLORIDE 75 MG/1
75 TABLET ORAL DAILY
Status: DISCONTINUED | OUTPATIENT
Start: 2025-06-20 | End: 2025-06-20 | Stop reason: HOSPADM

## 2025-06-19 RX ORDER — DONEPEZIL HYDROCHLORIDE 5 MG/1
10 TABLET, FILM COATED ORAL EVERY MORNING
Status: DISCONTINUED | OUTPATIENT
Start: 2025-06-20 | End: 2025-06-20 | Stop reason: HOSPADM

## 2025-06-19 RX ORDER — MECLIZINE HCL 12.5 MG 12.5 MG/1
25 TABLET ORAL ONCE
Status: COMPLETED | OUTPATIENT
Start: 2025-06-19 | End: 2025-06-19

## 2025-06-19 RX ORDER — ONDANSETRON 4 MG/1
4 TABLET, ORALLY DISINTEGRATING ORAL EVERY 8 HOURS PRN
Status: DISCONTINUED | OUTPATIENT
Start: 2025-06-19 | End: 2025-06-20 | Stop reason: HOSPADM

## 2025-06-19 RX ORDER — CITALOPRAM HYDROBROMIDE 20 MG/1
20 TABLET ORAL EVERY MORNING
Status: DISCONTINUED | OUTPATIENT
Start: 2025-06-20 | End: 2025-06-20 | Stop reason: HOSPADM

## 2025-06-19 RX ORDER — SODIUM CHLORIDE 0.9 % (FLUSH) 0.9 %
5-40 SYRINGE (ML) INJECTION EVERY 12 HOURS SCHEDULED
Status: DISCONTINUED | OUTPATIENT
Start: 2025-06-19 | End: 2025-06-20 | Stop reason: HOSPADM

## 2025-06-19 RX ORDER — POLYETHYLENE GLYCOL 3350 17 G/17G
17 POWDER, FOR SOLUTION ORAL DAILY PRN
Status: DISCONTINUED | OUTPATIENT
Start: 2025-06-19 | End: 2025-06-20 | Stop reason: HOSPADM

## 2025-06-19 RX ORDER — ROSUVASTATIN CALCIUM 10 MG/1
40 TABLET, COATED ORAL NIGHTLY
Status: DISCONTINUED | OUTPATIENT
Start: 2025-06-19 | End: 2025-06-20 | Stop reason: HOSPADM

## 2025-06-19 RX ORDER — MEMANTINE HYDROCHLORIDE 5 MG/1
10 TABLET ORAL 2 TIMES DAILY
Status: DISCONTINUED | OUTPATIENT
Start: 2025-06-19 | End: 2025-06-20 | Stop reason: HOSPADM

## 2025-06-19 RX ADMIN — SODIUM CHLORIDE 1000 ML: 0.9 INJECTION, SOLUTION INTRAVENOUS at 22:00

## 2025-06-19 RX ADMIN — MEMANTINE 10 MG: 5 TABLET ORAL at 23:52

## 2025-06-19 RX ADMIN — Medication 10 ML: at 23:52

## 2025-06-19 RX ADMIN — DIPHENHYDRAMINE HYDROCHLORIDE 12.5 MG: 50 INJECTION INTRAMUSCULAR; INTRAVENOUS at 22:01

## 2025-06-19 RX ADMIN — IOPAMIDOL 75 ML: 755 INJECTION, SOLUTION INTRAVENOUS at 20:58

## 2025-06-19 RX ADMIN — MECLIZINE 25 MG: 12.5 TABLET ORAL at 22:01

## 2025-06-19 RX ADMIN — APIXABAN 5 MG: 5 TABLET, FILM COATED ORAL at 23:52

## 2025-06-19 RX ADMIN — ROSUVASTATIN CALCIUM 40 MG: 10 TABLET, FILM COATED ORAL at 23:52

## 2025-06-19 RX ADMIN — ACETAMINOPHEN 1000 MG: 500 TABLET ORAL at 22:01

## 2025-06-19 RX ADMIN — METOCLOPRAMIDE HYDROCHLORIDE 10 MG: 5 INJECTION INTRAMUSCULAR; INTRAVENOUS at 22:00

## 2025-06-19 ASSESSMENT — PAIN SCALES - GENERAL
PAINLEVEL_OUTOF10: 4
PAINLEVEL_OUTOF10: 8

## 2025-06-19 ASSESSMENT — PAIN - FUNCTIONAL ASSESSMENT: PAIN_FUNCTIONAL_ASSESSMENT: 0-10

## 2025-06-20 ENCOUNTER — APPOINTMENT (OUTPATIENT)
Dept: MRI IMAGING | Age: 70
End: 2025-06-20
Payer: MEDICARE

## 2025-06-20 ENCOUNTER — CARE COORDINATION (OUTPATIENT)
Dept: CASE MANAGEMENT | Age: 70
End: 2025-06-20

## 2025-06-20 VITALS
HEART RATE: 71 BPM | TEMPERATURE: 98.8 F | RESPIRATION RATE: 18 BRPM | OXYGEN SATURATION: 98 % | DIASTOLIC BLOOD PRESSURE: 63 MMHG | WEIGHT: 155.8 LBS | BODY MASS INDEX: 25.96 KG/M2 | SYSTOLIC BLOOD PRESSURE: 109 MMHG | HEIGHT: 65 IN

## 2025-06-20 PROBLEM — H81.12 BENIGN PAROXYSMAL POSITIONAL VERTIGO OF LEFT EAR: Status: ACTIVE | Noted: 2025-06-20

## 2025-06-20 LAB
ANION GAP SERPL CALCULATED.3IONS-SCNC: 8 MMOL/L (ref 3–16)
BUN SERPL-MCNC: 14 MG/DL (ref 7–20)
CALCIUM SERPL-MCNC: 9.2 MG/DL (ref 8.3–10.6)
CHLORIDE SERPL-SCNC: 105 MMOL/L (ref 99–110)
CO2 SERPL-SCNC: 25 MMOL/L (ref 21–32)
CREAT SERPL-MCNC: 0.7 MG/DL (ref 0.6–1.2)
DEPRECATED RDW RBC AUTO: 13.2 % (ref 12.4–15.4)
EKG ATRIAL RATE: 67 BPM
EKG DIAGNOSIS: NORMAL
EKG P AXIS: 35 DEGREES
EKG P-R INTERVAL: 126 MS
EKG Q-T INTERVAL: 446 MS
EKG QRS DURATION: 124 MS
EKG QTC CALCULATION (BAZETT): 471 MS
EKG R AXIS: -35 DEGREES
EKG T AXIS: 74 DEGREES
EKG VENTRICULAR RATE: 67 BPM
GFR SERPLBLD CREATININE-BSD FMLA CKD-EPI: >90 ML/MIN/{1.73_M2}
GLUCOSE SERPL-MCNC: 93 MG/DL (ref 70–99)
HCT VFR BLD AUTO: 37.6 % (ref 36–48)
HGB BLD-MCNC: 12.9 G/DL (ref 12–16)
MCH RBC QN AUTO: 32.7 PG (ref 26–34)
MCHC RBC AUTO-ENTMCNC: 34.3 G/DL (ref 31–36)
MCV RBC AUTO: 95.2 FL (ref 80–100)
PLATELET # BLD AUTO: 246 K/UL (ref 135–450)
PMV BLD AUTO: 6.8 FL (ref 5–10.5)
POTASSIUM SERPL-SCNC: 3.9 MMOL/L (ref 3.5–5.1)
RBC # BLD AUTO: 3.95 M/UL (ref 4–5.2)
SODIUM SERPL-SCNC: 138 MMOL/L (ref 136–145)
WBC # BLD AUTO: 7.6 K/UL (ref 4–11)

## 2025-06-20 PROCEDURE — 80048 BASIC METABOLIC PNL TOTAL CA: CPT

## 2025-06-20 PROCEDURE — APPSS45 APP SPLIT SHARED TIME 31-45 MINUTES

## 2025-06-20 PROCEDURE — 83036 HEMOGLOBIN GLYCOSYLATED A1C: CPT

## 2025-06-20 PROCEDURE — 99222 1ST HOSP IP/OBS MODERATE 55: CPT | Performed by: STUDENT IN AN ORGANIZED HEALTH CARE EDUCATION/TRAINING PROGRAM

## 2025-06-20 PROCEDURE — 70551 MRI BRAIN STEM W/O DYE: CPT

## 2025-06-20 PROCEDURE — 80061 LIPID PANEL: CPT

## 2025-06-20 PROCEDURE — 85027 COMPLETE CBC AUTOMATED: CPT

## 2025-06-20 PROCEDURE — 6370000000 HC RX 637 (ALT 250 FOR IP): Performed by: NURSE PRACTITIONER

## 2025-06-20 PROCEDURE — 2500000003 HC RX 250 WO HCPCS: Performed by: NURSE PRACTITIONER

## 2025-06-20 PROCEDURE — G0378 HOSPITAL OBSERVATION PER HR: HCPCS

## 2025-06-20 PROCEDURE — 36415 COLL VENOUS BLD VENIPUNCTURE: CPT

## 2025-06-20 RX ORDER — MECLIZINE HCL 12.5 MG 12.5 MG/1
12.5 TABLET ORAL 3 TIMES DAILY PRN
Qty: 30 TABLET | Refills: 0 | Status: SHIPPED | OUTPATIENT
Start: 2025-06-20 | End: 2025-06-30

## 2025-06-20 RX ADMIN — Medication 10 ML: at 09:06

## 2025-06-20 RX ADMIN — CLOPIDOGREL BISULFATE 75 MG: 75 TABLET, FILM COATED ORAL at 09:05

## 2025-06-20 RX ADMIN — METOPROLOL SUCCINATE 25 MG: 25 TABLET, EXTENDED RELEASE ORAL at 09:06

## 2025-06-20 RX ADMIN — APIXABAN 5 MG: 5 TABLET, FILM COATED ORAL at 09:06

## 2025-06-20 RX ADMIN — CITALOPRAM HYDROBROMIDE 20 MG: 20 TABLET ORAL at 09:06

## 2025-06-20 RX ADMIN — MECLIZINE 12.5 MG: 12.5 TABLET ORAL at 09:09

## 2025-06-20 RX ADMIN — PANTOPRAZOLE SODIUM 40 MG: 40 TABLET, DELAYED RELEASE ORAL at 05:40

## 2025-06-20 RX ADMIN — MEMANTINE 10 MG: 5 TABLET ORAL at 09:05

## 2025-06-20 RX ADMIN — BUPROPION HYDROCHLORIDE 75 MG: 75 TABLET, FILM COATED ORAL at 09:06

## 2025-06-20 RX ADMIN — DONEPEZIL HYDROCHLORIDE 10 MG: 5 TABLET, FILM COATED ORAL at 09:06

## 2025-06-20 ASSESSMENT — PATIENT HEALTH QUESTIONNAIRE - PHQ9
1. LITTLE INTEREST OR PLEASURE IN DOING THINGS: NOT AT ALL
SUM OF ALL RESPONSES TO PHQ QUESTIONS 1-9: 2
6. FEELING BAD ABOUT YOURSELF - OR THAT YOU ARE A FAILURE OR HAVE LET YOURSELF OR YOUR FAMILY DOWN: NOT AT ALL
3. TROUBLE FALLING OR STAYING ASLEEP: SEVERAL DAYS
8. MOVING OR SPEAKING SO SLOWLY THAT OTHER PEOPLE COULD HAVE NOTICED. OR THE OPPOSITE - BEING SO FIDGETY OR RESTLESS THAT YOU HAVE BEEN MOVING AROUND A LOT MORE THAN USUAL: NOT AT ALL
2. FEELING DOWN, DEPRESSED OR HOPELESS: NOT AT ALL
SUM OF ALL RESPONSES TO PHQ QUESTIONS 1-9: 2
6. FEELING BAD ABOUT YOURSELF - OR THAT YOU ARE A FAILURE OR HAVE LET YOURSELF OR YOUR FAMILY DOWN: NOT AT ALL
SUM OF ALL RESPONSES TO PHQ QUESTIONS 1-9: 2
9. THOUGHTS THAT YOU WOULD BE BETTER OFF DEAD, OR OF HURTING YOURSELF: NOT AT ALL
3. TROUBLE FALLING OR STAYING ASLEEP: SEVERAL DAYS
10. IF YOU CHECKED OFF ANY PROBLEMS, HOW DIFFICULT HAVE THESE PROBLEMS MADE IT FOR YOU TO DO YOUR WORK, TAKE CARE OF THINGS AT HOME, OR GET ALONG WITH OTHER PEOPLE: NOT DIFFICULT AT ALL
1. LITTLE INTEREST OR PLEASURE IN DOING THINGS: NOT AT ALL
4. FEELING TIRED OR HAVING LITTLE ENERGY: SEVERAL DAYS
4. FEELING TIRED OR HAVING LITTLE ENERGY: SEVERAL DAYS
5. POOR APPETITE OR OVEREATING: NOT AT ALL
5. POOR APPETITE OR OVEREATING: NOT AT ALL
8. MOVING OR SPEAKING SO SLOWLY THAT OTHER PEOPLE COULD HAVE NOTICED. OR THE OPPOSITE, BEING SO FIGETY OR RESTLESS THAT YOU HAVE BEEN MOVING AROUND A LOT MORE THAN USUAL: NOT AT ALL
SUM OF ALL RESPONSES TO PHQ QUESTIONS 1-9: 2
7. TROUBLE CONCENTRATING ON THINGS, SUCH AS READING THE NEWSPAPER OR WATCHING TELEVISION: NOT AT ALL
10. IF YOU CHECKED OFF ANY PROBLEMS, HOW DIFFICULT HAVE THESE PROBLEMS MADE IT FOR YOU TO DO YOUR WORK, TAKE CARE OF THINGS AT HOME, OR GET ALONG WITH OTHER PEOPLE: NOT DIFFICULT AT ALL
7. TROUBLE CONCENTRATING ON THINGS, SUCH AS READING THE NEWSPAPER OR WATCHING TELEVISION: NOT AT ALL
SUM OF ALL RESPONSES TO PHQ QUESTIONS 1-9: 2
9. THOUGHTS THAT YOU WOULD BE BETTER OFF DEAD, OR OF HURTING YOURSELF: NOT AT ALL
2. FEELING DOWN, DEPRESSED OR HOPELESS: NOT AT ALL

## 2025-06-20 ASSESSMENT — ANXIETY QUESTIONNAIRES
3. WORRYING TOO MUCH ABOUT DIFFERENT THINGS: NOT AT ALL
5. BEING SO RESTLESS THAT IT IS HARD TO SIT STILL: NOT AT ALL
3. WORRYING TOO MUCH ABOUT DIFFERENT THINGS: NOT AT ALL
2. NOT BEING ABLE TO STOP OR CONTROL WORRYING: NOT AT ALL
IF YOU CHECKED OFF ANY PROBLEMS ON THIS QUESTIONNAIRE, HOW DIFFICULT HAVE THESE PROBLEMS MADE IT FOR YOU TO DO YOUR WORK, TAKE CARE OF THINGS AT HOME, OR GET ALONG WITH OTHER PEOPLE: NOT DIFFICULT AT ALL
5. BEING SO RESTLESS THAT IT IS HARD TO SIT STILL: NOT AT ALL
1. FEELING NERVOUS, ANXIOUS, OR ON EDGE: NOT AT ALL
4. TROUBLE RELAXING: NOT AT ALL
6. BECOMING EASILY ANNOYED OR IRRITABLE: SEVERAL DAYS
4. TROUBLE RELAXING: NOT AT ALL
7. FEELING AFRAID AS IF SOMETHING AWFUL MIGHT HAPPEN: NOT AT ALL
GAD7 TOTAL SCORE: 1
2. NOT BEING ABLE TO STOP OR CONTROL WORRYING: NOT AT ALL
6. BECOMING EASILY ANNOYED OR IRRITABLE: SEVERAL DAYS
7. FEELING AFRAID AS IF SOMETHING AWFUL MIGHT HAPPEN: NOT AT ALL
IF YOU CHECKED OFF ANY PROBLEMS ON THIS QUESTIONNAIRE, HOW DIFFICULT HAVE THESE PROBLEMS MADE IT FOR YOU TO DO YOUR WORK, TAKE CARE OF THINGS AT HOME, OR GET ALONG WITH OTHER PEOPLE: NOT DIFFICULT AT ALL
1. FEELING NERVOUS, ANXIOUS, OR ON EDGE: NOT AT ALL

## 2025-06-20 NOTE — CARE COORDINATION
CTN contacted [facility] inpatient  on C5 and l/m on [phone] regarding patient eligibility for RPM.     Christina Hoyos RN   [phone]

## 2025-06-20 NOTE — CARE COORDINATION
CASE MANAGEMENT DISCHARGE SUMMARY      Discharge to: Home- NN    Precertification completed: N/A  Hospital Exemption Notification (HENS) completed: N/A    IMM given: (date) Obs    New Durable Medical Equipment ordered/agency: N/A    Transportation:    Family/car: Personal      Confirmed discharge plan with:     Patient: yes     Family:  yes          RN, name: Nasreen    Note: Discharging nurse to complete LYNDSAY, reconcile AVS, and place final copy with patient's discharge packet. RN to ensure that written prescriptions for  Level II medications are sent with patient to the facility as per protocol.      Janessa Muniz, RN

## 2025-06-20 NOTE — CARE COORDINATION
Case Management Assessment  Initial Evaluation    Date/Time of Evaluation: 6/20/2025 9:42 AM  Assessment Completed by: Janessa Muniz RN    If patient is discharged prior to next notation, then this note serves as note for discharge by case management.    Patient Name: Shelby Cordero                   YOB: 1955  Diagnosis: TIA (transient ischemic attack) [G45.9]  Decreased sensation [R20.8]  Vertigo [R42]  Acute nonintractable headache, unspecified headache type [R51.9]                   Date / Time: 6/19/2025  8:20 PM    Patient Admission Status: Observation   Readmission Risk (Low < 19, Mod (19-27), High > 27): No data recorded  Current PCP: Nancy Pan APRN - CNP  PCP verified by CM? Yes (NP Viviane)    Chart Reviewed: Yes      History Provided by: Patient  Patient Orientation: Alert and Oriented    Patient Cognition: Alert    Hospitalization in the last 30 days (Readmission):  No    If yes, Readmission Assessment in CM Navigator will be completed.    Advance Directives:      Code Status: Full Code   Patient's Primary Decision Maker is: Named in Scanned ACP Document    Primary Decision Maker: Av Cordero - Spouse - [phone]    Secondary Decision Maker: Stacie Clifton - Niece/Nephew - [phone]    Supplemental (Other) Decision Maker: Cathleen Rdz - Brother/Sister - [phone]    Discharge Planning:    Patient lives with: Spouse/Significant Other, Family Members Type of Home: House  Primary Care Giver: Self  Patient Support Systems include: Spouse/Significant Other   Current Financial resources: Medicare  Current community resources: None  Current services prior to admission: None            Current DME:              Type of Home Care services:  None    ADLS  Prior functional level: Independent in ADLs/IADLs  Current functional level: Independent in ADLs/IADLs    PT AM-PAC:   /24  OT AM-PAC:   /24    Family can provide assistance at DC: Yes (Grandson lives with him 3-4  days a week)  Would you like Case Management to discuss the discharge plan with any other family members/significant others, and if so, who? No  Plans to Return to Present Housing: Yes  Other Identified Issues/Barriers to RETURNING to current housing: limited help at home  Potential Assistance needed at discharge: Outpatient PT/OT, Home Care            Potential DME:    Patient expects to discharge to: House  Plan for transportation at discharge:      Financial    Payor: [facility] MEDICARE / Plan: [facility] MEDICARE COMPLETE / Product Type: *No Product type* /     Does insurance require precert for SNF: Yes    Potential assistance Purchasing Medications: No  Meds-to-Beds request:        [facility] [id number] [place], [place] - [address] - P [phone] - F [phone]  [address]  [facility] [address]  Phone: [phone] Fax: [phone]      Notes:    Factors facilitating achievement of predicted outcomes: Family support, Cooperative, and Pleasant    Barriers to discharge: Limited family support and Limited safety awareness    Additional Case Management Notes: Patient lives in a house with her spouse, she is IPTA with no AD at baseline. She is an active , and has a PCP. No current services in the home. RPM info was discussed and given to patient to review.     The Plan for Transition of Care is related to the following treatment goals of TIA (transient ischemic attack) [G45.9]  Decreased sensation [R20.8]  Vertigo [R42]  Acute nonintractable headache, unspecified headache type [R51.9]    IF APPLICABLE: The Patient and/or patient representative Shelby and her family were provided with a choice of provider and agrees with the discharge plan. Freedom of choice list with basic dialogue that supports the patient's individualized plan of care/goals and shares the quality data associated with the providers was provided to: Patient   Patient Representative Name:       The Patient and/or

## 2025-06-20 NOTE — DISCHARGE INSTRUCTIONS
Neurology recommendations:    Patient was advised to repeat Epley maneuver at home. If vertigo resolves, then no need for vestibular therapy outpatient. If vertigo does not resolve, then follow up with vestibular therapy.     Follow up neurology clinic 3 months.

## 2025-06-20 NOTE — ED NOTES
Code stroke called @ 2046  Ct called @ 2046   stroke called @ 2047  Lab called @ 2047   stroke called back @ 2050

## 2025-06-20 NOTE — ED NOTES
Shelby Cordero is a 70 y.o. female admitted for  Principal Problem:    TIA (transient ischemic attack)  Resolved Problems:    * No resolved hospital problems. *  .   Patient Home via EMS transportation with   Chief Complaint   Patient presents with    Dizziness     Pt complains of dizziness, vomiting, and vertigo. States it started this morning. Hx of vertigo.   .  Patient is alert and Person, Place, Time, and Situation  Patient's baseline mobility: Baseline Mobility: Independent   Code Status: Prior   Cardiac Rhythm:       Is patient on baseline Oxygen: no how many Liters:   Abnormal Assessment Findings: Tingling on right side of face patient states consistent for her with hx of migraines.     Isolation:       NIH Score:    C-SSRS: Risk of Suicide: No Risk  Bedside swallow:        Active LDA's:   Peripheral IV 06/19/25 Right Antecubital (Active)       Family/Caregiver Present no Any Concerns: no   Restraints no  Sitter no         Vitals: MEWS Score: 1    Vitals:    06/19/25 2021 06/19/25 2103 06/19/25 2209   BP: 132/81  137/70   Pulse: 80  66   Resp: 16     Temp: 98 °F (36.7 °C)     TempSrc: Oral     SpO2: 98%  100%   Weight: 65.3 kg (144 lb) 70.7 kg (155 lb 12.8 oz)    Height:  1.651 m (5' 5\")        Last documented pain score (0-10 scale) Pain Level: 4  Pain medication administered See MAR .    Pertinent or High Risk Medications/Drips: .    Pending Blood Product Administration:     Abnormal labs:   Abnormal Labs Reviewed   PROTIME-INR - Abnormal; Notable for the following components:       Result Value    Protime 15.1 (*)     INR 1.16 (*)     All other components within normal limits     Critical values:   Intervention for critical value(s):     Abnormal Imaging:  CT abnormal             You may also review the ED PT Care Timeline found under the Summary Tab, ED Encounter Summary, Timeline Reports, ED Patient Care Timeline.     Recommendation    Pending orders/Uncompleted orders to hand off:    Additional

## 2025-06-20 NOTE — ED PROVIDER NOTES
[facility] EMERGENCY DEPARTMENT  EMERGENCY DEPARTMENT ENCOUNTER        Patient Name: Shelby Cordero  MRN: [id number]  Birthdate 1955  Date of evaluation: 6/19/2025  Provider: Samia Fine MD  PCP: Nancy Pan APRN - CNP  Note Started: 10:11 PM EDT 6/19/25      CHIEF COMPLAINT  Dizziness         HISTORY & PHYSICAL     HISTORY OF PRESENT ILLNESS  History from : Patient    Limitations to history : None    Shelby Cordero is a 70 y.o. female  has a past medical history of Allergic rhinitis, Anxiety, Atrial fibrillation (HCC) (June 2023), Bilateral carpal tunnel syndrome (04/13/2021), CAD (coronary artery disease) (06/05/2019), CHF (congestive heart failure) ([facility]), Congenital heart disease, COVID-19 (12/28/2020), Depression, Endometriosis, Fibromyalgia, GERD (gastroesophageal reflux disease), Headache(784.0), Hearing loss, Hiatal hernia, Hyperlipidemia, Irritable bowel syndrome, Left bundle branch block (LBBB), Osteoarthritis, SVT (supraventricular tachycardia), and WPW (Steffanie-Parkinson-White syndrome)., who presents to the ED complaining of dizziness.  Patient reports that she began having intermittent vertigo since waking up this morning at approximately 7 PM.  She does report that it resolves.  She does have a history of vertigo.  She does report headache, worse on the right side that feels like a migraine.  Has been progressive throughout the day.  Denies head trauma.  She is on blood thinners.  Not thunderclap in onset, headache feels like headache she has had in the past.  Patient also reports right-sided decreased sensation that started at approximately 9 AM.    Old records reviewed: Patient most recently seen for headache 4/4    No other complaints, modifying factors or associated symptoms.  Nursing Notes were all reviewed and agreed with or any disagreements were addressed in the HPI.    I have reviewed the following from the nursing documentation.    Past Medical History:

## 2025-06-20 NOTE — CONSULTS
Consult placed to neurology at this time. Electronically signed by Rylee Leonard on 6/20/2025 at 7:55 AM

## 2025-06-20 NOTE — PROGRESS NOTES
4 Eyes Skin Assessment     NAME:  Shelby Cordero  YOB: 1955  MEDICAL RECORD NUMBER:  [id number]    The patient is being assessed for  Admission    I agree that at least one RN has performed a thorough Head to Toe Skin Assessment on the patient. ALL assessment sites listed below have been assessed.      Areas assessed by both nurses:    Head, Face, Ears, Shoulders, Back, Chest, Arms, Elbows, Hands, Sacrum. Buttock, Coccyx, Ischium, and Legs. Feet and Heels        Does the Patient have a Wound? No noted wound(s)       Jaquan Prevention initiated by RN: No  Wound Care Orders initiated by RN: No    Pressure Injury (Stage 3,4, Unstageable, DTI, NWPT, and Complex wounds) if present, place Wound referral order by RN under : No    New Ostomies, if present place, Ostomy referral order under : No     Nurse 1 eSignature: Electronically signed by Kristie Javed RN on 6/19/25 at 11:45 PM EDT    **SHARE this note so that the co-signing nurse can place an eSignature**    Nurse 2 eSignature: Electronically signed by Baylee Garcia RN on 6/20/25 at 12:57 AM EDT

## 2025-06-20 NOTE — H&P
[facility] Medicine History & Physical    V 5.1    Date of Admission: 6/19/2025    Date of Service:  Pt seen/examined on 6/19/2025     []Admitted to Inpatient with expected LOS greater than two midnights due to medical therapy.  [x]Placed in Observation status.    Chief Admission Complaint:  Dizziness and decreased sensation of right upper and lower extremities    Presenting Admission History:      70 y.o. female who presented to [facility] with dizziness and decreased sensation of right upper and lower extremities.  PMHx significant for vertigo, HTN, HLD, atrial fibrillation. History obtained from the patient and review of EMR.  Patient stated she has been experiencing dizziness since she woke up this morning.  However, patient states she does have a history of vertigo.  She reports that her vertigo does resolve after a short period of time.  However she also does report a headache that is worse on the right side and feels like it might be a migraine.  She stated she is also experiencing decreased sensation on her right upper and lower extremity since 9 AM.  Patient stated the decreased sensation is lessening up but not completely resolving so she went to the emergency department for further evaluation. In the emergency department CT head was obtained that revealed evidence of acute intracranial abnormality.  CTA head and neck was also obtained that revealed no significant arterial vascular pathology identified in the neck.  The intracranial internal carotid arteries, heart anterior cerebral arteries and middle cerebral arteries demonstrate no occlusion or stenosis.  No evidence for aneurysm or anterior venous malformation.  The patient was given Tylenol, Benadryl, Reglan and meclizine.  She was also given 1 L of normal saline.  Otherwise, patient workup unremarkable. The patient was admitted for further evaluation and treatment.  An MRI of the brain without contrast and neurology consult have

## 2025-06-20 NOTE — ACP (ADVANCE CARE PLANNING)
Advance Care Planning     Advance Care Planning Inpatient Note  [facility] Department    Today's Date: 6/20/2025  Unit: [facility] C5 - MED SURG/ORTHO    Received request from IDT Member.  Upon review of chart and communication with care team, patient's decision making abilities are not in question.. Patient was/were present in the room during visit.    Goals of ACP Conversation:  Discuss advance care planning documents    Health Care Decision Makers:       Primary Decision Maker: Av Cordero - Spouse - [phone]    Secondary Decision Maker: Stacie Clifton - Niece/Nephew - [phone]    Supplemental (Other) Decision Maker: WidmyCathleen xie - Brother/Sister - [phone]  Summary:  Verified Healthcare Decision Maker    Advance Care Planning Documents (Patient Wishes):  Healthcare Power of /Advance Directive Appointment of Health Care Agent  Living Will/Advance Directive     Assessment:  Patient states her  wants her to be a full code.  She thought she needed to update her advance directives to do this.  She is currently a full code.  Patient stated she wanted forms for a family member.  Interventions:  Provided education on documents for clarity and greater understanding  Encouraged ongoing ACP conversation with future decision makers and loved ones    Care Preferences Communicated:   Patient states her  wanted her to remain a full code.  On further discussion, she clarified if she had wide spread cancer or a terminal condition, she would not want to be kept alive on machines.  She states her  feels the same way.      Outcomes/Plan:  ACP Discussion: Completed.   educated patient on the difference between a code status and her health care power of  and living will.   stressed that a living will is not a DNR order, rather directions for her providers how she would want to be cared for if she was dying or permanently unconscious.  Patient states she wants to

## 2025-06-20 NOTE — PLAN OF CARE
Problem: Chronic Conditions and Co-morbidities  Goal: Patient's chronic conditions and co-morbidity symptoms are monitored and maintained or improved  6/20/2025 1412 by Nasreen Sabillon RN  Outcome: Adequate for Discharge  6/20/2025 1410 by Nasreen Sabillon RN  Outcome: Progressing  Flowsheets (Taken 6/20/2025 0800)  Care Plan - Patient's Chronic Conditions and Co-Morbidity Symptoms are Monitored and Maintained or Improved: Monitor and assess patient's chronic conditions and comorbid symptoms for stability, deterioration, or improvement  6/20/2025 0148 by Kristie Javed RN  Outcome: Progressing     Problem: Discharge Planning  Goal: Discharge to home or other facility with appropriate resources  6/20/2025 1412 by Nasreen Sabillon RN  Outcome: Adequate for Discharge  6/20/2025 1410 by Nasreen Sabillon RN  Outcome: Progressing  Flowsheets (Taken 6/20/2025 1410)  Discharge to home or other facility with appropriate resources:   Identify barriers to discharge with patient and caregiver   Arrange for needed discharge resources and transportation as appropriate   Identify discharge learning needs (meds, wound care, etc)  6/20/2025 0148 by Kristie Javed RN  Outcome: Progressing     Problem: Safety - Adult  Goal: Free from fall injury  6/20/2025 1412 by Nasreen Sabillon RN  Outcome: Adequate for Discharge  6/20/2025 1410 by Nasreen Sabillon RN  Outcome: Progressing  6/20/2025 0148 by Kristie Javed RN  Outcome: Progressing     Problem: Neurosensory - Adult  Goal: Achieves stable or improved neurological status  Outcome: Adequate for Discharge  Goal: Achieves maximal functionality and self care  Outcome: Adequate for Discharge

## 2025-06-20 NOTE — CONSULTS
Shelby Cordero  Inpatient Neurology Consult  [facility] Neurology            Shelby Cordero  1955    Date of Service: 6/20/2025    Referring Physician: Tova Dial MD      Reason for the consult and CC: Dizziness, R facial numbness    HPI:   The patient is a 70 y.o.  years old female with a prior medical history of A-fib, CAD, CHF, GERD, hyperlipidemia, left bundle branch block, dementia, migraines who presented to the hospital yesterday with dizziness, headache, and right sided numbness and tingling.  She reports that yesterday morning when she woke up she sat up in bed and felt the room spinning for about 3-4 seconds.  It resolved and then she laid back down and felt the room spinning sensation again for several seconds.  She says that also on awakening she had a right sided headache which felt the same as her normal migraines.  She describes this as pulsating/throbbing on the right side of her head.  She says that this time she was also experiencing some numbness/tingling of the right side of her face.  She says that the vertigo intermittently occurred with position changes throughout the day and that her headache progressively worsened throughout the day although it still felt the same as her typical migraines.  She does endorse some flashes of light in her vision during the spell but she says that this is common with her migraines.  Her symptoms persisted into the evening and she was still feeling dizzy so she decided to come to the hospital.  In the ED, she reports that she may have had decreased sensation of her right arm and right leg.  She received a migraine cocktail in the ED and her symptoms resolved at some point overnight.  She feels back to baseline today.    She tells me that she has had migraines since childhood and that she often has numbness and tingling of the left side of her face with them.  She says that she has had this symptom since 2019.  She says that she typically has about

## 2025-06-20 NOTE — PLAN OF CARE
Problem: Chronic Conditions and Co-morbidities  Goal: Patient's chronic conditions and co-morbidity symptoms are monitored and maintained or improved  6/20/2025 1410 by Nasreen Sabillon RN  Outcome: Progressing  Flowsheets (Taken 6/20/2025 0800)  Care Plan - Patient's Chronic Conditions and Co-Morbidity Symptoms are Monitored and Maintained or Improved: Monitor and assess patient's chronic conditions and comorbid symptoms for stability, deterioration, or improvement       Problem: Discharge Planning  Goal: Discharge to home or other facility with appropriate resources  6/20/2025 1410 by Nasreen Sabillon, RN  Outcome: Progressing  Flowsheets (Taken 6/20/2025 1410)  Discharge to home or other facility with appropriate resources:   Identify barriers to discharge with patient and caregiver   Arrange for needed discharge resources and transportation as appropriate   Identify discharge learning needs (meds, wound care, etc)

## 2025-06-23 ENCOUNTER — TELEPHONE (OUTPATIENT)
Dept: FAMILY MEDICINE CLINIC | Age: 70
End: 2025-06-23

## 2025-06-23 ENCOUNTER — TELEMEDICINE (OUTPATIENT)
Dept: PSYCHOLOGY | Age: 70
End: 2025-06-23
Payer: MEDICARE

## 2025-06-23 ENCOUNTER — CARE COORDINATION (OUTPATIENT)
Dept: CASE MANAGEMENT | Age: 70
End: 2025-06-23

## 2025-06-23 DIAGNOSIS — G31.84 MILD COGNITIVE IMPAIRMENT (MCI) DUE TO ALZHEIMER'S DISEASE (HCC): Primary | ICD-10-CM

## 2025-06-23 DIAGNOSIS — H81.12 BENIGN PAROXYSMAL POSITIONAL VERTIGO OF LEFT EAR: Primary | ICD-10-CM

## 2025-06-23 DIAGNOSIS — G30.9 MILD COGNITIVE IMPAIRMENT (MCI) DUE TO ALZHEIMER'S DISEASE (HCC): Primary | ICD-10-CM

## 2025-06-23 LAB
CHOLEST SERPL-MCNC: 149 MG/DL (ref 0–199)
EST. AVERAGE GLUCOSE BLD GHB EST-MCNC: 111.2 MG/DL
HBA1C MFR BLD: 5.5 %
HDLC SERPL-MCNC: 64 MG/DL (ref 40–60)
LDLC SERPL CALC-MCNC: 63 MG/DL
TRIGL SERPL-MCNC: 112 MG/DL (ref 0–150)
VLDLC SERPL CALC-MCNC: 22 MG/DL

## 2025-06-23 PROCEDURE — 1036F TOBACCO NON-USER: CPT | Performed by: PSYCHOLOGIST

## 2025-06-23 PROCEDURE — 1123F ACP DISCUSS/DSCN MKR DOCD: CPT | Performed by: PSYCHOLOGIST

## 2025-06-23 PROCEDURE — 90832 PSYTX W PT 30 MINUTES: CPT | Performed by: PSYCHOLOGIST

## 2025-06-23 PROCEDURE — 1111F DSCHRG MED/CURRENT MED MERGE: CPT | Performed by: NURSE PRACTITIONER

## 2025-06-23 NOTE — CARE COORDINATION
Care Transitions Note    Initial Call - Call within 2 business days of discharge: Yes    Patient Current Location:  Home: [address] [place] [id number]    Care Transition Nurse contacted the patient by telephone to perform post hospital discharge assessment, verified name and  as identifiers.  Provided introduction to self, and explanation of the Care Transition Nurse role.    Patient: Shelby Cordero    Patient : 1955   MRN: [id number]    Reason for Admission: dizziness  Discharge Date: 25  RURS: No data recorded    Last Discharge Facility       Date Complaint Diagnosis Description Type Department Provider    25 Dizziness Benign paroxysmal positional vertigo of left ear ... ED to Hosp-Admission (Discharged) (ADMITTED) Tova Perdomo MD; Samia Fine ...            Was this an external facility discharge? No    Additional needs identified to be addressed with provider   No needs identified         Method of communication with provider: none.    Patients top risk factors for readmission: functional physical ability    Interventions to address risk factors:   Review of patient management of conditions/medications: dizziness    Care Summary Note: Patient answered call and verified . Patient pleasant and agreeable to transition call. Patient is doing well since discharge. Stated dizziness is significantly lessened. Taking meclizine as needed. Patient has not scheduled outpatient vestibular treatment d/t dizziness getting better and was instructed that if vertigo resolves, then outpatient treatment was not needed.  Confirmed she has all medications and reviewed in system. Patient has already scheduled HFU w/ PCP and noted in system. Denied any acute needs at present time.  Agreeable to f/u calls.  Educated on the use of urgent care or physician’s 24 hr access line if assistance is needed after hours.    Care Transition Nurse reviewed discharge instructions, medical action

## 2025-06-23 NOTE — TELEPHONE ENCOUNTER
Care Transitions Initial Follow Up Call    Outreach made within 2 business days of discharge: Yes    Patient: Shelby Cordero Patient : 1955   MRN: [id number]  Reason for Admission: TIA, vertigo  Discharge Date: 25       Spoke with: Patient     Discharge department/facility: [facility] Interactive Patient Contact:  Was patient able to fill all prescriptions: Yes  Was patient instructed to bring all medications to the follow-up visit: Yes  Is patient taking all medications as directed in the discharge summary? Yes  Does patient understand their discharge instructions: Yes  Does patient have questions or concerns that need addressed prior to 7-14 day follow up office visit: no        Scheduled appointment with PCP within 7-14 days    Follow Up  Future Appointments   Date Time Provider Department Center   2025  2:30 PM Luma Orantes PSYD [facility]   2025  1:00 PM Nancy Pan APRN - CNP [facility] DE[facility]   2025 11:00 AM Nancy Pan APRN - CNP [facility]       Lucy Begum LPN

## 2025-06-23 NOTE — PROGRESS NOTES
follow up interval in response to severity of symptoms, impact on functioning, and patient's goals.      F/u in 4 weeks.        Electronically signed by Luma Orantes PSYD on 6/23/2025 at 2:21 PM     An electronic signature was used to authenticate this note.

## 2025-06-24 NOTE — DISCHARGE SUMMARY
Pt discharged home alone, wheeled to visit brother admitted on C3. Prescription picked up from pharmacy. Discharge instructions given and pt denies any further questions at this time.  
demonstrate no occlusion or stenosis. No evidence for aneurysm or arteriovenous malformation. POSTERIOR CIRCULATION: The bilateral vertebral arteries, basilar artery and posterior cerebral arteries demonstrate no occlusion or stenosis. No evidence for aneurysm or arteriovenous malformation. INTRACRANIAL VENOUS SYSTEM: No evidence for intracranial venous thrombosis. Refer to recent noncontrast CT of the head for additional results. IMPRESSION: No significant intracranial vascular pathology identified. Electronically signed by Lonnie Nieto MD    CT HEAD WO CONTRAST  Result Date: 6/19/2025  CT HEAD WITHOUT CONTRAST HISTORY: Right-sided headache COMPARISON: 4/4/2025 FINDINGS: The ventricles are normal in size and configuration with no midline shift or mass effect. There is no evidence of intracranial hemorrhage or abnormal extra-axial fluid collection. There are no definite signs to suggest acute infarction. However, if there is concern of early infarction or other subtle intracranial abnormality, MRI correlation should be considered. Visualized portions of the paranasal sinuses appear clear.     No evidence of acute intracranial abnormality. Electronically signed by Lonnie Nieto MD      Consults:     IP CONSULT TO NEUROLOGY  IP CONSULT TO CASE MANAGEMENT  IP CONSULT TO SPIRITUAL SERVICES    Labs:     No results for input(s): \"WBC\", \"HGB\", \"HCT\", \"PLT\" in the last 72 hours.  No results for input(s): \"NA\", \"K\", \"CL\", \"CO2\", \"BUN\", \"CREATININE\", \"CALCIUM\", \"MG\", \"PHOS\" in the last 72 hours.  No results for input(s): \"PROBNP\", \"TROPHS\" in the last 72 hours.  No results for input(s): \"LABA1C\" in the last 72 hours.  No results for input(s): \"AST\", \"ALT\", \"BILIDIR\", \"BILITOT\", \"ALKPHOS\" in the last 72 hours.  No results for input(s): \"INR\", \"LACTA\", \"TSH\" in the last 72 hours.    Urine Cultures:   Lab Results   Component Value Date/Time    LABURIN  05/11/2025 01:47 PM     <50,000 CFU/ml mixed skin/urogenital chapito. No

## 2025-06-26 ENCOUNTER — TELEMEDICINE (OUTPATIENT)
Dept: FAMILY MEDICINE CLINIC | Age: 70
End: 2025-06-26

## 2025-06-26 DIAGNOSIS — Z09 HOSPITAL DISCHARGE FOLLOW-UP: ICD-10-CM

## 2025-06-26 DIAGNOSIS — I10 HTN (HYPERTENSION), BENIGN: ICD-10-CM

## 2025-06-26 DIAGNOSIS — H81.12 BENIGN PAROXYSMAL POSITIONAL VERTIGO OF LEFT EAR: ICD-10-CM

## 2025-06-26 DIAGNOSIS — G45.9 TIA (TRANSIENT ISCHEMIC ATTACK): Primary | ICD-10-CM

## 2025-06-26 NOTE — PROGRESS NOTES
angiography    CAD S/P percutaneous coronary angioplasty    Cubital tunnel syndrome, bilateral    Mild episode of recurrent major depressive disorder    Gastroesophageal reflux disease    Chronic systolic (congestive) heart failure    PAF (paroxysmal atrial fibrillation) (HCC)    History of stroke    TIA involving right internal carotid artery    HTN (hypertension), benign    TIA (transient ischemic attack)    Benign paroxysmal positional vertigo of left ear       Medications listed as ordered at the time of discharge from hospital     Medication List            Accurate as of June 26, 2025  1:13 PM. If you have any questions, ask your nurse or doctor.                CONTINUE taking these medications      apixaban 5 MG Tabs tablet  Commonly known as: Eliquis  Take 1 tablet by mouth 2 times daily     buPROPion 75 MG tablet  Commonly known as: WELLBUTRIN  TAKE 2 TABLETS BY MOUTH EVERY MORNING     cetirizine 5 MG tablet  Commonly known as: ZYRTEC     citalopram 20 MG tablet  Commonly known as: CELEXA  Take 1 tablet by mouth every morning     clopidogrel 75 MG tablet  Commonly known as: PLAVIX  Take 1 tablet by mouth daily     dextromethorphan-guaiFENesin  MG per extended release tablet  Commonly known as: MUCINEX DM     dicyclomine 10 MG capsule  Commonly known as: BENTYL  Take 1 capsule by mouth 3 times daily     donepezil 10 MG tablet  Commonly known as: ARICEPT  Take 1 tablet by mouth every morning     esomeprazole 20 MG delayed release capsule  Commonly known as: NexIUM  Take 1 capsule by mouth every morning (before breakfast)     HYDROcodone-acetaminophen 5-325 MG per tablet  Commonly known as: Norco  Take 1 tablet by mouth daily as needed for Pain for up to 15 days. May take instead of tramadol when pain is increased Max Daily Amount: 1 tablet     meclizine 12.5 MG tablet  Commonly known as: ANTIVERT  Take 1 tablet by mouth 3 times daily as needed for Dizziness     memantine 10 MG tablet  Commonly known as:

## 2025-06-30 ENCOUNTER — CARE COORDINATION (OUTPATIENT)
Dept: CASE MANAGEMENT | Age: 70
End: 2025-06-30

## 2025-06-30 NOTE — CARE COORDINATION
Care Transitions Note    Follow Up Call     Patient Current Location:  Home: [address] [address]    Care Transition Nurse contacted the patient by telephone. Verified name and  as identifiers.    Additional needs identified to be addressed with provider   No needs identified           Method of communication with provider: none.    Care Summary Note: Patient answered call and verified . Patient pleasant and agreeable to transition call. Doing \"fine\" and dizziness is occasional. Patient notices dizziness with moving from lying to sitting up position. Denied any falls and getting her footing before walking. Taking all medications as directed. Seen by PCP for HFU visit last week and went well. Denied any acute needs at present time.  Agreeable to f/u calls.  Educated on the use of urgent care or physician’s 24 hr access line if assistance is needed after hours.    Plan of care updates since last contact:  Review of patient management of conditions/medications:         Advance Care Planning:   Does patient have an Advance Directive: reviewed during previous call, see note. .    Medication Review:  Medications changed since last call, reviewed today.     Remote Patient Monitoring:  Offered patient enrollment in the Remote Patient Monitoring (RPM) program for in-home monitoring: Yes, but did not enroll at this time: controlled chronic disease management.    Assessments:  Care Transitions Subsequent and Final Call    Subsequent and Final Calls  Care Transitions Interventions  Other Interventions:              Follow Up Appointment:   Reviewed upcoming appointment(s). and SHERRY appointment attended as scheduled   Future Appointments         Provider Specialty Dept Phone    2025 11:00 AM (Arrive by 10:45 AM) Nancy Pan APRN - CNP Family Medicine [phone]    2025 11:30 AM Luma Orantes PSYD Psychology [phone]    2025 9:00 AM Dc Robb MD Neurology

## 2025-07-02 DIAGNOSIS — M79.7 FIBROMYALGIA: ICD-10-CM

## 2025-07-03 RX ORDER — METHOCARBAMOL 500 MG/1
TABLET, FILM COATED ORAL
Qty: 40 TABLET | Refills: 1 | Status: SHIPPED | OUTPATIENT
Start: 2025-07-03

## 2025-07-03 NOTE — TELEPHONE ENCOUNTER
Refill Request     CONFIRM preferred pharmacy with the patient.    If Mail Order Rx - Pend for 90 day refill.      Last Seen: Last Seen Department: 6/26/2025  Last Seen by PCP: 6/26/2025    Last Written: 5/5/25 40 with 1 refill     If no future appointment scheduled:  Review the last OV with PCP and review information for follow-up visit,  Route STAFF MESSAGE with patient name to the  Pool for scheduling with the following information:            -  Timing of next visit           -  Visit type ie Physical, OV, etc           -  Diagnoses/Reason ie. COPD, HTN - Do not use MEDICATION, Follow-up or CHECK UP - Give reason for visit      Next Appointment:   Future Appointments   Date Time Provider Department Center   7/21/2025 11:00 AM Nancy Pan APRN - CNP [facility]   9/25/2025  9:00 AM Dc Robb MD AND [facility]URO Neurology -       Message sent to  to schedule appt with patient?  NO      Requested Prescriptions     Pending Prescriptions Disp Refills    methocarbamol (ROBAXIN) 500 MG tablet [Pharmacy Med Name: METHOCARBAMOL 500 MG TABLET] 40 tablet 1     Sig: TAKE 1 TABLET BY MOUTH 3 TIMES A DAY AS NEEDED FOR SPASMS

## 2025-07-07 DIAGNOSIS — R42 VERTIGO: Primary | ICD-10-CM

## 2025-07-07 RX ORDER — MECLIZINE HCL 12.5 MG 12.5 MG/1
12.5 TABLET ORAL 3 TIMES DAILY PRN
Qty: 30 TABLET | Refills: 0 | Status: SHIPPED | OUTPATIENT
Start: 2025-07-07 | End: 2025-07-17

## 2025-07-08 ENCOUNTER — CARE COORDINATION (OUTPATIENT)
Dept: CASE MANAGEMENT | Age: 70
End: 2025-07-08

## 2025-07-08 NOTE — CARE COORDINATION
Care Transitions Note    Follow Up Call     Patient Current Location:  Home: [address] [address]    Care Transition Nurse contacted the patient by telephone. Verified name and  as identifiers.    Additional needs identified to be addressed with provider   No needs identified         Method of communication with provider: none.    Care Summary Note: Patient answered call and verified . Patient pleasant and agreeable to transition call. Patient doing well and decrease in amount of vertigo. Patient received refill of antivert. Still feeling off balance, but denied any falls.  BP WNL (unable to remember)   HR 72 and O2 sat 97% on RA  and confirms she has equipment to check vital signs. Declined RPM enrollment. Taking all medications as directed. Denied any acute needs at present time.  Agreeable to f/u calls.  Educated on the use of urgent care or physician’s 24 hr access line if assistance is needed after hours.        Plan of care updates since last contact:  Education: vertigo    Advance Care Planning:   Does patient have an Advance Directive: reviewed during previous call, see note. .    Medication Review:  Full medication review completed during previous call.    Remote Patient Monitoring:  Offered patient enrollment in the Remote Patient Monitoring (RPM) program for in-home monitoring: Yes, but did not enroll at this time: controlled chronic disease management and already monitoring with home equipment.    Assessments:  Care Transitions Subsequent and Final Call    Subsequent and Final Calls  Care Transitions Interventions  Other Interventions:              Follow Up Appointment:   Reviewed upcoming appointment(s). and SHERRY appointment attended as scheduled   Future Appointments         Provider Specialty Dept Phone    2025 11:00 AM (Arrive by 10:45 AM) Nancy Pan APRN - CNP Family Medicine [phone]    2025 11:30 AM Luma Orantes PSYD Psychology [phone]

## 2025-07-14 ENCOUNTER — OFFICE VISIT (OUTPATIENT)
Dept: ORTHOPEDIC SURGERY | Age: 70
End: 2025-07-14
Payer: MEDICARE

## 2025-07-14 VITALS — BODY MASS INDEX: 25.83 KG/M2 | HEIGHT: 65 IN | WEIGHT: 155 LBS

## 2025-07-14 DIAGNOSIS — R52 PAIN: Primary | ICD-10-CM

## 2025-07-14 DIAGNOSIS — M22.41 CHONDROMALACIA OF RIGHT PATELLOFEMORAL JOINT: ICD-10-CM

## 2025-07-14 PROCEDURE — G8419 CALC BMI OUT NRM PARAM NOF/U: HCPCS | Performed by: PHYSICIAN ASSISTANT

## 2025-07-14 PROCEDURE — G8428 CUR MEDS NOT DOCUMENT: HCPCS | Performed by: PHYSICIAN ASSISTANT

## 2025-07-14 PROCEDURE — 1123F ACP DISCUSS/DSCN MKR DOCD: CPT | Performed by: PHYSICIAN ASSISTANT

## 2025-07-14 PROCEDURE — G8399 PT W/DXA RESULTS DOCUMENT: HCPCS | Performed by: PHYSICIAN ASSISTANT

## 2025-07-14 PROCEDURE — 99204 OFFICE O/P NEW MOD 45 MIN: CPT | Performed by: PHYSICIAN ASSISTANT

## 2025-07-14 PROCEDURE — 1036F TOBACCO NON-USER: CPT | Performed by: PHYSICIAN ASSISTANT

## 2025-07-14 PROCEDURE — 3017F COLORECTAL CA SCREEN DOC REV: CPT | Performed by: PHYSICIAN ASSISTANT

## 2025-07-14 PROCEDURE — 1090F PRES/ABSN URINE INCON ASSESS: CPT | Performed by: PHYSICIAN ASSISTANT

## 2025-07-14 RX ORDER — METHYLPREDNISOLONE 4 MG/1
TABLET ORAL
Qty: 1 KIT | Refills: 0 | Status: SHIPPED | OUTPATIENT
Start: 2025-07-14

## 2025-07-14 NOTE — PROGRESS NOTES
Dr Kodak Canas      Date /Time 7/14/2025       6:13 PM EDT  Name Shelby Cordero             1955   Location  [facility]  MRN [id number]                Chief Complaint   Patient presents with    Knee Pain     [facility] ([facility])        History of Present Illness  Shelby Cordero is a 70 y.o. female who presents with  right knee pain, .    Sent in consultation by Nancy Pan, LUCIA - CNP, .      Injury Mechanism:  none.  Worker's Comp. & legal issues:   none.  Previous Treatments: Ice, Heat, and NSAIDs    Patient presents to the after-hours clinic with a chief complaint of right knee pain.  Patient's knee has had no recent injury or trauma.  She may have had an injury in high school.  Over the last several weeks her pain is gradually increased again without any new injury or trauma.  Symptoms mostly concentrated over her patellofemoral joint.  Increased pain when going up and down stairs pain    Past History  Past Medical History:   Diagnosis Date    Allergic rhinitis     Anxiety     Atrial fibrillation ([facility]) June 2023    Bilateral carpal tunnel syndrome 04/13/2021    CAD (coronary artery disease) 06/05/2019    CHF (congestive heart failure) ([facility])     Systolic    Congenital heart disease     COVID-19 12/28/2020    Depression     Endometriosis     hx    Fibromyalgia     GERD (gastroesophageal reflux disease)     Headache(784.0)     Hearing loss     Hiatal hernia     Hyperlipidemia     Irritable bowel syndrome     Left bundle branch block (LBBB)     Osteoarthritis     SVT (supraventricular tachycardia)     hx     WPW (Steffanie-Parkinson-White syndrome)      Past Surgical History:   Procedure Laterality Date    ABLATION OF DYSRHYTHMIC FOCUS  1999    SVT    APPENDECTOMY  1982    ARM SURGERY Left 05/04/2021    LEFT ULNAR NERVE DECOMPRESSION AT ELBOW performed by Jeff Peraza MD at [facility] OR    ARM SURGERY Right 07/06/2021    RIGHT ULNAR NERVE DECOMPRESSION AT ELBOW performed by Jeff

## 2025-07-15 ENCOUNTER — CARE COORDINATION (OUTPATIENT)
Dept: CASE MANAGEMENT | Age: 70
End: 2025-07-15

## 2025-07-15 NOTE — CARE COORDINATION
Care Transitions Note    Follow Up Call     Patient Current Location:  Home: [address] [id number]    Care Transition Nurse contacted the patient by telephone. Verified name and  as identifiers.    Additional needs identified to be addressed with provider   No needs identified         Method of communication with provider: none.    Care Summary Note: Patient answered call and verified . Patient pleasant and agreeable to transition call. Patient is doing \"alright\", but continues with some vertigo episodes from sitting to standing position. Patient continues to take Antivert with good relief. Monitors vital signs with home equipment and \"all good\". Denied RPM enrollment at this time.   Denied any acute needs at present time.  Agreeable to f/u calls.  Educated on the use of urgent care or physician’s 24 hr access line if assistance is needed after hours.      Plan of care updates since last contact:  Education: vertigo       Advance Care Planning:   Does patient have an Advance Directive: reviewed during previous call, see note. .    Medication Review:  Full medication review completed during previous call.    Remote Patient Monitoring:  Offered patient enrollment in the Remote Patient Monitoring (RPM) program for in-home monitoring: Yes, but did not enroll at this time: already monitoring with home equipment.    Assessments:  Care Transitions Subsequent and Final Call    Subsequent and Final Calls  Care Transitions Interventions  Other Interventions:              Follow Up Appointment:   Reviewed upcoming appointment(s). and SHERRY appointment attended as scheduled   Future Appointments         Provider Specialty Dept Phone    2025 9:30 AM Bubba Umana MD Orthopedic Surgery [phone]    2025 11:00 AM (Arrive by 10:45 AM) Nancy Pan, LUCIA - CNP Family Medicine [phone]    2025 11:30 AM Luma Orantes PSYD Psychology [phone]    2025 9:00 AM Cezar

## 2025-07-17 ENCOUNTER — PATIENT MESSAGE (OUTPATIENT)
Dept: FAMILY MEDICINE CLINIC | Age: 70
End: 2025-07-17

## 2025-07-18 ENCOUNTER — TELEPHONE (OUTPATIENT)
Dept: CARDIOLOGY CLINIC | Age: 70
End: 2025-07-18

## 2025-07-18 RX ORDER — CLOPIDOGREL BISULFATE 75 MG/1
75 TABLET ORAL DAILY
Qty: 90 TABLET | Refills: 3 | Status: SHIPPED | OUTPATIENT
Start: 2025-07-18 | End: 2025-07-18 | Stop reason: SDUPTHER

## 2025-07-18 RX ORDER — CLOPIDOGREL BISULFATE 75 MG/1
75 TABLET ORAL DAILY
Qty: 90 TABLET | Refills: 0 | Status: SHIPPED | OUTPATIENT
Start: 2025-07-18

## 2025-07-18 RX ORDER — CLOPIDOGREL BISULFATE 75 MG/1
75 TABLET ORAL DAILY
Qty: 90 TABLET | Refills: 0 | Status: CANCELLED | OUTPATIENT
Start: 2025-07-18

## 2025-07-18 NOTE — TELEPHONE ENCOUNTER
[facility] Medication Refills:    clopidogrel (PLAVIX) 75 MG tablet [[id number]]    Order Details  Dose: 75 mg Route: Oral Frequency: DAILY   Dispense Quantity: 90 tablet Refills: 3          Sig: Take 1 tablet by mouth daily     Patient stated she doesn't have anymore left and she missed a dose last night.    Last Office Visit: 7/1/24    Next Office Visit: 7/23/25    [facility] [id number] [id number] [facility] [address] - P [phone] - F [phone] [[id number]]

## 2025-07-18 NOTE — TELEPHONE ENCOUNTER
Spoke with patient.  Advised patient that this medication is managed by [facility] cardiologist.  Advised patient to call that office to get the refill.  Patient stated understanding.

## 2025-07-22 ENCOUNTER — CARE COORDINATION (OUTPATIENT)
Dept: CASE MANAGEMENT | Age: 70
End: 2025-07-22

## 2025-07-22 NOTE — CARE COORDINATION
Care Transitions Note    Final Call     Patient Current Location:  Home: [address] [id number]    Care Transition Nurse contacted the patient by telephone. Verified name and  as identifiers.    Patient graduated from the Care Transitions program on 25.  Patient/family verbalizes confidence in the ability to self-manage at this time. has the ability to self-manage at this time..      Advance Care Planning:   Does patient have an Advance Directive: reviewed during previous call, see note. .    Handoff:   Patient was not referred to the ACM team due to no additional needs identified.       Care Summary Note: Patient answered call and verified . Patient pleasant and agreeable to final transition call. Doing well, but continues with occasional episodes of vertigo. Patient did state she has ENT established and would reach out for follow up if symptoms persist. Patient taking all medications as directed. Follow up appts reviewed in system and denied any acute needs at present time.  declined ACM support needed.   Educated on the use of urgent care or physician’s 24 hr access line if assistance is needed after hours.        Assessments:  Care Transitions Subsequent and Final Call    Subsequent and Final Calls  Care Transitions Interventions  Other Interventions:              Upcoming Appointments:    Future Appointments         Provider Specialty Dept Phone    2025 2:30 PM Dawn Higgins, LUCIA - CNP Cardiology [phone]    2025 11:30 AM Luma Orantes PSYD Psychology [phone]    2025 9:00 AM Dc Robb MD Neurology [phone]            Patient has agreed to contact primary care provider and/or specialist for any further questions, concerns, or needs.    Christina Hoyos RN

## 2025-07-23 ENCOUNTER — OFFICE VISIT (OUTPATIENT)
Dept: CARDIOLOGY CLINIC | Age: 70
End: 2025-07-23
Payer: MEDICARE

## 2025-07-23 VITALS
SYSTOLIC BLOOD PRESSURE: 102 MMHG | BODY MASS INDEX: 25.49 KG/M2 | DIASTOLIC BLOOD PRESSURE: 62 MMHG | WEIGHT: 153 LBS | HEART RATE: 84 BPM | HEIGHT: 65 IN

## 2025-07-23 DIAGNOSIS — Z09 FOLLOW-UP EXAM: Primary | ICD-10-CM

## 2025-07-23 PROCEDURE — G8399 PT W/DXA RESULTS DOCUMENT: HCPCS | Performed by: NURSE PRACTITIONER

## 2025-07-23 PROCEDURE — G8419 CALC BMI OUT NRM PARAM NOF/U: HCPCS | Performed by: NURSE PRACTITIONER

## 2025-07-23 PROCEDURE — 3078F DIAST BP <80 MM HG: CPT | Performed by: NURSE PRACTITIONER

## 2025-07-23 PROCEDURE — 1159F MED LIST DOCD IN RCRD: CPT | Performed by: NURSE PRACTITIONER

## 2025-07-23 PROCEDURE — 1090F PRES/ABSN URINE INCON ASSESS: CPT | Performed by: NURSE PRACTITIONER

## 2025-07-23 PROCEDURE — 1160F RVW MEDS BY RX/DR IN RCRD: CPT | Performed by: NURSE PRACTITIONER

## 2025-07-23 PROCEDURE — 3017F COLORECTAL CA SCREEN DOC REV: CPT | Performed by: NURSE PRACTITIONER

## 2025-07-23 PROCEDURE — 1036F TOBACCO NON-USER: CPT | Performed by: NURSE PRACTITIONER

## 2025-07-23 PROCEDURE — G8427 DOCREV CUR MEDS BY ELIG CLIN: HCPCS | Performed by: NURSE PRACTITIONER

## 2025-07-23 PROCEDURE — 99215 OFFICE O/P EST HI 40 MIN: CPT | Performed by: NURSE PRACTITIONER

## 2025-07-23 PROCEDURE — 3074F SYST BP LT 130 MM HG: CPT | Performed by: NURSE PRACTITIONER

## 2025-07-23 PROCEDURE — 1123F ACP DISCUSS/DSCN MKR DOCD: CPT | Performed by: NURSE PRACTITIONER

## 2025-07-23 NOTE — PROGRESS NOTES
[facility] [place]     Outpatient Follow Up Note  Dawn Higgins RN, APRN,CNP     History of Present Illness    VSS wt stable on Plavix  & Eliquis no asa no NSAIDS only tylenol prn   LHC no sign stenosis in 2022   CAD status post stenting, left bundle branch block, SVT, Steffanie-Parkinson-White status post ablation, diverticulitis, depressionfibromyalgia anxiety GERD / new onset dementia   TIA  / migraines cont Eliquis and plavix   Hx pAF     7/1/2024 Holter  6 days Patient had a min HR of 50 bpm, max HR of 108 bpm, and avg HR of 69  bpm. Predominant underlying rhythm was Sinus Rhythm. Bundle Branch  Block/IVCD was present. Isolated SVEs were rare (<1.0%), SVE Couplets  were rare (<1.0%), and no SVE Triplets were present. Isolated VEs were  rare (<1.0%), and no VE Couplets or VE Triplets were present. Inverted  QRS complexes possibly due to inverted placement of device.    TIA 6/2024 MRI CT  1. No acute infarct or other acute intracranial process.  2. Moderate chronic microvascular ischemic changes.        TTE 6/2024     Left Ventricle: Mildly reduced left ventricular systolic function with a visually estimated EF of 45 - 50%. Left ventricle size is normal. Mildly increased wall thickness. Mild hypokinesis of the following segments, anterior and anteroseptal walls. Grade I diastolic dysfunction with normal LAP.    Aortic Valve: Trileaflet valve. Mild sclerosis of the aortic valve cusp. No regurgitation. No stenosis.    Mitral Valve: Trace regurgitation.    Tricuspid Valve: Trace regurgitation. Est RA pressure is 3 mmHg. The estimated RVSP is 32 mmHg.    Interatrial Septum: Agitated saline study was negative for intracardiac shunt.    Pericardium: No pericardial effusion.    IVC/SVC: IVC was not well visualized.     Us carotid 6/2024  <50 % zafar      8/22/2023 TTE Normal left ventricular size with mild left ventricular hypertrophy. Left ventricular systolic function is low-normal with ejection fraction estimated at

## 2025-07-29 ENCOUNTER — TELEMEDICINE (OUTPATIENT)
Dept: PSYCHOLOGY | Age: 70
End: 2025-07-29
Payer: MEDICARE

## 2025-07-29 DIAGNOSIS — G30.9 MILD COGNITIVE IMPAIRMENT (MCI) DUE TO ALZHEIMER'S DISEASE (HCC): Primary | ICD-10-CM

## 2025-07-29 DIAGNOSIS — G31.84 MILD COGNITIVE IMPAIRMENT (MCI) DUE TO ALZHEIMER'S DISEASE (HCC): Primary | ICD-10-CM

## 2025-07-29 PROCEDURE — 1123F ACP DISCUSS/DSCN MKR DOCD: CPT | Performed by: PSYCHOLOGIST

## 2025-07-29 PROCEDURE — 1036F TOBACCO NON-USER: CPT | Performed by: PSYCHOLOGIST

## 2025-07-29 PROCEDURE — 90832 PSYTX W PT 30 MINUTES: CPT | Performed by: PSYCHOLOGIST

## 2025-07-29 ASSESSMENT — ANXIETY QUESTIONNAIRES
5. BEING SO RESTLESS THAT IT IS HARD TO SIT STILL: NOT AT ALL
2. NOT BEING ABLE TO STOP OR CONTROL WORRYING: SEVERAL DAYS
1. FEELING NERVOUS, ANXIOUS, OR ON EDGE: SEVERAL DAYS
4. TROUBLE RELAXING: NOT AT ALL
3. WORRYING TOO MUCH ABOUT DIFFERENT THINGS: NOT AT ALL
1. FEELING NERVOUS, ANXIOUS, OR ON EDGE: SEVERAL DAYS
7. FEELING AFRAID AS IF SOMETHING AWFUL MIGHT HAPPEN: NOT AT ALL
6. BECOMING EASILY ANNOYED OR IRRITABLE: NOT AT ALL
IF YOU CHECKED OFF ANY PROBLEMS ON THIS QUESTIONNAIRE, HOW DIFFICULT HAVE THESE PROBLEMS MADE IT FOR YOU TO DO YOUR WORK, TAKE CARE OF THINGS AT HOME, OR GET ALONG WITH OTHER PEOPLE: NOT DIFFICULT AT ALL
IF YOU CHECKED OFF ANY PROBLEMS ON THIS QUESTIONNAIRE, HOW DIFFICULT HAVE THESE PROBLEMS MADE IT FOR YOU TO DO YOUR WORK, TAKE CARE OF THINGS AT HOME, OR GET ALONG WITH OTHER PEOPLE: NOT DIFFICULT AT ALL
4. TROUBLE RELAXING: NOT AT ALL
6. BECOMING EASILY ANNOYED OR IRRITABLE: NOT AT ALL
GAD7 TOTAL SCORE: 2
5. BEING SO RESTLESS THAT IT IS HARD TO SIT STILL: NOT AT ALL
3. WORRYING TOO MUCH ABOUT DIFFERENT THINGS: NOT AT ALL
2. NOT BEING ABLE TO STOP OR CONTROL WORRYING: SEVERAL DAYS
7. FEELING AFRAID AS IF SOMETHING AWFUL MIGHT HAPPEN: NOT AT ALL

## 2025-07-29 ASSESSMENT — PATIENT HEALTH QUESTIONNAIRE - PHQ9
7. TROUBLE CONCENTRATING ON THINGS, SUCH AS READING THE NEWSPAPER OR WATCHING TELEVISION: NOT AT ALL
SUM OF ALL RESPONSES TO PHQ QUESTIONS 1-9: 0
SUM OF ALL RESPONSES TO PHQ QUESTIONS 1-9: 0
9. THOUGHTS THAT YOU WOULD BE BETTER OFF DEAD, OR OF HURTING YOURSELF: NOT AT ALL
8. MOVING OR SPEAKING SO SLOWLY THAT OTHER PEOPLE COULD HAVE NOTICED. OR THE OPPOSITE - BEING SO FIDGETY OR RESTLESS THAT YOU HAVE BEEN MOVING AROUND A LOT MORE THAN USUAL: NOT AT ALL
SUM OF ALL RESPONSES TO PHQ QUESTIONS 1-9: 0
SUM OF ALL RESPONSES TO PHQ QUESTIONS 1-9: 0
4. FEELING TIRED OR HAVING LITTLE ENERGY: NOT AT ALL
8. MOVING OR SPEAKING SO SLOWLY THAT OTHER PEOPLE COULD HAVE NOTICED. OR THE OPPOSITE, BEING SO FIGETY OR RESTLESS THAT YOU HAVE BEEN MOVING AROUND A LOT MORE THAN USUAL: NOT AT ALL
5. POOR APPETITE OR OVEREATING: NOT AT ALL
1. LITTLE INTEREST OR PLEASURE IN DOING THINGS: NOT AT ALL
1. LITTLE INTEREST OR PLEASURE IN DOING THINGS: NOT AT ALL
4. FEELING TIRED OR HAVING LITTLE ENERGY: NOT AT ALL
6. FEELING BAD ABOUT YOURSELF - OR THAT YOU ARE A FAILURE OR HAVE LET YOURSELF OR YOUR FAMILY DOWN: NOT AT ALL
6. FEELING BAD ABOUT YOURSELF - OR THAT YOU ARE A FAILURE OR HAVE LET YOURSELF OR YOUR FAMILY DOWN: NOT AT ALL
10. IF YOU CHECKED OFF ANY PROBLEMS, HOW DIFFICULT HAVE THESE PROBLEMS MADE IT FOR YOU TO DO YOUR WORK, TAKE CARE OF THINGS AT HOME, OR GET ALONG WITH OTHER PEOPLE: NOT DIFFICULT AT ALL
7. TROUBLE CONCENTRATING ON THINGS, SUCH AS READING THE NEWSPAPER OR WATCHING TELEVISION: NOT AT ALL
10. IF YOU CHECKED OFF ANY PROBLEMS, HOW DIFFICULT HAVE THESE PROBLEMS MADE IT FOR YOU TO DO YOUR WORK, TAKE CARE OF THINGS AT HOME, OR GET ALONG WITH OTHER PEOPLE: NOT DIFFICULT AT ALL
2. FEELING DOWN, DEPRESSED OR HOPELESS: NOT AT ALL
9. THOUGHTS THAT YOU WOULD BE BETTER OFF DEAD, OR OF HURTING YOURSELF: NOT AT ALL
5. POOR APPETITE OR OVEREATING: NOT AT ALL
2. FEELING DOWN, DEPRESSED OR HOPELESS: NOT AT ALL
3. TROUBLE FALLING OR STAYING ASLEEP: NOT AT ALL
SUM OF ALL RESPONSES TO PHQ QUESTIONS 1-9: 0
3. TROUBLE FALLING OR STAYING ASLEEP: NOT AT ALL

## 2025-07-29 NOTE — PROGRESS NOTES
Behavioral Health Consultation  Luma Orantes PsyD  Clinical Psychologist  7/29/2025    Name: Shelby Cordero  YOB: 1955  PCP: Nancy Pan APRN - CNP     TELEHEALTH VISIT -- Audio/Visual (During COVID-19 public health emergency)  Time spent with Shelby: 30 minutes  This is her third [facility] appointment.  Reason for Consult: inattention, stress management        Shelby Cordero, was evaluated through a synchronous (real-time) audio-video encounter. The patient (or guardian if applicable) is aware that this is a billable service, which includes applicable co-pays. This Virtual Visit was conducted with patient's (and/or legal guardian's) consent. Patient identification was verified, and a caregiver was present when appropriate.   The patient was located at Home: [address] [place] [address]  Provider was located at Other: [place]  Confirm you are appropriately licensed, registered, or certified to deliver care in the state where the patient is located as indicated above. If you are not or unsure, please re-schedule the visit: Yes, I confirm.      Total time spent for this encounter: 30 min    --Luma Orantes PSYD on 7/29/2025 at 12:54 PM    An electronic signature was used to authenticate this note.       S:  Shelby is a 70 y.o. female seen per Nancy Pan APRN - CNP addressing \"trouble dealing with stress\". 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      Shelby endorses caregiver stress with caring for her , who had dementia, and her brother who has severe and persistent mental illness.  She also reports worry about others' health, often feeling responsible for their well being.  Targeted anxiety with CBT-based intervention including cognitive restructuring.  Also discussed difficulty with organization with Motivational Interviewing and

## 2025-07-30 ENCOUNTER — OFFICE VISIT (OUTPATIENT)
Dept: ORTHOPEDIC SURGERY | Age: 70
End: 2025-07-30

## 2025-07-30 VITALS — HEIGHT: 65 IN | WEIGHT: 153 LBS | BODY MASS INDEX: 25.49 KG/M2

## 2025-07-30 DIAGNOSIS — M65.961 SYNOVITIS OF RIGHT KNEE: ICD-10-CM

## 2025-07-30 DIAGNOSIS — M17.11 PRIMARY OSTEOARTHRITIS OF RIGHT KNEE: ICD-10-CM

## 2025-07-30 DIAGNOSIS — M25.561 RIGHT KNEE PAIN, UNSPECIFIED CHRONICITY: Primary | ICD-10-CM

## 2025-07-30 DIAGNOSIS — M22.41 CHONDROMALACIA OF PATELLA, RIGHT: ICD-10-CM

## 2025-07-30 RX ORDER — BETAMETHASONE SODIUM PHOSPHATE AND BETAMETHASONE ACETATE 3; 3 MG/ML; MG/ML
12 INJECTION, SUSPENSION INTRA-ARTICULAR; INTRALESIONAL; INTRAMUSCULAR; SOFT TISSUE ONCE
Status: COMPLETED | OUTPATIENT
Start: 2025-07-30 | End: 2025-07-30

## 2025-07-30 RX ORDER — BUPIVACAINE HYDROCHLORIDE 2.5 MG/ML
2 INJECTION, SOLUTION INFILTRATION; PERINEURAL ONCE
Status: COMPLETED | OUTPATIENT
Start: 2025-07-30 | End: 2025-07-30

## 2025-07-30 RX ADMIN — BETAMETHASONE SODIUM PHOSPHATE AND BETAMETHASONE ACETATE 12 MG: 3; 3 INJECTION, SUSPENSION INTRA-ARTICULAR; INTRALESIONAL; INTRAMUSCULAR; SOFT TISSUE at 09:42

## 2025-07-30 RX ADMIN — Medication 1 ML: at 09:43

## 2025-07-30 RX ADMIN — BUPIVACAINE HYDROCHLORIDE 5 MG: 2.5 INJECTION, SOLUTION INFILTRATION; PERINEURAL at 09:43

## 2025-07-30 NOTE — PROGRESS NOTES
Chief Complaint   Patient presents with    Knee Pain     [facility] R KNEE      Initial consultation newer onset of right anterior and anterior medial knee pain with difficulty walking and climbing stairs.    History of Present Illness  Shelby Cordero is a 70 y.o. female who presents with  right knee pain, .    Sent in consultation by Nancy Pan APRN - CNP, .      Injury Mechanism:  none.  Worker's Comp. & legal issues:   none.  Previous Treatments: Ice, Heat, and NSAIDs    Patient presents to the clinic with a chief complaint of right knee pain.  Patient's knee has had no recent injury or trauma.  She may have had an injury in high school.  Over the last several weeks her pain is gradually increased again without any new injury or trauma.  Symptoms mostly concentrated over her patellofemoral joint.  Increased pain when going up and down stairs pain  History of Present Illness  Shelby is being seen today upon referral from Danny Daniels for newer onset of right knee pain. She was diagnosed with osteoarthritis of her knee with chondromalacia on 07/14/2025 and is being seen today for follow-up and treatment recommendations.    She reports intermittent issues with her right knee, which began in early July 2025. The pain is localized to the front of the knee and is accompanied by clicking and popping sounds. She describes the pain as sharp during movement but not at rest, and it does not disturb her sleep. The pain intensifies when ascending or descending stairs, walking, or changing positions after prolonged sitting. She rates the pain as a 6 or 7 out of 10 during flare-ups. She also reports a sensation of instability in the knee but does not experience any locking or catching. Her left knee is unaffected. She enjoys walking for recreation. She has no history of diabetes or kidney issues. She has been applying ice and taking Tylenol for relief. A course of steroids was prescribed, which she estimates

## 2025-07-31 ENCOUNTER — TELEPHONE (OUTPATIENT)
Dept: ORTHOPEDIC SURGERY | Age: 70
End: 2025-07-31

## 2025-07-31 NOTE — TELEPHONE ENCOUNTER
SPOKE WITH PATIENT AND LET HER KNOW THAT [facility] HAVE BEEN AUTHORIZED WITH INSURANCE. PATIENT WOULD LIKE TO CALL INSURANCE FIRST TO SEE WHAT HER OUT OF POCKET COST WOULD BE. PROVIDED PATIENT WITH CODES TO GET THIS INFO. PATIENT WILL CALL BACK TO SCHEDULE

## 2025-08-04 ENCOUNTER — PATIENT MESSAGE (OUTPATIENT)
Dept: FAMILY MEDICINE CLINIC | Age: 70
End: 2025-08-04

## 2025-08-04 DIAGNOSIS — R42 VERTIGO: ICD-10-CM

## 2025-08-04 RX ORDER — MECLIZINE HCL 12.5 MG 12.5 MG/1
12.5 TABLET ORAL 3 TIMES DAILY PRN
Qty: 30 TABLET | Refills: 0 | Status: CANCELLED | OUTPATIENT
Start: 2025-08-04 | End: 2025-08-14

## 2025-08-04 RX ORDER — MECLIZINE HCL 12.5 MG 12.5 MG/1
12.5 TABLET ORAL 3 TIMES DAILY PRN
Qty: 30 TABLET | Refills: 0 | Status: SHIPPED | OUTPATIENT
Start: 2025-08-04 | End: 2025-08-14

## 2025-08-11 RX ORDER — ROSUVASTATIN CALCIUM 40 MG/1
TABLET, COATED ORAL
Qty: 90 TABLET | Refills: 1 | Status: SHIPPED | OUTPATIENT
Start: 2025-08-11

## 2025-08-14 ENCOUNTER — HOSPITAL ENCOUNTER (OUTPATIENT)
Dept: PHYSICAL THERAPY | Age: 70
Setting detail: THERAPIES SERIES
Discharge: HOME | End: 2025-08-14
Payer: MEDICARE

## 2025-08-14 DIAGNOSIS — R42 DIZZINESS: Primary | ICD-10-CM

## 2025-08-14 DIAGNOSIS — H81.12 BPPV (BENIGN PAROXYSMAL POSITIONAL VERTIGO), LEFT: ICD-10-CM

## 2025-08-14 PROCEDURE — 97112 NEUROMUSCULAR REEDUCATION: CPT

## 2025-08-14 PROCEDURE — 95992 CANALITH REPOSITIONING PROC: CPT

## 2025-08-14 PROCEDURE — 97161 PT EVAL LOW COMPLEX 20 MIN: CPT

## 2025-08-15 ENCOUNTER — TELEPHONE (OUTPATIENT)
Dept: FAMILY MEDICINE CLINIC | Age: 70
End: 2025-08-15

## 2025-08-18 DIAGNOSIS — G43.109 MIGRAINE WITH AURA AND WITHOUT STATUS MIGRAINOSUS, NOT INTRACTABLE: Primary | ICD-10-CM

## 2025-08-18 RX ORDER — SUMATRIPTAN 50 MG/1
TABLET, FILM COATED ORAL
Qty: 9 TABLET | Refills: 0 | Status: SHIPPED | OUTPATIENT
Start: 2025-08-18

## 2025-08-20 ENCOUNTER — HOSPITAL ENCOUNTER (OUTPATIENT)
Dept: PHYSICAL THERAPY | Age: 70
Setting detail: THERAPIES SERIES
End: 2025-08-20
Payer: MEDICARE

## 2025-08-21 DIAGNOSIS — F33.0 MILD EPISODE OF RECURRENT MAJOR DEPRESSIVE DISORDER: ICD-10-CM

## 2025-08-21 RX ORDER — BUPROPION HYDROCHLORIDE 75 MG/1
TABLET ORAL
Qty: 180 TABLET | Refills: 1 | Status: SHIPPED | OUTPATIENT
Start: 2025-08-21

## 2025-08-22 ENCOUNTER — HOSPITAL ENCOUNTER (EMERGENCY)
Age: 70
Discharge: HOME | End: 2025-08-23
Payer: MEDICARE

## 2025-08-22 VITALS
SYSTOLIC BLOOD PRESSURE: 144 MMHG | BODY MASS INDEX: 26.06 KG/M2 | OXYGEN SATURATION: 97 % | HEART RATE: 66 BPM | WEIGHT: 156.4 LBS | DIASTOLIC BLOOD PRESSURE: 82 MMHG | HEIGHT: 65 IN | TEMPERATURE: 98.1 F | RESPIRATION RATE: 18 BRPM

## 2025-08-22 DIAGNOSIS — R11.0 NAUSEA: ICD-10-CM

## 2025-08-22 DIAGNOSIS — K44.9 HIATAL HERNIA: ICD-10-CM

## 2025-08-22 DIAGNOSIS — R10.32 ABDOMINAL PAIN, LEFT LOWER QUADRANT: Primary | ICD-10-CM

## 2025-08-22 DIAGNOSIS — K57.32 DIVERTICULITIS OF COLON: ICD-10-CM

## 2025-08-22 LAB
ALBUMIN SERPL-MCNC: 4.5 G/DL (ref 3.4–5)
ALBUMIN/GLOB SERPL: 1.9 {RATIO} (ref 1.1–2.2)
ALP SERPL-CCNC: 100 U/L (ref 40–129)
ALT SERPL-CCNC: 14 U/L (ref 10–40)
ANION GAP SERPL CALCULATED.3IONS-SCNC: 12 MMOL/L (ref 3–16)
AST SERPL-CCNC: 20 U/L (ref 15–37)
BASOPHILS # BLD: 0.1 K/UL (ref 0–0.2)
BASOPHILS NFR BLD: 0.6 %
BILIRUB SERPL-MCNC: 0.5 MG/DL (ref 0–1)
BUN SERPL-MCNC: 9 MG/DL (ref 7–20)
CALCIUM SERPL-MCNC: 9.5 MG/DL (ref 8.3–10.6)
CHLORIDE SERPL-SCNC: 102 MMOL/L (ref 99–110)
CO2 SERPL-SCNC: 26 MMOL/L (ref 21–32)
CREAT SERPL-MCNC: 0.8 MG/DL (ref 0.6–1.2)
DEPRECATED RDW RBC AUTO: 13.2 % (ref 12.4–15.4)
EOSINOPHIL # BLD: 0.2 K/UL (ref 0–0.6)
EOSINOPHIL NFR BLD: 1.7 %
GFR SERPLBLD CREATININE-BSD FMLA CKD-EPI: 79 ML/MIN/{1.73_M2}
GLUCOSE SERPL-MCNC: 114 MG/DL (ref 70–99)
HCT VFR BLD AUTO: 41 % (ref 36–48)
HGB BLD-MCNC: 13.8 G/DL (ref 12–16)
LIPASE SERPL-CCNC: 22 U/L (ref 13–60)
LYMPHOCYTES # BLD: 1.7 K/UL (ref 1–5.1)
LYMPHOCYTES NFR BLD: 15 %
MCH RBC QN AUTO: 31.7 PG (ref 26–34)
MCHC RBC AUTO-ENTMCNC: 33.7 G/DL (ref 31–36)
MCV RBC AUTO: 94.1 FL (ref 80–100)
MONOCYTES # BLD: 0.6 K/UL (ref 0–1.3)
MONOCYTES NFR BLD: 5.5 %
NEUTROPHILS # BLD: 8.7 K/UL (ref 1.7–7.7)
NEUTROPHILS NFR BLD: 77.2 %
PLATELET # BLD AUTO: 285 K/UL (ref 135–450)
PMV BLD AUTO: 6.6 FL (ref 5–10.5)
POTASSIUM SERPL-SCNC: 4 MMOL/L (ref 3.5–5.1)
PROT SERPL-MCNC: 6.9 G/DL (ref 6.4–8.2)
RBC # BLD AUTO: 4.35 M/UL (ref 4–5.2)
SODIUM SERPL-SCNC: 140 MMOL/L (ref 136–145)
WBC # BLD AUTO: 11.2 K/UL (ref 4–11)

## 2025-08-22 PROCEDURE — 6370000000 HC RX 637 (ALT 250 FOR IP): Performed by: PHYSICIAN ASSISTANT

## 2025-08-22 PROCEDURE — 6360000002 HC RX W HCPCS: Performed by: PHYSICIAN ASSISTANT

## 2025-08-22 PROCEDURE — 81001 URINALYSIS AUTO W/SCOPE: CPT

## 2025-08-22 PROCEDURE — 99285 EMERGENCY DEPT VISIT HI MDM: CPT

## 2025-08-22 PROCEDURE — 80053 COMPREHEN METABOLIC PANEL: CPT

## 2025-08-22 PROCEDURE — 36415 COLL VENOUS BLD VENIPUNCTURE: CPT

## 2025-08-22 PROCEDURE — 96374 THER/PROPH/DIAG INJ IV PUSH: CPT

## 2025-08-22 PROCEDURE — 83690 ASSAY OF LIPASE: CPT

## 2025-08-22 PROCEDURE — 85025 COMPLETE CBC W/AUTO DIFF WBC: CPT

## 2025-08-22 RX ORDER — HYDROCODONE BITARTRATE AND ACETAMINOPHEN 5; 325 MG/1; MG/1
1 TABLET ORAL ONCE
Status: COMPLETED | OUTPATIENT
Start: 2025-08-22 | End: 2025-08-22

## 2025-08-22 RX ORDER — MORPHINE SULFATE 4 MG/ML
4 INJECTION, SOLUTION INTRAMUSCULAR; INTRAVENOUS ONCE
Refills: 0 | Status: DISCONTINUED | OUTPATIENT
Start: 2025-08-22 | End: 2025-08-22

## 2025-08-22 RX ORDER — ONDANSETRON 2 MG/ML
4 INJECTION INTRAMUSCULAR; INTRAVENOUS ONCE
Status: COMPLETED | OUTPATIENT
Start: 2025-08-22 | End: 2025-08-22

## 2025-08-22 RX ADMIN — ONDANSETRON 4 MG: 2 INJECTION, SOLUTION INTRAMUSCULAR; INTRAVENOUS at 23:31

## 2025-08-22 RX ADMIN — HYDROCODONE BITARTRATE AND ACETAMINOPHEN 1 TABLET: 5; 325 TABLET ORAL at 23:32

## 2025-08-22 ASSESSMENT — PAIN SCALES - GENERAL: PAINLEVEL_OUTOF10: 9

## 2025-08-22 ASSESSMENT — PAIN - FUNCTIONAL ASSESSMENT: PAIN_FUNCTIONAL_ASSESSMENT: 0-10

## 2025-08-23 ENCOUNTER — APPOINTMENT (OUTPATIENT)
Dept: CT IMAGING | Age: 70
End: 2025-08-23
Payer: MEDICARE

## 2025-08-23 LAB
BILIRUB UR QL STRIP.AUTO: NEGATIVE
CLARITY UR: CLEAR
COLOR UR: YELLOW
EPI CELLS #/AREA URNS HPF: ABNORMAL /HPF (ref 0–5)
GLUCOSE UR STRIP.AUTO-MCNC: NEGATIVE MG/DL
HGB UR QL STRIP.AUTO: NEGATIVE
KETONES UR STRIP.AUTO-MCNC: NEGATIVE MG/DL
LEUKOCYTE ESTERASE UR QL STRIP.AUTO: NEGATIVE
MUCOUS THREADS #/AREA URNS LPF: ABNORMAL /LPF
NITRITE UR QL STRIP.AUTO: NEGATIVE
PH UR STRIP.AUTO: 7 [PH] (ref 5–8)
PROT UR STRIP.AUTO-MCNC: NEGATIVE MG/DL
RBC #/AREA URNS HPF: ABNORMAL /HPF (ref 0–4)
SP GR UR STRIP.AUTO: 1.01 (ref 1–1.03)
UA DIPSTICK W REFLEX MICRO PNL UR: ABNORMAL
URN SPEC COLLECT METH UR: ABNORMAL
UROBILINOGEN UR STRIP-ACNC: 0.2 E.U./DL
WBC #/AREA URNS HPF: ABNORMAL /HPF (ref 0–5)

## 2025-08-23 PROCEDURE — 6360000004 HC RX CONTRAST MEDICATION: Performed by: PHYSICIAN ASSISTANT

## 2025-08-23 PROCEDURE — 6370000000 HC RX 637 (ALT 250 FOR IP): Performed by: PHYSICIAN ASSISTANT

## 2025-08-23 PROCEDURE — 74177 CT ABD & PELVIS W/CONTRAST: CPT

## 2025-08-23 PROCEDURE — 96375 TX/PRO/DX INJ NEW DRUG ADDON: CPT

## 2025-08-23 PROCEDURE — 6360000002 HC RX W HCPCS: Performed by: PHYSICIAN ASSISTANT

## 2025-08-23 RX ORDER — IOPAMIDOL 755 MG/ML
75 INJECTION, SOLUTION INTRAVASCULAR
Status: COMPLETED | OUTPATIENT
Start: 2025-08-23 | End: 2025-08-23

## 2025-08-23 RX ORDER — MORPHINE SULFATE 4 MG/ML
4 INJECTION, SOLUTION INTRAMUSCULAR; INTRAVENOUS ONCE
Refills: 0 | Status: COMPLETED | OUTPATIENT
Start: 2025-08-23 | End: 2025-08-23

## 2025-08-23 RX ORDER — CIPROFLOXACIN 500 MG/1
500 TABLET, FILM COATED ORAL 2 TIMES DAILY
Qty: 20 TABLET | Refills: 0 | Status: SHIPPED | OUTPATIENT
Start: 2025-08-23 | End: 2025-09-02

## 2025-08-23 RX ORDER — METRONIDAZOLE 250 MG/1
250 TABLET ORAL 3 TIMES DAILY
Qty: 30 TABLET | Refills: 0 | Status: SHIPPED | OUTPATIENT
Start: 2025-08-23 | End: 2025-09-02

## 2025-08-23 RX ORDER — METRONIDAZOLE 250 MG/1
250 TABLET ORAL ONCE
Status: COMPLETED | OUTPATIENT
Start: 2025-08-23 | End: 2025-08-23

## 2025-08-23 RX ORDER — CIPROFLOXACIN 500 MG/1
500 TABLET, FILM COATED ORAL ONCE
Status: COMPLETED | OUTPATIENT
Start: 2025-08-23 | End: 2025-08-23

## 2025-08-23 RX ADMIN — CIPROFLOXACIN HYDROCHLORIDE 500 MG: 500 TABLET, FILM COATED ORAL at 01:02

## 2025-08-23 RX ADMIN — MORPHINE SULFATE 4 MG: 4 INJECTION, SOLUTION INTRAMUSCULAR; INTRAVENOUS at 01:03

## 2025-08-23 RX ADMIN — IOPAMIDOL 75 ML: 755 INJECTION, SOLUTION INTRAVENOUS at 00:16

## 2025-08-23 RX ADMIN — METRONIDAZOLE 250 MG: 250 TABLET ORAL at 01:02

## 2025-08-26 RX ORDER — METOPROLOL SUCCINATE 25 MG/1
25 TABLET, EXTENDED RELEASE ORAL DAILY
Qty: 30 TABLET | Refills: 5 | Status: SHIPPED | OUTPATIENT
Start: 2025-08-26

## 2025-08-27 RX ORDER — PROMETHAZINE HYDROCHLORIDE 25 MG/1
25 TABLET ORAL EVERY 8 HOURS PRN
Qty: 20 TABLET | Refills: 0 | Status: SHIPPED | OUTPATIENT
Start: 2025-08-27

## 2025-08-28 ENCOUNTER — APPOINTMENT (OUTPATIENT)
Dept: PHYSICAL THERAPY | Age: 70
End: 2025-08-28
Payer: MEDICARE

## 2025-09-04 ENCOUNTER — PATIENT MESSAGE (OUTPATIENT)
Dept: FAMILY MEDICINE CLINIC | Age: 70
End: 2025-09-04

## 2025-09-05 ENCOUNTER — HOSPITAL ENCOUNTER (OUTPATIENT)
Dept: CT IMAGING | Age: 70
Discharge: HOME | End: 2025-09-05
Payer: MEDICARE

## 2025-09-05 ENCOUNTER — OFFICE VISIT (OUTPATIENT)
Dept: FAMILY MEDICINE CLINIC | Age: 70
End: 2025-09-05

## 2025-09-05 VITALS
SYSTOLIC BLOOD PRESSURE: 104 MMHG | DIASTOLIC BLOOD PRESSURE: 64 MMHG | HEART RATE: 109 BPM | WEIGHT: 152.8 LBS | BODY MASS INDEX: 25.43 KG/M2 | OXYGEN SATURATION: 96 %

## 2025-09-05 DIAGNOSIS — K57.92 ACUTE DIVERTICULITIS: ICD-10-CM

## 2025-09-05 DIAGNOSIS — S09.90XA TRAUMATIC INJURY OF HEAD, INITIAL ENCOUNTER: ICD-10-CM

## 2025-09-05 DIAGNOSIS — S09.90XA TRAUMATIC INJURY OF HEAD, INITIAL ENCOUNTER: Primary | ICD-10-CM

## 2025-09-05 PROCEDURE — 70450 CT HEAD/BRAIN W/O DYE: CPT

## 2025-09-05 RX ORDER — CIPROFLOXACIN 500 MG/1
500 TABLET, FILM COATED ORAL 2 TIMES DAILY
Qty: 8 TABLET | Refills: 0 | Status: SHIPPED | OUTPATIENT
Start: 2025-09-05 | End: 2025-09-09

## 2025-09-06 ASSESSMENT — ENCOUNTER SYMPTOMS
SHORTNESS OF BREATH: 0
NAUSEA: 0
ABDOMINAL PAIN: 0
VOMITING: 0
COUGH: 0

## (undated) DEVICE — GLOVE SURG SZ 65 L12IN FNGR THK94MIL STD WHT LTX FREE

## (undated) DEVICE — ZIMMER® STERILE DISPOSABLE TOURNIQUET CUFF WITH PLC, DUAL PORT, SINGLE BLADDER, 18 IN. (46 CM)

## (undated) DEVICE — SET ADMIN PRIMING 7ML L30IN 7.35LB 20 GTT 2ND RLER CLMP

## (undated) DEVICE — SYRINGE MED 10ML LUERLOCK TIP W/O SFTY DISP

## (undated) DEVICE — 3M™ TEGADERM™ TRANSPARENT FILM DRESSING FRAME STYLE, 1624W, 2-3/8 IN X 2-3/4 IN (6 CM X 7 CM), 100/CT 4CT/CASE: Brand: 3M™ TEGADERM™

## (undated) DEVICE — CORD ES L12FT BPLR FRCP

## (undated) DEVICE — SOLUTION IV 1000ML LAC RINGERS PH 6.5 INJ USP VIAFLX PLAS

## (undated) DEVICE — SOLUTION IV IRRIG 500ML 0.9% SODIUM CHL 2F7123

## (undated) DEVICE — SET GRAV VENT NVENT CK VLV 3 NDL FREE PRT 10 GTT

## (undated) DEVICE — GLOVE SURG SZ 65 L12IN FNGR THK79MIL GRN LTX FREE

## (undated) DEVICE — COMFO-TEX ELASTIC BANDAGE LATEX FREE, 4INX5YD: Brand: COMFO-TEX™

## (undated) DEVICE — TOWEL,OR,DSP,ST,BLUE,STD,8/PK,10PK/CS: Brand: MEDLINE

## (undated) DEVICE — DRESSING PETRO W7.6XL7.6CM CELOS ACETT NONADHERING MESH

## (undated) DEVICE — SUTURE VCRL SZ 3-0 L27IN ABSRB UD L26MM SH 1/2 CIR J416H

## (undated) DEVICE — DRAPE HND W114XL142IN BLU POLYPR W O PCH FEN CRD AND TB HLDR

## (undated) DEVICE — UNDERGLOVE SURG SZ 8 FNGR THK0.21MIL GRN LTX BEAD CUF

## (undated) DEVICE — CATHETER IV 20GA L1.25IN PNK FEP SFTY STR HUB RADPQ DISP

## (undated) DEVICE — GOWN,SIRUS,NON REINFRCD,LARGE,SET IN SL: Brand: MEDLINE

## (undated) DEVICE — WRAP COMPR W2INXL5YD TAN SELF ADH COBAN

## (undated) DEVICE — GLOVE SURG SZ 75 L12IN FNGR THK94MIL STD WHT LTX FREE

## (undated) DEVICE — Device

## (undated) DEVICE — NEEDLE HYPO 18GA L1.5IN THN WALL PIVOTING SHLD BVL ORIENTED

## (undated) DEVICE — NEEDLE HYPO 25GA L1.5IN BLU POLYPR HUB S STL REG BVL STR

## (undated) DEVICE — SUTURE CHROMIC GUT SZ 4-0 L18IN ABSRB BRN L19MM PS-2 3/8 1637G

## (undated) DEVICE — GLOVE,SURG,SENSICARE,ALOE,LF,PF,7: Brand: MEDLINE

## (undated) DEVICE — BANDAGE COMPR W2INXL5YD TAN BRTH SELF ADH WRP W/ HND TEAR